# Patient Record
Sex: FEMALE | Race: WHITE | Employment: FULL TIME | URBAN - METROPOLITAN AREA
[De-identification: names, ages, dates, MRNs, and addresses within clinical notes are randomized per-mention and may not be internally consistent; named-entity substitution may affect disease eponyms.]

---

## 2017-01-06 ENCOUNTER — TELEPHONE (OUTPATIENT)
Dept: FAMILY MEDICINE | Facility: CLINIC | Age: 62
End: 2017-01-06

## 2017-01-06 DIAGNOSIS — E78.5 HYPERLIPIDEMIA LDL GOAL <100: Primary | ICD-10-CM

## 2017-01-06 NOTE — TELEPHONE ENCOUNTER
Patient called and would like to know if  She could stop taking her fluoetine, lipitor and BP meds, due to her recent blood work that was done.  Wants to know what Dr. Arora thinks.    Cindy Horvath, [facility]

## 2017-01-09 RX ORDER — ATORVASTATIN CALCIUM 40 MG/1
40 TABLET, FILM COATED ORAL DAILY
Qty: 90 TABLET | Refills: 3 | Status: SHIPPED | OUTPATIENT
Start: 2017-01-09 | End: 2019-01-21

## 2017-01-09 NOTE — TELEPHONE ENCOUNTER
Patient returning call, please try patient again .     Latricia Collins [place] [facility]

## 2017-01-09 NOTE — TELEPHONE ENCOUNTER
Spoke with patient  She wants to stop the Prozac completely has only been taking one tablet and discussed it with Dr Arora at [facility] and now wants to stop , discussed will take one tab  Every other day for week and then stop  We review her cholesterol and she will continue on the statin refill sent   Will f/u in June P. [facility]  Clinic  RN/[place] Lynn

## 2017-01-16 RX ORDER — GLIPIZIDE 10 MG/1
10 TABLET, FILM COATED, EXTENDED RELEASE ORAL DAILY
Qty: 90 TABLET | Refills: 1 | Status: SHIPPED | OUTPATIENT
Start: 2017-01-16 | End: 2017-07-14

## 2017-01-16 NOTE — TELEPHONE ENCOUNTER
Glipizide XL  10mg         Last Written Prescription Date: 10/18/2016 #90 x 0  Last filled - not provided, mail order pharmacy, [facility]  Last Office Visit with [facility], [facility] or [facility] prescribing provider:  12/14/2016 WALESKA Arora        BP Readings from Last 3 Encounters:   12/14/16 120/76   03/09/16 112/80   12/23/15 120/74     MICROL       <5   12/14/2016  No results found for this basename: microalbumin  CREATININE   Date Value Ref Range Status   03/09/2016 0.69 0.52 - 1.04 mg/dL Final   ]  GFR ESTIMATE   Date Value Ref Range Status   03/09/2016 86 >60 mL/min/1.7m2 Final     Comment:     Non  GFR Calc   10/22/2015 81 >60 mL/min/1.7m2 Final     Comment:     Non  GFR Calc   09/29/2014 >90  Non  GFR Calc   >60 mL/min/1.7m2 Final     GFR ESTIMATE IF BLACK   Date Value Ref Range Status   03/09/2016 >90   GFR Calc   >60 mL/min/1.7m2 Final   10/22/2015 >90   GFR Calc   >60 mL/min/1.7m2 Final   09/29/2014 >90   GFR Calc   >60 mL/min/1.7m2 Final     CHOL      255   4/26/2012  HDL       43   4/26/2012  LDL       95   12/14/2016  LDL      172   4/26/2012  TRIG      197   4/26/2012  CHOLHDLRATIO      6.0   4/26/2012  AST       30   4/20/2011  ALT       26   4/20/2011  A1C      7.2   12/14/2016  A1C      7.9   3/9/2016  A1C      8.8   11/19/2015  A1C      8.3   7/13/2015  A1C      9.2   9/29/2014  POTASSIUM   Date Value Ref Range Status   03/09/2016 4.2 3.4 - 5.3 mmol/L Final

## 2017-01-16 NOTE — TELEPHONE ENCOUNTER
Prescription approved per [facility] Refill Protocol or patient Primary care provider (PCP)  JORDAN Sullivan RN/[place] Lynn

## 2017-01-24 DIAGNOSIS — F40.9 PHOBIA: Primary | ICD-10-CM

## 2017-01-24 RX ORDER — LORAZEPAM 1 MG/1
1 TABLET ORAL EVERY 8 HOURS PRN
Qty: 30 TABLET | Refills: 0 | Status: SHIPPED | OUTPATIENT
Start: 2017-01-24 | End: 2019-01-21

## 2017-01-24 NOTE — TELEPHONE ENCOUNTER
Routing refill request to provider for review/approval because:  Drug not on the [facility] refill protocol or controlled substance  [facility]  RN/[place] Lynn

## 2017-01-24 NOTE — TELEPHONE ENCOUNTER
Reason for Call:  Medication or medication refill:    Do you use a [facility]?  Name of the pharmacy and phone number for the current request:  See above    Name of the medication requested: lorazepam    Other request: patient is request this medication as she is leaving for [place] sea[place] trip on 2[place]10.  Please notify patient when ready.    Can we leave a detailed message on this number? YES    Phone number patient can be reached at: Home number on file [phone] (home)    Best Time:     Call taken on 1/24/2017 at 9:35 AM by Angeline Horvath

## 2017-02-03 ENCOUNTER — TELEPHONE (OUTPATIENT)
Dept: FAMILY MEDICINE | Facility: CLINIC | Age: 62
End: 2017-02-03

## 2017-02-03 NOTE — Clinical Note
[facility]  [address] [facility] [id number] [phone]      February 3, 2017      Marianne Monroy  [address] [place] [facility] [address]        Dear Marianne,     As part of [facility]'s commitment to health and wellness we have recently reviewed your chart and your medical record indicates that you are due for one or more of the following:    -- Physical/pap smear. The last pap that we have on file for you was from 2/15/2012. These are recommended every 3 years. Please call our clinic to schedule your pap smear / physical appointment.     -- Colonoscopy or FIT test. Please call one of the following numbers to schedule a colonoscopy:  [facility] [phone]  [facility] [phone]  [facility] [phone]  [facility] [phone] (multiple sites, call for locations)    A colonoscopy is the gold standard but if you are reluctant to do this there is a less invasive and less expensive alternative. FIT (fecal immunochemical testing) is a screening option that is performed on a yearly basis. This is a test to check for blood in the stool, which can be performed in the comfort of your own home. If you are willing to perform this test, we can order the kit for you to  at our lab. When you have completed the test, you simply mail it in the pre-addressed envelope.    Please call us at [phone] if you need an order for a colonoscopy or FIT testing.    Please try to schedule and/or complete the tests above within the next 2-4 weeks.   The number to call to schedule an appointment at [facility] is [phone].    While we work hard to maintain accurate records on all our patients, it is always possible that this notice does not accurately reflect tests that you may have had. To ensure that we do not continue to send you notices please verify, at your next visit or by a Hello[employer] message, that we have accurate dates of your tests, even if  these were done many years ago.     Working together for your health is our goal.    Thank you for choosing [facility] for your healthcare needs.      Sincerely,     Abigail Arora MD

## 2017-02-03 NOTE — TELEPHONE ENCOUNTER
Panel Management Review      Patient has the following on her problem list:     Depression / Dysthymia review  PHQ-9 SCORE 12/23/2015 3/9/2016 12/14/2016   Total Score - - -   Total Score 2 1 2      Patient is due for:  None    Diabetes    ASA: Passed    Last A1C  A1C      7.2   12/14/2016  A1C      7.9   3/9/2016  A1C      8.8   11/19/2015  A1C      8.3   7/13/2015  A1C      9.2   9/29/2014  A1C tested: Passed    Last LDL:    CHOL      255   4/26/2012  HDL       43   4/26/2012  LDL       95   12/14/2016  LDL      172   4/26/2012  TRIG      197   4/26/2012  CHOLHDLRATIO      6.0   4/26/2012  No results found for this basename: nhdl    Is the patient on a Statin? YES             Is the patient on Aspirin? YES    Medications     HMG CoA Reductase Inhibitors    atorvastatin (LIPITOR) 40 MG tablet    Salicylates    ASPIRIN 81 MG OR TABS          Last three blood pressure readings:  BP Readings from Last 3 Encounters:   12/14/16 120/76   03/09/16 112/80   12/23/15 120/74       Date of last diabetes office visit: 12/14/16     Tobacco History:     History   Smoking status     Never Smoker    Smokeless tobacco     Never Used             Composite cancer screening  Chart review shows that this patient is due/due soon for the following Pap Smear and Colonoscopy  Summary:    Patient is due/failing the following:   PHYSICAL/PAP, also due for colonoscopy    Action needed:   Patient needs office visit for physical/pap.    Type of outreach:    Sent letter.    Questions for provider review:    None                                                                                                                                    Alejandra Stallings CMA

## 2017-02-20 DIAGNOSIS — I10 HYPERTENSION GOAL BP (BLOOD PRESSURE) < 140/90: ICD-10-CM

## 2017-02-20 NOTE — TELEPHONE ENCOUNTER
lisinopril      Last Written Prescription Date: 2-12-16  Last Fill Quantity: 90, # refills: 2  Last Office Visit with G, P or [facility] prescribing provider: 12-14-16 with Dr. Arora       Potassium   Date Value Ref Range Status   03/09/2016 4.2 3.4 - 5.3 mmol/L Final     Creatinine   Date Value Ref Range Status   03/09/2016 0.69 0.52 - 1.04 mg/dL Final     BP Readings from Last 3 Encounters:   12/14/16 120/76   03/09/16 112/80   12/23/15 120/74     Latricia Collins [facility]

## 2017-02-21 RX ORDER — LISINOPRIL 20 MG/1
20 TABLET ORAL DAILY
Qty: 90 TABLET | Refills: 2 | Status: SHIPPED | OUTPATIENT
Start: 2017-02-21 | End: 2017-12-20

## 2017-02-21 NOTE — TELEPHONE ENCOUNTER
Prescription approved per [facility] Refill Protocol or patient Primary care provider (PCP)  JORDAN Sullivan RN/[place]

## 2017-04-01 ENCOUNTER — TRANSFERRED RECORDS (OUTPATIENT)
Dept: HEALTH INFORMATION MANAGEMENT | Facility: CLINIC | Age: 62
End: 2017-04-01

## 2017-04-19 RX ORDER — METFORMIN HCL 500 MG
TABLET, EXTENDED RELEASE 24 HR ORAL
Qty: 360 TABLET | Refills: 2 | Status: SHIPPED | OUTPATIENT
Start: 2017-04-19 | End: 2018-01-14

## 2017-04-19 NOTE — TELEPHONE ENCOUNTER
metFORMIN (GLUCOPHAGE-XR) 500 MG 24 hr tablet         Last Written Prescription Date: 4/13/16  Last Fill Quantity: 360, # refills: 3  Last Office Visit with G, P or [facility] prescribing provider:  12/14/16        BP Readings from Last 3 Encounters:   12/14/16 120/76   03/09/16 112/80   12/23/15 120/74     Lab Results   Component Value Date    MICROL <5 12/14/2016     Lab Results   Component Value Date    UMALCR Unable to calculate due to low value 12/14/2016     Creatinine   Date Value Ref Range Status   03/09/2016 0.69 0.52 - 1.04 mg/dL Final   ]  GFR Estimate   Date Value Ref Range Status   03/09/2016 86 >60 mL/min/1.7m2 Final     Comment:     Non  GFR Calc   10/22/2015 81 >60 mL/min/1.7m2 Final     Comment:     Non  GFR Calc   09/29/2014 >90  Non  GFR Calc   >60 mL/min/1.7m2 Final     GFR Estimate If Black   Date Value Ref Range Status   03/09/2016 >90   GFR Calc   >60 mL/min/1.7m2 Final   10/22/2015 >90   GFR Calc   >60 mL/min/1.7m2 Final   09/29/2014 >90   GFR Calc   >60 mL/min/1.7m2 Final     Lab Results   Component Value Date    CHOL 255 04/26/2012     Lab Results   Component Value Date    HDL 43 04/26/2012     Lab Results   Component Value Date    LDL 95 12/14/2016     04/26/2012     Lab Results   Component Value Date    TRIG 197 04/26/2012     Lab Results   Component Value Date    CHOLHDLRATIO 6.0 04/26/2012     Lab Results   Component Value Date    AST 30 04/20/2011     Lab Results   Component Value Date    ALT 26 04/20/2011     Lab Results   Component Value Date    A1C 7.2 12/14/2016    A1C 7.9 03/09/2016    A1C 8.8 11/19/2015    A1C 8.3 07/13/2015    A1C 9.2 09/29/2014     Potassium   Date Value Ref Range Status   03/09/2016 4.2 3.4 - 5.3 mmol/L Final

## 2017-04-19 NOTE — TELEPHONE ENCOUNTER
Prescription approved per [facility] Refill Protocol or patient Primary care provider (PCP)  JORDAN Sullivan RN/[place]

## 2017-05-01 NOTE — PROGRESS NOTES
SUBJECTIVE:                                                    Marianne Monroy is a 62 year old female who presents to clinic today for the following health issues:        Diabetes Follow-up  Metformin 2,000mg qd, glipizide XL 10mg qd  Patient is checking blood sugars: 1-2 times daily.    Results are as follows:         Rangin - 140    Diabetic concerns: None     Symptoms of hypoglycemia (low blood sugar): none     Paresthesias (numbness or burning in feet) or sores: No     Date of last diabetic eye exam: 1 month ago     - patient says that her diet has changed.     Hyperlipidemia Follow-Up  Atorvastatin 40mg qd    Rate your low fat/cholesterol diet?: fair    Taking statin?  Yes, no muscle aches from statin    Other lipid medications/supplements?:  none     Hypertension Follow-up  Lisinopril 20mg qd    Outpatient blood pressures are being checked at home.    Low Salt Diet: low salt     Depression Followup  Ativan 1mg q8h PRN - Patient states that she has discontinued Fluoxetine 20mg qd    Status since last visit: Worsened since discontinuing medication 1 month ago. She states that she wanted to see how she did without medication. She would like to discuss starting Buspirone     See PHQ-9 for current symptoms.  Other associated symptoms: Trouble sleeping    Complicating factors:   Significant life event:  No   Current substance abuse:  None  Anxiety or Panic symptoms:  Yes    PHQ-9  English PHQ-9   Any Language            Amount of exercise or physical activity: None    Problems taking medications regularly: No    Medication side effects: none    Diet: Watching what she eats        ROS:  Constitutional, HEENT, cardiovascular, pulmonary, gi and gu systems are negative, except as otherwise noted.    This document serves as a record of the services and decisions personally performed and made by Abigail Arora MD. It was created on his behalf by Tristian Bah, a trained medical scribe. The creation of this  "document is based the provider's statements to the medical scribe.  Tristian Bah 4:39 PM May 8, 2017      OBJECTIVE:                                                    /80  Pulse 80  Ht 5' 4\" (1.626 m)  Wt 158 lb 6 oz (71.8 kg)  BMI 27.19 kg/m2  Body mass index is 27.19 kg/(m^2).     GENERAL: healthy, alert and no distress  EYES: Eyes grossly normal to inspection, conjunctivae and sclerae normal  RESP: lungs clear to auscultation - no rales, rhonchi or wheezes  CV: regular rate and rhythm, normal S1 S2, no murmur  MS: no gross musculoskeletal defects noted, no edema  NEURO: Normal strength and tone, mentation intact and speech normal  PSYCH: mentation appears normal, affect normal/bright         ASSESSMENT/PLAN:                                                      (E11.65) Uncontrolled diabetes mellitus type 2 without complications (H)  (primary encounter diagnosis)  Comment: A1c is 8.4 today, increased from previous visit. Patient says her diet has changed due to increased anxiety since discontinuing SSRI therapy. Patient would like to change lifestyle before changing medications.   Plan: Basic metabolic panel  (Ca, Cl, CO2, Creat,         Gluc, K, Na, BUN), Hemoglobin A1c, FOOT EXAM    (F41.1) Generalized anxiety disorder  Comment: Increased anxiety since discontinuing SSRI therapy. Will start buspar. Patient should continue using Ativan PRN.    Plan: busPIRone (BUSPAR) 5 MG tablet          (K21.9) Gastroesophageal reflux disease, esophagitis presence not specified  Comment: Patient is doing well with PPI therapy.   Plan: Continue with monitor.         Patient Instructions   *    Great that you went to [place]!    *    For the anxiety, try buspar. Start slowly.     *    No change in medications.     *    Follow up in 3 months.    *    Call with any questions.         Patient will follow up in 3 months or sooner, PRN. Patient instructed to call with any questions or concerns.      The information in this " document, created by a scribe for me, accurately reflects the services I personally performed and the decisions made by me. I have reviewed and approved this document for accuracy. 4:40 PM 5/8/2017    Abigail Arora MD  [facility]

## 2017-05-08 ENCOUNTER — OFFICE VISIT (OUTPATIENT)
Dept: FAMILY MEDICINE | Facility: CLINIC | Age: 62
End: 2017-05-08
Payer: COMMERCIAL

## 2017-05-08 VITALS
HEIGHT: 64 IN | HEART RATE: 80 BPM | BODY MASS INDEX: 27.04 KG/M2 | DIASTOLIC BLOOD PRESSURE: 80 MMHG | SYSTOLIC BLOOD PRESSURE: 126 MMHG | WEIGHT: 158.38 LBS

## 2017-05-08 DIAGNOSIS — I10 HYPERTENSION, GOAL BELOW 140/90: ICD-10-CM

## 2017-05-08 DIAGNOSIS — K21.9 GASTROESOPHAGEAL REFLUX DISEASE, ESOPHAGITIS PRESENCE NOT SPECIFIED: ICD-10-CM

## 2017-05-08 DIAGNOSIS — F41.1 GENERALIZED ANXIETY DISORDER: ICD-10-CM

## 2017-05-08 DIAGNOSIS — E78.5 HYPERLIPIDEMIA LDL GOAL <100: ICD-10-CM

## 2017-05-08 LAB
ANION GAP SERPL CALCULATED.3IONS-SCNC: 8 MMOL/L (ref 3–14)
BUN SERPL-MCNC: 17 MG/DL (ref 7–30)
CALCIUM SERPL-MCNC: 9.6 MG/DL (ref 8.5–10.1)
CHLORIDE SERPL-SCNC: 102 MMOL/L (ref 94–109)
CO2 SERPL-SCNC: 32 MMOL/L (ref 20–32)
CREAT SERPL-MCNC: 0.8 MG/DL (ref 0.52–1.04)
GFR SERPL CREATININE-BSD FRML MDRD: 72 ML/MIN/1.7M2
GLUCOSE SERPL-MCNC: 125 MG/DL (ref 70–99)
HBA1C MFR BLD: 8.4 % (ref 4.3–6)
POTASSIUM SERPL-SCNC: 4.1 MMOL/L (ref 3.4–5.3)
SODIUM SERPL-SCNC: 142 MMOL/L (ref 133–144)

## 2017-05-08 PROCEDURE — 36415 COLL VENOUS BLD VENIPUNCTURE: CPT | Performed by: FAMILY MEDICINE

## 2017-05-08 PROCEDURE — 99207 C FOOT EXAM  NO CHARGE: CPT | Performed by: FAMILY MEDICINE

## 2017-05-08 PROCEDURE — 99214 OFFICE O/P EST MOD 30 MIN: CPT | Performed by: FAMILY MEDICINE

## 2017-05-08 PROCEDURE — 80048 BASIC METABOLIC PNL TOTAL CA: CPT | Performed by: FAMILY MEDICINE

## 2017-05-08 PROCEDURE — 83036 HEMOGLOBIN GLYCOSYLATED A1C: CPT | Performed by: FAMILY MEDICINE

## 2017-05-08 RX ORDER — BUSPIRONE HYDROCHLORIDE 5 MG/1
TABLET ORAL
Qty: 150 TABLET | Refills: 0 | Status: SHIPPED | OUTPATIENT
Start: 2017-05-08 | End: 2017-06-02

## 2017-05-08 NOTE — PATIENT INSTRUCTIONS
*    Great that you went to [place]!    *    For the anxiety, try buspar. Start slowly.     *    No change in medications.     *    Follow up in 3 months.    *    Call with any questions.

## 2017-05-08 NOTE — Clinical Note
Please abstract the following data from this visit with this patient into the appropriate field in Epic:  Eye exam with ophthalmology on this date: 4/1/2017

## 2017-05-08 NOTE — NURSING NOTE
"Chief Complaint   Patient presents with     Diabetes     Depression       Initial /80  Pulse 80  Ht 5' 4\" (1.626 m)  Wt 158 lb 6 oz (71.8 kg)  BMI 27.19 kg/m2 Estimated body mass index is 27.19 kg/(m^2) as calculated from the following:    Height as of this encounter: 5' 4\" (1.626 m).    Weight as of this encounter: 158 lb 6 oz (71.8 kg).  Medication Reconciliation: complete  "

## 2017-05-09 ASSESSMENT — ANXIETY QUESTIONNAIRES
7. FEELING AFRAID AS IF SOMETHING AWFUL MIGHT HAPPEN: NOT AT ALL
1. FEELING NERVOUS, ANXIOUS, OR ON EDGE: MORE THAN HALF THE DAYS
GAD7 TOTAL SCORE: 11
3. WORRYING TOO MUCH ABOUT DIFFERENT THINGS: SEVERAL DAYS
5. BEING SO RESTLESS THAT IT IS HARD TO SIT STILL: MORE THAN HALF THE DAYS
2. NOT BEING ABLE TO STOP OR CONTROL WORRYING: SEVERAL DAYS
6. BECOMING EASILY ANNOYED OR IRRITABLE: NEARLY EVERY DAY

## 2017-05-09 ASSESSMENT — PATIENT HEALTH QUESTIONNAIRE - PHQ9: 5. POOR APPETITE OR OVEREATING: MORE THAN HALF THE DAYS

## 2017-05-10 ASSESSMENT — ANXIETY QUESTIONNAIRES: GAD7 TOTAL SCORE: 11

## 2017-05-10 ASSESSMENT — PATIENT HEALTH QUESTIONNAIRE - PHQ9: SUM OF ALL RESPONSES TO PHQ QUESTIONS 1-9: 8

## 2017-06-02 ENCOUNTER — TELEPHONE (OUTPATIENT)
Dept: FAMILY MEDICINE | Facility: CLINIC | Age: 62
End: 2017-06-02

## 2017-06-02 DIAGNOSIS — F33.0 MAJOR DEPRESSIVE DISORDER, RECURRENT EPISODE, MILD (H): ICD-10-CM

## 2017-06-02 NOTE — TELEPHONE ENCOUNTER
Patient called  that she has questions about her buspar medication.    [facility]

## 2017-06-02 NOTE — TELEPHONE ENCOUNTER
"Patient presents at clinic requesting a Fluoxetine 20 mg 2 tab daily script sent to our  [place] pharmacy. The Buspar is not working for her. \"It works for about 2 hours and then I feel my anxiety ramping up again.\"    Routing to provider for script. Patient is waiting.    Aydee Saavedra RN    "

## 2017-07-19 RX ORDER — GLIPIZIDE 10 MG/1
TABLET, FILM COATED, EXTENDED RELEASE ORAL
Qty: 90 TABLET | Refills: 3 | Status: SHIPPED | OUTPATIENT
Start: 2017-07-19 | End: 2018-07-13

## 2017-07-19 NOTE — TELEPHONE ENCOUNTER
Prescription approved per [facility] Refill Protocol or patient Primary care provider (PCP)  JORDAN Sullivan RN/[place]

## 2017-08-19 ENCOUNTER — HEALTH MAINTENANCE LETTER (OUTPATIENT)
Age: 62
End: 2017-08-19

## 2017-11-17 ENCOUNTER — OFFICE VISIT (OUTPATIENT)
Dept: FAMILY MEDICINE | Facility: CLINIC | Age: 62
End: 2017-11-17
Payer: COMMERCIAL

## 2017-11-17 VITALS
SYSTOLIC BLOOD PRESSURE: 125 MMHG | HEART RATE: 80 BPM | DIASTOLIC BLOOD PRESSURE: 80 MMHG | BODY MASS INDEX: 25.88 KG/M2 | TEMPERATURE: 98.3 F | HEIGHT: 64 IN | WEIGHT: 151.6 LBS

## 2017-11-17 DIAGNOSIS — Z23 NEED FOR PROPHYLACTIC VACCINATION AND INOCULATION AGAINST INFLUENZA: ICD-10-CM

## 2017-11-17 DIAGNOSIS — F33.0 MAJOR DEPRESSIVE DISORDER, RECURRENT EPISODE, MILD (H): ICD-10-CM

## 2017-11-17 DIAGNOSIS — Z11.59 NEED FOR HEPATITIS C SCREENING TEST: ICD-10-CM

## 2017-11-17 DIAGNOSIS — N30.00 ACUTE CYSTITIS WITHOUT HEMATURIA: Primary | ICD-10-CM

## 2017-11-17 DIAGNOSIS — E11.9 TYPE 2 DIABETES MELLITUS WITHOUT COMPLICATION, WITHOUT LONG-TERM CURRENT USE OF INSULIN (H): ICD-10-CM

## 2017-11-17 DIAGNOSIS — I10 HYPERTENSION, GOAL BELOW 140/90: ICD-10-CM

## 2017-11-17 DIAGNOSIS — R30.0 DYSURIA: ICD-10-CM

## 2017-11-17 LAB
ALBUMIN UR-MCNC: NEGATIVE MG/DL
APPEARANCE UR: CLEAR
BACTERIA #/AREA URNS HPF: ABNORMAL /HPF
BILIRUB UR QL STRIP: NEGATIVE
COLOR UR AUTO: YELLOW
GLUCOSE UR STRIP-MCNC: NEGATIVE MG/DL
HBA1C MFR BLD: 7 % (ref 4.3–6)
HGB UR QL STRIP: NEGATIVE
KETONES UR STRIP-MCNC: NEGATIVE MG/DL
LEUKOCYTE ESTERASE UR QL STRIP: ABNORMAL
NITRATE UR QL: NEGATIVE
NON-SQ EPI CELLS #/AREA URNS LPF: ABNORMAL /LPF
PH UR STRIP: 7.5 PH (ref 5–7)
RBC #/AREA URNS AUTO: ABNORMAL /HPF
SOURCE: ABNORMAL
SP GR UR STRIP: 1.02 (ref 1–1.03)
UROBILINOGEN UR STRIP-ACNC: 1 EU/DL (ref 0.2–1)
WBC #/AREA URNS AUTO: ABNORMAL /HPF

## 2017-11-17 PROCEDURE — 83721 ASSAY OF BLOOD LIPOPROTEIN: CPT | Performed by: PHYSICIAN ASSISTANT

## 2017-11-17 PROCEDURE — 99214 OFFICE O/P EST MOD 30 MIN: CPT | Mod: 25 | Performed by: PHYSICIAN ASSISTANT

## 2017-11-17 PROCEDURE — 84443 ASSAY THYROID STIM HORMONE: CPT | Performed by: PHYSICIAN ASSISTANT

## 2017-11-17 PROCEDURE — 86803 HEPATITIS C AB TEST: CPT | Performed by: PHYSICIAN ASSISTANT

## 2017-11-17 PROCEDURE — 90686 IIV4 VACC NO PRSV 0.5 ML IM: CPT | Performed by: PHYSICIAN ASSISTANT

## 2017-11-17 PROCEDURE — 83036 HEMOGLOBIN GLYCOSYLATED A1C: CPT | Performed by: PHYSICIAN ASSISTANT

## 2017-11-17 PROCEDURE — 82043 UR ALBUMIN QUANTITATIVE: CPT | Performed by: PHYSICIAN ASSISTANT

## 2017-11-17 PROCEDURE — 80053 COMPREHEN METABOLIC PANEL: CPT | Performed by: PHYSICIAN ASSISTANT

## 2017-11-17 PROCEDURE — 81001 URINALYSIS AUTO W/SCOPE: CPT | Performed by: PHYSICIAN ASSISTANT

## 2017-11-17 PROCEDURE — 36415 COLL VENOUS BLD VENIPUNCTURE: CPT | Performed by: PHYSICIAN ASSISTANT

## 2017-11-17 PROCEDURE — 90471 IMMUNIZATION ADMIN: CPT | Performed by: PHYSICIAN ASSISTANT

## 2017-11-17 RX ORDER — SULFAMETHOXAZOLE/TRIMETHOPRIM 800-160 MG
1 TABLET ORAL 2 TIMES DAILY
Qty: 6 TABLET | Refills: 0 | Status: SHIPPED | OUTPATIENT
Start: 2017-11-17 | End: 2017-11-20

## 2017-11-17 NOTE — LETTER
November 21, 2017      Marianne M Porfirio  [address] [facility] [address]            Dear Marianne,     The rest of your labs look good.     Please follow-up if you have any questions or concerns.     Resulted Orders   Comprehensive metabolic panel   Result Value Ref Range    Sodium 138 133 - 144 mmol/L    Potassium 4.0 3.4 - 5.3 mmol/L    Chloride 101 94 - 109 mmol/L    Carbon Dioxide 31 20 - 32 mmol/L    Anion Gap 6 3 - 14 mmol/L    Glucose 53 (L) 70 - 99 mg/dL      Comment:      Non Fasting    Urea Nitrogen 18 7 - 30 mg/dL    Creatinine 0.74 0.52 - 1.04 mg/dL    GFR Estimate 79 >60 mL/min/1.7m2      Comment:      Non  GFR Calc    GFR Estimate If Black >90 >60 mL/min/1.7m2      Comment:       GFR Calc    Calcium 9.6 8.5 - 10.1 mg/dL    Bilirubin Total 0.3 0.2 - 1.3 mg/dL    Albumin 3.9 3.4 - 5.0 g/dL    Protein Total 7.9 6.8 - 8.8 g/dL    Alkaline Phosphatase 102 40 - 150 U/L    ALT 36 0 - 50 U/L    AST 16 0 - 45 U/L   Hemoglobin A1c   Result Value Ref Range    Hemoglobin A1C 7.0 (H) 4.3 - 6.0 %   Albumin Random Urine Quantitative with Creat Ratio   Result Value Ref Range    Creatinine Urine 150 mg/dL    Albumin Urine mg/L 27 mg/L    Albumin Urine mg/g Cr 18.20 0 - 25 mg/g Cr   *UA reflex to Microscopic   Result Value Ref Range    Color Urine Yellow     Appearance Urine Clear     Glucose Urine Negative NEG^Negative mg/dL    Bilirubin Urine Negative NEG^Negative    Ketones Urine Negative NEG^Negative mg/dL    Specific Gravity Urine 1.020 1.003 - 1.035    Blood Urine Negative NEG^Negative    pH Urine 7.5 (H) 5.0 - 7.0 pH    Protein Albumin Urine Negative NEG^Negative mg/dL    Urobilinogen Urine 1.0 0.2 - 1.0 EU/dL    Nitrite Urine Negative NEG^Negative    Leukocyte Esterase Urine Trace (A) NEG^Negative    Source Midstream Urine    LDL cholesterol direct   Result Value Ref Range    LDL Cholesterol Direct 136 (H) <100 mg/dL      Comment:      Above desirable:  100-129  mg/dl  Borderline High:  130-159 mg/dL  High:             160-189 mg/dL  Very high:       >189 mg/dl     Urine Microscopic   Result Value Ref Range    WBC Urine 2-5 (A) OTO2^O - 2 /HPF    RBC Urine O - 2 OTO2^O - 2 /HPF    Squamous Epithelial /LPF Urine Few FEW^Few /LPF    Bacteria Urine Few (A) NEG^Negative /HPF   TSH with free T4 reflex   Result Value Ref Range    TSH 3.11 0.40 - 4.00 mU/L   **Hepatitis C Screen Reflex to RNA FUTURE anytime   Result Value Ref Range    Hepatitis C Antibody Nonreactive NR^Nonreactive      Comment:      Assay performance characteristics have not been established for newborns,   infants, and children         If you have any questions or concerns, please call the clinic at the number listed above.       Sincerely,     Jannet Rick PA-C/ag

## 2017-11-17 NOTE — NURSING NOTE
"Chief Complaint   Patient presents with     UTI       Initial /80  Pulse 80  Temp 98.3  F (36.8  C) (Tympanic)  Ht 5' 4\" (1.626 m)  Wt 151 lb 9.6 oz (68.8 kg)  Breastfeeding? No  BMI 26.02 kg/m2 Estimated body mass index is 26.02 kg/(m^2) as calculated from the following:    Height as of this encounter: 5' 4\" (1.626 m).    Weight as of this encounter: 151 lb 9.6 oz (68.8 kg).  Medication Reconciliation: complete     Chey Trinh MA      "

## 2017-11-17 NOTE — MR AVS SNAPSHOT
After Visit Summary   11/17/2017    Marianne Monroy    MRN: [id number]           Patient Information     Date Of Birth          1955        Visit Information        Provider Department      11/17/2017 4:00 PM Jannet Rick PA-C [facility]        Today's Diagnoses     Uncontrolled type 2 diabetes mellitus without complication, without long-term current use of insulin (H)    -  1    Hypertension, goal below 140/90        Dysuria        Need for prophylactic vaccination and inoculation against influenza        Need for hepatitis C screening test        Acute cystitis without hematuria        Major depressive disorder, recurrent episode, mild (H)          Care Instructions    Great job getting your sugars down!  Follow-up in 4-6 months for a physical and pap.             Follow-ups after your visit        Future tests that were ordered for you today     Open Future Orders        Priority Expected Expires Ordered    **Hepatitis C Screen Reflex to RNA FUTURE anytime Routine 11/17/2017 11/17/2018 11/17/2017            Who to contact     Normal or non-critical lab and imaging results will be communicated to you by [employer], letter or phone within 4 business days after the clinic has received the results. If you do not hear from us within 7 days, please contact the clinic through [employer] or phone. If you have a critical or abnormal lab result, we will notify you by phone as soon as possible.  Submit refill requests through [employer] or call your pharmacy and they will forward the refill request to us. Please allow 3 business days for your refill to be completed.          If you need to speak with a  for additional information , please call: [phone]           Additional Information About Your Visit        [employer] Information     [employer] gives you secure access to your electronic health record. If you see a primary care provider, you can also send messages to your  "care team and make appointments. If you have questions, please call your primary care clinic.  If you do not have a primary care provider, please call [phone] and they will assist you.        Care EveryWhere ID     This is your Care EveryWhere ID. This could be used by other organizations to access your [facility] medical records  [id number]        Your Vitals Were     Pulse Temperature Height Breastfeeding? BMI (Body Mass Index)       80 98.3  F (36.8  C) (Tympanic) 5' 4\" (1.626 m) No 26.02 kg/m2        Blood Pressure from Last 3 Encounters:   11/17/17 125/80   05/08/17 126/80   12/14/16 120/76    Weight from Last 3 Encounters:   11/17/17 151 lb 9.6 oz (68.8 kg)   05/08/17 158 lb 6 oz (71.8 kg)   12/14/16 152 lb 2 oz (69 kg)              We Performed the Following     *UA reflex to Microscopic     Albumin Random Urine Quantitative with Creat Ratio     Comprehensive metabolic panel     FLU VAC, SPLIT VIRUS IM > 3 YO (QUADRIVALENT) [58202]     Hemoglobin A1c     LDL cholesterol direct     TSH with free T4 reflex     Urine Microscopic     Vaccine Administration, Initial [59771]          Today's Medication Changes          These changes are accurate as of: 11/17/17  4:20 PM.  If you have any questions, ask your nurse or doctor.               Start taking these medicines.        Dose/Directions    sulfamethoxazole-trimethoprim 800-160 MG per tablet   Commonly known as:  BACTRIM DS/SEPTRA DS   Used for:  Acute cystitis without hematuria   Started by:  Jannet Rick PA-C        Dose:  1 tablet   Take 1 tablet by mouth 2 times daily for 3 days   Quantity:  6 tablet   Refills:  0            Where to get your medicines      These medications were sent to [facility] [address]  [address], [facility] [address]     Phone:  [phone]     sulfamethoxazole-trimethoprim 800-160 MG per tablet                Primary Care Provider Office Phone # Fax #    Abigail Dior " MD [place] [phone]       [address] [place] [address]        Equal Access to Services     MAIDA PARRA : Hadii aad ku hadrenéego Adelinasammy, wakerenda lucreciaqsobeidaha, qanhita kaalmada franchesca, andrzej spivey. So [facility] [phone].    ATENCIÓN: Si habla español, tiene a lewis disposición servicios gratuitos de asistencia lingüística. Llame al [phone].    We comply with applicable federal civil rights laws and [place] laws. We do not discriminate on the basis of race, color, national origin, age, disability, sex, sexual orientation, or gender identity.            Thank you!     [facility]  for your care. Our goal is always to provide you with excellent care. Hearing back from our patients is one way we can continue to improve our services. Please take a few minutes to complete the written survey that you may receive in the mail after your visit with us. Thank you!             Your Updated Medication List - Protect others around you: Learn how to safely use, store and throw away your medicines at www.disposemymeds.org.          This list is accurate as of: 11/17/17  4:20 PM.  Always use your most recent med list.                   Brand Name Dispense Instructions for use Diagnosis    aspirin 81 MG tablet      ONE DAILY        atorvastatin 40 MG tablet    LIPITOR    90 tablet    Take 1 tablet (40 mg) by mouth daily    Hyperlipidemia LDL goal <100       blood glucose monitoring lancets     100 Box    1 each 2 times daily Use to test blood sugar 2 times daily or as directed.    Diabetes mellitus, type 2 (H)       blood glucose monitoring meter device kit    no brand specified    1 Kit    PER PATIENT HEALTH PLAN    Diabetes mellitus, type 2 (H)       blood glucose monitoring test strip    JESSE CONTOUR    100 each    1 strip by In Vitro route 2 times daily    Type 2 diabetes, HbA1c goal < 7% (H)       fish oil-omega-3 fatty acids 1000 MG  capsule      1 tablet daily        * FLUoxetine 20 MG capsule    PROZAC    180 capsule    2 tablets daily for mood.    Major depressive disorder, recurrent episode, mild (H)       * FLUoxetine 20 MG capsule    PROzac    60 capsule    Take 2 capsules (40 mg) by mouth daily    Major depressive disorder, recurrent episode, mild (H)       glipiZIDE 10 MG 24 hr tablet    GLUCOTROL XL    90 tablet    TAKE 1 TABLET DAILY    Uncontrolled diabetes mellitus type 2 without complications (H)       HERBALS      sleep med- prn        lisinopril 20 MG tablet    PRINIVIL/ZESTRIL    90 tablet    Take 1 tablet (20 mg) by mouth daily    Hypertension goal BP (blood pressure) < 140/90       LORazepam 1 MG tablet    ATIVAN    30 tablet    Take 1 tablet (1 mg) by mouth every 8 hours as needed for anxiety    Phobia       metFORMIN 500 MG 24 hr tablet    GLUCOPHAGE-XR    360 tablet    TAKE 4 TABLETS DAILY    Uncontrolled diabetes mellitus type 2 without complications (H)       Multiple vitamin Tabs      1 TABLET DAILY        omeprazole 20 MG CR capsule    priLOSEC    90 capsule    Take 1 capsule (20 mg) by mouth daily    GERD (gastroesophageal reflux disease)       sulfamethoxazole-trimethoprim 800-160 MG per tablet    BACTRIM DS/SEPTRA DS    6 tablet    Take 1 tablet by mouth 2 times daily for 3 days    Acute cystitis without hematuria       * Notice:  This list has 2 medication(s) that are the same as other medications prescribed for you. Read the directions carefully, and ask your doctor or other care provider to review them with you.

## 2017-11-18 LAB
ALBUMIN SERPL-MCNC: 3.9 G/DL (ref 3.4–5)
ALP SERPL-CCNC: 102 U/L (ref 40–150)
ALT SERPL W P-5'-P-CCNC: 36 U/L (ref 0–50)
ANION GAP SERPL CALCULATED.3IONS-SCNC: 6 MMOL/L (ref 3–14)
AST SERPL W P-5'-P-CCNC: 16 U/L (ref 0–45)
BILIRUB SERPL-MCNC: 0.3 MG/DL (ref 0.2–1.3)
BUN SERPL-MCNC: 18 MG/DL (ref 7–30)
CALCIUM SERPL-MCNC: 9.6 MG/DL (ref 8.5–10.1)
CHLORIDE SERPL-SCNC: 101 MMOL/L (ref 94–109)
CO2 SERPL-SCNC: 31 MMOL/L (ref 20–32)
CREAT SERPL-MCNC: 0.74 MG/DL (ref 0.52–1.04)
CREAT UR-MCNC: 150 MG/DL
GFR SERPL CREATININE-BSD FRML MDRD: 79 ML/MIN/1.7M2
GLUCOSE SERPL-MCNC: 53 MG/DL (ref 70–99)
LDLC SERPL DIRECT ASSAY-MCNC: 136 MG/DL
MICROALBUMIN UR-MCNC: 27 MG/L
MICROALBUMIN/CREAT UR: 18.2 MG/G CR (ref 0–25)
POTASSIUM SERPL-SCNC: 4 MMOL/L (ref 3.4–5.3)
PROT SERPL-MCNC: 7.9 G/DL (ref 6.8–8.8)
SODIUM SERPL-SCNC: 138 MMOL/L (ref 133–144)
TSH SERPL DL<=0.005 MIU/L-ACNC: 3.11 MU/L (ref 0.4–4)

## 2017-11-20 LAB — HCV AB SERPL QL IA: NONREACTIVE

## 2017-11-21 DIAGNOSIS — F33.0 MAJOR DEPRESSIVE DISORDER, RECURRENT EPISODE, MILD (H): Primary | ICD-10-CM

## 2017-12-20 DIAGNOSIS — I10 HYPERTENSION GOAL BP (BLOOD PRESSURE) < 140/90: ICD-10-CM

## 2017-12-21 RX ORDER — LISINOPRIL 20 MG/1
TABLET ORAL
Qty: 90 TABLET | Refills: 2 | Status: SHIPPED | OUTPATIENT
Start: 2017-12-21 | End: 2018-09-16

## 2017-12-21 NOTE — TELEPHONE ENCOUNTER
Prescription approved per [facility] Refill Protocol or patient Primary care provider (PCP)  JORDAN Sullivan RN/[place]

## 2017-12-21 NOTE — TELEPHONE ENCOUNTER
Requested Prescriptions   Pending Prescriptions Disp Refills     lisinopril (PRINIVIL/ZESTRIL) 20 MG tablet [Pharmacy Med Name: LISINOPRIL TABS 20MG] 90 tablet 2    Last Written Prescription Date:  02/21/2017 #90 x 0  Last filled 09/22/2017  Last Office Visit with [facility], [facility] or [facility] prescribing provider:  11/17/2017 WALESKA Rick   Future Office Visit:   none   Sig: TAKE 1 TABLET DAILY    ACE Inhibitors (Including Combos) Protocol Passed    12/20/2017 11:36 PM       Passed - Blood pressure under 140/90    BP Readings from Last 3 Encounters:   11/17/17 125/80   05/08/17 126/80   12/14/16 120/76                Passed - Recent or future visit with authorizing provider's specialty    Patient had office visit in the last year or has a visit in the next 30 days with authorizing provider.  See chart review.              Passed - Patient is age 18 or older       Passed - No active pregnancy on record       Passed - Normal serum creatinine on file in past 12 months    Recent Labs   Lab Test  11/17/17   1543   CR  0.74            Passed - Normal serum potassium on file in past 12 months    Recent Labs   Lab Test  11/17/17   1543   POTASSIUM  4.0            Passed - No positive pregnancy test in past 12 months

## 2018-01-08 DIAGNOSIS — F33.0 MAJOR DEPRESSIVE DISORDER, RECURRENT EPISODE, MILD (H): ICD-10-CM

## 2018-01-08 NOTE — TELEPHONE ENCOUNTER
"Requested Prescriptions   Pending Prescriptions Disp Refills     FLUoxetine (PROZAC) 20 MG capsule [Pharmacy Med Name: FLUOXETINE HCL CAPS 20MG] 180 capsule 1    Last Written Prescription Date:  06/02/2017 #180 x 1  Last filled 10/02/2017  Last Office Visit with [facility], [facility] or [facility] prescribing provider:  11/17/2017 WALESKA Rick   Future Office Visit: none      Sig: TAKE 2 CAPSULES DAILY FOR MOOD    SSRIs Protocol Failed  PHQ-9 SCORE 3/9/2016 12/14/2016 5/9/2017   Total Score - - -   Total Score 1 2 8         JUSTUS-7 SCORE 3/9/2016 12/14/2016 5/9/2017   Total Score 1 2 11           1/8/2018  5:24 PM       Failed - PHQ-9 score less than 5 in past 6 months    The PHQ-9 criteria is meant to fail. It requires a PHQ-9 score review         Passed - Patient is age 18 or older       Passed - No active pregnancy on record       Passed - No positive pregnancy test in last 12 months       Passed - Recent (6 mo) or future visit with authorizing provider's specialty    Patient had office visit in the last 6 months or has a visit in the next 30 days with authorizing provider.  See \"Patient Info\" tab in inbasket, or \"Choose Columns\" in Meds & Orders section of the refill encounter.             "

## 2018-01-10 ENCOUNTER — MYC MEDICAL ADVICE (OUTPATIENT)
Dept: OTHER | Age: 63
End: 2018-01-10

## 2018-01-10 DIAGNOSIS — F33.0 MAJOR DEPRESSIVE DISORDER, RECURRENT EPISODE, MILD (H): ICD-10-CM

## 2018-01-11 NOTE — TELEPHONE ENCOUNTER
Patient called and wants to know why her Fluoxetine has not been filled.  [facility]

## 2018-01-12 ASSESSMENT — PATIENT HEALTH QUESTIONNAIRE - PHQ9: SUM OF ALL RESPONSES TO PHQ QUESTIONS 1-9: 6

## 2018-01-12 NOTE — TELEPHONE ENCOUNTER
Please fill medication. Last PHQ-9 was 8. Then forward to Alejandra or nurse pool to do PHQ-9. Left message for patient to call back if she wants this at a local pharmacy as well. Thanks, Mena Duncan RN

## 2018-01-12 NOTE — TELEPHONE ENCOUNTER
Spoke with patient.  PHQ-9 updated and updated patient that script was sent.  Routing to Dr. Arora to review.    Aydee Saavedra RN

## 2018-01-12 NOTE — TELEPHONE ENCOUNTER
Pt states she is out of her fluoxetine and needs it asap to avoid withdrawal. Pt reports she does NOT use MyChart so she didn't receive the message from RN regarding PHQ9. Pt requesting call back after 2pm 1/12 so she can do it over the phone. Her mother is in the hospital so she will be gone until 2pm.      Janie Pelaez, [place]

## 2018-01-15 NOTE — TELEPHONE ENCOUNTER
"Requested Prescriptions   Pending Prescriptions Disp Refills     metFORMIN (GLUCOPHAGE-XR) 500 MG 24 hr tablet [Pharmacy Med Name: METFORMIN HCL ER TABS 500MG] 360 tablet 2    Last Written Prescription Date:  04/19/2017 #360 x 2  Last filled 10/17/2017  Last Office Visit with [facility], [facility] or [facility] prescribing provider:  11/17/2017 WALESKA Rick   Future Office Visit:  none    Sig: TAKE 4 TABLETS DAILY    Biguanide Agents Passed    1/14/2018 11:34 PM       Passed - Patient's BP is less than 140/90    BP Readings from Last 3 Encounters:   11/17/17 125/80   05/08/17 126/80   12/14/16 120/76                Passed - Patient has documented LDL within the past 12 mos.    Recent Labs   Lab Test  11/17/17   1543   LDL  136*            Passed - Patient has had a Microalbumin in the past 12 mos.    Recent Labs   Lab Test  11/17/17   1547   MICROL  27   UMALCR  18.20            Passed - Patient is age 10 or older       Passed - Patient has documented A1c within the specified period of time.    Recent Labs   Lab Test  11/17/17   1543   A1C  7.0*            Passed - Patient's CR is NOT>1.4 OR Patient's EGFR is NOT<45 within past 12 mos.    Recent Labs   Lab Test  11/17/17   1543   GFRESTIMATED  79   GFRESTBLACK  >90       Recent Labs   Lab Test  11/17/17   1543   CR  0.74            Passed - Patient does NOT have a diagnosis of CHF.       Passed - Patient is not pregnant       Passed - Patient has not had a positive pregnancy test within the past 12 mos.        Passed - Recent (6 mos) or future visit with authorizing provider's specialty    Patient had office visit in the last 6 months or has a visit in the next 30 days with authorizing provider.  See \"Patient Info\" tab in inbasket, or \"Choose Columns\" in Meds & Orders section of the refill encounter.             "

## 2018-01-19 RX ORDER — METFORMIN HCL 500 MG
TABLET, EXTENDED RELEASE 24 HR ORAL
Qty: 360 TABLET | Refills: 0 | Status: SHIPPED | OUTPATIENT
Start: 2018-01-19 | End: 2018-04-15

## 2018-01-19 NOTE — TELEPHONE ENCOUNTER
Patient called and LM that she is still waiting for her metformin to be filled.  Needs it done today.    Cindy Horvath, [facility]

## 2018-04-03 PROBLEM — F41.1 GENERALIZED ANXIETY DISORDER: Status: ACTIVE | Noted: 2018-04-03

## 2018-04-16 RX ORDER — METFORMIN HCL 500 MG
TABLET, EXTENDED RELEASE 24 HR ORAL
Qty: 360 TABLET | Refills: 0 | Status: SHIPPED | OUTPATIENT
Start: 2018-04-16 | End: 2018-07-15

## 2018-04-16 NOTE — TELEPHONE ENCOUNTER
"Requested Prescriptions   Pending Prescriptions Disp Refills     metFORMIN (GLUCOPHAGE-XR) 500 MG 24 hr tablet [Pharmacy Med Name: METFORMIN HCL ER TABS 500MG] 360 tablet 0    Last Written Prescription Date:  01/19/2018 #360 x 0  Last filled 01/19/2018  Last office visit: 11/17/2017 WALESKA Rick   Future Office Visit:  None   Sig: TAKE 4 TABLETS DAILY    Biguanide Agents Passed    4/15/2018  4:31 AM       Passed - Blood pressure less than 140/90 in past 6 months    BP Readings from Last 3 Encounters:   11/17/17 125/80   05/08/17 126/80   12/14/16 120/76                Passed - Patient has documented LDL within the past 12 mos.    Recent Labs   Lab Test  11/17/17   1543   LDL  136*            Passed - Patient has had a Microalbumin in the past 12 mos.    Recent Labs   Lab Test  11/17/17   1547   MICROL  27   UMALCR  18.20            Passed - Patient is age 10 or older       Passed - Patient has documented A1c within the specified period of time.    Recent Labs   Lab Test  11/17/17   1543   A1C  7.0*            Passed - Patient's CR is NOT>1.4 OR Patient's EGFR is NOT<45 within past 12 mos.    Recent Labs   Lab Test  11/17/17   1543   GFRESTIMATED  79   GFRESTBLACK  >90       Recent Labs   Lab Test  11/17/17   1543   CR  0.74            Passed - Patient does NOT have a diagnosis of CHF.       Passed - Patient is not pregnant       Passed - Patient has not had a positive pregnancy test within the past 12 mos.        Passed - Recent (6 mo) or future (30 days) visit within the authorizing provider's specialty    Patient had office visit in the last 6 months or has a visit in the next 30 days with authorizing provider or within the authorizing provider's specialty.  See \"Patient Info\" tab in inbasket, or \"Choose Columns\" in Meds & Orders section of the refill encounter.              "

## 2018-06-10 ENCOUNTER — HOSPITAL ENCOUNTER (EMERGENCY)
Facility: CLINIC | Age: 63
Discharge: HOME | End: 2018-06-10
Attending: FAMILY MEDICINE | Admitting: FAMILY MEDICINE
Payer: COMMERCIAL

## 2018-06-10 ENCOUNTER — APPOINTMENT (OUTPATIENT)
Dept: CT IMAGING | Facility: CLINIC | Age: 63
End: 2018-06-10
Attending: FAMILY MEDICINE
Payer: COMMERCIAL

## 2018-06-10 VITALS
HEART RATE: 95 BPM | RESPIRATION RATE: 16 BRPM | OXYGEN SATURATION: 99 % | TEMPERATURE: 98.4 F | BODY MASS INDEX: 25.75 KG/M2 | WEIGHT: 150 LBS | SYSTOLIC BLOOD PRESSURE: 164 MMHG | DIASTOLIC BLOOD PRESSURE: 84 MMHG

## 2018-06-10 DIAGNOSIS — S22.080A T12 COMPRESSION FRACTURE (H): ICD-10-CM

## 2018-06-10 PROCEDURE — 99284 EMERGENCY DEPT VISIT MOD MDM: CPT | Mod: 25

## 2018-06-10 PROCEDURE — 99284 EMERGENCY DEPT VISIT MOD MDM: CPT | Mod: Z6 | Performed by: FAMILY MEDICINE

## 2018-06-10 PROCEDURE — 72131 CT LUMBAR SPINE W/O DYE: CPT

## 2018-06-10 RX ORDER — OXYCODONE HYDROCHLORIDE 5 MG/1
5 TABLET ORAL EVERY 6 HOURS PRN
Qty: 12 TABLET | Refills: 0 | Status: SHIPPED | OUTPATIENT
Start: 2018-06-10 | End: 2018-06-25

## 2018-06-10 NOTE — ED AVS SNAPSHOT
[facility] Emergency Department    [address] [address]    Phone:  [phone]    Fax:  [phone]                                       Marianne Monroy   MRN: [id number]    Department:  [facility] Emergency Department   Date of Visit:  6/10/2018           After Visit Summary Signature Page     I have received my discharge instructions, and my questions have been answered. I have discussed any challenges I see with this plan with the nurse or doctor.    ..........................................................................................................................................  Patient/Patient Representative Signature      ..........................................................................................................................................  Patient Representative Print Name and Relationship to Patient    ..................................................               ................................................  Date                                            Time    ..........................................................................................................................................  Reviewed by Signature/Title    ...................................................              ..............................................  Date                                                            Time

## 2018-06-10 NOTE — ED PROVIDER NOTES
"  History     Chief Complaint   Patient presents with     Fall     6 foot fall off ladder, landed on bottom, heard a crunch, coming in with back pain      The history is provided by the patient.     Marianne Monroy is a 63 year old female with a past medical history of JUSTUS, hyperlipidemia, hypertension and Type II Diabetes who presents to the ED for evaluation of a fall. Patient states that was she on a 6 foot ladder, cleaning gutters, when she \"hit a soft spot\" and the ladder fell from underneath her. She tried jumping off the ladder, but that didn't work and she fell directly on her buttocks. She heard a \"crunch\" when she landed on the ground. This incident happened at 1300 today. She tried resting the area through the day but continues to have significant pain. She took a dose of Tylenol at 1300 which she has not had much relief from. She denies neck, chest, abdominal, foot or hip pain or injury. She denies weakness or numbness in lower extremities. She has no troubles ambulating. She is not on a blood thinner. Denies loss of consciousness.      Problem List:    Patient Active Problem List    Diagnosis Date Noted     Generalized anxiety disorder 04/03/2018     Priority: Medium     Major depressive disorder, recurrent episode, mild (H) 11/21/2017     Priority: Medium     Added per visit on 11/17/17.       Hypertension, goal below 140/90 09/02/2014     Priority: Medium     Diagnosis abstracted from previous encounter       Advanced directives, counseling/discussion 02/18/2014     Priority: Medium     Hyperlipidemia LDL goal <100 01/27/2013     Priority: Medium     January 27, 2013 above goal. , will change to atorvastatin 40 mg, recheck lipids in 1-2 months.        C. difficile diarrhea 05/25/2012     Priority: Medium     May 25, 2012 culture positive, will treat with flagyl.   January 27, 2013 clinically resolved.        GERD (gastroesophageal reflux disease) 04/07/2010     Priority: Medium     December " 2015: Omeprazole 20 mg prn, helps symptoms    2011 stable on chronic PPI therapy. One year refill.        Type 2 diabetes mellitus without complication, without long-term current use of insulin (H) 2010     Priority: Medium     2012  markedly elevated A1c, but improved from last year. Now 11.8. Poor compliance, an element of denial. Referral to diabetic education, discuss starting insulin, patient strongly felt she could not given herself injections.   .2013 now controlled, on glipizide, metformin. A1c 7.3.        Phobia 2008     Priority: Medium     2008  Intolerant of Effexor. Will give small rx of ativan. Max: 10 to 20 tablets per year.   2009 improved. Still has 12 of 20 tablets of ativan.   2011 limited usage, refill given.   Problem list name updated by automated process. Provider to review          Past Medical History:    Past Medical History:   Diagnosis Date     Abnormal Pap smear of cervix ~     Allergic rhinitis, cause unspecified      Anxiety state, unspecified      Unspecified essential hypertension        Past Surgical History:    Past Surgical History:   Procedure Laterality Date     NO HISTORY OF SURGERY         Family History:    Family History   Problem Relation Age of Onset     DIABETES Mother      hypertension, alive at 83     C.A.D. Mother      CANCER Father      lung cancer, smoker.   at age 53     Family History Negative Sister      OSTEOPOROSIS Sister        Social History:  Marital Status:   [2]  Social History   Substance Use Topics     Smoking status: Never Smoker     Smokeless tobacco: Never Used     Alcohol use Yes      Comment: social        Medications:      oxyCODONE IR (ROXICODONE) 5 MG tablet   ASPIRIN 81 MG OR TABS   atorvastatin (LIPITOR) 40 MG tablet   BLOOD GLUCOSE METER KIT   blood glucose monitoring (ACCU-CHEK MULTICLIX) lancets   blood glucose monitoring (JESSE CONTOUR) test  strip   FISH OIL 1000 MG OR CAPS   FLUoxetine (PROZAC) 20 MG capsule   glipiZIDE (GLUCOTROL XL) 10 MG 24 hr tablet   HERBALS   lisinopril (PRINIVIL/ZESTRIL) 20 MG tablet   LORazepam (ATIVAN) 1 MG tablet   metFORMIN (GLUCOPHAGE-XR) 500 MG 24 hr tablet   MULTIPLE VITAMIN OR TABS   omeprazole (PRILOSEC) 20 MG capsule         Review of Systems  Further problem focused system review negative.    Physical Exam   BP: 164/84  Pulse: 95  Temp: 98.4  F (36.9  C)  Resp: 16  Weight: 68 kg (150 lb)  SpO2: 99 %      Physical Exam  Nursing note and vitals were reviewed.  Constitutional: Awake and alert, adequately nourished and developed appearing 63-year-old in moderate discomfort, who does not appear acutely ill, and who answers questions appropriately and cooperates with examination.  HEENT: Atraumatic and normocephalic.  PERRL.  EOMI.   Neck: Full pain-free range of motion of the neck in 6 directions with no discomfort.  Pulmonary/Chest: Breathing is unlabored.      Musculoskeletal: Extremities are warm and well-perfused and without edema.  She moves them all freely.  Range of motion of the back reveals normal spinal curvatures.  Range of motion of the lumbar spine is markedly limited due to pain with 0  of extension, 20  of flexion and limited lateral bending and rotation.  There is no tenderness to palpation over the length of the spine.  There is some tenderness with percussion over the upper lumbar spine without underlying erythema, swelling, deformity.  Neurological: Alert, oriented, thought content logical, coherent.  Motor strength is intact in the lower extremities on manual muscle testing.  Motor tone is normal.  Reflexes are symmetric.  Skin: Warm, dry, no rashes.  Psychiatric: Affect broad and appropriate.      ED Course     ED Course     Procedures               Critical Care time:  none               Results for orders placed or performed during the hospital encounter of 06/10/18 (from the past 24 hour(s))    Lumbar spine CT w/o contrast    Narrative    CT LUMBAR SPINE WITHOUT CONTRAST 6/10/2018 6:19 PM     HISTORY: Fall off ladder 6 feet onto buttocks, pain at mid back  extends down to sacrum, denies leg pain.    TECHNIQUE: Axial images were obtained through the lumbar spine without  contrast. Coronal and sagittal images were also acquired. Radiation  dose for this scan was reduced using automated exposure control,  adjustment of the mA and/or kV according to patient size, or iterative  reconstruction technique.    FINDINGS: Six nonrib-bearing vertebral bodies are present. For the  purposes of this report, we will assume that T12 has absent ribs.  There is an acute fracture involving the superior portion of the T12  vertebral body with minimal wedging. There is no evidence for any  posterior protrusion. Posterior elements are intact. No other  fractures are present. Disc spaces are all preserved. There is no  evidence for any stenosis. Minimal degenerative facet disease is seen  in the lower lumbar spine. Vascular calcifications are noted.      Impression    IMPRESSION: T12 superior endplate compression fracture with minimal  anterior wedging. No evidence for any posterior protrusion or  stenosis. Please note that there are six nonrib-bearing vertebral  bodies and T12 is considered to have absent ribs for the purposes of  this report.         Medications - No data to display    1737 PM. Patient assessed. Course of care outlined.    Assessments & Plan (with Medical Decision Making)     63-year-old female presents with low back pain after a fall from a ladder as described above.  She has no neurologic symptoms and no neurologic signs.  Pain was moderate.  She has no evidence of other injuries.  CT scan of the lumbosacral spine shows an acute T12 compression fracture.  This is a small fracture.  There are no retropulsed fragments.  Posterior elements are intact.  I discussed the findings with the patient and her friend.   We discussed pain medication and conservative management.  She will use acetaminophen and ibuprofen and I gave her a prescription for oxycodone for back breakthrough pain.  I asked her to follow-up in primary care next week for recheck.    I have reviewed the nursing notes.    I have reviewed the findings, diagnosis, plan and need for follow up with the patient.       New Prescriptions    OXYCODONE IR (ROXICODONE) 5 MG TABLET    Take 1 tablet (5 mg) by mouth every 6 hours as needed for pain       Final diagnoses:   T12 compression fracture (H)     This document serves as a record of services personally performed by Latrell Goldne MD. It was created on their behalf by Farideh Soria, a trained medical scribe. The creation of this record is based on the provider's personal observations and the statements of the patient. This document has been checked and approved by the attending provider.    Note: Chart documentation done in part with Dragon Voice Recognition software. Although reviewed after completion, some word and grammatical errors may remain.      6/10/2018   [facility] EMERGENCY DEPARTMENT     Latrell Golden MD  06/10/18 0354

## 2018-06-10 NOTE — ED AVS SNAPSHOT
[facility] Emergency Department    [address] [address]    Phone:  [phone]    Fax:  [phone]                                       Marianne Monroy   MRN: [id number]    Department:  [facility] Emergency Department   Date of Visit:  6/10/2018           Patient Information     Date Of Birth          1955        Your diagnoses for this visit were:     T12 compression fracture (H)        You were seen by Latrell Golden MD.        Discharge Instructions       Use ibuprofen 400-600 mg up to 4 times per day if needed for pain. Stop if it is causing nausea or abdominal pain.   Add acetaminophen 500 mg 1-2 pills up to every 4 hours if needed for pain.   You may add oxycodone 5 mg, 1-2 tablets up to every 6 hours if needed for pain.  Try to use this primarily only at night to help with sleep.    Do not use alcohol, operate machinery, drive, or climb on ladders for 8 hours after taking oxycodone. Use docusate (100mg) 2 times a day to prevent constipation while on narcotics.  Schedule follow up with Dr. Arora for next week.  Take Vitamin D3, 5688-2549 units per day.        Discharge References/Attachments     FRACTURE, VERTEBRAL COMPRESSION (ENGLISH)      24 Hour Appointment Hotline       To make an appointment at any [place] clinic, call [phone] ([phone]). If you don't have a family doctor or clinic, we will help you find one. [place] clinics are conveniently located to serve the needs of you and your family.             Review of your medicines      START taking        Dose / Directions Last dose taken    oxyCODONE IR 5 MG tablet   Commonly known as:  ROXICODONE   Dose:  5 mg   Quantity:  12 tablet        Take 1 tablet (5 mg) by mouth every 6 hours as needed for pain   Refills:  0          Our records show that you are taking the medicines listed below. If these are incorrect, please call your family doctor or clinic.        Dose / Directions Last dose taken     aspirin 81 MG tablet        ONE DAILY   Refills:  3        atorvastatin 40 MG tablet   Commonly known as:  LIPITOR   Dose:  40 mg   Quantity:  90 tablet        Take 1 tablet (40 mg) by mouth daily   Refills:  3        blood glucose monitoring lancets   Dose:  1 each   Quantity:  100 Box        1 each 2 times daily Use to test blood sugar 2 times daily or as directed.   Refills:  prn        blood glucose monitoring meter device kit   Commonly known as:  no brand specified   Quantity:  1 Kit        PER PATIENT HEALTH PLAN   Refills:  0        blood glucose monitoring test strip   Commonly known as:  JESSE CONTOUR   Dose:  1 strip   Quantity:  100 each        1 strip by In Vitro route 2 times daily   Refills:  5        fish oil-omega-3 fatty acids 1000 MG capsule        1 tablet daily   Refills:  0        FLUoxetine 20 MG capsule   Commonly known as:  PROZAC   Quantity:  180 capsule        2 tablets daily for mood.   Refills:  1        glipiZIDE 10 MG 24 hr tablet   Commonly known as:  GLUCOTROL XL   Quantity:  90 tablet        TAKE 1 TABLET DAILY   Refills:  3        HERBALS        sleep med- prn   Refills:  0        lisinopril 20 MG tablet   Commonly known as:  PRINIVIL/ZESTRIL   Quantity:  90 tablet        TAKE 1 TABLET DAILY   Refills:  2        LORazepam 1 MG tablet   Commonly known as:  ATIVAN   Dose:  1 mg   Quantity:  30 tablet        Take 1 tablet (1 mg) by mouth every 8 hours as needed for anxiety   Refills:  0        metFORMIN 500 MG 24 hr tablet   Commonly known as:  GLUCOPHAGE-XR   Quantity:  360 tablet        TAKE 4 TABLETS DAILY   Refills:  0        Multiple vitamin Tabs        1 TABLET DAILY   Refills:  0        omeprazole 20 MG CR capsule   Commonly known as:  priLOSEC   Dose:  20 mg   Quantity:  90 capsule        Take 1 capsule (20 mg) by mouth daily   Refills:  2                Information about OPIOIDS     PRESCRIPTION OPIOIDS: WHAT YOU NEED TO KNOW   You have a prescription for an opioid (narcotic)  pain medicine. Opioids can cause addiction. If you have a history of chemical dependency of any type, you are at a higher risk of becoming addicted to opioids. Only take this medicine after all other options have been tried. Take it for as short a time and as few doses as possible.     Do not:    Drive. If you drive while taking these medicines, you could be arrested for driving under the influence (DUI).    Operate heavy machinery    Do any other dangerous activities while taking these medicines.     Drink any alcohol while taking these medicines.      Take with any other medicines that contain acetaminophen. Read all labels carefully. Look for the word  acetaminophen  or  Tylenol.  Ask your pharmacist if you have questions or are unsure.    Store your pills in a secure place, locked if possible. We will not replace any lost or stolen medicine. If you don t finish your medicine, please throw away (dispose) as directed by your pharmacist. The [place] [employer] has more information about safe disposal: https://www.pca.[facility]./living-green/managing-unwanted-medications    All opioids tend to cause constipation. Drink plenty of water and eat foods that have a lot of fiber, such as fruits, vegetables, prune juice, apple juice and high-fiber cereal. Take a laxative (Miralax, milk of magnesia, Colace, Senna) if you don t move your bowels at least every other day.         Prescriptions were sent or printed at these locations (1 Prescription)                   [facility] [address]   [address] [address]    Telephone:  [phone]   Fax:  [phone]   Hours:                  Printed at Department/Unit printer (1 of 1)         oxyCODONE IR (ROXICODONE) 5 MG tablet                Procedures and tests performed during your visit     Lumbar spine CT w/o contrast      Orders Needing Specimen Collection     None      Pending Results     Date and Time  Order Name Status Description    6/10/2018 1743 Lumbar spine CT w/o contrast Preliminary             Pending Culture Results     No orders found from 6/8/2018 to 6/11/2018.            Pending Results Instructions     If you had any lab results that were not finalized at the time of your Discharge, you can call the ED Lab Result RN at [phone]. You will be contacted by this team for any positive Lab results or changes in treatment. The nurses are available 7 days a week from 10A to 6:30P.  You can leave a message 24 hours per day and they will return your call.        Test Results From Your Hospital Stay        6/10/2018  6:35 PM      Narrative     CT LUMBAR SPINE WITHOUT CONTRAST 6/10/2018 6:19 PM     HISTORY: Fall off ladder 6 feet onto buttocks, pain at mid back  extends down to sacrum, denies leg pain.    TECHNIQUE: Axial images were obtained through the lumbar spine without  contrast. Coronal and sagittal images were also acquired. Radiation  dose for this scan was reduced using automated exposure control,  adjustment of the mA and/or kV according to patient size, or iterative  reconstruction technique.    FINDINGS: Six nonrib-bearing vertebral bodies are present. For the  purposes of this report, we will assume that T12 has absent ribs.  There is an acute fracture involving the superior portion of the T12  vertebral body with minimal wedging. There is no evidence for any  posterior protrusion. Posterior elements are intact. No other  fractures are present. Disc spaces are all preserved. There is no  evidence for any stenosis. Minimal degenerative facet disease is seen  in the lower lumbar spine. Vascular calcifications are noted.        Impression     IMPRESSION: T12 superior endplate compression fracture with minimal  anterior wedging. No evidence for any posterior protrusion or  stenosis. Please note that there are six nonrib-bearing vertebral  bodies and T12 is considered to have absent ribs for the  purposes of  this report.                  Thank you for choosing [facility]       Thank you for choosing [facility] for your care. Our goal is always to provide you with excellent care. Hearing back from our patients is one way we can continue to improve our services. Please take a few minutes to complete the written survey that you may receive in the mail after you visit with us. Thank you!        [employer] Information     [facility] gives you secure access to your electronic health record. If you see a primary care provider, you can also send messages to your care team and make appointments. If you have questions, please call your primary care clinic.  If you do not have a primary care provider, please call [phone] and they will assist you.        Care EveryWhere ID     This is your Care EveryWhere ID. This could be used by other organizations to access your [facility] medical records  [id number]        Equal Access to Services     MAIDA PARRA : Mj Adames, flaco garcia, andrzej ragland. [facility] [phone].    ATENCIÓN: Si habla español, tiene a lewis disposición servicios gratuitos de asistencia lingüística. Sylvester al [phone].    We comply with applicable federal civil rights laws and [place] laws. We do not discriminate on the basis of race, color, national origin, age, disability, sex, sexual orientation, or gender identity.            After Visit Summary       This is your record. Keep this with you and show to your community pharmacist(s) and doctor(s) at your next visit.

## 2018-06-10 NOTE — ED NOTES
Pt here after falling from a 6 foot ladder around 1300. Pt was unable to get up for about 15 minutes afterwards due to low back pain. Took tylenol at 1330 and ice packs with no relief. She states pain has not gotten worse but not getting any better. Denies LOC or hitting her head.

## 2018-06-12 ENCOUNTER — OFFICE VISIT (OUTPATIENT)
Dept: FAMILY MEDICINE | Facility: CLINIC | Age: 63
End: 2018-06-12
Payer: COMMERCIAL

## 2018-06-12 VITALS
DIASTOLIC BLOOD PRESSURE: 64 MMHG | WEIGHT: 150 LBS | SYSTOLIC BLOOD PRESSURE: 122 MMHG | HEIGHT: 64 IN | HEART RATE: 70 BPM | BODY MASS INDEX: 25.61 KG/M2

## 2018-06-12 DIAGNOSIS — E11.9 TYPE 2 DIABETES MELLITUS WITHOUT COMPLICATION, WITHOUT LONG-TERM CURRENT USE OF INSULIN (H): ICD-10-CM

## 2018-06-12 DIAGNOSIS — R11.0 NAUSEA: ICD-10-CM

## 2018-06-12 DIAGNOSIS — S22.080A COMPRESSION FRACTURE OF T12 VERTEBRA (H): Primary | ICD-10-CM

## 2018-06-12 LAB
GLUCOSE BLD-MCNC: 116 MG/DL (ref 70–99)
HBA1C MFR BLD: 7.2 % (ref 0–5.6)

## 2018-06-12 PROCEDURE — 82947 ASSAY GLUCOSE BLOOD QUANT: CPT | Performed by: FAMILY MEDICINE

## 2018-06-12 PROCEDURE — 99214 OFFICE O/P EST MOD 30 MIN: CPT | Performed by: FAMILY MEDICINE

## 2018-06-12 PROCEDURE — 83036 HEMOGLOBIN GLYCOSYLATED A1C: CPT | Performed by: FAMILY MEDICINE

## 2018-06-12 PROCEDURE — 36416 COLLJ CAPILLARY BLOOD SPEC: CPT | Performed by: FAMILY MEDICINE

## 2018-06-12 RX ORDER — OXYCODONE HYDROCHLORIDE 5 MG/1
5 TABLET ORAL EVERY 6 HOURS PRN
Qty: 12 TABLET | Refills: 0 | Status: CANCELLED | OUTPATIENT
Start: 2018-06-12

## 2018-06-12 RX ORDER — ONDANSETRON 8 MG/1
8 TABLET, FILM COATED ORAL EVERY 8 HOURS PRN
Qty: 20 TABLET | Refills: 1 | Status: SHIPPED | OUTPATIENT
Start: 2018-06-12 | End: 2019-01-21

## 2018-06-12 RX ORDER — POLYETHYLENE GLYCOL 3350 17 G/17G
1 POWDER, FOR SOLUTION ORAL 2 TIMES DAILY
Qty: 510 G | Refills: 1 | Status: SHIPPED | OUTPATIENT
Start: 2018-06-12 | End: 2019-09-24

## 2018-06-12 RX ORDER — HYDROMORPHONE HYDROCHLORIDE 4 MG/1
4 TABLET ORAL EVERY 6 HOURS PRN
Qty: 40 TABLET | Refills: 0 | Status: SHIPPED | OUTPATIENT
Start: 2018-06-12 | End: 2018-06-25

## 2018-06-12 ASSESSMENT — PAIN SCALES - GENERAL: PAINLEVEL: SEVERE PAIN (6)

## 2018-06-12 NOTE — PATIENT INSTRUCTIONS
*   This will be a frustrating experience.     *    Needs to heal.     *    Listen to the pain.     *    In 2 weeks, set up physical therapy. [phone].    *     Try a different pain medication: dilaudid.     *     Will check an A1c    *      Won't be able to work for 2 weeks, then see as needed.

## 2018-06-12 NOTE — MR AVS SNAPSHOT
After Visit Summary   6/12/2018    Marianne Monroy    MRN: [id number]           Patient Information     Date Of Birth          1955        Visit Information        Provider Department      6/12/2018 3:00 PM Abigail Arora MD [facility]        Today's Diagnoses     Compression fracture of T12 vertebra (H)    -  1    Nausea        Type 2 diabetes mellitus without complication, without long-term current use of insulin (H)          Care Instructions    *   This will be a frustrating experience.     *    Needs to heal.     *    Listen to the pain.     *    In 2 weeks, set up physical therapy. [phone].    *     Try a different pain medication: dilaudid.     *     Will check an A1c    *      Won't be able to work for 2 weeks, then see as needed.               Follow-ups after your visit        Additional Services     [facility], AND CH[facility]RACT[facility] REFERRAL       **This order will print in the [facility] [place]**    Physical Therapy, Hand Therapy and Chiropractic Care are available through:    *[facility]  *[facility]  *[facility]    Call one number to schedule at any of the above locations: [phone].    Your provider has referred you to: Physical Therapy at [facility] or [facility]    Indication/Reason for Referral: Low Back Pain  Onset of Illness: days  Therapy Orders: Evaluate and Treat  Special Programs: None  Special Request: None    Danielle Santos      Additional Comments for the Therapist or Chiropractor:     Please be aware that coverage of these services is subject to the terms and limitations of your health insurance plan.  Call member services at your health plan with any benefit or coverage questions.      Please bring the following to your appointment:    *Your personal calendar for scheduling future appointments  *Comfortable clothing                  Who to contact     Normal or non-critical lab and imaging  "results will be communicated to you by MyChart, letter or phone within 4 business days after the clinic has received the results. If you do not hear from us within 7 days, please contact the clinic through [employer] or phone. If you have a critical or abnormal lab result, we will notify you by phone as soon as possible.  Submit refill requests through [email or web address] or call your pharmacy and they will forward the refill request to us. Please allow 3 business days for your refill to be completed.          If you need to speak with a  for additional information , please call: [phone]           Additional Information About Your Visit        [email or web address] Information     [email or web address] gives you secure access to your electronic health record. If you see a primary care provider, you can also send messages to your care team and make appointments. If you have questions, please call your primary care clinic.  If you do not have a primary care provider, please call [phone] and they will assist you.        Care EveryWhere ID     This is your Care EveryWhere ID. This could be used by other organizations to access your [place] medical records  [id number]        Your Vitals Were     Pulse Height BMI (Body Mass Index)             70 5' 4\" (1.626 m) 25.75 kg/m2          Blood Pressure from Last 3 Encounters:   06/12/18 122/64   06/10/18 164/84   11/17/17 125/80    Weight from Last 3 Encounters:   06/12/18 150 lb (68 kg)   06/10/18 150 lb (68 kg)   11/17/17 151 lb 9.6 oz (68.8 kg)              We Performed the Following     Glucose, whole blood     Hemoglobin A1c     [facility], AND CHIR[facility]RACTIC REFERRAL          Today's Medication Changes          These changes are accurate as of 6/12/18  3:42 PM.  If you have any questions, ask your nurse or doctor.               Start taking these medicines.        Dose/Directions    HYDROmorphone 4 MG tablet   Commonly known as:  DILAUDID   Used for:  Compression fracture of T12 " vertebra (H)   Started by:  Abigail Arora MD        Dose:  4 mg   Take 1 tablet (4 mg) by mouth every 6 hours as needed for severe pain   Quantity:  40 tablet   Refills:  0       ondansetron 8 MG tablet   Commonly known as:  ZOFRAN   Used for:  Nausea   Started by:  Abigail Arora MD        Dose:  8 mg   Take 1 tablet (8 mg) by mouth every 8 hours as needed for nausea   Quantity:  20 tablet   Refills:  1       polyethylene glycol powder   Commonly known as:  MIRALAX   Used for:  Type 2 diabetes mellitus without complication, without long-term current use of insulin (H)   Started by:  Abigail Arora MD        Dose:  1 capful   Take 17 g (1 capful) by mouth 2 times daily prn   Quantity:  510 g   Refills:  1            Where to get your medicines      These medications were sent to [facility] [address]  [address] [address]     Phone:  [phone]     ondansetron 8 MG tablet    polyethylene glycol powder         Some of these will need a paper prescription and others can be bought over the counter.  Ask your nurse if you have questions.     Bring a paper prescription for each of these medications     HYDROmorphone 4 MG tablet               Information about OPIOIDS     PRESCRIPTION OPIOIDS: WHAT YOU NEED TO KNOW   We gave you an opioid (narcotic) pain medicine. It is important to manage your pain, but opioids are not always the best choice. You should first try all the other options your care team gave you. Take this medicine for as short a time (and as few doses) as possible.     These medicines have risks:    DO NOT drive when on new or higher doses of pain medicine. These medicines can affect your alertness and reaction times, and you could be arrested for driving under the influence (DUI). If you need to use opioids long-term, talk to your care team about driving.    DO NOT operate heave machinery    DO NOT do any other dangerous  activities while taking these medicines.     DO NOT drink any alcohol while taking these medicines.      If the opioid prescribed includes acetaminophen, DO NOT take with any other medicines that contain acetaminophen. Read all labels carefully. Look for the word  acetaminophen  or  Tylenol.  Ask your pharmacist if you have questions or are unsure.    You can get addicted to pain medicines, especially if you have a history of addiction (chemical, alcohol or substance dependence). Talk to your care team about ways to reduce this risk.    Store your pills in a secure place, locked if possible. We will not replace any lost or stolen medicine. If you don t finish your medicine, please throw away (dispose) as directed by your pharmacist. The [place] Pollution Control Agency has more information about safe disposal: https://www.pca.[facility].[place].us/living-green/managing-unwanted-medications.     All opioids tend to cause constipation. Drink plenty of water and eat foods that have a lot of fiber, such as fruits, vegetables, prune juice, apple juice and high-fiber cereal. Take a laxative (Miralax, milk of magnesia, Colace, Senna) if you don t move your bowels at least every other day.          Primary Care Provider Office Phone # Fax #    Abigail Arora [phone] [address] [place] [address]        Equal Access to Services     MAIDA PARRA AH: Hadii kevan ku hadasho Soomaali, waaxda luqadaha, qaybta kaalmada adeegyada, andrzej bae haymoon spivey. So [facility] [phone].    ATENCIÓN: Si habla español, tiene a lewis disposición servicios gratuitos de asistencia lingüística. Llame al [phone].    We comply with applicable federal civil rights laws and [place] laws. We do not discriminate on the basis of race, color, national origin, age, disability, sex, sexual orientation, or gender identity.            Thank you!     Thank you for choosing [facility] [place]  for your  care. Our goal is always to provide you with excellent care. Hearing back from our patients is one way we can continue to improve our services. Please take a few minutes to complete the written survey that you may receive in the mail after your visit with us. Thank you!             Your Updated Medication List - Protect others around you: Learn how to safely use, store and throw away your medicines at www.disposemymeds.org.          This list is accurate as of 6/12/18  3:42 PM.  Always use your most recent med list.                   Brand Name Dispense Instructions for use Diagnosis    aspirin 81 MG tablet      ONE DAILY        atorvastatin 40 MG tablet    LIPITOR    90 tablet    Take 1 tablet (40 mg) by mouth daily    Hyperlipidemia LDL goal <100       blood glucose monitoring lancets     100 Box    1 each 2 times daily Use to test blood sugar 2 times daily or as directed.    Diabetes mellitus, type 2 (H)       blood glucose monitoring meter device kit    no brand specified    1 Kit    PER PATIENT HEALTH PLAN    Diabetes mellitus, type 2 (H)       blood glucose monitoring test strip    JESSE CONTOUR    100 each    1 strip by In Vitro route 2 times daily    Type 2 diabetes, HbA1c goal < 7% (H)       fish oil-omega-3 fatty acids 1000 MG capsule      1 tablet daily        FLUoxetine 20 MG capsule    PROZAC    180 capsule    2 tablets daily for mood.    Major depressive disorder, recurrent episode, mild (H)       glipiZIDE 10 MG 24 hr tablet    GLUCOTROL XL    90 tablet    TAKE 1 TABLET DAILY    Uncontrolled diabetes mellitus type 2 without complications (H)       HERBALS      sleep med- prn        HYDROmorphone 4 MG tablet    DILAUDID    40 tablet    Take 1 tablet (4 mg) by mouth every 6 hours as needed for severe pain    Compression fracture of T12 vertebra (H)       lisinopril 20 MG tablet    PRINIVIL/ZESTRIL    90 tablet    TAKE 1 TABLET DAILY    Hypertension goal BP (blood pressure) < 140/90       LORazepam 1 MG  tablet    ATIVAN    30 tablet    Take 1 tablet (1 mg) by mouth every 8 hours as needed for anxiety    Phobia       metFORMIN 500 MG 24 hr tablet    GLUCOPHAGE-XR    360 tablet    TAKE 4 TABLETS DAILY    Uncontrolled diabetes mellitus type 2 without complications (H)       Multiple vitamin Tabs      1 TABLET DAILY        omeprazole 20 MG CR capsule    priLOSEC    90 capsule    Take 1 capsule (20 mg) by mouth daily    GERD (gastroesophageal reflux disease)       ondansetron 8 MG tablet    ZOFRAN    20 tablet    Take 1 tablet (8 mg) by mouth every 8 hours as needed for nausea    Nausea       oxyCODONE IR 5 MG tablet    ROXICODONE    12 tablet    Take 1 tablet (5 mg) by mouth every 6 hours as needed for pain        polyethylene glycol powder    MIRALAX    510 g    Take 17 g (1 capful) by mouth 2 times daily prn    Type 2 diabetes mellitus without complication, without long-term current use of insulin (H)

## 2018-06-12 NOTE — PROGRESS NOTES
"  SUBJECTIVE:   Marianne Monroy is a 63 year old female who presents to clinic today for the following health issues:      ED/UC Followup:    Facility:  [place]  Date of visit: 6/10/2018  Reason for visit: Fall from ladder  Current Status: Dx T12 compression fracture. She is taking oxycodone 5mg q6h prn with improvement, she states if medication is taken on an empty stomach she does have nausea and threw up medication this morning. She states that she is fatigued and is sleeping a lot. She is using a walker to ambulate.       ROS:  Constitutional, HEENT, cardiovascular, pulmonary, gi and gu systems are negative, except as otherwise noted.    This document serves as a record of the services and decisions personally performed and made by Abigail Arora MD. It was created on his behalf by Tristian Bah, a trained medical scribe. The creation of this document is based the provider's statements to the medical scribe.  Tristian Bah 3:02 PM June 12, 2018  OBJECTIVE:   /64  Pulse 70  Ht 5' 4\" (1.626 m)  Wt 150 lb (68 kg)  BMI 25.75 kg/m2  Body mass index is 25.75 kg/(m^2).       GENERAL: healthy, alert and no distress  EYES: Eyes grossly normal to inspection, conjunctivae and sclerae normal  MS: no gross musculoskeletal defects noted, no edema  SKIN: no suspicious lesions or rashes  NEURO: Normal strength and tone, mentation intact and speech normal  PSYCH: mentation appears normal, affect normal/bright    Lab Results   Component Value Date    A1C 7.0 11/17/2017    A1C 8.4 05/08/2017    A1C 7.2 12/14/2016    A1C 7.9 03/09/2016    A1C 8.8 11/19/2015       Results for orders placed or performed in visit on 06/12/18   Glucose, whole blood   Result Value Ref Range    Glucose Whole Blood 116 (H) 70 - 99 mg/dL     ASSESSMENT/PLAN:     (S22.080A) Compression fracture of T12 vertebra (H)  (primary encounter diagnosis)  Comment: Fracture will heal with time. Narcotic pain medication does control the pain. She will " change medication due to side effects from oxycodone. I advised the patient to be as active as she can tolerate, allowing pain to be her guide. She understands that the fracture can take 6-8 weeks to heal. She should continue using walker to assist with ambulation. She was given note for work today. Patient will be prescribed PT but she understands she should not start PT for at least 2 weeks.   Plan: HYDROmorphone (DILAUDID) 4 MG tablet, [facility],         HAND, AND CHIROPRACTIC REFERRAL    (R11.0) Nausea  Comment: Glucose 116 today. Will prescribe Zofran to manage symptoms.   Plan: Glucose, whole blood, ondansetron (ZOFRAN) 8 MG        tablet          (E11.9) Type 2 diabetes mellitus without complication, without long-term current use of insulin (H)  Comment: A1c is 7.2. Controlled with 2 oral medications. Continue with medications.    Plan: Hemoglobin A1c, polyethylene glycol (MIRALAX)         Powder    Results for orders placed or performed in visit on 06/12/18   Glucose, whole blood   Result Value Ref Range    Glucose Whole Blood 116 (H) 70 - 99 mg/dL   Hemoglobin A1c   Result Value Ref Range    Hemoglobin A1C 7.2 (H) 0 - 5.6 %        Patient Instructions   *   This will be a frustrating experience.     *    Needs to heal.     *    Listen to the pain.     *    In 2 weeks, set up physical therapy. [phone].    *     Try a different pain medication: dilaudid.     *     Will check an A1c    *      Won't be able to work for 2 weeks, then see as needed.             Patient will follow up in 3 months or sooner, PRN. Patient instructed to call with any questions or concerns.    The information in this document, created by a scribe for me, accurately reflects the services I personally performed and the decisions made by me. I have reviewed and approved this document for accuracy. 3:02 PM 6/12/2018    Abigail Arora MD  [facility]

## 2018-06-14 ENCOUNTER — TELEPHONE (OUTPATIENT)
Dept: FAMILY MEDICINE | Facility: CLINIC | Age: 63
End: 2018-06-14

## 2018-06-14 NOTE — TELEPHONE ENCOUNTER
Patient was in on 6/13 to see Ulysses for severe back pain from fall off ladder.  She was prescribed 4 mg of morphine every 6 hours.  It isn't lasting long enough - only about 4 hours.  She wants to know if she can either increase dosage or take the 4 mg every 4 hours instead.   She is in constant pain.  Her phone number is [phone].    Please triage.    Jody Nguyen/[facility] [place]

## 2018-06-14 NOTE — TELEPHONE ENCOUNTER
Called and spoke with patient  Advised not to take hydromorphone any closer the 5 hours and to try to get to the 6 hours, advised to add IBUP 600mg QID opposite it for the inflammation , she may feels it not helping as much as she is being more active, to try and rest and limit activity for these first 2 weeks, she agreed  PLAN  Ice heat alt for comfort  Take pain med as ordered  Add IBUP in between  Limit activity  as need not to over due , due to taking pain med  Call Monday with up date  JORDAN Sullivan  RN/[place]

## 2018-06-15 ENCOUNTER — MYC MEDICAL ADVICE (OUTPATIENT)
Dept: FAMILY MEDICINE | Facility: CLINIC | Age: 63
End: 2018-06-15

## 2018-06-18 ENCOUNTER — MYC MEDICAL ADVICE (OUTPATIENT)
Dept: FAMILY MEDICINE | Facility: CLINIC | Age: 63
End: 2018-06-18

## 2018-06-19 ENCOUNTER — MYC MEDICAL ADVICE (OUTPATIENT)
Dept: FAMILY MEDICINE | Facility: CLINIC | Age: 63
End: 2018-06-19

## 2018-06-24 ENCOUNTER — MYC MEDICAL ADVICE (OUTPATIENT)
Dept: FAMILY MEDICINE | Facility: CLINIC | Age: 63
End: 2018-06-24

## 2018-06-25 ENCOUNTER — MYC MEDICAL ADVICE (OUTPATIENT)
Dept: FAMILY MEDICINE | Facility: CLINIC | Age: 63
End: 2018-06-25

## 2018-06-25 ENCOUNTER — OFFICE VISIT (OUTPATIENT)
Dept: FAMILY MEDICINE | Facility: CLINIC | Age: 63
End: 2018-06-25
Payer: COMMERCIAL

## 2018-06-25 VITALS
DIASTOLIC BLOOD PRESSURE: 70 MMHG | HEART RATE: 68 BPM | WEIGHT: 157.38 LBS | HEIGHT: 64 IN | BODY MASS INDEX: 26.87 KG/M2 | SYSTOLIC BLOOD PRESSURE: 124 MMHG

## 2018-06-25 DIAGNOSIS — S22.080A COMPRESSION FRACTURE OF T12 VERTEBRA (H): ICD-10-CM

## 2018-06-25 DIAGNOSIS — S22.000D CLOSED COMPRESSION FRACTURE OF THORACIC VERTEBRA WITH ROUTINE HEALING, SUBSEQUENT ENCOUNTER: Primary | ICD-10-CM

## 2018-06-25 PROCEDURE — 99214 OFFICE O/P EST MOD 30 MIN: CPT | Performed by: FAMILY MEDICINE

## 2018-06-25 RX ORDER — HYDROMORPHONE HYDROCHLORIDE 4 MG/1
4 TABLET ORAL EVERY 6 HOURS PRN
Qty: 40 TABLET | Refills: 0 | Status: SHIPPED | OUTPATIENT
Start: 2018-06-25 | End: 2018-07-18

## 2018-06-25 ASSESSMENT — PAIN SCALES - GENERAL: PAINLEVEL: SEVERE PAIN (6)

## 2018-06-25 NOTE — PROGRESS NOTES
SUBJECTIVE:   Marianne Monroy is a 63 year old female who presents to clinic today for the following health issues:    - Follow up after 6/12/2018 visit.    - Patient presents to clinic today with episodes of shortness of breath, wheezing and a productive cough. Symptoms have been present for the last 2 nights. She denies other cold symptoms. Unsure if this is related to recent fall.   - She does has had frequent back pain that will wake her up at night. For pain, she takes hydromorphone and acetaminophen-codeine. Will also take Miralax for constipation side effects.    Back Pain Follow Up  Hydromorphone 4mg q6h prn, acetaminophen-codeine 300-30mg q6h prn    Description:   Location of pain:  Right lumbar spine  Character of pain: Constant dull ache that becomes shooting  Pain radiation: Radiates to right side of buttock  Since last visit, pain is:  unchanged  New numbness or weakness in legs, not attributed to pain:  no     Intensity: Currently 6+/10    History:   Pain interferes with job: YES, she states that she did go to work this AM but had to leave due to pain  Therapies tried without relief: none  Therapies tried with relief: Tylenol #3 and Dulaudid, she states that she would like to discuss upping dose of tylenol       Accompanying Signs & Symptoms:  Risk of Fracture:  Recent history of compression fracture of T12 vertebra  Risk of Cauda Equina:  None  Risk of Infection:  None  Risk of Cancer:  None        Amount of exercise or physical activity: None due to pain    Problems taking medications regularly: No    Medication side effects: GI upset when taking oxycodone    Diet: regular (no restrictions)      Problem list and histories reviewed & adjusted, as indicated.  Additional history: as documented    Reviewed and updated as needed this visit by clinical staff  Tobacco  Meds  Med Hx  Surg Hx  Fam Hx  Soc Hx      Reviewed and updated as needed this visit by Provider         ROS:  Constitutional,  "HEENT, cardiovascular, pulmonary, gi and gu systems are negative, except as otherwise noted.    This document serves as a record of the services and decisions personally performed and made by Abigail Arora MD. It was created on his behalf by Janet Rea, a trained medical scribe. The creation of this document is based the provider's statements to the medical scribe.    Scribe Janet Rea 2:35 PM 6/25/2018      OBJECTIVE:     /70  Pulse 68  Ht 1.626 m (5' 4\")  Wt 71.4 kg (157 lb 6 oz)  BMI 27.01 kg/m2  Body mass index is 27.01 kg/(m^2).  GENERAL: Healthy, alert and no distress  EYES: Eyes grossly normal to inspection, conjunctivae and sclerae normal  MS: He continues to be lower thoracic point midline vertebral tenderness to palpation.  NEURO: Normal strength and tone, mentation intact and speech normal  PSYCH: Mentation appears normal, affect normal/bright     Diagnostic Test Results:  No results found for this or any previous visit (from the past 24 hour(s)).    ASSESSMENT/PLAN:   (S22.000D) Closed compression fracture of thoracic vertebra with routine healing, subsequent encounter  (primary encounter diagnosis)  Comment: Explained again that fracture will heal over time. Refilled hydromorphone for pain. It has only been 2 weeks since the injury and patient understands it can take 6-8 weeks for fracture to heal. Can start physical therapy at this time.  We will also discuss osteoporosis, patient will need a DEXA scan but for insurance purposes, may defer to age 64..  She did have a significant fall, this fracture may not be secondary to osteoporosis.  Plan: HYDROmorphone (DILAUDID) 4 MG tablet      (S22.080A) Compression fracture of T12 vertebra (H)  Comment: See above.  Plan: HYDROmorphone (DILAUDID) 4 MG tablet        Patient instructions:  - Return to clinic in 1 month for recheck.  - Take medications as prescribed.   - Continue to take Miralax for constipation symptoms.    Patient will follow up in 1 " month or sooner, PRN. Patient instructed to call with any questions or concerns.    The information in this document, created by a scribe for me, accurately reflects the services I personally performed and the decisions made by me. I have reviewed and approved this document for accuracy.

## 2018-06-25 NOTE — MR AVS SNAPSHOT
"              After Visit Summary   6/25/2018    Marianne Monroy    MRN: [id number]           Patient Information     Date Of Birth          1955        Visit Information        Provider Department      6/25/2018 2:20 PM Abigail Arora MD [facility]        Today's Diagnoses     Closed compression fracture of thoracic vertebra with routine healing, subsequent encounter    -  1    Compression fracture of T12 vertebra (H)           Follow-ups after your visit        Who to contact     Normal or non-critical lab and imaging results will be communicated to you by Easyworks Universehart, letter or phone within 4 business days after the clinic has received the results. If you do not hear from us within 7 days, please contact the clinic through Kaybust or phone. If you have a critical or abnormal lab result, we will notify you by phone as soon as possible.  Submit refill requests through iubenda or call your pharmacy and they will forward the refill request to us. Please allow 3 business days for your refill to be completed.          If you need to speak with a  for additional information , please call: [phone]           Additional Information About Your Visit        MyCharFundability Information     iubenda gives you secure access to your electronic health record. If you see a primary care provider, you can also send messages to your care team and make appointments. If you have questions, please call your primary care clinic.  If you do not have a primary care provider, please call [phone] and they will assist you.        Care EveryWhere ID     This is your Care EveryWhere ID. This could be used by other organizations to access your [facility] medical records  [id number]        Your Vitals Were     Pulse Height BMI (Body Mass Index)             68 5' 4\" (1.626 m) 27.01 kg/m2          Blood Pressure from Last 3 Encounters:   06/25/18 124/70   06/12/18 122/64   06/10/18 164/84    Weight from Last " 3 Encounters:   06/25/18 157 lb 6 oz (71.4 kg)   06/12/18 150 lb (68 kg)   06/10/18 150 lb (68 kg)              Today, you had the following     No orders found for display         Today's Medication Changes          These changes are accurate as of 6/25/18 11:59 PM.  If you have any questions, ask your nurse or doctor.               Stop taking these medicines if you haven't already. Please contact your care team if you have questions.     oxyCODONE IR 5 MG tablet   Commonly known as:  ROXICODONE   Stopped by:  Abigail Arora MD                Where to get your medicines      Some of these will need a paper prescription and others can be bought over the counter.  Ask your nurse if you have questions.     Bring a paper prescription for each of these medications     HYDROmorphone 4 MG tablet               Information about OPIOIDS     PRESCRIPTION OPIOIDS: WHAT YOU NEED TO KNOW   We gave you an opioid (narcotic) pain medicine. It is important to manage your pain, but opioids are not always the best choice. You should first try all the other options your care team gave you. Take this medicine for as short a time (and as few doses) as possible.     These medicines have risks:    DO NOT drive when on new or higher doses of pain medicine. These medicines can affect your alertness and reaction times, and you could be arrested for driving under the influence (DUI). If you need to use opioids long-term, talk to your care team about driving.    DO NOT operate heave machinery    DO NOT do any other dangerous activities while taking these medicines.     DO NOT drink any alcohol while taking these medicines.      If the opioid prescribed includes acetaminophen, DO NOT take with any other medicines that contain acetaminophen. Read all labels carefully. Look for the word  acetaminophen  or  Tylenol.  Ask your pharmacist if you have questions or are unsure.    You can get addicted to pain medicines, especially if you have a  history of addiction (chemical, alcohol or substance dependence). Talk to your care team about ways to reduce this risk.    Store your pills in a secure place, locked if possible. We will not replace any lost or stolen medicine. If you don t finish your medicine, please throw away (dispose) as directed by your pharmacist. The [place] Pollution Control Agency has more information about safe disposal: https://www.pca.[facility].[place]/living-green/managing-unwanted-medications.     All opioids tend to cause constipation. Drink plenty of water and eat foods that have a lot of fiber, such as fruits, vegetables, prune juice, apple juice and high-fiber cereal. Take a laxative (Miralax, milk of magnesia, Colace, Senna) if you don t move your bowels at least every other day.          Primary Care Provider Office Phone # Fax #    Abigail Arora [phone] [address] [place] [address]        Equal Access to Services     MELISSA [facility]ULIETH : Hadii aad ku hadasho Soomaali, waaxda luqadaha, qaybta kaalmada adeegyada, waxay olga lidiain haymartyn tej del rosario . So [facility] [phone].    ATENCIÓN: Si habla español, tiene a lewis disposición servicios gratuitos de asistencia lingüística. Llame al [phone].    We comply with applicable federal civil rights laws and [place] laws. We do not discriminate on the basis of race, color, national origin, age, disability, sex, sexual orientation, or gender identity.            Thank you!     Thank you for choosing [facility]3] [place]  for your care. Our goal is always to provide you with excellent care. Hearing back from our patients is one way we can continue to improve our services. Please take a few minutes to complete the written survey that you may receive in the mail after your visit with us. Thank you!             Your Updated Medication List - Protect others around you: Learn how to safely use, store and throw away your medicines at  www.disposemymeds.org.          This list is accurate as of 6/25/18 11:59 PM.  Always use your most recent med list.                   Brand Name Dispense Instructions for use Diagnosis    acetaminophen-codeine 300-30 MG per tablet    TYLENOL #3    20 tablet    Take 1 tablet by mouth every 6 hours as needed for severe pain    Closed compression fracture of thoracic vertebra with routine healing, subsequent encounter       aspirin 81 MG tablet      ONE DAILY        atorvastatin 40 MG tablet    LIPITOR    90 tablet    Take 1 tablet (40 mg) by mouth daily    Hyperlipidemia LDL goal <100       blood glucose monitoring lancets     100 Box    1 each 2 times daily Use to test blood sugar 2 times daily or as directed.    Diabetes mellitus, type 2 (H)       blood glucose monitoring meter device kit    no brand specified    1 Kit    PER PATIENT HEALTH PLAN    Diabetes mellitus, type 2 (H)       blood glucose monitoring test strip    JESSE CONTOUR    100 each    1 strip by In Vitro route 2 times daily    Type 2 diabetes, HbA1c goal < 7% (H)       fish oil-omega-3 fatty acids 1000 MG capsule      1 tablet daily        FLUoxetine 20 MG capsule    PROZAC    180 capsule    2 tablets daily for mood.    Major depressive disorder, recurrent episode, mild (H)       glipiZIDE 10 MG 24 hr tablet    GLUCOTROL XL    90 tablet    TAKE 1 TABLET DAILY    Uncontrolled diabetes mellitus type 2 without complications (H)       HERBALS      sleep med- prn        HYDROmorphone 4 MG tablet    DILAUDID    40 tablet    Take 1 tablet (4 mg) by mouth every 6 hours as needed for severe pain    Compression fracture of T12 vertebra (H), Closed compression fracture of thoracic vertebra with routine healing, subsequent encounter       lisinopril 20 MG tablet    PRINIVIL/ZESTRIL    90 tablet    TAKE 1 TABLET DAILY    Hypertension goal BP (blood pressure) < 140/90       LORazepam 1 MG tablet    ATIVAN    30 tablet    Take 1 tablet (1 mg) by mouth every 8  hours as needed for anxiety    Phobia       metFORMIN 500 MG 24 hr tablet    GLUCOPHAGE-XR    360 tablet    TAKE 4 TABLETS DAILY    Uncontrolled diabetes mellitus type 2 without complications (H)       Multiple vitamin Tabs      1 TABLET DAILY        omeprazole 20 MG CR capsule    priLOSEC    90 capsule    Take 1 capsule (20 mg) by mouth daily    GERD (gastroesophageal reflux disease)       ondansetron 8 MG tablet    ZOFRAN    20 tablet    Take 1 tablet (8 mg) by mouth every 8 hours as needed for nausea    Nausea       polyethylene glycol powder    MIRALAX    510 g    Take 17 g (1 capful) by mouth 2 times daily prn    Type 2 diabetes mellitus without complication, without long-term current use of insulin (H)

## 2018-07-16 RX ORDER — METFORMIN HCL 500 MG
TABLET, EXTENDED RELEASE 24 HR ORAL
Qty: 360 TABLET | Refills: 1 | Status: SHIPPED | OUTPATIENT
Start: 2018-07-16 | End: 2019-01-11

## 2018-07-16 RX ORDER — GLIPIZIDE 10 MG/1
TABLET, FILM COATED, EXTENDED RELEASE ORAL
Qty: 90 TABLET | Refills: 1 | Status: SHIPPED | OUTPATIENT
Start: 2018-07-16 | End: 2019-01-11

## 2018-07-16 NOTE — TELEPHONE ENCOUNTER
"Requested Prescriptions   Pending Prescriptions Disp Refills     glipiZIDE (GLUCOTROL XL) 10 MG 24 hr tablet [Pharmacy Med Name: GLIPIZIDE XL TABS 10MG] 90 tablet 3    Last Written Prescription Date:  07/19/2017 #90 x 3  Last filled 04/17/2018  Last office visit: 6/25/2018 WALESKA Arora   Future Office Visit:  None   Sig: TAKE 1 TABLET DAILY    Sulfonylurea Agents Passed    7/13/2018 11:27 PM       Passed - Blood pressure less than 140/90 in past 6 months    BP Readings from Last 3 Encounters:   06/25/18 124/70   06/12/18 122/64   06/10/18 164/84                Passed - Patient has documented LDL within the past 12 mos.    Recent Labs   Lab Test  11/17/17   1543   LDL  136*            Passed - Patient has had a Microalbumin in the past 12 mos.    Recent Labs   Lab Test  11/17/17   1547   MICROL  27   UMALCR  18.20            Passed - Patient has documented A1c within the specified period of time.    If HgbA1C is 8 or greater, it needs to be on file within the past 3 months.  If less than 8, must be on file within the past 6 months.     Recent Labs   Lab Test  06/12/18   1518   A1C  7.2*            Passed - Patient is age 18 or older       Passed - No active pregnancy on record       Passed - Patient has a recent creatinine (normal) within the past 12 mos.    Recent Labs   Lab Test  11/17/17   1543   CR  0.74            Passed - Patient has not had a positive pregnancy test within the past 12 mos.       Passed - Recent (6 mo) or future (30 days) visit within the authorizing provider's specialty    Patient had office visit in the last 6 months or has a visit in the next 30 days with authorizing provider or within the authorizing provider's specialty.  See \"Patient Info\" tab in inbasket, or \"Choose Columns\" in Meds & Orders section of the refill encounter.              "

## 2018-09-04 ENCOUNTER — TELEPHONE (OUTPATIENT)
Dept: FAMILY MEDICINE | Facility: CLINIC | Age: 63
End: 2018-09-04

## 2018-09-04 NOTE — LETTER
September 4, 2018      Marianne Monroy  [address] [place] [id number]        Dear Marianne,     As part of [facility]'s commitment to health and wellness we have reviewed your chart and it indicates that you are due for one or more of the following:    -- Pap smear. The last pap that we have on file for you was from 2/15/2012. These are recommended every 3 years. Please call our clinic to schedule your pap smear / physical appointment.     -- Lab tests: You are due for the following: Hgb A1C and Lipid profile. Plan to come fasting 8-10 hours prior to your appointment - nothing to eat or drink EXCEPT water and your medications.    Please try to schedule and/or complete the tests above within the next 2-4 weeks.   The number to call to schedule an appointment at [facility] is [phone].    While we work hard to maintain accurate records, it is always possible that this notice does not accurately reflect tests that you may have had. To ensure that we do not send you unnecessary notices please verify that we have accurate dates of your tests, even if these were done many years ago.     Thank you for choosing [facility] for your healthcare needs.      Sincerely,     Abigail Arora MD

## 2018-09-04 NOTE — TELEPHONE ENCOUNTER
Panel Management Review      Patient has the following on her problem list:     Depression / Dysthymia review    Measure:  Needs PHQ-9 score of 4 or less during index window.  Administer PHQ-9 and if score is 5 or more, send encounter to provider for next steps.    5 - 7 month window range: FAIL    PHQ-9 SCORE 12/14/2016 5/9/2017 1/12/2018   Total Score - - -   Total Score 2 8 6       If PHQ-9 recheck is 5 or more, route to provider for next steps.    Patient is due for:  PHQ9    Diabetes    ASA: Passed    Last A1C  Lab Results   Component Value Date    A1C 7.2 06/12/2018    A1C 7.0 11/17/2017    A1C 8.4 05/08/2017    A1C 7.2 12/14/2016    A1C 7.9 03/09/2016     A1C tested: Passed    Last LDL:    Lab Results   Component Value Date    CHOL 255 04/26/2012     Lab Results   Component Value Date    HDL 43 04/26/2012     Lab Results   Component Value Date     11/17/2017     04/26/2012     Lab Results   Component Value Date    TRIG 197 04/26/2012     Lab Results   Component Value Date    CHOLHDLRATIO 6.0 04/26/2012     No results found for: NHDL    Is the patient on a Statin? YES             Is the patient on Aspirin? YES    Medications     HMG CoA Reductase Inhibitors    atorvastatin (LIPITOR) 40 MG tablet    Salicylates    ASPIRIN 81 MG OR TABS          Last three blood pressure readings:  BP Readings from Last 3 Encounters:   06/25/18 124/70   06/12/18 122/64   06/10/18 164/84       Date of last diabetes office visit: 11/17/2017     Tobacco History:     History   Smoking Status     Never Smoker   Smokeless Tobacco     Never Used           Composite cancer screening  Chart review shows that this patient is due/due soon for the following Pap Smear, Colonoscopy and Fecal Colorectal (FIT)  Summary:    Patient is due/failing the following:   COLONOSCOPY and PAP    Action needed:   Patient needs office visit for physical/pap/med check with fasting labs. Will address need for colonoscopy/FIT test at  visit.    Type of outreach:    Sent letter.    Questions for provider review:    None                                                                                                                                    Alejandra Stallings CMA

## 2018-09-16 DIAGNOSIS — I10 HYPERTENSION GOAL BP (BLOOD PRESSURE) < 140/90: ICD-10-CM

## 2018-09-17 NOTE — TELEPHONE ENCOUNTER
"Requested Prescriptions   Pending Prescriptions Disp Refills     lisinopril (PRINIVIL/ZESTRIL) 20 MG tablet [Pharmacy Med Name: LISINOPRIL TABS 20MG] 90 tablet 2    Last Written Prescription Date:  12/21/2017 #90 x 2  Last filled 06/21/2018  Last office visit: 6/25/2018 WALESKA Arora   Future Office Visit:  None   Sig: TAKE 1 TABLET DAILY    ACE Inhibitors (Including Combos) Protocol Passed    9/16/2018 11:11 PM       Passed - Blood pressure under 140/90 in past 12 months    BP Readings from Last 3 Encounters:   06/25/18 124/70   06/12/18 122/64   06/10/18 164/84                Passed - Recent (12 mo) or future (30 days) visit within the authorizing provider's specialty    Patient had office visit in the last 12 months or has a visit in the next 30 days with authorizing provider or within the authorizing provider's specialty.  See \"Patient Info\" tab in inbasket, or \"Choose Columns\" in Meds & Orders section of the refill encounter.           Passed - Patient is age 18 or older       Passed - No active pregnancy on record       Passed - Normal serum creatinine on file in past 12 months    Recent Labs   Lab Test  11/17/17   1543   CR  0.74            Passed - Normal serum potassium on file in past 12 months    Recent Labs   Lab Test  11/17/17   1543   POTASSIUM  4.0            Passed - No positive pregnancy test in past 12 months          "

## 2018-09-18 RX ORDER — LISINOPRIL 20 MG/1
TABLET ORAL
Qty: 90 TABLET | Refills: 1 | Status: SHIPPED | OUTPATIENT
Start: 2018-09-18 | End: 2019-03-17

## 2018-09-18 NOTE — TELEPHONE ENCOUNTER
Prescription approved per [facility] [place].  Aydee Saavedra RN

## 2018-10-12 ENCOUNTER — MYC MEDICAL ADVICE (OUTPATIENT)
Dept: FAMILY MEDICINE | Facility: CLINIC | Age: 63
End: 2018-10-12

## 2018-10-12 DIAGNOSIS — F33.0 MAJOR DEPRESSIVE DISORDER, RECURRENT EPISODE, MILD (H): ICD-10-CM

## 2018-10-12 NOTE — TELEPHONE ENCOUNTER
"Called patient to clarify.  She is currently taking 2 tabs a day of the fluoxetine \"for anxiety\", she had sent a my chart message to Dr Arora in July asking about increasing the dose to 3 tabs, but did not increase.  She states she does occasionally take 3 tabs per day.  She was sent a PHQ9 and GAD7 to update.  Last office visit with note regarding mood 11/17/2017.  She states she has enough medication to last until refill can be approved and mailed by [facility].    Routing refill request to provider for review/approval because:  Last PHQ9 score of 6 outside protocol parameters.        Shy Huynh RN    "

## 2018-10-13 ASSESSMENT — PATIENT HEALTH QUESTIONNAIRE - PHQ9
10. IF YOU CHECKED OFF ANY PROBLEMS, HOW DIFFICULT HAVE THESE PROBLEMS MADE IT FOR YOU TO DO YOUR WORK, TAKE CARE OF THINGS AT HOME, OR GET ALONG WITH OTHER PEOPLE: NOT DIFFICULT AT ALL
SUM OF ALL RESPONSES TO PHQ QUESTIONS 1-9: 7
SUM OF ALL RESPONSES TO PHQ QUESTIONS 1-9: 7

## 2018-10-14 ASSESSMENT — PATIENT HEALTH QUESTIONNAIRE - PHQ9: SUM OF ALL RESPONSES TO PHQ QUESTIONS 1-9: 7

## 2019-01-14 RX ORDER — METFORMIN HCL 500 MG
TABLET, EXTENDED RELEASE 24 HR ORAL
Qty: 360 TABLET | Refills: 1 | Status: SHIPPED | OUTPATIENT
Start: 2019-01-14 | End: 2019-08-02

## 2019-01-14 RX ORDER — GLIPIZIDE 10 MG/1
TABLET, FILM COATED, EXTENDED RELEASE ORAL
Qty: 90 TABLET | Refills: 1 | Status: SHIPPED | OUTPATIENT
Start: 2019-01-14 | End: 2019-08-02

## 2019-01-14 NOTE — TELEPHONE ENCOUNTER
"Requested Prescriptions   Pending Prescriptions Disp Refills     glipiZIDE (GLUCOTROL XL) 10 MG 24 hr tablet [Pharmacy Med Name: GLIPIZIDE ER TABS 10MG] 90 tablet 1    Last Written Prescription Date:  07/16/2018 #90 x 1  Last filled 10/17/2018  Last office visit: 6/25/2018 WALESKA Arora   Future Office Visit: None    Sig: TAKE 1 TABLET DAILY    Sulfonylurea Agents Failed - 1/11/2019 11:47 PM       Failed - Blood pressure less than 140/90 in past 6 months    BP Readings from Last 3 Encounters:   06/25/18 124/70   06/12/18 122/64   06/10/18 164/84                Failed - Patient has documented LDL within the past 12 mos.    Recent Labs   Lab Test 11/17/17  1543   *            Failed - Patient has documented A1c within the specified period of time.    If HgbA1C is 8 or greater, it needs to be on file within the past 3 months.  If less than 8, must be on file within the past 6 months.     Recent Labs   Lab Test 06/12/18  1518   A1C 7.2*            Failed - Patient has a recent creatinine (normal) within the past 12 mos.    Recent Labs   Lab Test 11/17/17  1543   CR 0.74            Failed - Recent (6 mo) or future (30 days) visit within the authorizing provider's specialty    Patient had office visit in the last 6 months or has a visit in the next 30 days with authorizing provider or within the authorizing provider's specialty.  See \"Patient Info\" tab in inbasket, or \"Choose Columns\" in Meds & Orders section of the refill encounter.           Passed - Patient has had a Microalbumin in the past 12 mos.    Recent Labs   Lab Test 11/17/17  1547   MICROL 27   UMALCR 18.20            Passed - Medication is active on med list       Passed - Patient is age 18 or older       Passed - No active pregnancy on record       Passed - Patient has not had a positive pregnancy test within the past 12 mos.        metFORMIN (GLUCOPHAGE-XR) 500 MG 24 hr tablet [Pharmacy Med Name: METFORMIN HCL ER TABS 500MG] 360 tablet 1    Last " "Written Prescription Date:  07/16/2018 #360 x 1  Last filled 10/17/2018  Last office visit: 6/25/2018 with prescribing provider:  06/25/2018 WALESKA Arora   Future Office Visit:  None   Sig: TAKE 4 TABLETS DAILY    Biguanide Agents Failed - 1/11/2019 11:47 PM       Failed - Blood pressure less than 140/90 in past 6 months    BP Readings from Last 3 Encounters:   06/25/18 124/70   06/12/18 122/64   06/10/18 164/84                Failed - Patient has documented LDL within the past 12 mos.    Recent Labs   Lab Test 11/17/17  1543   *            Failed - Patient has documented A1c within the specified period of time.    If HgbA1C is 8 or greater, it needs to be on file within the past 3 months.  If less than 8, must be on file within the past 6 months.     Recent Labs   Lab Test 06/12/18  1518   A1C 7.2*            Failed - Recent (6 mo) or future (30 days) visit within the authorizing provider's specialty    Patient had office visit in the last 6 months or has a visit in the next 30 days with authorizing provider or within the authorizing provider's specialty.  See \"Patient Info\" tab in inbasket, or \"Choose Columns\" in Meds & Orders section of the refill encounter.           Passed - Patient has had a Microalbumin in the past 15 mos.    Recent Labs   Lab Test 11/17/17  1547   MICROL 27   UMALCR 18.20            Passed - Patient is age 10 or older       Passed - Patient's CR is NOT>1.4 OR Patient's EGFR is NOT<45 within past 12 mos.    Recent Labs   Lab Test 11/17/17  1543   GFRESTIMATED 79   GFRESTBLACK >90       Recent Labs   Lab Test 11/17/17  1543   CR 0.74            Passed - Patient does NOT have a diagnosis of CHF.       Passed - Medication is active on med list       Passed - Patient is not pregnant       Passed - Patient has not had a positive pregnancy test within the past 12 mos.           "

## 2019-01-14 NOTE — TELEPHONE ENCOUNTER
ML for pt  to callback will fill but pt is OVER DUE for a karinaalry general physical and fasting labs with Ulysses tucker advised appt   .;hr

## 2019-01-15 NOTE — PATIENT INSTRUCTIONS
Preventive Health Recommendations  Female Ages 50 - 64    *   I'll have our care coordination regarding care giver burnout. They will contact you.     *   The A1c, a 3 month average of your blood sugars, 7.1. This is very good.     *   No change in medications.     *   Your last pap smear.     *   Follow up in 6 months.     *   Tetanus booster today.         Yearly exam: See your health care provider every year in order to  o Review health changes.   o Discuss preventive care.    o Review your medicines if your doctor has prescribed any.      Get a Pap test every three years (unless you have an abnormal result and your provider advises testing more often).    If you get Pap tests with HPV test, you only need to test every 5 years, unless you have an abnormal result.     You do not need a Pap test if your uterus was removed (hysterectomy) and you have not had cancer.    You should be tested each year for STDs (sexually transmitted diseases) if you're at risk.     Have a mammogram every 1 to 2 years.    Have a colonoscopy at age 50, or have a yearly FIT test (stool test). These exams screen for colon cancer.      Have a cholesterol test every 5 years, or more often if advised.    Have a diabetes test (fasting glucose) every three years. If you are at risk for diabetes, you should have this test more often.     If you are at risk for osteoporosis (brittle bone disease), think about having a bone density scan (DEXA).    Shots: Get a flu shot each year. Get a tetanus shot every 10 years.    Nutrition:     Eat at least 5 servings of fruits and vegetables each day.    Eat whole-grain bread, whole-wheat pasta and brown rice instead of white grains and rice.    Get adequate Calcium and Vitamin D.     Lifestyle    Exercise at least 150 minutes a week (30 minutes a day, 5 days a week). This will help you control your weight and prevent disease.    Limit alcohol to one drink per day.    No smoking.     Wear sunscreen to  prevent skin cancer.     See your dentist every six months for an exam and cleaning.    See your eye doctor every 1 to 2 years.

## 2019-01-15 NOTE — PROGRESS NOTES
"   SUBJECTIVE:   CC: Marianne Monroy is an 63 year old woman who presents for preventive health visit.     Healthy Habits:    Do you get at least three servings of calcium containing foods daily (dairy, green leafy vegetables, etc.)? yes    Amount of exercise or daily activities, outside of work: Doing yoga at home    Problems taking medications regularly No    Medication side effects: No    Have you had an eye exam in the past two years? yes    Do you see a dentist twice per year? yes    Do you have sleep apnea, excessive snoring or daytime drowsiness?no      Diabetes Follow-up  Glipizide 10mg every day, metformin 2,000mg qd    Patient is checking blood sugars: rarely.  She states that readings have been ok    Diabetic concerns: None     Symptoms of hypoglycemia (low blood sugar): nausea, sweating and lightheaded     Paresthesias (numbness or burning in feet) or sores: No     Date of last diabetic eye exam: 2017    Diabetes Management Resources    Hyperlipidemia Follow-Up  Atorvastatin 40mg qd    Rate your low fat/cholesterol diet?: not monitoring fat    Taking statin?  Yes, no muscle aches from statin    Other lipid medications/supplements?:  Fish oil/Omega 3, without side effects    Hypertension Follow-up  Lisinopril 20mg qd    Outpatient blood pressures are being checked at home.  She states that results are \"really good\"    Low Salt Diet: not monitoring salt    BP Readings from Last 2 Encounters:   01/21/19 128/80   06/25/18 124/70     Hemoglobin A1C (%)   Date Value   01/21/2019 7.1 (H)   06/12/2018 7.2 (H)     LDL Cholesterol Calculated (mg/dL)   Date Value   04/26/2012 172 (H)   04/01/2010 215 (H)     LDL Cholesterol Direct (mg/dL)   Date Value   11/17/2017 136 (H)   12/14/2016 95     Depression and Anxiety Follow-Up  Prozac 60mg every day, ativan 1mg q8h prn    Status since last visit: No change    Other associated symptoms:None    Complicating factors:     Significant life event: Yes-  Stress with her " mother     Current substance abuse: None    PHQ 5/9/2017 1/12/2018 10/13/2018   PHQ-9 Total Score 8 6 7   Q9: Suicide Ideation Not at all Not at all Not at all     JUSTUS-7 SCORE 3/9/2016 12/14/2016 5/9/2017   Total Score 1 2 11         PHQ-9  English  PHQ-9   Any Language  JUSTUS-7  Suicide Assessment Five-step Evaluation and Treatment (SAFE-T)    - She would like information on a Caregiver Support group.    - FIT test last completed 12/22/2016 with normal results. She is on a routine annual schedule for this and is due to repeat.     - Mammogram last completed with normal findings 11/17/2016. She is on a routine annual schedule for this and is due to repeat.       Today's PHQ-2 Score:   PHQ-2 ( 1999 [employer]) 5/9/2017 12/14/2016   Q1: Little interest or pleasure in doing things 1 1   Q2: Feeling down, depressed or hopeless 1 0   PHQ-2 Score 2 1       Abuse: Current or Past(Physical, Sexual or Emotional)- No  Do you feel safe in your environment? Yes    Social History     Tobacco Use     Smoking status: Never Smoker     Smokeless tobacco: Never Used   Substance Use Topics     Alcohol use: Yes     Comment: social     If you drink alcohol do you typically have >3 drinks per day or >7 drinks per week? No                     Reviewed orders with patient.  Reviewed health maintenance and updated orders accordingly - Yes      Mammogram Screening: Patient over age 50, mutual decision to screen reflected in health maintenance.    Pertinent mammograms are reviewed under the imaging tab.  History of abnormal Pap smear: NO - age 30-65 PAP every 5 years with negative HPV co-testing recommended  PAP / HPV 2/15/2012 9/17/2009 3/26/2008   PAP NIL NIL NIL     Reviewed and updated as needed this visit by clinical staff  Tobacco  Allergies  Meds  Med Hx  Surg Hx  Fam Hx  Soc Hx        Reviewed and updated as needed this visit by Provider          ROS:  CONSTITUTIONAL: NEGATIVE for fever, chills, change in weight  INTEGUMENTARY/SKIN:  "NEGATIVE for worrisome rashes, moles or lesions  EYES: NEGATIVE for vision changes or irritation  ENT: NEGATIVE for ear, mouth and throat problems  RESP: NEGATIVE for significant cough or SOB  BREAST: NEGATIVE for masses, tenderness or discharge  CV: NEGATIVE for chest pain, palpitations or peripheral edema  GI: NEGATIVE for nausea, abdominal pain, heartburn, or change in bowel habits  : NEGATIVE for unusual urinary or vaginal symptoms. No vaginal bleeding.  MUSCULOSKELETAL: NEGATIVE for significant arthralgias or myalgia  NEURO: NEGATIVE for weakness, dizziness or paresthesias  PSYCHIATRIC: NEGATIVE for changes in mood or affect     OBJECTIVE:   /80   Pulse 64   Ht 1.626 m (5' 4\")   Wt 68.7 kg (151 lb 6 oz)   BMI 25.98 kg/m    EXAM:  GENERAL: Healthy, alert and no distress  EYES: Eyes grossly normal to inspection, conjunctivae and sclerae normal  RESP: Lungs clear to auscultation - no rales, rhonchi or wheezes  CV: Regular rate and rhythm, normal S1 S2, no murmur  GI:  soft, nontender, no HSM   Pelvic: no vaginal mucosa lesion, minimal discharge, atrophy noted.   MS: No gross musculoskeletal defects noted, no edema  NEURO: Normal strength and tone, mentation intact and speech normal  PSYCH: Mentation appears normal, affect normal/bright     Results for orders placed or performed in visit on 01/21/19   Hemoglobin A1c   Result Value Ref Range    Hemoglobin A1C 7.1 (H) 0 - 5.6 %        ASSESSMENT/PLAN:   (Z00.00) Routine general medical examination at a health care facility  (primary encounter diagnosis)  Comment: no significant change.      (Z12.4) Screening for cervical cancer  Plan: Pap imaged thin layer screen with HPV -         recommended age 30 - 65      (Z11.51) Screening for human papillomavirus  Plan: HPV High Risk Types DNA Cervical      (E11.9) Type 2 diabetes mellitus without complication, without long-term current use of insulin (H)  Comment: Within guidelines using 2 drug therapy, metformin " and glipizide.  Lab Results   Component Value Date    A1C 7.1 01/21/2019    A1C 7.2 06/12/2018    A1C 7.0 11/17/2017    A1C 8.4 05/08/2017    A1C 7.2 12/14/2016     Plan: Hemoglobin A1c, FOOT EXAM        Continue.  Follow-up in 6 months.    (I10) Hypertension, goal below 140/90  Comment: within guide lines.  Acceptable renal function.  GFR Estimate   Date Value Ref Range Status   01/21/2019 74 >60 mL/min/[1.73_m2] Final     Comment:     Non  GFR Calc  Starting 12/18/2018, serum creatinine based estimated GFR (eGFR) will be   calculated using the Chronic Kidney Disease Epidemiology Collaboration   (CKD-EPI) equation.     11/17/2017 79 >60 mL/min/1.7m2 Final     Comment:     Non  GFR Calc   05/08/2017 72 >60 mL/min/1.7m2 Final     Comment:     Non  GFR Calc     Patient is on an ACE inhibitor.  Plan: Basic metabolic panel  (Ca, Cl, CO2, Creat,         Gluc, K, Na, BUN), Albumin Random Urine         Quantitative with Creat Ratio        Follow-up in 6 months.    (E78.5) Hyperlipidemia LDL goal <100  Comment: Advised to restart statin therapy.  Plan: Lipid panel reflex to direct LDL Fasting,         atorvastatin (LIPITOR) 40 MG tablet,         DISCONTINUED: atorvastatin (LIPITOR) 40 MG         tablet      (F33.0) Major depressive disorder, recurrent episode, mild (H)  Comment: doing well on serotonin specific reuptake inhibitor therapy.   Plan: continue. 6 month refill.     (E11.65) Uncontrolled type 2 diabetes mellitus without complication, without long-term current use of insulin (H)  Comment: now controlled  Plan: continue with current medications.     (Z12.11) Colon cancer screening  Plan: Fecal colorectal cancer screen (FIT)    (Z23) Need for vaccination  Plan: VACCINE ADMINISTRATION, INITIAL, TDAP VACCINE         (ADACEL)      (Z63.6) Caregiver burden  Comment: Patient is the sole caregiver for her 93-year-old mother who is now living in assisted care.  She notes  significant care giver burden requesting additional resources and support group for herself.  Will refer for care coordination consultation.  Plan: CARE COORDINATION REFERRAL           Patient Instructions     Preventive Health Recommendations  Female Ages 50 - 64    *   I'll have our care coordination regarding care giver burnout. They will contact you.     *   The A1c, a 3 month average of your blood sugars, 7.1. This is very good.     *   No change in medications.     *   Your last pap smear.     *   Follow up in 6 months.     *   Tetanus booster today.         Yearly exam: See your health care provider every year in order to  o Review health changes.   o Discuss preventive care.    o Review your medicines if your doctor has prescribed any.      Get a Pap test every three years (unless you have an abnormal result and your provider advises testing more often).    If you get Pap tests with HPV test, you only need to test every 5 years, unless you have an abnormal result.     You do not need a Pap test if your uterus was removed (hysterectomy) and you have not had cancer.    You should be tested each year for STDs (sexually transmitted diseases) if you're at risk.     Have a mammogram every 1 to 2 years.    Have a colonoscopy at age 50, or have a yearly FIT test (stool test). These exams screen for colon cancer.      Have a cholesterol test every 5 years, or more often if advised.    Have a diabetes test (fasting glucose) every three years. If you are at risk for diabetes, you should have this test more often.     If you are at risk for osteoporosis (brittle bone disease), think about having a bone density scan (DEXA).    Shots: Get a flu shot each year. Get a tetanus shot every 10 years.    Nutrition:     Eat at least 5 servings of fruits and vegetables each day.    Eat whole-grain bread, whole-wheat pasta and brown rice instead of white grains and rice.    Get adequate Calcium and Vitamin D.  "    Lifestyle    Exercise at least 150 minutes a week (30 minutes a day, 5 days a week). This will help you control your weight and prevent disease.    Limit alcohol to one drink per day.    No smoking.     Wear sunscreen to prevent skin cancer.     See your dentist every six months for an exam and cleaning.    See your eye doctor every 1 to 2 years.       COUNSELING:   Reviewed preventive health counseling, as reflected in patient instructions       Regular exercise       Healthy diet/nutrition       Vision screening       Immunizations    Vaccinated for: Influenza             (Kayla)menopause management    BP Readings from Last 1 Encounters:   01/21/19 128/80     Estimated body mass index is 25.98 kg/m  as calculated from the following:    Height as of this encounter: 1.626 m (5' 4\").    Weight as of this encounter: 68.7 kg (151 lb 6 oz).           reports that  has never smoked. she has never used smokeless tobacco.      Counseling Resources:  ATP IV Guidelines  Pooled Cohorts Equation Calculator  Breast Cancer Risk Calculator  FRAX Risk Assessment  ICSI Preventive Guidelines  Dietary Guidelines for Americans, 2010  USDA's MyPlate  ASA Prophylaxis  Lung CA Screening    Abigail Arora MD  [facility]  "

## 2019-01-21 ENCOUNTER — OFFICE VISIT (OUTPATIENT)
Dept: FAMILY MEDICINE | Facility: CLINIC | Age: 64
End: 2019-01-21
Payer: COMMERCIAL

## 2019-01-21 VITALS
DIASTOLIC BLOOD PRESSURE: 80 MMHG | HEIGHT: 64 IN | BODY MASS INDEX: 25.84 KG/M2 | HEART RATE: 64 BPM | WEIGHT: 151.38 LBS | SYSTOLIC BLOOD PRESSURE: 128 MMHG

## 2019-01-21 DIAGNOSIS — F33.0 MAJOR DEPRESSIVE DISORDER, RECURRENT EPISODE, MILD (H): ICD-10-CM

## 2019-01-21 DIAGNOSIS — Z12.11 COLON CANCER SCREENING: ICD-10-CM

## 2019-01-21 DIAGNOSIS — E78.5 HYPERLIPIDEMIA LDL GOAL <100: ICD-10-CM

## 2019-01-21 DIAGNOSIS — E11.9 TYPE 2 DIABETES MELLITUS WITHOUT COMPLICATION, WITHOUT LONG-TERM CURRENT USE OF INSULIN (H): ICD-10-CM

## 2019-01-21 DIAGNOSIS — Z11.51 SCREENING FOR HUMAN PAPILLOMAVIRUS: ICD-10-CM

## 2019-01-21 DIAGNOSIS — Z23 NEED FOR VACCINATION: ICD-10-CM

## 2019-01-21 DIAGNOSIS — Z00.00 ROUTINE GENERAL MEDICAL EXAMINATION AT A HEALTH CARE FACILITY: Primary | ICD-10-CM

## 2019-01-21 DIAGNOSIS — I10 HYPERTENSION, GOAL BELOW 140/90: ICD-10-CM

## 2019-01-21 DIAGNOSIS — Z63.6 CAREGIVER BURDEN: ICD-10-CM

## 2019-01-21 DIAGNOSIS — Z12.4 SCREENING FOR CERVICAL CANCER: ICD-10-CM

## 2019-01-21 LAB
ANION GAP SERPL CALCULATED.3IONS-SCNC: 5 MMOL/L (ref 3–14)
BUN SERPL-MCNC: 19 MG/DL (ref 7–30)
CALCIUM SERPL-MCNC: 9.5 MG/DL (ref 8.5–10.1)
CHLORIDE SERPL-SCNC: 101 MMOL/L (ref 94–109)
CHOLEST SERPL-MCNC: 287 MG/DL
CO2 SERPL-SCNC: 30 MMOL/L (ref 20–32)
CREAT SERPL-MCNC: 0.83 MG/DL (ref 0.52–1.04)
CREAT UR-MCNC: 164 MG/DL
GFR SERPL CREATININE-BSD FRML MDRD: 74 ML/MIN/{1.73_M2}
GLUCOSE SERPL-MCNC: 133 MG/DL (ref 70–99)
HBA1C MFR BLD: 7.1 % (ref 0–5.6)
HDLC SERPL-MCNC: 53 MG/DL
LDLC SERPL CALC-MCNC: 171 MG/DL
MICROALBUMIN UR-MCNC: 18 MG/L
MICROALBUMIN/CREAT UR: 10.98 MG/G CR (ref 0–25)
NONHDLC SERPL-MCNC: 234 MG/DL
POTASSIUM SERPL-SCNC: 4.7 MMOL/L (ref 3.4–5.3)
SODIUM SERPL-SCNC: 136 MMOL/L (ref 133–144)
TRIGL SERPL-MCNC: 313 MG/DL

## 2019-01-21 PROCEDURE — G0123 SCREEN CERV/VAG THIN LAYER: HCPCS | Performed by: FAMILY MEDICINE

## 2019-01-21 PROCEDURE — 80061 LIPID PANEL: CPT | Performed by: FAMILY MEDICINE

## 2019-01-21 PROCEDURE — 36415 COLL VENOUS BLD VENIPUNCTURE: CPT | Performed by: FAMILY MEDICINE

## 2019-01-21 PROCEDURE — 90471 IMMUNIZATION ADMIN: CPT | Performed by: FAMILY MEDICINE

## 2019-01-21 PROCEDURE — 80048 BASIC METABOLIC PNL TOTAL CA: CPT | Performed by: FAMILY MEDICINE

## 2019-01-21 PROCEDURE — 99396 PREV VISIT EST AGE 40-64: CPT | Mod: 25 | Performed by: FAMILY MEDICINE

## 2019-01-21 PROCEDURE — 99207 C FOOT EXAM  NO CHARGE: CPT | Mod: 25 | Performed by: FAMILY MEDICINE

## 2019-01-21 PROCEDURE — 82043 UR ALBUMIN QUANTITATIVE: CPT | Performed by: FAMILY MEDICINE

## 2019-01-21 PROCEDURE — 87624 HPV HI-RISK TYP POOLED RSLT: CPT | Performed by: FAMILY MEDICINE

## 2019-01-21 PROCEDURE — 90715 TDAP VACCINE 7 YRS/> IM: CPT | Performed by: FAMILY MEDICINE

## 2019-01-21 PROCEDURE — 99213 OFFICE O/P EST LOW 20 MIN: CPT | Mod: 25 | Performed by: FAMILY MEDICINE

## 2019-01-21 PROCEDURE — 83036 HEMOGLOBIN GLYCOSYLATED A1C: CPT | Performed by: FAMILY MEDICINE

## 2019-01-21 RX ORDER — ATORVASTATIN CALCIUM 40 MG/1
40 TABLET, FILM COATED ORAL DAILY
Qty: 90 TABLET | Refills: 3 | Status: SHIPPED | OUTPATIENT
Start: 2019-01-21 | End: 2019-12-31

## 2019-01-21 RX ORDER — ATORVASTATIN CALCIUM 40 MG/1
40 TABLET, FILM COATED ORAL DAILY
Qty: 90 TABLET | Refills: 3 | Status: SHIPPED | OUTPATIENT
Start: 2019-01-21 | End: 2019-01-21

## 2019-01-21 SDOH — SOCIAL STABILITY - SOCIAL INSECURITY: DEPENDENT RELATIVE NEEDING CARE AT HOME: Z63.6

## 2019-01-21 ASSESSMENT — MIFFLIN-ST. JEOR: SCORE: 1221.63

## 2019-01-21 NOTE — NURSING NOTE
Screening Questionnaire for Adult Immunization    Are you sick today?   No   Do you have allergies to medications, food, a vaccine component or latex?   No   Have you ever had a serious reaction after receiving a vaccination?   No   Do you have a long-term health problem with heart disease, lung disease, asthma, kidney disease, metabolic disease (e.g. diabetes), anemia, or other blood disorder?   No   Do you have cancer, leukemia, HIV/AIDS, or any other immune system problem?   No   In the past 3 months, have you taken medications that affect  your immune system, such as prednisone, other steroids, or anticancer drugs; drugs for the treatment of rheumatoid arthritis, Crohn s disease, or psoriasis; or have you had radiation treatments?   No   Have you had a seizure, or a brain or other nervous system problem?   No   During the past year, have you received a transfusion of blood or blood     products, or been given immune (gamma) globulin or antiviral drug?   No   For women: Are you pregnant or is there a chance you could become        pregnant during the next month?   No   Have you received any vaccinations in the past 4 weeks?   No     Immunization questionnaire answers were all negative.     Screening performed by Alejandra Stallings on 1/21/2019 at 9:25 AM.

## 2019-01-23 ENCOUNTER — PATIENT OUTREACH (OUTPATIENT)
Dept: CARE COORDINATION | Facility: CLINIC | Age: 64
End: 2019-01-23

## 2019-01-23 LAB
COPATH REPORT: NORMAL
PAP: NORMAL

## 2019-01-23 ASSESSMENT — ACTIVITIES OF DAILY LIVING (ADL): DEPENDENT_IADLS:: INDEPENDENT

## 2019-01-23 NOTE — PROGRESS NOTES
"Clinic Care Coordination Contact    Clinic Care Coordination Contact  OUTREACH    Referral Information:  Referral Source: PCP, pt requesting assistance with support group resources.  Primary Diagnosis: Behavioral Health    KEIRY placed a call to the pt and introduced self, title, and role.  SW explained that a referral was placed by pt's care team and this writer is calling to offer assistance of how I may be able to help.  Pt agreed to speak with this writer.    SW and pt discussed that pt is the primary care giver to her 93 year old mother and that the pt also lives with depression.  Pt shared that she has been struggling with how to manage her depression and caring for her mom at the same time.  In the past she found that attending support groups was helpful, stating she found the experiences of others as uplifting and sources of encouragement.    KEIRY had done research prior to calling the pt and shared that [facility] has the Family Caregiver Connection, which offers several programs, but these may work for the pt & her mom:  The Gathering -- out of home group respite.  Forums -- educational forums on care giving in your area.  Support Groups -- Caregiver Support Groups meet regularly to build support, comfort, and share information regarding caregiving issues.  Volunteering -- Contribute to your community in a meaningful way by providing \"respite\" to a senior caregiver and an opportunity for socialization to those they care for.  Powerful Tools for Caregivers Class --Powerful Tools for Caregivers is a class with six sessions designed to provide you with the skills you need to take care of yourself.    KEIRY printed off materials and mailed them to the pt for review as she does not have access to the Internet at home right now.  Pt said she will review and follow up as needed.    Chief Complaint   Patient presents with     Clinic Care Coordination - Initial     social work      Universal Utilization: Clinic " Utilization  Difficulty keeping appointments:: No  Compliance Concerns: No  No-Show Concerns: No  No PCP office visit in Past Year: No  Utilization    Last refreshed: 1/22/2019  6:34 PM:  Hospital Admissions 0           Last refreshed: 1/22/2019  6:34 PM:  ED Visits 1           Last refreshed: 1/22/2019  6:34 PM:  No Show Count (past year) 0              Current as of: 1/22/2019  6:34 PM            Clinical Concerns:  Current Medical Concerns:  Pt lives with DM    Current Behavioral Concerns: Pt lives with depression     Education Provided to patient: See above   Pain  Pain (GOAL):: No  Health Maintenance Reviewed: Due/Overdue:   02/02/1972  HIV SCREEN (SYSTEM ASSIGNED)     02/02/2004  ZOSTER IMMUNIZATION (1 of 2)     12/22/2017  FIT Q1 YR     04/01/2018  EYE EXAM Q1 YEAR     11/17/2018  MAMMO SCREEN Q2 YR (SYSTEM ASSIGNED)      Clinical Pathway: None    Medication Management:  Pt takes as prescribed; pt is able to afford and obtain.     Functional Status:  Dependent ADLs:: Independent  Dependent IADLs:: Independent  Bed or wheelchair confined:: No  Mobility Status: Independent    Living Situation:  Current living arrangement:: I live in a private home with family; pt is caregiver to 93 year old mom in her home.  Type of residence:: Private home - stairs    Diet/Exercise/Sleep:  Diet:: Diabetic diet  Inadequate nutrition (GOAL):: No  Food Insecurity: No  Tube Feeding: No  Exercise:: Currently not exercising  Inadequate activity/exercise (GOAL):: No  Significant changes in sleep pattern (GOAL): No    Transportation:  Transportation concerns (GOAL):: No  Transportation means:: Regular car     Psychosocial:  Islam or spiritual beliefs that impact treatment:: Yes  Mental health DX:: Yes  Mental health DX how managed:: Medication  Mental health management concern (GOAL):: No  Informal Support system:: Children, Friends     Financial/Insurance:  Social Security and unemployment, 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   Medicare on 2/1/19 with Rl   Financial/Insurance concerns (GOAL):: No     Resources and Interventions:  Community Resources: None  Supplies used at home:: Diabetic Supplies  Equipment Currently Used at Home: glucometer    Advance Care Plan/Directive  Advanced Care Plans/Directives on file:: No  Advanced Care Plan/Directive Status: Considering Options    Referrals Placed: [facility], [facility], [facility] Family Caregiver Connection and [place] Choice Assessment     Goals:   Goal Statement: I will begin to attend a caregiver support group to help my mental health.  Measure of Success: Pt will attend support group for improved mental health  Supportive Steps to Achieve: SW to mail pt list of support groups in her area  Barriers: None  Strengths: Pt reached out for assistance for her mental health  Date to Achieve By: 3 months  Patient expressed understanding of goal: Yes    Patient/Caregiver understanding: Pt to review information that is being mailed to her and join a support group.    Outreach Frequency: monthly      Plan: SW to continue to follow pt for cares.  Pt also has several questions about how to gain services for her elderly mom.    Janie Carrero  Social Work Care Coordinator  [facility]  [phone]

## 2019-01-23 NOTE — LETTER
[facility]  [address] [place] [id number]  [phone]      January 23, 2019    Marianne Monroy  [address] [place] [facility] [id number]      Dear Marianne,    I am a clinic care coordinator who works with Abigail Arora MD at the [facility] and I wanted to thank you for spending the time to talk with me.     As we discussed today on the phone, I am including information on the several programs we discussed:  1.  [place] Choice Assessment  2.  [facility] Caregiver Connection Programs    Please feel free to contact me at [phone], with any questions or concerns. We at [facility] are focused on providing you with the highest-quality healthcare experience possible and that all starts with you.     Sincerely,     Janie Marquez    Enclosed: I have enclosed a copy of the Complex Care Plan. This has helpful information and goals that we have talked about. Please keep this in an easy to access place to use as needed.

## 2019-01-23 NOTE — LETTER
[facility]  Complex Care Plan  About Me:  Patient Name:  Marianne Monroy    YOB: 1955  Age:   63 year old   An MRN: [id number] Telephone Information:  Home Phone [phone]   Mobile [phone]       Address:  [address] [place] [address]  [place] [address] Email address:  [email or web address]      Emergency Contact(s)  Name Relationship Lgl Grd Work Phone Home Phone Mobile Phone   1. CAROL MONROY Daughter    [phone]   2. DIEGO MONROY Son   [phone]           Primary language:  English     needed? No   [facility]:  [phone] op. 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  Mobility Status/ Medical Equipment: Independent    Health Maintenance: Health Maintenance Reviewed: Due/Overdue    My Access Plan  Medical Emergency 911   Primary Clinic Line [facility] - [phone]   24 Hour Appointment Line [phone] or  [phone]) (toll-free)   24 Hour Nurse Line [phone] (toll-free)   Preferred Urgent Care [facility], [phone]   Preferred Hospital [place], [place]  [phone]   Preferred Pharmacy [facility] [address]     Behavioral Health Crisis Line The National Suicide Prevention Lifeline at 1-446.850.7408 or 911       My Care Team Members    Patient Care Team       Relationship Specialty Notifications Start End    Abigail Arora MD PCP - General   10/11/05     Phone: [phone] Fax: [phone] [address] [place] [address]    Janie Marquez LSW Clinic Care Coordinator Primary Care - [facility] Admissions 1/23/19     Phone: [phone] Fax: [phone]                My Care Plans  Self Management and Treatment Plan, Goals and  (Comments)  Goals        General    Healthy Coping     Notes - Note edited  1/23/2019 11:55 AM by Janie Marquez LSW    Goal Statement: I will begin to attend a caregiver support group to help my mental health.  Measure of Success: Pt will attend support group for improved mental health  Supportive Steps to Achieve: SW to mail pt list of support groups in her area  Barriers: None  Strengths: Pt reached out for assistance for her mental health  Date to Achieve By: 3 months  Patient expressed understanding of goal: Yes               Action Plans on File: None  Advance Care Plans/Directives Type: None    My Medical and Care Information  Problem List   Patient Active Problem List   Diagnosis     Phobia     GERD (gastroesophageal reflux disease)     Uncontrolled type 2 diabetes mellitus without complication, without long-term current use of insulin (H)     C. difficile diarrhea     Hyperlipidemia LDL goal <100     Advanced directives, counseling/discussion     Hypertension, goal below 140/90     Major depressive disorder, recurrent episode, mild (H)     Generalized anxiety disorder      Current Medications and Allergies:  See printed Medication Report.    Care Coordination Start Date: 1/23/2019   Frequency of Care Coordination: monthly   Form Last Updated: 01/23/2019

## 2019-01-24 LAB
FINAL DIAGNOSIS: NORMAL
HPV HR 12 DNA CVX QL NAA+PROBE: NEGATIVE
HPV16 DNA SPEC QL NAA+PROBE: NEGATIVE
HPV18 DNA SPEC QL NAA+PROBE: NEGATIVE
SPECIMEN DESCRIPTION: NORMAL
SPECIMEN SOURCE CVX/VAG CYTO: NORMAL

## 2019-01-25 ENCOUNTER — MYC MEDICAL ADVICE (OUTPATIENT)
Dept: FAMILY MEDICINE | Facility: CLINIC | Age: 64
End: 2019-01-25

## 2019-01-25 NOTE — TELEPHONE ENCOUNTER
Screen shot of updated information given to the AFR at [facility] to update the system.    Shy Huynh RN

## 2019-02-05 DIAGNOSIS — Z12.11 COLON CANCER SCREENING: ICD-10-CM

## 2019-02-28 ENCOUNTER — PATIENT OUTREACH (OUTPATIENT)
Dept: CARE COORDINATION | Facility: CLINIC | Age: 64
End: 2019-02-28

## 2019-02-28 ASSESSMENT — ACTIVITIES OF DAILY LIVING (ADL): DEPENDENT_IADLS:: INDEPENDENT

## 2019-02-28 NOTE — PROGRESS NOTES
Clinic Care Coordination Contact  [facility]/Voicemail    Referral Source: PCP  Clinical Data: Care Coordinator Outreach  Outreach attempted x 1.  Left message on voicemail with call back information and requested return call.  Plan: Care Coordinator mailed out care coordination introduction letter on 1-23-19. Care Coordinator will try to reach patient again in 5-15 business days.    Per Epic review, pt just recently increased her daily dosage of prozac due to increased mental health symptoms.  SW to continue to follow and offer assistance of [facility] as needed.    Janie Carrero  Social Work Care Coordinator  [facility]  [phone]

## 2019-03-06 ENCOUNTER — MYC REFILL (OUTPATIENT)
Dept: FAMILY MEDICINE | Facility: CLINIC | Age: 64
End: 2019-03-06

## 2019-03-06 DIAGNOSIS — F33.0 MAJOR DEPRESSIVE DISORDER, RECURRENT EPISODE, MILD (H): ICD-10-CM

## 2019-03-17 DIAGNOSIS — I10 HYPERTENSION GOAL BP (BLOOD PRESSURE) < 140/90: ICD-10-CM

## 2019-03-18 NOTE — TELEPHONE ENCOUNTER
"Requested Prescriptions   Pending Prescriptions Disp Refills     lisinopril (PRINIVIL/ZESTRIL) 20 MG tablet [Pharmacy Med Name: LISINOPRIL TABS 20MG] 90 tablet 1     Sig: TAKE 1 TABLET DAILY    ACE Inhibitors (Including Combos) Protocol Passed - 3/17/2019  4:48 AM       Passed - Blood pressure under 140/90 in past 12 months    BP Readings from Last 3 Encounters:   01/21/19 128/80   06/25/18 124/70   06/12/18 122/64                Passed - Recent (12 mo) or future (30 days) visit within the authorizing provider's specialty    Patient had office visit in the last 12 months or has a visit in the next 30 days with authorizing provider or within the authorizing provider's specialty.  See \"Patient Info\" tab in inbasket, or \"Choose Columns\" in Meds & Orders section of the refill encounter.             Passed - Medication is active on med list       Passed - Patient is age 18 or older       Passed - No active pregnancy on record       Passed - Normal serum creatinine on file in past 12 months    Recent Labs   Lab Test 01/21/19  0836   CR 0.83            Passed - Normal serum potassium on file in past 12 months    Recent Labs   Lab Test 01/21/19  0836   POTASSIUM 4.7            Passed - No positive pregnancy test within past 12 months        Last Written Prescription Date:  9/18/18  Last Fill Quantity: 90,  # refills: 1   Last office visit: 1/21/2019 with prescribing provider:  Abigail Arora     Future Office Visit:      "

## 2019-03-19 RX ORDER — LISINOPRIL 20 MG/1
TABLET ORAL
Qty: 90 TABLET | Refills: 1 | Status: SHIPPED | OUTPATIENT
Start: 2019-03-19 | End: 2019-09-24

## 2019-03-19 NOTE — TELEPHONE ENCOUNTER
Prescription approved per [facility] Refill Protocol.  Cat Card RN

## 2019-04-02 ENCOUNTER — PATIENT OUTREACH (OUTPATIENT)
Dept: CARE COORDINATION | Facility: CLINIC | Age: 64
End: 2019-04-02

## 2019-04-02 ASSESSMENT — ACTIVITIES OF DAILY LIVING (ADL): DEPENDENT_IADLS:: INDEPENDENT

## 2019-04-02 NOTE — PROGRESS NOTES
Clinic Care Coordination Contact  [facility]/Voicemail    Referral Source: PCP  Clinical Data: Care Coordinator Outreach  Outreach attempted x 2.  Left message on voicemail with call back information and requested return call.  Plan: Care Coordinator mailed out care coordination introduction letter on 1-23-19. Care Coordinator will try to reach patient again in 5-15 business days.  If no response to that encounter, CC to close pt to care coordination due to lack of engagement.      Janie Carrero  Social Work Care Coordinator  [facility]  [phone]

## 2019-04-16 ASSESSMENT — ACTIVITIES OF DAILY LIVING (ADL): DEPENDENT_IADLS:: INDEPENDENT

## 2019-04-16 NOTE — PROGRESS NOTES
Clinic Care Coordination Contact  [facility]/Voicemail    Referral Source: PCP  Clinical Data: Care Coordinator Outreach  Outreach attempted x 3.  Left message on voicemail with call back information and requested return call.  Plan: Care Coordinator mailed out care coordination introduction letter on 1-23-19. Care Coordinator will do no further outreaches at this time.    Janie Rosa [facility]  Social Work Care Coordinator  [facility]  [phone]

## 2019-08-05 NOTE — TELEPHONE ENCOUNTER
"Requested Prescriptions   Pending Prescriptions Disp Refills     glipiZIDE (GLUCOTROL XL) 10 MG 24 hr tablet [Pharmacy Med Name:  Last Written Prescription Date:  01/14/19  Last Fill Quantity: 90,  # refills: 1   Last office visit: 1/21/2019 with prescribing provider:  Abigail Arora   Future Office Visit:     GLIPIZIDE ER TABS 10MG] 90 tablet 1     Sig: TAKE 1 TABLET DAILY       Sulfonylurea Agents Failed - 8/2/2019  7:09 PM        Failed - Blood pressure less than 140/90 in past 6 months     BP Readings from Last 3 Encounters:   01/21/19 128/80   06/25/18 124/70   06/12/18 122/64           Failed - Patient has documented A1c within the specified period of time.     If HgbA1C is 8 or greater, it needs to be on file within the past 3 months.  If less than 8, must be on file within the past 6 months.     Recent Labs   Lab Test 01/21/19  0836   A1C 7.1*             Failed - Recent (6 mo) or future (30 days) visit within the authorizing provider's specialty     Patient had office visit in the last 6 months or has a visit in the next 30 days with authorizing provider or within the authorizing provider's specialty.  See \"Patient Info\" tab in inbasket, or \"Choose Columns\" in Meds & Orders section of the refill encounter.            Passed - Patient has documented LDL within the past 12 mos.     Recent Labs   Lab Test 01/21/19  0836   *             Passed - Patient has had a Microalbumin in the past 15 mos.     Recent Labs   Lab Test 01/21/19  0843   MICROL 18   UMALCR 10.98             Passed - Medication is active on med list        Passed - Patient is age 18 or older        Passed - No active pregnancy on record        Passed - Patient has a recent creatinine (normal) within the past 12 mos.     Recent Labs   Lab Test 01/21/19  0836   CR 0.83             Passed - Patient has not had a positive pregnancy test within the past 12 mos.        metFORMIN (GLUCOPHAGE-XR) 500 MG 24 hr tablet [Pharmacy Med Name:  Last " "Written Prescription Date:  01/14/19  Last Fill Quantity: 360,  # refills: 1   Last office visit: 1/21/2019 with prescribing provider:  Abigail Arora   Future Office Visit:     METFORMIN HCL ER TABS 500MG] 360 tablet 1     Sig: TAKE 4 TABLETS DAILY       Biguanide Agents Failed - 8/2/2019  7:09 PM        Failed - Blood pressure less than 140/90 in past 6 months     BP Readings from Last 3 Encounters:   01/21/19 128/80   06/25/18 124/70   06/12/18 122/64           Failed - Patient has documented A1c within the specified period of time.     If HgbA1C is 8 or greater, it needs to be on file within the past 3 months.  If less than 8, must be on file within the past 6 months.     Recent Labs   Lab Test 01/21/19  0836   A1C 7.1*             Failed - Recent (6 mo) or future (30 days) visit within the authorizing provider's specialty     Patient had office visit in the last 6 months or has a visit in the next 30 days with authorizing provider or within the authorizing provider's specialty.  See \"Patient Info\" tab in inbasket, or \"Choose Columns\" in Meds & Orders section of the refill encounter.            Passed - Patient has documented LDL within the past 12 mos.     Recent Labs   Lab Test 01/21/19  0836   *             Passed - Patient has had a Microalbumin in the past 15 mos.     Recent Labs   Lab Test 01/21/19  0843   MICROL 18   UMALCR 10.98           Passed - Patient is age 10 or older        Passed - Patient's CR is NOT>1.4 OR Patient's EGFR is NOT<45 within past 12 mos.     Recent Labs   Lab Test 01/21/19  0836   GFRESTIMATED 74   GFRESTBLACK 86       Recent Labs   Lab Test 01/21/19  0836   CR 0.83             Passed - Patient does NOT have a diagnosis of CHF.        Passed - Medication is active on med list        Passed - Patient is not pregnant        Passed - Patient has not had a positive pregnancy test within the past 12 mos.           "

## 2019-08-06 RX ORDER — GLIPIZIDE 10 MG/1
10 TABLET, FILM COATED, EXTENDED RELEASE ORAL DAILY
Qty: 30 TABLET | Refills: 0 | Status: SHIPPED | OUTPATIENT
Start: 2019-08-06 | End: 2019-09-02

## 2019-08-06 RX ORDER — METFORMIN HCL 500 MG
2000 TABLET, EXTENDED RELEASE 24 HR ORAL DAILY
Qty: 120 TABLET | Refills: 0 | Status: SHIPPED | OUTPATIENT
Start: 2019-08-06 | End: 2019-09-02

## 2019-09-17 NOTE — PROGRESS NOTES
Subjective     Marianne Monroy is a 64 year old female who presents to clinic today for the following health issues:    HPI     Diabetes Follow-up  Glipizide 10mg every day, metformin 2,000mg every day     How often are you checking your blood sugar? Not at all over the past 2 weeks due to moving and unable to find meter    What time of day are you checking your blood sugars (select all that apply)?  N/A    Have you had any blood sugars above 200?  N/A    Have you had any blood sugars below 70?  N/A    What symptoms do you notice when your blood sugar is low?  None    What concerns do you have today about your diabetes? None     Do you have any of these symptoms? (Select all that apply)  No numbness or tingling in feet.  No redness, sores or blisters on feet.  No complaints of excessive thirst.  No reports of blurry vision.  No significant changes to weight.     Have you had a diabetic eye exam in the last 12 months? No    Diabetes Management Resources    Hyperlipidemia Follow-Up  Atorvastatin 40mg every day     Are you having any of the following symptoms? (Select all that apply)  No complaints of shortness of breath, chest pain or pressure.  No increased sweating or nausea with activity.  No left-sided neck or arm pain.  No complaints of pain in calves when walking 1-2 blocks.    Are you regularly taking any medication or supplement to lower your cholesterol?   Yes- Statin and fish oil    Are you having muscle aches or other side effects that you think could be caused by your cholesterol lowering medication?  No    Hypertension Follow-up  Lisinopril 20mg every day     Do you check your blood pressure regularly outside of the clinic? No     Are you following a low salt diet? No    Are your blood pressures ever more than 140 on the top number (systolic) OR more   than 90 on the bottom number (diastolic), for example 140/90? N/A    BP Readings from Last 2 Encounters:   09/24/19 120/80   01/21/19 128/80      Hemoglobin A1C (%)   Date Value   09/24/2019 7.6 (H)   01/21/2019 7.1 (H)     LDL Cholesterol Calculated (mg/dL)   Date Value   01/21/2019 171 (H)   04/26/2012 172 (H)     LDL Cholesterol Direct (mg/dL)   Date Value   11/17/2017 136 (H)   12/14/2016 95     Depression and Anxiety Follow-Up  Prozac 60mg every day     How are you doing with your depression since your last visit? No change    How are you doing with your anxiety since your last visit?  No change    Are you having other symptoms that might be associated with depression or anxiety? No    Have you had a significant life event? Recent move and stress with aging mother     Do you have any concerns with your use of alcohol or other drugs? No    Social History     Tobacco Use     Smoking status: Never Smoker     Smokeless tobacco: Never Used   Substance Use Topics     Alcohol use: Yes     Comment: social     Drug use: No     PHQ 1/12/2018 10/13/2018 9/24/2019   PHQ-9 Total Score 6 7 5   Q9: Thoughts of better off dead/self-harm past 2 weeks Not at all Not at all Not at all     JUSTUS-7 SCORE 12/14/2016 5/9/2017 9/24/2019   Total Score 2 11 4     Suicide Assessment Five-step Evaluation and Treatment (SAFE-T)      - Mammogram last completed 11/17/2016 with normal findings She is on a routine annual schedule and is due to repeat.    - FIT test last completed 12/22/2016 with normal findings. She is on a routine annual schedule and is due to repeat.      How many servings of fruits and vegetables do you eat daily?  2-3    On average, how many sweetened beverages do you drink each day (soda, juice, sweet tea, etc)?   0-1    How many days per week do you miss taking your medication? 0      Reviewed and updated as needed this visit by Provider         Review of Systems   ROS COMP: CONSTITUTIONAL:NEGATIVE for fever, chills, change in weight  INTEGUMENTARY/SKIN: NEGATIVE for worrisome rashes, moles or lesions  ENT/MOUTH: NEGATIVE for ear, mouth and throat  problems  RESP:NEGATIVE for significant cough or SOB  CV: NEGATIVE for chest pain, palpitations or peripheral edema  PSYCHIATRIC: NEGATIVE for changes in mood or affect    This document serves as a record of the services and decisions personally performed and made by Abigail Arora MD. It was created on his behalf by Jelani Corea, a trained medical scribe. The creation of this document is based the provider's statements to the medical scribe.  Jelani Corea 4:06 PM September 24, 2019        Objective    /80   Pulse 72   Temp 97.4  F (36.3  C) (Tympanic)   Resp 14   Wt 69.9 kg (154 lb)   BMI 26.43 kg/m    Body mass index is 26.43 kg/m .  Physical Exam   GENERAL: alert, pleasant and no distress  HENT: ear canals and TM's normal, nose and mouth without ulcers or lesions  RESP: lungs clear to auscultation - no rales, rhonchi or wheezes  CV: regular rate and rhythm, normal S1 S2  SKIN: no suspicious lesions or rashes  PSYCH: mentation appears normal, affect normal/bright    Diagnostic Test Results:    Results for orders placed or performed in visit on 09/24/19   Hemoglobin A1c   Result Value Ref Range    Hemoglobin A1C 7.6 (H) 0 - 5.6 %   TSH   Result Value Ref Range    TSH 0.89 0.40 - 4.00 mU/L           Assessment & Plan     (E11.9) Controlled type 2 diabetes mellitus without complication, without long-term current use of insulin (H)  (primary encounter diagnosis)  Comment: Patient now has improved insurance.  We will add a third agent, if covered, to manage her diabetes.  If she tolerates this medication, will discontinue glipizide.  Plan: TSH, empagliflozin (JARDIANCE) 10 MG TABS         tablet        Reviewed side effects including increased urination.  Follow-up in 6 months.    (E11.65) Uncontrolled diabetes mellitus type 2 without complications (H)  Comment: Now resolved, patient is considered controlled.  Plan: glipiZIDE (GLUCOTROL XL) 10 MG 24 hr tablet,         metFORMIN (GLUCOPHAGE-XR) 500 MG 24 hr  tablet        Continue Metformin and glipizide with a long-term goal weaning off or decrease in the dose of glipizide to preserve beta cell function.    (E78.5) Hyperlipidemia LDL goal <100  Comment: Tolerating high intensity statin well. Denies any myalgias. Acceptable lipid panel from previous lab tests.  Plan: Continue medication. Refills x 4 months.    (I10) Hypertension, goal below 140/90  Comment: Comment: BP within guidelines using single drug therapy, ACE Inhibitor. Normal renal function. No evidence of microalbuminuria from previous lab tests.  Plan: lisinopril (PRINIVIL/ZESTRIL) 20 MG tablet        Continue medication. Refill x 1 year.    (F33.0) Major depressive disorder, recurrent episode, mild (H)  Comment: Doing well on SSRI therapy.  Plan: FLUoxetine (PROZAC) 20 MG capsule        Continue.  Follow-up in 6 months.    (Z12.11) Colon cancer screening  Comment: Preventative screening.  Plan: Fecal colorectal cancer screen (FIT)          (Z23) Need for prophylactic vaccination and inoculation against influenza  Comment: I provided face to face counseling, answered questions, and explained the benefits and risks of the vaccine component order today.  Plan: INFLUENZA (HIGH DOSE) 3 VALENT VACCINE [96741],        Vaccine Administration, Initial [08159]         Patient Instructions   *   The A1c, a 3 month average of your blood sugars, is okay: 7.6.     *    No change in medications.     *   Flu shot today. Thank you.    *   Turn in the FIT card for colon cancer screening.     *    Follow up in 6 months.     *    Call about a mammogram: [phone].     *     Stop by our pharmacy about the new diabetes medication: Jardiance. Check on the price.        Return in about 6 months (around 3/24/2020).    The information in this document, created by a scribe for me, accurately reflects the services I personally performed and the decisions made by me. I have reviewed and approved this document for accuracy.      Abigail Arora MD  [facility]

## 2019-09-24 ENCOUNTER — OFFICE VISIT (OUTPATIENT)
Dept: FAMILY MEDICINE | Facility: CLINIC | Age: 64
End: 2019-09-24
Payer: COMMERCIAL

## 2019-09-24 VITALS
SYSTOLIC BLOOD PRESSURE: 120 MMHG | HEART RATE: 72 BPM | DIASTOLIC BLOOD PRESSURE: 80 MMHG | BODY MASS INDEX: 26.43 KG/M2 | RESPIRATION RATE: 14 BRPM | WEIGHT: 154 LBS | TEMPERATURE: 97.4 F

## 2019-09-24 DIAGNOSIS — Z23 NEED FOR PROPHYLACTIC VACCINATION AND INOCULATION AGAINST INFLUENZA: ICD-10-CM

## 2019-09-24 DIAGNOSIS — I10 HYPERTENSION, GOAL BELOW 140/90: ICD-10-CM

## 2019-09-24 DIAGNOSIS — E11.9 CONTROLLED TYPE 2 DIABETES MELLITUS WITHOUT COMPLICATION, WITHOUT LONG-TERM CURRENT USE OF INSULIN (H): Primary | ICD-10-CM

## 2019-09-24 DIAGNOSIS — Z12.11 COLON CANCER SCREENING: ICD-10-CM

## 2019-09-24 DIAGNOSIS — F33.0 MAJOR DEPRESSIVE DISORDER, RECURRENT EPISODE, MILD (H): ICD-10-CM

## 2019-09-24 DIAGNOSIS — E78.5 HYPERLIPIDEMIA LDL GOAL <100: ICD-10-CM

## 2019-09-24 LAB
HBA1C MFR BLD: 7.6 % (ref 0–5.6)
TSH SERPL DL<=0.005 MIU/L-ACNC: 0.89 MU/L (ref 0.4–4)

## 2019-09-24 PROCEDURE — G0008 ADMIN INFLUENZA VIRUS VAC: HCPCS | Performed by: FAMILY MEDICINE

## 2019-09-24 PROCEDURE — 90662 IIV NO PRSV INCREASED AG IM: CPT | Performed by: FAMILY MEDICINE

## 2019-09-24 PROCEDURE — 83036 HEMOGLOBIN GLYCOSYLATED A1C: CPT | Performed by: FAMILY MEDICINE

## 2019-09-24 PROCEDURE — 99214 OFFICE O/P EST MOD 30 MIN: CPT | Mod: 25 | Performed by: FAMILY MEDICINE

## 2019-09-24 PROCEDURE — 84443 ASSAY THYROID STIM HORMONE: CPT | Performed by: FAMILY MEDICINE

## 2019-09-24 PROCEDURE — 36415 COLL VENOUS BLD VENIPUNCTURE: CPT | Performed by: FAMILY MEDICINE

## 2019-09-24 RX ORDER — GLIPIZIDE 10 MG/1
10 TABLET, FILM COATED, EXTENDED RELEASE ORAL DAILY
Qty: 90 TABLET | Refills: 1 | Status: SHIPPED | OUTPATIENT
Start: 2019-09-24 | End: 2020-02-12

## 2019-09-24 RX ORDER — LISINOPRIL 20 MG/1
20 TABLET ORAL DAILY
Qty: 90 TABLET | Refills: 1 | Status: SHIPPED | OUTPATIENT
Start: 2019-09-24 | End: 2020-03-25

## 2019-09-24 RX ORDER — METFORMIN HCL 500 MG
1000 TABLET, EXTENDED RELEASE 24 HR ORAL 2 TIMES DAILY WITH MEALS
Qty: 360 TABLET | Refills: 1 | Status: SHIPPED | OUTPATIENT
Start: 2019-09-24 | End: 2020-04-10

## 2019-09-24 ASSESSMENT — PATIENT HEALTH QUESTIONNAIRE - PHQ9
5. POOR APPETITE OR OVEREATING: NOT AT ALL
SUM OF ALL RESPONSES TO PHQ QUESTIONS 1-9: 5

## 2019-09-24 ASSESSMENT — ANXIETY QUESTIONNAIRES
5. BEING SO RESTLESS THAT IT IS HARD TO SIT STILL: SEVERAL DAYS
3. WORRYING TOO MUCH ABOUT DIFFERENT THINGS: SEVERAL DAYS
6. BECOMING EASILY ANNOYED OR IRRITABLE: SEVERAL DAYS
7. FEELING AFRAID AS IF SOMETHING AWFUL MIGHT HAPPEN: NOT AT ALL
GAD7 TOTAL SCORE: 4
1. FEELING NERVOUS, ANXIOUS, OR ON EDGE: SEVERAL DAYS
2. NOT BEING ABLE TO STOP OR CONTROL WORRYING: NOT AT ALL

## 2019-09-24 ASSESSMENT — PAIN SCALES - GENERAL: PAINLEVEL: NO PAIN (0)

## 2019-09-24 NOTE — PATIENT INSTRUCTIONS
*   The A1c, a 3 month average of your blood sugars, is okay: 7.6.     *    No change in medications.     *   Flu shot today. Thank you.    *   Turn in the FIT card for colon cancer screening.     *    Follow up in 6 months.     *    Call about a mammogram: [phone].     *     Stop by our pharmacy about the new diabetes medication: Jardiance. Check on the price.

## 2019-09-25 ASSESSMENT — ANXIETY QUESTIONNAIRES: GAD7 TOTAL SCORE: 4

## 2019-10-09 DIAGNOSIS — Z12.11 COLON CANCER SCREENING: ICD-10-CM

## 2019-10-09 PROCEDURE — 82274 ASSAY TEST FOR BLOOD FECAL: CPT | Performed by: FAMILY MEDICINE

## 2019-10-13 LAB — HEMOCCULT STL QL IA: NEGATIVE

## 2019-10-29 ENCOUNTER — MYC MEDICAL ADVICE (OUTPATIENT)
Dept: FAMILY MEDICINE | Facility: CLINIC | Age: 64
End: 2019-10-29

## 2019-10-30 NOTE — TELEPHONE ENCOUNTER
Called and spoke with pt after receiving mychart     Rash      Duration: for some time, off on some time worse  Worse right now     Description  Location: around waist band  Bright Red , very itchy and inflamed  No bleeding at this time   Itching: moderate    Intensity:  moderate    Accompanying signs and symptoms: None    History (similar episodes/previous evaluation): has had pres  2009    Precipitating or alleviating factors:  New exposures:  None  Recent travel: no      Therapies tried and outcome: old  cream form 2009  , not getting better     Discussed most likely a skin rash yeast-Candida  ,  Advised OTC antifungal cream, 2-3 x  7-10 days  should see improvement in in 3-5 days    Home care  Your healthcare provider will recommend an antifungal cream or ointment for the rash. He or she may also prescribe a medicine for the itch. Follow all instructions for using these medicines. Don t use cornstarch powder. Cornstarch can cause the Candida infection to get worse.  General care:    Keep your skin clean by washing the area twice a day.    Use the cream as directed until your rash is gone. Once the skin has healed, keep it dry to prevent another infection.     If you are overweight, talk with your healthcare provider about a plan to lose excess weight.    Avoid clothes that fit tightly.  Follow-up care  Follow up with your healthcare provider, or as advised. Your rash will clear in 7 to 14 days. Call your healthcare provider if the rash is not gone after 14 days.  When to seek medical advice  Call your healthcare provider right away if any of these occur:    Pain or redness that gets worse or spreads    Fluid coming from the skin    Yellow crusts on the skin    Fever of 100.4 F (38 C) or higher, or as directed by your healthcare provider  Pt ok with home care will call and make appt as need  JORDAN Sullivan RN/[place]

## 2019-11-06 ENCOUNTER — HEALTH MAINTENANCE LETTER (OUTPATIENT)
Age: 64
End: 2019-11-06

## 2019-11-20 ENCOUNTER — TELEPHONE (OUTPATIENT)
Dept: FAMILY MEDICINE | Facility: CLINIC | Age: 64
End: 2019-11-20

## 2019-11-20 NOTE — TELEPHONE ENCOUNTER
Panel Management Review          Composite cancer screening  Chart review shows that this patient is due/due soon for the following Mammogram  Summary:    Patient is due/failing the following:   MAMMOGRAM    Action needed:   Patient needs referral/order: mammogram    Type of outreach:    Sent letter.    Questions for provider review:    None                                                                                                                                    Alejandra Stallings CMA

## 2019-11-20 NOTE — LETTER
[facility]  [address] [address]  [phone]  November 20, 2019    Marianne Monroy  [address]   [place] [address]    Dear Jody,    We care about your health and have reviewed your health plan including your medical conditions, medication list, and lab results.  Based on this review, it is recommended that you follow up regarding the following health topic(s):     -Breast Cancer Screening    We recommend you take the following action(s):  -schedule a MAMMOGRAM which is due. Please disregard this reminder if you have had this exam elsewhere within the last 1-2 years please let us know so we can update your records.    Please call us at [phone] (or use [facility]) to address the above recommendations.     Thank you for trusting [facility] and we appreciate the opportunity to serve you.  We look forward to supporting your healthcare needs in the future.    Healthy Regards,      Your Health Care Team  [facility]

## 2019-12-29 DIAGNOSIS — E78.5 HYPERLIPIDEMIA LDL GOAL <100: ICD-10-CM

## 2019-12-30 NOTE — TELEPHONE ENCOUNTER
"Requested Prescriptions   Pending Prescriptions Disp Refills     atorvastatin (LIPITOR) 40 MG tablet [Pharmacy Med Name: ATORVASTATIN TABS 40MG] 90 tablet 4     Sig: TAKE 1 TABLET DAILY  Last Written Prescription Date:  01/21/2019 #90 x 3  Last filled 09/20/2019  Last office visit: 9/24/2019 WALESKA Arora   Future Office Visit:  None         Statins Protocol Passed - 12/29/2019 12:53 PM        Passed - LDL on file in past 12 months     Recent Labs   Lab Test 01/21/19  0836   *             Passed - No abnormal creatine kinase in past 12 months     No lab results found.             Passed - Recent (12 mo) or future (30 days) visit within the authorizing provider's specialty     Patient has had an office visit with the authorizing provider or a provider within the authorizing providers department within the previous 12 mos or has a future within next 30 days. See \"Patient Info\" tab in inbasket, or \"Choose Columns\" in Meds & Orders section of the refill encounter.              Passed - Medication is active on med list        Passed - Patient is age 18 or older        Passed - No active pregnancy on record        Passed - No positive pregnancy test in past 12 months          "

## 2019-12-31 ENCOUNTER — TELEPHONE (OUTPATIENT)
Dept: FAMILY MEDICINE | Facility: CLINIC | Age: 64
End: 2019-12-31

## 2019-12-31 RX ORDER — ATORVASTATIN CALCIUM 40 MG/1
TABLET, FILM COATED ORAL
Qty: 90 TABLET | Refills: 0 | Status: SHIPPED | OUTPATIENT
Start: 2019-12-31 | End: 2020-02-12

## 2019-12-31 NOTE — TELEPHONE ENCOUNTER
I have attempted to contact this patient by phone with the following results: left message to return my call on answering machine.    Muna Velásquez RN

## 2019-12-31 NOTE — TELEPHONE ENCOUNTER
Pt  Sent a mychart message to the  that they brought back to me,  She is having a really bad cough and her ribs hurt from it and a temp of 100.5 she states it hurts to breath in deep and then makes her cough more. Please call to triage.        Denise Patel, [place]

## 2020-01-03 ENCOUNTER — OFFICE VISIT (OUTPATIENT)
Dept: FAMILY MEDICINE | Facility: CLINIC | Age: 65
End: 2020-01-03
Payer: COMMERCIAL

## 2020-01-03 VITALS
HEART RATE: 89 BPM | RESPIRATION RATE: 14 BRPM | SYSTOLIC BLOOD PRESSURE: 130 MMHG | HEIGHT: 64 IN | WEIGHT: 152 LBS | OXYGEN SATURATION: 98 % | BODY MASS INDEX: 25.95 KG/M2 | DIASTOLIC BLOOD PRESSURE: 82 MMHG

## 2020-01-03 DIAGNOSIS — F33.0 MAJOR DEPRESSIVE DISORDER, RECURRENT EPISODE, MILD (H): ICD-10-CM

## 2020-01-03 DIAGNOSIS — L30.9 ECZEMA, UNSPECIFIED TYPE: ICD-10-CM

## 2020-01-03 DIAGNOSIS — J20.9 ACUTE BRONCHITIS, UNSPECIFIED ORGANISM: Primary | ICD-10-CM

## 2020-01-03 DIAGNOSIS — I10 HYPERTENSION, GOAL BELOW 140/90: ICD-10-CM

## 2020-01-03 DIAGNOSIS — E78.5 HYPERLIPIDEMIA LDL GOAL <100: ICD-10-CM

## 2020-01-03 DIAGNOSIS — E11.9 CONTROLLED TYPE 2 DIABETES MELLITUS WITHOUT COMPLICATION, WITHOUT LONG-TERM CURRENT USE OF INSULIN (H): ICD-10-CM

## 2020-01-03 LAB
ANION GAP SERPL CALCULATED.3IONS-SCNC: 5 MMOL/L (ref 3–14)
BUN SERPL-MCNC: 22 MG/DL (ref 7–30)
CALCIUM SERPL-MCNC: 9.7 MG/DL (ref 8.5–10.1)
CHLORIDE SERPL-SCNC: 99 MMOL/L (ref 94–109)
CHOLEST SERPL-MCNC: 180 MG/DL
CO2 SERPL-SCNC: 32 MMOL/L (ref 20–32)
CREAT SERPL-MCNC: 0.68 MG/DL (ref 0.52–1.04)
CREAT UR-MCNC: 81 MG/DL
GFR SERPL CREATININE-BSD FRML MDRD: >90 ML/MIN/{1.73_M2}
GLUCOSE SERPL-MCNC: 240 MG/DL (ref 70–99)
HBA1C MFR BLD: 8.8 % (ref 0–5.6)
HDLC SERPL-MCNC: 48 MG/DL
LDLC SERPL CALC-MCNC: 67 MG/DL
MICROALBUMIN UR-MCNC: 11 MG/L
MICROALBUMIN/CREAT UR: 13.21 MG/G CR (ref 0–25)
NONHDLC SERPL-MCNC: 132 MG/DL
POTASSIUM SERPL-SCNC: 4.3 MMOL/L (ref 3.4–5.3)
SODIUM SERPL-SCNC: 136 MMOL/L (ref 133–144)
TRIGL SERPL-MCNC: 326 MG/DL

## 2020-01-03 PROCEDURE — 82043 UR ALBUMIN QUANTITATIVE: CPT | Performed by: FAMILY MEDICINE

## 2020-01-03 PROCEDURE — 83036 HEMOGLOBIN GLYCOSYLATED A1C: CPT | Performed by: FAMILY MEDICINE

## 2020-01-03 PROCEDURE — 80061 LIPID PANEL: CPT | Performed by: FAMILY MEDICINE

## 2020-01-03 PROCEDURE — 80048 BASIC METABOLIC PNL TOTAL CA: CPT | Performed by: FAMILY MEDICINE

## 2020-01-03 PROCEDURE — 36415 COLL VENOUS BLD VENIPUNCTURE: CPT | Performed by: FAMILY MEDICINE

## 2020-01-03 PROCEDURE — 99214 OFFICE O/P EST MOD 30 MIN: CPT | Performed by: FAMILY MEDICINE

## 2020-01-03 RX ORDER — TRIAMCINOLONE ACETONIDE 1 MG/G
CREAM TOPICAL 2 TIMES DAILY PRN
Qty: 45 G | Refills: 1 | Status: ON HOLD | OUTPATIENT
Start: 2020-01-03 | End: 2022-05-20

## 2020-01-03 RX ORDER — DOXYCYCLINE 100 MG/1
100 CAPSULE ORAL 2 TIMES DAILY
Qty: 20 CAPSULE | Refills: 0 | Status: SHIPPED | OUTPATIENT
Start: 2020-01-03 | End: 2020-02-12

## 2020-01-03 RX ORDER — PREDNISONE 20 MG/1
20 TABLET ORAL DAILY
Qty: 7 TABLET | Refills: 0 | Status: SHIPPED | OUTPATIENT
Start: 2020-01-03 | End: 2020-02-12

## 2020-01-03 RX ORDER — DOXYCYCLINE 100 MG/1
100 CAPSULE ORAL 2 TIMES DAILY
Qty: 14 CAPSULE | Refills: 0 | Status: SHIPPED | OUTPATIENT
Start: 2020-01-03 | End: 2020-02-12

## 2020-01-03 ASSESSMENT — MIFFLIN-ST. JEOR: SCORE: 1219.47

## 2020-01-03 ASSESSMENT — PAIN SCALES - GENERAL: PAINLEVEL: NO PAIN (0)

## 2020-01-03 NOTE — PROGRESS NOTES
"Subjective     Marianne Monroy is a 64 year old female who presents to clinic today for the following health issues:    HPI     Acute Illness   Acute illness concerns: Cough  Onset: 6 weeks    Fever: YES- Low grade    Chills/Sweats: no    Headache (location?): YES    Sinus Pressure:no    Conjunctivitis:  no    Ear Pain: YES- Right ear popping    Rhinorrhea: YES    Congestion: YES    Sore Throat: no     Cough: YES-productive of yellow sputum    Wheeze: YES    Decreased Appetite: YES- Less PO    Nausea: no    Vomiting: no    Diarrhea:  no    Dysuria/Freq.: no    Fatigue/Achiness: YES- fatigue    Sick/Strep Exposure: no     Therapies Tried and outcome: None            Reviewed and updated as needed this visit by Provider         Review of Systems   ROS COMP: Constitutional, HEENT, cardiovascular, pulmonary, gi and gu systems are negative, except as otherwise noted.      Objective    /82   Pulse 89   Resp 14   Ht 1.626 m (5' 4\")   Wt 68.9 kg (152 lb)   SpO2 98%   BMI 26.09 kg/m    Body mass index is 26.09 kg/m .  Physical Exam   GENERAL: Healthy, alert and no distress  EYES: Eyes grossly normal to inspection, conjunctivae and sclerae normal  RESP: Lungs clear to auscultation - no rales, rhonchi or wheezes  CV: Regular rate and rhythm, normal S1 S2, no murmur  MS: No gross musculoskeletal defects noted, no edema  NEURO: Normal strength and tone, mentation intact and speech normal  PSYCH: Mentation appears normal, affect normal/bright     Diagnostic Test Results:  Labs reviewed in Epic  Results for orders placed or performed in visit on 01/03/20   Basic metabolic panel     Status: Abnormal   Result Value Ref Range    Sodium 136 133 - 144 mmol/L    Potassium 4.3 3.4 - 5.3 mmol/L    Chloride 99 94 - 109 mmol/L    Carbon Dioxide 32 20 - 32 mmol/L    Anion Gap 5 3 - 14 mmol/L    Glucose 240 (H) 70 - 99 mg/dL    Urea Nitrogen 22 7 - 30 mg/dL    Creatinine 0.68 0.52 - 1.04 mg/dL    GFR Estimate >90 >60 " mL/min/[1.73_m2]    GFR Estimate If Black >90 >60 mL/min/[1.73_m2]    Calcium 9.7 8.5 - 10.1 mg/dL   Hemoglobin A1c     Status: Abnormal   Result Value Ref Range    Hemoglobin A1C 8.8 (H) 0 - 5.6 %   Lipid panel reflex to direct LDL Non-fasting     Status: Abnormal   Result Value Ref Range    Cholesterol 180 <200 mg/dL    Triglycerides 326 (H) <150 mg/dL    HDL Cholesterol 48 (L) >49 mg/dL    LDL Cholesterol Calculated 67 <100 mg/dL    Non HDL Cholesterol 132 (H) <130 mg/dL   Albumin Random Urine Quantitative with Creat Ratio     Status: None   Result Value Ref Range    Creatinine Urine 81 mg/dL    Albumin Urine mg/L 11 mg/L    Albumin Urine mg/g Cr 13.21 0 - 25 mg/g Cr           Assessment & Plan     (J20.9) Acute bronchitis, unspecified organism  (primary encounter diagnosis)  Comment: Given the duration of symptoms, will treat for infection and bronchospasm.  Plan: doxycycline hyclate (VIBRAMYCIN) 100 MG         capsule, predniSONE (DELTASONE) 20 MG tablet        Reviewed side effects including transient elevation of blood sugar readings.    (E78.5) Hyperlipidemia LDL goal <100  Comment: Continue on statin therapy.  LDL less than 70.  Plan: Basic metabolic panel, Lipid panel reflex to         direct LDL Non-fasting      (E11.9) Controlled type 2 diabetes mellitus without complication, without long-term current use of insulin (H)  Comment: Not elevated.  Stressed lifestyle.  Plan: Hemoglobin A1c        No change in medications at this time.  Follow-up in 3 months.    (I10) Hypertension, goal below 140/90  Comment: Within guidelines using single drug therapy, ACE inhibitor.  Excellent renal function, no signs of microalbuminuria.  Plan: Albumin Random Urine Quantitative with Creat         Ratio        Continue.  1 year refill.    (L30.9) Eczema, unspecified type  Comment: To be used as needed but not on the face or continuously for more than 2 weeks.  Plan: triamcinolone (KENALOG) 0.1 % external cream      (F33.0)  "Major depressive disorder, recurrent episode, mild (H)  Comment: Doing well.  Continue high-dose SSRI therapy.  Plan: We will review at next visit.     BMI:   Estimated body mass index is 26.09 kg/m  as calculated from the following:    Height as of this encounter: 1.626 m (5' 4\").    Weight as of this encounter: 68.9 kg (152 lb).           Patient Instructions   *  Call about setting up a mammogram. [phone].     *   Sounds like a sinus infection, will try some antibiotics.     *   Also prednisone pills for the wheezing. Watch for insomnia and agitation     *   Blood tests today for diabetes.     *   Follow up in 6 months.      Return in about 6 months (around 7/3/2020) for diabetes check.    Abigail Arora MD  [facility]        "

## 2020-01-03 NOTE — PATIENT INSTRUCTIONS
*  Call about setting up a mammogram. [phone].     *   Sounds like a sinus infection, will try some antibiotics.     *   Also prednisone pills for the wheezing. Watch for insomnia and agitation     *   Blood tests today for diabetes.     *   Follow up in 6 months.

## 2020-02-06 ENCOUNTER — TRANSFERRED RECORDS (OUTPATIENT)
Dept: HEALTH INFORMATION MANAGEMENT | Facility: CLINIC | Age: 65
End: 2020-02-06

## 2020-02-06 LAB
RETINOPATHY: NEGATIVE
RETINOPATHY: NORMAL

## 2020-02-12 ENCOUNTER — OFFICE VISIT (OUTPATIENT)
Dept: FAMILY MEDICINE | Facility: CLINIC | Age: 65
End: 2020-02-12
Payer: COMMERCIAL

## 2020-02-12 VITALS
HEART RATE: 94 BPM | WEIGHT: 154.2 LBS | TEMPERATURE: 98 F | OXYGEN SATURATION: 97 % | HEIGHT: 64 IN | RESPIRATION RATE: 16 BRPM | DIASTOLIC BLOOD PRESSURE: 72 MMHG | BODY MASS INDEX: 26.32 KG/M2 | SYSTOLIC BLOOD PRESSURE: 128 MMHG

## 2020-02-12 DIAGNOSIS — R21 RASH: Primary | ICD-10-CM

## 2020-02-12 PROBLEM — E11.9 DIABETES MELLITUS, TYPE 2 (H): Status: ACTIVE | Noted: 2020-02-12

## 2020-02-12 PROCEDURE — 99213 OFFICE O/P EST LOW 20 MIN: CPT | Performed by: FAMILY MEDICINE

## 2020-02-12 RX ORDER — METHYLPREDNISOLONE 4 MG
TABLET, DOSE PACK ORAL
Qty: 21 TABLET | Refills: 0 | Status: SHIPPED | OUTPATIENT
Start: 2020-02-12 | End: 2020-09-10

## 2020-02-12 RX ORDER — GLIPIZIDE 10 MG/1
10 TABLET, FILM COATED, EXTENDED RELEASE ORAL DAILY
Qty: 90 TABLET | Refills: 1 | Status: SHIPPED | OUTPATIENT
Start: 2020-02-12 | End: 2020-03-25

## 2020-02-12 RX ORDER — ATORVASTATIN CALCIUM 40 MG/1
40 TABLET, FILM COATED ORAL DAILY
Qty: 90 TABLET | Refills: 3 | Status: SHIPPED | OUTPATIENT
Start: 2020-02-12 | End: 2020-03-25

## 2020-02-12 ASSESSMENT — ANXIETY QUESTIONNAIRES
IF YOU CHECKED OFF ANY PROBLEMS ON THIS QUESTIONNAIRE, HOW DIFFICULT HAVE THESE PROBLEMS MADE IT FOR YOU TO DO YOUR WORK, TAKE CARE OF THINGS AT HOME, OR GET ALONG WITH OTHER PEOPLE: SOMEWHAT DIFFICULT
2. NOT BEING ABLE TO STOP OR CONTROL WORRYING: SEVERAL DAYS
5. BEING SO RESTLESS THAT IT IS HARD TO SIT STILL: NOT AT ALL
1. FEELING NERVOUS, ANXIOUS, OR ON EDGE: SEVERAL DAYS
GAD7 TOTAL SCORE: 6
7. FEELING AFRAID AS IF SOMETHING AWFUL MIGHT HAPPEN: SEVERAL DAYS
6. BECOMING EASILY ANNOYED OR IRRITABLE: SEVERAL DAYS
3. WORRYING TOO MUCH ABOUT DIFFERENT THINGS: SEVERAL DAYS

## 2020-02-12 ASSESSMENT — PATIENT HEALTH QUESTIONNAIRE - PHQ9
SUM OF ALL RESPONSES TO PHQ QUESTIONS 1-9: 6
5. POOR APPETITE OR OVEREATING: SEVERAL DAYS

## 2020-02-12 ASSESSMENT — MIFFLIN-ST. JEOR: SCORE: 1224.45

## 2020-02-12 NOTE — PROGRESS NOTES
Subjective     Marianne Monroy is a 65 year old female who presents to clinic today for the following health issues:    HPI   Rash  Onset: About 2 months    Description:   Location: Spreading on upper body  Character: round, raised, red, itchy  Itching (Pruritis): YES    Progression of Symptoms:  worsening    Accompanying Signs & Symptoms:  Fever: no   Body aches or joint pain: no   Sore throat symptoms: no   Recent cold symptoms: no     History:   Previous similar rash: no     Precipitating factors:   Exposure to similar rash: no   New exposures: None   Recent travel: no     Alleviating factors:  None    Therapies Tried and outcome: kenalog cream helps relieve itch but rash still there. From previous doctor  Triamcinolone helps but rash comes back    Patient Active Problem List   Diagnosis     Phobia     GERD (gastroesophageal reflux disease)     C. difficile diarrhea     Hyperlipidemia LDL goal <100     Advanced directives, counseling/discussion     Hypertension, goal below 140/90     Major depressive disorder, recurrent episode, mild (H)     Generalized anxiety disorder     Diabetes mellitus, type 2 (H)     Past Surgical History:   Procedure Laterality Date     NO HISTORY OF SURGERY         Social History     Tobacco Use     Smoking status: Never Smoker     Smokeless tobacco: Never Used   Substance Use Topics     Alcohol use: Yes     Comment: social     Family History   Problem Relation Age of Onset     Diabetes Mother         hypertension, alive at 83     C.A.D. Mother      Cancer Father         lung cancer, smoker.   at age 53     Family History Negative Sister      Osteoporosis Sister          Current Outpatient Medications   Medication Sig Dispense Refill     ASPIRIN 81 MG OR TABS ONE DAILY  3     atorvastatin (LIPITOR) 40 MG tablet Take 1 tablet (40 mg) by mouth daily 90 tablet 3     BLOOD GLUCOSE METER KIT PER PATIENT HEALTH PLAN 1 Kit 0     blood glucose monitoring (ACCU-CHEK MULTICLIX) lancets 1 each  2 times daily Use to test blood sugar 2 times daily or as directed. 100 Box prn     blood glucose monitoring (JESSE CONTOUR) test strip 1 strip by In Vitro route 2 times daily 100 each 5     FLUoxetine (PROZAC) 20 MG capsule Take 3 capsules (60 mg) by mouth daily 270 capsule 1     glipiZIDE (GLUCOTROL XL) 10 MG 24 hr tablet Take 1 tablet (10 mg) by mouth daily 90 tablet 1     lisinopril (PRINIVIL/ZESTRIL) 20 MG tablet Take 1 tablet (20 mg) by mouth daily For blood pressure. 90 tablet 1     metFORMIN (GLUCOPHAGE-XR) 500 MG 24 hr tablet Take 2 tablets (1,000 mg) by mouth 2 times daily (with meals) 360 tablet 1     methylPREDNISolone (MEDROL DOSEPAK) 4 MG tablet therapy pack Follow Package Directions 21 tablet 0     MULTIPLE VITAMIN OR TABS 1 TABLET DAILY       triamcinolone (KENALOG) 0.1 % external cream Apply topically 2 times daily as needed for irritation 45 g 1     FISH OIL 1000 MG OR CAPS 1 tablet daily       HERBALS sleep med- prn       LORazepam (ATIVAN) 0.5 MG tablet Take 1 tablet (0.5 mg) by mouth every 6 hours as needed for anxiety (Patient not taking: Reported on 2/12/2020) 6 tablet 0     omeprazole (PRILOSEC) 20 MG capsule Take 1 capsule (20 mg) by mouth daily (Patient not taking: Reported on 2/12/2020) 90 capsule 2     Allergies   Allergen Reactions     Effexor [Venlafaxine Hydrochloride]      Tachycardia, makes her extremely jittery--cant sit down, has to keep moving constantly     Recent Labs   Lab Test 01/03/20  1402 09/24/19  1554 01/21/19  0836  11/17/17  1543  04/26/12  0937   A1C 8.8* 7.6* 7.1*   < > 7.0*   < > 8.1*   LDL 67  --  171*  --  136*   < > 172*   HDL 48*  --  53  --   --   --  43*   TRIG 326*  --  313*  --   --   --  197*   ALT  --   --   --   --  36  --   --    CR 0.68  --  0.83  --  0.74   < > 0.63   GFRESTIMATED >90  --  74  --  79   < > >90   GFRESTBLACK >90  --  86  --  >90   < > >90   POTASSIUM 4.3  --  4.7  --  4.0   < > 4.3   TSH  --  0.89  --   --  3.11   < >  --     < > =  "values in this interval not displayed.      BP Readings from Last 3 Encounters:   02/12/20 128/72   01/03/20 130/82   09/24/19 120/80    Wt Readings from Last 3 Encounters:   02/12/20 69.9 kg (154 lb 3.2 oz)   01/03/20 68.9 kg (152 lb)   09/24/19 69.9 kg (154 lb)                      Reviewed and updated as needed this visit by Provider         Review of Systems   ROS COMP:   Rest of the ROS is Negative except see above and Problem list [stable]        Objective    Pulse 94   Temp 98  F (36.7  C) (Oral)   Resp 16   Ht 1.626 m (5' 4\")   Wt 69.9 kg (154 lb 3.2 oz)   SpO2 97%   Breastfeeding No   BMI 26.47 kg/m    Body mass index is 26.47 kg/m .  Physical Exam   GENERAL: healthy, alert and no distress  NECK: no adenopathy, no asymmetry, masses, or scars and thyroid normal to palpation  RESP: lungs clear to auscultation - no rales, rhonchi or wheezes  CV: regular rate and rhythm, normal S1 S2, no S3 or S4, no murmur, click or rub, no peripheral edema and peripheral pulses strong  ABDOMEN: soft, nontender, no hepatosplenomegaly, no masses and bowel sounds normal  MS: no gross musculoskeletal defects noted, no edema  Mild erythematous rash seen abdomen, arms    Diagnostic Test Results:  Labs reviewed in Epic  none         Assessment & Plan     1. Rash  Allergic rash  - methylPREDNISolone (MEDROL DOSEPAK) 4 MG tablet therapy pack; Follow Package Directions  Dispense: 21 tablet; Refill: 0     Zyrtec otc  Follow up 2 week if not better/sooner if worse        Return in about 3 months (around 5/12/2020) for Diabetes.    Roxanna Otoole MD  [facility]        "

## 2020-02-13 ASSESSMENT — ANXIETY QUESTIONNAIRES: GAD7 TOTAL SCORE: 6

## 2020-02-18 ENCOUNTER — ANCILLARY PROCEDURE (OUTPATIENT)
Dept: MAMMOGRAPHY | Facility: CLINIC | Age: 65
End: 2020-02-18
Payer: COMMERCIAL

## 2020-02-18 DIAGNOSIS — Z12.31 VISIT FOR SCREENING MAMMOGRAM: ICD-10-CM

## 2020-02-18 PROCEDURE — 77067 SCR MAMMO BI INCL CAD: CPT | Mod: TC

## 2020-02-18 PROCEDURE — 77063 BREAST TOMOSYNTHESIS BI: CPT | Mod: TC

## 2020-03-02 ENCOUNTER — MYC MEDICAL ADVICE (OUTPATIENT)
Dept: FAMILY MEDICINE | Facility: CLINIC | Age: 65
End: 2020-03-02

## 2020-03-02 NOTE — TELEPHONE ENCOUNTER
Mychart information     Influenza (High Dose) 3 valent vaccine9/24/2019    Pneumo Conj 13-V (2010&after)3/9/2016    Pneumococcal 23 valent4/20/2011    JORDAN Sullivan RN/[place]

## 2020-03-24 ENCOUNTER — MYC MEDICAL ADVICE (OUTPATIENT)
Dept: FAMILY MEDICINE | Facility: CLINIC | Age: 65
End: 2020-03-24

## 2020-03-24 DIAGNOSIS — E78.5 HYPERLIPIDEMIA LDL GOAL <100: ICD-10-CM

## 2020-03-24 DIAGNOSIS — I10 HYPERTENSION, GOAL BELOW 140/90: ICD-10-CM

## 2020-03-24 DIAGNOSIS — E11.9 CONTROLLED TYPE 2 DIABETES MELLITUS WITHOUT COMPLICATION, WITHOUT LONG-TERM CURRENT USE OF INSULIN (H): Primary | ICD-10-CM

## 2020-03-25 RX ORDER — GLIPIZIDE 10 MG/1
TABLET, FILM COATED, EXTENDED RELEASE ORAL
Qty: 90 TABLET | Refills: 3 | Status: SHIPPED | OUTPATIENT
Start: 2020-03-25 | End: 2020-09-10

## 2020-03-25 RX ORDER — ATORVASTATIN CALCIUM 40 MG/1
TABLET, FILM COATED ORAL
Qty: 90 TABLET | Refills: 3 | Status: SHIPPED | OUTPATIENT
Start: 2020-03-25 | End: 2020-11-10 | Stop reason: DRUGHIGH

## 2020-03-25 RX ORDER — LISINOPRIL 20 MG/1
TABLET ORAL
Qty: 90 TABLET | Refills: 3 | Status: SHIPPED | OUTPATIENT
Start: 2020-03-25 | End: 2021-03-10

## 2020-03-25 NOTE — TELEPHONE ENCOUNTER
Routing refill request to provider for review/approval because:  Last signed by another provider. Mail order now requesting as other was sent to a local pharmacy.    Muna Velásquez RN

## 2020-03-25 NOTE — TELEPHONE ENCOUNTER
"Requested Prescriptions   Pending Prescriptions Disp Refills     lisinopril (ZESTRIL) 20 MG tablet [Pharmacy Med Name: LISINOPRIL TABS 20MG]  Last Written Prescription Date:  9/24/19  Last Fill Quantity: 90,  # refills: 1   Last office visit: 2/12/2020 with prescribing provider:   david  Future Office Visit:     90 tablet 3     Sig: TAKE 1 TABLET DAILY FOR BLOOD PRESSURE       ACE Inhibitors (Including Combos) Protocol Passed - 3/24/2020  5:35 PM        Passed - Blood pressure under 140/90 in past 12 months     BP Readings from Last 3 Encounters:   02/12/20 128/72   01/03/20 130/82   09/24/19 120/80                 Passed - Recent (12 mo) or future (30 days) visit within the authorizing provider's specialty     Patient has had an office visit with the authorizing provider or a provider within the authorizing providers department within the previous 12 mos or has a future within next 30 days. See \"Patient Info\" tab in inbasket, or \"Choose Columns\" in Meds & Orders section of the refill encounter.              Passed - Medication is active on med list        Passed - Patient is age 18 or older        Passed - No active pregnancy on record        Passed - Normal serum creatinine on file in past 12 months     Recent Labs   Lab Test 01/03/20  1402   CR 0.68       Ok to refill medication if creatinine is low          Passed - Normal serum potassium on file in past 12 months     Recent Labs   Lab Test 01/03/20  1402   POTASSIUM 4.3             Passed - No positive pregnancy test within past 12 months           glipiZIDE (GLUCOTROL XL) 10 MG 24 hr tablet [Pharmacy Med Name: GLIPIZIDE ER TABS 10MG]  Last Written Prescription Date:  2/12/20  Last Fill Quantity: 90,  # refills: 1   Last office visit: 2/12/2020 with prescribing provider:  david   Future Office Visit:     90 tablet 3     Sig: TAKE 1 TABLET DAILY       Sulfonylurea Agents Passed - 3/24/2020  5:35 PM        Passed - Blood pressure less than 140/90 in past 6 " "months     BP Readings from Last 3 Encounters:   02/12/20 128/72   01/03/20 130/82   09/24/19 120/80                 Passed - Patient has documented LDL within the past 12 mos.     Recent Labs   Lab Test 01/03/20  1402   LDL 67             Passed - Patient has had a Microalbumin in the past 15 mos.     Recent Labs   Lab Test 01/03/20  1408   MICROL 11   UMALCR 13.21             Passed - Patient has documented A1c within the specified period of time.     If HgbA1C is 8 or greater, it needs to be on file within the past 3 months.  If less than 8, must be on file within the past 6 months.     Recent Labs   Lab Test 01/03/20  1402   A1C 8.8*             Passed - Medication is active on med list        Passed - Patient is age 18 or older        Passed - No active pregnancy on record        Passed - Patient has a recent creatinine (normal) within the past 12 mos.     Recent Labs   Lab Test 01/03/20  1402   CR 0.68       Ok to refill medication if creatinine is low          Passed - Patient has not had a positive pregnancy test within the past 12 mos.        Passed - Recent (6 mo) or future (30 days) visit within the authorizing provider's specialty     Patient had office visit in the last 6 months or has a visit in the next 30 days with authorizing provider or within the authorizing provider's specialty.  See \"Patient Info\" tab in inbasket, or \"Choose Columns\" in Meds & Orders section of the refill encounter.               atorvastatin (LIPITOR) 40 MG tablet [Pharmacy Med Name: ATORVASTATIN TABS 40MG]  Last Written Prescription Date:  2/12/20  Last Fill Quantity: 90,  # refills: 3   Last office visit: 2/12/2020 with prescribing provider:  david   Future Office Visit:     90 tablet 3     Sig: TAKE 1 TABLET DAILY       Statins Protocol Passed - 3/24/2020  5:35 PM        Passed - LDL on file in past 12 months     Recent Labs   Lab Test 01/03/20  1402   LDL 67             Passed - No abnormal creatine kinase in past 12 " "months     No lab results found.             Passed - Recent (12 mo) or future (30 days) visit within the authorizing provider's specialty     Patient has had an office visit with the authorizing provider or a provider within the authorizing providers department within the previous 12 mos or has a future within next 30 days. See \"Patient Info\" tab in inbasket, or \"Choose Columns\" in Meds & Orders section of the refill encounter.              Passed - Medication is active on med list        Passed - Patient is age 18 or older        Passed - No active pregnancy on record        Passed - No positive pregnancy test in past 12 months             "

## 2020-09-09 ENCOUNTER — OFFICE VISIT (OUTPATIENT)
Dept: FAMILY MEDICINE | Facility: CLINIC | Age: 65
End: 2020-09-09
Payer: COMMERCIAL

## 2020-09-09 VITALS
OXYGEN SATURATION: 98 % | HEART RATE: 96 BPM | SYSTOLIC BLOOD PRESSURE: 120 MMHG | HEIGHT: 64 IN | RESPIRATION RATE: 18 BRPM | WEIGHT: 155 LBS | BODY MASS INDEX: 26.46 KG/M2 | DIASTOLIC BLOOD PRESSURE: 82 MMHG | TEMPERATURE: 98 F

## 2020-09-09 DIAGNOSIS — F33.0 MAJOR DEPRESSIVE DISORDER, RECURRENT EPISODE, MILD (H): ICD-10-CM

## 2020-09-09 DIAGNOSIS — E78.5 HYPERLIPIDEMIA WITH TARGET LDL LESS THAN 70: ICD-10-CM

## 2020-09-09 DIAGNOSIS — Z23 NEED FOR PROPHYLACTIC VACCINATION AND INOCULATION AGAINST INFLUENZA: ICD-10-CM

## 2020-09-09 DIAGNOSIS — E11.65 TYPE 2 DIABETES MELLITUS WITH HYPERGLYCEMIA, WITHOUT LONG-TERM CURRENT USE OF INSULIN (H): ICD-10-CM

## 2020-09-09 DIAGNOSIS — K21.9 GASTROESOPHAGEAL REFLUX DISEASE WITHOUT ESOPHAGITIS: ICD-10-CM

## 2020-09-09 DIAGNOSIS — I10 HYPERTENSION, GOAL BELOW 140/90: ICD-10-CM

## 2020-09-09 DIAGNOSIS — Z12.11 SCREEN FOR COLON CANCER: ICD-10-CM

## 2020-09-09 LAB
ALBUMIN SERPL-MCNC: 3.7 G/DL (ref 3.4–5)
ALP SERPL-CCNC: 90 U/L (ref 40–150)
ALT SERPL W P-5'-P-CCNC: 33 U/L (ref 0–50)
AMYLASE SERPL-CCNC: 26 U/L (ref 30–110)
ANION GAP SERPL CALCULATED.3IONS-SCNC: 4 MMOL/L (ref 3–14)
AST SERPL W P-5'-P-CCNC: 14 U/L (ref 0–45)
BASOPHILS # BLD AUTO: 0 10E9/L (ref 0–0.2)
BASOPHILS NFR BLD AUTO: 0.4 %
BILIRUB DIRECT SERPL-MCNC: <0.1 MG/DL (ref 0–0.2)
BILIRUB SERPL-MCNC: 0.4 MG/DL (ref 0.2–1.3)
BUN SERPL-MCNC: 18 MG/DL (ref 7–30)
CALCIUM SERPL-MCNC: 9.1 MG/DL (ref 8.5–10.1)
CHLORIDE SERPL-SCNC: 107 MMOL/L (ref 94–109)
CO2 SERPL-SCNC: 28 MMOL/L (ref 20–32)
CREAT SERPL-MCNC: 0.91 MG/DL (ref 0.52–1.04)
DIFFERENTIAL METHOD BLD: NORMAL
EOSINOPHIL # BLD AUTO: 0.5 10E9/L (ref 0–0.7)
EOSINOPHIL NFR BLD AUTO: 7.3 %
ERYTHROCYTE [DISTWIDTH] IN BLOOD BY AUTOMATED COUNT: 12.5 % (ref 10–15)
GFR SERPL CREATININE-BSD FRML MDRD: 66 ML/MIN/{1.73_M2}
GLUCOSE SERPL-MCNC: 121 MG/DL (ref 70–99)
HBA1C MFR BLD: 8.5 % (ref 0–5.6)
HCT VFR BLD AUTO: 41.1 % (ref 35–47)
HGB BLD-MCNC: 13.3 G/DL (ref 11.7–15.7)
LIPASE SERPL-CCNC: 105 U/L (ref 73–393)
LYMPHOCYTES # BLD AUTO: 2.4 10E9/L (ref 0.8–5.3)
LYMPHOCYTES NFR BLD AUTO: 33.5 %
MCH RBC QN AUTO: 29.1 PG (ref 26.5–33)
MCHC RBC AUTO-ENTMCNC: 32.4 G/DL (ref 31.5–36.5)
MCV RBC AUTO: 90 FL (ref 78–100)
MONOCYTES # BLD AUTO: 0.9 10E9/L (ref 0–1.3)
MONOCYTES NFR BLD AUTO: 12.7 %
NEUTROPHILS # BLD AUTO: 3.3 10E9/L (ref 1.6–8.3)
NEUTROPHILS NFR BLD AUTO: 46.1 %
PLATELET # BLD AUTO: 222 10E9/L (ref 150–450)
POTASSIUM SERPL-SCNC: 4.9 MMOL/L (ref 3.4–5.3)
PROT SERPL-MCNC: 7.4 G/DL (ref 6.8–8.8)
RBC # BLD AUTO: 4.57 10E12/L (ref 3.8–5.2)
SODIUM SERPL-SCNC: 139 MMOL/L (ref 133–144)
WBC # BLD AUTO: 7.1 10E9/L (ref 4–11)

## 2020-09-09 PROCEDURE — 83036 HEMOGLOBIN GLYCOSYLATED A1C: CPT | Performed by: FAMILY MEDICINE

## 2020-09-09 PROCEDURE — 82150 ASSAY OF AMYLASE: CPT | Performed by: FAMILY MEDICINE

## 2020-09-09 PROCEDURE — 80076 HEPATIC FUNCTION PANEL: CPT | Performed by: FAMILY MEDICINE

## 2020-09-09 PROCEDURE — 90732 PPSV23 VACC 2 YRS+ SUBQ/IM: CPT | Performed by: FAMILY MEDICINE

## 2020-09-09 PROCEDURE — 80048 BASIC METABOLIC PNL TOTAL CA: CPT | Performed by: FAMILY MEDICINE

## 2020-09-09 PROCEDURE — 99214 OFFICE O/P EST MOD 30 MIN: CPT | Mod: 25 | Performed by: FAMILY MEDICINE

## 2020-09-09 PROCEDURE — 85025 COMPLETE CBC W/AUTO DIFF WBC: CPT | Performed by: FAMILY MEDICINE

## 2020-09-09 PROCEDURE — G0008 ADMIN INFLUENZA VIRUS VAC: HCPCS | Performed by: FAMILY MEDICINE

## 2020-09-09 PROCEDURE — 99207 C FOOT EXAM  NO CHARGE: CPT | Performed by: FAMILY MEDICINE

## 2020-09-09 PROCEDURE — G0009 ADMIN PNEUMOCOCCAL VACCINE: HCPCS | Performed by: FAMILY MEDICINE

## 2020-09-09 PROCEDURE — 36415 COLL VENOUS BLD VENIPUNCTURE: CPT | Performed by: FAMILY MEDICINE

## 2020-09-09 PROCEDURE — 90662 IIV NO PRSV INCREASED AG IM: CPT | Performed by: FAMILY MEDICINE

## 2020-09-09 PROCEDURE — 83690 ASSAY OF LIPASE: CPT | Performed by: FAMILY MEDICINE

## 2020-09-09 RX ORDER — METFORMIN HCL 500 MG
1000 TABLET, EXTENDED RELEASE 24 HR ORAL 2 TIMES DAILY WITH MEALS
Qty: 360 TABLET | Refills: 0 | Status: SHIPPED | OUTPATIENT
Start: 2020-09-09 | End: 2020-10-13

## 2020-09-09 ASSESSMENT — MIFFLIN-ST. JEOR: SCORE: 1228.08

## 2020-09-09 ASSESSMENT — PAIN SCALES - GENERAL: PAINLEVEL: NO PAIN (0)

## 2020-09-09 NOTE — PROGRESS NOTES
Subjective     Marianne Monroy is a 65 year old female who presents to clinic today for the following health issues:    History of Present Illness        Mental Health Follow-up:  Patient presents to follow-up on Depression.Patient's depression since last visit has been:  No change  The patient is not having other symptoms associated with depression.      Any significant life events: No  Patient is feeling anxious or having panic attacks.  Patient has no concerns about alcohol or drug use.     Social History  Tobacco Use    Smoking status: Never Smoker    Smokeless tobacco: Never Used  Alcohol use: Yes    Comment: social  Drug use: No      Today's PHQ-9         PHQ-9 Total Score:         PHQ-9 Q9 Thoughts of better off dead/self-harm past 2 weeks :       Thoughts of suicide or self harm:      Self-harm Plan:        Self-harm Action:          Safety concerns for self or others:           Diabetes:   She presents for follow up of diabetes.  She is checking home blood glucose one time daily. She checks blood glucose before meals.  Blood glucose is never over 200 and never under 70. She is aware of hypoglycemia symptoms including shakiness, dizziness and weakness. She has no concerns regarding her diabetes at this time.  She is not experiencing numbness or burning in feet, excessive thirst, blurry vision, weight changes or redness, sores or blisters on feet. The patient has had a diabetic eye exam in the last 12 months. Eye exam performed on 2/6/2020. Location of last eye exam doesn't know the name.        Hyperlipidemia:  She presents for follow up of hyperlipidemia.  She is taking medication to lower cholesterol. She is not having myalgia or other side effects to statin medications.    Hypertension: She presents for follow up of hypertension.  She does check blood pressure  regularly outside of the clinic. Outpatient blood pressures have not been over 140/90. She follows a low salt diet.       Pt has been seeing  "Physician at another clinic  She will follow up for Depression    Abdominal/Flank Pain  Onset/Duration: week and half ago  Description:   Character: Cramping and burning  Location: epigastric region with some GERD symptoms  Radiation: None  Intensity: moderate  Progression of Symptoms:  same  Accompanying Signs & Symptoms:  Fever/Chills: no  Gas/Bloating: YES  Nausea: no  Vomitting: no  Diarrhea: YES occasional  Constipation: no  Dysuria or Hematuria: no  History:   Trauma: no  Previous similar pain: YES  Previous tests done: fit test done  Precipitating factors:   Does the pain change with:     Food: YES    Bowel Movement: no    Urination: no   Other factors:  no  Therapies tried and outcome: OCT  Pepcid and imodium  No LMP recorded. Patient is postmenopausal.  Drinks 2 c of coffee  No other gastric Irritants  Review of Systems   CONSTITUTIONAL: NEGATIVE for fever, chills, change in weight  ENT/MOUTH: NEGATIVE for ear, mouth and throat problems  RESP: NEGATIVE for significant cough or SOB  CV: NEGATIVE for chest pain, palpitations or peripheral edema  GI: as above  The patient denies a, anorexia, nausea or vomiting, dysphagia,  or black or bloody stools.    : none  PSYCHIATRIC: NEGATIVE for changes in mood or affect  ROS otherwise negative      Objective    /82   Pulse 96   Temp 98  F (36.7  C) (Oral)   Resp 18   Ht 1.626 m (5' 4\")   Wt 70.3 kg (155 lb)   SpO2 98%   BMI 26.61 kg/m    Body mass index is 26.61 kg/m .  Physical Exam   GENERAL: healthy, alert and no distress  NECK: no adenopathy, no asymmetry, masses, or scars and thyroid normal to palpation  RESP: lungs clear to auscultation - no rales, rhonchi or wheezes  CV: regular rate and rhythm, normal S1 S2, no S3 or S4, no murmur, click or rub, no peripheral edema and peripheral pulses strong  ABDOMEN: soft, nontender, no hepatosplenomegaly, no masses and bowel sounds normal  MS: no gross musculoskeletal defects noted, no edema  SKIN: no " "suspicious lesions or rashes  PSYCH: mentation appears normal, affect normal/bright    Pending         Assessment & Plan     Type 2 diabetes mellitus with hyperglycemia, without long-term current use of insulin (H)  Pending   - HEMOGLOBIN A1C  - metFORMIN (GLUCOPHAGE-XR) 500 MG 24 hr tablet; Take 2 tablets (1,000 mg) by mouth 2 times daily (with meals) Needs appointment for further refills.  - Basic metabolic panel  - FOOT EXAM    Hyperlipidemia LDL goal <70  Labs pending     Hypertension, goal below 140/90  Stable         Gastroesophageal reflux disease without esophagitis  Advised PPI  - CBC with platelets differential  - Hepatic panel  - Lipase  - Amylase  - omeprazole (PRILOSEC) 20 MG DR capsule; Take 1 capsule (20 mg) by mouth daily  Follow up 1 week if not better/sooner if worse    Screen for colon cancer  Advised   - Fecal colorectal cancer screen (FIT); Future    Need for prophylactic vaccination and inoculation against influenza  Advised   - FLUZONE HIGH DOSE 65+  [57767]  - Vaccine Administration, Initial [80725]     BMI:   Estimated body mass index is 26.61 kg/m  as calculated from the following:    Height as of this encounter: 1.626 m (5' 4\").    Weight as of this encounter: 70.3 kg (155 lb).   Weight management plan: Discussed healthy diet and exercise guidelines            Return in about 1 month (around 10/9/2020) for Physical Exam, Med check.    Roxanna Otoole MD  [facility]    "

## 2020-09-10 DIAGNOSIS — E11.65 TYPE 2 DIABETES MELLITUS WITH HYPERGLYCEMIA, WITHOUT LONG-TERM CURRENT USE OF INSULIN (H): Primary | ICD-10-CM

## 2020-09-10 DIAGNOSIS — E11.9 CONTROLLED TYPE 2 DIABETES MELLITUS WITHOUT COMPLICATION, WITHOUT LONG-TERM CURRENT USE OF INSULIN (H): ICD-10-CM

## 2020-09-10 DIAGNOSIS — Z12.11 SCREEN FOR COLON CANCER: ICD-10-CM

## 2020-09-10 PROCEDURE — 82274 ASSAY TEST FOR BLOOD FECAL: CPT | Performed by: FAMILY MEDICINE

## 2020-09-10 RX ORDER — GLIPIZIDE 10 MG/1
10 TABLET, FILM COATED, EXTENDED RELEASE ORAL DAILY
Refills: 3 | Status: SHIPPED
Start: 2020-09-10 | End: 2020-09-15

## 2020-09-10 RX ORDER — GLIPIZIDE 2.5 MG/1
12.5 TABLET, EXTENDED RELEASE ORAL DAILY
Qty: 450 TABLET | Refills: 0 | Status: SHIPPED | OUTPATIENT
Start: 2020-09-10 | End: 2020-10-13

## 2020-09-13 ENCOUNTER — MYC MEDICAL ADVICE (OUTPATIENT)
Dept: FAMILY MEDICINE | Facility: CLINIC | Age: 65
End: 2020-09-13

## 2020-09-13 LAB — HEMOCCULT STL QL IA: NEGATIVE

## 2020-09-14 ENCOUNTER — MYC MEDICAL ADVICE (OUTPATIENT)
Dept: FAMILY MEDICINE | Facility: CLINIC | Age: 65
End: 2020-09-14

## 2020-09-14 NOTE — TELEPHONE ENCOUNTER
Please see mychart message. Pt was given referral to diabetic ed, has not scheduled with them yet. Should she start with an appt with them to discuss her questions?

## 2020-09-17 ASSESSMENT — PATIENT HEALTH QUESTIONNAIRE - PHQ9: SUM OF ALL RESPONSES TO PHQ QUESTIONS 1-9: 4

## 2020-09-30 ENCOUNTER — MYC MEDICAL ADVICE (OUTPATIENT)
Dept: FAMILY MEDICINE | Facility: CLINIC | Age: 65
End: 2020-09-30

## 2020-09-30 DIAGNOSIS — K21.9 GASTROESOPHAGEAL REFLUX DISEASE WITHOUT ESOPHAGITIS: ICD-10-CM

## 2020-10-05 NOTE — TELEPHONE ENCOUNTER
Me         12:58 PM  Note     Patient is scheduled for an appointment.         Next 5 appointments (look out 90 days)    Oct 13, 2020  4:00 PM  PHYSICAL with Roxanna Otoole MD  [facility] ([facility] [address] [address]  [phone]        Gail Ramirez,

## 2020-10-11 ENCOUNTER — MYC MEDICAL ADVICE (OUTPATIENT)
Dept: FAMILY MEDICINE | Facility: CLINIC | Age: 65
End: 2020-10-11

## 2020-10-11 DIAGNOSIS — F33.0 MAJOR DEPRESSIVE DISORDER, RECURRENT EPISODE, MILD (H): ICD-10-CM

## 2020-10-11 DIAGNOSIS — E11.65 TYPE 2 DIABETES MELLITUS WITH HYPERGLYCEMIA, WITHOUT LONG-TERM CURRENT USE OF INSULIN (H): ICD-10-CM

## 2020-10-12 ENCOUNTER — MYC MEDICAL ADVICE (OUTPATIENT)
Dept: FAMILY MEDICINE | Facility: CLINIC | Age: 65
End: 2020-10-12

## 2020-10-12 DIAGNOSIS — E11.65 TYPE 2 DIABETES MELLITUS WITH HYPERGLYCEMIA, WITHOUT LONG-TERM CURRENT USE OF INSULIN (H): ICD-10-CM

## 2020-10-12 NOTE — TELEPHONE ENCOUNTER
Please send to Mail order metFORMIN (GLUCOPHAGE-XR) 500 MG 24 hr tablet, glipiZIDE (GLUCOTROL XL) 2.5 MG 24 hr tablet, FLUoxetine (PROZAC) 20 MG capsule

## 2020-10-12 NOTE — TELEPHONE ENCOUNTER
Refill request received within 30 days of last office visit with pcp.  Prescription is routed to the provider to please address refill.   Hanane Camejo RN

## 2020-10-13 RX ORDER — METFORMIN HCL 500 MG
1000 TABLET, EXTENDED RELEASE 24 HR ORAL 2 TIMES DAILY WITH MEALS
Qty: 360 TABLET | Refills: 0 | Status: SHIPPED | OUTPATIENT
Start: 2020-10-13 | End: 2020-11-10

## 2020-10-13 RX ORDER — GLIPIZIDE 2.5 MG/1
12.5 TABLET, EXTENDED RELEASE ORAL DAILY
Qty: 450 TABLET | Refills: 0 | Status: SHIPPED | OUTPATIENT
Start: 2020-10-13 | End: 2020-10-15

## 2020-10-15 RX ORDER — GLIPIZIDE 5 MG/1
10 TABLET, FILM COATED, EXTENDED RELEASE ORAL DAILY
Qty: 60 TABLET | Refills: 3 | Status: SHIPPED | OUTPATIENT
Start: 2020-10-15 | End: 2021-03-10

## 2020-11-06 ENCOUNTER — PATIENT OUTREACH (OUTPATIENT)
Dept: EDUCATION SERVICES | Facility: OTHER | Age: 65
End: 2020-11-06
Payer: COMMERCIAL

## 2020-11-06 DIAGNOSIS — E11.9 DIABETES MELLITUS WITHOUT COMPLICATION (H): Primary | ICD-10-CM

## 2020-11-06 PROCEDURE — G0108 DIAB MANAGE TRN  PER INDIV: HCPCS | Mod: 95

## 2020-11-06 NOTE — PROGRESS NOTES
"Diabetes Self-Management Education & Support    Presents for: Individual review  Patient verbally consented to the telephone visit service today: yes        SUBJECTIVE/OBJECTIVE:  Presents for: Individual review  Accompanied by: Self  Focus of Visit: Taking Medication, Monitoring, Healthy Eating  Diabetes type: Type 2  Diabetes management related comments/concerns: Healthy eating  Other concerns:: None  Cultural Influences/Ethnic Background:  American    Diabetes Symptoms & Complications:  Weight trend: Stable  Complications assessed today?: No    Patient Problem List and Family Medical History reviewed for relevant medical history, current medical status, and diabetes risk factors.    Vitals:  There were no vitals taken for this visit.  Estimated body mass index is 26.61 kg/m  as calculated from the following:    Height as of 9/9/20: 1.626 m (5' 4\").    Weight as of 9/9/20: 70.3 kg (155 lb).   Last 3 BP:   BP Readings from Last 3 Encounters:   09/09/20 120/82   02/12/20 128/72   01/03/20 130/82       History   Smoking Status     Never Smoker   Smokeless Tobacco     Never Used       Labs:  Lab Results   Component Value Date    A1C 8.5 09/09/2020     Lab Results   Component Value Date     09/09/2020     Lab Results   Component Value Date    LDL 67 01/03/2020     HDL Cholesterol   Date Value Ref Range Status   01/03/2020 48 (L) >49 mg/dL Final   ]  GFR Estimate   Date Value Ref Range Status   09/09/2020 66 >60 mL/min/[1.73_m2] Final     Comment:     Non  GFR Calc  Starting 12/18/2018, serum creatinine based estimated GFR (eGFR) will be   calculated using the Chronic Kidney Disease Epidemiology Collaboration   (CKD-EPI) equation.       GFR Estimate If Black   Date Value Ref Range Status   09/09/2020 76 >60 mL/min/[1.73_m2] Final     Comment:      GFR Calc  Starting 12/18/2018, serum creatinine based estimated GFR (eGFR) will be   calculated using the Chronic Kidney Disease " Epidemiology Collaboration   (CKD-EPI) equation.       Lab Results   Component Value Date    CR 0.91 09/09/2020     No results found for: MICROALBUMIN    Healthy Eating:  Healthy Eating Assessed Today: Yes  Cultural/Faith diet restrictions?: No  Meal planning/habits: Avoiding sweets, Carb counting  Meals include: Breakfast, Lunch, Dinner  Breakfast: 2 cups of coffe with cream and a bowl of cheerios.  Lunch: couple slices of cake at work, beef, chips, water all day OR Apple and rice cake and more water  Dinner: 2 pieces of pumpernickle bread and 2 glasses of milk  Snacks: green snap peas and handful of pretzel OR 2 pieces of pumpernickle bread and 2 glasses of milk  Beverages: Water, Coffee  Has patient met with a dietitian in the past?: No    Being Active:  Being Active Assessed Today: Yes  Exercise:: Currently not exercising  Barrier to exercise: None    Monitoring:  Monitoring Assessed Today: Yes  Did patient bring glucose meter to appointment? : Yes  Times checking blood sugar at home (number): 4  Times checking blood sugar at home (per): Day  Blood glucose trend: Fluctuating      Date Breakfast  Lunch  Dinner  Bedtime    Before After Before After Before After    11/6 209   - 318 145 - - -   11/5  554 322  266 402       Taking Medications:  Diabetes Medication(s)     Biguanides       metFORMIN (GLUCOPHAGE-XR) 500 MG 24 hr tablet    Take 2 tablets (1,000 mg) by mouth 2 times daily (with meals) Needs appointment for further refills.    Sodium-Glucose Co-Transporter 2 (SGLT2) Inhibitors       empagliflozin (JARDIANCE) 10 MG TABS tablet    Take 1 tablet (10 mg) by mouth daily    Sulfonylureas       glipiZIDE (GLUCOTROL XL) 5 MG 24 hr tablet    Take 2 tablets (10 mg) by mouth daily Discontinue Previous dose          Taking Medication Assessed Today: Yes  Current Treatments: Oral Medication (taken by mouth)  Problems taking diabetes medications regularly?: Yes  Diabetes medication side effects?: Yes(Diarrhea  with 4 tablet of metformin, tolerates 3 tablets.)    Problem Solving:  Problem Solving Assessed Today: Yes  Is the patient at risk for DKA?: No  Does patient have severe weather/disaster plan for diabetes management?: No    Reducing Risks:  Diabetes Risks: Age over 45 years    Healthy Coping:  Healthy Coping Assessed Today: Yes  Stage of change: ACTION (Actively working towards change)  Patient Activation Measure Survey Score:  BRY Score (Last Two) 4/20/2011   BRY Raw Score 42   Activation Score 66   BRY Level 3       Diabetes knowledge and skills assessment:   Patient is knowledgeable in diabetes management concepts related to: PT has not mastered any topics for diabetes self management     Patient needs further education on the following diabetes management concepts: Healthy Eating, Being Active, Monitoring, Taking Medication, Problem Solving, Reducing Risks and Healthy Coping    Based on learning assessment above, most appropriate setting for further diabetes education would be: Individual setting.      INTERVENTIONS:    Education provided today on:  AADE Self-Care Behaviors:  Diabetes Pathophysiology  Healthy Eating: plate planning method  Being Active: relationship to blood glucose  Monitoring: purpose, log and interpret results, individual blood glucose targets and frequency of monitoring  Taking Medication: action of prescribed medication, drawing up, administering and storing injectable diabetes medications, proper site selection and rotation for injections, side effects of prescribed medications and when to take medications  Reducing Risks: major complications of diabetes and A1C - goals, relating to blood glucose levels, how often to check    Opportunities for ongoing education and support in diabetes-self management were discussed.    Pt verbalized understanding of concepts discussed and recommendations provided today.       Education Materials Provided:  [place] Understanding Diabetes Booklet and My  Plate Planner      ASSESSMENT:  Marianne Monroy presents over the  Phone for diabetes education. Reports used to take metformin x 4 tablets but had diarrhea / incontinence so she reduced metformin to 3 tablets. She also admits she stopped Jardiance on her own. Advised to restart Jardiance to aid with elevated BG. Pt wasn't to avoid injectables and insulin. She has many questions about diet/lifestyle and is hoping to maximize on lifestyle changes. Voices recent life stressor with pandemic, and the passing of her mother.        Discussed carbohydrate sources and impact on blood glucose. Reviewed basics of healthy eating and incorporating a variety of foods into meal plan. Instructed  portion control and suggested plate method to balance meals.  Lean protein, non starchy vegetables and plant based fats were encouraged with meals. Reviewed benefits of exercising to help lower blood glucose and encouraged 30 minutes a day x 5 days/week, and to target exercise after meals if feel consumed too many CHO or having problem with BG being elevated after a meal.  Pt verbalized understanding of concepts discussed and recommendations provided.    Patient's most recent   Lab Results   Component Value Date    A1C 8.5 09/09/2020    is not meeting goal of <7.0    PLAN  Start Jardiance as prescribed once a day in the morning   See Patient Instructions for co-developed, patient-stated behavior change goals.  AVS via [employer]. See Follow-Up section for recommended follow-up.    Regina Sutherland, MS, RD, LD, CDE       Time Spent: 30 minutes  Encounter Type: Individual    Any diabetes medication dose changes were made via the CDE Protocol and Collaborative Practice Agreement with the patient's referring provider. A copy of this encounter was shared with the provider.

## 2020-11-07 NOTE — PATIENT INSTRUCTIONS
Patient Instructions:     Start Jardiance as prescribed once a day in the morning     Check blood sugars four times a day               - Before breakfast in the morning (target 130mg/dL or less)             - 2 hours after the start of a meal ( target 180 mg/dL or less)      Consume up to 45-60 gm carbohydrates for breakfast, lunch and dinner, and up to 30 gm for snacks.   Here is a file that you can open in your browser about Understanding Diabetes and Counting Carbs. [email or web address]    Use My Plate method for dinner:   Plate planning provides an easy way to create balanced meals based on foods you like to eat. Fill one quarter of your plate with non-starchy vegetables, one quarter with fruit, one quarter with lean protein, and the last quarter with fiber-rich starch or grain. Add a serving of calcium-rich dairy or non-dairy substitute to complete the balanced meal. You may add a small amount of fat to your meal, if desired (such as 1 tablespoon salad dressing or 1 teaspoon butter or margarine). If you choose to snack, keep snacks small. https://www.choosemyplate.gov/      Bring your meter to your next appointment.      Email if blood sugars are above target range.      Regina Sutherland, MS, RD, LD, CDE

## 2020-11-09 ENCOUNTER — VIRTUAL VISIT (OUTPATIENT)
Dept: PHARMACY | Facility: CLINIC | Age: 65
End: 2020-11-09
Attending: FAMILY MEDICINE
Payer: COMMERCIAL

## 2020-11-09 DIAGNOSIS — K21.9 GASTROESOPHAGEAL REFLUX DISEASE, UNSPECIFIED WHETHER ESOPHAGITIS PRESENT: ICD-10-CM

## 2020-11-09 DIAGNOSIS — Z78.9 TAKES DIETARY SUPPLEMENTS: ICD-10-CM

## 2020-11-09 DIAGNOSIS — E78.5 HYPERLIPIDEMIA LDL GOAL <100: ICD-10-CM

## 2020-11-09 DIAGNOSIS — E11.65 TYPE 2 DIABETES MELLITUS WITH HYPERGLYCEMIA, WITHOUT LONG-TERM CURRENT USE OF INSULIN (H): Primary | ICD-10-CM

## 2020-11-09 DIAGNOSIS — I10 HYPERTENSION, GOAL BELOW 140/90: ICD-10-CM

## 2020-11-09 PROCEDURE — 99607 MTMS BY PHARM ADDL 15 MIN: CPT | Mod: TEL | Performed by: PHARMACIST

## 2020-11-09 PROCEDURE — 99605 MTMS BY PHARM NP 15 MIN: CPT | Mod: TEL | Performed by: PHARMACIST

## 2020-11-09 NOTE — PROGRESS NOTES
MTM ENCOUNTER  SUBJECTIVE/OBJECTIVE:                           Marianne Monroy is a 65 year old female coming in for an initial visit. She was referred to me from Dr. Otoole for diabetes Drugs cost.      Reason for visit: medication questions  Need lisinopril?  Need atorvastatin?  Herbal medications.    Allergies/ADRs: Reviewed in chart  Tobacco: She reports that she has never smoked. She has never used smokeless tobacco.  Alcohol: Less than 1 beverages / week  Caffeine: 2 cups/day of coffee  Activity: 10 min walking video/day  Past Medical History: Reviewed in chart    Medication Adherence/Access:   Patient uses pill box(es).  Patient takes medications 2 time(s) per day.   Per patient, misses medication 2 times per week.   Pharmacy: [facility].  Misses doses 0 times/week.    Type 2 Diabetes:    Metformin 3000 mg/day   Glipizide XL 10 mg daily  Jardiance 10 mg once daily - restarted 3 days ago    Diarrhea has improved with metformin dose reduction from 4000 mg/day.  She initially thought she had diarrhea due to Jardiance, but now tolerating well. Has noticed increased urination, but was prepared for that.  Blood sugar monitorin-4 time(s) daily, [employer] Lite meter from [employer]. Ranges (patient reported):  Fasting- 157 (lower than normal 170s, 180s)  ~Post-Prandial- 250, 155, 165, 289 doesn't know best times to check blood sugar.  Symptoms of low blood sugar? none, Frequency of lows- none  Symptoms of high blood sugar? polyuria  Eye exam: up to date  Foot exam: up to date  Diet/Exercise: met with dietician on Friday, discussed diet in detail then. Already starting to make adjustments.  Aspirin: Taking 81mg daily and denies side effects  Statin: Yes: atorvastatin   ACEi/ARB: Yes: lisinopril.   Urine Albumin:   Lab Results   Component Value Date    UMALCR 13.21 2020      Lab Results   Component Value Date    A1C 8.5 2020    A1C 8.8 2020    A1C 7.6 2019    A1C 7.1 2019    A1C 7.2  06/12/2018     Hypertension:  Lisinopril 20 mg once daily     She wonders if she needs lisinopril.  Patient does self-monitor blood pressure. Home BP monitoring in range of 130's systolic over 70's diastolic.  Patient reports no current medication side effects.  139/76 today , electronic cuff.  BP Readings from Last 3 Encounters:   09/09/20 120/82   02/12/20 128/72   01/03/20 130/82     Hyperlipidemia:   atorvastatin 40 mg daily    Wonders if she needs atorvastatin and if should restart fish oil, used to take 1000 mg daily.  Patient reports no significant myalgias or other side effects.  The 10-year ASCVD risk score (Evelyn BECKER JrHilda, et al., 2013) is: 13.7%    Values used to calculate the score:      Age: 65 years      Sex: Female      Is Non- : No      Diabetic: Yes      Tobacco smoker: No      Systolic Blood Pressure: 120 mmHg      Is BP treated: Yes      HDL Cholesterol: 48 mg/dL      Total Cholesterol: 180 mg/dL  Recent Labs   Lab Test 01/03/20  1402 01/21/19  0836   CHOL 180 287*   HDL 48* 53   LDL 67 171*   TRIG 326* 313*     GERD/Hx c diff:   Prilosec (omeprazole) 20 mg daily as needed (~1x/week)    Has history of ulcer, no bleed. Used a bit more a couple of weeks ago, but now down back to ~1x/week.  Pt reports no current symptoms.  Patient feels that current regimen is effective.    Supplements:   Vitamin D 125 mcg (5000 international unit(s)) once daily    Has questions about cinnamon or garlic and memory supplement. Hasn't started them yet.  Vitamin D Deficiency Screening Results:  No results found for: VITDT No reported issues at this time.     Today's Vitals: There were no vitals taken for this visit.      ASSESSMENT:                              Medication Adherence: No issues identified    Type 2 Diabetes: Patient is not meeting A1c goal of < 7%. Self monitoring of blood glucose is not at goal of fasting  mg/dL and post prandial < 180 mg/dL. Patient would benefit from minimum  SMBG: Check blood sugars fasting, and occasionally 2 hours after starting a meal. Aspirin therapy is indicated in this patient due to age and at dose of 81mg daily. Pt is taking with no problems..    Hypertension: Stable. Patient is meeting blood pressure goal of < 140/90mmHg. Blood pressure would likely increase if she were to stop lisinopril.    Hyperlipidemia: Patient is on high intensity statin which is indicated based on 2019 ACC/AHA guidelines for lipid management, however may benefit from dose increase on atorvastatin vs restarting fish oil at 4g/day for added triglyceride lowering benefit.    GERD/Hx c diff: Stable.     Supplements: Vitamin D dose not necessary. No known benefit for the other supplements.     PLAN:                              1. Goal blood sugars we are looking for to get your A1C less than 7% are between  mg/dL right away in the morning or before meals, and 2-hours after a meal less than 180 mg/dL.     2. We will plan to recheck A1C in December.     3. Increase atorvastatin to 80 mg once daily (don't need to take fish oil).    4. Don't need to take cinnamon or garlic or the memory supplement.    5. Next time you  Vitamin D, you only need to take 25 mcg once daily.    6. Will need to check electrolytes after Jardiance start - lab appointment scheduled.    -follow-up with Dr. Otoole in December/January.    I spent 48 minutes with this patient today. All changes were made via collaborative practice agreement with Dr. Otoole. A copy of the visit note was provided to the patient's referring provider.    Will follow up in 3 weeks.    The patient was sent via [employer] a summary of these recommendations.     Zeynep Yap, PharmD  Medication Therapy Management Pharmacist  [phone]    Patient consented to a telehealth visit: yes  Telemedicine Visit Details  Type of service:  Telephone visit  Start Time: 3:04 PM  End Time: 3:52 PM  Originating Location (patient location): Home  [place]  Location (provider location):  [facility]  Mode of Communication:  Telephone

## 2020-11-10 RX ORDER — METFORMIN HCL 500 MG
TABLET, EXTENDED RELEASE 24 HR ORAL
Qty: 270 TABLET | Refills: 1 | Status: SHIPPED | OUTPATIENT
Start: 2020-11-10 | End: 2021-03-10

## 2020-11-10 RX ORDER — ATORVASTATIN CALCIUM 80 MG/1
80 TABLET, FILM COATED ORAL DAILY
Qty: 90 TABLET | Refills: 0 | Status: SHIPPED | OUTPATIENT
Start: 2020-11-10 | End: 2020-11-11

## 2020-11-10 NOTE — PATIENT INSTRUCTIONS
Recommendations from today's MTM visit:                                                      1. Goal blood sugars we are looking for to get your A1C less than 7% are between  mg/dL right away in the morning or before meals, and 2-hours after a meal less than 180 mg/dL.     2. We will plan to recheck A1C in December.     3. Increase atorvastatin to 80 mg once daily (don't need to take fish oil).    4. Don't need to take cinnamon or garlic or the memory supplement.    5. Next time you  Vitamin D, you only need to take 25 mcg once daily.    6. Will need to check electrolytes after Jardiance start - lab appointment scheduled.    It was great to speak with you today.  I value your experience and would be very thankful for your time with providing feedback on our clinic survey. You may receive a survey via email or text message in the next few days.     Next MTM visit: 3 weeks    To schedule another MTM appointment, please call the clinic directly or you may call the MTM scheduling line at [phone] or toll-free at [phone].     My Clinical Pharmacist's contact information:                                                      It was a pleasure talking with you today!  Please feel free to contact me with any questions or concerns you have.      Zeynep Yap, PharmD  Medication Therapy Management Pharmacist  [phone]

## 2020-11-11 ENCOUNTER — MYC REFILL (OUTPATIENT)
Dept: PHARMACY | Facility: CLINIC | Age: 65
End: 2020-11-11

## 2020-11-11 DIAGNOSIS — E11.65 TYPE 2 DIABETES MELLITUS WITH HYPERGLYCEMIA, WITHOUT LONG-TERM CURRENT USE OF INSULIN (H): ICD-10-CM

## 2020-11-12 RX ORDER — ATORVASTATIN CALCIUM 80 MG/1
80 TABLET, FILM COATED ORAL DAILY
Qty: 90 TABLET | Refills: 0 | Status: SHIPPED | OUTPATIENT
Start: 2020-11-12 | End: 2021-03-10

## 2020-11-29 ENCOUNTER — HEALTH MAINTENANCE LETTER (OUTPATIENT)
Age: 65
End: 2020-11-29

## 2020-11-30 ENCOUNTER — VIRTUAL VISIT (OUTPATIENT)
Dept: PHARMACY | Facility: CLINIC | Age: 65
End: 2020-11-30
Payer: COMMERCIAL

## 2020-11-30 DIAGNOSIS — Z78.9 TAKES DIETARY SUPPLEMENTS: ICD-10-CM

## 2020-11-30 DIAGNOSIS — E78.5 HYPERLIPIDEMIA LDL GOAL <100: ICD-10-CM

## 2020-11-30 DIAGNOSIS — I10 HYPERTENSION, GOAL BELOW 140/90: ICD-10-CM

## 2020-11-30 DIAGNOSIS — K21.9 GASTROESOPHAGEAL REFLUX DISEASE, UNSPECIFIED WHETHER ESOPHAGITIS PRESENT: ICD-10-CM

## 2020-11-30 DIAGNOSIS — E11.65 TYPE 2 DIABETES MELLITUS WITH HYPERGLYCEMIA, WITHOUT LONG-TERM CURRENT USE OF INSULIN (H): Primary | ICD-10-CM

## 2020-11-30 PROCEDURE — 99607 MTMS BY PHARM ADDL 15 MIN: CPT | Mod: TEL | Performed by: PHARMACIST

## 2020-11-30 PROCEDURE — 99606 MTMS BY PHARM EST 15 MIN: CPT | Mod: TEL | Performed by: PHARMACIST

## 2020-11-30 RX ORDER — MULTIVIT-MIN/IRON/FOLIC ACID/K 18-600-40
1 CAPSULE ORAL DAILY
Status: ON HOLD | COMMUNITY
End: 2022-05-20

## 2020-11-30 NOTE — Clinical Note
KRISTINE SANDOVAL note, thanks!    Zeynep Yap, PharmD  Medication Therapy Management Pharmacist  [phone]

## 2020-11-30 NOTE — PROGRESS NOTES
MTM ENCOUNTER  SUBJECTIVE/OBJECTIVE:                           Marianne Monroy is a 65 year old female called for a follow-up visit. She was referred to me from Dr. Otoole for diabetes Drugs cost.  Today's visit is a follow-up MTM visit from 20.     Reason for visit: doing well following med changes from last time and eating habits.    Allergies/ADRs: Reviewed in chart  Tobacco: She reports that she has never smoked. She has never used smokeless tobacco.  Alcohol: Less than 1 beverages / week  Caffeine: 2 cups/day of coffee  Activity:two 10 min walking video/day  Past Medical History: Reviewed in chart    Medication Adherence/Access:   Patient uses pill box(es).  Patient takes medications 2 time(s) per day.   Per patient, misses medication 2 times per week.   Pharmacy: [facility].  Misses doses 0 times/week.    Type 2 Diabetes:    Metformin 1500 mg/day   Glipizide XL 10 mg daily  Jardiance 10 mg once daily    She plans to increase her exercise, trying to to do more after meals. Has noticed increased urination since starting Jardiance, but it is improving, not as often. She's happy with numbers coming down.  Diarrhea remains improved with metformin dose reduction from 2000 mg/day.   Blood sugar monitorin-4 time(s) daily, [employer] Lite meter from internet. Ranges (patient reported):  Fastin, 130, 134, 137   1.5-2 hours after meal: 184, 165, 208 (during eating)  [place], cranberry, pie, 1.5 hours after 279  Symptoms of low blood sugar? none Frequency of lows- none  Symptoms of high blood sugar? none  Eye exam: up to date  Foot exam: up to date  Diet/Exercise: met with dietician, making adjustments with eating (see exercise above)  Aspirin: Taking 81mg daily and denies side effects  Statin: Yes: atorvastatin   ACEi/ARB: Yes: lisinopril.   Urine Albumin:   Lab Results   Component Value Date    UMALCR 13.21 2020   ]  Lab Results   Component Value Date    A1C 8.5 2020    A1C 8.8 2020     A1C 7.6 09/24/2019    A1C 7.1 01/21/2019    A1C 7.2 06/12/2018     Hypertension:  Lisinopril 20 mg once daily     Patient does self-monitor blood pressure. Home BP monitoring in range of 130's systolic over 70's diastolic.  Patient reports no current medication side effects.    BP Readings from Last 3 Encounters:   09/09/20 120/82   02/12/20 128/72   01/03/20 130/82     Hyperlipidemia:   atorvastatin 80 mg daily (increased from 40 mg daily)    Patient reports no significant myalgias or other side effects.  Recent Labs   Lab Test 01/03/20  1402 01/21/19  0836   CHOL 180 287*   HDL 48* 53   LDL 67 171*   TRIG 326* 313*     GERD/Hx c diff:   Prilosec (omeprazole) 20 mg daily as needed (~1x/week)    Has history of ulcer, no bleed.  Pt reports no current symptoms.  Patient feels that current regimen is effective.    Supplements:   None    She didn't start cinnamon or garlic or memory supplement. Stopped Vitamin D 5000 international unit(s).  Vitamin D Deficiency Screening Results:  No results found for: VITDT No reported issues at this time.     Today's Vitals: There were no vitals taken for this visit.      ASSESSMENT:                              Medication Adherence: No issues identified    Type 2 Diabetes: Patient is not meeting A1c goal of < 7%. Self monitoring of blood glucose is not at goal of fasting  mg/dL and post prandial < 180 mg/dL. Patient would benefit from minimum SMBG: Check blood sugars fasting, and occasionally 2 hours after starting a meal.  Aspirin therapy is indicated in this patient due to age and at dose of 81mg daily. Pt is taking with no problems. Due for A1C.    Hypertension: Stable. Patient is meeting blood pressure goal of < 140/90mmHg.     Hyperlipidemia: Patient is on high intensity statin which is indicated based on 2019 ACC/AHA guidelines for lipid management. May benefit from lipid recheck in 1-2 months.    GERD/Hx c diff: Stable.     Supplements: Vitamin D at lower dose than  previous would be good to continue.    PLAN:                              1. Ok to take Vitamin D 1000 international unit(s) daily, start taking this.    2. Set a timer to check blood sugar two hours after a meal.    3. Continue to increase exercise, aiming for after meals is a good time.    4. Scheduled lab appointment for A1C and electrolytes.    I spent 23 minutes with this patient today. All changes were made via collaborative practice agreement with Dr. Otoole A copy of the visit note was provided to the patient's primary care provider.    Will follow up in 1 month.    The patient was sent via [employer] a summary of these recommendations.     Zeynep Yap, PharmD  Medication Therapy Management Pharmacist  [phone]    Patient consented to a telehealth visit: yes  Telemedicine Visit Details  Type of service:  Telephone visit  Start Time: 11:02 AM  End Time: 11:25 AM  Originating Location (patient location): [place]  [place] Location (provider location):  [facility]  Mode of Communication:  Telephone

## 2020-11-30 NOTE — PATIENT INSTRUCTIONS
Recommendations from today's MTM visit:                                                      1. Ok to take Vitamin D 1000 international unit(s) daily, start taking this.    2. Set a timer to check blood sugar two hours after a meal.    3. Continue to increase exercise, aiming for after meals is a good time.    4. Scheduled lab appointment for A1C and electrolytes.    It was great to speak with you today.  I value your experience and would be very thankful for your time with providing feedback on our clinic survey. You may receive a survey via email or text message in the next few days.     Next MTM visit: 1 month    To schedule another MTM appointment, please call the clinic directly or you may call the MTM scheduling line at [phone] or toll-free at [phone].     My Clinical Pharmacist's contact information:                                                      It was a pleasure talking with you today!  Please feel free to contact me with any questions or concerns you have.      Zeynep Yap, PharmD  Medication Therapy Management Pharmacist  [phone]

## 2020-12-03 ENCOUNTER — PATIENT OUTREACH (OUTPATIENT)
Dept: EDUCATION SERVICES | Facility: OTHER | Age: 65
End: 2020-12-03
Payer: COMMERCIAL

## 2020-12-03 DIAGNOSIS — E11.9 DIABETES MELLITUS WITHOUT COMPLICATION (H): Primary | ICD-10-CM

## 2020-12-03 PROCEDURE — 98966 PH1 ASSMT&MGMT NQHP 5-10: CPT | Mod: 95

## 2020-12-03 NOTE — PROGRESS NOTES
Diabetes Self Management Training: Follow-up Visit    Marianne Monroy presents today for education, evaluation of glucose control and initiation of oral medication Type 2 diabetes.    She is accompanied by self on the phone  Patient verbally consented to the telephone visit service today: yes    Patient's diabetes management related comments/concerns: none     Patient would like this visit to be focused around the following diabetes-related behaviors and goals: lifestyle/diet    ASSESSMENT:  Patient Problem List reviewed for relevant medical history and current medical status. Pt was called today to follow up on initiation of jardiance 11/6. She reports BG have improved significantly. She is tolerating medication. Pt is also following recommended exercise and diet changes.     Reports cut out potatoes and French fries. Eats high fiber choices I.e  pumpernickel bread. Has cut out sweets. Uses stevia. Reading labels more often.  Brought herself a workout DVD 10 mins workout twice.       Current Diabetes Management per Patient:  Taking diabetes medications?   yes:     Diabetes Medication(s)     Biguanides       metFORMIN (GLUCOPHAGE-XR) 500 MG 24 hr tablet    Take 1000 mg by mouth in the morning and 500 mg in the evening.    Sodium-Glucose Co-Transporter 2 (SGLT2) Inhibitors       empagliflozin (JARDIANCE) 10 MG TABS tablet    Take 1 tablet (10 mg) by mouth daily    Sulfonylureas       glipiZIDE (GLUCOTROL XL) 5 MG 24 hr tablet    Take 2 tablets (10 mg) by mouth daily Discontinue Previous dose          Do you have any difficulty affording your medications or glucose monitoring supplies?  No     Patient glucose self monitoring as follows: two times daily.   BG results:   Date Breakfast  Lunch  Dinner  Bedtime    Before After Before After Before After    12/3 120 187         12/2 129          12/1 122   125            BG values are: In goal  Patient's most recent   Lab Results   Component Value Date    A1C 8.5  "09/09/2020    is not meeting goal of <7.0    Nutrition:  Patient currently eats 3 meals per day and has a low intake of carbohydrates    Meals include: Breakfast, Lunch, Dinner  Breakfast: 2 cups of coffe with cream and a bowl of cheerios.  Lunch: couple slices of cake at work, beef, chips, water all day OR Apple and rice cake and more water  Dinner: 2 pieces of pumpernickel bread and 2 glasses of milk  Snacks: green snap peas and handful of pretzel OR 2 pieces of pumpernickel bread and 2 glasses of milk  Beverages: Water, Coffee    Cultural/Roman Catholic diet restrictions: No     Biggest Challenge to Healthy Eating: knowing what to eat    Physical Activity:    20 mins a day DVD workout     Vitals:  There were no vitals taken for this visit.  Estimated body mass index is 26.61 kg/m  as calculated from the following:    Height as of 9/9/20: 1.626 m (5' 4\").    Weight as of 9/9/20: 70.3 kg (155 lb).   Last 3 BP:   BP Readings from Last 3 Encounters:   09/09/20 120/82   02/12/20 128/72   01/03/20 130/82       History   Smoking Status     Never Smoker   Smokeless Tobacco     Never Used       Labs:  Lab Results   Component Value Date    A1C 8.5 09/09/2020     Lab Results   Component Value Date     09/09/2020     Lab Results   Component Value Date    LDL 67 01/03/2020     HDL Cholesterol   Date Value Ref Range Status   01/03/2020 48 (L) >49 mg/dL Final   ]  GFR Estimate   Date Value Ref Range Status   09/09/2020 66 >60 mL/min/[1.73_m2] Final     Comment:     Non  GFR Calc  Starting 12/18/2018, serum creatinine based estimated GFR (eGFR) will be   calculated using the Chronic Kidney Disease Epidemiology Collaboration   (CKD-EPI) equation.       GFR Estimate If Black   Date Value Ref Range Status   09/09/2020 76 >60 mL/min/[1.73_m2] Final     Comment:      GFR Calc  Starting 12/18/2018, serum creatinine based estimated GFR (eGFR) will be   calculated using the Chronic Kidney Disease " Epidemiology Collaboration   (CKD-EPI) equation.       Lab Results   Component Value Date    CR 0.91 09/09/2020     No results found for: MICROALBUMIN    Health Beliefs and Attitudes:   Patient Activation Measure Survey Score:  BRY Score (Last Two) 4/20/2011   BRY Raw Score 42   Activation Score 66   BRY Level 3       Stage of Change: MAINTENANCE (Working to maintain change, with risk of relapse)    Progress toward meeting diabetes-related behavioral goals:    GOALS % Met Goal   Healthy Eating     Physical Activity     Monitoring     Medication Taking     Problem Solving     Healthy Coping     Risk Reduction           Diabetes knowledge and skills assessment:     Patient is knowledgeable in diabetes management concepts related to: Healthy Eating and Being Active    Patient needs further education on the following diabetes management concepts: Taking Medication, Problem Solving and Reducing Risks    Barriers to Learning Assessment: No Barriers identified    Based on learning assessment above, most appropriate setting for further diabetes education would be: Individual setting.    INTERVENTION:    Education provided today on:  AADE Self-Care Behaviors:  Healthy Eating: consistency in amount, composition, and timing of food intake  Monitoring: log and interpret results, individual blood glucose targets and frequency of monitoring  Taking Medication: action of prescribed medication and when to take medications    Opportunities for ongoing education and support in diabetes-self management were discussed.    Pt verbalized understanding of concepts discussed and recommendations provided today.       Education Materials Provided:  No new materials provided today    PLAN:  Keep a blood glucose record for next visit.  Repeat A1C this month   AVS via [email or web address]      FOLLOW-UP:  Follow-up as needed.   Chart routed to referring provider.    Ongoing plan for education and support: Online diabetes websites/forums, Smartphone Charanjit(s),  Exercise program and Diabetes Education group class(es)    Regina Sutherland, MS, RD, LD, CDE       Time Spent: 10 minutes  Encounter Type: Individual    Any diabetes medication dose changes were made via the CDE Protocol and Collaborative Practice Agreement with the patient's referring provider. A copy of this encounter was shared with the provider.

## 2020-12-27 ENCOUNTER — MYC REFILL (OUTPATIENT)
Dept: FAMILY MEDICINE | Facility: CLINIC | Age: 65
End: 2020-12-27

## 2020-12-27 DIAGNOSIS — E11.65 TYPE 2 DIABETES MELLITUS WITH HYPERGLYCEMIA, WITHOUT LONG-TERM CURRENT USE OF INSULIN (H): ICD-10-CM

## 2020-12-29 NOTE — TELEPHONE ENCOUNTER
"Requested Prescriptions   Pending Prescriptions Disp Refills     empagliflozin (JARDIANCE) 10 MG TABS tablet 90 tablet 0     Sig: Take 1 tablet (10 mg) by mouth daily       Sodium Glucose Co-Transport Inhibitor Agents Failed - 12/29/2020 10:59 AM        Failed - Patient has documented A1c within the specified period of time.     If HgbA1C is 8 or greater, it needs to be on file within the past 3 months.  If less than 8, must be on file within the past 6 months.     Recent Labs   Lab Test 09/09/20  1625   A1C 8.5*             Passed - No creatinine >1.4 or GFR <45 within the past 12 mos     Recent Labs   Lab Test 09/09/20  1625   GFRESTIMATED 66   GFRESTBLACK 76       Recent Labs   Lab Test 09/09/20  1625   CR 0.91             Passed - Medication is active on med list        Passed - Patient is age 18 or older        Passed - Patient is not pregnant        Passed - Patient has documented normal Potassium within the last 12 mos.     Recent Labs   Lab Test 09/09/20  1625   POTASSIUM 4.9             Passed - Patient has no positive pregnancy test within the past 12 mos.        Passed - Recent (6 mo) or future (30 days) visit within the authorizing provider's specialty     Patient had office visit in the last 6 months or has a visit in the next 30 days with authorizing provider or within the authorizing provider's specialty.  See \"Patient Info\" tab in inbasket, or \"Choose Columns\" in Meds & Orders section of the refill encounter.               Routing refill request to provider for review/approval because:  Failed protocol.  Sarah PAYANN-RN  [facility]    "

## 2021-01-07 DIAGNOSIS — E11.65 TYPE 2 DIABETES MELLITUS WITH HYPERGLYCEMIA, WITHOUT LONG-TERM CURRENT USE OF INSULIN (H): ICD-10-CM

## 2021-01-07 DIAGNOSIS — I10 HYPERTENSION, GOAL BELOW 140/90: ICD-10-CM

## 2021-01-07 LAB
ANION GAP SERPL CALCULATED.3IONS-SCNC: 7 MMOL/L (ref 3–14)
BUN SERPL-MCNC: 19 MG/DL (ref 7–30)
CALCIUM SERPL-MCNC: 9.5 MG/DL (ref 8.5–10.1)
CHLORIDE SERPL-SCNC: 106 MMOL/L (ref 94–109)
CO2 SERPL-SCNC: 28 MMOL/L (ref 20–32)
CREAT SERPL-MCNC: 0.9 MG/DL (ref 0.52–1.04)
GFR SERPL CREATININE-BSD FRML MDRD: 66 ML/MIN/{1.73_M2}
GLUCOSE SERPL-MCNC: 113 MG/DL (ref 70–99)
HBA1C MFR BLD: 7.9 % (ref 0–5.6)
POTASSIUM SERPL-SCNC: 4.8 MMOL/L (ref 3.4–5.3)
SODIUM SERPL-SCNC: 141 MMOL/L (ref 133–144)

## 2021-01-07 PROCEDURE — 83036 HEMOGLOBIN GLYCOSYLATED A1C: CPT | Performed by: FAMILY MEDICINE

## 2021-01-07 PROCEDURE — 36415 COLL VENOUS BLD VENIPUNCTURE: CPT | Performed by: FAMILY MEDICINE

## 2021-01-07 PROCEDURE — 80048 BASIC METABOLIC PNL TOTAL CA: CPT | Performed by: FAMILY MEDICINE

## 2021-02-15 ENCOUNTER — TELEPHONE (OUTPATIENT)
Dept: PHARMACY | Facility: CLINIC | Age: 66
End: 2021-02-15

## 2021-02-15 NOTE — TELEPHONE ENCOUNTER
Called to schedule MTM appt, she states her blood sugar have been good and prefers waiting 1 month to schedule.    Zeynep Yap, PharmD  Medication Therapy Management Pharmacist  [phone]  [facility]: [phone]

## 2021-03-06 ENCOUNTER — IMMUNIZATION (OUTPATIENT)
Dept: NURSING | Facility: CLINIC | Age: 66
End: 2021-03-06
Payer: COMMERCIAL

## 2021-03-06 PROCEDURE — 0031A PR COVID VAC JANSSEN AD26 0.5ML: CPT

## 2021-03-06 PROCEDURE — 91303 PR COVID VAC JANSSEN AD26 0.5ML: CPT

## 2021-03-08 DIAGNOSIS — I10 HYPERTENSION, GOAL BELOW 140/90: ICD-10-CM

## 2021-03-08 DIAGNOSIS — E11.65 TYPE 2 DIABETES MELLITUS WITH HYPERGLYCEMIA, WITHOUT LONG-TERM CURRENT USE OF INSULIN (H): ICD-10-CM

## 2021-03-10 RX ORDER — METFORMIN HCL 500 MG
TABLET, EXTENDED RELEASE 24 HR ORAL
Qty: 360 TABLET | Refills: 0 | Status: SHIPPED | OUTPATIENT
Start: 2021-03-10 | End: 2021-05-18

## 2021-03-10 RX ORDER — LISINOPRIL 20 MG/1
20 TABLET ORAL DAILY
Qty: 90 TABLET | Refills: 0 | Status: SHIPPED | OUTPATIENT
Start: 2021-03-10 | End: 2021-05-18

## 2021-03-10 RX ORDER — ATORVASTATIN CALCIUM 80 MG/1
80 TABLET, FILM COATED ORAL DAILY
Qty: 90 TABLET | Refills: 0 | Status: SHIPPED | OUTPATIENT
Start: 2021-03-10 | End: 2021-05-18

## 2021-03-10 RX ORDER — GLIPIZIDE 5 MG/1
10 TABLET, FILM COATED, EXTENDED RELEASE ORAL DAILY
Qty: 180 TABLET | Refills: 0 | Status: SHIPPED | OUTPATIENT
Start: 2021-03-10 | End: 2021-05-18

## 2021-03-10 RX ORDER — GLIPIZIDE 2.5 MG/1
TABLET, EXTENDED RELEASE ORAL
Qty: 450 TABLET | Refills: 3 | OUTPATIENT
Start: 2021-03-10

## 2021-03-10 NOTE — TELEPHONE ENCOUNTER
Medication is being filled for 1 time refill only due to:  Patient needs to be seen because +++NEED ANNUAL EXAM+++.

## 2021-03-10 NOTE — TELEPHONE ENCOUNTER
Per chart patient followed by Dr. Otoole.  Lalitha Rodriguez RN  [facility]

## 2021-03-10 NOTE — TELEPHONE ENCOUNTER
Per chart this is a Otoole patient.  Lalitha Rodriguez RN  [facility]

## 2021-03-15 ENCOUNTER — TRANSFERRED RECORDS (OUTPATIENT)
Dept: HEALTH INFORMATION MANAGEMENT | Facility: CLINIC | Age: 66
End: 2021-03-15

## 2021-03-15 LAB — RETINOPATHY: NORMAL

## 2021-04-05 DIAGNOSIS — E11.65 TYPE 2 DIABETES MELLITUS WITH HYPERGLYCEMIA, WITHOUT LONG-TERM CURRENT USE OF INSULIN (H): ICD-10-CM

## 2021-04-05 NOTE — LETTER
April 7, 2021      Marianne Monroy  [address]  [address]  [facility] [place] [facility] [address]            Your provider has sent a 90 day joey refill of JARDIANCE 10 MG TABS tablet. You are due for an appointment for further refills. Please contact the clinic to schedule an appointment for further refills.      Sincerely,       [facility] / MICHELLE

## 2021-04-07 RX ORDER — EMPAGLIFLOZIN 10 MG/1
TABLET, FILM COATED ORAL
Qty: 90 TABLET | Refills: 0 | Status: SHIPPED | OUTPATIENT
Start: 2021-04-07 | End: 2021-05-18

## 2021-04-07 NOTE — TELEPHONE ENCOUNTER
Medication is being filled for 1 time refill only due to:      Greater than 6 months since last office visit, due for follow up      Zoie Higginbotham RN, BSN, PHN  [facility]

## 2021-04-23 ENCOUNTER — TELEPHONE (OUTPATIENT)
Dept: PHARMACY | Facility: CLINIC | Age: 66
End: 2021-04-23

## 2021-04-23 NOTE — TELEPHONE ENCOUNTER
Called to schedule MTM appt, no answer, LVM.     Zeynep Yap, PharmD  Medication Therapy Management Pharmacist  [phone]  [facility]: [phone]

## 2021-05-18 ENCOUNTER — OFFICE VISIT (OUTPATIENT)
Dept: FAMILY MEDICINE | Facility: CLINIC | Age: 66
End: 2021-05-18
Payer: COMMERCIAL

## 2021-05-18 VITALS
HEART RATE: 90 BPM | TEMPERATURE: 98.5 F | RESPIRATION RATE: 12 BRPM | DIASTOLIC BLOOD PRESSURE: 86 MMHG | SYSTOLIC BLOOD PRESSURE: 118 MMHG | OXYGEN SATURATION: 99 % | WEIGHT: 147 LBS | BODY MASS INDEX: 26.05 KG/M2 | HEIGHT: 63 IN

## 2021-05-18 DIAGNOSIS — F41.1 GENERALIZED ANXIETY DISORDER: ICD-10-CM

## 2021-05-18 DIAGNOSIS — F33.0 MAJOR DEPRESSIVE DISORDER, RECURRENT EPISODE, MILD (H): ICD-10-CM

## 2021-05-18 DIAGNOSIS — E11.65 TYPE 2 DIABETES MELLITUS WITH HYPERGLYCEMIA, WITHOUT LONG-TERM CURRENT USE OF INSULIN (H): Primary | ICD-10-CM

## 2021-05-18 DIAGNOSIS — I10 HYPERTENSION, GOAL BELOW 140/90: ICD-10-CM

## 2021-05-18 DIAGNOSIS — E78.5 HYPERLIPIDEMIA LDL GOAL <100: ICD-10-CM

## 2021-05-18 LAB — HBA1C MFR BLD: 7.6 % (ref 0–5.6)

## 2021-05-18 PROCEDURE — 36415 COLL VENOUS BLD VENIPUNCTURE: CPT | Performed by: FAMILY MEDICINE

## 2021-05-18 PROCEDURE — 80061 LIPID PANEL: CPT | Performed by: FAMILY MEDICINE

## 2021-05-18 PROCEDURE — 99214 OFFICE O/P EST MOD 30 MIN: CPT | Performed by: FAMILY MEDICINE

## 2021-05-18 PROCEDURE — 83036 HEMOGLOBIN GLYCOSYLATED A1C: CPT | Performed by: FAMILY MEDICINE

## 2021-05-18 PROCEDURE — 82043 UR ALBUMIN QUANTITATIVE: CPT | Performed by: FAMILY MEDICINE

## 2021-05-18 RX ORDER — LISINOPRIL 20 MG/1
20 TABLET ORAL DAILY
Qty: 90 TABLET | Refills: 3 | Status: ON HOLD | OUTPATIENT
Start: 2021-05-18 | End: 2022-06-03

## 2021-05-18 RX ORDER — METFORMIN HCL 500 MG
1500 TABLET, EXTENDED RELEASE 24 HR ORAL
Qty: 270 TABLET | Refills: 1 | Status: SHIPPED | OUTPATIENT
Start: 2021-05-18 | End: 2021-12-08

## 2021-05-18 RX ORDER — ATORVASTATIN CALCIUM 80 MG/1
80 TABLET, FILM COATED ORAL DAILY
Qty: 90 TABLET | Refills: 3 | Status: SHIPPED | OUTPATIENT
Start: 2021-05-18 | End: 2022-06-27

## 2021-05-18 RX ORDER — GLIPIZIDE 5 MG/1
10 TABLET, FILM COATED, EXTENDED RELEASE ORAL DAILY
Qty: 180 TABLET | Refills: 1 | Status: SHIPPED | OUTPATIENT
Start: 2021-05-18 | End: 2021-12-06

## 2021-05-18 ASSESSMENT — ANXIETY QUESTIONNAIRES
2. NOT BEING ABLE TO STOP OR CONTROL WORRYING: NOT AT ALL
6. BECOMING EASILY ANNOYED OR IRRITABLE: SEVERAL DAYS
7. FEELING AFRAID AS IF SOMETHING AWFUL MIGHT HAPPEN: NOT AT ALL
3. WORRYING TOO MUCH ABOUT DIFFERENT THINGS: NOT AT ALL
IF YOU CHECKED OFF ANY PROBLEMS ON THIS QUESTIONNAIRE, HOW DIFFICULT HAVE THESE PROBLEMS MADE IT FOR YOU TO DO YOUR WORK, TAKE CARE OF THINGS AT HOME, OR GET ALONG WITH OTHER PEOPLE: SOMEWHAT DIFFICULT
5. BEING SO RESTLESS THAT IT IS HARD TO SIT STILL: SEVERAL DAYS
GAD7 TOTAL SCORE: 2
1. FEELING NERVOUS, ANXIOUS, OR ON EDGE: NOT AT ALL

## 2021-05-18 ASSESSMENT — PATIENT HEALTH QUESTIONNAIRE - PHQ9
SUM OF ALL RESPONSES TO PHQ QUESTIONS 1-9: 3
5. POOR APPETITE OR OVEREATING: NOT AT ALL

## 2021-05-18 ASSESSMENT — MIFFLIN-ST. JEOR: SCORE: 1170.92

## 2021-05-18 NOTE — PROGRESS NOTES
"    Assessment & Plan     Type 2 diabetes mellitus with hyperglycemia, without long-term current use of insulin (H)  Doing well    - glipiZIDE (GLUCOTROL XL) 5 MG 24 hr tablet; Take 2 tablets (10 mg) by mouth daily +++NEED ANNUAL EXAM+++  - empagliflozin (JARDIANCE) 10 MG TABS tablet; Take 1 tablet (10 mg) by mouth daily  - metFORMIN (GLUCOPHAGE-XR) 500 MG 24 hr tablet; Take 3 tablets (1,500 mg) by mouth daily (with dinner)  - Albumin Random Urine Quantitative with Creat Ratio  - Lipid panel reflex to direct LDL Non-fasting    Major depressive disorder, recurrent episode, mild (H)  Stable   - FLUoxetine (PROZAC) 20 MG capsule; Take 2 capsules (40 mg) by mouth daily  Pt has been taking 40 mg only and doing well  Hypertension, goal below 140/90  controlled  - lisinopril (ZESTRIL) 20 MG tablet; Take 1 tablet (20 mg) by mouth daily    Generalized anxiety disorder  Stable     Hyperlipidemia LDL goal <100  Labs pending   - Lipid panel reflex to direct LDL Non-fasting  BMI:   Estimated body mass index is 26.04 kg/m  as calculated from the following:    Height as of this encounter: 1.6 m (5' 3\").    Weight as of this encounter: 66.7 kg (147 lb).           Return in about 3 months (around 8/18/2021) for Physical Exam, Diabetes.    Roxanna Otoole MD  [facility] NARDA Vera is a 66 year old who presents for the following health issues     HPI     Diabetes Follow-up    How often are you checking your blood sugar? One time daily  What time of day are you checking your blood sugars (select all that apply)?  morning  Have you had any blood sugars above 200?  Yes   Have you had any blood sugars below 70?  No    What symptoms do you notice when your blood sugar is low?  lighthead and temp    What concerns do you have today about your diabetes? None     Do you have any of these symptoms? (Select all that apply)  No numbness or tingling in feet.  No redness, sores or blisters on feet.  No complaints of " excessive thirst.  No reports of blurry vision.  No significant changes to weight.    Have you had a diabetic eye exam in the last 12 months? Yes- Date of last eye exam: 3-15-21,  Location: [facility]        BP Readings from Last 2 Encounters:   05/18/21 118/86   09/09/20 120/82     Hemoglobin A1C (%)   Date Value   05/18/2021 7.6 (H)   01/07/2021 7.9 (H)     LDL Cholesterol Calculated (mg/dL)   Date Value   01/03/2020 67   01/21/2019 171 (H)                 How many servings of fruits and vegetables do you eat daily?  4 or more    On average, how many sweetened beverages do you drink each day (Examples: soda, juice, sweet tea, etc.  Do NOT count diet or artificially sweetened beverages)?   0    How many days per week do you exercise enough to make your heart beat faster? 3 or less    How many minutes a day do you exercise enough to make your heart beat faster? 9 or less    How many days per week do you miss taking your medication? 0    Hyperlipidemia Follow-Up      Are you regularly taking any medication or supplement to lower your cholesterol?   Yes- lipitor    Are you having muscle aches or other side effects that you think could be caused by your cholesterol lowering medication?  No    Hypertension Follow-up      Do you check your blood pressure regularly outside of the clinic? No     Are you following a low salt diet? Yes    Are your blood pressures ever more than 140 on the top number (systolic) OR more   than 90 on the bottom number (diastolic), for example 140/90? No    Depression and Anxiety Follow-Up    How are you doing with your depression since your last visit? Improved     How are you doing with your anxiety since your last visit?  Improved     Are you having other symptoms that might be associated with depression or anxiety? No    Have you had a significant life event? No     Do you have any concerns with your use of alcohol or other drugs? No    Social History     Tobacco Use     Smoking status:  Never Smoker     Smokeless tobacco: Never Used   Substance Use Topics     Alcohol use: Yes     Comment: social     Drug use: No     PHQ 2/12/2020 9/17/2020 5/18/2021   PHQ-9 Total Score 6 4 3   Q9: Thoughts of better off dead/self-harm past 2 weeks Not at all Not at all Not at all     JUSTUS-7 SCORE 9/24/2019 2/12/2020 5/18/2021   Total Score 4 6 2     Last PHQ-9 5/18/2021   1.  Little interest or pleasure in doing things 0   2.  Feeling down, depressed, or hopeless 0   3.  Trouble falling or staying asleep, or sleeping too much 1   4.  Feeling tired or having little energy 1   5.  Poor appetite or overeating 0   6.  Feeling bad about yourself 0   7.  Trouble concentrating 0   8.  Moving slowly or restless 1   Q9: Thoughts of better off dead/self-harm past 2 weeks 0   PHQ-9 Total Score 3   Difficulty at work, home, or with people Not difficult at all     JUSTUS-7  5/18/2021   1. Feeling nervous, anxious, or on edge 0   2. Not being able to stop or control worrying 0   3. Worrying too much about different things 0   4. Trouble relaxing 0   5. Being so restless that it is hard to sit still 1   6. Becoming easily annoyed or irritable 1   7. Feeling afraid, as if something awful might happen 0   JUSTUS-7 Total Score 2   If you checked any problems, how difficult have they made it for you to do your work, take care of things at home, or get along with other people? Somewhat difficult       Suicide Assessment Five-step Evaluation and Treatment (SAFE-T)        Review of Systems   CONSTITUTIONAL: NEGATIVE for fever, chills, change in weight  ENT/MOUTH: NEGATIVE for ear, mouth and throat problems  RESP: NEGATIVE for significant cough or SOB  CV: NEGATIVE for chest pain, palpitations or peripheral edema  GI: NEGATIVE for nausea, abdominal pain, heartburn, or change in bowel habits  PSYCHIATRIC: NEGATIVE for changes in mood or affect  ROS otherwise negative      Objective    /86   Pulse 90   Temp 98.5  F (36.9  C)   Resp 12    "Ht 1.6 m (5' 3\")   Wt 66.7 kg (147 lb)   SpO2 99%   BMI 26.04 kg/m    Body mass index is 26.04 kg/m .  Physical Exam   GENERAL: healthy, alert and no distress  NECK: no adenopathy, no asymmetry, masses, or scars and thyroid normal to palpation  RESP: lungs clear to auscultation - no rales, rhonchi or wheezes  CV: regular rate and rhythm, normal S1 S2, no S3 or S4, no murmur, click or rub, no peripheral edema and peripheral pulses strong  ABDOMEN: soft, nontender, no hepatosplenomegaly, no masses and bowel sounds normal  MS: no gross musculoskeletal defects noted, no edema  PSYCH: mentation appears normal, affect normal/bright  Diabetic foot exam: normal DP and PT pulses, no trophic changes or ulcerative lesions and normal sensory exam    Orders Only on 01/07/2021   Component Date Value Ref Range Status     Sodium 01/07/2021 141  133 - 144 mmol/L Final     Potassium 01/07/2021 4.8  3.4 - 5.3 mmol/L Final     Chloride 01/07/2021 106  94 - 109 mmol/L Final     Carbon Dioxide 01/07/2021 28  20 - 32 mmol/L Final     Anion Gap 01/07/2021 7  3 - 14 mmol/L Final     Glucose 01/07/2021 113* 70 - 99 mg/dL Final     Urea Nitrogen 01/07/2021 19  7 - 30 mg/dL Final     Creatinine 01/07/2021 0.90  0.52 - 1.04 mg/dL Final     GFR Estimate 01/07/2021 66  >60 mL/min/[1.73_m2] Final    Comment: Non  GFR Calc  Starting 12/18/2018, serum creatinine based estimated GFR (eGFR) will be   calculated using the Chronic Kidney Disease Epidemiology Collaboration   (CKD-EPI) equation.       GFR Estimate If Black 01/07/2021 77  >60 mL/min/[1.73_m2] Final    Comment:  GFR Calc  Starting 12/18/2018, serum creatinine based estimated GFR (eGFR) will be   calculated using the Chronic Kidney Disease Epidemiology Collaboration   (CKD-EPI) equation.       Calcium 01/07/2021 9.5  8.5 - 10.1 mg/dL Final     Hemoglobin A1C 01/07/2021 7.9* 0 - 5.6 % Final    Comment: Normal <5.7% Prediabetes 5.7-6.4%  Diabetes 6.5% or " higher - adopted from ADA   consensus guidelines.         Results for orders placed or performed in visit on 05/18/21   Hemoglobin A1c     Status: Abnormal   Result Value Ref Range    Hemoglobin A1C 7.6 (H) 0 - 5.6 %

## 2021-05-19 LAB
CHOLEST SERPL-MCNC: 144 MG/DL
CREAT UR-MCNC: 141 MG/DL
HDLC SERPL-MCNC: 50 MG/DL
LDLC SERPL CALC-MCNC: 59 MG/DL
MICROALBUMIN UR-MCNC: 9 MG/L
MICROALBUMIN/CREAT UR: 6.7 MG/G CR (ref 0–25)
NONHDLC SERPL-MCNC: 94 MG/DL
TRIGL SERPL-MCNC: 173 MG/DL

## 2021-05-19 ASSESSMENT — ANXIETY QUESTIONNAIRES: GAD7 TOTAL SCORE: 2

## 2021-05-21 ENCOUNTER — TELEPHONE (OUTPATIENT)
Dept: PHARMACY | Facility: CLINIC | Age: 66
End: 2021-05-21

## 2021-05-21 NOTE — TELEPHONE ENCOUNTER
We have been unable to reach this patient for MTM follow-up after several attempts. We will stop reaching out to the patient at this time. Please let us know if we can assist in this patient's care in the future.     Routing to PCP as MARGI Yap, PharmD  Medication Therapy Management Pharmacist  [phone]

## 2021-06-09 ENCOUNTER — MYC MEDICAL ADVICE (OUTPATIENT)
Dept: FAMILY MEDICINE | Facility: CLINIC | Age: 66
End: 2021-06-09

## 2021-06-09 DIAGNOSIS — E11.65 TYPE 2 DIABETES MELLITUS WITH HYPERGLYCEMIA, WITHOUT LONG-TERM CURRENT USE OF INSULIN (H): Primary | ICD-10-CM

## 2021-06-09 RX ORDER — FLASH GLUCOSE SCANNING READER
1 EACH MISCELLANEOUS ONCE
Qty: 1 EACH | Refills: 0 | Status: SHIPPED | OUTPATIENT
Start: 2021-06-09 | End: 2021-06-09

## 2021-06-09 RX ORDER — FLASH GLUCOSE SENSOR
1 KIT MISCELLANEOUS
Qty: 2 EACH | Refills: 5 | Status: SHIPPED | OUTPATIENT
Start: 2021-06-09 | End: 2021-06-14

## 2021-06-09 NOTE — TELEPHONE ENCOUNTER
Routing to provider.    Pt asking about whether she can receive an order for a Freestyle Herbert blood glucose monitoring device. Pt concerned about BG readings. Unsure if pt qualifies.    Suzan ORTEGA RN, BSN  [facility]

## 2021-06-14 ENCOUNTER — TELEPHONE (OUTPATIENT)
Dept: FAMILY MEDICINE | Facility: CLINIC | Age: 66
End: 2021-06-14

## 2021-06-14 NOTE — TELEPHONE ENCOUNTER
Prior Authorization Retail Medication Request    Medication/Dose: DispRefillsStartEndDAW  ICD code (if different than what is on RX):  E11.65  Previously Tried and Failed:    Rationale:      Insurance Name:  [employer] NON [place] [employer]  Insurance ID:  [id number]       Pharmacy Information (if different than what is on RX)  Name:  [facility]  Phone:  [phone]

## 2021-06-15 ENCOUNTER — MYC MEDICAL ADVICE (OUTPATIENT)
Dept: FAMILY MEDICINE | Facility: CLINIC | Age: 66
End: 2021-06-15

## 2021-06-15 NOTE — TELEPHONE ENCOUNTER
Central Prior Authorization Team   Phone: [phone]      PA Initiation    Medication: Continuous Blood Gluc Sensor (FREESTYLE VANESSA 14 DAY SENSOR & READER) MISC  Insurance Company: [employer] - Phone [phone] Fax [phone]  Pharmacy Filling the Rx: [id number] - [place], [place] - [address] AT [facility] & [address]  Filling Pharmacy Phone: [phone]  Filling Pharmacy Fax:    Start Date: 6/15/2021

## 2021-06-15 NOTE — TELEPHONE ENCOUNTER
PRIOR AUTHORIZATION DENIED    Medication: Continuous Blood Gluc Sensor (FREESTYLE VANESSA 14 DAY SENSOR & READER) MISC    Denial Date: 6/15/2021    Denial Rational:              Appeal Information:    If you would like to appeal, please supply P/A team with a letter of medical necessity with clinical reason.

## 2021-09-13 ENCOUNTER — MYC MEDICAL ADVICE (OUTPATIENT)
Dept: FAMILY MEDICINE | Facility: CLINIC | Age: 66
End: 2021-09-13

## 2021-09-13 DIAGNOSIS — E11.9 DIABETES MELLITUS, TYPE 2 (H): Primary | ICD-10-CM

## 2021-09-16 NOTE — TELEPHONE ENCOUNTER
Prescription approved per [facility] Refill Protocol.    Perta Hanna RN

## 2021-09-19 ENCOUNTER — HEALTH MAINTENANCE LETTER (OUTPATIENT)
Age: 66
End: 2021-09-19

## 2021-09-30 ENCOUNTER — IMMUNIZATION (OUTPATIENT)
Dept: FAMILY MEDICINE | Facility: CLINIC | Age: 66
End: 2021-09-30
Payer: COMMERCIAL

## 2021-09-30 DIAGNOSIS — Z23 NEED FOR PROPHYLACTIC VACCINATION AND INOCULATION AGAINST INFLUENZA: Primary | ICD-10-CM

## 2021-09-30 PROCEDURE — 99207 PR NO CHARGE NURSE ONLY: CPT

## 2021-09-30 PROCEDURE — 90662 IIV NO PRSV INCREASED AG IM: CPT

## 2021-09-30 PROCEDURE — G0008 ADMIN INFLUENZA VIRUS VAC: HCPCS

## 2021-10-18 ENCOUNTER — OFFICE VISIT (OUTPATIENT)
Dept: URGENT CARE | Facility: URGENT CARE | Age: 66
End: 2021-10-18
Payer: COMMERCIAL

## 2021-10-18 ENCOUNTER — ANCILLARY PROCEDURE (OUTPATIENT)
Dept: GENERAL RADIOLOGY | Facility: CLINIC | Age: 66
End: 2021-10-18
Attending: PHYSICIAN ASSISTANT
Payer: COMMERCIAL

## 2021-10-18 VITALS
RESPIRATION RATE: 12 BRPM | HEIGHT: 64 IN | HEART RATE: 69 BPM | SYSTOLIC BLOOD PRESSURE: 110 MMHG | OXYGEN SATURATION: 99 % | BODY MASS INDEX: 25.61 KG/M2 | WEIGHT: 150 LBS | DIASTOLIC BLOOD PRESSURE: 58 MMHG | TEMPERATURE: 98.5 F

## 2021-10-18 DIAGNOSIS — S29.011A INTERCOSTAL MUSCLE STRAIN, INITIAL ENCOUNTER: Primary | ICD-10-CM

## 2021-10-18 DIAGNOSIS — R07.81 RIB PAIN ON LEFT SIDE: ICD-10-CM

## 2021-10-18 PROCEDURE — 71101 X-RAY EXAM UNILAT RIBS/CHEST: CPT | Mod: LT | Performed by: RADIOLOGY

## 2021-10-18 PROCEDURE — 99214 OFFICE O/P EST MOD 30 MIN: CPT | Performed by: PHYSICIAN ASSISTANT

## 2021-10-18 ASSESSMENT — MIFFLIN-ST. JEOR: SCORE: 1200.4

## 2021-10-18 NOTE — PROGRESS NOTES
Rib pain on left side  - XR Ribs & Chest Left G/E 3 Views    Intercostal muscle strain, initial encounter  Rest the affected area as much as possible.  Apply ice for 15-20 minutes intermittently as needed and especially after any offending activity. Hot packs are better for muscle spasms and cramping. Daily stretching as tolerated.  As pain recedes, begin normal activities slowly as tolerated.  Consider Physical Therapy after 6 weeks if symptoms not better with conservative care.      Okay to take acetaminophen 500 mg- 2 tabs (Total of 1000 mg) every 8 hrs   Okay to take ibuprofen 200 mg- 3 tabs (Total of 600 mg) every 6 hours      30 minutes spent on the date of the encounter doing chart review, history and exam, documentation and further activities per the note     See Patient Instructions  Patient Instructions       Patient Education     RICE     Rest an injury, elevate it, and use ice and compression as directed.   RICE stands for rest, ice, compression, and elevation. These can limit pain and swelling after an injury. RICE may be recommended to help treat breaks (fractures), sprains, strains, and bruises or bumps.   Home care  Here are the details of RICE:    Rest. Limit the use of the injured body part. This helps prevent further damage to the body part and gives it time to heal. In some cases, you may need a sling, brace, splint, or cast to help keep the body part still until it has healed.    Ice. Applying ice right after an injury helps relieve pain and swelling. To make an ice pack, put ice cubes in a plastic bag that seals at the top. Wrap the bag in a clean, thin towel or cloth. Then place it over the injured area. Do this for 10 to 15 minutes every 3 to 4 hours. Continue for the next 1 to 3 days or until your symptoms improve. Never put ice directly on your skin. Don't ice an area longer than 15 minutes at a time.    Compression. Putting pressure on an injury helps reduce swelling and provides  support. Wrap the injured area firmly with an elastic bandage or wrap. Make sure not to wrap the bandage too tightly or you will cut off blood flow to the injured area. If your bandage loosens, rewrap it.    Elevation. Keeping an injury raised or elevated above the level of your heart reduces swelling, pain, and throbbing. For instance, if you have a broken leg, it may help to rest your leg on several pillows when sitting or lying down. Try to keep the injured area elevated as often as possible.  Follow-up care  Follow up with your healthcare provider, or as advised.  When to seek medical advice  Call your healthcare provider right away if any of these occur:    Fever of 100.4 F (38 C) or higher, or as directed by your healthcare provider    Chills    Increased pain or swelling in the injured body part    Injured body part becomes cold, blue, numb, or tingly    Signs of infection. These include warmth in the skin, redness, drainage, or bad smell coming from the injured body part.  [employer] last reviewed this educational content on 6/1/2018 2000-2021 [employer]. All rights reserved. This information is not intended as a substitute for professional medical care. Always follow your healthcare professional's instructions.               Jose Will PA-C  [facility]    Subjective   66 year old who presents to clinic today for the following health issues:    Urgent Care and Musculoskeletal Problem       HPI     Chest Pain  Onset/Duration: Patient has been having left lower rib pain after a fall on Saturday   Description:   Location: left side  Character: achey  Radiation: None  Duration: constant   Intensity: moderate  Progression of Symptoms: same  Accompanying Signs & Symptoms:  Shortness of breath: no  Sweating: no  Nausea/vomiting: no  Lightheadedness: no  Palpitations: no  Fever/Chills: no  Cough: no           Heartburn: no  History:   Family history of heart disease:  no  Tobacco use: no  Previous similar symptoms: no   Precipitating factors:   Worse with exertion: no  Worse with deep breaths: YES           Related to eating: no           Better with burping: no  Alleviating factors: moderate  Therapies tried and outcome: moderate      Review of Systems   Review of Systems   See HPI     Objective    Temp: 98.5  F (36.9  C) Temp src: Oral BP: 110/58 Pulse: 69   Resp: 12 SpO2: 99 %       Physical Exam   Physical Exam  Constitutional:       General: She is not in acute distress.     Appearance: Normal appearance. She is normal weight. She is not ill-appearing, toxic-appearing or diaphoretic.   HENT:      Head: Normocephalic and atraumatic.   Cardiovascular:      Rate and Rhythm: Normal rate and regular rhythm.      Pulses: Normal pulses.      Heart sounds: Normal heart sounds. No murmur heard.   No friction rub. No gallop.    Pulmonary:      Effort: Pulmonary effort is normal. No respiratory distress.      Breath sounds: No stridor. No wheezing, rhonchi or rales.   Chest:      Chest wall: Tenderness present. No mass, lacerations, deformity, swelling, crepitus or edema. There is no dullness to percussion.       Neurological:      General: No focal deficit present.      Mental Status: She is alert and oriented to person, place, and time. Mental status is at baseline.      Gait: Gait normal.   Psychiatric:         Mood and Affect: Mood normal.         Behavior: Behavior normal.         Thought Content: Thought content normal.         Judgment: Judgment normal.        An X-ray was ordered today and reviewed by me. There does not appear to be any evidence of fracture. Awaiting radiology report.     Results for orders placed or performed in visit on 10/18/21 (from the past 24 hour(s))   XR Ribs & Chest Left G/E 3 Views    Narrative    EXAM: XR RIBS and CHEST LT 3VW  LOCATION: [facility]  DATE/TIME: 10/18/2021 5:59 PM    INDICATION:  Rib pain on left  side  COMPARISON: None.      Impression    IMPRESSION: The visualized heart and lungs are negative. No rib fractures.

## 2021-11-24 ENCOUNTER — MYC MEDICAL ADVICE (OUTPATIENT)
Dept: FAMILY MEDICINE | Facility: CLINIC | Age: 66
End: 2021-11-24
Payer: COMMERCIAL

## 2021-11-24 DIAGNOSIS — F40.243 FLYING PHOBIA: ICD-10-CM

## 2021-11-24 RX ORDER — LORAZEPAM 0.5 MG/1
0.5 TABLET ORAL EVERY 6 HOURS PRN
Qty: 6 TABLET | Refills: 0 | Status: SHIPPED | OUTPATIENT
Start: 2021-11-24 | End: 2022-08-22

## 2021-12-06 ENCOUNTER — MYC MEDICAL ADVICE (OUTPATIENT)
Dept: FAMILY MEDICINE | Facility: CLINIC | Age: 66
End: 2021-12-06
Payer: COMMERCIAL

## 2021-12-06 DIAGNOSIS — F33.0 MAJOR DEPRESSIVE DISORDER, RECURRENT EPISODE, MILD (H): ICD-10-CM

## 2021-12-06 DIAGNOSIS — E11.65 TYPE 2 DIABETES MELLITUS WITH HYPERGLYCEMIA, WITHOUT LONG-TERM CURRENT USE OF INSULIN (H): ICD-10-CM

## 2021-12-06 NOTE — TELEPHONE ENCOUNTER
Please call Pt  She is overdue for a visit  Make Diabetic appointment  Our chart says 1500 mg metformin?

## 2021-12-06 NOTE — TELEPHONE ENCOUNTER
Patient sent a [employer] message 12/6/2021:    Jody Monroy Sonia, MD 1 hour ago (7:17 AM)       Good morning - -  I have just requested a renewal for my Metformin - -  the old one was 3 pills a day - - it should be 4 pills a day - - - I just wanted to remind you that you increased my dosage --  Thank you - - Jody       FLUoxetine HCl 20 MG, glipiZIDE 5 MG & metFORMIN HCl 500 MG

## 2021-12-07 NOTE — TELEPHONE ENCOUNTER
[employer] message sent (see 12/6/2021 mycharYabbly encounter)    Lisa Nath RN  [facility]

## 2021-12-08 RX ORDER — METFORMIN HCL 500 MG
1500 TABLET, EXTENDED RELEASE 24 HR ORAL
Qty: 90 TABLET | Refills: 0
Start: 2021-12-08

## 2021-12-08 RX ORDER — GLIPIZIDE 5 MG/1
TABLET, FILM COATED, EXTENDED RELEASE ORAL
Qty: 60 TABLET | Refills: 0 | Status: SHIPPED | OUTPATIENT
Start: 2021-12-08 | End: 2021-12-21

## 2021-12-08 RX ORDER — METFORMIN HCL 500 MG
2000 TABLET, EXTENDED RELEASE 24 HR ORAL
Qty: 120 TABLET | Refills: 0 | Status: SHIPPED | OUTPATIENT
Start: 2021-12-08 | End: 2021-12-21

## 2021-12-13 ENCOUNTER — MYC MEDICAL ADVICE (OUTPATIENT)
Dept: FAMILY MEDICINE | Facility: CLINIC | Age: 66
End: 2021-12-13
Payer: COMMERCIAL

## 2021-12-16 ENCOUNTER — HOSPITAL ENCOUNTER (OUTPATIENT)
Dept: MAMMOGRAPHY | Facility: CLINIC | Age: 66
Discharge: HOME | End: 2021-12-16
Attending: FAMILY MEDICINE | Admitting: FAMILY MEDICINE
Payer: COMMERCIAL

## 2021-12-16 DIAGNOSIS — Z12.31 VISIT FOR SCREENING MAMMOGRAM: ICD-10-CM

## 2021-12-16 PROCEDURE — 77063 BREAST TOMOSYNTHESIS BI: CPT

## 2021-12-21 ENCOUNTER — OFFICE VISIT (OUTPATIENT)
Dept: FAMILY MEDICINE | Facility: CLINIC | Age: 66
End: 2021-12-21
Payer: COMMERCIAL

## 2021-12-21 VITALS
BODY MASS INDEX: 26.26 KG/M2 | WEIGHT: 153.01 LBS | OXYGEN SATURATION: 99 % | TEMPERATURE: 99 F | DIASTOLIC BLOOD PRESSURE: 70 MMHG | HEART RATE: 93 BPM | SYSTOLIC BLOOD PRESSURE: 118 MMHG

## 2021-12-21 DIAGNOSIS — E78.5 HYPERLIPIDEMIA LDL GOAL <100: ICD-10-CM

## 2021-12-21 DIAGNOSIS — I10 HYPERTENSION, GOAL BELOW 140/90: ICD-10-CM

## 2021-12-21 DIAGNOSIS — Z12.11 SCREEN FOR COLON CANCER: ICD-10-CM

## 2021-12-21 DIAGNOSIS — F33.0 MAJOR DEPRESSIVE DISORDER, RECURRENT EPISODE, MILD (H): ICD-10-CM

## 2021-12-21 DIAGNOSIS — E11.65 TYPE 2 DIABETES MELLITUS WITH HYPERGLYCEMIA, WITHOUT LONG-TERM CURRENT USE OF INSULIN (H): ICD-10-CM

## 2021-12-21 DIAGNOSIS — Z78.0 ASYMPTOMATIC POSTMENOPAUSAL STATUS: ICD-10-CM

## 2021-12-21 DIAGNOSIS — E11.65 TYPE 2 DIABETES MELLITUS WITH HYPERGLYCEMIA, WITHOUT LONG-TERM CURRENT USE OF INSULIN (H): Primary | ICD-10-CM

## 2021-12-21 LAB
ANION GAP SERPL CALCULATED.3IONS-SCNC: 6 MMOL/L (ref 3–14)
BUN SERPL-MCNC: 16 MG/DL (ref 7–30)
CALCIUM SERPL-MCNC: 9.5 MG/DL (ref 8.5–10.1)
CHLORIDE BLD-SCNC: 103 MMOL/L (ref 94–109)
CO2 SERPL-SCNC: 28 MMOL/L (ref 20–32)
CREAT SERPL-MCNC: 0.71 MG/DL (ref 0.52–1.04)
GFR SERPL CREATININE-BSD FRML MDRD: >90 ML/MIN/1.73M2
GLUCOSE BLD-MCNC: 218 MG/DL (ref 70–99)
HBA1C MFR BLD: 8.1 % (ref 0–5.6)
HOLD SPECIMEN: NORMAL
HOLD SPECIMEN: NORMAL
POTASSIUM BLD-SCNC: 4.5 MMOL/L (ref 3.4–5.3)
SODIUM SERPL-SCNC: 137 MMOL/L (ref 133–144)

## 2021-12-21 PROCEDURE — 83036 HEMOGLOBIN GLYCOSYLATED A1C: CPT | Performed by: FAMILY MEDICINE

## 2021-12-21 PROCEDURE — 80048 BASIC METABOLIC PNL TOTAL CA: CPT | Performed by: FAMILY MEDICINE

## 2021-12-21 PROCEDURE — 99214 OFFICE O/P EST MOD 30 MIN: CPT | Performed by: FAMILY MEDICINE

## 2021-12-21 PROCEDURE — 36415 COLL VENOUS BLD VENIPUNCTURE: CPT | Performed by: FAMILY MEDICINE

## 2021-12-21 RX ORDER — METFORMIN HCL 500 MG
2000 TABLET, EXTENDED RELEASE 24 HR ORAL
Qty: 120 TABLET | Refills: 0 | Status: CANCELLED | OUTPATIENT
Start: 2021-12-21

## 2021-12-21 RX ORDER — GLIPIZIDE 5 MG/1
TABLET, FILM COATED, EXTENDED RELEASE ORAL
Qty: 180 TABLET | Refills: 1 | Status: SHIPPED | OUTPATIENT
Start: 2021-12-21 | End: 2022-04-14

## 2021-12-21 RX ORDER — METFORMIN HCL 500 MG
2000 TABLET, EXTENDED RELEASE 24 HR ORAL
Qty: 360 TABLET | Refills: 1 | Status: SHIPPED | OUTPATIENT
Start: 2021-12-21 | End: 2022-04-14

## 2021-12-21 RX ORDER — METFORMIN HCL 500 MG
2000 TABLET, EXTENDED RELEASE 24 HR ORAL
Qty: 120 TABLET | Refills: 0 | Status: SHIPPED | OUTPATIENT
Start: 2021-12-21 | End: 2021-12-21

## 2021-12-21 ASSESSMENT — PAIN SCALES - GENERAL: PAINLEVEL: NO PAIN (0)

## 2021-12-21 NOTE — PATIENT INSTRUCTIONS
Please send me your sugar readings in 1 months  Sincerely,  Roxanna Otoole MD    Patient Education     Jardiance Oral Tablet 10 mg   Uses  For diabetes.  Instructions  This medicine may be taken with or without food.  It is very important that you take the medicine at about the same time every day. It will work best if you do this.  Store at room temperature in a dry place. Do not keep in the bathroom.  Keep the medicine away from heat and light.  Drink plenty of water while on this medicine.  Tell your doctor if you have severe or persistent sweating, diarrhea or vomiting. These can increase your risk of a serious side effect.  It is important that you keep taking each dose of this medicine on time even if you are feeling well.  If you forget to take a dose on time, take it as soon as you remember. If it is almost time for the next dose, do not take the missed dose. Return to your normal dosing schedule. Do not take 2 doses of this medicine at one time.  Please tell your doctor and pharmacist about all the medicines you take. Include both prescription and over-the-counter medicines. Also tell them about any vitamins, herbal medicines, or anything else you take for your health.  If your symptoms do not improve or they worsen while on this medicine, contact your doctor.  This medicine may cause low blood sugar. It is very important to have regular meals and exercise regularly as instructed by your doctor. Notify your doctor if you experience symptoms of low blood sugar.  Symptoms of low blood sugar may include nausea, shaking, sweating, cold skin, fast heartbeat, hunger, and irritability.  It is very important that you follow your doctor's instructions for all blood tests.  It is very important that you keep all appointments for medical exams and tests while on this medicine.  Cautions  Tell your doctor and pharmacist if you ever had an allergic reaction to a medicine. Symptoms of an allergic reaction can include  trouble breathing, skin rash, itching, swelling, or severe dizziness.  This medicine is associated with a rare but very serious medical condition. Please speak with your doctor about symptoms you should look out for while on this medicine. Notify your doctor immediately if you develop those symptoms.  Some patients taking this medicine have experienced serious side effects. Please speak with your doctor to understand the risks and benefits associated with this medicine.  Do not use the medication any more than instructed.  This medicine may cause dizziness or fainting, especially after exercising or in hot weather. Be very careful when standing or sitting up quickly.  Your ability to stay alert or to react quickly may be impaired by this medicine. Do not drive or operate machinery until you know how this medicine will affect you.  Please check with your doctor before drinking alcohol while on this medicine.  If you drink more than a few alcoholic beverages each day, ask your doctor whether you should be on this medicine.  Avoid becoming overheated during exercise or other activities. Try to stay cool in hot weather.  Tell the doctor or pharmacist if you are pregnant, planning to be pregnant, or breastfeeding.  Do not breastfeed while on this medicine.  Ask your pharmacist if this medicine can interact with any of your other medicines. Be sure to tell them about all the medicines you take.  Please tell all your doctors and dentists that you are on this medicine before they provide care.  Do not start or stop any other medicines without first speaking to your doctor or pharmacist.  Do not share this medicine with anyone who has not been prescribed this medicine.  This medicine can cause serious side effects in some patients. Important information from the U[place] Food and Drug Administration (FDA) is available from your pharmacist. Please review it carefully with your pharmacist to understand the risks associated with  this medicine.  Always refill this medicine before it runs out.  Side Effects  The following is a list of some common side effects from this medicine. Please speak with your doctor about what you should do if you experience these or other side effects.    dizziness    increased urinary frequency    waking up at night to urinate  Call your doctor or get medical help right away if you notice any of these more serious side effects:    blurry vision    dry mouth    fainting    fast or irregular heart beats    nausea    feeling of numbness or tingling in your hands and feet    shakiness    shortness of breath    stomach pain    thirst    unusual or unexplained tiredness or weakness    difficulty or discomfort urinating    blood in urine    vaginal itching or discharge    vaginal itching or yeast infection    vomiting    yeast infection of the penis (redness, itching, swelling or discharge)  A few people may have an allergic reactions to this medicine. Symptoms can include difficulty breathing, skin rash, itching, swelling, or severe dizziness. If you notice any of these symptoms, seek medical help quickly.  Extra  Please speak with your doctor, nurse, or pharmacist if you have any questions about this medicine.  [email or web address]  IMPORTANT NOTE: This document tells you briefly how to take your medicine, but it does not tell you all there is to know about it.Your doctor or pharmacist may give you other documents about your medicine. Please talk to them if you have any questions.Always follow their advice. There is a more complete description of this medicine available in English.Scan this code on your smartphone or tablet or use the web address below. You can also ask your pharmacist for a printout. If you have any questions, please ask your pharmacist.     2021 [employer].

## 2021-12-21 NOTE — PROGRESS NOTES
"  Assessment & Plan     Type 2 diabetes mellitus with hyperglycemia, without long-term current use of insulin (H)  High  Add Jardiance  SEE [facility] care orders  Risks of jardiance discussed -kidney, Genital infections etc  Let me know if she ha genital itching , pain etc  Drink lots of water  The potential side effects of this medication have been discussed with the patient.  Call if any significant problems with these are experienced.  Will consider weaning off Glipizide in future  - HEMOGLOBIN A1C  - BASIC METABOLIC PANEL; Future  - glipiZIDE (GLUCOTROL XL) 5 MG 24 hr tablet; TAKE 2 TABLETS DAILY (NEED ANNUAL EXAM)  - empagliflozin (JARDIANCE) 10 MG TABS tablet; Take 1 tablet (10 mg) by mouth daily  - metFORMIN (GLUCOPHAGE-XR) 500 MG 24 hr tablet; Take 4 tablets (2,000 mg) by mouth daily (with dinner) Please see changed dose    Hyperlipidemia LDL goal <100  Stable     Hypertension, goal below 140/90  controlled    Major depressive disorder, recurrent episode, mild (H)  Says she generally feels a little down during the holidays  PHQ9 reviewed  No suicidal ideation or thoughts      - FLUoxetine (PROZAC) 20 MG capsule; TAKE 2 CAPSULES DAILY  Follow up PHQ9 in 1 month  Asymptomatic postmenopausal status  Advised   - DEXA HIP/PELVIS/SPINE - Future; Future    Screen for colon cancer  Advised   - COLOGUAJESSICA([employer] SCI[employer])  BMI:   Estimated body mass index is 26.26 kg/m  as calculated from the following:    Height as of 10/18/21: 1.626 m (5' 4\").    Weight as of this encounter: 69.4 kg (153 lb 0.2 oz).       Regular exercise    Return in about 3 months (around 3/21/2022) for recheck/Please schedule appointment, Diabetes, Physical Exam.    Roxanna Otoole MD  [facility] NARDA Vera is a 66 year old who presents for the following health issues     HPI     Diabetes Follow-up    How often are you checking your blood sugar? Two times daily  Blood sugar testing frequency justification:  Patient " modifying lifestyle changes (diet, exercise) with blood sugars  What time of day are you checking your blood sugars (select all that apply)?  Before and after meals  Have you had any blood sugars above 200?  Yes   Have you had any blood sugars below 70?  Yes     What symptoms do you notice when your blood sugar is low?  Doing well    What concerns do you have today about your diabetes? None and Other: Would like Herbert reader     Do you have any of these symptoms? (Select all that apply)  No numbness or tingling in feet.  No redness, sores or blisters on feet.  No complaints of excessive thirst.  No reports of blurry vision.  No significant changes to weight.      BP Readings from Last 2 Encounters:   12/21/21 118/70   10/18/21 110/58     Hemoglobin A1C POCT (%)   Date Value   05/18/2021 7.6 (H)   01/07/2021 7.9 (H)     Hemoglobin A1C (%)   Date Value   12/21/2021 8.1 (H)     LDL Cholesterol Calculated (mg/dL)   Date Value   05/18/2021 59   01/03/2020 67           How many servings of fruits and vegetables do you eat daily?  2-3    On average, how many sweetened beverages do you drink each day (Examples: soda, juice, sweet tea, etc.  Do NOT count diet or artificially sweetened beverages)?   0    How many days per week do you exercise enough to make your heart beat faster? 3 or less    How many minutes a day do you exercise enough to make your heart beat faster? 10 - 19  How many days per week do you miss taking your medication? Rare    What makes it hard for you to take your medications?  remembering to take    Hyperlipidemia Follow-Up      Are you regularly taking any medication or supplement to lower your cholesterol?   Yes- statins    Are you having muscle aches or other side effects that you think could be caused by your cholesterol lowering medication?  No    Hypertension Follow-up      Do you check your blood pressure regularly outside of the clinic? No     Are you following a low salt diet? Yes    Are your  blood pressures ever more than 140 on the top number (systolic) OR more   than 90 on the bottom number (diastolic), for example 140/90? No    Review of Systems   CONSTITUTIONAL: NEGATIVE for fever, chills, change in weight  ENT/MOUTH: NEGATIVE for ear, mouth and throat problems  RESP: NEGATIVE for significant cough or SOB  CV: NEGATIVE for chest pain, palpitations or peripheral edema  GI: NEGATIVE for nausea, abdominal pain, heartburn, or change in bowel habits  PSYCHIATRIC: NEGATIVE for changes in mood or affect      Objective    /70 (BP Location: Right arm, Patient Position: Chair, Cuff Size: Adult Regular)   Pulse 93   Temp 99  F (37.2  C) (Oral)   Wt 69.4 kg (153 lb 0.2 oz)   SpO2 99%   BMI 26.26 kg/m    Body mass index is 26.26 kg/m .  Physical Exam   GENERAL: healthy, alert and no distress  NECK: no adenopathy, no asymmetry, masses, or scars and thyroid normal to palpation  RESP: lungs clear to auscultation - no rales, rhonchi or wheezes  CV: regular rate and rhythm, normal S1 S2, no S3 or S4, no murmur, click or rub, no peripheral edema and peripheral pulses strong  ABDOMEN: soft, nontender, no hepatosplenomegaly, no masses and bowel sounds normal  MS: no gross musculoskeletal defects noted, no edema  PSYCH: mentation appears normal, affect normal/bright  Diabetic foot exam: normal DP and PT pulses    Office Visit on 05/18/2021   Component Date Value Ref Range Status     Hemoglobin A1C POCT 05/18/2021 7.6* 0 - 5.6 % Final    Comment: Normal <5.7% Prediabetes 5.7-6.4%  Diabetes 6.5% or higher - adopted from ADA   consensus guidelines.       Creatinine Urine 05/18/2021 141  mg/dL Final     Albumin Urine mg/L 05/18/2021 9  mg/L Final     Albumin Urine mg/g Cr 05/18/2021 6.70  0 - 25 mg/g Cr Final     Cholesterol 05/18/2021 144  <200 mg/dL Final     Triglycerides 05/18/2021 173* <150 mg/dL Final    Comment: Borderline high:  150-199 mg/dl  High:             200-499 mg/dl  Very high:       >499 mg/dl        HDL Cholesterol 05/18/2021 50  >49 mg/dL Final     LDL Cholesterol Calculated 05/18/2021 59  <100 mg/dL Final    Desirable:       <100 mg/dl     Non HDL Cholesterol 05/18/2021 94  <130 mg/dL Final

## 2022-01-09 ENCOUNTER — HEALTH MAINTENANCE LETTER (OUTPATIENT)
Age: 67
End: 2022-01-09

## 2022-01-10 ENCOUNTER — TELEPHONE (OUTPATIENT)
Dept: FAMILY MEDICINE | Facility: CLINIC | Age: 67
End: 2022-01-10
Payer: COMMERCIAL

## 2022-01-10 DIAGNOSIS — E11.65 TYPE 2 DIABETES MELLITUS WITH HYPERGLYCEMIA, WITHOUT LONG-TERM CURRENT USE OF INSULIN (H): ICD-10-CM

## 2022-01-10 NOTE — TELEPHONE ENCOUNTER
Prior Authorization Retail Medication Request    Medication/Dose: empagliflozin (JARDIANCE) 10 MG TABS tablet  ICD code (if different than what is on RX):    Previously Tried and Failed:    Rationale:      Insurance Name:  [facility]  Insurance ID:  [id number]       Pharmacy Information (if different than what is on RX)  Name: [facility] - [address]

## 2022-01-11 NOTE — TELEPHONE ENCOUNTER
Prior Authorization Not Needed per Insurance    Medication: empagliflozin (JARDIANCE) 10 MG TABS tablet  Insurance Company: [employer] - Phone [phone] Fax [phone]  Expected CoPay:      Pharmacy Filling the Rx: [facility] - [place], [place] - [address] [place] [address]  Pharmacy Notified: Yes  Patient Notified: Yes **Instructed pharmacy to notify patient when script is ready to /ship.**

## 2022-01-12 ENCOUNTER — MYC MEDICAL ADVICE (OUTPATIENT)
Dept: FAMILY MEDICINE | Facility: CLINIC | Age: 67
End: 2022-01-12
Payer: COMMERCIAL

## 2022-01-12 NOTE — TELEPHONE ENCOUNTER
[facility], this request is from . RN sent mychart to clarify preferred pharmacy.     Zoie Higginbotham RN, BSN, PHN  [facility]: [place]

## 2022-01-14 RX ORDER — EMPAGLIFLOZIN 10 MG/1
TABLET, FILM COATED ORAL
Qty: 30 TABLET | Refills: 3 | OUTPATIENT
Start: 2022-01-14

## 2022-01-18 DIAGNOSIS — E11.65 TYPE 2 DIABETES MELLITUS WITH HYPERGLYCEMIA, WITHOUT LONG-TERM CURRENT USE OF INSULIN (H): ICD-10-CM

## 2022-01-20 RX ORDER — BLOOD SUGAR DIAGNOSTIC
STRIP MISCELLANEOUS
Qty: 200 STRIP | Refills: 1 | Status: SHIPPED | OUTPATIENT
Start: 2022-01-20 | End: 2023-01-05

## 2022-01-20 NOTE — TELEPHONE ENCOUNTER
"Requested Prescriptions   Pending Prescriptions Disp Refills     blood glucose (ONETOUCH VERIO IQ) test strip 200 strip 1     Sig: Use to test blood sugar 2 times daily or as directed.       Diabetic Supplies Protocol Passed - 1/18/2022  4:22 PM        Passed - Medication is active on med list        Passed - Patient is 18 years of age or older        Passed - Recent (6 mo) or future (30 days) visit within the authorizing provider's specialty     Patient had office visit in the last 6 months or has a visit in the next 30 days with authorizing provider.  See \"Patient Info\" tab in inbasket, or \"Choose Columns\" in Meds & Orders section of the refill encounter.               Prescription approved per [facility] Refill Protocol.    Zaida SNELL RN  [facility]     "

## 2022-04-14 ENCOUNTER — TELEPHONE (OUTPATIENT)
Dept: FAMILY MEDICINE | Facility: CLINIC | Age: 67
End: 2022-04-14

## 2022-04-14 ENCOUNTER — OFFICE VISIT (OUTPATIENT)
Dept: FAMILY MEDICINE | Facility: CLINIC | Age: 67
End: 2022-04-14
Payer: COMMERCIAL

## 2022-04-14 VITALS
TEMPERATURE: 97.5 F | DIASTOLIC BLOOD PRESSURE: 82 MMHG | BODY MASS INDEX: 25.74 KG/M2 | SYSTOLIC BLOOD PRESSURE: 124 MMHG | HEIGHT: 64 IN | WEIGHT: 150.8 LBS | HEART RATE: 89 BPM | RESPIRATION RATE: 20 BRPM | OXYGEN SATURATION: 98 %

## 2022-04-14 DIAGNOSIS — E11.40 TYPE 2 DIABETES MELLITUS WITH DIABETIC NEUROPATHY, WITHOUT LONG-TERM CURRENT USE OF INSULIN (H): Primary | ICD-10-CM

## 2022-04-14 DIAGNOSIS — Z23 HIGH PRIORITY FOR 2019-NCOV VACCINE: ICD-10-CM

## 2022-04-14 DIAGNOSIS — R42 DIZZINESS: ICD-10-CM

## 2022-04-14 DIAGNOSIS — H57.9 VISUAL SYMPTOMS: ICD-10-CM

## 2022-04-14 DIAGNOSIS — F41.1 GENERALIZED ANXIETY DISORDER: ICD-10-CM

## 2022-04-14 DIAGNOSIS — I10 HYPERTENSION, GOAL BELOW 140/90: ICD-10-CM

## 2022-04-14 DIAGNOSIS — F33.0 MAJOR DEPRESSIVE DISORDER, RECURRENT EPISODE, MILD (H): ICD-10-CM

## 2022-04-14 LAB
CHOLEST SERPL-MCNC: 152 MG/DL
CREAT UR-MCNC: 65 MG/DL
FASTING STATUS PATIENT QL REPORTED: NO
HBA1C MFR BLD: 7.8 % (ref 0–5.6)
HDLC SERPL-MCNC: 50 MG/DL
LDLC SERPL CALC-MCNC: 64 MG/DL
MICROALBUMIN UR-MCNC: <5 MG/L
MICROALBUMIN/CREAT UR: NORMAL MG/G{CREAT}
NONHDLC SERPL-MCNC: 102 MG/DL
TRIGL SERPL-MCNC: 192 MG/DL

## 2022-04-14 PROCEDURE — 0064A COVID-19,PF,MODERNA (18+ YRS BOOSTER .25ML): CPT | Performed by: FAMILY MEDICINE

## 2022-04-14 PROCEDURE — 91306 COVID-19,PF,MODERNA (18+ YRS BOOSTER .25ML): CPT | Performed by: FAMILY MEDICINE

## 2022-04-14 PROCEDURE — 96127 BRIEF EMOTIONAL/BEHAV ASSMT: CPT | Performed by: FAMILY MEDICINE

## 2022-04-14 PROCEDURE — 82043 UR ALBUMIN QUANTITATIVE: CPT | Performed by: FAMILY MEDICINE

## 2022-04-14 PROCEDURE — 99214 OFFICE O/P EST MOD 30 MIN: CPT | Mod: 25 | Performed by: FAMILY MEDICINE

## 2022-04-14 PROCEDURE — 36415 COLL VENOUS BLD VENIPUNCTURE: CPT | Performed by: FAMILY MEDICINE

## 2022-04-14 PROCEDURE — 99207 PR FOOT EXAM NO CHARGE: CPT | Performed by: FAMILY MEDICINE

## 2022-04-14 PROCEDURE — 80061 LIPID PANEL: CPT | Performed by: FAMILY MEDICINE

## 2022-04-14 PROCEDURE — 83036 HEMOGLOBIN GLYCOSYLATED A1C: CPT | Performed by: FAMILY MEDICINE

## 2022-04-14 RX ORDER — METFORMIN HCL 500 MG
2000 TABLET, EXTENDED RELEASE 24 HR ORAL
Qty: 360 TABLET | Refills: 1 | Status: SHIPPED | OUTPATIENT
Start: 2022-04-14 | End: 2022-08-22

## 2022-04-14 RX ORDER — GLIPIZIDE 5 MG/1
TABLET, FILM COATED, EXTENDED RELEASE ORAL
Qty: 180 TABLET | Refills: 1 | Status: SHIPPED | OUTPATIENT
Start: 2022-04-14 | End: 2022-08-22

## 2022-04-14 ASSESSMENT — PATIENT HEALTH QUESTIONNAIRE - PHQ9
10. IF YOU CHECKED OFF ANY PROBLEMS, HOW DIFFICULT HAVE THESE PROBLEMS MADE IT FOR YOU TO DO YOUR WORK, TAKE CARE OF THINGS AT HOME, OR GET ALONG WITH OTHER PEOPLE: NOT DIFFICULT AT ALL
SUM OF ALL RESPONSES TO PHQ QUESTIONS 1-9: 5

## 2022-04-14 ASSESSMENT — ENCOUNTER SYMPTOMS: DIZZINESS: 1

## 2022-04-14 NOTE — NURSING NOTE
Prior to immunization administration, verified patients identity using patient s name and date of birth. Please see Immunization Activity for additional information.     Screening Questionnaire for Adult Immunization    Are you sick today?   No   Do you have allergies to medications, food, a vaccine component or latex?   No   Have you ever had a serious reaction after receiving a vaccination?   No   Do you have a long-term health problem with heart, lung, kidney, or metabolic disease (e.g., diabetes), asthma, a blood disorder, no spleen, complement component deficiency, a cochlear implant, or a spinal fluid leak?  Are you on long-term aspirin therapy?   No   Do you have cancer, leukemia, HIV/AIDS, or any other immune system problem?   No   Do you have a parent, brother, or sister with an immune system problem?   No   In the past 3 months, have you taken medications that affect  your immune system, such as prednisone, other steroids, or anticancer drugs; drugs for the treatment of rheumatoid arthritis, Crohn s disease, or psoriasis; or have you had radiation treatments?   No   Have you had a seizure, or a brain or other nervous system problem?   No   During the past year, have you received a transfusion of blood or blood    products, or been given immune (gamma) globulin or antiviral drug?   No   For women: Are you pregnant or is there a chance you could become       pregnant during the next month?   No   Have you received any vaccinations in the past 4 weeks?   No     Immunization questionnaire answers were all negative.        Per orders of Dr. Otoole, injection of Moderna COVID-19 booster given by Gena Duron MA. Patient instructed to remain in clinic for 15 minutes afterwards, and to report any adverse reaction to me immediately.       Screening performed by Gena Duron MA on 4/14/2022 at 3:23 PM.  Gena Duron MA on 4/14/2022 at 3:23 PM

## 2022-04-14 NOTE — TELEPHONE ENCOUNTER
Left message on answering machine for patient to call back to the nurse at [phone].    Janie Carroll RN  [facility]

## 2022-04-14 NOTE — TELEPHONE ENCOUNTER
Roxanna Otoole MD   4/14/2022  4:02 PM CDT Back to Top        Please call Pt  to increase Jardiance to 25 mg daily with food  Drink Lots of Fluids  If any vaginal Itching/pain let me know  Follow up 3 months

## 2022-04-14 NOTE — PROGRESS NOTES
Assessment & Plan     Type 2 diabetes mellitus with diabetic neuropathy, without long-term current use of insulin (H)  If a1c is high increase Jardiance to 25 mg daily with Food  The potential side effects of this medication have been discussed with the patient.  Call if any significant problems with these are experienced.  Follow up 3 months  Pt karen not bring her CGM today  - OPTOMETRY REFERRAL; Future  - Hemoglobin A1c; Future  - Lipid panel reflex to direct LDL Non-fasting; Future  - Albumin Random Urine Quantitative with Creat Ratio; Future  - FOOT EXAM  - Hemoglobin A1c  - Lipid panel reflex to direct LDL Non-fasting  - Albumin Random Urine Quantitative with Creat Ratio    Generalized anxiety disorder  Stable     Major depressive disorder, recurrent episode, mild (H)  Stable   - FLUoxetine (PROZAC) 20 MG capsule; TAKE 2 CAPSULES DAILY    Hypertension, goal below 140/90  controlled  - Lipid panel reflex to direct LDL Non-fasting; Future  - Lipid panel reflex to direct LDL Non-fasting    Dizziness  With Visual symptoms  Advised MRI  If normal advised PT  - MR Brain w/o & w Contrast; Future    Visual symptoms  As above  - MR Brain w/o & w Contrast; Future    High priority for 2019-nCoV vaccine  Discussed   - COVID-19,PF,MODERNA (18+ Yrs BOOSTER .25mL)  Return in about 3 months (around 7/14/2022) for Medicare wellness Exam.    Roxanna Otoole MD  [facility] NARDA Vera is a 67 year old who presents for the following health issues     Dizziness    History of Present Illness       Mental Health Follow-up:                    Today's PHQ-9         PHQ-9 Total Score: 5  PHQ-9 Q9 Thoughts of better off dead/self-harm past 2 weeks :   Not at all    How difficult have these problems made it for you to do your work, take care of things at home, or get along with other people: Not difficult at all        Diabetes:   She presents for follow up of diabetes.  She is checking home blood glucose two  times daily. She checks blood glucose before meals and at bedtime.  Blood glucose is sometimes over 200 and never under 70. She is aware of hypoglycemia symptoms including shakiness and dizziness. She is concerned about blood sugar frequently over 200. She is having burning in feet. The patient has not had a diabetic eye exam in the last 12 months.         Migraines:    The patient is getting headaches:                Has tried/received treatment for these symptoms:  No    She eats 2-3 servings of fruits and vegetables daily.She consumes 0 sweetened beverage(s) daily.She exercises with enough effort to increase her heart rate 10 to 19 minutes per day.  She exercises with enough effort to increase her heart rate 3 or less days per week.   She is taking medications regularly.   accucheck 120-250  Dizziness  Onset/Duration: x6-7 months  Description: veers to either side when walking   Do you feel faint: no  Does it feel like the surroundings (bed, room) are moving: no  Unsteady/off balance: YES  Have you passed out or fallen: YES  Intensity: mild  Progression of Symptoms: worsening  Accompanying Signs & Symptoms:  Heart palpitations or chest pain: no   Nausea, vomiting: YES - nausea  Weakness or lack of coordination in arms or legs: no  Vision or speech changes: YES- one episode of vision loss   Numbness or tingling: YES- numbness in toes   Ringing in ears (Tinnitus): no  Hearing Loss: no  History:   Head trauma/concussion history: no  Previous similar symptoms: no  Recent bleeding history: no  Any new medications (BP?): no  Precipitating factors:   Worse with activity: YES- running, bending over   Worse with head movement: YES  Alleviating factors:   Does staying in a fixed position give relief: no   Therapies tried and outcome: none  No chest pain  No palpitations  Hyperlipidemia Follow-Up      Are you regularly taking any medication or supplement to lower your cholesterol?   Yes- statins    Are you having muscle  aches or other side effects that you think could be caused by your cholesterol lowering medication?  No    Depression Followup    How are you doing with your depression since your last visit? Stable     Are you having other symptoms that might be associated with depression? Yes:  some    Have you had a significant life event?  No     Are you feeling anxious or having panic attacks?   No    Do you have any concerns with your use of alcohol or other drugs? No    Social History     Tobacco Use     Smoking status: Never Smoker     Smokeless tobacco: Never Used   Vaping Use     Vaping Use: Never used   Substance Use Topics     Alcohol use: Yes     Comment: social     Drug use: No     PHQ 9/17/2020 5/18/2021 4/14/2022   PHQ-9 Total Score 4 3 4   Q9: Thoughts of better off dead/self-harm past 2 weeks Not at all Not at all Not at all     JUSTUS-7 SCORE 9/24/2019 2/12/2020 5/18/2021   Total Score 4 6 2     Last PHQ-9 4/14/2022   1.  Little interest or pleasure in doing things 1   2.  Feeling down, depressed, or hopeless 0   3.  Trouble falling or staying asleep, or sleeping too much 1   4.  Feeling tired or having little energy 1   5.  Poor appetite or overeating 0   6.  Feeling bad about yourself 0   7.  Trouble concentrating 1   8.  Moving slowly or restless 0   Q9: Thoughts of better off dead/self-harm past 2 weeks 0   PHQ-9 Total Score 4   Difficulty at work, home, or with people -     JUSTUS-7  5/18/2021   1. Feeling nervous, anxious, or on edge 0   2. Not being able to stop or control worrying 0   3. Worrying too much about different things 0   4. Trouble relaxing 0   5. Being so restless that it is hard to sit still 1   6. Becoming easily annoyed or irritable 1   7. Feeling afraid, as if something awful might happen 0   JUSTUS-7 Total Score 2   If you checked any problems, how difficult have they made it for you to do your work, take care of things at home, or get along with other people? Somewhat difficult       Suicide  "Assessment Five-step Evaluation and Treatment (SAFE-T)      Review of Systems   Neurological: Positive for dizziness.      CONSTITUTIONAL: NEGATIVE for fever, chills, change in weight  ENT/MOUTH: NEGATIVE for ear, mouth and throat problems  RESP: NEGATIVE for significant cough or SOB  CV: NEGATIVE for chest pain, palpitations or peripheral edema  GI: NEGATIVE for nausea, abdominal pain, heartburn, or change in bowel habits  MUSCULOSKELETAL: NEGATIVE for significant arthralgias or myalgia  NEURO: dizziness  Mild headaches  HEME/ALLERGY/IMMUNE: NEGATIVE for bleeding problems  PSYCHIATRIC: NEGATIVE for changes in mood or affect  Rest of the ROS is Negative except see above and Problem list [stable]        Objective    /82 (Patient Position: Sitting, Cuff Size: Adult Regular)   Pulse 89   Temp 97.5  F (36.4  C) (Tympanic)   Resp 20   Ht 1.626 m (5' 4\")   Wt 68.4 kg (150 lb 12.8 oz)   SpO2 98%   BMI 25.88 kg/m    Body mass index is 25.88 kg/m .  Physical Exam   GENERAL: healthy, alert and no distress  EYES: Eyes grossly normal to inspection, PERRL and conjunctivae and sclerae normal  HENT: normal cephalic/atraumatic, nose and mouth without ulcers or lesions, oropharynx clear and oral mucous membranes moist  NECK: no adenopathy, no asymmetry, masses, or scars and thyroid normal to palpation  RESP: lungs clear to auscultation - no rales, rhonchi or wheezes  CV: regular rate and rhythm, normal S1 S2, no S3 or S4, no murmur, click or rub, no peripheral edema and peripheral pulses strong  ABDOMEN: soft, nontender, no hepatosplenomegaly, no masses and bowel sounds normal  MS: no gross musculoskeletal defects noted, no edema  SKIN: no suspicious lesions or rashes  NEURO: Normal strength and tone, sensory exam grossly normal, mentation intact, speech normal, cranial nerves 2-12 intact, DTR's normal and symmetric normal  and Romberg normal  PSYCH: mentation appears normal, affect normal/bright  Diabetic foot exam: " normal DP and PT pulses, no trophic changes or ulcerative lesions and normal sensory exam    Pending

## 2022-04-15 NOTE — TELEPHONE ENCOUNTER
Patient notified of provider's message as written. Patient verbalized understanding.     Katie Robbins RN   [facility]

## 2022-04-15 NOTE — RESULT ENCOUNTER NOTE
Jody,    Your blood glucose is improving some. Your cholesterol is controlled and your urine protein test is normal. Follow-up as planned.     Mena Dawkins MD

## 2022-04-26 ENCOUNTER — ANCILLARY PROCEDURE (OUTPATIENT)
Dept: MRI IMAGING | Facility: CLINIC | Age: 67
End: 2022-04-26
Attending: FAMILY MEDICINE
Payer: COMMERCIAL

## 2022-04-26 DIAGNOSIS — R42 DIZZINESS: ICD-10-CM

## 2022-04-26 DIAGNOSIS — H57.9 VISUAL SYMPTOMS: ICD-10-CM

## 2022-04-26 PROCEDURE — 70553 MRI BRAIN STEM W/O & W/DYE: CPT | Mod: TC | Performed by: RADIOLOGY

## 2022-04-26 PROCEDURE — A9585 GADOBUTROL INJECTION: HCPCS | Performed by: RADIOLOGY

## 2022-04-26 RX ORDER — GADOBUTROL 604.72 MG/ML
7.5 INJECTION INTRAVENOUS ONCE
Status: COMPLETED | OUTPATIENT
Start: 2022-04-26 | End: 2022-04-26

## 2022-04-26 RX ADMIN — GADOBUTROL 6.5 ML: 604.72 INJECTION INTRAVENOUS at 09:03

## 2022-04-30 ENCOUNTER — TRANSFERRED RECORDS (OUTPATIENT)
Dept: HEALTH INFORMATION MANAGEMENT | Facility: CLINIC | Age: 67
End: 2022-04-30

## 2022-04-30 ENCOUNTER — APPOINTMENT (OUTPATIENT)
Dept: GENERAL RADIOLOGY | Facility: CLINIC | Age: 67
End: 2022-04-30
Attending: EMERGENCY MEDICINE
Payer: COMMERCIAL

## 2022-04-30 ENCOUNTER — HOSPITAL ENCOUNTER (EMERGENCY)
Facility: CLINIC | Age: 67
Discharge: HOME | End: 2022-04-30
Attending: EMERGENCY MEDICINE | Admitting: EMERGENCY MEDICINE
Payer: COMMERCIAL

## 2022-04-30 VITALS
RESPIRATION RATE: 29 BRPM | HEART RATE: 89 BPM | SYSTOLIC BLOOD PRESSURE: 165 MMHG | DIASTOLIC BLOOD PRESSURE: 86 MMHG | OXYGEN SATURATION: 93 % | TEMPERATURE: 97.6 F

## 2022-04-30 DIAGNOSIS — R55 NEAR SYNCOPE: ICD-10-CM

## 2022-04-30 DIAGNOSIS — R00.0 TACHYCARDIA: ICD-10-CM

## 2022-04-30 LAB
ANION GAP SERPL CALCULATED.3IONS-SCNC: 7 MMOL/L (ref 3–14)
BASOPHILS # BLD AUTO: 0.1 10E3/UL (ref 0–0.2)
BASOPHILS NFR BLD AUTO: 1 %
BUN SERPL-MCNC: 28 MG/DL (ref 7–30)
CALCIUM SERPL-MCNC: 9.1 MG/DL (ref 8.5–10.1)
CHLORIDE BLD-SCNC: 99 MMOL/L (ref 94–109)
CO2 SERPL-SCNC: 29 MMOL/L (ref 20–32)
CREAT SERPL-MCNC: 1.12 MG/DL (ref 0.52–1.04)
D DIMER PPP FEU-MCNC: 0.32 UG/ML FEU (ref 0–0.5)
EOSINOPHIL # BLD AUTO: 0.2 10E3/UL (ref 0–0.7)
EOSINOPHIL NFR BLD AUTO: 2 %
ERYTHROCYTE [DISTWIDTH] IN BLOOD BY AUTOMATED COUNT: 12.8 % (ref 10–15)
GFR SERPL CREATININE-BSD FRML MDRD: 53 ML/MIN/1.73M2
GLUCOSE BLD-MCNC: 297 MG/DL (ref 70–99)
HCT VFR BLD AUTO: 43.1 % (ref 35–47)
HGB BLD-MCNC: 13.9 G/DL (ref 11.7–15.7)
HOLD SPECIMEN: NORMAL
IMM GRANULOCYTES # BLD: 0 10E3/UL
IMM GRANULOCYTES NFR BLD: 0 %
LYMPHOCYTES # BLD AUTO: 1.6 10E3/UL (ref 0.8–5.3)
LYMPHOCYTES NFR BLD AUTO: 18 %
MCH RBC QN AUTO: 28.8 PG (ref 26.5–33)
MCHC RBC AUTO-ENTMCNC: 32.3 G/DL (ref 31.5–36.5)
MCV RBC AUTO: 89 FL (ref 78–100)
MONOCYTES # BLD AUTO: 0.6 10E3/UL (ref 0–1.3)
MONOCYTES NFR BLD AUTO: 7 %
NEUTROPHILS # BLD AUTO: 6.1 10E3/UL (ref 1.6–8.3)
NEUTROPHILS NFR BLD AUTO: 72 %
NRBC # BLD AUTO: 0 10E3/UL
NRBC BLD AUTO-RTO: 0 /100
NT-PROBNP SERPL-MCNC: 540 PG/ML (ref 0–900)
PLATELET # BLD AUTO: 248 10E3/UL (ref 150–450)
POTASSIUM BLD-SCNC: 4.1 MMOL/L (ref 3.4–5.3)
RBC # BLD AUTO: 4.83 10E6/UL (ref 3.8–5.2)
SODIUM SERPL-SCNC: 135 MMOL/L (ref 133–144)
TROPONIN I SERPL HS-MCNC: 31 NG/L
TROPONIN I SERPL HS-MCNC: 32 NG/L
WBC # BLD AUTO: 8.6 10E3/UL (ref 4–11)

## 2022-04-30 PROCEDURE — 85025 COMPLETE CBC W/AUTO DIFF WBC: CPT | Performed by: EMERGENCY MEDICINE

## 2022-04-30 PROCEDURE — 36415 COLL VENOUS BLD VENIPUNCTURE: CPT | Performed by: EMERGENCY MEDICINE

## 2022-04-30 PROCEDURE — 99285 EMERGENCY DEPT VISIT HI MDM: CPT | Mod: 25

## 2022-04-30 PROCEDURE — 80048 BASIC METABOLIC PNL TOTAL CA: CPT | Performed by: EMERGENCY MEDICINE

## 2022-04-30 PROCEDURE — 85379 FIBRIN DEGRADATION QUANT: CPT | Performed by: EMERGENCY MEDICINE

## 2022-04-30 PROCEDURE — 93005 ELECTROCARDIOGRAM TRACING: CPT

## 2022-04-30 PROCEDURE — 258N000003 HC RX IP 258 OP 636: Performed by: EMERGENCY MEDICINE

## 2022-04-30 PROCEDURE — 96360 HYDRATION IV INFUSION INIT: CPT

## 2022-04-30 PROCEDURE — 71046 X-RAY EXAM CHEST 2 VIEWS: CPT

## 2022-04-30 PROCEDURE — 84484 ASSAY OF TROPONIN QUANT: CPT | Performed by: EMERGENCY MEDICINE

## 2022-04-30 PROCEDURE — 83880 ASSAY OF NATRIURETIC PEPTIDE: CPT | Performed by: EMERGENCY MEDICINE

## 2022-04-30 RX ADMIN — SODIUM CHLORIDE 1000 ML: 9 INJECTION, SOLUTION INTRAVENOUS at 20:16

## 2022-05-01 NOTE — ED PROVIDER NOTES
History     Chief Complaint:    Dizziness      HPI   Marianne Monroy is a 67 year old female with history of diabetes who presents for evaluation of dizziness, shortness of breath, and elevated heart rate.  Patient states that she went to the museum today and had episodes of lightheaded nests, near syncope, and malaise.  While sitting at lunch, she states that she had to put her head between her knees twice but she felt that she was going to pass out.  Upon returning home, she again became dizzy, but was not well short of breath with exertion and had to take several breaks before she can get up of stairs.  At that time, her daughter placed her apple watch on her wrist and noted a heart rate of 160 approximately which was sustained for some time.  They then went to urgent care where she was evaluated and had a heart rate of 100, and thereupon was referred here for further evaluation.  At this time she notes her heart rate is slightly higher than usual and endorses malaise, but denies any chest pain, shortness of breath, dizziness, or other acute concerns.  She has felt well in the last few days until this afternoon.        Review of Systems  CV: + as per above  Resp: + as per above  Neuro: + as per above  All other systems reviewed and negative      Allergies:  Effexor [Venlafaxine Hydrochloride]      Medications:    ASPIRIN 81 MG OR TABS  atorvastatin (LIPITOR) 80 MG tablet  blood glucose (NO BRAND SPECIFIED) test strip  blood glucose (ONETOUCH VERIO IQ) test strip  blood glucose monitoring (ONETOUCH VERIO) meter device kit  Continuous Blood Gluc Sensor (FREESTYLE VANESSA 14 DAY SENSOR) MISC  empagliflozin (JARDIANCE) 25 MG TABS tablet  FLUoxetine (PROZAC) 20 MG capsule  glipiZIDE (GLUCOTROL XL) 5 MG 24 hr tablet  lisinopril (ZESTRIL) 20 MG tablet  LORazepam (ATIVAN) 0.5 MG tablet  metFORMIN (GLUCOPHAGE-XR) 500 MG 24 hr tablet  MULTIPLE VITAMIN OR TABS  Multiple Vitamins-Minerals (PRESERVISION AREDS PO)  omeprazole  (PRILOSEC) 20 MG DR capsule  triamcinolone (KENALOG) 0.1 % external cream  Vitamin D, Cholecalciferol, 25 MCG (1000 UT) TABS        Past Medical History:    Past Medical History:   Diagnosis Date     Abnormal Pap smear of cervix ~2000     Allergic rhinitis, cause unspecified      Anxiety state, unspecified      Unspecified essential hypertension      Patient Active Problem List    Diagnosis Date Noted     Diabetes mellitus, type 2 (H) 02/12/2020     Priority: Medium     Generalized anxiety disorder 04/03/2018     Priority: Medium     Major depressive disorder, recurrent episode, mild (H) 11/21/2017     Priority: Medium     Added per visit on 11/17/17.       Hypertension, goal below 140/90 09/02/2014     Priority: Medium     Diagnosis abstracted from previous encounter       Advanced directives, counseling/discussion 02/18/2014     Priority: Medium     Hyperlipidemia LDL goal <70 01/27/2013     Priority: Medium     January 27, 2013 above goal. , will change to atorvastatin 40 mg, recheck lipids in 1-2 months.        C. difficile diarrhea 05/25/2012     Priority: Medium     May 25, 2012 culture positive, will treat with flagyl.   January 27, 2013 clinically resolved.        GERD (gastroesophageal reflux disease) 04/07/2010     Priority: Medium     December 23, 2015: Omeprazole 20 mg prn, helps symptoms    April 21, 2011 stable on chronic PPI therapy. One year refill.        Phobia 02/11/2008     Priority: Medium     February 9, 2008  Intolerant of Effexor. Will give small rx of ativan. Max: 10 to 20 tablets per year.   September 17, 2009 improved. Still has 12 of 20 tablets of ativan.   April 21, 2011 limited usage, refill given.   Problem list name updated by automated process. Provider to review          Past Surgical History:    None    Family History:    Family History   Problem Relation Age of Onset     Diabetes Mother         hypertension, alive at 83     C.A.D. Mother      Cancer Father         lung  cancer, smoker.   at age 53     Family History Negative Sister      Osteoporosis Sister        Social History:  Presents with daughter    Physical Exam     Patient Vitals for the past 24 hrs:   BP Temp Temp src Pulse Resp SpO2   22 1915 (!) 165/86 97.6  F (36.4  C) Temporal 105 18 99 %       Physical Exam  Constitutional: Alert, attentive  HENT:    Nose: Nose normal.    Mouth/Throat: Oropharynx is clear, mucous membranes are moist   Eyes: EOM are normal.   CV: tachycardic, regular rhythm; no murmurs, rubs or gallups  Chest: Effort normal and breath sounds normal.   GI:  There is no tenderness. No distension. Normal bowel sounds  MSK: Normal range of motion.   Neurological: Alert, attentive  Skin: Skin is warm and dry.      Emergency Department Course   ECG:  Normal sinus rhythm, rate 95, Left ventricular hypertrophy with repolarization abnormality   Inferior infarct , age undetermined   Abnormal ECG   No previous ECGs available     Imaging:  XR Chest 2 Views   Final Result   IMPRESSION: Negative chest.      Leadless EKG Monitor 3 to 7 Days    (Results Pending)     Report per radiology    Laboratory:  Labs Ordered and Resulted from Time of ED Arrival to Time of ED Departure   BASIC METABOLIC PANEL - Abnormal       Result Value    Sodium 135      Potassium 4.1      Chloride 99      Carbon Dioxide (CO2) 29      Anion Gap 7      Urea Nitrogen 28      Creatinine 1.12 (*)     Calcium 9.1      Glucose 297 (*)     GFR Estimate 53 (*)    D DIMER QUANTITATIVE - Normal    D-Dimer Quantitative 0.32     TROPONIN I - Normal    Troponin I High Sensitivity 31     NT PROBNP INPATIENT - Normal    N terminal Pro BNP Inpatient 540     TROPONIN I - Normal    Troponin I High Sensitivity 32     CBC WITH PLATELETS AND DIFFERENTIAL    WBC Count 8.6      RBC Count 4.83      Hemoglobin 13.9      Hematocrit 43.1      MCV 89      MCH 28.8      MCHC 32.3      RDW 12.8      Platelet Count 248      % Neutrophils 72      % Lymphocytes 18       % Monocytes 7      % Eosinophils 2      % Basophils 1      % Immature Granulocytes 0      NRBCs per 100 WBC 0      Absolute Neutrophils 6.1      Absolute Lymphocytes 1.6      Absolute Monocytes 0.6      Absolute Eosinophils 0.2      Absolute Basophils 0.1      Absolute Immature Granulocytes 0.0      Absolute NRBCs 0.0       Emergency Department Course:      Reviewed:    I reviewed nursing notes, vitals and past medical history    Assessments:   I obtained history and examined the patient as noted above.    I rechecked the patient and explained findings.       Disposition:  The patient was discharged to home.     Impression & Plan        Medical Decision Making:  This is a 67-year-old female with history of diabetes who presents for evaluation of tachycardia and near syncopal episode.  Symptoms are now resolved for several hours.  Differential includes possible paroxysmal atrial fibrillation, SVT, among others.  Her negative D-dimer essentially rules out PE and the patient is low risk by Wells criteria.  Her negative EKG in conjunction with delta 2-hour troponin essentially rule out AMI.  Chest x-ray shows no widened mediastinum, pneumothorax, normality.  There is no hematologic, metabolic, or thyroid related abnormality to explain her symptoms.  Cardiac monitoring is normal in the department.  He remains unclear regarding etiology of symptoms, therefore should resolve the work-up is reassuring.  Explained the unclear nature of symptoms to the patient and daughter at bedside.  Plan Zio patch Holter monitor and return precautions for recurrent symptoms, new or worse symptoms, and primary care follow-up this week.      Diagnosis:    ICD-10-CM    1. Tachycardia  R00.0 Leadless EKG Monitor 3 to 7 Days   2. Near syncope  R55 Leadless EKG Monitor 3 to 7 Days          Lloyd Coronado MD  04/30/22 [id number]

## 2022-05-01 NOTE — ED TRIAGE NOTES
Here for sensation of passing out, sob, shaking, stiff sensation to neck, light sensitivity, gets winded easily, n/v. Stated check her heart rate with apple watch that shows 160 bpm. Went to [facility], had ekg and blood sugar test, advised to come to ED for further eval. ABCs intact.

## 2022-05-02 ENCOUNTER — MYC MEDICAL ADVICE (OUTPATIENT)
Dept: FAMILY MEDICINE | Facility: CLINIC | Age: 67
End: 2022-05-02
Payer: COMMERCIAL

## 2022-05-02 ENCOUNTER — PATIENT OUTREACH (OUTPATIENT)
Dept: FAMILY MEDICINE | Facility: CLINIC | Age: 67
End: 2022-05-02
Payer: COMMERCIAL

## 2022-05-02 LAB
ATRIAL RATE - MUSE: 95 BPM
DIASTOLIC BLOOD PRESSURE - MUSE: NORMAL MMHG
INTERPRETATION ECG - MUSE: NORMAL
P AXIS - MUSE: 38 DEGREES
PR INTERVAL - MUSE: 124 MS
QRS DURATION - MUSE: 86 MS
QT - MUSE: 382 MS
QTC - MUSE: 480 MS
R AXIS - MUSE: 2 DEGREES
SYSTOLIC BLOOD PRESSURE - MUSE: NORMAL MMHG
T AXIS - MUSE: 80 DEGREES
VENTRICULAR RATE- MUSE: 95 BPM

## 2022-05-02 NOTE — TELEPHONE ENCOUNTER
This patient was discharged from [facility] on 4/30/2022    Discharge Diagnosis: Dizziness, tachycardia, near syncope    Has a follow-up visit has been scheduled? No    Please follow-up with patient    Janie Carroll RN  [facility]

## 2022-05-02 NOTE — TELEPHONE ENCOUNTER
Spoke with patient over the telephone for hospital follow up outreach call.    She directed me to look at this [employer] message and ensure that Dr. Otoole reviews it.     It has already been routed to Dr. Otoole for review. Patient was advised that a hospital f/u appointment is needed to address further. She stated that she would like to wait until Dr. Otoole reviews and advises regarding this message before she makes an appointment. Writer provided her with scheduling phone number [phone].    GONSALO Ha RN  [facility]

## 2022-05-02 NOTE — TELEPHONE ENCOUNTER
"ED/Discharge Protocol    \"Hi, my name is Desire Guevara RN, a registered nurse, and I am calling on behalf of Dr. Otoole's office at [facility].  I am calling to follow up and see how things are going for you after your recent visit.\"    \"I see that you were in the (ER/UC/IP) on 04/30/2022.    How are you doing now that you are home?\" \"Feeling like I may need a stress test. It was not good, doing much better now though.\"    Is patient experiencing symptoms that may require a hospital visit?  No symptoms currently.     Discharge Instructions    \"Let's review your discharge instructions.  What is/are the follow-up recommendations?  Pt. Response: Stress test recommended.     \"Were you instructed to make a follow-up appointment?\"  Pt. Response: Yes.  Has appointment been made?   No.  \"Can I help you schedule that appointment?\" \"No, I want to hear back from Dr. Otoole on [facility] first\"      \"When you see the provider, I would recommend that you bring your discharge instructions with you.    Medications    \"How many new medications are you on since your hospitalization/ED visit?\"    0-1  \"How many of your current medicines changed (dose, timing, name, etc.) while you were in the hospital/ED visit?\"   0-1  \"Do you have questions about your medications?\"   No  \"Were you newly diagnosed with heart failure, COPD, diabetes or did you have a heart attack?\"   No  For patients on insulin: \"Did you start on insulin in the hospital or did you have your insulin dose changed?\"   No  Post Discharge Medication Reconciliation Status: Patient did not wish to discuss this with writer.    Was MTM referral placed (*Make sure to put transitions as reason for referral)?   No    Call Summary    \"Do you have any questions or concerns about your condition or care plan at the moment?\"    No  Triage nurse advice given: N/A    Patient was in ER 1 in the past year (assess appropriateness of ER visits.)      \"If you have questions or things don't continue " "to improve, we encourage you contact us through the main clinic number,  [phone].  Even if the clinic is not open, triage nurses are available 24/7 to help you.     We would like you to know that our clinic has extended hours (provide information).  We also have urgent care (provide details on closest location and hours/contact info)\"      \"Thank you for your time and take care!\"    GONSALO Ha RN  [facility] [place]      "

## 2022-05-02 NOTE — TELEPHONE ENCOUNTER
Patient notified of provider message as written. Patient verbalized good understanding.     Desire PAYANN, RN  [facility]

## 2022-05-11 ENCOUNTER — OFFICE VISIT (OUTPATIENT)
Dept: FAMILY MEDICINE | Facility: CLINIC | Age: 67
End: 2022-05-11
Payer: COMMERCIAL

## 2022-05-11 VITALS
BODY MASS INDEX: 25.47 KG/M2 | HEART RATE: 91 BPM | SYSTOLIC BLOOD PRESSURE: 118 MMHG | WEIGHT: 149.2 LBS | DIASTOLIC BLOOD PRESSURE: 80 MMHG | TEMPERATURE: 97.5 F | HEIGHT: 64 IN | OXYGEN SATURATION: 99 %

## 2022-05-11 DIAGNOSIS — R00.0 TACHYCARDIA: Primary | ICD-10-CM

## 2022-05-11 DIAGNOSIS — R55 NEAR SYNCOPE: ICD-10-CM

## 2022-05-11 DIAGNOSIS — Z82.49 FAMILY HISTORY OF ISCHEMIC HEART DISEASE: ICD-10-CM

## 2022-05-11 LAB — TSH SERPL DL<=0.005 MIU/L-ACNC: 1.41 MU/L (ref 0.4–4)

## 2022-05-11 PROCEDURE — 84443 ASSAY THYROID STIM HORMONE: CPT | Performed by: FAMILY MEDICINE

## 2022-05-11 PROCEDURE — 36415 COLL VENOUS BLD VENIPUNCTURE: CPT | Performed by: FAMILY MEDICINE

## 2022-05-11 PROCEDURE — 99214 OFFICE O/P EST MOD 30 MIN: CPT | Performed by: FAMILY MEDICINE

## 2022-05-11 ASSESSMENT — PAIN SCALES - GENERAL: PAINLEVEL: NO PAIN (0)

## 2022-05-11 NOTE — PROGRESS NOTES
"  Assessment & Plan     Tachycardia  Pending   - Adult Leadless EKG Monitor 3 to 7 Days; Future  - Echocardiogram Exercise Stress; Future  - TSH with free T4 reflex; Future  - TSH with free T4 reflex    Near syncope  Doing well now  - Adult Leadless EKG Monitor 3 to 7 Days; Future  - Echocardiogram Exercise Stress; Future    Family history of ischemic heart disease  Pt still has occasional SOB but better  - Echocardiogram Exercise Stress; Future  Follow up after test  Return in about 3 months (around 8/11/2022) for Medicare wellness Exam.    Roxanna Otoole MD  [facility] NARDA Vera is a 67 year old who presents for the following health issues     HPI     ED/UC Followup:    Facility:  [facility]  Date of visit: April 30, 2022  Reason for visit: Tachycardia, dizziness  Current Status: Improved but still having shortness of breath with walking up the stair.    Pt is feeling better and has Not had further episodes  No chest pain  No wheezing  She has Known Diabetes    Review of Systems   Rest of the ROS is Negative except see above and Problem list [stable]        Objective    /80   Pulse 91   Temp 97.5  F (36.4  C) (Temporal)   Ht 1.626 m (5' 4\")   Wt 67.7 kg (149 lb 3.2 oz)   SpO2 99%   BMI 25.61 kg/m    Body mass index is 25.61 kg/m .  Physical Exam   GENERAL: healthy, alert and no distress  NECK: no adenopathy, no asymmetry, masses, or scars and thyroid normal to palpation  RESP: lungs clear to auscultation - no rales, rhonchi or wheezes  CV: regular rate and rhythm, normal S1 S2, no S3 or S4, no murmur, click or rub, no peripheral edema and peripheral pulses strong  ABDOMEN: soft, nontender, no hepatosplenomegaly, no masses and bowel sounds normal  MS: no gross musculoskeletal defects noted, no edema  PSYCH: mentation appears normal, affect normal/bright    Pending   ER notes reviewed           "

## 2022-05-18 ENCOUNTER — HOSPITAL ENCOUNTER (INPATIENT)
Facility: CLINIC | Age: 67
LOS: 17 days | Discharge: HOME HEALTH | DRG: 233 | End: 2022-06-04
Attending: EMERGENCY MEDICINE | Admitting: STUDENT IN AN ORGANIZED HEALTH CARE EDUCATION/TRAINING PROGRAM
Payer: COMMERCIAL

## 2022-05-18 ENCOUNTER — TELEPHONE (OUTPATIENT)
Dept: FAMILY MEDICINE | Facility: CLINIC | Age: 67
End: 2022-05-18

## 2022-05-18 DIAGNOSIS — Z20.822 CONTACT WITH AND (SUSPECTED) EXPOSURE TO COVID-19: ICD-10-CM

## 2022-05-18 DIAGNOSIS — E78.5 HYPERLIPIDEMIA LDL GOAL <100: ICD-10-CM

## 2022-05-18 DIAGNOSIS — I50.21 ACUTE SYSTOLIC HEART FAILURE (H): ICD-10-CM

## 2022-05-18 DIAGNOSIS — R55 NEAR SYNCOPE: ICD-10-CM

## 2022-05-18 DIAGNOSIS — I47.29 PAROXYSMAL VENTRICULAR TACHYCARDIA (H): ICD-10-CM

## 2022-05-18 DIAGNOSIS — Z95.1 S/P CABG (CORONARY ARTERY BYPASS GRAFT): Primary | ICD-10-CM

## 2022-05-18 DIAGNOSIS — I47.29 NON-SUSTAINED VENTRICULAR TACHYCARDIA (H): ICD-10-CM

## 2022-05-18 PROBLEM — I47.20 PAROXYSMAL VENTRICULAR TACHYCARDIA (H): Status: ACTIVE | Noted: 2022-05-18

## 2022-05-18 LAB
ALBUMIN SERPL-MCNC: 4 G/DL (ref 3.4–5)
ALP SERPL-CCNC: 78 U/L (ref 40–150)
ALT SERPL W P-5'-P-CCNC: 27 U/L (ref 0–50)
ANION GAP SERPL CALCULATED.3IONS-SCNC: 8 MMOL/L (ref 3–14)
AST SERPL W P-5'-P-CCNC: 16 U/L (ref 0–45)
ATRIAL RATE - MUSE: 91 BPM
ATRIAL RATE - MUSE: 92 BPM
BASOPHILS # BLD AUTO: 0.1 10E3/UL (ref 0–0.2)
BASOPHILS NFR BLD AUTO: 1 %
BILIRUB DIRECT SERPL-MCNC: <0.1 MG/DL (ref 0–0.2)
BILIRUB SERPL-MCNC: 0.4 MG/DL (ref 0.2–1.3)
BUN SERPL-MCNC: 30 MG/DL (ref 7–30)
CALCIUM SERPL-MCNC: 9.7 MG/DL (ref 8.5–10.1)
CHLORIDE BLD-SCNC: 103 MMOL/L (ref 94–109)
CO2 SERPL-SCNC: 28 MMOL/L (ref 20–32)
CREAT SERPL-MCNC: 0.9 MG/DL (ref 0.52–1.04)
DIASTOLIC BLOOD PRESSURE - MUSE: NORMAL MMHG
DIASTOLIC BLOOD PRESSURE - MUSE: NORMAL MMHG
EOSINOPHIL # BLD AUTO: 0.4 10E3/UL (ref 0–0.7)
EOSINOPHIL NFR BLD AUTO: 6 %
ERYTHROCYTE [DISTWIDTH] IN BLOOD BY AUTOMATED COUNT: 12.7 % (ref 10–15)
GFR SERPL CREATININE-BSD FRML MDRD: 69 ML/MIN/1.73M2
GLUCOSE BLD-MCNC: 167 MG/DL (ref 70–99)
HCT VFR BLD AUTO: 42.3 % (ref 35–47)
HGB BLD-MCNC: 13.8 G/DL (ref 11.7–15.7)
HOLD SPECIMEN: NORMAL
IMM GRANULOCYTES # BLD: 0 10E3/UL
IMM GRANULOCYTES NFR BLD: 0 %
INTERPRETATION ECG - MUSE: NORMAL
INTERPRETATION ECG - MUSE: NORMAL
LYMPHOCYTES # BLD AUTO: 2 10E3/UL (ref 0.8–5.3)
LYMPHOCYTES NFR BLD AUTO: 28 %
MCH RBC QN AUTO: 28.6 PG (ref 26.5–33)
MCHC RBC AUTO-ENTMCNC: 32.6 G/DL (ref 31.5–36.5)
MCV RBC AUTO: 88 FL (ref 78–100)
MONOCYTES # BLD AUTO: 0.6 10E3/UL (ref 0–1.3)
MONOCYTES NFR BLD AUTO: 9 %
NEUTROPHILS # BLD AUTO: 4.1 10E3/UL (ref 1.6–8.3)
NEUTROPHILS NFR BLD AUTO: 56 %
NRBC # BLD AUTO: 0 10E3/UL
NRBC BLD AUTO-RTO: 0 /100
NT-PROBNP SERPL-MCNC: 664 PG/ML (ref 0–900)
P AXIS - MUSE: 35 DEGREES
P AXIS - MUSE: 36 DEGREES
PLATELET # BLD AUTO: 284 10E3/UL (ref 150–450)
POTASSIUM BLD-SCNC: 4.1 MMOL/L (ref 3.4–5.3)
PR INTERVAL - MUSE: 118 MS
PR INTERVAL - MUSE: 122 MS
PROT SERPL-MCNC: 7.9 G/DL (ref 6.8–8.8)
QRS DURATION - MUSE: 84 MS
QRS DURATION - MUSE: 86 MS
QT - MUSE: 374 MS
QT - MUSE: 402 MS
QTC - MUSE: 462 MS
QTC - MUSE: 494 MS
R AXIS - MUSE: -10 DEGREES
R AXIS - MUSE: -12 DEGREES
RBC # BLD AUTO: 4.82 10E6/UL (ref 3.8–5.2)
SARS-COV-2 RNA RESP QL NAA+PROBE: NEGATIVE
SODIUM SERPL-SCNC: 139 MMOL/L (ref 133–144)
SYSTOLIC BLOOD PRESSURE - MUSE: NORMAL MMHG
SYSTOLIC BLOOD PRESSURE - MUSE: NORMAL MMHG
T AXIS - MUSE: 18 DEGREES
T AXIS - MUSE: 30 DEGREES
TROPONIN I SERPL HS-MCNC: 10 NG/L
VENTRICULAR RATE- MUSE: 91 BPM
VENTRICULAR RATE- MUSE: 92 BPM
WBC # BLD AUTO: 7.2 10E3/UL (ref 4–11)

## 2022-05-18 PROCEDURE — 87635 SARS-COV-2 COVID-19 AMP PRB: CPT | Performed by: EMERGENCY MEDICINE

## 2022-05-18 PROCEDURE — 80053 COMPREHEN METABOLIC PANEL: CPT | Performed by: EMERGENCY MEDICINE

## 2022-05-18 PROCEDURE — 250N000013 HC RX MED GY IP 250 OP 250 PS 637: Performed by: EMERGENCY MEDICINE

## 2022-05-18 PROCEDURE — 99285 EMERGENCY DEPT VISIT HI MDM: CPT | Performed by: EMERGENCY MEDICINE

## 2022-05-18 PROCEDURE — 84484 ASSAY OF TROPONIN QUANT: CPT | Performed by: EMERGENCY MEDICINE

## 2022-05-18 PROCEDURE — 99223 1ST HOSP IP/OBS HIGH 75: CPT | Mod: AI | Performed by: STUDENT IN AN ORGANIZED HEALTH CARE EDUCATION/TRAINING PROGRAM

## 2022-05-18 PROCEDURE — 93005 ELECTROCARDIOGRAM TRACING: CPT | Mod: 76 | Performed by: EMERGENCY MEDICINE

## 2022-05-18 PROCEDURE — 36415 COLL VENOUS BLD VENIPUNCTURE: CPT | Performed by: EMERGENCY MEDICINE

## 2022-05-18 PROCEDURE — C9803 HOPD COVID-19 SPEC COLLECT: HCPCS | Performed by: EMERGENCY MEDICINE

## 2022-05-18 PROCEDURE — 83735 ASSAY OF MAGNESIUM: CPT | Performed by: STUDENT IN AN ORGANIZED HEALTH CARE EDUCATION/TRAINING PROGRAM

## 2022-05-18 PROCEDURE — 83880 ASSAY OF NATRIURETIC PEPTIDE: CPT | Performed by: EMERGENCY MEDICINE

## 2022-05-18 PROCEDURE — 82248 BILIRUBIN DIRECT: CPT | Performed by: STUDENT IN AN ORGANIZED HEALTH CARE EDUCATION/TRAINING PROGRAM

## 2022-05-18 PROCEDURE — 80061 LIPID PANEL: CPT

## 2022-05-18 PROCEDURE — 93005 ELECTROCARDIOGRAM TRACING: CPT | Performed by: EMERGENCY MEDICINE

## 2022-05-18 PROCEDURE — 93010 ELECTROCARDIOGRAM REPORT: CPT | Performed by: EMERGENCY MEDICINE

## 2022-05-18 PROCEDURE — 214N000001 HC R&B CCU UMMC

## 2022-05-18 PROCEDURE — 99285 EMERGENCY DEPT VISIT HI MDM: CPT | Mod: 25 | Performed by: EMERGENCY MEDICINE

## 2022-05-18 PROCEDURE — 85049 AUTOMATED PLATELET COUNT: CPT | Performed by: EMERGENCY MEDICINE

## 2022-05-18 RX ORDER — PANTOPRAZOLE SODIUM 40 MG/1
40 TABLET, DELAYED RELEASE ORAL DAILY
Status: DISCONTINUED | OUTPATIENT
Start: 2022-05-19 | End: 2022-05-25

## 2022-05-18 RX ORDER — ASPIRIN 81 MG/1
162 TABLET, CHEWABLE ORAL ONCE
Status: COMPLETED | OUTPATIENT
Start: 2022-05-18 | End: 2022-05-18

## 2022-05-18 RX ORDER — FLUOXETINE 40 MG/1
40 CAPSULE ORAL DAILY
Status: DISCONTINUED | OUTPATIENT
Start: 2022-05-19 | End: 2022-05-24

## 2022-05-18 RX ORDER — ASPIRIN 81 MG/1
81 TABLET ORAL DAILY
Status: DISCONTINUED | OUTPATIENT
Start: 2022-05-19 | End: 2022-05-24

## 2022-05-18 RX ORDER — DEXTROSE MONOHYDRATE 25 G/50ML
25-50 INJECTION, SOLUTION INTRAVENOUS
Status: DISCONTINUED | OUTPATIENT
Start: 2022-05-18 | End: 2022-05-24

## 2022-05-18 RX ORDER — LIDOCAINE 40 MG/G
CREAM TOPICAL
Status: DISCONTINUED | OUTPATIENT
Start: 2022-05-18 | End: 2022-06-04 | Stop reason: HOSPADM

## 2022-05-18 RX ORDER — IBUPROFEN 600 MG/1
600 TABLET, FILM COATED ORAL ONCE
Status: DISCONTINUED | OUTPATIENT
Start: 2022-05-18 | End: 2022-05-18 | Stop reason: CLARIF

## 2022-05-18 RX ORDER — LISINOPRIL 20 MG/1
20 TABLET ORAL DAILY
Status: DISCONTINUED | OUTPATIENT
Start: 2022-05-19 | End: 2022-05-21

## 2022-05-18 RX ORDER — ACETAMINOPHEN 325 MG/1
650 TABLET ORAL ONCE
Status: DISCONTINUED | OUTPATIENT
Start: 2022-05-18 | End: 2022-05-18 | Stop reason: CLARIF

## 2022-05-18 RX ORDER — VITAMIN B COMPLEX
25 TABLET ORAL DAILY
Status: DISCONTINUED | OUTPATIENT
Start: 2022-05-19 | End: 2022-05-20

## 2022-05-18 RX ORDER — ACETAMINOPHEN 650 MG/1
650 SUPPOSITORY RECTAL EVERY 4 HOURS PRN
Status: DISCONTINUED | OUTPATIENT
Start: 2022-05-18 | End: 2022-05-24

## 2022-05-18 RX ORDER — NICOTINE POLACRILEX 4 MG
15-30 LOZENGE BUCCAL
Status: DISCONTINUED | OUTPATIENT
Start: 2022-05-18 | End: 2022-05-24

## 2022-05-18 RX ORDER — ACETAMINOPHEN 325 MG/1
650 TABLET ORAL EVERY 4 HOURS PRN
Status: DISCONTINUED | OUTPATIENT
Start: 2022-05-18 | End: 2022-05-24

## 2022-05-18 RX ORDER — ATORVASTATIN CALCIUM 80 MG/1
80 TABLET, FILM COATED ORAL DAILY
Status: DISCONTINUED | OUTPATIENT
Start: 2022-05-19 | End: 2022-06-04 | Stop reason: HOSPADM

## 2022-05-18 RX ADMIN — ASPIRIN 81 MG 162 MG: 81 TABLET ORAL at 17:40

## 2022-05-18 ASSESSMENT — ACTIVITIES OF DAILY LIVING (ADL)
ADLS_ACUITY_SCORE: 22
ADLS_ACUITY_SCORE: 35
ADLS_ACUITY_SCORE: 35

## 2022-05-18 ASSESSMENT — ENCOUNTER SYMPTOMS
MUSCULOSKELETAL NEGATIVE: 1
PSYCHIATRIC NEGATIVE: 1
GASTROINTESTINAL NEGATIVE: 1
LIGHT-HEADEDNESS: 1
CONSTITUTIONAL NEGATIVE: 1
SHORTNESS OF BREATH: 1
DIZZINESS: 1

## 2022-05-18 NOTE — TELEPHONE ENCOUNTER
Discussed results with pt  Bino shows multiple episodes of V tach  Advised she go to ER  Pt will let us Know which ER she will go to and I can talk to Them also  She feels fine

## 2022-05-18 NOTE — TELEPHONE ENCOUNTER
Patient notified of Provider's message as written.  However, she stated that she was already at the ED on 4/30/2022 for this.  Patient asked if she could just be referred to cardiology asap and go into the hospital if having worsening symptoms.  She is not sure what more they can do in the hospital that an outpatient cardiologist cannot do.  Explained that the ED would now have the Zio Patch results, could monitor/treat her and possibly have their cardiologist weigh in while she is there.  Otherwise, it could take some time to get in with outpatient cardiology and she could have complications or even cardiac arrest during the time she waits to see the specialist.    She would like to discuss further with Roxanna Ignacio personally before deciding whether or not to go to ED. Requesting a phone call from Roxanna Ignacio, IKER  [facility]

## 2022-05-18 NOTE — ED PROVIDER NOTES
[facility] EMERGENCY DEPARTMENT ([facility])    5/18/22        History     Chief Complaint   Patient presents with     Dizziness     Patient presents with episodes of dizziness, light headed and shortness of breath. Patient was wearing a device for the past 3 days that displayed episodes of V-Tach. Currently does not have any symptoms beyond her usual light-headed.      The history is provided by the patient and medical records.     Marianne Monroy is a 67 year old female referred to the emergency department after having several episodes of near syncope. She had an evaluation a week or so ago that revealed nothing substantial. Her primary care provider wisely had a cardiac monitor placed that she wore for about a week. She did have episodes during that period.  She was contacted during this 1 weeks episode and told she had 21 spells of V. tach up to about 160 bpm and the longest one was 30 seconds.  She has diabetes and high blood pressure. She has never had STEMI, she has no stents, no bypass surgeries, never had an echo stress test or angiogram.  She does get short of breath during these spells but they are not accompanied by shortness of breath.  She is currently asymptomatic. Her EKG shows sinus rhythm with LVH and inferior Q waves.  She is currently stable, labs are all ordered and she has been accepted to [place] cardiology service. I discussed the issue of whether to prophylax her with any medications and the decision was no.    This part of the medical record was transcribed by Kristan Pabon Medical Scribe, from a dictation done by Bryan Goyal MD.     Past Medical History  Past Medical History:   Diagnosis Date     Abnormal Pap smear of cervix ~2000    repeat pap was normal     Allergic rhinitis, cause unspecified      Anxiety state, unspecified     panic episodes     Unspecified essential hypertension      Past Surgical History:   Procedure Laterality Date     NO HISTORY OF SURGERY        ASPIRIN 81 MG OR TABS  atorvastatin (LIPITOR) 80 MG tablet  empagliflozin (JARDIANCE) 25 MG TABS tablet  FLUoxetine (PROZAC) 20 MG capsule  glipiZIDE (GLUCOTROL XL) 5 MG 24 hr tablet  lisinopril (ZESTRIL) 20 MG tablet  LORazepam (ATIVAN) 0.5 MG tablet  metFORMIN (GLUCOPHAGE-XR) 500 MG 24 hr tablet  MULTIPLE VITAMIN OR TABS  omeprazole (PRILOSEC) 20 MG DR capsule  triamcinolone (KENALOG) 0.1 % external cream  Vitamin D, Cholecalciferol, 25 MCG (1000 UT) TABS  blood glucose (NO BRAND SPECIFIED) test strip  blood glucose (ONETOUCH VERIO IQ) test strip  blood glucose monitoring (ONETOUCH VERIO) meter device kit  Continuous Blood Gluc Sensor (CoinfloorSTYLE VANESSA 14 DAY SENSOR) MISC  Multiple Vitamins-Minerals (PRESERVISION AREDS PO)      Allergies   Allergen Reactions     Effexor [Venlafaxine Hydrochloride]      Tachycardia, makes her extremely jittery--cant sit down, has to keep moving constantly     Family History  Family History   Problem Relation Age of Onset     Diabetes Mother         hypertension, alive at 83     C.A.D. Mother      Cancer Father         lung cancer, smoker.   at age 53     Family History Negative Sister      Osteoporosis Sister      Social History   Social History     Tobacco Use     Smoking status: Never Smoker     Smokeless tobacco: Never Used   Vaping Use     Vaping Use: Never used   Substance Use Topics     Alcohol use: Yes     Comment: social     Drug use: No      Past medical history, past surgical history, medications, allergies, family history, and social history were reviewed with the patient. No additional pertinent items.       Review of Systems   Constitutional: Negative.    HENT: Negative.    Respiratory: Positive for shortness of breath.    Cardiovascular: Negative for chest pain.   Gastrointestinal: Negative.    Genitourinary: Negative.    Musculoskeletal: Negative.    Neurological: Positive for dizziness and light-headedness. Negative for syncope (near).  "  Psychiatric/Behavioral: Negative.    All other systems reviewed and are negative.    A complete review of systems was performed with pertinent positives and negatives noted in the HPI, and all other systems negative.    Physical Exam   BP: (!) 157/67  Pulse: 100  Temp: 98.5  F (36.9  C)  Resp: 14  Height: 162.6 cm (5' 4\")  Weight: 67.1 kg (148 lb)  SpO2: 95 %  Physical Exam  Vitals and nursing note reviewed.   Constitutional:       General: She is not in acute distress.     Appearance: She is well-developed.   HENT:      Head: Normocephalic and atraumatic.      Mouth/Throat:      Mouth: Mucous membranes are moist.   Eyes:      General: No scleral icterus.     Conjunctiva/sclera: Conjunctivae normal.      Pupils: Pupils are equal, round, and reactive to light.   Cardiovascular:      Rate and Rhythm: Normal rate and regular rhythm.      Heart sounds: Normal heart sounds. No murmur heard.  Pulmonary:      Effort: Pulmonary effort is normal. No respiratory distress.      Breath sounds: Normal breath sounds. No wheezing.   Abdominal:      General: Abdomen is flat.      Palpations: Abdomen is soft.   Musculoskeletal:      Cervical back: Neck supple.   Skin:     General: Skin is warm and dry.   Neurological:      General: No focal deficit present.      Mental Status: She is alert and oriented to person, place, and time.      Cranial Nerves: No cranial nerve deficit.   Psychiatric:         Mood and Affect: Mood normal.         Behavior: Behavior normal.         ED Course      Procedures            EKG Interpretation:      Interpreted by Bryan Goyal MD  Time reviewed: 1730  Symptoms at time of EKG: VT   Rhythm: normal sinus   Rate: normal  Axis: normal  Ectopy: none  Conduction: LVH  ST Segments/ T Waves: No ST-T wave changes  Q Waves: inferior  Comparison to prior: Unchanged from 4/2022    Clinical Impression: LVH, inferior Q waves                    Results for orders placed or performed during the hospital " encounter of 05/18/22   [place] Draw     Status: None    Narrative    The following orders were created for panel order [place] Draw.  Procedure                               Abnormality         Status                     ---------                               -----------         ------                     Extra Red Top Tube[[id number]]                               Final result               Extra Green Top (Lithium...[[id number]]                                                 Extra Purple Top Tube[[id number]]                            Final result                 Please view results for these tests on the individual orders.   Extra Red Top Tube     Status: None   Result Value Ref Range    Hold Specimen JIC    Extra Purple Top Tube     Status: None   Result Value Ref Range    Hold Specimen JIC    Extra Tube     Status: None    Narrative    The following orders were created for panel order Extra Tube.  Procedure                               Abnormality         Status                     ---------                               -----------         ------                     Extra Blue Top Tube[[id number]]                              Final result                 Please view results for these tests on the individual orders.   Extra Blue Top Tube     Status: None   Result Value Ref Range    Hold Specimen JIC    Basic metabolic panel     Status: Abnormal   Result Value Ref Range    Sodium 139 133 - 144 mmol/L    Potassium 4.1 3.4 - 5.3 mmol/L    Chloride 103 94 - 109 mmol/L    Carbon Dioxide (CO2) 28 20 - 32 mmol/L    Anion Gap 8 3 - 14 mmol/L    Urea Nitrogen 30 7 - 30 mg/dL    Creatinine 0.90 0.52 - 1.04 mg/dL    Calcium 9.7 8.5 - 10.1 mg/dL    Glucose 167 (H) 70 - 99 mg/dL    GFR Estimate 69 >60 mL/min/1.73m2   Troponin I     Status: Normal   Result Value Ref Range    Troponin I High Sensitivity 10 <54 ng/L   Nt probnp inpatient (BNP)     Status: Normal   Result Value Ref Range    N terminal Pro BNP Inpatient 664 0 -  900 pg/mL   CBC with platelets and differential     Status: None   Result Value Ref Range    WBC Count 7.2 4.0 - 11.0 10e3/uL    RBC Count 4.82 3.80 - 5.20 10e6/uL    Hemoglobin 13.8 11.7 - 15.7 g/dL    Hematocrit 42.3 35.0 - 47.0 %    MCV 88 78 - 100 fL    MCH 28.6 26.5 - 33.0 pg    MCHC 32.6 31.5 - 36.5 g/dL    RDW 12.7 10.0 - 15.0 %    Platelet Count 284 150 - 450 10e3/uL    % Neutrophils 56 %    % Lymphocytes 28 %    % Monocytes 9 %    % Eosinophils 6 %    % Basophils 1 %    % Immature Granulocytes 0 %    NRBCs per 100 WBC 0 <1 /100    Absolute Neutrophils 4.1 1.6 - 8.3 10e3/uL    Absolute Lymphocytes 2.0 0.8 - 5.3 10e3/uL    Absolute Monocytes 0.6 0.0 - 1.3 10e3/uL    Absolute Eosinophils 0.4 0.0 - 0.7 10e3/uL    Absolute Basophils 0.1 0.0 - 0.2 10e3/uL    Absolute Immature Granulocytes 0.0 <=0.4 10e3/uL    Absolute NRBCs 0.0 10e3/uL   Asymptomatic COVID-19 Virus (Coronavirus) by PCR Nasopharyngeal     Status: Normal    Specimen: Nasopharyngeal; Swab   Result Value Ref Range    SARS CoV2 PCR Negative Negative    Narrative    Testing was performed using the lorenzo  SARS-CoV-2 & Influenza A/B Assay on the lorenzo  Elda  System.  This test should be ordered for the detection of SARS-COV-2 in individuals who meet SARS-CoV-2 clinical and/or epidemiological criteria. Test performance is unknown in asymptomatic patients.  This test is for in vitro diagnostic use under the FDA EUA for laboratories certified under CLIA to perform moderate and/or high complexity testing. This test has not been FDA cleared or approved.  A negative test does not rule out the presence of PCR inhibitors in the specimen or target RNA in concentration below the limit of detection for the assay. The possibility of a false negative should be considered if the patient's recent exposure or clinical presentation suggests COVID-19.  [facility] are certified under the Clinical Laboratory Improvement Amendments of 1988 (CLIA-88) as  qualified to perform moderate and/or high complexity laboratory testing.   EKG 12 lead     Status: None (Preliminary result)   Result Value Ref Range    Systolic Blood Pressure  mmHg    Diastolic Blood Pressure  mmHg    Ventricular Rate 91 BPM    Atrial Rate 91 BPM    RI Interval 118 ms    QRS Duration 86 ms     ms    QTc 494 ms    P Axis 35 degrees    R AXIS -12 degrees    T Axis 18 degrees    Interpretation ECG       Sinus rhythm  Possible Left atrial enlargement  Left ventricular hypertrophy with repolarization abnormality  Inferior infarct , age undetermined  Abnormal ECG     CBC with platelets differential     Status: None    Narrative    The following orders were created for panel order CBC with platelets differential.  Procedure                               Abnormality         Status                     ---------                               -----------         ------                     CBC with platelets and d...[[id number]]                      Final result                 Please view results for these tests on the individual orders.     Medications   acetaminophen (TYLENOL) tablet 650 mg (has no administration in time range)   ibuprofen (ADVIL/MOTRIN) tablet 600 mg (has no administration in time range)   aspirin (ASA) chewable tablet 162 mg (162 mg Oral Given 5/18/22 1740)        Assessments & Plan (with Medical Decision Making)   59-year-old female with recent episodes of near syncope not related to her underlying diabetes.  Her primary care provider had her set up with a Zio patch which showed multiple episodes of V. tach up to 160 bpm and sustained as long as 30 minutes.  She is currently in sinus rhythm she does have signs of LVH and likely inferior Q waves routine labs electrolytes troponin are all negative she was maintained on a monitor here Case was discussed with cardiology attending who agreed with admission to a telemetry bed on the [place] and she did not feel that any medications  such as amiodarone or indicated at this time.  [place] [facility]    I have reviewed the nursing notes. I have reviewed the findings, diagnosis, plan and need for follow up with the patient.    New Prescriptions    No medications on file       Final diagnoses:   Near syncope   Paroxysmal ventricular tachycardia (H)       --  Bryan Goyal MD  [facility] EMERGENCY DEPARTMENT  5/18/2022     Bryan Goyal MD  05/18/22 1922

## 2022-05-18 NOTE — Clinical Note
The site was marked. Prepped: groin and right radial. Prepped with: ChloraPrep. The patient was draped. .

## 2022-05-18 NOTE — TELEPHONE ENCOUNTER
Claudia Day called the clinic to report abnormal Ziopatch results.  1. Episode of Ventricular tachycardia with heart rate of 160 beats per minute and lasted 30 seconds on May 14th 2022 at 11.36am.    2. Patient experienced 21 episodes of  Ventricular tachycardia.    3.Patient experienced 3 episodes of Supraventricular tachycardia.    Please call Claudia Day at [phone] with additional questions or concerns.

## 2022-05-18 NOTE — TELEPHONE ENCOUNTER
Patient has already arrived at [facility] ED and is currently being seen    Lisa Nath RN  [facility]

## 2022-05-18 NOTE — TELEPHONE ENCOUNTER
Left message on patient's identified voice mail.  Advised patient to call [phone] at her earliest convenience and speak to one of the nurses.    Please inform patient to seek medical assistance at the nearest ER as soon as possible.      Sent [employer] message.

## 2022-05-19 ENCOUNTER — APPOINTMENT (OUTPATIENT)
Dept: ULTRASOUND IMAGING | Facility: CLINIC | Age: 67
DRG: 233 | End: 2022-05-19
Attending: NURSE PRACTITIONER
Payer: COMMERCIAL

## 2022-05-19 ENCOUNTER — APPOINTMENT (OUTPATIENT)
Dept: CT IMAGING | Facility: CLINIC | Age: 67
DRG: 233 | End: 2022-05-19
Attending: NURSE PRACTITIONER
Payer: COMMERCIAL

## 2022-05-19 ENCOUNTER — APPOINTMENT (OUTPATIENT)
Dept: CARDIOLOGY | Facility: CLINIC | Age: 67
DRG: 233 | End: 2022-05-19
Attending: EMERGENCY MEDICINE
Payer: COMMERCIAL

## 2022-05-19 PROBLEM — I50.21 ACUTE SYSTOLIC HEART FAILURE (H): Status: ACTIVE | Noted: 2022-05-19

## 2022-05-19 LAB
CHOLEST SERPL-MCNC: 147 MG/DL
GLUCOSE BLDC GLUCOMTR-MCNC: 132 MG/DL (ref 70–99)
GLUCOSE BLDC GLUCOMTR-MCNC: 163 MG/DL (ref 70–99)
GLUCOSE BLDC GLUCOMTR-MCNC: 183 MG/DL (ref 70–99)
GLUCOSE BLDC GLUCOMTR-MCNC: 228 MG/DL (ref 70–99)
HDLC SERPL-MCNC: 47 MG/DL
LDLC SERPL CALC-MCNC: 57 MG/DL
LVEF ECHO: NORMAL
MAGNESIUM SERPL-MCNC: 1.8 MG/DL (ref 1.6–2.3)
NONHDLC SERPL-MCNC: 100 MG/DL
TRIGL SERPL-MCNC: 214 MG/DL

## 2022-05-19 PROCEDURE — 999N000208 ECHOCARDIOGRAM COMPLETE

## 2022-05-19 PROCEDURE — 71250 CT THORAX DX C-: CPT | Mod: 26 | Performed by: RADIOLOGY

## 2022-05-19 PROCEDURE — 214N000001 HC R&B CCU UMMC

## 2022-05-19 PROCEDURE — 250N000013 HC RX MED GY IP 250 OP 250 PS 637: Performed by: NURSE PRACTITIONER

## 2022-05-19 PROCEDURE — 93454 CORONARY ARTERY ANGIO S&I: CPT | Mod: 26 | Performed by: INTERNAL MEDICINE

## 2022-05-19 PROCEDURE — 250N000011 HC RX IP 250 OP 636: Performed by: INTERNAL MEDICINE

## 2022-05-19 PROCEDURE — 99222 1ST HOSP IP/OBS MODERATE 55: CPT | Performed by: SURGERY

## 2022-05-19 PROCEDURE — 93880 EXTRACRANIAL BILAT STUDY: CPT | Mod: 26 | Performed by: RADIOLOGY

## 2022-05-19 PROCEDURE — 71250 CT THORAX DX C-: CPT

## 2022-05-19 PROCEDURE — 272N000001 HC OR GENERAL SUPPLY STERILE: Performed by: INTERNAL MEDICINE

## 2022-05-19 PROCEDURE — 250N000009 HC RX 250: Performed by: INTERNAL MEDICINE

## 2022-05-19 PROCEDURE — 93880 EXTRACRANIAL BILAT STUDY: CPT

## 2022-05-19 PROCEDURE — 99152 MOD SED SAME PHYS/QHP 5/>YRS: CPT | Mod: GC | Performed by: INTERNAL MEDICINE

## 2022-05-19 PROCEDURE — C1887 CATHETER, GUIDING: HCPCS | Performed by: INTERNAL MEDICINE

## 2022-05-19 PROCEDURE — 258N000003 HC RX IP 258 OP 636

## 2022-05-19 PROCEDURE — 93970 EXTREMITY STUDY: CPT

## 2022-05-19 PROCEDURE — C1894 INTRO/SHEATH, NON-LASER: HCPCS | Performed by: INTERNAL MEDICINE

## 2022-05-19 PROCEDURE — 99232 SBSQ HOSP IP/OBS MODERATE 35: CPT | Mod: 25 | Performed by: STUDENT IN AN ORGANIZED HEALTH CARE EDUCATION/TRAINING PROGRAM

## 2022-05-19 PROCEDURE — 255N000002 HC RX 255 OP 636: Performed by: INTERNAL MEDICINE

## 2022-05-19 PROCEDURE — B2111ZZ FLUOROSCOPY OF MULTIPLE CORONARY ARTERIES USING LOW OSMOLAR CONTRAST: ICD-10-PCS | Performed by: INTERNAL MEDICINE

## 2022-05-19 PROCEDURE — 93454 CORONARY ARTERY ANGIO S&I: CPT | Performed by: INTERNAL MEDICINE

## 2022-05-19 PROCEDURE — 93306 TTE W/DOPPLER COMPLETE: CPT | Mod: 26 | Performed by: INTERNAL MEDICINE

## 2022-05-19 PROCEDURE — 99152 MOD SED SAME PHYS/QHP 5/>YRS: CPT | Performed by: INTERNAL MEDICINE

## 2022-05-19 PROCEDURE — 250N000013 HC RX MED GY IP 250 OP 250 PS 637: Performed by: STUDENT IN AN ORGANIZED HEALTH CARE EDUCATION/TRAINING PROGRAM

## 2022-05-19 PROCEDURE — 99207 PR SC NO CHARGE VISIT: CPT | Performed by: STUDENT IN AN ORGANIZED HEALTH CARE EDUCATION/TRAINING PROGRAM

## 2022-05-19 PROCEDURE — 93970 EXTREMITY STUDY: CPT | Mod: 26 | Performed by: RADIOLOGY

## 2022-05-19 RX ORDER — METOPROLOL TARTRATE 25 MG/1
25 TABLET, FILM COATED ORAL 2 TIMES DAILY
Status: DISCONTINUED | OUTPATIENT
Start: 2022-05-19 | End: 2022-05-20

## 2022-05-19 RX ORDER — NICARDIPINE HYDROCHLORIDE 2.5 MG/ML
INJECTION INTRAVENOUS
Status: DISCONTINUED | OUTPATIENT
Start: 2022-05-19 | End: 2022-05-19 | Stop reason: HOSPADM

## 2022-05-19 RX ORDER — NALOXONE HYDROCHLORIDE 0.4 MG/ML
0.2 INJECTION, SOLUTION INTRAMUSCULAR; INTRAVENOUS; SUBCUTANEOUS
Status: ACTIVE | OUTPATIENT
Start: 2022-05-19 | End: 2022-05-19

## 2022-05-19 RX ORDER — ASPIRIN 81 MG/1
162 TABLET ORAL ONCE
Status: DISCONTINUED | OUTPATIENT
Start: 2022-05-19 | End: 2022-05-19

## 2022-05-19 RX ORDER — FLUMAZENIL 0.1 MG/ML
0.2 INJECTION, SOLUTION INTRAVENOUS
Status: ACTIVE | OUTPATIENT
Start: 2022-05-19 | End: 2022-05-19

## 2022-05-19 RX ORDER — NALOXONE HYDROCHLORIDE 0.4 MG/ML
0.4 INJECTION, SOLUTION INTRAMUSCULAR; INTRAVENOUS; SUBCUTANEOUS
Status: ACTIVE | OUTPATIENT
Start: 2022-05-19 | End: 2022-05-19

## 2022-05-19 RX ORDER — OXYCODONE HYDROCHLORIDE 10 MG/1
10 TABLET ORAL EVERY 4 HOURS PRN
Status: DISCONTINUED | OUTPATIENT
Start: 2022-05-19 | End: 2022-06-04 | Stop reason: HOSPADM

## 2022-05-19 RX ORDER — FENTANYL CITRATE 50 UG/ML
INJECTION, SOLUTION INTRAMUSCULAR; INTRAVENOUS
Status: DISCONTINUED | OUTPATIENT
Start: 2022-05-19 | End: 2022-05-19 | Stop reason: HOSPADM

## 2022-05-19 RX ORDER — FENTANYL CITRATE 50 UG/ML
25 INJECTION, SOLUTION INTRAMUSCULAR; INTRAVENOUS
Status: DISCONTINUED | OUTPATIENT
Start: 2022-05-19 | End: 2022-05-23

## 2022-05-19 RX ORDER — OXYCODONE HYDROCHLORIDE 5 MG/1
5 TABLET ORAL EVERY 4 HOURS PRN
Status: DISCONTINUED | OUTPATIENT
Start: 2022-05-19 | End: 2022-06-04 | Stop reason: HOSPADM

## 2022-05-19 RX ORDER — SODIUM CHLORIDE 9 MG/ML
75 INJECTION, SOLUTION INTRAVENOUS CONTINUOUS
Status: ACTIVE | OUTPATIENT
Start: 2022-05-19 | End: 2022-05-19

## 2022-05-19 RX ORDER — IOPAMIDOL 755 MG/ML
INJECTION, SOLUTION INTRAVASCULAR
Status: DISCONTINUED | OUTPATIENT
Start: 2022-05-19 | End: 2022-05-19 | Stop reason: HOSPADM

## 2022-05-19 RX ORDER — NITROGLYCERIN 5 MG/ML
VIAL (ML) INTRAVENOUS
Status: DISCONTINUED | OUTPATIENT
Start: 2022-05-19 | End: 2022-05-19 | Stop reason: HOSPADM

## 2022-05-19 RX ORDER — HEPARIN SODIUM 1000 [USP'U]/ML
INJECTION, SOLUTION INTRAVENOUS; SUBCUTANEOUS
Status: DISCONTINUED | OUTPATIENT
Start: 2022-05-19 | End: 2022-05-19 | Stop reason: HOSPADM

## 2022-05-19 RX ORDER — ATROPINE SULFATE 0.1 MG/ML
0.5 INJECTION INTRAVENOUS
Status: ACTIVE | OUTPATIENT
Start: 2022-05-19 | End: 2022-05-19

## 2022-05-19 RX ADMIN — LISINOPRIL 20 MG: 20 TABLET ORAL at 08:19

## 2022-05-19 RX ADMIN — ATORVASTATIN CALCIUM 80 MG: 80 TABLET, FILM COATED ORAL at 08:19

## 2022-05-19 RX ADMIN — METOPROLOL TARTRATE 25 MG: 25 TABLET, FILM COATED ORAL at 19:11

## 2022-05-19 RX ADMIN — FLUOXETINE HYDROCHLORIDE 40 MG: 40 CAPSULE ORAL at 17:49

## 2022-05-19 RX ADMIN — METOPROLOL TARTRATE 25 MG: 25 TABLET, FILM COATED ORAL at 11:18

## 2022-05-19 RX ADMIN — EMPAGLIFLOZIN 25 MG: 25 TABLET, FILM COATED ORAL at 08:19

## 2022-05-19 RX ADMIN — HUMAN ALBUMIN MICROSPHERES AND PERFLUTREN 6 ML: 10; .22 INJECTION, SOLUTION INTRAVENOUS at 07:22

## 2022-05-19 RX ADMIN — SODIUM CHLORIDE 75 ML/HR: 9 INJECTION, SOLUTION INTRAVENOUS at 14:46

## 2022-05-19 RX ADMIN — ASPIRIN 81 MG: 81 TABLET, COATED ORAL at 08:19

## 2022-05-19 ASSESSMENT — ACTIVITIES OF DAILY LIVING (ADL)
ADLS_ACUITY_SCORE: 24
ADLS_ACUITY_SCORE: 25
ADLS_ACUITY_SCORE: 24

## 2022-05-19 NOTE — PLAN OF CARE
Pt had angiogram this morning which revealed 3-vessel disease and need for CAB.  CVTS consult this afternoon.   Returned from cathlab with TR band in place-air removal started at 14:40. R CMS intact.   Continue to monitor R radial site and band removal.

## 2022-05-19 NOTE — UTILIZATION REVIEW
Admission Status; Secondary Review Determination       Under the authority of the Utilization Management Committee, the utilization review process indicated a secondary review on the above patient. The review outcome is based on review of the medical records, discussions with staff, and applying clinical experience noted on the date of the review.     (x) Inpatient Status Appropriate - This patient's medical care is consistent with medical management for inpatient care and reasonable inpatient medical practice.     RATIONALE FOR DETERMINATION     Marianne Monroy is a 67 year old female with history of HTN, T2DM, GERD, JUSTUS, and MDD.  She had an episode of palpitations and white headedness on 4/30/2022 for which she had urgent care or emergency department evaluation that was unremarkable.  She followed up with primary care on 5/11/2022 and was placed on an event monitor.  She had recurrent lightheadedness and fatigue on 5/14/2022.  She returned the event monitor on 5/18/2022, I believe.  She was contacted by primary care with concern that she had had some episodes of nonsustained ventricular tachycardia.  She was referred to the emergency department.  At the time of presentation she was actually feeling okay.  She was admitted for further evaluation.  Echocardiogram this morning shows severe cardiomyopathy with ejection fraction of 20 - 30%.  Inpatient admission is appropriate for further evaluation and treatment of new severe cardiomyopathy (thought most likely to be ischemic), and nonsustained ventricular tachycardia.  Coronary angiogram is planned for today.     At the time of admission with the information available to the attending physician more than 2 nights Hospital complex care was anticipated, based on patient risk of adverse outcome if treated as outpatient and complex care required. Inpatient admission is appropriate based on the Medicare guidelines.     This document was produced using voice recognition  software       The information on this document is developed by the utilization review team in order for the business office to ensure compliance. This only denotes the appropriateness of proper admission status and does not reflect the quality of care rendered.   The definitions of Inpatient Status and Observation Status used in making the determination above are those provided in the CMS Coverage Manual, Chapter 1 and Chapter 6, section 70.4.   Sincerely,     Antonio Tinajero MD    Utilization Review  Physician Advisor  [facility].

## 2022-05-19 NOTE — CONSULTS
Discharge Pharmacy Test Claim    Entresto is covered by patient's [employer] Medicare Part D plan with a copay of $47/mo.    Test Claim Copay   entresto 47.00       Maggie Suarez  [facility] Pharmacy Liaison  Ph: [phone] Pager: [phone]

## 2022-05-19 NOTE — PLAN OF CARE
Admission          5/18/2022  2200 PM  -----------------------------------------------------------  Diagnosis: for cardiac monitor after hotler monitor show SVT and VT    Admitted from: [facility] ED  Report given from: JULIA Miranda, which received report from [facility] ED RN   Via: EMS  Accompanied by: 2 EMS staff  Family Aware of Admission: Patient notified dtr and son  Belongings: with patient  Admission Profile: Complete  Teaching: Orientation to unit, call don't fall, use of call light, meal times, visiting hours,  when to call for the RN (angina/sob/dizzyness, etc.), and enforced importance of safety.  Access:   Telemetry: Placed on pt  Ht./Wt.: Complete  2 RN skin assessment: completed by Dejuan RN  Dry mole on L hip and dry spot on L ankle pinpoint size. Dry heels.     Temp:  [97.6  F (36.4  C)-98.5  F (36.9  C)] 97.6  F (36.4  C)  Pulse:  [] 83  Resp:  [14-20] 20  BP: (126-169)/(58-94) 147/58  SpO2:  [92 %-99 %] 96 %

## 2022-05-19 NOTE — PROGRESS NOTES
"    [facility]   Cardiology   Progress Note     ASSESSMENT/PLAN:  Marianne Monroy is a 67 year old year old female with PMH significant for HTN, DM2, GERD, JUSTUS, MDD who was admitted to the hospital on 5/18/22 with concern for ventricular tachycardia noted on her portable heart monitor (Zio Patch).     # Concern for Ventricular Tachycardia vs. SVT  # Non-Sustained Ventricular Tachycardia  # Prolonged QTc  Patient with multiple presyncope episodes per above with associated lightheadedness, shortness of breath, and \"just feeling off\" during the episodes. Preliminary event monitor with probable VT vs. SVT. Patient also reports ongoing progressive JEFFERSON over the past several months. Feels out of breath with mild to moderate activity. Denies chest pain but reports recent history of occurrences of jaw pain, diaphoresis, and vomiting consistent with probable ischemic heart disease. Patient has not historically followed with a cardiologist and tends to attribute most of her symptoms to her diabetes.  - On 5/19/22 a 15 second run of NSVT was detected on telemetry. Patient was in bed but was walking around prior to episodes. Per nurse she felt \"off\" during episode. Plan for angiogram today.  - Start ASA 81mg daily and beta blocker today (see below)  - Bathroom privileges only until after angiogram   - Awaiting final report from Zio     #Acute Systolic Heart Failure   # HTN  5/19/22 Echocardiogram showing severely reduced EF (20-30%) with moderate to severe diffuse hypokinesis in the RCA territory consistent with ICMO. This is a new diagnosis for patient.   - BB: Start Lopressor 25mg BID; If tolerated plan to switch to Toprol or Carvedilol tomorrow  - ACE/ARB/ARNi: Was on PTA Lisinopril 20mg daily (last dose 5/19). Currently holding for 3 day wash out with plans to start Entresto (pending affordability for patient; $47/month).   - SGLT-2i: Continue PTA Jardiance 25mg daily   - AA: Consider tomorrow if " BP tolerates  - Continue to monitor BP and titrate/add medication as able    #Hyperlipidemia  Admission Cholesterol 147, HDL 47, LDL 57  - Continue PTA Lipitor 80mg daily   - Cardiac diet and carb conscious diet (after NPO status lifted)    #Diabetes Mellitus Type 2  Last A1C 7.8% on 4/14/22. Home regimen includes Glipizide XL 10mg daily, Metformin XR 1000mg BID, and Jardiance 25mg daily.   - Continue to hold metformin in anticipation of angiogram  - Continue to hold Glipizide due to NPO status and hypoglycemia risk  - Start medium correction SSI  - Hypoglycemia Protocol ordered    #Major Depressive Disorder  -  QTc stable  - Continua PTA Fluoxetine   .  #GERD  - Continue PTA Omeprazole    FEN:   Code status: Full  Prophylaxis:  Up ad lisy; after angiogram  Isolation: None  Disposition: 2-3 Days pending work up    Patient seen and discussed with Dr. Steve, who agrees with above plan.    Farideh MOORE, CNP  [facility] Cardiology Team    Interval History: Patient asymptomatic in bed upon assessment and had not had a recurrence of symptoms at the time of exam. Resting comfortably on room air. Echocardiogram results and plan discussed with patient. She is agreeable to angiogram this afternoon. Beta blocker and ASA initiated. Already on SGLT2-I; continue. Recommend Entresto ($47/month) and will discuss with patient after angiogram. Continue to hold Lisinopril for 3 day washout pending Entresto start (Lisinopril given today). 15 second run of VT noted on telemetry shortly after patient ambulated. Bathroom privileges only unto angiogram complete.     Physical Exam:  Temp:  [97.6  F (36.4  C)-98.5  F (36.9  C)] 98  F (36.7  C)  Pulse:  [] 76  Resp:  [14-20] 16  BP: (126-169)/(58-94) 145/80  SpO2:  [92 %-100 %] 98 %    I/O: Adequate urine output. NPO until after angiogram.     Wt:   Wt Readings from Last 5 Encounters:   05/19/22 66.2 kg (146 lb)   05/11/22 67.7 kg (149 lb 3.2 oz)   04/14/22 68.4 kg (150 lb 12.8 oz)    12/21/21 69.4 kg (153 lb 0.2 oz)   10/18/21 68 kg (150 lb)     General: NAD  HEENT:  PERRLA, EOMI.   Neck: JVD at clavicular line.   CV: RRR. No murmur appreciated. No rubs or gallops. Peripheral radial pulse intact.  Resp: No increased work of breathing or use of accessory muscles, breathing comfortably on room air.  Lung sounds clear throughout/bilaterally  Abdomen:  Normal active bowel sounds.  Abdomen is soft. No distension, non-tender to palpation.    Extremities: Warm. Capillary refill less than 3 sec. 3/4 radial pulses bilaterally.  3/4 pedal pulses bilaterally. No pre-tibial edema. No cyanosis or clubbing.  Skin:  Warm and dry. No erythema, rashes, ulceration or diaphoresis.  Neuro: Alert and oriented x3.      Medications:    aspirin  81 mg Oral Daily     atorvastatin  80 mg Oral Daily     empagliflozin  25 mg Oral Daily     [Held by provider] FLUoxetine  40 mg Oral Daily     insulin aspart  1-7 Units Subcutaneous TID AC     insulin aspart  1-5 Units Subcutaneous At Bedtime     [Held by provider] lisinopril  20 mg Oral Daily     metoprolol tartrate  25 mg Oral BID     pantoprazole  40 mg Oral Daily     sodium chloride (PF)  3 mL Intracatheter Q8H     Vitamin D3  25 mcg Oral Daily       - MEDICATION INSTRUCTIONS -         Labs:   CMP  Recent Labs   Lab 05/19/22  0824 05/18/22  2354 05/18/22  1715   NA  --   --  139   POTASSIUM  --   --  4.1   CHLORIDE  --   --  103   CO2  --   --  28   ANIONGAP  --   --  8   * 228* 167*   BUN  --   --  30   CR  --   --  0.90   GFRESTIMATED  --   --  69   POONAM  --   --  9.7   MAG  --   --  1.8   PROTTOTAL  --   --  7.9   ALBUMIN  --   --  4.0   BILITOTAL  --   --  0.4   ALKPHOS  --   --  78   AST  --   --  16   ALT  --   --  27     CBC  Recent Labs   Lab 05/18/22  1715   WBC 7.2   RBC 4.82   HGB 13.8   HCT 42.3   MCV 88   MCH 28.6   MCHC 32.6   RDW 12.7        INRNo lab results found in last 7 days.  Arterial Blood GasNo lab results found in last 7  days.    Diagnostics:    22 EC/19/22 VT Strip:      22 Echo:  Interpretation Summary  Left ventricular function is decreased. The ejection fraction is 20-30%  (severely reduced). Moderate to severe diffuse hypokinesis is present.  Inferior/inferoseptal akinesis is present. Findings consistent with Ischemic  CM.  Global right ventricular function is normal.  Mild mitral insufficiency is present.  IVC diameter <2.1 cm collapsing >50% with sniff suggests a normal RA pressure  of 3 mmHg.  No pericardial effusion is present.  There is no prior study for direct comparison.    Coronary angiogram: Pending    Recent Results (from the past 24 hour(s))   Echocardiogram Complete   Result Value    LVEF  20-30% (severely reduced)    [facility]    [id number]  [facility]  [id number]  [id number]^MISHEL^PAUL^IRSI     [employer],[place]  Echocardiography Laboratory  [address] [place] [id number]     Name: CRISTAL YOUSIF  MRN: [id number]  : 1955  Study Date: 2022 06:57 AM  Age: 67 yrs  Gender: Female  [place] [facility] [place] For Study: VT  Ordering Physician: PAUL FLORENTINO  Performed By: Alexandria Palomo     BSA: 1.7 m2  Height: 64 in  Weight: 146 lb  HR: 81  BP: 157/67 mmHg  ______________________________________________________________________________  Procedure  Complete Portable Echo Adult. Contrast Optison. Adequate quality color and  spectral Doppler were performed and interpreted. Patient was given 6 ml  mixture of 3 ml Optison and 6 ml saline. 3 ml wasted.  ______________________________________________________________________________  Interpretation Summary  Left ventricular function is decreased. The ejection fraction is 20-30%  (severely reduced). Moderate to severe diffuse hypokinesis is present.  Inferior/inferoseptal akinesis is present. Findings consistent with Ischemic  CM.  Global right ventricular function is normal.  Mild mitral  insufficiency is present.  IVC diameter <2.1 cm collapsing >50% with sniff suggests a normal RA pressure  of 3 mmHg.  No pericardial effusion is present.  There is no prior study for direct comparison.  ______________________________________________________________________________  Left Ventricle  Left ventricular wall thickness is normal. Moderate to severe left ventricular  dilation is present. Grade II or moderate diastolic dysfunction. Left  ventricular function is decreased. The ejection fraction is 20-30% (severely  reduced). Moderate to severe diffuse hypokinesis is present. Inferior wall  akinesis is present. There is no thrombus.     Right Ventricle  The right ventricle is normal size. Global right ventricular function is  normal.     Atria  The right atria appears normal. Mild left atrial enlargement is present.     Mitral Valve  Mild mitral insufficiency is present.     Aortic Valve  Aortic valve is normal in structure and function.     Tricuspid Valve  The tricuspid valve is normal. The right ventricular systolic pressure is  approximated at 17.7 mmHg plus the right atrial pressure.     Pulmonic Valve  Trace pulmonic insufficiency is present.     Vessels  The aorta root is normal. IVC diameter <2.1 cm collapsing >50% with sniff  suggests a normal RA pressure of 3 mmHg.     Pericardium  No pericardial effusion is present.     Compared to Previous Study  There is no prior study for direct comparison.  ______________________________________________________________________________  MMode/2D Measurements & Calculations     RVDd: 2.7 cm  IVSd: 0.70 cm  LVIDd: 5.1 cm  LVIDs: 4.6 cm  LVPWd: 0.74 cm  FS: 9.9 %  LV mass(C)d: 124.5 grams  LV mass(C)dI: 72.7 grams/m2  asc Aorta Diam: 2.7 cm  LVOT diam: 1.5 cm  LVOT area: 1.9 cm2  LA Volume Index (BP): 36.9 ml/m2  RWT: 0.29  TAPSE: 1.7 cm     Doppler Measurements & Calculations  MV E max leona: 89.8 cm/sec  MV A max leona: 102.7 cm/sec  MV E/A: 0.87  MV dec slope: 485.2  cm/sec2  MV dec time: 0.19 sec  MR ERO: 0.13 cm2  MR volume: 25.0 ml  PA acc time: 0.12 sec  TR max leona: 209.9 cm/sec  TR max P.7 mmHg  E/E' av.4  Lateral E/e': 13.9  Medial E/e': 20.9     ______________________________________________________________________________  Report approved by: Lucia Oconnell 2022 09:31 AM             Time Spent on this Encounter   I spent >30 minutes on the unit/floor managing the care of Marianne Monroy. Over 50% of my time was spent on the following:   - Counseling the patient and/or family regarding: diagnostic results, prognosis and risks and benefits of treatment options  - Coordination of care with the: patient and daughter    OSCAR Boykin CNP

## 2022-05-19 NOTE — PROGRESS NOTES
D: Admitted from [facility] ED for cardiac monitor after holter monitor showed SVT and VT.   PMH of HTN, T2DM, GERD, JUSTUS, MDD.     I: Monitored vitals and assessed pt status.   Changed:   Running:   PRN:   Tele: SR  O2: RA  Mobility: Up ad lisy    A: A0x4. VSS. Afebrile. Slept on and off overnight. Pt denied numbness, tingling, SOB/dyspnea, chest pain, nausea, dizziness, or chills. Urinating adequately. LBM 5/18 prior to admission. ACHS blood sugar checks, 1 unit insulin given overnight. NPO diet started at midnight. R PIV WDL and saline locked.    P: Continue to monitor Pt status and report changes to Cards 1.    Shift [id number]

## 2022-05-19 NOTE — H&P
[facility]    Cardiology History and Physical - Cardiology    Date of Admission:  5/18/2022    Assessment & Plan: S    Marianne Monroy is a 67 year old female with PMH significant for HTN, T2DM, GERD, JUSTUS, MDD who is admitted in the setting of concern for ventricular tachycardia on cardiac monitor.    #Concern for ventricular tachycardia  #Lightheadedness  #Presyncope  #Prolonged QTc  Two episodes of presyncope from 4/30/22 with increased lightheadedness, shortness of breath, palpitations. HD stable on presentation. Physical exam unremarkable. Labs with normal TSH, CBC, BMP, troponin and midly elevated BNP. EKG with significant Q waves in DII-DIII, LVH on aVL and LA enlargement on V1 (biphasic wave) consistent in 3 EKGs from 4/30 to today. Main concern is ischemic cardiomyopathy with EKG findings. Per ED report, there could have been sustained ventricular tachycardia for up to 30 minutes in the zio patch report. Patient also has prolonged QTc to 462-490ms, should discontinue fluoxetine to prevent risk of additional polymorphic VT, does not suspect a long QT syndrome given findings suggestive of ischemia.  -Pharmacy to assist in AM with fluoxetine taper to prevent withdrawal, consider duloxetine, desvenlafaxine, milnacipran (no QTc prolongation in clinical trials).  -Goal K>4, Mg >2.  -Telemetry.  -NPO at [place] for possible coronary angiogram vs CT coronary.  -Follow zio patch result in AM, attempted to call without success.  -TTE in AM.    #T2DM  Last A1c: 7.8 (4/14/22)  Current OP regimen: Empagliflozin 25mg daily, metformin XR 2gr daily, glipizide XL 10mg daily.  -Hold metformin and glipizide due to anticipated likely contrast exposure and NPO status.  -Start medium correction ISS.  -Hypoglycemia protocol.  --------------------------------------Chronic medical problems--------------------------------  #MDD/JUSTUS: Continue PTA fluoxetine.  #HTN: Hold PTA  losartan.  #GERD: Continue PTA omeprazole.  #ASCVD: Continue PTA atorvastatin, last LDL 64 (4/22).    Diet: Cardiac, NPO at MN in case of possible procedures.    DVT Prophylaxis: Ambulate every shift  [place] Catheter: Not present  Code Status: Full code.  Fluids: NA.  Lines: PIV.     Disposition Plan   Expected discharge: 2-3 days, recommended to prior living arrangement once Ventricular tachycardia has been completed and no additional arrhythmias.    Entered: Abimael Shen MD 05/18/2022, 10:57 PM     Case to be formally staffed in AM.    Abimael Shen MD  [facility]    ______________________________________________________________________    Chief Complaint   Abnormal cardiac monitor.    History is obtained from the patient.    History of Present Illness   Marianne Monroy is a 67 year old female with PMH significant for HTN, T2DM, GERD, JUSTUS, MDD who is admitted in the setting of concern for ventricular tachycardia on cardiac monitor.    Patient reports a clinical course that started on 4/30/22 at ~12 (noon) characterized by sudden onset episode of increased lightheadedness from baseline associated with heart palpitations and shortness of breath while she was at the [place] that persisted despite sugar intake (patient initially attributed to hypoglycemia) and sitting/lying down. She denied CP, JEFFERSON at baseline, cough, LOC, falls, changes in vision, tinnitus, vertigo. During this episode, she checked her HR with an apple watch and it read 162. She notes that this episode has been the most intense and that persisted for ~7 hours that led to evaluation in the ED in [facility] ultimately resolving spontaneously. During ED evaluation, D-Dimer was negative, EKG and delta 2 hour troponin were significant for possible prior inferior MI, LVH, LA enlargement, CXR normal and lab work up did not reveal abnormal findings.    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 Then, she met with her PCP on 5/11/22 and repeat EKG was unchanged from 4/30 and was placed on cardiac monitor as well as plan for stress echocardiogram.     She notes that on 5/14/22 she had a repeat episode of lightheadedness, fatigue, shortness of breath with heart palpitations while she was sweeping the floor at her son's apartment. She notes that during this episode she had water and sat down and symptoms resolved after ~45 minutes. She denies any recurrences since then.    She returned the cardiac monitor and was contacted today by her PCP with the possibility of possible ventricular tachycardia and recommended to present to the ED.    Today, she reports feeling at her baseline, no CP, no SOB, no palpitations, no changes in vision, falls. Notes that her lightheadedness/unsteadiness is unchanged from baseline. Denies prior events that could have suggested MI (CP, SOB, diaphoresis).    On ROS, no abdominal pain, no nausea/vomiting, tolerates PO intake, no changes in bowel movements/urination, no fevers, chills, night sweats. No weakness, numbness, tingling.    ED course:  -On presentation: 157/57, , afeb, RA.  -Workup: EKG unchanged with suggested prior inferior MI, LVH, labs with BNP to 664, otherwise CBC, BMP, troponin normal.  -Interventions: Aspirin 162mg, transferred to [place] for further evaluation.    Per ED note:  \"Her primary care provider had her set up with a Zio patch which showed multiple episodes of V. tach up to 160 bpm and sustained as long as 30 minutes.\"    Review of Systems    The 10 point Review of Systems is negative other than noted in the HPI or here.     Past Medical History    I have reviewed this patient's medical history and updated it with pertinent information if needed.   Past Medical History:   Diagnosis Date     Abnormal Pap " smear of cervix ~    repeat pap was normal     Allergic rhinitis, cause unspecified      Anxiety state, unspecified     panic episodes     Unspecified essential hypertension        Past Surgical History   I have reviewed this patient's surgical history and updated it with pertinent information if needed.  Past Surgical History:   Procedure Laterality Date     NO HISTORY OF SURGERY         Social History   I have reviewed this patient's social history and updated it with pertinent information if needed.  Social History     Tobacco Use     Smoking status: Never Smoker     Smokeless tobacco: Never Used   Vaping Use     Vaping Use: Never used   Substance Use Topics     Alcohol use: Yes     Comment: social     Drug use: No     Family History   I have reviewed this patient's family history and updated it with pertinent information if needed.   I have reviewed this patient's family history and updated it with pertinent information if needed.  Family History   Problem Relation Age of Onset     Diabetes Mother         hypertension, alive at 83     C.A.D. Mother      Cancer Father         lung cancer, smoker.   at age 53     Family History Negative Sister      Osteoporosis Sister    Mother  at 94yo from MI, no FH of CHF, arrhythmias or sudden cardiac death.    Prior to Admission Medications   Prior to Admission Medications   Prescriptions Last Dose Informant Patient Reported? Taking?   ASPIRIN 81 MG OR TABS 2022 at Unknown time  Yes Yes   Sig: ONE DAILY   Continuous Blood Gluc Sensor (FREESTYLE VANESSA 14 DAY SENSOR) [facility]   No No   Si each every 14 days Change every 14 days.   Patient not taking: No sig reported   FLUoxetine (PROZAC) 20 MG capsule 2022 at Unknown time  No Yes   Sig: TAKE 2 CAPSULES DAILY   LORazepam (ATIVAN) 0.5 MG tablet 2022 at Unknown time  No Yes   Sig: Take 1 tablet (0.5 mg) by mouth every 6 hours as needed for anxiety   MULTIPLE VITAMIN OR TABS 2022 at Unknown time  Yes  Yes   Si TABLET DAILY   Multiple Vitamins-Minerals (PRESERVISION AREDS PO)   Yes No   Sig: Take 2 tablets by mouth daily   Vitamin D, Cholecalciferol, 25 MCG (1000 UT) TABS Unknown at Unknown time  Yes Yes   Sig: Take 1 tablet by mouth daily   atorvastatin (LIPITOR) 80 MG tablet 2022 at Unknown time  No Yes   Sig: Take 1 tablet (80 mg) by mouth daily +++NEED ANNUAL EXAM+++   blood glucose (NO BRAND SPECIFIED) test strip   No No   Sig: Use to test blood sugar 2 times daily or as directed.   blood glucose (ONETOUCH VERIO IQ) test strip   No No   Sig: Use to test blood sugar 2 times daily or as directed.   blood glucose monitoring (ONETOUCH VERIO) meter device kit   No No   Sig: Use to test blood sugar 2 times daily or as directed.   empagliflozin (JARDIANCE) 25 MG TABS tablet 2022 at Unknown time  No Yes   Sig: Take 1 tablet (25 mg) by mouth daily   glipiZIDE (GLUCOTROL XL) 5 MG 24 hr tablet 2022 at Unknown time  No Yes   Sig: TAKE 2 TABLETS DAILY (NEED ANNUAL EXAM)   lisinopril (ZESTRIL) 20 MG tablet 2022 at Unknown time  No Yes   Sig: Take 1 tablet (20 mg) by mouth daily   metFORMIN (GLUCOPHAGE-XR) 500 MG 24 hr tablet 2022 at Unknown time  No Yes   Sig: Take 4 tablets (2,000 mg) by mouth daily (with dinner) Please see changed dose   omeprazole (PRILOSEC) 20 MG DR capsule Unknown at Unknown time  No Yes   Sig: Take 1 capsule (20 mg) by mouth daily   Patient taking differently: Take 20 mg by mouth as needed   triamcinolone (KENALOG) 0.1 % external cream Unknown at Unknown time  No Yes   Sig: Apply topically 2 times daily as needed for irritation      Facility-Administered Medications: None     Allergies   Allergies   Allergen Reactions     Effexor [Venlafaxine Hydrochloride]      Tachycardia, makes her extremely jittery--cant sit down, has to keep moving constantly       Physical Exam   Vital Signs: Temp: 97.6  F (36.4  C) Temp src: Oral BP: (!) 147/58 Pulse: 83   Resp: 20 SpO2: 96 % O2  Device: None (Room air)    Weight: 146 lbs 14.4 oz    -General: Alert, NAD, answers to questions appropriately.  -HEENT: NC/AT, PERRLA, EOMI, non-icteric sclerae, normochromic conjunctivae. Moist mucous membranes, no oropharyngeal erythema.  -Neck: No masses, no LAD, supple.  -CV: RRR, no murmurs, rubs or gallops. No JVD.  -Resp: CTAB, no crackles, rhonchi or wheezing.  -Abdomen: Soft, nontender, nondistended.  -Extremities: No leg edema. DP 2+ bilaterally.  -Neuro: No gross motor or sensory deficit.  -Psych: Oriented x3.  -Skin: No primary or secondary lesions.    Data   Data reviewed today: I reviewed all medications, new labs and imaging results over the last 24 hours. I personally reviewed Cardiology specific data review: the EKG tracing showing Significant Q waves in DII-DIII, LVH on aVL and LA enlargement on V1 (biphasic wave).     Recent Labs   Lab 05/18/22  1715   WBC 7.2   HGB 13.8   MCV 88         POTASSIUM 4.1   CHLORIDE 103   CO2 28   BUN 30   CR 0.90   ANIONGAP 8   POONAM 9.7   *   ALBUMIN 4.0   PROTTOTAL 7.9   BILITOTAL 0.4   ALKPHOS 78   ALT 27   AST 16

## 2022-05-19 NOTE — PRE-PROCEDURE
GENERAL PRE-PROCEDURE:   Procedure:  Coronary angiogram with possible PCI  Date/Time:  5/19/2022 1:09 PM    Verbal consent obtained?: Yes    Written consent obtained?: Yes    Risks and benefits: Risks, benefits and alternatives were discussed    Consent given by:  Patient  Patient states understanding of procedure being performed: Yes    Patient's understanding of procedure matches consent: Yes    Procedure consent matches procedure scheduled: Yes    Expected level of sedation:  Moderate  Appropriately NPO:  Yes  Mallampati  :  Grade 2- soft palate, base of uvula, tonsillar pillars, and portion of posterior pharyngeal wall visible  Lungs:  Lungs clear with good breath sounds bilaterally  Heart:  Normal heart sounds and rate  History & Physical reviewed:  History and physical reviewed and no updates needed  Statement of review:  I have reviewed the lab findings, diagnostic data, medications, and the plan for sedation    OSCAR Herman, CNP  [facility] Cardiology

## 2022-05-19 NOTE — ED NOTES
Pt arrived here via friend. She had an episode last t were she was feeling light headed and felt like she was going to pass out. She talked to her Dr and they did a 3 day Holter monitor. She sent it in and today she received a call telling her to go to the ER. Her dr sent her a letter telling her that she had 21 episodes on in the 3 days of wearing the monitor. 3 of them were SVT and the rest was V tach. And the longest episode of v tach lasted 30 sec. Pt reports that she can tell when she is having the episodes she becomes light headed, dizzy sob and feeling like she is going to faint.

## 2022-05-19 NOTE — ED TRIAGE NOTES
Palpations    Triage Assessment     Row Name 05/18/22 1537       Triage Assessment (Adult)    Airway WDL WDL       Respiratory WDL    Respiratory WDL WDL       Skin Circulation/Temperature WDL    Skin Circulation/Temperature WDL WDL       Cardiac WDL    Cardiac WDL WDL       Peripheral/Neurovascular WDL    Peripheral Neurovascular WDL WDL       Cognitive/Neuro/Behavioral WDL    Cognitive/Neuro/Behavioral WDL WDL

## 2022-05-19 NOTE — CONSULTS
"Cardiothoracic Surgery Consult Note    Consult Reason: Evaluation for CABG    HPI: Marianne Monroy is a 67 year old female with a PMH significant for T2DM, HTN, GERD, JUSTUS, MDD and recent episode of lightheadedness and dizziness on 4/30 with associated palpitations, neck stiffness, bilateral jaw pain and SOB that did not resolve with rest. She had then attempted to walked up one flight of stairs and upon attempting a second flight, developed profound shortness of breath and n/v.  At this time, she had discovered her heart rate was 162 BPM on her apple watch and overall the episode lasted around 7 hours. Sought care in the Emergency Department and workup ultimately revealed possible prior inferior MI, LVH, LA enlargement. She was sent home and followed up with her PCP on 5/11, repeat EKG at that time unchanged and she was placed on a Zio patch and plans for a stress echocardiogram. Then on 5/14, she developed a recurrence of lightheadedness with associated fatigue, palpitations, and shortness of breath. Symptoms resolved with 45 minutes of rest. Her Zio patch was returned on 5/18 and her PCP was contacted due to concerns of ventricular tachycardia. She was advised to present to the emergency department for further evaluation. Upon admission, she denied chest pain, shortness of breath, palpations, dizziness, lightheadedness. She was admitted to the  Cardiology service and coronary angiogram was performed today 5/19 which revealed 70% stenosis of both proximal LAD and mid circumflex as well as  of RCA. Since inpatient, she has had intermittent chest heaviness and one episode of NSVT, ~15 seconds, reportedly felt \"off\" during this.     Pertinent Positives: fatigue x ~ 1 year, shortness of breath, b/l jaw pain, intermittent headaches, one episode of blurry/double vision, chest heaviness, intermittent n/v, decreased activity, dizziness, near syncope, falls approximately one every other week for the past two " months  Pertinent Negatives: syncope, PND, orthopnea, lower extremity edema, hematemesis, hemoptysis, recent illness, COVID, recent travel, chest or abdominal surgeries, radiation/chemotherapy  Activity: Lives alone, fully functional, does not use an assistive device, works full time    Good family support.    PMH:  Past Medical History:   Diagnosis Date     Abnormal Pap smear of cervix ~    repeat pap was normal     Allergic rhinitis, cause unspecified      Anxiety state, unspecified     panic episodes     Unspecified essential hypertension        PSH:  Past Surgical History:   Procedure Laterality Date     NO HISTORY OF SURGERY         Past Surgical History:   Procedure Laterality Date     NO HISTORY OF SURGERY         FH:  Family History   Problem Relation Age of Onset     Diabetes Mother         hypertension, alive at 83     C.A.D. Mother      Cancer Father         lung cancer, smoker.   at age 53     Family History Negative Sister      Osteoporosis Sister        SH:  Social History     Socioeconomic History     Marital status:    Tobacco Use     Smoking status: Never Smoker     Smokeless tobacco: Never Used   Vaping Use     Vaping Use: Never used   Substance and Sexual Activity     Alcohol use: Yes     Comment: social     Drug use: No     Sexual activity: Not Currently     Partners: Male   Other Topics Concern     Parent/sibling w/ CABG, MI or angioplasty before 65F 55M? No       Home Meds:  Medications Prior to Admission   Medication Sig Dispense Refill Last Dose     ASPIRIN 81 MG OR TABS ONE DAILY  3 2022 at Unknown time     atorvastatin (LIPITOR) 80 MG tablet Take 1 tablet (80 mg) by mouth daily +++NEED ANNUAL EXAM+++ 90 tablet 3 2022 at Unknown time     empagliflozin (JARDIANCE) 25 MG TABS tablet Take 1 tablet (25 mg) by mouth daily 30 tablet 4 2022 at Unknown time     FLUoxetine (PROZAC) 20 MG capsule TAKE 2 CAPSULES DAILY 180 capsule 1 2022 at Unknown time     glipiZIDE  (GLUCOTROL XL) 5 MG 24 hr tablet TAKE 2 TABLETS DAILY (NEED ANNUAL EXAM) 180 tablet 1 5/18/2022 at Unknown time     lisinopril (ZESTRIL) 20 MG tablet Take 1 tablet (20 mg) by mouth daily 90 tablet 3 5/18/2022 at Unknown time     LORazepam (ATIVAN) 0.5 MG tablet Take 1 tablet (0.5 mg) by mouth every 6 hours as needed for anxiety 6 tablet 0 5/18/2022 at Unknown time     metFORMIN (GLUCOPHAGE-XR) 500 MG 24 hr tablet Take 4 tablets (2,000 mg) by mouth daily (with dinner) Please see changed dose 360 tablet 1 5/18/2022 at Unknown time     MULTIPLE VITAMIN OR TABS 1 TABLET DAILY   5/18/2022 at Unknown time     omeprazole (PRILOSEC) 20 MG DR capsule Take 1 capsule (20 mg) by mouth daily (Patient taking differently: Take 20 mg by mouth as needed) 30 capsule 0 Unknown at Unknown time     triamcinolone (KENALOG) 0.1 % external cream Apply topically 2 times daily as needed for irritation 45 g 1 Unknown at Unknown time     Vitamin D, Cholecalciferol, 25 MCG (1000 UT) TABS Take 1 tablet by mouth daily   Unknown at Unknown time     blood glucose (NO BRAND SPECIFIED) test strip Use to test blood sugar 2 times daily or as directed. 200 strip 1      blood glucose (ONETOUCH VERIO IQ) test strip Use to test blood sugar 2 times daily or as directed. 200 strip 1      blood glucose monitoring (ONETOUCH VERIO) meter device kit Use to test blood sugar 2 times daily or as directed. 1 kit 0      Continuous Blood Gluc Sensor (FREESTYLE VANESSA 14 DAY SENSOR) MISC 1 each every 14 days Change every 14 days. (Patient not taking: No sig reported) 2 each 5      Multiple Vitamins-Minerals (PRESERVISION AREDS PO) Take 2 tablets by mouth daily          Allergies:  Allergies   Allergen Reactions     Effexor [Venlafaxine Hydrochloride]      Tachycardia, makes her extremely jittery--cant sit down, has to keep moving constantly       ROS:  ROS: 10 point ROS neg other than the symptoms noted above in the HPI.    Physical Exam:  Temp:  [97.6  F (36.4  C)-98.5   F (36.9  C)] 98  F (36.7  C)  Pulse:  [] 67  Resp:  [14-20] 16  BP: (126-169)/(58-94) 138/68  SpO2:  [92 %-100 %] 94 %  Gen: NAD, resting comfortably in bed, conversational  HEENT: normocephalic, atraumatic cranium, EOMI, sclerae anicteric. Oral mucosa pink and moist, midline trachea  Lungs: CTA in all fields, no wheezing or rhonchi   CV: RRR, S1S2 normal, no murmur. Radial pulses and DP pulses symmetric. No dependent edema.   Abd: no scars, positive normal pitched bowel sounds, overall soft and non distended, nontender, no hepatosplenomegaly, no masses/guarding/rebound tenderness.   Musculoskeletal: grossly intact, strength 5/5 upper and lower extremities  Neuro: AOx3, CN II-VII grossly intact, sensation/motor intact in upper and lower extremities  Mental: normal mood and affect, regular rate of speech    Labs:  ABG No lab results found in last 7 days.  CBC  Recent Labs   Lab 22  1715   WBC 7.2   HGB 13.8        BMP  Recent Labs   Lab 22  1208 22  0824 22  2354 22  1715   NA  --   --   --  139   POTASSIUM  --   --   --  4.1   CHLORIDE  --   --   --  103   CO2  --   --   --  28   BUN  --   --   --  30   CR  --   --   --  0.90   * 163* 228* 167*     LFT  Recent Labs   Lab 22  1715   AST 16   ALT 27   ALKPHOS 78   BILITOTAL 0.4   ALBUMIN 4.0     PancreasNo lab results found in last 7 days.    Imaging:  Recent Results (from the past 24 hour(s))   Echocardiogram Complete   Result Value    LVEF  20-30% (severely reduced)    Narrative    [id number]  [id number]  [id number]  [id number]^MISHEL^PAUL^IRIS     [facility],[place]  Echocardiography Laboratory  [address] [place] [id number]     Name: CRISTAL YOUSIF  MRN: [id number]  : 1955  Study Date: 2022 06:57 AM  Age: 67 yrs  Gender: Female  Patient Location: [id number]  Reason For Study: VT  Ordering Physician: PAUL FLORENTINO  Performed By: Alexandria Palomo      BSA: 1.7 m2  Height: 64 in  Weight: 146 lb  HR: 81  BP: 157/67 mmHg  ______________________________________________________________________________  Procedure  Complete Portable Echo Adult. Contrast Optison. Adequate quality color and  spectral Doppler were performed and interpreted. Patient was given 6 ml  mixture of 3 ml Optison and 6 ml saline. 3 ml wasted.  ______________________________________________________________________________  Interpretation Summary  Left ventricular function is decreased. The ejection fraction is 20-30%  (severely reduced). Moderate to severe diffuse hypokinesis is present.  Inferior/inferoseptal akinesis is present. Findings consistent with Ischemic  CM.  Global right ventricular function is normal.  Mild mitral insufficiency is present.  IVC diameter <2.1 cm collapsing >50% with sniff suggests a normal RA pressure  of 3 mmHg.  No pericardial effusion is present.  There is no prior study for direct comparison.  ______________________________________________________________________________  Left Ventricle  Left ventricular wall thickness is normal. Moderate to severe left ventricular  dilation is present. Grade II or moderate diastolic dysfunction. Left  ventricular function is decreased. The ejection fraction is 20-30% (severely  reduced). Moderate to severe diffuse hypokinesis is present. Inferior wall  akinesis is present. There is no thrombus.     Right Ventricle  The right ventricle is normal size. Global right ventricular function is  normal.     Atria  The right atria appears normal. Mild left atrial enlargement is present.     Mitral Valve  Mild mitral insufficiency is present.     Aortic Valve  Aortic valve is normal in structure and function.     Tricuspid Valve  The tricuspid valve is normal. The right ventricular systolic pressure is  approximated at 17.7 mmHg plus the right atrial pressure.     Pulmonic Valve  Trace pulmonic insufficiency is present.     Vessels  The  aorta root is normal. IVC diameter <2.1 cm collapsing >50% with sniff  suggests a normal RA pressure of 3 mmHg.     Pericardium  No pericardial effusion is present.     Compared to Previous Study  There is no prior study for direct comparison.  ______________________________________________________________________________  MMode/2D Measurements & Calculations     RVDd: 2.7 cm  IVSd: 0.70 cm  LVIDd: 5.1 cm  LVIDs: 4.6 cm  LVPWd: 0.74 cm  FS: 9.9 %  LV mass(C)d: 124.5 grams  LV mass(C)dI: 72.7 grams/m2  asc Aorta Diam: 2.7 cm  LVOT diam: 1.5 cm  LVOT area: 1.9 cm2  LA Volume Index (BP): 36.9 ml/m2  RWT: 0.29  TAPSE: 1.7 cm     Doppler Measurements & Calculations  MV E max leona: 89.8 cm/sec  MV A max leona: 102.7 cm/sec  MV E/A: 0.87  MV dec slope: 485.2 cm/sec2  MV dec time: 0.19 sec  MR ERO: 0.13 cm2  MR volume: 25.0 ml  PA acc time: 0.12 sec  TR max leona: 209.9 cm/sec  TR max P.7 mmHg  E/E' av.4  Lateral E/e': 13.9  Medial E/e': 20.9     ______________________________________________________________________________  Report approved by: Lucia Oconnell 2022 09:31 AM         Cardiac Catheterization    Narrative      Prox LAD lesion is 70% stenosed.    Prox LAD to Mid LAD lesion is 40% stenosed.    2nd Mrg-1 lesion is 80% stenosed.    2nd Mrg-2 lesion is 20% stenosed.    Prox RCA to Mid RCA lesion is 100% stenosed.     Three vessel coronary artery disease (70% pLAD lesion, severe stenosis 80%   of large OM2 that bifurcates in 2 large vessels,  of pRCA; distal RCA   get R to R collaterals and rPL gets L to R collaterals from a septal   branch).  Hemostasis with TR band.          A/P:   Severe multivessel CAD  Acute systolic heart failure, LVEF 20-30% likely secondary to ischemic cardiomyopathy  Concern for VT vs SVT  HTN  HLD  DM2 (A1c 7.8%)    Patient is a 67 year old female with a history of DM2, HTN, HLD, acute systolic heart failure, concern for VT vs SVT who has multivessel CAD. CVTS was  consulted for consideration of surgical revascularization.    Discussed with patient and son, agreeable to surgery.    - CT Chest, w/o contrast  - Carotid US  - BLE vein mapping  - Tentative OR date for Tuesday May 24 with Dr. Desai  -Thank you for the opportunity to participate in the care of this patient.    Patient and plan discussed with Dr. Lloyd Mittal, [facility], [facility] CCRN  Nurse Practitioner  Cardiothoracic Surgery  Pager: [phone]    2:18 PM  May 19, 2022    Patient seen and examined. Investigations reviewed. I agree with the findings outlined in the EMMIE note. Plan discussed with Dr Steve. I agree with the plan to proceed with coronary artery bypass surgery. I discussed the risks and benefits of surgery with patient including the risks of death, bleeding, stroke, infection,renal failure and arrhythmias. She understands and is willing to proceed with surgery.  I spent a total of 45 minutes examining the patient, reviewing investigations and therapeutic counseling.

## 2022-05-20 ENCOUNTER — PREP FOR PROCEDURE (OUTPATIENT)
Dept: CARDIOLOGY | Facility: CLINIC | Age: 67
End: 2022-05-20
Payer: COMMERCIAL

## 2022-05-20 DIAGNOSIS — I25.10 CAD (CORONARY ARTERY DISEASE): Primary | ICD-10-CM

## 2022-05-20 LAB
ANION GAP SERPL CALCULATED.3IONS-SCNC: 7 MMOL/L (ref 3–14)
BUN SERPL-MCNC: 29 MG/DL (ref 7–30)
CALCIUM SERPL-MCNC: 9.3 MG/DL (ref 8.5–10.1)
CHLORIDE BLD-SCNC: 106 MMOL/L (ref 94–109)
CO2 SERPL-SCNC: 28 MMOL/L (ref 20–32)
CREAT SERPL-MCNC: 0.82 MG/DL (ref 0.52–1.04)
GFR SERPL CREATININE-BSD FRML MDRD: 77 ML/MIN/1.73M2
GLUCOSE BLD-MCNC: 168 MG/DL (ref 70–99)
GLUCOSE BLDC GLUCOMTR-MCNC: 151 MG/DL (ref 70–99)
GLUCOSE BLDC GLUCOMTR-MCNC: 176 MG/DL (ref 70–99)
GLUCOSE BLDC GLUCOMTR-MCNC: 205 MG/DL (ref 70–99)
GLUCOSE BLDC GLUCOMTR-MCNC: 219 MG/DL (ref 70–99)
HOLD SPECIMEN: NORMAL
MAGNESIUM SERPL-MCNC: 1.9 MG/DL (ref 1.6–2.3)
POTASSIUM BLD-SCNC: 4.3 MMOL/L (ref 3.4–5.3)
SODIUM SERPL-SCNC: 141 MMOL/L (ref 133–144)

## 2022-05-20 PROCEDURE — 82310 ASSAY OF CALCIUM: CPT | Performed by: NURSE PRACTITIONER

## 2022-05-20 PROCEDURE — 86901 BLOOD TYPING SEROLOGIC RH(D): CPT | Performed by: STUDENT IN AN ORGANIZED HEALTH CARE EDUCATION/TRAINING PROGRAM

## 2022-05-20 PROCEDURE — 250N000013 HC RX MED GY IP 250 OP 250 PS 637: Performed by: NURSE PRACTITIONER

## 2022-05-20 PROCEDURE — 83735 ASSAY OF MAGNESIUM: CPT | Performed by: STUDENT IN AN ORGANIZED HEALTH CARE EDUCATION/TRAINING PROGRAM

## 2022-05-20 PROCEDURE — 250N000013 HC RX MED GY IP 250 OP 250 PS 637: Performed by: STUDENT IN AN ORGANIZED HEALTH CARE EDUCATION/TRAINING PROGRAM

## 2022-05-20 PROCEDURE — 86850 RBC ANTIBODY SCREEN: CPT | Performed by: STUDENT IN AN ORGANIZED HEALTH CARE EDUCATION/TRAINING PROGRAM

## 2022-05-20 PROCEDURE — 99207 PR SC NO CHARGE VISIT: CPT | Performed by: STUDENT IN AN ORGANIZED HEALTH CARE EDUCATION/TRAINING PROGRAM

## 2022-05-20 PROCEDURE — 36415 COLL VENOUS BLD VENIPUNCTURE: CPT | Performed by: NURSE PRACTITIONER

## 2022-05-20 PROCEDURE — 99232 SBSQ HOSP IP/OBS MODERATE 35: CPT | Performed by: STUDENT IN AN ORGANIZED HEALTH CARE EDUCATION/TRAINING PROGRAM

## 2022-05-20 PROCEDURE — 214N000001 HC R&B CCU UMMC

## 2022-05-20 PROCEDURE — 250N000012 HC RX MED GY IP 250 OP 636 PS 637: Performed by: STUDENT IN AN ORGANIZED HEALTH CARE EDUCATION/TRAINING PROGRAM

## 2022-05-20 PROCEDURE — 250N000011 HC RX IP 250 OP 636: Performed by: STUDENT IN AN ORGANIZED HEALTH CARE EDUCATION/TRAINING PROGRAM

## 2022-05-20 RX ORDER — HYDROXYZINE HYDROCHLORIDE 25 MG/1
25 TABLET, FILM COATED ORAL EVERY 6 HOURS PRN
Status: DISCONTINUED | OUTPATIENT
Start: 2022-05-20 | End: 2022-05-24

## 2022-05-20 RX ORDER — HYDROXYZINE HYDROCHLORIDE 25 MG/1
50 TABLET, FILM COATED ORAL EVERY 6 HOURS PRN
Status: DISCONTINUED | OUTPATIENT
Start: 2022-05-20 | End: 2022-05-24

## 2022-05-20 RX ORDER — MAGNESIUM SULFATE HEPTAHYDRATE 40 MG/ML
2 INJECTION, SOLUTION INTRAVENOUS ONCE
Status: COMPLETED | OUTPATIENT
Start: 2022-05-20 | End: 2022-05-20

## 2022-05-20 RX ORDER — METOPROLOL SUCCINATE 25 MG/1
25 TABLET, EXTENDED RELEASE ORAL DAILY
Status: DISCONTINUED | OUTPATIENT
Start: 2022-05-20 | End: 2022-05-24

## 2022-05-20 RX ORDER — VALSARTAN 40 MG/1
40 TABLET ORAL DAILY
Status: DISCONTINUED | OUTPATIENT
Start: 2022-05-20 | End: 2022-05-24

## 2022-05-20 RX ADMIN — INSULIN ASPART 1 UNITS: 100 INJECTION, SOLUTION INTRAVENOUS; SUBCUTANEOUS at 08:05

## 2022-05-20 RX ADMIN — MAGNESIUM SULFATE IN WATER 2 G: 40 INJECTION, SOLUTION INTRAVENOUS at 10:00

## 2022-05-20 RX ADMIN — INSULIN ASPART 1 UNITS: 100 INJECTION, SOLUTION INTRAVENOUS; SUBCUTANEOUS at 12:47

## 2022-05-20 RX ADMIN — ASPIRIN 81 MG: 81 TABLET, COATED ORAL at 08:05

## 2022-05-20 RX ADMIN — FLUOXETINE HYDROCHLORIDE 40 MG: 40 CAPSULE ORAL at 08:05

## 2022-05-20 RX ADMIN — ATORVASTATIN CALCIUM 80 MG: 80 TABLET, FILM COATED ORAL at 08:05

## 2022-05-20 RX ADMIN — METOPROLOL TARTRATE 25 MG: 25 TABLET, FILM COATED ORAL at 08:05

## 2022-05-20 RX ADMIN — METOPROLOL SUCCINATE 25 MG: 25 TABLET, EXTENDED RELEASE ORAL at 18:00

## 2022-05-20 RX ADMIN — VALSARTAN 40 MG: 40 TABLET, FILM COATED ORAL at 10:51

## 2022-05-20 RX ADMIN — EMPAGLIFLOZIN 25 MG: 25 TABLET, FILM COATED ORAL at 08:05

## 2022-05-20 RX ADMIN — INSULIN ASPART 2 UNITS: 100 INJECTION, SOLUTION INTRAVENOUS; SUBCUTANEOUS at 18:07

## 2022-05-20 ASSESSMENT — ACTIVITIES OF DAILY LIVING (ADL)
ADLS_ACUITY_SCORE: 25

## 2022-05-20 NOTE — DISCHARGE SUMMARY
.       Monitor Your Weight and Symptoms    Contact us if you:      Gain 2 pounds in one day or 5 pounds in one week    Feel more short of breath    Notice more leg swelling    Feel lightheadeded   Change your lifestyle    Limit Salt or Sodium:    2000 mg  Limit Fluids:    2000 mL or approximately 64 ounces  Eat a Heart Healthy Diet    Low in saturated fats  Stay Active:    Aim to move at least 150 minutes every  week         To Contact us    During Business Hours:  [phone], option # 1      After hours, weekends or holidays:   [phone], Option #4  Ask to speak to the On-Call Cardiologist. Inform them you are a CORE/heart failure patient at the [facility].     Use [employer] allows you to communicate directly with your heart team through secure messaging.    [facility] can be accessed any time on your phone, computer, or tablet.    If you need assistance, we'd be happy to help!         Keep your Heart Appointments:    6/9/22--Belinda MOORE CNP  C.O.R.E. clinic at 1:30.   This is located at the [facility].

## 2022-05-20 NOTE — PLAN OF CARE
Pt awaiting 3v CAB likely on Tuesday w/. No complaints of chest pain; no episodes of VT. Maintaining SR 60s. Per team, activity limited to ambulating in room and short walks in [place].  R radial site from cath 5/19 C/D/I.   K 4.3; Mg 1.9 replaced per protocol.    CORE clinic consult completed at bedside.   BGs 151 & 176. Usually on oral meds at home-using sliding scale and jardiance currently.  Adjusting meds for new heart failure: stopping lisinopril/starting valsartan today (holding off on entresto for now), beta blocker changed to toprol xl 25mg.   New prn atarax for anxiety.  Pre-op imaging completed last night.

## 2022-05-20 NOTE — PROGRESS NOTES
D/she is awake and alert.  I/we talked about CABG surgery, time in ICU, how to do I/S, how to transfer post op, activity restrictions post op, incisions and wound vac, CT's, pain meds, probable rehab before going home. She said surgery has not talked to her yet.  A/laid some groundwork to understand more when surgery talks to her, and to give her enough information to stimulate more questions  P/monitor for changes,  Keep team updated, reminded to write down more questions.

## 2022-05-20 NOTE — PROGRESS NOTES
D: Admitted from [facility] ED for cardiac monitor after holter monitor showed SVT and VT.   PMH of HTN, T2DM, GERD, JUSTUS, MDD.    Had angiogram on 5/19, chest CT and carotid and LE US done as well.     I: Monitored vitals and assessed pt status.   Changed: 13 beats accelerated idioventricular rhythm at 0305 (see below for further details)  Running:   PRN:   Tele: SR  O2: RA  Mobility: Up ad lisy     A: A0x4. VSS. Afebrile. HR 50's-60's most of night. Notified by tele that Pt had 13 beats of accelerated idioventricular rhythm at 0305, provider notified and instructed to monitor and wait for AM lab results (Mg and K). Appeared to sleep well overnight. Pt denied pain, numbness, tingling, SOB/dyspnea, dizziness, or palpitations this shift. Urinating adequately. LBM 5/19. Diet to be advanced as tolerated. POCT ACHS. R radial site WDL, dressing CDI, CMS intact. Pt prefers to leave board on wrist overnight. R PIV WDL and saline locked.     P: Continue to monitor Pt status and report changes to Cards 1. To have CABG early next week on Tuesday. Still needs vein mapping.     Shift [id number]

## 2022-05-20 NOTE — PROGRESS NOTES
"  Cardiology Progress Note      Assessment & Plan:  Marianne Monroy is a 67 year old female with a history of HTN, DM2, GERD, JUSTUS, MDD who was admitted 5/18/22 with concern for ventricular tachycardia noted on her Zio Patch.    Today's Update:  - Atarax ordered for anxiety as needed  - Switch from Lopressor to Toprol XL  - Start Valsartan: plan to start Entresto post-op    # Concern for Ventricular Tachycardia vs. SVT  # Non-Sustained Ventricular Tachycardia  # Prolonged QTc  Patient with multiple presyncope episodes with associated lightheadedness, shortness of breath, and \"just feeling off\" during the episodes. Preliminary event monitor with probable VT vs. SVT. Patient also reports ongoing progressive JEFFERSON over the past several months. Feels out of breath with mild to moderate activity. Denies chest pain, but reports recent history of occurrences of jaw pain, diaphoresis, and vomiting consistent with probable ischemic heart disease. Patient has not historically followed with a cardiologist and tends to attribute most of her symptoms to her diabetes.  - On 5/19/22 a 15 second run of NSVT was detected on telemetry. Patient was in bed but was walking around prior to episodes. Per nurse she felt \"off\" during episode.   - Angiogram 5/19 with 3V disease involving LAD, second OM and mid RCA. CVTS consulted with tentative surgery date 5/24/22  - No heparin gtt indicated due to lack of symptoms.   - ASA 81mg daily and beta blocker (see below)  - Bathroom privileges and short walks with RN if asymptomatic  - Awaiting final report from [facility]      # Acute Heart Failure with reduced EF (20-30%)   # HTN  5/19/22 Echocardiogram showing severely reduced EF (20-30%) with moderate to severe diffuse hypokinesis in the RCA territory consistent with ICMO. This is a new diagnosis for patient.   - GDMT:   - BB: Stop Lopressor 25mg BID, transition to Toprol XL 25 daily. Hopefully titrate up tomorrow due to ectopy   - ACE/ARB/ARNi: Was on " PTA Lisinopril 20mg daily (last dose 5/19). Currently holding for 3 day wash out with plans to start Entresto (pending affordability for patient; $47/month). Patient agrees to starting Entresto post op, will start Valsartan today   - SGLT-2i: Continue PTA Jardiance 25mg daily    - AA: holding for now to allow room to up titrate Toprol XL   - Volume status: appears euvolemic  - Daily BMP  - Daily weights  - Strict I&Os     # Hyperlipidemia  Admission Cholesterol 147, HDL 47, LDL 57  - Continue PTA Lipitor 80mg daily   - Cardiac diet and carb conscious diet     # Diabetes Mellitus Type 2  Last A1C 7.8% on 4/14/22. Home regimen includes Glipizide XL 10mg daily, Metformin XR 1000mg BID, and Jardiance 25mg daily.   - Continue to hold metformin and glipizide while inpatient  - Start medium correction SSI  - Hypoglycemia Protocol ordered     # Major Depressive Disorder  -  QTc stable  - Continua PTA Fluoxetine   - Added PRN Atarax  .  # GERD  - Continue PTA Omeprazole    FEN: Cardiac diet  Code Status: FULL  Prophylaxis: Heparin gtt  Isolation:  none  Disposition: pending surgery    Patient seen and discussed w/ Dr. Steve, who agrees with above plan.    OSCAR Pham, CNP  [facility]  Cards 1  Ascom [phone]    Time Spent on this Encounter   I spent 30 minutes on the unit/floor managing the care of Marianne Monroy. Over 50% of my time was spent on the following:   - Counseling the patient and/or family regarding: diagnostic results, prognosis, risks and benefits of treatment options, recommended follow-up, medical compliance and prevention of disease  - Coordination of care with the: consultant(s) and nurse    OSCAR Betts CNP    Interval History:  No acute events overnight, telemetry reviewed, 13 beats accelerated idoventricular rhythm noted, patient asymptomatic.   Blood sugars well controlled 132-183, will continue to monitor as NPO status off for > 24 hours.   Blood  "pressures 130's: Transition Lopressor to Toprol XL for HFrEF. Discussed entresto with patient who agrees to start and is able to cover the $47 monthly copay    Most recent vital signs:  /64 (BP Location: Left arm)   Pulse 58   Temp 98  F (36.7  C) (Oral)   Resp 16   Ht 1.626 m (5' 4\")   Wt 65.3 kg (144 lb)   SpO2 96%   BMI 24.72 kg/m    Temp:  [98  F (36.7  C)-98.4  F (36.9  C)] 98  F (36.7  C)  Pulse:  [58-85] 58  Resp:  [16-18] 16  BP: (112-160)/(52-92) 133/64  Cuff Mean (mmHg):  [] 88  SpO2:  [94 %-100 %] 96 %  Wt Readings from Last 2 Encounters:   05/20/22 65.3 kg (144 lb)   05/11/22 67.7 kg (149 lb 3.2 oz)       Intake/Output Summary (Last 24 hours) at 5/20/2022 0658  Last data filed at 5/19/2022 1200  Gross per 24 hour   Intake 100 ml   Output 800 ml   Net -700 ml       Physical exam:  General: Pleasant elderly female. Appears comfortable and in no acute distress. Alert and interactive  HEENT: Normocephalic, atraumatic. No scleral icterus or injection  Neck: JVP not elevated  CARDIAC: Regular rate and rhythm, no m/r/g appreciated. Peripheral pulses 2+  RESP: Normal work of breathing on room air without use of accessory breathing muscles. Clear to auscultation in all fields. No wheezes, rhonchi or crackles appreciated.  GI: No abdominal distention. Soft and nontender.   EXTREMITIES: Without lower extremity edema. No cyanosis or clubbing. Warm and well perfused. No venous stasis changes.   SKIN: No acute lesions appreciated. Warm and dry to touch  NEURO: Alert and oriented X3, CN II-XII grossly intact, no focal neurological deficits noted, normal speech  PSYCH: Mood and affect are appropriate    Labs (Past three days):  CBC  Recent Labs   Lab 05/18/22  1715   WBC 7.2   RBC 4.82   HGB 13.8   HCT 42.3   MCV 88   MCH 28.6   MCHC 32.6   RDW 12.7        BMP  Recent Labs   Lab 05/19/22  2123 05/19/22  1208 05/19/22  0824 05/18/22  4464 05/18/22  1715   NA  --   --   --   --  139   POTASSIUM  " --   --   --   --  4.1   CHLORIDE  --   --   --   --  103   CO2  --   --   --   --  28   ANIONGAP  --   --   --   --  8   * 132* 163* 228* 167*   BUN  --   --   --   --  30   CR  --   --   --   --  0.90   GFRESTIMATED  --   --   --   --  69   POONAM  --   --   --   --  9.7   MAG  --   --   --   --  1.8     Troponins: No results found for: TROPI, TROPONIN, TROPR, TROPN     INRNo lab results found in last 7 days.  Liver panel  Recent Labs   Lab 05/18/22  1715   PROTTOTAL 7.9   ALBUMIN 4.0   BILITOTAL 0.4   ALKPHOS 78   AST 16   ALT 27       Imaging/procedure results:  EKG 12 Lead 5/18/22:     Echocardiogram 5/19/22:  Interpretation Summary  Left ventricular function is decreased. The ejection fraction is 20-30%  (severely reduced). Moderate to severe diffuse hypokinesis is present.  Inferior/inferoseptal akinesis is present. Findings consistent with Ischemic  CM.  Global right ventricular function is normal.  Mild mitral insufficiency is present.  IVC diameter <2.1 cm collapsing >50% with sniff suggests a normal RA pressure  of 3 mmHg.  No pericardial effusion is present.  There is no prior study for direct comparison    Coronary angiogram 5/19/22:  Left Main   The vessel is large and is angiographically normal.   Left Anterior Descending   The vessel is large.   Prox LAD lesion is 80% stenosed.   Prox LAD to Mid LAD lesion is 80% stenosed.   First Diagonal Branch   The vessel is small. The vessel exhibits minimal luminal irregularities.   Second Diagonal Branch   The vessel is moderate in size. The vessel exhibits minimal luminal irregularities.   Left Circumflex   The vessel is moderate in size.   First Obtuse Marginal Branch   The vessel is small.   1st Mrg lesion is 40% stenosed.   Second Obtuse Marginal Branch   The vessel is large. The vessel exhibits minimal luminal irregularities.   2nd Mrg-1 lesion is 80% stenosed.   2nd Mrg-2 lesion is 20% stenosed.   Lateral Second Obtuse Marginal Branch   The vessel is  large. The vessel exhibits minimal luminal irregularities.   Right Coronary Artery   The vessel is large.   Collaterals   Mid RCA filled by collaterals from Prox RCA.      Prox RCA to Mid RCA lesion is 100% stenosed.   Right Posterior Descending Artery   The vessel is moderate in size. The vessel exhibits minimal luminal irregularities.   Right Posterior Atrioventricular Artery   The vessel is moderate in size. The vessel exhibits minimal luminal irregularities.       Intervention      No interventions have been documented.

## 2022-05-20 NOTE — CONSULTS
67 year old female presenting with VT. CORE consult requested. Patient is currently admitted with VT, CAD, and HF.    Patient was provided with a CHF book.  I reviewed the importance of daily weights, 2 gm sodium diet, 2L fluid restriction and compliance with medications upon hospital discharge.  CORE contact phone numbers provided and patient is encouraged to call with any questions or concerns, including any weight gain or loss of 2 or more pounds in 24 hours or 5 or more pounds in 1 week.      Appointment made in CORE clinic on 22 at 1:30 at [facility].  I will follow-up with the patient at that time, sooner if needed.  Thank you for the consult.    Yonathan Zhao RN  Cardiology Care Coordinator   [facility]  Questions and [phone]

## 2022-05-20 NOTE — PLAN OF CARE
Pt with HF(EF 20-30%), HTN, DM2 and VT had angiogram today, R radial TR band off with site intact. To have CABG early next week. Chest CT and carotid and LE US done. Needs vein mapping.  Shift: 15:00-23:30  Neuro: A&O x 4, pleasant.  Resp: Lungs CTA, sats 90s.   CV: SR 60-90s. VSS. Denies CP/SOB.  GI/: Eating well. Voiding in the BR. Blood sugar checks.  Mobility: Up in the room independently. Arm board on R lower arm to keep site stable.  Plan: Pt still needs vein mapping. To stay in the hospital till she has bypass surgery.

## 2022-05-20 NOTE — PHARMACY-ADMISSION MEDICATION HISTORY
Admission Medication History Completed by Pharmacy    See [facility] Admission Navigator for allergy information, preferred outpatient pharmacy, prior to admission medications and immunization status.     Medication History Sources:     Patient via telephone, Surescripts    Changes made to PTA medication list (reason):    Added: None    Deleted: Omeprazole,triamcinolone, cholecalciferol    Changed: None    Additional Information:    Patient is a good historian    Medications were last taken at home on 5/18/22 AM.    Prior to Admission medications    Medication Sig Last Dose Taking? Auth Provider   ASPIRIN 81 MG OR TABS ONE DAILY Past Week at Unknown time Yes Abigail Arora MD   atorvastatin (LIPITOR) 80 MG tablet Take 1 tablet (80 mg) by mouth daily +++NEED ANNUAL EXAM+++ Past Week at Unknown time Yes Roxanna Otoole MD   empagliflozin (JARDIANCE) 25 MG TABS tablet Take 1 tablet (25 mg) by mouth daily Past Week at Unknown time Yes Roxanna Otoole MD   FLUoxetine (PROZAC) 20 MG capsule TAKE 2 CAPSULES DAILY Past Week at Unknown time Yes Roxanna Otoole MD   glipiZIDE (GLUCOTROL XL) 5 MG 24 hr tablet TAKE 2 TABLETS DAILY (NEED ANNUAL EXAM) Past Week at Unknown time Yes Roxanna Otoole MD   lisinopril (ZESTRIL) 20 MG tablet Take 1 tablet (20 mg) by mouth daily Past Week at Unknown time Yes Roxanna Otoole MD   LORazepam (ATIVAN) 0.5 MG tablet Take 1 tablet (0.5 mg) by mouth every 6 hours as needed for anxiety Past Week at Unknown time Yes Andrea Julian MD   metFORMIN (GLUCOPHAGE-XR) 500 MG 24 hr tablet Take 4 tablets (2,000 mg) by mouth daily (with dinner) Please see changed dose Past Week at Unknown time Yes Roxanna Otoole MD   MULTIPLE VITAMIN OR TABS 1 TABLET DAILY Past Week at Unknown time Yes Reported, Patient   Multiple Vitamins-Minerals (PRESERVISION AREDS PO) Take 2 tablets by mouth daily Past Week at Unknown time Yes Reported, Patient   blood glucose (NO BRAND SPECIFIED) test strip Use to test blood sugar 2 times  daily or as directed.   Roxanna Otoole MD   blood glucose (ONETOUCH VERIO IQ) test strip Use to test blood sugar 2 times daily or as directed.   Roxanna Otoole MD   blood glucose monitoring (ONETOUCH VERIO) meter device kit Use to test blood sugar 2 times daily or as directed.   Roxanna Otoole MD   Continuous Blood Gluc Sensor (FREESTYLE VANESSA 14 DAY SENSOR) MISC 1 each every 14 days Change every 14 days.  Patient not taking: No sig reported   Roxanan Otoole MD       Date completed: 05/20/22    Medication history completed by: Opal Dupree [facility]

## 2022-05-21 LAB
ANION GAP SERPL CALCULATED.3IONS-SCNC: 4 MMOL/L (ref 3–14)
BUN SERPL-MCNC: 28 MG/DL (ref 7–30)
CALCIUM SERPL-MCNC: 9.3 MG/DL (ref 8.5–10.1)
CHLORIDE BLD-SCNC: 105 MMOL/L (ref 94–109)
CO2 SERPL-SCNC: 30 MMOL/L (ref 20–32)
CREAT SERPL-MCNC: 0.88 MG/DL (ref 0.52–1.04)
GFR SERPL CREATININE-BSD FRML MDRD: 71 ML/MIN/1.73M2
GLUCOSE BLD-MCNC: 197 MG/DL (ref 70–99)
GLUCOSE BLDC GLUCOMTR-MCNC: 144 MG/DL (ref 70–99)
GLUCOSE BLDC GLUCOMTR-MCNC: 189 MG/DL (ref 70–99)
GLUCOSE BLDC GLUCOMTR-MCNC: 266 MG/DL (ref 70–99)
GLUCOSE BLDC GLUCOMTR-MCNC: 278 MG/DL (ref 70–99)
MAGNESIUM SERPL-MCNC: 2.5 MG/DL (ref 1.6–2.3)
POTASSIUM BLD-SCNC: 5.1 MMOL/L (ref 3.4–5.3)
SODIUM SERPL-SCNC: 139 MMOL/L (ref 133–144)

## 2022-05-21 PROCEDURE — 83735 ASSAY OF MAGNESIUM: CPT | Performed by: STUDENT IN AN ORGANIZED HEALTH CARE EDUCATION/TRAINING PROGRAM

## 2022-05-21 PROCEDURE — 214N000001 HC R&B CCU UMMC

## 2022-05-21 PROCEDURE — 250N000013 HC RX MED GY IP 250 OP 250 PS 637: Performed by: STUDENT IN AN ORGANIZED HEALTH CARE EDUCATION/TRAINING PROGRAM

## 2022-05-21 PROCEDURE — 36415 COLL VENOUS BLD VENIPUNCTURE: CPT | Performed by: NURSE PRACTITIONER

## 2022-05-21 PROCEDURE — 999N000128 HC STATISTIC PERIPHERAL IV START W/O US GUIDANCE

## 2022-05-21 PROCEDURE — 80048 BASIC METABOLIC PNL TOTAL CA: CPT | Performed by: NURSE PRACTITIONER

## 2022-05-21 PROCEDURE — 250N000013 HC RX MED GY IP 250 OP 250 PS 637: Performed by: NURSE PRACTITIONER

## 2022-05-21 PROCEDURE — 99232 SBSQ HOSP IP/OBS MODERATE 35: CPT | Performed by: STUDENT IN AN ORGANIZED HEALTH CARE EDUCATION/TRAINING PROGRAM

## 2022-05-21 RX ORDER — NALOXONE HYDROCHLORIDE 0.4 MG/ML
0.4 INJECTION, SOLUTION INTRAMUSCULAR; INTRAVENOUS; SUBCUTANEOUS
Status: DISCONTINUED | OUTPATIENT
Start: 2022-05-21 | End: 2022-05-24

## 2022-05-21 RX ORDER — NALOXONE HYDROCHLORIDE 0.4 MG/ML
0.2 INJECTION, SOLUTION INTRAMUSCULAR; INTRAVENOUS; SUBCUTANEOUS
Status: DISCONTINUED | OUTPATIENT
Start: 2022-05-21 | End: 2022-05-24

## 2022-05-21 RX ADMIN — EMPAGLIFLOZIN 25 MG: 25 TABLET, FILM COATED ORAL at 08:14

## 2022-05-21 RX ADMIN — INSULIN ASPART 1 UNITS: 100 INJECTION, SOLUTION INTRAVENOUS; SUBCUTANEOUS at 08:21

## 2022-05-21 RX ADMIN — VALSARTAN 40 MG: 40 TABLET, FILM COATED ORAL at 08:14

## 2022-05-21 RX ADMIN — PANTOPRAZOLE SODIUM 40 MG: 40 TABLET, DELAYED RELEASE ORAL at 08:14

## 2022-05-21 RX ADMIN — ATORVASTATIN CALCIUM 80 MG: 80 TABLET, FILM COATED ORAL at 08:14

## 2022-05-21 RX ADMIN — ASPIRIN 81 MG: 81 TABLET, COATED ORAL at 08:14

## 2022-05-21 RX ADMIN — FLUOXETINE HYDROCHLORIDE 40 MG: 40 CAPSULE ORAL at 08:13

## 2022-05-21 RX ADMIN — METOPROLOL SUCCINATE 25 MG: 25 TABLET, EXTENDED RELEASE ORAL at 18:18

## 2022-05-21 RX ADMIN — INSULIN ASPART 3 UNITS: 100 INJECTION, SOLUTION INTRAVENOUS; SUBCUTANEOUS at 12:08

## 2022-05-21 ASSESSMENT — ACTIVITIES OF DAILY LIVING (ADL)
ADLS_ACUITY_SCORE: 25
ADLS_ACUITY_SCORE: 22
ADLS_ACUITY_SCORE: 25
ADLS_ACUITY_SCORE: 22
ADLS_ACUITY_SCORE: 25
ADLS_ACUITY_SCORE: 22
ADLS_ACUITY_SCORE: 25
ADLS_ACUITY_SCORE: 25

## 2022-05-21 NOTE — PLAN OF CARE
Temp: 97.9  F (36.6  C) Temp src: Oral BP: 118/63 Pulse: 51   Resp: 16 SpO2: 96 % O2 Device: None (Room air)       D: Admitted for symptomatic VT noted on Zio patch. Hx HTN, DM, GERD, JUSTUS, MDD    I/A: Jody is A&O x4. Tele in place, SR-B (no calls from tele this shift). VSS on RA. PIV in place, SL. R radial site WDL, CMS intact. Up independently. Appeared to sleep well overnight    P: Continue to monitor and follow POC. Plan for CABG 5/24. Notify Cards 1 with changes      Goal Outcome Evaluation:    Plan of Care Reviewed With: patient   Overall Patient Progress: no change  Outcome Evaluation: plan for CABG 5/24

## 2022-05-21 NOTE — PLAN OF CARE
Goal Outcome Evaluation:      HX:67 year old female with a history of HTN, DM2, GERD, JSUTUS, MDD who was admitted 5/18/22 with concern for ventricular tachycardia noted on her Zio Patch.    Cardiac:SB/SR 50-70s. No VT noted.   VS:VSS  IV:PIV-SL  Tubes:NA  Neuro:A/Ox4  Resp:Denies shortness of breath. On RA.   GI/:voiding well, no BM this shift.   Nutrition:Good PO intake.  Endo:Glucoses covered per sliding scale.  Skin:Right wrist angio site clean and dry.  Activity:Up independently.  Pain:Denies pain.  Social:No visitors present. Has cell phone at bedside.  Plan:Monitor heart rate and rhythm. Plan for bypass on Tuesday. Continue current cares and notify providers with questions and concerns.

## 2022-05-21 NOTE — PROGRESS NOTES
D/she is up in the chair and says surgery talked to her today. She likes Dr Desai and appreciated that he explained things well to her, and she feels ready to have surgery. She also talks to her children and they are all keeping each other informed and updated. She is often on her phone. She has no new questions for the moment.   D/daughter here and had questions about VT and metropolol   I/discussed these topics and reviewed with her how he will transfer post op   A/informed about upcoming surgery, and more ready  P/monitor for changes, keep team updated

## 2022-05-21 NOTE — PROGRESS NOTES
"  Cardiology Progress Note      Assessment & Plan:  Marianne Monroy is a 67 year old female with a history ofHTN, DM2, GERD, JUSTUS, MDD who was admitted 5/18/22 with concern for ventricular tachycardia noted on her Zio Patch.     Today's Update:  - Continue current medication regimen  - Plan for CABG Tuesday: will touch base with CVTS to attempt to solidify time     # Concern for Ventricular Tachycardia vs. SVT  # Non-Sustained Ventricular Tachycardia  # Prolonged QTc  Patient with multiple presyncope episodes with associated lightheadedness, shortness of breath, and \"just feeling off\" during the episodes. Preliminary event monitor with probable VT vs. SVT. Patient also reports ongoing progressive JEFFERSON over the past several months. Feels out of breath with mild to moderate activity. Denies chest pain, but reports recent history of occurrences of jaw pain, diaphoresis, and vomiting consistent with probable ischemic heart disease. Patient has not historically followed with a cardiologist and tends to attribute most of her symptoms to her diabetes.  - On 5/19/22 a 15 second run of NSVT was detected on telemetry. Patient was in bed but was walking around prior to episodes. Per nurse she felt \"off\" during episode.   - Angiogram 5/19 with 3V disease involving LAD, second OM and mid RCA. CVTS consulted with tentative surgery date 5/24/22  - No heparin gtt indicated.   - ASA 81mg daily and beta blocker (see below)  - Bathroom privileges and short walks with RN if asymptomatic  - Awaiting final report from [facility]      # Acute Heart Failure with reduced EF (20-30%)   # HTN  5/19/22 Echocardiogram showing severely reduced EF (20-30%) with moderate to severe diffuse hypokinesis in the RCA territory consistent with ICMO. This is a new diagnosis for patient.   - GDMT:              - BB: Toprol XL 25 daily              - ACE/ARB/ARNi: Was on PTA Lisinopril 20mg daily, stopped due to starting Entresto post-op. Valsartan 40 mg daily.     " "          - SGLT-2i: Continue PTA Jardiance 25mg daily               - AA: holding for now to allow room to up titrate Toprol XL              - Volume status: appears euvolemic  - Daily BMP  - Daily weights  - Strict I&Os     # Hyperlipidemia  Admission Cholesterol 147, HDL 47, LDL 57  - Continue PTA Lipitor 80mg daily   - Cardiac diet and carb conscious diet     # Diabetes Mellitus Type 2  Last A1C 7.8% on 4/14/22. Home regimen includes Glipizide XL 10mg daily, Metformin XR 1000mg BID, and Jardiance 25mg daily.   - Continue to hold metformin and glipizide while inpatient  - Start medium correction SSI  - Hypoglycemia Protocol ordered     # Major Depressive Disorder  -  QTc stable  - Continua PTA Fluoxetine   - Added PRN Atarax  .  # GERD  - Continue PTA Omeprazole     FEN: Cardiac diet  Code Status: FULL  Prophylaxis: ambulation  Isolation:  none  Disposition: pending surgery     Patient seen and discussed w/ Dr. Steve, who agrees with above plan.    OSCAR Pham, CNP  [facility]  Cards 1  Ascom [phone]    Time Spent on this Encounter   I spent 30 minutes on the unit/floor managing the care of Marianne Monroy. Over 50% of my time was spent on the following:   - Counseling the patient and/or family regarding: diagnostic results, prognosis, risks and benefits of treatment options, recommended follow-up, medical compliance, and prevention of disease  - Coordination of care with the: consultant(s) and nurse    OSCAR Betts CNP    Interval History:  No acute events overnight  Telemetry, vital signs and labs reviewed. Ectopy much improved overnight with Toprol XL but has had several 'episodes' this AM. Telemetry reviewed, no sustained VT noted.     Most recent vital signs:  /63 (BP Location: Left arm)   Pulse 51   Temp 97.9  F (36.6  C) (Oral)   Resp 16   Ht 1.626 m (5' 4\")   Wt 65.2 kg (143 lb 11.2 oz)   SpO2 98%   BMI 24.67 kg/m    Temp:  [97.9  F (36.6 "  C)-98.3  F (36.8  C)] 97.9  F (36.6  C)  Pulse:  [51-66] 51  Resp:  [16-18] 16  BP: (108-136)/(52-68) 118/63  Cuff Mean (mmHg):  [72] 72  SpO2:  [96 %-98 %] 98 %  Wt Readings from Last 2 Encounters:   05/21/22 65.2 kg (143 lb 11.2 oz)   05/11/22 67.7 kg (149 lb 3.2 oz)       Intake/Output Summary (Last 24 hours) at 5/21/2022 0716  Last data filed at 5/20/2022 1800  Gross per 24 hour   Intake 830 ml   Output 350 ml   Net 480 ml       Physical exam:  General: Pleasant elderly female. Appears comfortable and in no acute distress. Alert and interactive  HEENT: Normocephalic, atraumatic. No scleral icterus or injection  Neck: JVP not elevated  CARDIAC: Regular rate and rhythm, no m/r/g appreciated. Peripheral pulses 2+  RESP: Normal work of breathing on room air without use of accessory breathing muscles. Clear to auscultation in all fields. No wheezes, rhonchi or crackles appreciated.  GI: No abdominal distention. Soft and nontender.   EXTREMITIES: Without lower extremity edema. No cyanosis or clubbing. Warm and well perfused. No venous stasis changes.   SKIN: No acute lesions appreciated. Warm and dry to touch  NEURO: Alert and oriented X3, CN II-XII grossly intact, no focal neurological deficits noted, normal speech  PSYCH: Mood and affect are appropriate    Labs (Past three days):  CBC  Recent Labs   Lab 05/18/22  1715   WBC 7.2   RBC 4.82   HGB 13.8   HCT 42.3   MCV 88   MCH 28.6   MCHC 32.6   RDW 12.7        BMP  Recent Labs   Lab 05/21/22  0706 05/20/22  2229 05/20/22  1734 05/20/22  1218 05/20/22  0758 05/18/22  2354 05/18/22  1715   NA  --   --   --   --  141  --  139   POTASSIUM  --   --   --   --  4.3  --  4.1   CHLORIDE  --   --   --   --  106  --  103   CO2  --   --   --   --  28  --  28   ANIONGAP  --   --   --   --  7  --  8   * 205* 219* 176* 168*   < > 167*   BUN  --   --   --   --  29  --  30   CR  --   --   --   --  0.82  --  0.90   GFRESTIMATED  --   --   --   --  77  --  69   POONAM  --    --   --   --  9.3  --  9.7   MAG  --   --   --   --  1.9  --  1.8    < > = values in this interval not displayed.     Troponins: No results found for: TROPI, TROPONIN, TROPR, TROPN     INRNo lab results found in last 7 days.  Liver panel  Recent Labs   Lab 05/18/22  1715   PROTTOTAL 7.9   ALBUMIN 4.0   BILITOTAL 0.4   ALKPHOS 78   AST 16   ALT 27       Imaging/procedure results:  EKG 12 Lead 5/18/22:     Echocardiogram 5/19/22:  Interpretation Summary  Left ventricular function is decreased. The ejection fraction is 20-30%  (severely reduced). Moderate to severe diffuse hypokinesis is present.  Inferior/inferoseptal akinesis is present. Findings consistent with Ischemic  CM.  Global right ventricular function is normal.  Mild mitral insufficiency is present.  IVC diameter <2.1 cm collapsing >50% with sniff suggests a normal RA pressure  of 3 mmHg.  No pericardial effusion is present.  There is no prior study for direct comparison     Coronary angiogram 5/19/22:  Left Main   The vessel is large and is angiographically normal.   Left Anterior Descending   The vessel is large.   Prox LAD lesion is 80% stenosed.   Prox LAD to Mid LAD lesion is 80% stenosed.   First Diagonal Branch   The vessel is small. The vessel exhibits minimal luminal irregularities.   Second Diagonal Branch   The vessel is moderate in size. The vessel exhibits minimal luminal irregularities.   Left Circumflex   The vessel is moderate in size.   First Obtuse Marginal Branch   The vessel is small.   1st Mrg lesion is 40% stenosed.   Second Obtuse Marginal Branch   The vessel is large. The vessel exhibits minimal luminal irregularities.   2nd Mrg-1 lesion is 80% stenosed.   2nd Mrg-2 lesion is 20% stenosed.   Lateral Second Obtuse Marginal Branch   The vessel is large. The vessel exhibits minimal luminal irregularities.   Right Coronary Artery   The vessel is large.   Collaterals   Mid RCA filled by collaterals from Prox RCA.       Prox RCA to Mid RCA  lesion is 100% stenosed.   Right Posterior Descending Artery   The vessel is moderate in size. The vessel exhibits minimal luminal irregularities.   Right Posterior Atrioventricular Artery   The vessel is moderate in size. The vessel exhibits minimal luminal irregularities.        Intervention        No interventions have been documented.

## 2022-05-22 LAB
ANION GAP SERPL CALCULATED.3IONS-SCNC: 6 MMOL/L (ref 3–14)
BUN SERPL-MCNC: 30 MG/DL (ref 7–30)
CALCIUM SERPL-MCNC: 9.1 MG/DL (ref 8.5–10.1)
CHLORIDE BLD-SCNC: 108 MMOL/L (ref 94–109)
CO2 SERPL-SCNC: 28 MMOL/L (ref 20–32)
CREAT SERPL-MCNC: 0.99 MG/DL (ref 0.52–1.04)
GFR SERPL CREATININE-BSD FRML MDRD: 62 ML/MIN/1.73M2
GLUCOSE BLD-MCNC: 200 MG/DL (ref 70–99)
GLUCOSE BLDC GLUCOMTR-MCNC: 143 MG/DL (ref 70–99)
GLUCOSE BLDC GLUCOMTR-MCNC: 189 MG/DL (ref 70–99)
GLUCOSE BLDC GLUCOMTR-MCNC: 189 MG/DL (ref 70–99)
GLUCOSE BLDC GLUCOMTR-MCNC: 267 MG/DL (ref 70–99)
MAGNESIUM SERPL-MCNC: 2.6 MG/DL (ref 1.6–2.3)
POTASSIUM BLD-SCNC: 5.3 MMOL/L (ref 3.4–5.3)
SODIUM SERPL-SCNC: 142 MMOL/L (ref 133–144)

## 2022-05-22 PROCEDURE — 250N000013 HC RX MED GY IP 250 OP 250 PS 637: Performed by: STUDENT IN AN ORGANIZED HEALTH CARE EDUCATION/TRAINING PROGRAM

## 2022-05-22 PROCEDURE — 99231 SBSQ HOSP IP/OBS SF/LOW 25: CPT | Performed by: STUDENT IN AN ORGANIZED HEALTH CARE EDUCATION/TRAINING PROGRAM

## 2022-05-22 PROCEDURE — 214N000001 HC R&B CCU UMMC

## 2022-05-22 PROCEDURE — 83735 ASSAY OF MAGNESIUM: CPT | Performed by: STUDENT IN AN ORGANIZED HEALTH CARE EDUCATION/TRAINING PROGRAM

## 2022-05-22 PROCEDURE — 36415 COLL VENOUS BLD VENIPUNCTURE: CPT | Performed by: NURSE PRACTITIONER

## 2022-05-22 PROCEDURE — 250N000013 HC RX MED GY IP 250 OP 250 PS 637: Performed by: NURSE PRACTITIONER

## 2022-05-22 PROCEDURE — 80048 BASIC METABOLIC PNL TOTAL CA: CPT | Performed by: NURSE PRACTITIONER

## 2022-05-22 RX ADMIN — PANTOPRAZOLE SODIUM 40 MG: 40 TABLET, DELAYED RELEASE ORAL at 09:07

## 2022-05-22 RX ADMIN — VALSARTAN 40 MG: 40 TABLET, FILM COATED ORAL at 09:07

## 2022-05-22 RX ADMIN — EMPAGLIFLOZIN 25 MG: 25 TABLET, FILM COATED ORAL at 09:07

## 2022-05-22 RX ADMIN — METOPROLOL SUCCINATE 25 MG: 25 TABLET, EXTENDED RELEASE ORAL at 18:03

## 2022-05-22 RX ADMIN — ASPIRIN 81 MG: 81 TABLET, COATED ORAL at 09:07

## 2022-05-22 RX ADMIN — FLUOXETINE HYDROCHLORIDE 40 MG: 40 CAPSULE ORAL at 09:07

## 2022-05-22 RX ADMIN — ATORVASTATIN CALCIUM 80 MG: 80 TABLET, FILM COATED ORAL at 09:07

## 2022-05-22 ASSESSMENT — ACTIVITIES OF DAILY LIVING (ADL)
ADLS_ACUITY_SCORE: 22

## 2022-05-22 NOTE — PROGRESS NOTES
"  Cardiology Progress Note      Assessment & Plan:  Marianne Monroy is a 67 year old female with a history ofHTN, DM2, GERD, JUSTUS, MDD who was admitted 5/18/22 with concern for ventricular tachycardia noted on her Zio Patch.     Today's Update:  - Continue current medication regimen  - Increased insulin for improved glycemic control  - Plan for CABG Tuesday     # Concern for Ventricular Tachycardia vs. SVT  # Non-Sustained Ventricular Tachycardia  # Prolonged QTc  Patient with multiple presyncope episodes with associated lightheadedness, shortness of breath, and \"just feeling off\" during the episodes. Preliminary event monitor with probable VT vs. SVT. Patient also reports ongoing progressive JEFFERSON over the past several months. Feels out of breath with mild to moderate activity. Denies chest pain, but reports recent history of occurrences of jaw pain, diaphoresis, and vomiting consistent with probable ischemic heart disease. Patient has not historically followed with a cardiologist and tends to attribute most of her symptoms to her diabetes.  - On 5/19/22 a 15 second run of NSVT was detected on telemetry. Patient was in bed but was walking around prior to episodes. Per nurse she felt \"off\" during episode.   - Angiogram 5/19 with 3V disease involving LAD, second OM and mid RCA. CVTS consulted with tentative surgery date 5/24/22  - No heparin gtt indicated.   - ASA 81mg daily and beta blocker (see below)  - Ambulate on monitor      # Acute Heart Failure with reduced EF (20-30%)   # HTN  5/19/22 Echocardiogram showing severely reduced EF (20-30%) with moderate to severe diffuse hypokinesis in the RCA territory consistent with ICMO. This is a new diagnosis for patient.   - GDMT:              - BB: Toprol XL 25 daily              - ACE/ARB/ARNi: Was on PTA Lisinopril 20mg daily, stopped due to starting Entresto post-op. Valsartan 40 mg daily.               - SGLT-2i: Continue PTA Jardiance 25mg daily               - AA: " "holding for now              - Volume status: appears euvolemic, no need for diuretic at this time  - Daily BMP  - Daily weights  - Strict I&Os     # Hyperlipidemia  Admission Cholesterol 147, HDL 47, LDL 57  - Continue PTA Lipitor 80mg daily   - Cardiac diet and carb conscious diet     # Diabetes Mellitus Type 2  Last A1C 7.8% on 4/14/22. Home regimen includes Glipizide XL 10mg daily, Metformin XR 1000mg BID, and Jardiance 25mg daily.   - Continue to hold metformin and glipizide while inpatient  - Increased to high correction SSI  - Hypoglycemia Protocol ordered     # Major Depressive Disorder  -  QTc stable  - Continua PTA Fluoxetine   - PRN Atarax  .  # GERD  - Continue PTA Omeprazole     FEN: Cardiac diet  Code Status: FULL  Prophylaxis: ambulation  Isolation:  none  Disposition: pending surgery     Patient seen and discussed w/ Dr. Steve, who agrees with above plan.    OSCAR Pham, CNP  [facility]  Cards 1  Ascom [phone]    Time Spent on this Encounter   I spent 20 minutes on the unit/floor managing the care of Marianne Monroy. Over 50% of my time was spent on the following:   - Counseling the patient and/or family regarding: diagnostic results, prognosis and prevention of disease  - Coordination of care with the: nurse    OSCAR Betts CNP    Interval History:  Jody states she had a good nights rest, denies any episodes of feeling 'off'. States she is going slow when changing positions which is helping with postural hypotension. Also continues to work on mindfulness to help with pre op anxiety. Patient met with Dr. Paul yesterday and denies any further questions regarding surgery.  Telemetry, labs and vital signs reviewed by  Writer.     Most recent vital signs:  /57   Pulse 62   Temp 97.9  F (36.6  C) (Oral)   Resp 14   Ht 1.626 m (5' 4\")   Wt 65.2 kg (143 lb 12.8 oz)   SpO2 98%   BMI 24.68 kg/m    Temp:  [97.7  F (36.5  C)-98.5  F (36.9  C)] " 97.9  F (36.6  C)  Pulse:  [60-75] 62  Resp:  [14-16] 14  BP: (105-121)/(37-61) 116/57  Cuff Mean (mmHg):  [72-75] 72  SpO2:  [97 %-99 %] 98 %  Wt Readings from Last 2 Encounters:   05/22/22 65.2 kg (143 lb 12.8 oz)   05/11/22 67.7 kg (149 lb 3.2 oz)       Intake/Output Summary (Last 24 hours) at 5/22/2022 0737  Last data filed at 5/21/2022 1800  Gross per 24 hour   Intake 900 ml   Output --   Net 900 ml       Physical exam:  General: Pleasant elderly female. Appears comfortable and in no acute distress. Alert and interactive  HEENT: Normocephalic, atraumatic. No scleral icterus or injection  Neck: JVP not elevated  CARDIAC: Regular rate and rhythm, no m/r/g appreciated. Peripheral pulses 2+  RESP: Normal work of breathing on room air without use of accessory breathing muscles. Clear to auscultation in all fields. No wheezes, rhonchi or crackles appreciated.  GI: No abdominal distention. Soft and nontender.   EXTREMITIES: Without lower extremity edema. No cyanosis or clubbing. Warm and well perfused. No venous stasis changes.   SKIN: No acute lesions appreciated. Warm and dry to touch  NEURO: Alert and oriented X3, CN II-XII grossly intact, no focal neurological deficits noted, normal speech  PSYCH: Mood and affect are appropriate    Labs (Past three days):  CBC  Recent Labs   Lab 05/18/22  1715   WBC 7.2   RBC 4.82   HGB 13.8   HCT 42.3   MCV 88   MCH 28.6   MCHC 32.6   RDW 12.7        BMP  Recent Labs   Lab 05/22/22  0627 05/21/22  2140 05/21/22  1703 05/21/22  1202 05/21/22  0721 05/20/22  1218 05/20/22  0758 05/18/22  2354 05/18/22  1715     --   --   --  139  --  141  --  139   POTASSIUM 5.3  --   --   --  5.1  --  4.3  --  4.1   CHLORIDE 108  --   --   --  105  --  106  --  103   CO2 28  --   --   --  30  --  28  --  28   ANIONGAP 6  --   --   --  4  --  7  --  8   * 144* 266* 278* 197*   < > 168*   < > 167*   BUN 30  --   --   --  28  --  29  --  30   CR 0.99  --   --   --  0.88  --  0.82   --  0.90   GFRESTIMATED 62  --   --   --  71  --  77  --  69   POONAM 9.1  --   --   --  9.3  --  9.3  --  9.7   MAG 2.6*  --   --   --  2.5*  --  1.9  --  1.8    < > = values in this interval not displayed.     Troponins: No results found for: TROPI, TROPONIN, TROPR, TROPN     INRNo lab results found in last 7 days.  Liver panel  Recent Labs   Lab 05/18/22  1715   PROTTOTAL 7.9   ALBUMIN 4.0   BILITOTAL 0.4   ALKPHOS 78   AST 16   ALT 27       Imaging/procedure results:  EKG 12 Lead 5/18/22:     Echocardiogram 5/19/22:  Interpretation Summary  Left ventricular function is decreased. The ejection fraction is 20-30%  (severely reduced). Moderate to severe diffuse hypokinesis is present.  Inferior/inferoseptal akinesis is present. Findings consistent with Ischemic  CM.  Global right ventricular function is normal.  Mild mitral insufficiency is present.  IVC diameter <2.1 cm collapsing >50% with sniff suggests a normal RA pressure  of 3 mmHg.  No pericardial effusion is present.  There is no prior study for direct comparison     Coronary angiogram 5/19/22:  Left Main   The vessel is large and is angiographically normal.   Left Anterior Descending   The vessel is large.   Prox LAD lesion is 80% stenosed.   Prox LAD to Mid LAD lesion is 80% stenosed.   First Diagonal Branch   The vessel is small. The vessel exhibits minimal luminal irregularities.   Second Diagonal Branch   The vessel is moderate in size. The vessel exhibits minimal luminal irregularities.   Left Circumflex   The vessel is moderate in size.   First Obtuse Marginal Branch   The vessel is small.   1st Mrg lesion is 40% stenosed.   Second Obtuse Marginal Branch   The vessel is large. The vessel exhibits minimal luminal irregularities.   2nd Mrg-1 lesion is 80% stenosed.   2nd Mrg-2 lesion is 20% stenosed.   Lateral Second Obtuse Marginal Branch   The vessel is large. The vessel exhibits minimal luminal irregularities.   Right Coronary Artery   The vessel is  large.   Collaterals   Mid RCA filled by collaterals from Prox RCA.       Prox RCA to Mid RCA lesion is 100% stenosed.   Right Posterior Descending Artery   The vessel is moderate in size. The vessel exhibits minimal luminal irregularities.   Right Posterior Atrioventricular Artery   The vessel is moderate in size. The vessel exhibits minimal luminal irregularities.        Intervention        No interventions have been documented.

## 2022-05-22 NOTE — PROGRESS NOTES
"D/up and about in the halls, son is here tonight. Said she practiced transfers in and out of bed as she will post op. Says she feels good and looks forward to having the surgery done and to work on recovery. She says she is a \"planner\" and is making plans with her family for after surgery. She is pleased that she has some good plans in place.   A/stable  P/monitor for changes, expect surgery Tuesday afternoon  "

## 2022-05-22 NOTE — PLAN OF CARE
Goal Outcome Evaluation:      HX:67 year old female with a history of HTN, DM2, GERD, JUSTUS, MDD who was admitted 5/18/22 with concern for ventricular tachycardia noted on her Zio Patch.     Cardiac:SB/SR 50-70s. No VT noted.   VS:VSS  IV:PIV-SL  Tubes:NA  Neuro:A/Ox4  Resp:Denies shortness of breath. On RA.   GI/:voiding well, no BM this shift.   Nutrition:Good PO intake.  Endo:Glucoses covered per sliding scale.  Skin:Right wrist angio site clean and dry.  Activity:Up independently.  Pain:Denies pain.  Social:Daughter present. Has cell phone at bedside.  Plan:Monitor heart rate and rhythm. Plan for bypass on Tuesday. Continue post op education. Continue current cares and notify providers with questions and concerns.

## 2022-05-22 NOTE — PLAN OF CARE
Temp: 97.9  F (36.6  C) Temp src: Oral BP: 116/57 Pulse: 62   Resp: 14 SpO2: 98 % O2 Device: None (Room air)       D: Admitted for symptomatic VT noted on Zio patch. Hx HTN, DM, GERD, JUSTUS, MDD     I/A: Jody is A&O x4. Tele in place, SR-B (no VT this shift). VSS on RA. PIV in place, SL. Up independently. Appeared to sleep well overnight     P: Continue to monitor and follow POC. Plan for CABG 5/24. Notify Cards 1 with changes      Goal Outcome Evaluation:    Plan of Care Reviewed With: patient   Overall Patient Progress: no change

## 2022-05-23 ENCOUNTER — ANESTHESIA EVENT (OUTPATIENT)
Dept: SURGERY | Facility: CLINIC | Age: 67
DRG: 233 | End: 2022-05-23
Payer: COMMERCIAL

## 2022-05-23 LAB
ABO/RH(D): NORMAL
ABO/RH(D): NORMAL
ANION GAP SERPL CALCULATED.3IONS-SCNC: 5 MMOL/L (ref 3–14)
ANTIBODY SCREEN: NEGATIVE
ANTIBODY SCREEN: NEGATIVE
BUN SERPL-MCNC: 27 MG/DL (ref 7–30)
CALCIUM SERPL-MCNC: 9.7 MG/DL (ref 8.5–10.1)
CHLORIDE BLD-SCNC: 109 MMOL/L (ref 94–109)
CO2 SERPL-SCNC: 28 MMOL/L (ref 20–32)
CREAT SERPL-MCNC: 0.92 MG/DL (ref 0.52–1.04)
GFR SERPL CREATININE-BSD FRML MDRD: 67 ML/MIN/1.73M2
GLUCOSE BLD-MCNC: 201 MG/DL (ref 70–99)
GLUCOSE BLDC GLUCOMTR-MCNC: 167 MG/DL (ref 70–99)
GLUCOSE BLDC GLUCOMTR-MCNC: 239 MG/DL (ref 70–99)
GLUCOSE BLDC GLUCOMTR-MCNC: 287 MG/DL (ref 70–99)
GLUCOSE BLDC GLUCOMTR-MCNC: 324 MG/DL (ref 70–99)
MAGNESIUM SERPL-MCNC: 2.5 MG/DL (ref 1.6–2.3)
POTASSIUM BLD-SCNC: 5.6 MMOL/L (ref 3.4–5.3)
SODIUM SERPL-SCNC: 142 MMOL/L (ref 133–144)
SPECIMEN EXPIRATION DATE: NORMAL
SPECIMEN EXPIRATION DATE: NORMAL

## 2022-05-23 PROCEDURE — 250N000013 HC RX MED GY IP 250 OP 250 PS 637: Performed by: NURSE PRACTITIONER

## 2022-05-23 PROCEDURE — 86901 BLOOD TYPING SEROLOGIC RH(D): CPT | Performed by: SURGERY

## 2022-05-23 PROCEDURE — 214N000001 HC R&B CCU UMMC

## 2022-05-23 PROCEDURE — 93005 ELECTROCARDIOGRAM TRACING: CPT

## 2022-05-23 PROCEDURE — 93010 ELECTROCARDIOGRAM REPORT: CPT | Performed by: INTERNAL MEDICINE

## 2022-05-23 PROCEDURE — 86850 RBC ANTIBODY SCREEN: CPT | Performed by: SURGERY

## 2022-05-23 PROCEDURE — 250N000013 HC RX MED GY IP 250 OP 250 PS 637: Performed by: STUDENT IN AN ORGANIZED HEALTH CARE EDUCATION/TRAINING PROGRAM

## 2022-05-23 PROCEDURE — 36415 COLL VENOUS BLD VENIPUNCTURE: CPT | Performed by: NURSE PRACTITIONER

## 2022-05-23 PROCEDURE — 83735 ASSAY OF MAGNESIUM: CPT | Performed by: STUDENT IN AN ORGANIZED HEALTH CARE EDUCATION/TRAINING PROGRAM

## 2022-05-23 PROCEDURE — 99232 SBSQ HOSP IP/OBS MODERATE 35: CPT | Performed by: INTERNAL MEDICINE

## 2022-05-23 PROCEDURE — 80048 BASIC METABOLIC PNL TOTAL CA: CPT | Performed by: NURSE PRACTITIONER

## 2022-05-23 PROCEDURE — 36415 COLL VENOUS BLD VENIPUNCTURE: CPT | Performed by: SURGERY

## 2022-05-23 RX ADMIN — FLUOXETINE HYDROCHLORIDE 40 MG: 40 CAPSULE ORAL at 08:12

## 2022-05-23 RX ADMIN — METOPROLOL SUCCINATE 25 MG: 25 TABLET, EXTENDED RELEASE ORAL at 18:09

## 2022-05-23 RX ADMIN — EMPAGLIFLOZIN 25 MG: 25 TABLET, FILM COATED ORAL at 08:12

## 2022-05-23 RX ADMIN — ATORVASTATIN CALCIUM 80 MG: 80 TABLET, FILM COATED ORAL at 08:12

## 2022-05-23 RX ADMIN — PANTOPRAZOLE SODIUM 40 MG: 40 TABLET, DELAYED RELEASE ORAL at 08:12

## 2022-05-23 RX ADMIN — ASPIRIN 81 MG: 81 TABLET, COATED ORAL at 08:12

## 2022-05-23 ASSESSMENT — ACTIVITIES OF DAILY LIVING (ADL)
ADLS_ACUITY_SCORE: 22

## 2022-05-23 NOTE — PLAN OF CARE
D: Admitted 5/18 for concern for VT on ZioPatch. Now awaiting CABG 5/24.    I: Monitored vitals and assessed pt status.     A: A0x4. VSS, on RA. SR/SB. Afebrile. Up ad lisy, walking halls w/ daughter. BG AC/HS.    Temp:  [97.8  F (36.6  C)-98.3  F (36.8  C)] 97.8  F (36.6  C)  Pulse:  [54-72] 70  Resp:  [14-18] 16  BP: (128-140)/(55-64) 140/55  SpO2:  [96 %-99 %] 98 %      P: Continue to monitor Pt status and report changes to Cards 1.   NPO at midnight for CABG 5/24.

## 2022-05-23 NOTE — PROGRESS NOTES
"  Cardiology Progress Note      Assessment & Plan:  Marianne Monroy is a 67 year old female with a history of HTN, DM2, GERD, JUSTUS, MDD who was admitted 5/18/22 with concern for ventricular tachycardia noted on her Zio Patch    Today's Update:  - Hyperkalemic: holding Valsartan, repeat ordered for 1800, no concerning EKG changes    # Concern for Ventricular Tachycardia vs. SVT  # Non-Sustained Ventricular Tachycardia  # Prolonged QTc  Patient with multiple presyncope episodes with associated lightheadedness, shortness of breath, and \"just feeling off\" during the episodes. Preliminary event monitor with probable VT vs. SVT. Patient also reports ongoing progressive JEFFERSON over the past several months. Feels out of breath with mild to moderate activity. Denies chest pain, but reports recent history of occurrences of jaw pain, diaphoresis, and vomiting consistent with probable ischemic heart disease. Patient has not historically followed with a cardiologist and tends to attribute most of her symptoms to her diabetes.  - On 5/19/22 a 15 second run of NSVT was detected on telemetry. Patient was in bed but was walking around prior to episodes. Per nurse she felt \"off\" during episode.   - Angiogram 5/19 with 3V disease involving LAD, second OM and mid RCA. CVTS consulted with tentative surgery date 5/24/22  - No heparin gtt indicated  - ASA 81mg daily and beta blocker (see below)  - Ambulate on monitor      # Acute Heart Failure with reduced EF (20-30%)   # HTN  # Hyperkalemia  5/19/22 Echocardiogram showing severely reduced EF (20-30%) with moderate to severe diffuse hypokinesis in the RCA territory consistent with ICMO. This is a new diagnosis for patient.   - GDMT:              - BB: Toprol XL 25 daily              - ACE/ARB/ARNi: Was on PTA Lisinopril 20mg daily, stopped due to starting Entresto post-op. Valsartan 40 mg daily held due to hyperkalemia              - SGLT-2i: Continue PTA Jardiance 25mg daily               - " "AA: holding for now              - Volume status: appears euvolemic, no need for diuretic at this time  - Daily BMP  - Daily weights  - Strict I&Os     # Hyperlipidemia  Admission Cholesterol 147, HDL 47, LDL 57  - Continue PTA Lipitor 80mg daily   - Cardiac diet and carb conscious diet     # Diabetes Mellitus Type 2  Last A1C 7.8% on 4/14/22. Home regimen includes Glipizide XL 10mg daily, Metformin XR 1000mg BID, and Jardiance 25mg daily.   - Hold metformin and glipizide while inpatient  - Continue high correction sliding scale insulin   - Hypoglycemia Protocol ordered     # Major Depressive Disorder  -  QTc stable  - Continua PTA Fluoxetine   - PRN Atarax  .  # GERD  - Continue PTA Omeprazole     FEN: Cardiac diet  Code Status: FULL  Prophylaxis: ambulation  Isolation:  none  Disposition: pending surgery    Patient seen and discussed w/ Dr. Finn, who agrees with above plan.    OSCAR Pham CNP  [facility]  Cards 1  Ascom [phone]    Time Spent on this Encounter   I spent 30 minutes on the unit/floor managing the care of Marianne Monroy. Over 50% of my time was spent on the following:   - Counseling the patient and/or family regarding: diagnostic results, prognosis, risks and benefits of treatment options, recommended follow-up, medical compliance and prevention of disease  - Coordination of care with the: consultant(s), care coordinator/ and nurse    OSCAR Betts CNP    Interval History:  Mildly hyperkalemic this AM (5.6), will hold Valsartan and recheck this evening. No concerning EKG changes or increased ectopy on telemetry.   Improved glycemic control on high correction sliding scale: 143-267    Most recent vital signs:  /64   Pulse 54   Temp 98.2  F (36.8  C) (Oral)   Resp 14   Ht 1.626 m (5' 4\")   Wt 65 kg (143 lb 4.8 oz)   SpO2 99%   BMI 24.60 kg/m    Temp:  [98.2  F (36.8  C)-98.7  F (37.1  C)] 98.2  F (36.8  C)  Pulse:  [54-74] " 54  Resp:  [14-16] 14  BP: (122-144)/(62-71) 133/64  SpO2:  [94 %-99 %] 99 %  Wt Readings from Last 2 Encounters:   05/23/22 65 kg (143 lb 4.8 oz)   05/11/22 67.7 kg (149 lb 3.2 oz)     Intake/Output Summary (Last 24 hours) at 5/23/2022 0707  Last data filed at 5/22/2022 1800  Gross per 24 hour   Intake 1090 ml   Output --   Net 1090 ml     Physical exam:  General: Pleasant elderly female. Appears comfortable and in no acute distress. Alert and interactive  HEENT: Normocephalic, atraumatic. No scleral icterus or injection  Neck: JVP not elevated  CARDIAC: Regular rate and rhythm, no m/r/g appreciated. Peripheral pulses 2+  RESP: Normal work of breathing on room air without use of accessory breathing muscles. Clear to auscultation in all fields. No wheezes, rhonchi or crackles appreciated.  GI: No abdominal distention. Soft and nontender.   EXTREMITIES: Without lower extremity edema. No cyanosis or clubbing. Warm and well perfused. No venous stasis changes.   SKIN: No acute lesions appreciated. Warm and dry to touch  NEURO: Alert and oriented X3, CN II-XII grossly intact, no focal neurological deficits noted, normal speech  PSYCH: Mood and affect are appropriate    Labs (Past three days):  CBC  Recent Labs   Lab 05/18/22  1715   WBC 7.2   RBC 4.82   HGB 13.8   HCT 42.3   MCV 88   MCH 28.6   MCHC 32.6   RDW 12.7        BMP  Recent Labs   Lab 05/22/22  2151 05/22/22  1738 05/22/22  1306 05/22/22  0740 05/22/22  0627 05/21/22  1202 05/21/22  0721 05/20/22  1218 05/20/22  0758 05/18/22  2354 05/18/22  1715   NA  --   --   --   --  142  --  139  --  141  --  139   POTASSIUM  --   --   --   --  5.3  --  5.1  --  4.3  --  4.1   CHLORIDE  --   --   --   --  108  --  105  --  106  --  103   CO2  --   --   --   --  28  --  30  --  28  --  28   ANIONGAP  --   --   --   --  6  --  4  --  7  --  8   * 267* 143* 189* 200*   < > 197*   < > 168*   < > 167*   BUN  --   --   --   --  30  --  28  --  29  --  30   CR  --    --   --   --  0.99  --  0.88  --  0.82  --  0.90   GFRESTIMATED  --   --   --   --  62  --  71  --  77  --  69   POONAM  --   --   --   --  9.1  --  9.3  --  9.3  --  9.7   MAG  --   --   --   --  2.6*  --  2.5*  --  1.9  --  1.8    < > = values in this interval not displayed.     Liver panel  Recent Labs   Lab 05/18/22  1715   PROTTOTAL 7.9   ALBUMIN 4.0   BILITOTAL 0.4   ALKPHOS 78   AST 16   ALT 27       Imaging/procedure results:  EKG 12 Lead 5/23/22:     EKG 12 Lead 5/18/22:     Echocardiogram 5/19/22:  Interpretation Summary  Left ventricular function is decreased. The ejection fraction is 20-30%  (severely reduced). Moderate to severe diffuse hypokinesis is present.  Inferior/inferoseptal akinesis is present. Findings consistent with Ischemic  CM.  Global right ventricular function is normal.  Mild mitral insufficiency is present.  IVC diameter <2.1 cm collapsing >50% with sniff suggests a normal RA pressure  of 3 mmHg.  No pericardial effusion is present.  There is no prior study for direct comparison     Coronary angiogram 5/19/22:  Left Main   The vessel is large and is angiographically normal.   Left Anterior Descending   The vessel is large.   Prox LAD lesion is 80% stenosed.   Prox LAD to Mid LAD lesion is 80% stenosed.   First Diagonal Branch   The vessel is small. The vessel exhibits minimal luminal irregularities.   Second Diagonal Branch   The vessel is moderate in size. The vessel exhibits minimal luminal irregularities.   Left Circumflex   The vessel is moderate in size.   First Obtuse Marginal Branch   The vessel is small.   1st Mrg lesion is 40% stenosed.   Second Obtuse Marginal Branch   The vessel is large. The vessel exhibits minimal luminal irregularities.   2nd Mrg-1 lesion is 80% stenosed.   2nd Mrg-2 lesion is 20% stenosed.   Lateral Second Obtuse Marginal Branch   The vessel is large. The vessel exhibits minimal luminal irregularities.   Right Coronary Artery   The vessel is large.    Collaterals   Mid RCA filled by collaterals from Prox RCA.       Prox RCA to Mid RCA lesion is 100% stenosed.   Right Posterior Descending Artery   The vessel is moderate in size. The vessel exhibits minimal luminal irregularities.   Right Posterior Atrioventricular Artery   The vessel is moderate in size. The vessel exhibits minimal luminal irregularities.        Intervention        No interventions have been documented.

## 2022-05-23 NOTE — PROGRESS NOTES
BRIEF CVTS PROGRESS NOTE    Met with pt and daughter to complete pre-operative CABG teaching.  Reviewed surgical considerations and recovery pathway; all questions answered to the best of my ability.    NPO at midnight, OK for sip with AM meds.    Pre-op orders placed.  Type and screen ordered; blood products to be available for OR.  No plan for pre-op IABP at this time.    Gabi Tello CNP, ACN-AG  Cardiothoracic Surgery  Pager [phone]

## 2022-05-23 NOTE — ANESTHESIA PREPROCEDURE EVALUATION
Anesthesia Pre-Procedure Evaluation    Patient: Marianne Monroy   MRN: [id number] : 1955        Procedure : Procedure(s):  CORONARY ARTERY BYPASS GRAFT (CABG) ANY ASSOCIATED PROCEDURES          Past Medical History:   Diagnosis Date     Abnormal Pap smear of cervix ~    repeat pap was normal     Allergic rhinitis, cause unspecified      Anxiety state, unspecified     panic episodes     Unspecified essential hypertension       Past Surgical History:   Procedure Laterality Date     CV CORONARY ANGIOGRAM  2022    Procedure: CV CORONARY ANGIOGRAM;  Surgeon: Huseyin Vogel MD;  [facility]     CV CORONARY ANGIOGRAM  2022    Procedure: ;  Surgeon: Huseyin Vogel MD;  Location:  [facility]     NO HISTORY OF SURGERY        Allergies   Allergen Reactions     Effexor [Venlafaxine Hydrochloride]      Tachycardia, makes her extremely jittery--cant sit down, has to keep moving constantly      Social History     Tobacco Use     Smoking status: Never Smoker     Smokeless tobacco: Never Used   Substance Use Topics     Alcohol use: Yes     Comment: social      Wt Readings from Last 1 Encounters:   22 65 kg (143 lb 4.8 oz)        Anesthesia Evaluation   Pt has had prior anesthetic. Type: MAC.        ROS/MED HX  ENT/Pulmonary:     (+) SANDIE risk factors, hypertension,     Neurologic:  - neg neurologic ROS     Cardiovascular: Comment: Acute HF with reduced EF (20-30%)    (+) Dyslipidemia hypertension--CAD ---Previous cardiac testing   Echo: Date:  Results:  Left ventricular function is decreased. The ejection fraction is 20-30%  (severely reduced). Moderate to severe diffuse hypokinesis is present.  Inferior/inferoseptal akinesis is present. Findings consistent with Ischemic  CM.  Global right ventricular function is normal.  Mild mitral insufficiency is present.  IVC diameter <2.1 cm collapsing >50% with sniff suggests a normal RA  pressure  of 3 mmHg.  No pericardial effusion is present.  Stress Test: Date: Results:    ECG Reviewed: Date: Results:    Cath: Date: 5/19 Results:  3V disease (LAD, second OM, mid RCA)     METS/Exercise Tolerance:     Hematologic:       Musculoskeletal:       GI/Hepatic:     (+) GERD,     Renal/Genitourinary:  - neg Renal ROS     Endo:     (+) type II DM,     Psychiatric/Substance Use:     (+) psychiatric history anxiety and depression     Infectious Disease:  - neg infectious disease ROS     Malignancy:  - neg malignancy ROS     Other:                 OUTSIDE LABS:  CBC:   Lab Results   Component Value Date    WBC 7.2 05/18/2022    WBC 8.6 04/30/2022    HGB 13.8 05/18/2022    HGB 13.9 04/30/2022    HCT 42.3 05/18/2022    HCT 43.1 04/30/2022     05/18/2022     04/30/2022     BMP:   Lab Results   Component Value Date     05/23/2022     05/22/2022    POTASSIUM 5.6 (H) 05/23/2022    POTASSIUM 5.3 05/22/2022    CHLORIDE 109 05/23/2022    CHLORIDE 108 05/22/2022    CO2 28 05/23/2022    CO2 28 05/22/2022    BUN 27 05/23/2022    BUN 30 05/22/2022    CR 0.92 05/23/2022    CR 0.99 05/22/2022     (H) 05/23/2022     (H) 05/23/2022     COAGS: No results found for: PTT, INR, FIBR  POC: No results found for: BGM, HCG, HCGS  HEPATIC:   Lab Results   Component Value Date    ALBUMIN 4.0 05/18/2022    PROTTOTAL 7.9 05/18/2022    ALT 27 05/18/2022    AST 16 05/18/2022    ALKPHOS 78 05/18/2022    BILITOTAL 0.4 05/18/2022     OTHER:   Lab Results   Component Value Date    A1C 7.8 (H) 04/14/2022    POONAM 9.7 05/23/2022    MAG 2.5 (H) 05/23/2022    LIPASE 105 09/09/2020    AMYLASE 26 (L) 09/09/2020    TSH 1.41 05/11/2022       Anesthesia Plan    ASA Status:  3      Anesthesia Type: General.     - Airway: ETT   Induction: Intravenous.   Maintenance: Balanced.   Techniques and Equipment:     - Lines/Monitors: 2nd IV, Arterial Line, Central Line, CVP, PAC, BIS, NIRS, DAVID            DAVID Absolute  Contra-indication: NONE            DAVID Relative Contra-indication: NONE     - Blood: PRBC, T&C, Cell Saver     - Drips/Meds: Norepi, Epinephrine     Consents            Postoperative Care    Pain management: IV analgesics, Multi-modal analgesia, Peripheral nerve block (Continuous).   PONV prophylaxis: Ondansetron (or other 5HT-3), Dexamethasone or Solumedrol     Comments:                Oliva Aguilar MD

## 2022-05-23 NOTE — PLAN OF CARE
Temp: 98.2  F (36.8  C) Temp src: Oral BP: 128/62 Pulse: 55   Resp: 14 SpO2: 96 % O2 Device: None (Room air)       D: Admitted for symptomatic VT noted on Zio patch. Hx HTN, DM, GERD, JUSTUS, MDD     I/A: Jody is A&O x4. Tele in place, SR-B (no VT noted this shift). VSS on RA. PIV in place, SL. Up independently. Appeared to sleep well overnight     P: Continue to monitor and follow POC. Plan for CABG 5/24. Notify Cards 1 with changes      Goal Outcome Evaluation:    Plan of Care Reviewed With: patient     Overall Patient Progress: no change

## 2022-05-24 ENCOUNTER — ANESTHESIA (OUTPATIENT)
Dept: SURGERY | Facility: CLINIC | Age: 67
DRG: 233 | End: 2022-05-24
Payer: COMMERCIAL

## 2022-05-24 ENCOUNTER — APPOINTMENT (OUTPATIENT)
Dept: GENERAL RADIOLOGY | Facility: CLINIC | Age: 67
DRG: 233 | End: 2022-05-24
Attending: STUDENT IN AN ORGANIZED HEALTH CARE EDUCATION/TRAINING PROGRAM
Payer: COMMERCIAL

## 2022-05-24 ENCOUNTER — APPOINTMENT (OUTPATIENT)
Dept: GENERAL RADIOLOGY | Facility: CLINIC | Age: 67
DRG: 233 | End: 2022-05-24
Attending: SURGERY
Payer: COMMERCIAL

## 2022-05-24 LAB
ALBUMIN SERPL-MCNC: 2.5 G/DL (ref 3.4–5)
ALP SERPL-CCNC: 53 U/L (ref 40–150)
ALT SERPL W P-5'-P-CCNC: 25 U/L (ref 0–50)
ANION GAP SERPL CALCULATED.3IONS-SCNC: 10 MMOL/L (ref 3–14)
ANION GAP SERPL CALCULATED.3IONS-SCNC: 12 MMOL/L (ref 3–14)
ANION GAP SERPL CALCULATED.3IONS-SCNC: 6 MMOL/L (ref 3–14)
APTT PPP: 30 SECONDS (ref 22–38)
APTT PPP: 53 SECONDS (ref 22–38)
AST SERPL W P-5'-P-CCNC: 37 U/L (ref 0–45)
ATRIAL RATE - MUSE: 60 BPM
BASE EXCESS BLDA CALC-SCNC: -1 MMOL/L (ref -9.6–2)
BASE EXCESS BLDA CALC-SCNC: -1.4 MMOL/L (ref -9.6–2)
BASE EXCESS BLDA CALC-SCNC: -1.7 MMOL/L (ref -9.6–2)
BASE EXCESS BLDA CALC-SCNC: -1.9 MMOL/L (ref -9.6–2)
BASE EXCESS BLDA CALC-SCNC: -4.6 MMOL/L (ref -9–1.8)
BASE EXCESS BLDA CALC-SCNC: -5 MMOL/L (ref -9.6–2)
BASE EXCESS BLDA CALC-SCNC: 1.5 MMOL/L (ref -9.6–2)
BASE EXCESS BLDV CALC-SCNC: -3.5 MMOL/L (ref -7.7–1.9)
BASE EXCESS BLDV CALC-SCNC: -4 MMOL/L (ref -7.7–1.9)
BASE EXCESS BLDV CALC-SCNC: 0 MMOL/L (ref -8.1–1.9)
BILIRUB SERPL-MCNC: 0.5 MG/DL (ref 0.2–1.3)
BLD PROD TYP BPU: NORMAL
BLOOD COMPONENT TYPE: NORMAL
BUN SERPL-MCNC: 18 MG/DL (ref 7–30)
BUN SERPL-MCNC: 18 MG/DL (ref 7–30)
BUN SERPL-MCNC: 29 MG/DL (ref 7–30)
CA-I BLD-MCNC: 4.1 MG/DL (ref 4.4–5.2)
CA-I BLD-MCNC: 4.2 MG/DL (ref 4.4–5.2)
CA-I BLD-MCNC: 4.3 MG/DL (ref 4.4–5.2)
CA-I BLD-MCNC: 4.4 MG/DL (ref 4.4–5.2)
CA-I BLD-MCNC: 4.6 MG/DL (ref 4.4–5.2)
CA-I BLD-MCNC: 4.7 MG/DL (ref 4.4–5.2)
CA-I BLD-MCNC: 4.7 MG/DL (ref 4.4–5.2)
CA-I BLD-MCNC: 5.3 MG/DL (ref 4.4–5.2)
CALCIUM SERPL-MCNC: 8.1 MG/DL (ref 8.5–10.1)
CALCIUM SERPL-MCNC: 8.3 MG/DL (ref 8.5–10.1)
CALCIUM SERPL-MCNC: 9.4 MG/DL (ref 8.5–10.1)
CF REDUC 30M P MA P HEP LENFR BLD TEG: 1 % (ref 0–8)
CF REDUC 30M P MA P HEP LENFR BLD TEG: NORMAL %
CF REDUC 60M P MA LENFR BLD TEG: 0.5 % (ref 0–15)
CF REDUC 60M P MA LENFR BLD TEG: 1 % (ref 0–15)
CF REDUC 60M P MA P HEPASE LENFR BLD TEG: 3.3 % (ref 0–15)
CF REDUC 60M P MA P HEPASE LENFR BLD TEG: NORMAL %
CFT BLD TEG: 1 MINUTE (ref 1–3)
CFT BLD TEG: 1.2 MINUTE (ref 1–3)
CFT P HPASE BLD TEG: 1.2 MINUTE (ref 1–3)
CFT P HPASE BLD TEG: 1.3 MINUTE (ref 1–3)
CHLORIDE BLD-SCNC: 107 MMOL/L (ref 94–109)
CHLORIDE BLD-SCNC: 113 MMOL/L (ref 94–109)
CHLORIDE BLD-SCNC: 114 MMOL/L (ref 94–109)
CI (COAGULATION INDEX)(Z) NON NATIVE: 3.4 (ref -3–3)
CI (COAGULATION INDEX)(Z) NON NATIVE: 3.6 (ref -3–3)
CI (COAGULATION INDEX)(Z): 0.2 (ref -3–3)
CI (COAGULATION INDEX)(Z): 2.8 (ref -3–3)
CLOT ANGLE BLD TEG: 74.6 DEGREES (ref 53–72)
CLOT ANGLE BLD TEG: 75.8 DEGREES (ref 53–72)
CLOT ANGLE P HPASE BLD TEG: 71.3 DEGREES (ref 53–72)
CLOT ANGLE P HPASE BLD TEG: 74.9 DEGREES (ref 53–72)
CLOT INIT BLD TEG: 3.8 MINUTE (ref 5–10)
CLOT INIT BLD TEG: 3.9 MINUTE (ref 5–10)
CLOT INIT P HPASE BLD TEG: 3.8 MINUTE (ref 5–10)
CLOT INIT P HPASE BLD TEG: 7.2 MINUTE (ref 5–10)
CLOT LYSIS 30M P MA LENFR BLD TEG: 0 % (ref 0–8)
CLOT LYSIS 30M P MA LENFR BLD TEG: 0 % (ref 0–8)
CLOT STRENGTH BLD TEG: 10.3 KD/SC (ref 4.5–11)
CLOT STRENGTH BLD TEG: 12.5 KD/SC (ref 4.5–11)
CLOT STRENGTH P HPASE BLD TEG: 8.9 KD/SC (ref 4.5–11)
CLOT STRENGTH P HPASE BLD TEG: 9 KD/SC (ref 4.5–11)
CO2 SERPL-SCNC: 21 MMOL/L (ref 20–32)
CO2 SERPL-SCNC: 22 MMOL/L (ref 20–32)
CO2 SERPL-SCNC: 28 MMOL/L (ref 20–32)
CODING SYSTEM: NORMAL
CREAT SERPL-MCNC: 0.74 MG/DL (ref 0.52–1.04)
CREAT SERPL-MCNC: 0.87 MG/DL (ref 0.52–1.04)
CREAT SERPL-MCNC: 0.95 MG/DL (ref 0.52–1.04)
DIASTOLIC BLOOD PRESSURE - MUSE: NORMAL MMHG
ERYTHROCYTE [DISTWIDTH] IN BLOOD BY AUTOMATED COUNT: 13 % (ref 10–15)
ERYTHROCYTE [DISTWIDTH] IN BLOOD BY AUTOMATED COUNT: 13.2 % (ref 10–15)
FIBRINOGEN PPP-MCNC: 185 MG/DL (ref 170–490)
GFR SERPL CREATININE-BSD FRML MDRD: 65 ML/MIN/1.73M2
GFR SERPL CREATININE-BSD FRML MDRD: 72 ML/MIN/1.73M2
GFR SERPL CREATININE-BSD FRML MDRD: 88 ML/MIN/1.73M2
GLUCOSE BLD-MCNC: 116 MG/DL (ref 70–99)
GLUCOSE BLD-MCNC: 139 MG/DL (ref 70–99)
GLUCOSE BLD-MCNC: 145 MG/DL (ref 70–99)
GLUCOSE BLD-MCNC: 160 MG/DL (ref 70–99)
GLUCOSE BLD-MCNC: 168 MG/DL (ref 70–99)
GLUCOSE BLD-MCNC: 187 MG/DL (ref 70–99)
GLUCOSE BLD-MCNC: 200 MG/DL (ref 70–99)
GLUCOSE BLD-MCNC: 201 MG/DL (ref 70–99)
GLUCOSE BLD-MCNC: 207 MG/DL (ref 70–99)
GLUCOSE BLD-MCNC: 211 MG/DL (ref 70–99)
GLUCOSE BLD-MCNC: 211 MG/DL (ref 70–99)
GLUCOSE BLDC GLUCOMTR-MCNC: 149 MG/DL (ref 70–99)
GLUCOSE BLDC GLUCOMTR-MCNC: 163 MG/DL (ref 70–99)
GLUCOSE BLDC GLUCOMTR-MCNC: 188 MG/DL (ref 70–99)
GLUCOSE BLDC GLUCOMTR-MCNC: 202 MG/DL (ref 70–99)
HCO3 BLD-SCNC: 21 MMOL/L (ref 21–28)
HCO3 BLDA-SCNC: 21 MMOL/L (ref 21–28)
HCO3 BLDA-SCNC: 23 MMOL/L (ref 21–28)
HCO3 BLDA-SCNC: 24 MMOL/L (ref 21–28)
HCO3 BLDA-SCNC: 26 MMOL/L (ref 21–28)
HCO3 BLDV-SCNC: 23 MMOL/L (ref 21–28)
HCO3 BLDV-SCNC: 23 MMOL/L (ref 21–28)
HCO3 BLDV-SCNC: 27 MMOL/L (ref 21–28)
HCT VFR BLD AUTO: 31.3 % (ref 35–47)
HCT VFR BLD AUTO: 32.7 % (ref 35–47)
HGB BLD-MCNC: 10.2 G/DL (ref 11.7–15.7)
HGB BLD-MCNC: 10.4 G/DL (ref 11.7–15.7)
HGB BLD-MCNC: 12.2 G/DL (ref 11.7–15.7)
HGB BLD-MCNC: 12.6 G/DL (ref 11.7–15.7)
HGB BLD-MCNC: 8.2 G/DL (ref 11.7–15.7)
HGB BLD-MCNC: 8.7 G/DL (ref 11.7–15.7)
HGB BLD-MCNC: 8.8 G/DL (ref 11.7–15.7)
HGB BLD-MCNC: 8.9 G/DL (ref 11.7–15.7)
HGB BLD-MCNC: 8.9 G/DL (ref 11.7–15.7)
HOLD SPECIMEN: NORMAL
INR PPP: 1.26 (ref 0.85–1.15)
INR PPP: 1.55 (ref 0.85–1.15)
INTERPRETATION ECG - MUSE: NORMAL
ISSUE DATE AND TIME: NORMAL
LACTATE BLD-SCNC: 0.8 MMOL/L
LACTATE BLD-SCNC: 1.2 MMOL/L
LACTATE BLD-SCNC: 1.7 MMOL/L
LACTATE BLD-SCNC: 1.8 MMOL/L
LACTATE BLD-SCNC: 2.2 MMOL/L
LACTATE BLD-SCNC: 2.4 MMOL/L
LACTATE BLD-SCNC: 3.3 MMOL/L
LACTATE SERPL-SCNC: 2.7 MMOL/L (ref 0.7–2)
LACTATE SERPL-SCNC: 3.8 MMOL/L (ref 0.7–2)
MAGNESIUM SERPL-MCNC: 2.1 MG/DL (ref 1.6–2.3)
MAGNESIUM SERPL-MCNC: 2.6 MG/DL (ref 1.6–2.3)
MCF BLD TEG: 67.2 MM (ref 50–70)
MCF BLD TEG: 71.4 MM (ref 50–70)
MCF P HPASE BLD TEG: 63.9 MM (ref 50–70)
MCF P HPASE BLD TEG: 64.2 MM (ref 50–70)
MCH RBC QN AUTO: 28.2 PG (ref 26.5–33)
MCH RBC QN AUTO: 28.6 PG (ref 26.5–33)
MCHC RBC AUTO-ENTMCNC: 31.8 G/DL (ref 31.5–36.5)
MCHC RBC AUTO-ENTMCNC: 32.6 G/DL (ref 31.5–36.5)
MCV RBC AUTO: 88 FL (ref 78–100)
MCV RBC AUTO: 89 FL (ref 78–100)
O2/TOTAL GAS SETTING VFR VENT: 100 %
O2/TOTAL GAS SETTING VFR VENT: 40 %
O2/TOTAL GAS SETTING VFR VENT: 40 %
O2/TOTAL GAS SETTING VFR VENT: 60 %
O2/TOTAL GAS SETTING VFR VENT: 70 %
O2/TOTAL GAS SETTING VFR VENT: 70 %
O2/TOTAL GAS SETTING VFR VENT: 71 %
OXYHGB MFR BLD: 98 % (ref 92–100)
OXYHGB MFR BLDA: 98 % (ref 92–100)
OXYHGB MFR BLDV: 62 % (ref 70–75)
OXYHGB MFR BLDV: 62 % (ref 70–75)
P AXIS - MUSE: 37 DEGREES
PCO2 BLD: 39 MM HG (ref 35–45)
PCO2 BLDA: 37 MM HG (ref 35–45)
PCO2 BLDA: 37 MM HG (ref 35–45)
PCO2 BLDA: 38 MM HG (ref 35–45)
PCO2 BLDA: 39 MM HG (ref 35–45)
PCO2 BLDA: 44 MM HG (ref 35–45)
PCO2 BLDA: 47 MM HG (ref 35–45)
PCO2 BLDV: 46 MM HG (ref 40–50)
PCO2 BLDV: 46 MM HG (ref 40–50)
PCO2 BLDV: 52 MM HG (ref 40–50)
PH BLD: 7.34 [PH] (ref 7.35–7.45)
PH BLDA: 7.3 [PH] (ref 7.35–7.45)
PH BLDA: 7.33 [PH] (ref 7.35–7.45)
PH BLDA: 7.39 [PH] (ref 7.35–7.45)
PH BLDA: 7.41 [PH] (ref 7.35–7.45)
PH BLDA: 7.41 [PH] (ref 7.35–7.45)
PH BLDA: 7.43 [PH] (ref 7.35–7.45)
PH BLDV: 7.3 [PH] (ref 7.32–7.43)
PH BLDV: 7.3 [PH] (ref 7.32–7.43)
PH BLDV: 7.32 [PH] (ref 7.32–7.43)
PHOSPHATE SERPL-MCNC: 3 MG/DL (ref 2.5–4.5)
PLATELET # BLD AUTO: 147 10E3/UL (ref 150–450)
PLATELET # BLD AUTO: 154 10E3/UL (ref 150–450)
PLATELET # BLD AUTO: 214 10E3/UL (ref 150–450)
PO2 BLD: 135 MM HG (ref 80–105)
PO2 BLDA: 101 MM HG (ref 80–105)
PO2 BLDA: 120 MM HG (ref 80–105)
PO2 BLDA: 231 MM HG (ref 80–105)
PO2 BLDA: 350 MM HG (ref 80–105)
PO2 BLDA: 355 MM HG (ref 80–105)
PO2 BLDA: 361 MM HG (ref 80–105)
PO2 BLDV: 37 MM HG (ref 25–47)
PO2 BLDV: 38 MM HG (ref 25–47)
PO2 BLDV: 43 MM HG (ref 25–47)
POTASSIUM BLD-SCNC: 3.4 MMOL/L (ref 3.5–5)
POTASSIUM BLD-SCNC: 3.5 MMOL/L (ref 3.4–5.3)
POTASSIUM BLD-SCNC: 3.6 MMOL/L (ref 3.5–5)
POTASSIUM BLD-SCNC: 3.8 MMOL/L (ref 3.5–5)
POTASSIUM BLD-SCNC: 4 MMOL/L (ref 3.5–5)
POTASSIUM BLD-SCNC: 4.5 MMOL/L (ref 3.4–5.3)
POTASSIUM BLD-SCNC: 4.7 MMOL/L (ref 3.4–5.3)
POTASSIUM BLD-SCNC: 4.8 MMOL/L (ref 3.5–5)
POTASSIUM BLD-SCNC: 4.9 MMOL/L (ref 3.5–5)
POTASSIUM BLD-SCNC: 5.7 MMOL/L (ref 3.5–5)
PR INTERVAL - MUSE: 102 MS
PROT SERPL-MCNC: 4.6 G/DL (ref 6.8–8.8)
QRS DURATION - MUSE: 86 MS
QT - MUSE: 474 MS
QTC - MUSE: 474 MS
R AXIS - MUSE: -11 DEGREES
RBC # BLD AUTO: 3.57 10E6/UL (ref 3.8–5.2)
RBC # BLD AUTO: 3.69 10E6/UL (ref 3.8–5.2)
SARS-COV-2 RNA RESP QL NAA+PROBE: NEGATIVE
SODIUM BLD-SCNC: 140 MMOL/L (ref 133–144)
SODIUM BLD-SCNC: 141 MMOL/L (ref 133–144)
SODIUM BLD-SCNC: 141 MMOL/L (ref 133–144)
SODIUM BLD-SCNC: 142 MMOL/L (ref 133–144)
SODIUM SERPL-SCNC: 141 MMOL/L (ref 133–144)
SODIUM SERPL-SCNC: 145 MMOL/L (ref 133–144)
SODIUM SERPL-SCNC: 147 MMOL/L (ref 133–144)
SYSTOLIC BLOOD PRESSURE - MUSE: NORMAL MMHG
T AXIS - MUSE: 29 DEGREES
UNIT ABO/RH: NORMAL
UNIT NUMBER: NORMAL
UNIT STATUS: NORMAL
UNIT TYPE ISBT: 8400
VENTRICULAR RATE- MUSE: 60 BPM
WBC # BLD AUTO: 14.4 10E3/UL (ref 4–11)
WBC # BLD AUTO: 9.7 10E3/UL (ref 4–11)

## 2022-05-24 PROCEDURE — 250N000024 HC ISOFLURANE, PER MIN: Performed by: SURGERY

## 2022-05-24 PROCEDURE — 250N000013 HC RX MED GY IP 250 OP 250 PS 637: Performed by: SURGERY

## 2022-05-24 PROCEDURE — 85610 PROTHROMBIN TIME: CPT | Performed by: SURGERY

## 2022-05-24 PROCEDURE — 250N000011 HC RX IP 250 OP 636

## 2022-05-24 PROCEDURE — 82330 ASSAY OF CALCIUM: CPT | Performed by: STUDENT IN AN ORGANIZED HEALTH CARE EDUCATION/TRAINING PROGRAM

## 2022-05-24 PROCEDURE — 82805 BLOOD GASES W/O2 SATURATION: CPT | Performed by: SURGERY

## 2022-05-24 PROCEDURE — 84100 ASSAY OF PHOSPHORUS: CPT | Performed by: STUDENT IN AN ORGANIZED HEALTH CARE EDUCATION/TRAINING PROGRAM

## 2022-05-24 PROCEDURE — 99291 CRITICAL CARE FIRST HOUR: CPT | Mod: GC | Performed by: ANESTHESIOLOGY

## 2022-05-24 PROCEDURE — 250N000011 HC RX IP 250 OP 636: Performed by: STUDENT IN AN ORGANIZED HEALTH CARE EDUCATION/TRAINING PROGRAM

## 2022-05-24 PROCEDURE — 258N000003 HC RX IP 258 OP 636: Performed by: SURGERY

## 2022-05-24 PROCEDURE — 250N000011 HC RX IP 250 OP 636: Performed by: SURGERY

## 2022-05-24 PROCEDURE — 410N000003 HC PER-PERFUSION 1ST 30 MIN: Performed by: SURGERY

## 2022-05-24 PROCEDURE — 84132 ASSAY OF SERUM POTASSIUM: CPT

## 2022-05-24 PROCEDURE — 85396 CLOTTING ASSAY WHOLE BLOOD: CPT | Performed by: SURGERY

## 2022-05-24 PROCEDURE — 84132 ASSAY OF SERUM POTASSIUM: CPT | Performed by: SURGERY

## 2022-05-24 PROCEDURE — 258N000003 HC RX IP 258 OP 636: Performed by: ANESTHESIOLOGY

## 2022-05-24 PROCEDURE — 258N000003 HC RX IP 258 OP 636: Performed by: STUDENT IN AN ORGANIZED HEALTH CARE EDUCATION/TRAINING PROGRAM

## 2022-05-24 PROCEDURE — 85049 AUTOMATED PLATELET COUNT: CPT | Performed by: STUDENT IN AN ORGANIZED HEALTH CARE EDUCATION/TRAINING PROGRAM

## 2022-05-24 PROCEDURE — 250N000009 HC RX 250

## 2022-05-24 PROCEDURE — 82330 ASSAY OF CALCIUM: CPT

## 2022-05-24 PROCEDURE — 74018 RADEX ABDOMEN 1 VIEW: CPT | Mod: 26 | Performed by: RADIOLOGY

## 2022-05-24 PROCEDURE — 85396 CLOTTING ASSAY WHOLE BLOOD: CPT | Performed by: STUDENT IN AN ORGANIZED HEALTH CARE EDUCATION/TRAINING PROGRAM

## 2022-05-24 PROCEDURE — 82947 ASSAY GLUCOSE BLOOD QUANT: CPT | Performed by: SURGERY

## 2022-05-24 PROCEDURE — 85730 THROMBOPLASTIN TIME PARTIAL: CPT | Performed by: SURGERY

## 2022-05-24 PROCEDURE — 999N000065 XR ABDOMEN PORT 1 VIEWS

## 2022-05-24 PROCEDURE — U0003 INFECTIOUS AGENT DETECTION BY NUCLEIC ACID (DNA OR RNA); SEVERE ACUTE RESPIRATORY SYNDROME CORONAVIRUS 2 (SARS-COV-2) (CORONAVIRUS DISEASE [COVID-19]), AMPLIFIED PROBE TECHNIQUE, MAKING USE OF HIGH THROUGHPUT TECHNOLOGIES AS DESCRIBED BY CMS-2020-01-R: HCPCS | Performed by: STUDENT IN AN ORGANIZED HEALTH CARE EDUCATION/TRAINING PROGRAM

## 2022-05-24 PROCEDURE — 82330 ASSAY OF CALCIUM: CPT | Performed by: SURGERY

## 2022-05-24 PROCEDURE — 85027 COMPLETE CBC AUTOMATED: CPT | Performed by: SURGERY

## 2022-05-24 PROCEDURE — 85610 PROTHROMBIN TIME: CPT | Performed by: STUDENT IN AN ORGANIZED HEALTH CARE EDUCATION/TRAINING PROGRAM

## 2022-05-24 PROCEDURE — 33533 CABG ARTERIAL SINGLE: CPT | Mod: GC | Performed by: SURGERY

## 2022-05-24 PROCEDURE — 250N000009 HC RX 250: Performed by: SURGERY

## 2022-05-24 PROCEDURE — 83735 ASSAY OF MAGNESIUM: CPT | Performed by: STUDENT IN AN ORGANIZED HEALTH CARE EDUCATION/TRAINING PROGRAM

## 2022-05-24 PROCEDURE — 83735 ASSAY OF MAGNESIUM: CPT | Performed by: SURGERY

## 2022-05-24 PROCEDURE — 85384 FIBRINOGEN ACTIVITY: CPT | Performed by: STUDENT IN AN ORGANIZED HEALTH CARE EDUCATION/TRAINING PROGRAM

## 2022-05-24 PROCEDURE — 250N000025 HC SEVOFLURANE, PER MIN: Performed by: SURGERY

## 2022-05-24 PROCEDURE — 83605 ASSAY OF LACTIC ACID: CPT | Performed by: SURGERY

## 2022-05-24 PROCEDURE — 06BQ4ZZ EXCISION OF LEFT SAPHENOUS VEIN, PERCUTANEOUS ENDOSCOPIC APPROACH: ICD-10-PCS | Performed by: SURGERY

## 2022-05-24 PROCEDURE — 021209W BYPASS CORONARY ARTERY, THREE ARTERIES FROM AORTA WITH AUTOLOGOUS VENOUS TISSUE, OPEN APPROACH: ICD-10-PCS | Performed by: SURGERY

## 2022-05-24 PROCEDURE — 5A1221Z PERFORMANCE OF CARDIAC OUTPUT, CONTINUOUS: ICD-10-PCS | Performed by: SURGERY

## 2022-05-24 PROCEDURE — 82810 BLOOD GASES O2 SAT ONLY: CPT

## 2022-05-24 PROCEDURE — 200N000002 HC R&B ICU UMMC

## 2022-05-24 PROCEDURE — C1898 LEAD, PMKR, OTHER THAN TRANS: HCPCS | Performed by: SURGERY

## 2022-05-24 PROCEDURE — 999N000157 HC STATISTIC RCP TIME EA 10 MIN

## 2022-05-24 PROCEDURE — 93010 ELECTROCARDIOGRAM REPORT: CPT | Mod: 59 | Performed by: INTERNAL MEDICINE

## 2022-05-24 PROCEDURE — 94002 VENT MGMT INPAT INIT DAY: CPT

## 2022-05-24 PROCEDURE — P9041 ALBUMIN (HUMAN),5%, 50ML: HCPCS

## 2022-05-24 PROCEDURE — 33519 CABG ARTERY-VEIN THREE: CPT | Mod: GC | Performed by: SURGERY

## 2022-05-24 PROCEDURE — 999N000065 XR CHEST PORT 1 VIEW

## 2022-05-24 PROCEDURE — 3E043XZ INTRODUCTION OF VASOPRESSOR INTO CENTRAL VEIN, PERCUTANEOUS APPROACH: ICD-10-PCS | Performed by: PHYSICIAN ASSISTANT

## 2022-05-24 PROCEDURE — 85049 AUTOMATED PLATELET COUNT: CPT | Performed by: SURGERY

## 2022-05-24 PROCEDURE — 370N000017 HC ANESTHESIA TECHNICAL FEE, PER MIN: Performed by: SURGERY

## 2022-05-24 PROCEDURE — 250N000009 HC RX 250: Performed by: STUDENT IN AN ORGANIZED HEALTH CARE EDUCATION/TRAINING PROGRAM

## 2022-05-24 PROCEDURE — 271N000002 HC RX 271: Performed by: STUDENT IN AN ORGANIZED HEALTH CARE EDUCATION/TRAINING PROGRAM

## 2022-05-24 PROCEDURE — 272N000085 HC PACK CELL SAVER CSP: Performed by: SURGERY

## 2022-05-24 PROCEDURE — 83605 ASSAY OF LACTIC ACID: CPT | Performed by: STUDENT IN AN ORGANIZED HEALTH CARE EDUCATION/TRAINING PROGRAM

## 2022-05-24 PROCEDURE — 85730 THROMBOPLASTIN TIME PARTIAL: CPT | Performed by: STUDENT IN AN ORGANIZED HEALTH CARE EDUCATION/TRAINING PROGRAM

## 2022-05-24 PROCEDURE — 33508 ENDOSCOPIC VEIN HARVEST: CPT | Mod: XU | Performed by: SURGERY

## 2022-05-24 PROCEDURE — 360N000079 HC SURGERY LEVEL 6, PER MIN: Performed by: SURGERY

## 2022-05-24 PROCEDURE — 410N000004: Performed by: SURGERY

## 2022-05-24 PROCEDURE — 82805 BLOOD GASES W/O2 SATURATION: CPT | Performed by: STUDENT IN AN ORGANIZED HEALTH CARE EDUCATION/TRAINING PROGRAM

## 2022-05-24 PROCEDURE — 71045 X-RAY EXAM CHEST 1 VIEW: CPT | Mod: 26 | Performed by: RADIOLOGY

## 2022-05-24 PROCEDURE — 02100Z9 BYPASS CORONARY ARTERY, ONE ARTERY FROM LEFT INTERNAL MAMMARY, OPEN APPROACH: ICD-10-PCS | Performed by: SURGERY

## 2022-05-24 PROCEDURE — 82310 ASSAY OF CALCIUM: CPT | Performed by: NURSE PRACTITIONER

## 2022-05-24 PROCEDURE — 84155 ASSAY OF PROTEIN SERUM: CPT | Performed by: SURGERY

## 2022-05-24 PROCEDURE — 93005 ELECTROCARDIOGRAM TRACING: CPT

## 2022-05-24 PROCEDURE — 999N000141 HC STATISTIC PRE-PROCEDURE NURSING ASSESSMENT: Performed by: SURGERY

## 2022-05-24 PROCEDURE — 272N000088 HC PUMP APP ADULT PERFUSION: Performed by: SURGERY

## 2022-05-24 PROCEDURE — 84100 ASSAY OF PHOSPHORUS: CPT | Performed by: SURGERY

## 2022-05-24 PROCEDURE — 36415 COLL VENOUS BLD VENIPUNCTURE: CPT | Performed by: NURSE PRACTITIONER

## 2022-05-24 PROCEDURE — 272N000001 HC OR GENERAL SUPPLY STERILE: Performed by: SURGERY

## 2022-05-24 PROCEDURE — P9037 PLATE PHERES LEUKOREDU IRRAD: HCPCS | Performed by: INTERNAL MEDICINE

## 2022-05-24 PROCEDURE — 06BP4ZZ EXCISION OF RIGHT SAPHENOUS VEIN, PERCUTANEOUS ENDOSCOPIC APPROACH: ICD-10-PCS | Performed by: SURGERY

## 2022-05-24 PROCEDURE — 250N000012 HC RX MED GY IP 250 OP 636 PS 637: Performed by: SURGERY

## 2022-05-24 RX ORDER — DEXTROSE MONOHYDRATE 25 G/50ML
25-50 INJECTION, SOLUTION INTRAVENOUS
Status: DISCONTINUED | OUTPATIENT
Start: 2022-05-24 | End: 2022-05-26

## 2022-05-24 RX ORDER — ONDANSETRON 2 MG/ML
4 INJECTION INTRAMUSCULAR; INTRAVENOUS EVERY 6 HOURS PRN
Status: DISCONTINUED | OUTPATIENT
Start: 2022-05-24 | End: 2022-06-04 | Stop reason: HOSPADM

## 2022-05-24 RX ORDER — HYDROMORPHONE HYDROCHLORIDE 1 MG/ML
0.5 INJECTION, SOLUTION INTRAMUSCULAR; INTRAVENOUS; SUBCUTANEOUS ONCE
Status: COMPLETED | OUTPATIENT
Start: 2022-05-24 | End: 2022-05-24

## 2022-05-24 RX ORDER — HEPARIN SODIUM 5000 [USP'U]/.5ML
5000 INJECTION, SOLUTION INTRAVENOUS; SUBCUTANEOUS EVERY 8 HOURS
Status: DISCONTINUED | OUTPATIENT
Start: 2022-05-25 | End: 2022-06-04 | Stop reason: HOSPADM

## 2022-05-24 RX ORDER — FLUMAZENIL 0.1 MG/ML
0.2 INJECTION, SOLUTION INTRAVENOUS
Status: DISCONTINUED | OUTPATIENT
Start: 2022-05-24 | End: 2022-05-24 | Stop reason: HOSPADM

## 2022-05-24 RX ORDER — ACETAMINOPHEN 325 MG/1
975 TABLET ORAL EVERY 8 HOURS
Status: DISPENSED | OUTPATIENT
Start: 2022-05-24 | End: 2022-05-27

## 2022-05-24 RX ORDER — SODIUM CHLORIDE, SODIUM LACTATE, POTASSIUM CHLORIDE, CALCIUM CHLORIDE 600; 310; 30; 20 MG/100ML; MG/100ML; MG/100ML; MG/100ML
INJECTION, SOLUTION INTRAVENOUS CONTINUOUS
Status: DISCONTINUED | OUTPATIENT
Start: 2022-05-24 | End: 2022-05-29

## 2022-05-24 RX ORDER — FENTANYL CITRATE 50 UG/ML
25-50 INJECTION, SOLUTION INTRAMUSCULAR; INTRAVENOUS
Status: DISCONTINUED | OUTPATIENT
Start: 2022-05-24 | End: 2022-05-24 | Stop reason: HOSPADM

## 2022-05-24 RX ORDER — NALOXONE HYDROCHLORIDE 0.4 MG/ML
0.4 INJECTION, SOLUTION INTRAMUSCULAR; INTRAVENOUS; SUBCUTANEOUS
Status: DISCONTINUED | OUTPATIENT
Start: 2022-05-24 | End: 2022-05-24

## 2022-05-24 RX ORDER — NALOXONE HYDROCHLORIDE 0.4 MG/ML
0.2 INJECTION, SOLUTION INTRAMUSCULAR; INTRAVENOUS; SUBCUTANEOUS
Status: DISCONTINUED | OUTPATIENT
Start: 2022-05-24 | End: 2022-05-24

## 2022-05-24 RX ORDER — HYDROMORPHONE HCL IN WATER/PF 6 MG/30 ML
.2-.4 PATIENT CONTROLLED ANALGESIA SYRINGE INTRAVENOUS EVERY 4 HOURS PRN
Status: DISCONTINUED | OUTPATIENT
Start: 2022-05-24 | End: 2022-05-24

## 2022-05-24 RX ORDER — CALCIUM GLUCONATE 20 MG/ML
2 INJECTION, SOLUTION INTRAVENOUS
Status: ACTIVE | OUTPATIENT
Start: 2022-05-24 | End: 2022-05-27

## 2022-05-24 RX ORDER — LIDOCAINE 40 MG/G
CREAM TOPICAL
Status: DISCONTINUED | OUTPATIENT
Start: 2022-05-24 | End: 2022-05-24 | Stop reason: HOSPADM

## 2022-05-24 RX ORDER — FIBRINOGEN (HUMAN) 700-1300MG
1100 KIT INTRAVENOUS ONCE
Status: DISCONTINUED | OUTPATIENT
Start: 2022-05-24 | End: 2022-05-24

## 2022-05-24 RX ORDER — PROPOFOL 10 MG/ML
INJECTION, EMULSION INTRAVENOUS PRN
Status: DISCONTINUED | OUTPATIENT
Start: 2022-05-24 | End: 2022-05-24

## 2022-05-24 RX ORDER — PROCHLORPERAZINE MALEATE 5 MG
5 TABLET ORAL EVERY 6 HOURS PRN
Status: CANCELLED | OUTPATIENT
Start: 2022-05-24

## 2022-05-24 RX ORDER — ONDANSETRON 4 MG/1
4 TABLET, ORALLY DISINTEGRATING ORAL EVERY 6 HOURS PRN
Status: DISCONTINUED | OUTPATIENT
Start: 2022-05-24 | End: 2022-06-04 | Stop reason: HOSPADM

## 2022-05-24 RX ORDER — NITROGLYCERIN 20 MG/100ML
INJECTION INTRAVENOUS CONTINUOUS PRN
Status: DISCONTINUED | OUTPATIENT
Start: 2022-05-24 | End: 2022-05-24

## 2022-05-24 RX ORDER — PROTAMINE SULFATE 10 MG/ML
INJECTION, SOLUTION INTRAVENOUS PRN
Status: DISCONTINUED | OUTPATIENT
Start: 2022-05-24 | End: 2022-05-24

## 2022-05-24 RX ORDER — DEXMEDETOMIDINE HYDROCHLORIDE 4 UG/ML
.1-1.2 INJECTION, SOLUTION INTRAVENOUS CONTINUOUS
Status: DISCONTINUED | OUTPATIENT
Start: 2022-05-24 | End: 2022-05-24

## 2022-05-24 RX ORDER — HEPARIN SODIUM 1000 [USP'U]/ML
INJECTION, SOLUTION INTRAVENOUS; SUBCUTANEOUS PRN
Status: DISCONTINUED | OUTPATIENT
Start: 2022-05-24 | End: 2022-05-24

## 2022-05-24 RX ORDER — AMOXICILLIN 250 MG
1 CAPSULE ORAL 2 TIMES DAILY
Status: DISCONTINUED | OUTPATIENT
Start: 2022-05-24 | End: 2022-05-27

## 2022-05-24 RX ORDER — CEFAZOLIN SODIUM 1 G/3ML
1 INJECTION, POWDER, FOR SOLUTION INTRAMUSCULAR; INTRAVENOUS EVERY 8 HOURS
Status: COMPLETED | OUTPATIENT
Start: 2022-05-24 | End: 2022-05-25

## 2022-05-24 RX ORDER — METHOCARBAMOL 500 MG/1
500 TABLET, FILM COATED ORAL EVERY 6 HOURS PRN
Status: DISCONTINUED | OUTPATIENT
Start: 2022-05-24 | End: 2022-06-04 | Stop reason: HOSPADM

## 2022-05-24 RX ORDER — BUPIVACAINE HYDROCHLORIDE 2.5 MG/ML
INJECTION, SOLUTION EPIDURAL; INFILTRATION; INTRACAUDAL
Status: COMPLETED | OUTPATIENT
Start: 2022-05-24 | End: 2022-05-24

## 2022-05-24 RX ORDER — DEXMEDETOMIDINE HYDROCHLORIDE 4 UG/ML
.2-.7 INJECTION, SOLUTION INTRAVENOUS CONTINUOUS
Status: DISCONTINUED | OUTPATIENT
Start: 2022-05-24 | End: 2022-05-26

## 2022-05-24 RX ORDER — NICOTINE POLACRILEX 4 MG
15-30 LOZENGE BUCCAL
Status: DISCONTINUED | OUTPATIENT
Start: 2022-05-24 | End: 2022-05-26

## 2022-05-24 RX ORDER — ACETAMINOPHEN 325 MG/1
650 TABLET ORAL EVERY 4 HOURS PRN
Status: DISCONTINUED | OUTPATIENT
Start: 2022-05-27 | End: 2022-06-04 | Stop reason: HOSPADM

## 2022-05-24 RX ORDER — CEFAZOLIN SODIUM 2 G/100ML
2 INJECTION, SOLUTION INTRAVENOUS
Status: COMPLETED | OUTPATIENT
Start: 2022-05-24 | End: 2022-05-24

## 2022-05-24 RX ORDER — ASPIRIN 81 MG/1
162 TABLET, CHEWABLE ORAL DAILY
Status: DISCONTINUED | OUTPATIENT
Start: 2022-05-25 | End: 2022-06-04 | Stop reason: HOSPADM

## 2022-05-24 RX ORDER — ASPIRIN 81 MG/1
162 TABLET, CHEWABLE ORAL
Status: COMPLETED | OUTPATIENT
Start: 2022-05-24 | End: 2022-05-24

## 2022-05-24 RX ORDER — NALOXONE HYDROCHLORIDE 0.4 MG/ML
0.4 INJECTION, SOLUTION INTRAMUSCULAR; INTRAVENOUS; SUBCUTANEOUS
Status: DISCONTINUED | OUTPATIENT
Start: 2022-05-24 | End: 2022-06-04 | Stop reason: HOSPADM

## 2022-05-24 RX ORDER — BISACODYL 10 MG
10 SUPPOSITORY, RECTAL RECTAL DAILY PRN
Status: DISCONTINUED | OUTPATIENT
Start: 2022-05-24 | End: 2022-06-04 | Stop reason: HOSPADM

## 2022-05-24 RX ORDER — CHLORHEXIDINE GLUCONATE ORAL RINSE 1.2 MG/ML
10 SOLUTION DENTAL
Status: DISCONTINUED | OUTPATIENT
Start: 2022-05-24 | End: 2022-05-24 | Stop reason: HOSPADM

## 2022-05-24 RX ORDER — NOREPINEPHRINE BITARTRATE 0.06 MG/ML
.01-.6 INJECTION, SOLUTION INTRAVENOUS CONTINUOUS
Status: DISCONTINUED | OUTPATIENT
Start: 2022-05-24 | End: 2022-05-26

## 2022-05-24 RX ORDER — CALCIUM GLUCONATE 20 MG/ML
1 INJECTION, SOLUTION INTRAVENOUS
Status: DISPENSED | OUTPATIENT
Start: 2022-05-24 | End: 2022-05-27

## 2022-05-24 RX ORDER — HYDRALAZINE HYDROCHLORIDE 20 MG/ML
10 INJECTION INTRAMUSCULAR; INTRAVENOUS EVERY 30 MIN PRN
Status: DISCONTINUED | OUTPATIENT
Start: 2022-05-24 | End: 2022-05-29

## 2022-05-24 RX ORDER — PROPOFOL 10 MG/ML
INJECTION, EMULSION INTRAVENOUS CONTINUOUS PRN
Status: DISCONTINUED | OUTPATIENT
Start: 2022-05-24 | End: 2022-05-24

## 2022-05-24 RX ORDER — PHENYLEPHRINE HCL IN 0.9% NACL 50MG/250ML
.1-6 PLASTIC BAG, INJECTION (ML) INTRAVENOUS CONTINUOUS
Status: DISCONTINUED | OUTPATIENT
Start: 2022-05-24 | End: 2022-05-24 | Stop reason: HOSPADM

## 2022-05-24 RX ORDER — NALOXONE HYDROCHLORIDE 0.4 MG/ML
0.2 INJECTION, SOLUTION INTRAMUSCULAR; INTRAVENOUS; SUBCUTANEOUS
Status: DISCONTINUED | OUTPATIENT
Start: 2022-05-24 | End: 2022-06-04 | Stop reason: HOSPADM

## 2022-05-24 RX ORDER — HYDROMORPHONE HYDROCHLORIDE 1 MG/ML
INJECTION, SOLUTION INTRAMUSCULAR; INTRAVENOUS; SUBCUTANEOUS
Status: COMPLETED
Start: 2022-05-24 | End: 2022-05-24

## 2022-05-24 RX ORDER — NOREPINEPHRINE BITARTRATE 0.06 MG/ML
.01-.1 INJECTION, SOLUTION INTRAVENOUS CONTINUOUS
Status: DISCONTINUED | OUTPATIENT
Start: 2022-05-24 | End: 2022-05-24

## 2022-05-24 RX ORDER — FAMOTIDINE 20 MG/1
20 TABLET, FILM COATED ORAL
Status: COMPLETED | OUTPATIENT
Start: 2022-05-24 | End: 2022-05-24

## 2022-05-24 RX ORDER — POLYETHYLENE GLYCOL 3350 17 G/17G
17 POWDER, FOR SOLUTION ORAL DAILY
Status: DISCONTINUED | OUTPATIENT
Start: 2022-05-25 | End: 2022-05-27

## 2022-05-24 RX ORDER — POTASSIUM CHLORIDE 29.8 MG/ML
20 INJECTION INTRAVENOUS ONCE
Status: COMPLETED | OUTPATIENT
Start: 2022-05-25 | End: 2022-05-25

## 2022-05-24 RX ORDER — FENTANYL CITRATE 50 UG/ML
INJECTION, SOLUTION INTRAMUSCULAR; INTRAVENOUS PRN
Status: DISCONTINUED | OUTPATIENT
Start: 2022-05-24 | End: 2022-05-24

## 2022-05-24 RX ORDER — ACETAMINOPHEN 325 MG/1
975 TABLET ORAL ONCE
Status: COMPLETED | OUTPATIENT
Start: 2022-05-24 | End: 2022-05-24

## 2022-05-24 RX ORDER — NITROGLYCERIN 10 MG/100ML
INJECTION INTRAVENOUS PRN
Status: DISCONTINUED | OUTPATIENT
Start: 2022-05-24 | End: 2022-05-24

## 2022-05-24 RX ORDER — SODIUM CHLORIDE, SODIUM LACTATE, POTASSIUM CHLORIDE, CALCIUM CHLORIDE 600; 310; 30; 20 MG/100ML; MG/100ML; MG/100ML; MG/100ML
INJECTION, SOLUTION INTRAVENOUS CONTINUOUS PRN
Status: DISCONTINUED | OUTPATIENT
Start: 2022-05-24 | End: 2022-05-24

## 2022-05-24 RX ORDER — PROPOFOL 10 MG/ML
5-75 INJECTION, EMULSION INTRAVENOUS CONTINUOUS
Status: DISCONTINUED | OUTPATIENT
Start: 2022-05-24 | End: 2022-05-26

## 2022-05-24 RX ORDER — GABAPENTIN 100 MG/1
100 CAPSULE ORAL
Status: COMPLETED | OUTPATIENT
Start: 2022-05-24 | End: 2022-05-24

## 2022-05-24 RX ORDER — MUPIROCIN 20 MG/G
0.5 OINTMENT TOPICAL 2 TIMES DAILY
Status: DISPENSED | OUTPATIENT
Start: 2022-05-24 | End: 2022-05-29

## 2022-05-24 RX ORDER — SODIUM CHLORIDE, SODIUM GLUCONATE, SODIUM ACETATE, POTASSIUM CHLORIDE AND MAGNESIUM CHLORIDE 526; 502; 368; 37; 30 MG/100ML; MG/100ML; MG/100ML; MG/100ML; MG/100ML
INJECTION, SOLUTION INTRAVENOUS CONTINUOUS PRN
Status: DISCONTINUED | OUTPATIENT
Start: 2022-05-24 | End: 2022-05-24

## 2022-05-24 RX ORDER — LIDOCAINE HYDROCHLORIDE 20 MG/ML
INJECTION, SOLUTION INFILTRATION; PERINEURAL PRN
Status: DISCONTINUED | OUTPATIENT
Start: 2022-05-24 | End: 2022-05-24

## 2022-05-24 RX ORDER — HYDROMORPHONE HCL IN WATER/PF 6 MG/30 ML
0.2 PATIENT CONTROLLED ANALGESIA SYRINGE INTRAVENOUS
Status: DISCONTINUED | OUTPATIENT
Start: 2022-05-24 | End: 2022-06-04 | Stop reason: HOSPADM

## 2022-05-24 RX ORDER — CEFAZOLIN SODIUM 2 G/100ML
2 INJECTION, SOLUTION INTRAVENOUS SEE ADMIN INSTRUCTIONS
Status: DISCONTINUED | OUTPATIENT
Start: 2022-05-24 | End: 2022-05-24 | Stop reason: HOSPADM

## 2022-05-24 RX ORDER — HYDROMORPHONE HCL IN WATER/PF 6 MG/30 ML
0.4 PATIENT CONTROLLED ANALGESIA SYRINGE INTRAVENOUS
Status: DISCONTINUED | OUTPATIENT
Start: 2022-05-24 | End: 2022-06-04 | Stop reason: HOSPADM

## 2022-05-24 RX ORDER — ASPIRIN 81 MG/1
81 TABLET, CHEWABLE ORAL
Status: COMPLETED | OUTPATIENT
Start: 2022-05-24 | End: 2022-05-24

## 2022-05-24 RX ADMIN — NITROGLYCERIN 100 MCG: 10 INJECTION INTRAVENOUS at 08:46

## 2022-05-24 RX ADMIN — SODIUM CHLORIDE, POTASSIUM CHLORIDE, SODIUM LACTATE AND CALCIUM CHLORIDE: 600; 310; 30; 20 INJECTION, SOLUTION INTRAVENOUS at 08:05

## 2022-05-24 RX ADMIN — FENTANYL CITRATE 250 MCG: 50 INJECTION, SOLUTION INTRAMUSCULAR; INTRAVENOUS at 09:38

## 2022-05-24 RX ADMIN — PROTAMINE SULFATE 30 MG: 10 INJECTION, SOLUTION INTRAVENOUS at 13:27

## 2022-05-24 RX ADMIN — FENTANYL CITRATE 50 MCG/HR: 50 INJECTION INTRAVENOUS at 19:42

## 2022-05-24 RX ADMIN — FENTANYL CITRATE 150 MCG: 50 INJECTION, SOLUTION INTRAMUSCULAR; INTRAVENOUS at 13:13

## 2022-05-24 RX ADMIN — FENTANYL CITRATE 250 MCG: 50 INJECTION, SOLUTION INTRAMUSCULAR; INTRAVENOUS at 09:50

## 2022-05-24 RX ADMIN — HYDROMORPHONE HYDROCHLORIDE 0.5 MG: 1 INJECTION, SOLUTION INTRAMUSCULAR; INTRAVENOUS; SUBCUTANEOUS at 14:07

## 2022-05-24 RX ADMIN — CEFAZOLIN SODIUM 2 G: 2 INJECTION, SOLUTION INTRAVENOUS at 13:13

## 2022-05-24 RX ADMIN — MUPIROCIN 0.5 G: 20 OINTMENT TOPICAL at 19:57

## 2022-05-24 RX ADMIN — CALCIUM GLUCONATE 1 G: 20 INJECTION, SOLUTION INTRAVENOUS at 20:51

## 2022-05-24 RX ADMIN — PROTAMINE SULFATE 20 MG: 10 INJECTION, SOLUTION INTRAVENOUS at 13:23

## 2022-05-24 RX ADMIN — NOREPINEPHRINE BITARTRATE 6.4 MCG: 1 INJECTION, SOLUTION, CONCENTRATE INTRAVENOUS at 08:30

## 2022-05-24 RX ADMIN — FENTANYL CITRATE 250 MCG: 50 INJECTION, SOLUTION INTRAMUSCULAR; INTRAVENOUS at 08:18

## 2022-05-24 RX ADMIN — PROTAMINE SULFATE 20 MG: 10 INJECTION, SOLUTION INTRAVENOUS at 13:31

## 2022-05-24 RX ADMIN — NOREPINEPHRINE BITARTRATE 6.4 MCG: 1 INJECTION, SOLUTION, CONCENTRATE INTRAVENOUS at 09:28

## 2022-05-24 RX ADMIN — Medication 50 MG: at 09:32

## 2022-05-24 RX ADMIN — Medication 50 MG: at 10:48

## 2022-05-24 RX ADMIN — HYDROMORPHONE HYDROCHLORIDE 0.2 MG: 0.2 INJECTION, SOLUTION INTRAMUSCULAR; INTRAVENOUS; SUBCUTANEOUS at 17:18

## 2022-05-24 RX ADMIN — SODIUM CHLORIDE, POTASSIUM CHLORIDE, SODIUM LACTATE AND CALCIUM CHLORIDE: 600; 310; 30; 20 INJECTION, SOLUTION INTRAVENOUS at 16:24

## 2022-05-24 RX ADMIN — MIDAZOLAM 1 MG: 1 INJECTION INTRAMUSCULAR; INTRAVENOUS at 07:40

## 2022-05-24 RX ADMIN — GABAPENTIN 100 MG: 100 CAPSULE ORAL at 07:21

## 2022-05-24 RX ADMIN — PROTAMINE SULFATE 30 MG: 10 INJECTION, SOLUTION INTRAVENOUS at 13:25

## 2022-05-24 RX ADMIN — NOREPINEPHRINE BITARTRATE 3.2 MCG: 1 INJECTION, SOLUTION, CONCENTRATE INTRAVENOUS at 10:12

## 2022-05-24 RX ADMIN — HUMAN INSULIN 2 UNITS/HR: 100 INJECTION, SOLUTION SUBCUTANEOUS at 09:55

## 2022-05-24 RX ADMIN — PROPOFOL 30 MCG/KG/MIN: 10 INJECTION, EMULSION INTRAVENOUS at 16:02

## 2022-05-24 RX ADMIN — HEPARIN SODIUM 25000 UNITS: 1000 INJECTION INTRAVENOUS; SUBCUTANEOUS at 10:40

## 2022-05-24 RX ADMIN — CEFAZOLIN SODIUM 2 G: 2 INJECTION, SOLUTION INTRAVENOUS at 09:05

## 2022-05-24 RX ADMIN — EPINEPHRINE 0.01 MCG/KG/MIN: 1 INJECTION PARENTERAL at 16:03

## 2022-05-24 RX ADMIN — BUPIVACAINE HYDROCHLORIDE 20 ML: 2.5 INJECTION, SOLUTION EPIDURAL; INFILTRATION; INTRACAUDAL; PERINEURAL at 07:10

## 2022-05-24 RX ADMIN — FENTANYL CITRATE 250 MCG: 50 INJECTION, SOLUTION INTRAMUSCULAR; INTRAVENOUS at 09:32

## 2022-05-24 RX ADMIN — HYDROMORPHONE HYDROCHLORIDE 0.5 MG: 1 INJECTION, SOLUTION INTRAMUSCULAR; INTRAVENOUS; SUBCUTANEOUS at 16:12

## 2022-05-24 RX ADMIN — ACETAMINOPHEN 975 MG: 325 TABLET ORAL at 07:21

## 2022-05-24 RX ADMIN — PROPOFOL 100 MG: 10 INJECTION, EMULSION INTRAVENOUS at 08:18

## 2022-05-24 RX ADMIN — Medication 50 MG: at 13:13

## 2022-05-24 RX ADMIN — AMINOCAPROIC ACID 1.25 G/HR: 250 INJECTION, SOLUTION INTRAVENOUS at 09:45

## 2022-05-24 RX ADMIN — DEXMEDETOMIDINE HYDROCHLORIDE 0.5 MCG/KG/HR: 4 INJECTION, SOLUTION INTRAVENOUS at 09:50

## 2022-05-24 RX ADMIN — SODIUM CHLORIDE, POTASSIUM CHLORIDE, SODIUM LACTATE AND CALCIUM CHLORIDE 500 ML: 600; 310; 30; 20 INJECTION, SOLUTION INTRAVENOUS at 22:20

## 2022-05-24 RX ADMIN — PROTAMINE SULFATE 20 MG: 10 INJECTION, SOLUTION INTRAVENOUS at 13:32

## 2022-05-24 RX ADMIN — PROTAMINE SULFATE 30 MG: 10 INJECTION, SOLUTION INTRAVENOUS at 13:29

## 2022-05-24 RX ADMIN — HUMAN INSULIN 2 UNITS/HR: 100 INJECTION, SOLUTION SUBCUTANEOUS at 16:03

## 2022-05-24 RX ADMIN — FENTANYL CITRATE 100 MCG: 50 INJECTION, SOLUTION INTRAMUSCULAR; INTRAVENOUS at 10:55

## 2022-05-24 RX ADMIN — AMINOCAPROIC ACID 7.5 G: 250 INJECTION, SOLUTION INTRAVENOUS at 08:45

## 2022-05-24 RX ADMIN — NOREPINEPHRINE BITARTRATE 0.02 MCG/KG/MIN: 1 INJECTION, SOLUTION, CONCENTRATE INTRAVENOUS at 10:13

## 2022-05-24 RX ADMIN — Medication 7 ML/HR: at 10:40

## 2022-05-24 RX ADMIN — PROPOFOL 50 MG: 10 INJECTION, EMULSION INTRAVENOUS at 09:38

## 2022-05-24 RX ADMIN — CEFAZOLIN 1 G: 1 INJECTION, POWDER, FOR SOLUTION INTRAMUSCULAR; INTRAVENOUS at 19:58

## 2022-05-24 RX ADMIN — PROPOFOL 30 MCG/KG/MIN: 10 INJECTION, EMULSION INTRAVENOUS at 21:47

## 2022-05-24 RX ADMIN — DOCUSATE SODIUM 50 MG AND SENNOSIDES 8.6 MG 1 TABLET: 8.6; 5 TABLET, FILM COATED ORAL at 22:08

## 2022-05-24 RX ADMIN — Medication 100 MG: at 08:18

## 2022-05-24 RX ADMIN — HUMAN INSULIN 7 UNITS/HR: 100 INJECTION, SOLUTION SUBCUTANEOUS at 23:00

## 2022-05-24 RX ADMIN — EPINEPHRINE 0.03 MCG/KG/MIN: 1 INJECTION PARENTERAL at 08:52

## 2022-05-24 RX ADMIN — SUGAMMADEX 200 MG: 100 INJECTION, SOLUTION INTRAVENOUS at 15:17

## 2022-05-24 RX ADMIN — NOREPINEPHRINE BITARTRATE 3.2 MCG: 1 INJECTION, SOLUTION, CONCENTRATE INTRAVENOUS at 09:55

## 2022-05-24 RX ADMIN — NOREPINEPHRINE BITARTRATE 12.8 MCG: 1 INJECTION, SOLUTION, CONCENTRATE INTRAVENOUS at 08:18

## 2022-05-24 RX ADMIN — SODIUM CHLORIDE, SODIUM GLUCONATE, SODIUM ACETATE, POTASSIUM CHLORIDE AND MAGNESIUM CHLORIDE: 526; 502; 368; 37; 30 INJECTION, SOLUTION INTRAVENOUS at 08:40

## 2022-05-24 RX ADMIN — FENTANYL CITRATE 250 MCG: 50 INJECTION, SOLUTION INTRAMUSCULAR; INTRAVENOUS at 13:33

## 2022-05-24 RX ADMIN — ONDANSETRON 4 MG: 2 INJECTION INTRAMUSCULAR; INTRAVENOUS at 17:24

## 2022-05-24 RX ADMIN — ASPIRIN 81 MG CHEWABLE TABLET 162 MG: 81 TABLET CHEWABLE at 07:21

## 2022-05-24 RX ADMIN — LIDOCAINE HYDROCHLORIDE 100 MG: 20 INJECTION, SOLUTION INFILTRATION; PERINEURAL at 08:18

## 2022-05-24 RX ADMIN — PROPOFOL 50 MCG/KG/MIN: 10 INJECTION, EMULSION INTRAVENOUS at 14:09

## 2022-05-24 RX ADMIN — SODIUM CHLORIDE, POTASSIUM CHLORIDE, SODIUM LACTATE AND CALCIUM CHLORIDE 250 ML: 600; 310; 30; 20 INJECTION, SOLUTION INTRAVENOUS at 16:23

## 2022-05-24 RX ADMIN — FAMOTIDINE 20 MG: 20 TABLET ORAL at 07:21

## 2022-05-24 RX ADMIN — FENTANYL CITRATE 50 MCG: 50 INJECTION, SOLUTION INTRAMUSCULAR; INTRAVENOUS at 07:40

## 2022-05-24 RX ADMIN — Medication 12.5 MG: at 07:21

## 2022-05-24 ASSESSMENT — ACTIVITIES OF DAILY LIVING (ADL)
ADLS_ACUITY_SCORE: 22
ADLS_ACUITY_SCORE: 34
ADLS_ACUITY_SCORE: 22
ADLS_ACUITY_SCORE: 22
ADLS_ACUITY_SCORE: 34
ADLS_ACUITY_SCORE: 22
ADLS_ACUITY_SCORE: 34
ADLS_ACUITY_SCORE: 22

## 2022-05-24 ASSESSMENT — VISUAL ACUITY: OU: NORMAL ACUITY

## 2022-05-24 NOTE — PROGRESS NOTES
Shift Summary:  Pt admitted from OR post op CABGx4 with Dr. Desai. Sedation turned off, and pt aroused to voice, followed all commands and moved all extremities equally. CPAP was attempted on the ventilator, but pt went apneic multiple times and was flipped back to full support. CVTS updated and told to keep pt intubated overnight. Pt on epinephrine gtt to maintain MAP >65, see flowsheets for titrations. Per Dr. Desai leave Epi gtt on overnight unless pt becomes hypertensive  See flowsheets for detailed hemodynamics. Urine output clear and adequate >100/hr. No significant output from chest tubes. Femoral Arterial line removed.       For detailed assessments and vitals, see flowsheets  Plan:  Monitor hemodynamics, chest tube output and pain.

## 2022-05-24 NOTE — BRIEF OP NOTE
[facility]    Brief Operative Note    Pre-operative diagnosis: CAD (coronary artery disease) [I25.10]  Post-operative diagnosis Same as pre-operative diagnosis    Procedure: Procedure(s):  Median sternotomy.  Intraoperative transesophageal echocardiogram per anesthesia.  Left internal mammary artery harvest.  Right and left endoscopically harvested  greater saphenouse vein.  Cardiopulmonary Bypass.  Coronary Artery Bypass Grafts x4.  Surgeon: Surgeon(s) and Role:     * Ulises Desai MD - Primary     * Charli Shoemaker PA-C - Assisting     * Dayanara Walker MD - Fellow - Assisting  Anesthesia: Combined General with Block   Estimated Blood Loss: 1L    Drains: Mediastinal x2, left pleural  Specimens: * No specimens in log *  Findings:   severe coronary atherosclerosis.  Complications: None.  Implants: * No implants in log *

## 2022-05-24 NOTE — ANESTHESIA PROCEDURE NOTES
Arterial Line Procedure Note    Pre-Procedure   Staff -        Anesthesiologist:  Carlo Ramey MD       Resident/Fellow: Oliva Aguilar MD       Performed By: resident       Location: OR       Pre-Anesthestic Checklist: patient identified, IV checked, risks and benefits discussed, informed consent, monitors and equipment checked, pre-op evaluation and at physician/surgeon's request  Timeout:       Correct Patient: Yes        Correct Procedure: Yes        Correct Site: Yes        Correct Position: Yes   Line Placement:   This line was placed Pre Induction starting at 5/24/2022 8:10 AM and ending at 5/24/2022 8:16 AM  Procedure   Procedure: arterial line       Laterality: right       Insertion Site: radial.  Sterile Prep        Standard elements of sterile barrier followed       Skin prep: Chloraprep  Insertion/Injection        Technique: Seldinger Technique        Catheter Type/Size: 20 G, 12 cm  Narrative        Tegaderm dressing used.       Complications: None apparent,        Arterial waveform: Yes        IBP within 10% of NIBP: Yes

## 2022-05-24 NOTE — ANESTHESIA PROCEDURE NOTES
Airway       Patient location during procedure: OR       Procedure Start/Stop Times: 5/24/2022 8:21 AM  Staff -        Anesthesiologist:  Carlo Ramey MD       Resident/Fellow: Kailey Lee MD       Performed By: residentIndications and Patient Condition       Indications for airway management: luis enrique-procedural       Induction type:intravenous       Mask difficulty assessment: 1 - vent by mask    Final Airway Details       Final airway type: endotracheal airway       Successful airway: ETT - single  Endotracheal Airway Details        ETT size (mm): 8.0       Cuffed: yes       Successful intubation technique: direct laryngoscopy       DL Blade Type: MAC 3       Grade View of Cords: 2 (2B)       Adjucts: stylet       Position: Right       Measured from: lips       Secured at (cm): 23       Bite block used: None    Post intubation assessment        Placement verified by: capnometry, equal breath sounds and chest rise        Number of attempts at approach: 1       Secured with: silk tape       Ease of procedure: easy       Dentition: Intact    Medication(s) Administered   Medication Administration Time: 5/24/2022 8:21 AM

## 2022-05-24 NOTE — ANESTHESIA PROCEDURE NOTES
Central Line/PA Catheter Placement    Pre-Procedure   Staff -        Anesthesiologist:  Carlo Ramey MD       Resident/Fellow: Kailey Lee MD       Performed By: fellow       Location: OR       Pre-Anesthestic Checklist: patient identified, IV checked, site marked, risks and benefits discussed, informed consent, monitors and equipment checked, pre-op evaluation and at physician/surgeon's request  Timeout:       Correct Patient: Yes        Correct Procedure: Yes        Correct Site: Yes        Correct Position: Yes        Correct Laterality: Yes   Line Placement:   This line was placed Post Induction    Procedure   Procedure: central line       Laterality: right       Insertion Site: internal jugular.       Patient Position: Trendelenburg  Sterile Prep        All elements of maximal sterile barrier technique followed       Patient Prep/Sterile Barriers: draped, hand hygiene, gloves , hat , mask , draped, gown, sterile gel and probe cover       Skin prep: Chloraprep  Insertion/Injection        Technique: ultrasound guided and Seldinger Technique        1. Ultrasound was used to evaluate the access site.       2. Vein evaluated via ultrasound for patency/adequacy.       3. Using real-time ultrasound the needle/catheter was observed entering the artery/vein.       Introducer Type: 9 Fr, 2-lumen MAC        Type: PA/CVC with Introducer       Number of Lumens: double lumen       PA Catheter Type: CCO         Appropriate RV, RA and PA waveforms noted:  Yes            Balloon down at end of the procedure:   Narrative         Secured by: suture       Tegaderm and Biopatch dressing used.       Complications: None apparent,        blood aspirated from all lumens,        All lumens flushed: Yes       Verification method: Ultrasound, DAVID and Placement to be verified post-op

## 2022-05-24 NOTE — PROGRESS NOTES
Admitted/transferred from: OR  Reason for admission/transfer: Pt received CABG x4 with Dr. Lloyd Salgado RN skin assessment: completed by IKER Cao and Genia RN  Result of skin assessment and interventions/actions: bruising to L hand and dry, cracked lips, otherwise unremarkable.   Height, weight, drug calc weight: Done  Patient belongings: Glasses in safe; Purse and belongings with son    ?

## 2022-05-24 NOTE — PROGRESS NOTES
Anesthesia block -   Timeout completed. Consent verified.50 mcg fentanyl and 1 mg versed given per provider @ bedside. Patient placed in prone position.    See provider note for more detail.    Jannet Beauchamp RN on 5/24/2022 at 8:45 AM

## 2022-05-24 NOTE — PROGRESS NOTES
D: Admitted 5/18 for concern for VT on ZioPatch. Now awaiting CABG 5/24.   PMH of HTN, DM, GERD, JUSTUS, MDD.     I: Monitored vitals and assessed pt status.   Changed:   Running:   PRN:   Tele: SR/SB  O2: RA  Mobility: Up ad lisy    A: A0x4. VSS. HR 50's-70's this shift. Afebrile. Pt denied any numbness, tingling, dizziness, SOB, or pain. Urinating adequately. LBM 5/23/22. NPO diet started at midnight for CABG. L PIV WDL and saline locked. CHG bath done overnight per Pt.     P: Continue to monitor Pt status and report changes to Cards 1. To have CABG on 5/24 at 8AM.     Shift [id number]

## 2022-05-24 NOTE — ANESTHESIA CARE TRANSFER NOTE
Patient: Marianne Monroy    Procedure: Procedure(s):  Median sternotomy.  Intraoperative transesophageal echocardiogram per anesthesia.  Left internal mammary artery harvest.  Right and left endoscopically harvested  greater saphenouse vein.  Cardiopulmonary Bypass.  Coronary Artery Bypass Grafts x4.       Diagnosis: CAD (coronary artery disease) [I25.10]  Diagnosis Additional Information: No value filed.    Anesthesia Type:   General     Note:    Oropharynx: endotracheal tube in place            Vital Signs Stable: post-procedure vital signs reviewed and stable  Report to RN Given: handoff report given  Patient transferred to: ICU  Comments: Patient transferred to ICU with ETT in situ and with appropriate monitors. VSS on Epi. No apparent complications from anesthesia. Sign out given to ICU team. Patient reversed with sugammadex prior to leaving.   ICU Handoff: Call for PAUSE to initiate/utilize ICU HANDOFF, Identified Patient, Identified Responsible Provider, Reviewed the Pertinent Medical History, Discussed Surgical Course, Reviewed Intra-OP Anesthesia Management and Issues during Anesthesia, Set Expectations for Post Procedure Period and Allowed Opportunity for Questions and Acknowledgement of Understanding      Vitals:  Vitals Value Taken Time   BP     Temp     Pulse 77 05/24/22 1526   Resp 1 05/24/22 1526   SpO2 100 % 05/24/22 1526   Vitals shown include unvalidated device data.    Electronically Signed By: Oliva Aguilar MD  May 24, 2022  3:27 PM

## 2022-05-24 NOTE — ANESTHESIA PROCEDURE NOTES
Perioperative DAVID Procedure Note    Staff -        Anesthesiologist:  Carlo Ramey MD       Resident/Fellow: Kailey Lee MD       Performed By: anesthesiologist and fellow  Preanesthesia Checklist:  Patient identified, IV assessed, risks and benefits discussed, monitors and equipment assessed, procedure being performed at surgeon's request and anesthesia consent obtained.    DAVID Probe Insertion    Probe Status PRE Insertion: NO obvious damage  Probe type:  Adult 2D  Bite block used:   None           Reason: Edentulous  Insertion Technique: Easy, no oropharyngeal manipulation  Insertion complications: None obvious  Billing Report:DAVID report by Anesthesiologist (See Separate Report note)  Probe Status POST Removal: NO obvious damage    DAVID Report  General Procedure Information  Images for this study have been archived.  Modalities: 2D, 3D, CW Doppler, PW Doppler and Color flow mapping  Diagnostic indications for DAVID:   Cardiomyopathy, other  Echocardiographic and Doppler Measurements  Right Ventricle:  Cavity size normal.    Hypertrophy not present.   Thrombus not present.    Global function normal.     Left Ventricle:  Cavity size dilated.    Hypertrophy not present.   Thrombus not present.   Global Function severely impaired.   Ejection Fraction 25%.      Ventricular Regional Function:  1- Basal Anteroseptal:  hypokinetic  2- Basal Anterior:  normal  3- Basal Anterolateral:  normal  4- Basal Inferolateral:  normal  5- Basal Inferior:  normal  6- Basal Inferoseptal:  hypokinetic  7- Mid Anteroseptal:  hypokinetic  8- Mid Anterior:  normal  9- Mid Anterolateral:  normal  10- Mid Inferolateral:  normal  11- Mid Inferior:  normal  12- Mid Inferoseptal:  hypokinetic  13- Apical Anterior:  normal  14- Apical Lateral:  normal  15- Apical Inferior:  normal  16- Apical Septal:  normal  17- [place]:  normal    Valves  Aortic Valve: Annulus normal.  Stenosis not present.  Regurgitation absent.  Leaflets normal.   Leaflet motions normal.    Mitral Valve: Annulus normal.  Stenosis not present.  Regurgitation +1.  Leaflets normal.  Leaflet motions normal.    Tricuspid Valve: Annulus normal.  Regurgitation +1.  Leaflets normal.  Leaflet motions normal.    Pulmonic Valve: Annulus normal.  Stenosis not present.  Regurgitation absent.      Aorta: Ascending Aorta: Size normal.  Dissection not present.  Plaque thickness greater than 3 mm.  Mobile plaque not present.    Aortic Arch: Size normal.    Dissection not present.   Plaque thickness less than 3 mm.   Mobile plaque not present.    Descending Aorta: Size normal.    Dissection not present.   Plaque thickness less than 3 mm.   Mobile plaque not present.      Right Atrium:  Size normal.   Spontaneous echo contrast not present.   Thrombus not present.   Tumor not present.   Device not present.     Left Atrium: Size normal.  Spontaneous echo contrast not present.  Thrombus not present.  Tumor not present.  Device not present.    Left atrial appendage normal.     Atrial Septum: Intra-atrial septal morphology normal.       Ventricular Septum: Intra-ventricular septum morphology normal.      Diastolic Function Measurements:  Diastolic Dysfunction Grade= indeterminate.  E=  ms.  A=  ms.  E/A Ratio= .  DT=  ms.  S/D= .  IVRT= ms.  Other Findings:   Pericardium:  normal. Pleural Effusion:  none. Pulmonary Arteries:  normal. Pulmonary Venous Flow:  normal. Cornoary sinus catheter present.    Post Intervention Findings  Procedure(s) performed:  CABG. Global function:  Improved. Regional wall motion: Improved. Surgeon(s) notified of all postintervention findings: Yes.                 Echocardiogram Comments  Echocardiogram comments: Pre bypass- Normal LA/Dilated LV, Mild Central MR, Severely depressed LV with EF-20-25% with hypokinetic basal to mid nathan septal, infero septal,  segments, No AI/PFO,Normal RA/RV size and function and Mild TR, Normal PV, Calcification on right STJ and diffuse  calcification along DTA.  Post bypass-Normal LA/Dilated LV, Mild Central MR, LV  systolic function improved EF-35-40%  No AI/PFO,Normal RA/RV size and function and Mild TR, Normal PV, Calcification on right STJ and diffuse calcification along DTA.  .

## 2022-05-24 NOTE — ANESTHESIA PROCEDURE NOTES
Erector spinae Procedure Note    Pre-Procedure   Staff -        Anesthesiologist:  Chan Mahoney MD       Resident/Fellow: Josiah Desai MD       Performed By: resident       Location: pre-op       Procedure Start/Stop Times: 5/24/2022 7:10 AM and 5/24/2022 7:55 AM       Pre-Anesthestic Checklist: patient identified, IV checked, site marked, risks and benefits discussed, informed consent, monitors and equipment checked, pre-op evaluation, at physician/surgeon's request and post-op pain management  Timeout:       Correct Patient: Yes        Correct Procedure: Yes        Correct Site: Yes        Correct Position: Yes        Correct Laterality: Yes        Site Marked: Yes  Procedure Documentation  Procedure: Erector spinae       Diagnosis: POST OPERATIVE PAIN       Laterality: bilateral       Patient Position: supine       Patient Prep/Sterile Barriers: sterile gloves, mask       Skin prep: Chloraprep       Local skin infiltrated with 5 mL of 2% lidocaine.        Insertion Site: T5-6.       Needle Type: short bevel       Needle Gauge: 21.        Needle Length (millimeters): 110        Ultrasound guided       1. Ultrasound was used to identify targeted nerve, plexus, vascular marker, or fascial plane and place a needle adjacent to it in real-time.       2. Ultrasound was used to visualize the spread of anesthetic in close proximity to the above referenced structure.       3. A permanent image is entered into the patient's record.    Assessment/Narrative         The placement was negative for: blood aspirated, painful injection and site bleeding       Paresthesias: No.       Bolus given via needle..        Secured via.        Insertion/Infusion Method: Single Shot       Complications: none       Injection made incrementally with aspirations every 5 mL.    Medication(s) Administered   Bupivacaine 0.25% PF (Infiltration) - Infiltration   20 mL - 5/24/2022 7:10:00 AM  Medication Administration Time: 5/24/2022 7:10  AM

## 2022-05-24 NOTE — H&P
CV ICU H&P  5/24/2022      CO-MORBIDITIES:   Patient Active Problem List   Diagnosis     Phobia     GERD (gastroesophageal reflux disease)     C. difficile diarrhea     Hyperlipidemia LDL goal <70     Advanced directives, counseling/discussion     Hypertension, goal below 140/90     Major depressive disorder, recurrent episode, mild (H)     Generalized anxiety disorder     Diabetes mellitus, type 2 (H)     Non-sustained ventricular tachycardia (H)     Near syncope     Acute systolic heart failure (H)     ASSESSMENT: Marianne Monroy is a 67 year old female with PMH of HTN, HLD, T2DM, GERD, MDD, JUSTUS, nonsustained VT, multivessel coronary disease with acute heart failure due to ischemic cardiomyopathy who underwent CABG x4 on 5/24/22 with Dr. Desai. CHU to LAD, SVGs (EVH from both thighs) to distal RCA, OM, ramus.  Intraoperative course uncomplicated. 2 hours on bypass and cross clamp 1:45 hours. LVEF preop 25-30%, end of case was about 30-35% by DAVID.  Pt admitted to CVICU sedated and intubated for further postoperative care. OK to extubate tonight if progressing well.     PLAN:  Neuro/ pain/ sedation:  Hx of MDD and JUSTUS  Acute Postoperative pain  PTA: fluoxetine 20mg BID and hydroxyzine 25-50mg q6h PRN  - bilateral RICKEY catheters  - Monitor neurological status. Notify the MD for any acute changes in exam.  - Pain: fentanyl gtt. Scheduled tylenol and gabapentin. PRN tylenol, oxycodone, robaxin  - Sedation: propofol gtt    Pulmonary care:   Postoperative ventilation management  - Intubated, ventilated  - Titrate supplemental oxygen to maintain saturation above 92%.  - Pulmonary hygiene: Incentive spirometer every 15- 30 minutes when awake, flutter valve, C&DB    Cardiovascular:    Hx of HTN  Acute HFrEF, ischemic CMP  Multivessel coronary artery disease  S/p CABG x 4 on 5/24/22 with Dr. eDsai: CHU to LAD, SVG (harvest form both thighs) to distal RCA, OM, ramus.   Recent echo on 5/19, LVEF 20-30%  Post-op LVEF  35%  GDMT: Toprol XL 25 qday, lisinopril 20mg qday (held, start Entresto postop) and valsartan 40mg qday (held, hyperkalemic)  ASA 81mg, Lipitor 80mg   - Monitor hemodynamic status.   - atrial and ventricular wires placed, not dependent on this during immediate postop period   - Goal MAP>65, SBP<140  - Statin hold  - BB hold  - Entresto postop (discuss with cards)   - ASA hold  - Epi, norepi, vaso gtt; wean as tolerated    GI care/ Nutrition:   Hx of GERD   PTA omeprazole 40mg qday  - NPO   - PPI  - Continue bowel regimen: miralax, senna    Renal/ Fluid Balance/ Electrolytes:   BL creat appears to be ~ 0.82-0.99 (5/23)  - Strict I/O, daily weights  - Avoid/limit nephrotoxins as able  - Replete lytes PRN per protocol    Endocrine:    Stress induced hyperglycemia  Preop A1c: 7.8% (4/14/22)   Preop on: Jardiance 625 qday, high sliding scale corrective insulin  Holding while inpt: Glipizide XL 10qday, Metformin XR 1000 BID  - Insulin gtt postop   - Goal BG <180 for optimal healing    ID/ Antibiotics:  Stress induced leukocytosis  - To complete perioperative regimen: ancef  - Continue to monitor fever curve, WBC and inflammatory markers as appropriate    Heme:     Stress induced leukocytosis  Acute blood loss anemia  Acute blood loss thrombocytopenia  Preop labs (5/18): Hgb: 13.8, hematocrit: 42.3%, plts: 284k.   Postop labs: Hgb: 8.9, Plts 147k.   No s/sx active bleeding  - Continue to monitor  - CBC     MSK/ Skin:  Sternotomy  Surgical Incision  - wound vac   - Sternal precautions  - Postoperative incision management per protocol  - PT/OT/CR    Prophylaxis:    - Mechanical prophylaxis for DVT  - Chemical DVT prophylaxis - start subcutaneous heparin tomorrow  - PPI    Lines/ tubes/ drains:  - Arterial Lines (R radial. L femoral- can be removed) ETT,   - CTs: 1 pleural, 2 meds   - PA catheter, MAC RIJ, geiger, OG    Disposition:  - CVICU    Patient seen, findings and plan discussed with CVICU staff    Suzy Quinonez  MS4,  [facility]  CVTS Service     Resident/Fellow Attestation   I, Andrea Reagan MD, was present with the medical/EMMIE student who participated in the service and in the documentation of the note.  I have verified the history and personally performed the physical exam and medical decision making.  I agree with the assessment and plan of care as documented in the note.      Andrea Reagan MD  PGY3  Date of Service (when I saw the patient): 05/24/22      ====================================    HPI:   Ms. Marianne Monroy is a 67 year old female with past medical history of HTN, HLD, T2DM, GERD, MDD, JUSTUS, recent identification of nonsustained VT on outpt Ziopatch, increase in symptoms of neck stiffness, jaw pain, dyspnea at rest and nausea/vomiting w/ activity originally on 4/30 who was subsequently admitted 5/18 and found to have triple vessel coronary disease. Was also in acute HFrEF, LVEF 20-30%, presumed ischemic cardiomyopathy.     Echo 5/19: RCA distribution inferoseptal hypokinesis, mild MR.   Angio 5/19: Prox LAD: 80% stenosis  To mid LAD: 80% stenosis  OM1: 40% stenosis   OM2 (1,2): 80% and 20%   Prox to mid RCA: 100% (~)  Mid RCA: receives collaterals from proximal RCA   CT chest 5/19: coronary calcifications with sub 6mm pulm nodules  Carotid US 5/19: ALVARO <50% and LICA <50% stenosis.   Vein mapping: no abnormalities noted.     Pt underwent CABGx4 with bilateral EVH for her multivessel coronary disease with Dr. Desai on 5/24/22.   Grafts:  LIMA to LAD, SVGs (EVH from both thighs) to distal RCA, OM, ramus.      PAST MEDICAL HISTORY:   Past Medical History:   Diagnosis Date     Abnormal Pap smear of cervix ~2000    repeat pap was normal     Allergic rhinitis, cause unspecified      Anxiety state, unspecified     panic episodes     Unspecified essential hypertension        PAST SURGICAL HISTORY:   Past Surgical History:   Procedure Laterality Date     CV CORONARY ANGIOGRAM  5/19/2022     Procedure: CV CORONARY ANGIOGRAM;  Surgeon: Huseyin Vogel MD;  [facility]     CV CORONARY ANGIOGRAM  2022    Procedure: ;  Surgeon: Huseyin Vogel MD;  Location:  [facility]     NO HISTORY OF SURGERY         FAMILY HISTORY:   Family History   Problem Relation Age of Onset     Diabetes Mother         hypertension, alive at 83     C.A.D. Mother      Cancer Father         lung cancer, smoker.   at age 53     Family History Negative Sister      Osteoporosis Sister        SOCIAL HISTORY:   Social History     Tobacco Use     Smoking status: Never Smoker     Smokeless tobacco: Never Used   Substance Use Topics     Alcohol use: Yes     Comment: social       OBJECTIVE:   1. VITAL SIGNS:    Temp: 98  F (36.7  C) Temp src: Oral BP: (!) 140/82 Pulse: 63   Resp: 16 SpO2: 98 % O2 Device: None (Room air)        2. INTAKE/ OUTPUT:   EBL: 1L  UOP: intraoperatively, 250   Cell saver: 210   Received total of 2L fluids   Blood products: none   Chest Tubes: 1 pleural, 2 meds, 1 pleurovac, total 40cc.     3. PHYSICAL EXAMINATION:     General: sedated  Neuro: sedated  Resp: intubated, ventilated  CV: S1, S2, RRR, no m/r/g   Abdomen: Soft, non-distended, non-tender  Incisions: wound vac over median sternotomy incision, both legs/thighs wrapped in ace bandages.   Extremities: warm and well perfused  CT: To suction, serosang output, no airleak    4. INVESTIGATIONS:   Arterial Blood Gases   No lab results found in last 7 days.  Complete Blood Count   Recent Labs   Lab 22  1715   WBC 7.2   HGB 13.8        Basic Metabolic Panel  Recent Labs   Lab 22  0533 22  2159 22  1801 22  1206 22  0802 22  0552 22  0740 22  0627 22  1202 22  0721     --   --   --   --  142  --  142  --  139   POTASSIUM 4.7  --   --   --   --  5.6*  --  5.3  --  5.1   CHLORIDE 107  --   --   --   --  109  --  108  --   105   CO2 28  --   --   --   --  28  --  28  --  30   BUN 29  --   --   --   --  27  --  30  --  28   CR 0.95  --   --   --   --  0.92  --  0.99  --  0.88   * 324* 287* 239*   < > 201*   < > 200*   < > 197*    < > = values in this interval not displayed.     Liver Function Tests  Recent Labs   Lab 05/18/22  1715   AST 16   ALT 27   ALKPHOS 78   BILITOTAL 0.4   ALBUMIN 4.0     Pancreatic Enzymes  No lab results found in last 7 days.  Coagulation Profile  No lab results found in last 7 days.    5. RADIOLOGY:   No results found for this or any previous visit (from the past 24 hour(s)).    =========================================

## 2022-05-24 NOTE — ANESTHESIA PROCEDURE NOTES
Arterial Line Procedure Note    Pre-Procedure   Staff -        Anesthesiologist:  Carlo Ramey MD       Resident/Fellow: Oliva Aguilar MD       Performed By: resident       Location: OR       Pre-Anesthestic Checklist: patient identified, IV checked, risks and benefits discussed, informed consent, monitors and equipment checked, pre-op evaluation and at physician/surgeon's request  Timeout:       Correct Patient: Yes        Correct Procedure: Yes        Correct Site: Yes        Correct Position: Yes   Line Placement:   This line was placed Post Induction  Procedure   Procedure: arterial line       Laterality: left       Insertion Site: femoral.  Sterile Prep        Standard elements of sterile barrier followed       Skin prep: Chloraprep  Insertion/Injection        Technique: ultrasound guided        1. Ultrasound was used to evaluate the access site.       2. Artery evaluated via ultrasound for patency/adequacy.       3. Using real-time ultrasound the needle/catheter was observed entering the artery/vein.       Catheter Type/Size: 20 G, 12 cm  Narrative         Secured by: suture       Tegaderm and Biopatch dressing used.       Complications: None apparent,        Arterial waveform: Yes        IBP within 10% of NIBP: Yes

## 2022-05-25 ENCOUNTER — APPOINTMENT (OUTPATIENT)
Dept: GENERAL RADIOLOGY | Facility: CLINIC | Age: 67
DRG: 233 | End: 2022-05-25
Attending: SURGERY
Payer: COMMERCIAL

## 2022-05-25 ENCOUNTER — APPOINTMENT (OUTPATIENT)
Dept: OCCUPATIONAL THERAPY | Facility: CLINIC | Age: 67
DRG: 233 | End: 2022-05-25
Attending: SURGERY
Payer: COMMERCIAL

## 2022-05-25 ENCOUNTER — APPOINTMENT (OUTPATIENT)
Dept: GENERAL RADIOLOGY | Facility: CLINIC | Age: 67
DRG: 233 | End: 2022-05-25
Payer: COMMERCIAL

## 2022-05-25 ENCOUNTER — APPOINTMENT (OUTPATIENT)
Dept: SPEECH THERAPY | Facility: CLINIC | Age: 67
DRG: 233 | End: 2022-05-25
Payer: COMMERCIAL

## 2022-05-25 DIAGNOSIS — Z95.1 S/P CABG (CORONARY ARTERY BYPASS GRAFT): Primary | ICD-10-CM

## 2022-05-25 LAB
ALBUMIN SERPL-MCNC: 2.4 G/DL (ref 3.4–5)
ALP SERPL-CCNC: 53 U/L (ref 40–150)
ALT SERPL W P-5'-P-CCNC: 24 U/L (ref 0–50)
ANION GAP SERPL CALCULATED.3IONS-SCNC: 6 MMOL/L (ref 3–14)
ANION GAP SERPL CALCULATED.3IONS-SCNC: 7 MMOL/L (ref 3–14)
APTT PPP: 29 SECONDS (ref 22–38)
AST SERPL W P-5'-P-CCNC: 34 U/L (ref 0–45)
ATRIAL RATE - MUSE: 73 BPM
ATRIAL RATE - MUSE: 82 BPM
ATRIAL RATE - MUSE: 84 BPM
BASE EXCESS BLDA CALC-SCNC: -1.3 MMOL/L (ref -9–1.8)
BASE EXCESS BLDA CALC-SCNC: -1.5 MMOL/L (ref -9–1.8)
BASE EXCESS BLDA CALC-SCNC: -3.7 MMOL/L (ref -9–1.8)
BASE EXCESS BLDA CALC-SCNC: -3.9 MMOL/L (ref -9–1.8)
BASE EXCESS BLDA CALC-SCNC: -4.2 MMOL/L (ref -9–1.8)
BASE EXCESS BLDV CALC-SCNC: -0.3 MMOL/L (ref -7.7–1.9)
BASE EXCESS BLDV CALC-SCNC: -0.7 MMOL/L (ref -7.7–1.9)
BASE EXCESS BLDV CALC-SCNC: -1.1 MMOL/L (ref -7.7–1.9)
BASE EXCESS BLDV CALC-SCNC: -2.7 MMOL/L (ref -7.7–1.9)
BASE EXCESS BLDV CALC-SCNC: 1.1 MMOL/L (ref -7.7–1.9)
BASE EXCESS BLDV CALC-SCNC: 1.2 MMOL/L (ref -7.7–1.9)
BILIRUB SERPL-MCNC: 0.2 MG/DL (ref 0.2–1.3)
BUN SERPL-MCNC: 16 MG/DL (ref 7–30)
BUN SERPL-MCNC: 18 MG/DL (ref 7–30)
CA-I BLD-MCNC: 4.8 MG/DL (ref 4.4–5.2)
CA-I BLD-MCNC: 5.1 MG/DL (ref 4.4–5.2)
CALCIUM SERPL-MCNC: 8.5 MG/DL (ref 8.5–10.1)
CALCIUM SERPL-MCNC: 8.5 MG/DL (ref 8.5–10.1)
CHLORIDE BLD-SCNC: 116 MMOL/L (ref 94–109)
CHLORIDE BLD-SCNC: 117 MMOL/L (ref 94–109)
CO2 SERPL-SCNC: 23 MMOL/L (ref 20–32)
CO2 SERPL-SCNC: 23 MMOL/L (ref 20–32)
CREAT SERPL-MCNC: 0.77 MG/DL (ref 0.52–1.04)
CREAT SERPL-MCNC: 0.8 MG/DL (ref 0.52–1.04)
CREAT SERPL-MCNC: 0.85 MG/DL (ref 0.52–1.04)
CREAT UR-MCNC: 86 MG/DL
DIASTOLIC BLOOD PRESSURE - MUSE: NORMAL MMHG
ERYTHROCYTE [DISTWIDTH] IN BLOOD BY AUTOMATED COUNT: 13.2 % (ref 10–15)
ERYTHROCYTE [DISTWIDTH] IN BLOOD BY AUTOMATED COUNT: 13.4 % (ref 10–15)
FRACT EXCRET NA UR+SERPL-RTO: 0.3 %
GFR SERPL CREATININE-BSD FRML MDRD: 74 ML/MIN/1.73M2
GFR SERPL CREATININE-BSD FRML MDRD: 80 ML/MIN/1.73M2
GLUCOSE BLD-MCNC: 132 MG/DL (ref 70–99)
GLUCOSE BLD-MCNC: 144 MG/DL (ref 70–99)
GLUCOSE BLD-MCNC: 146 MG/DL (ref 70–99)
GLUCOSE BLD-MCNC: 146 MG/DL (ref 70–99)
GLUCOSE BLDC GLUCOMTR-MCNC: 109 MG/DL (ref 70–99)
GLUCOSE BLDC GLUCOMTR-MCNC: 112 MG/DL (ref 70–99)
GLUCOSE BLDC GLUCOMTR-MCNC: 112 MG/DL (ref 70–99)
GLUCOSE BLDC GLUCOMTR-MCNC: 113 MG/DL (ref 70–99)
GLUCOSE BLDC GLUCOMTR-MCNC: 116 MG/DL (ref 70–99)
GLUCOSE BLDC GLUCOMTR-MCNC: 119 MG/DL (ref 70–99)
GLUCOSE BLDC GLUCOMTR-MCNC: 121 MG/DL (ref 70–99)
GLUCOSE BLDC GLUCOMTR-MCNC: 125 MG/DL (ref 70–99)
GLUCOSE BLDC GLUCOMTR-MCNC: 129 MG/DL (ref 70–99)
GLUCOSE BLDC GLUCOMTR-MCNC: 130 MG/DL (ref 70–99)
GLUCOSE BLDC GLUCOMTR-MCNC: 135 MG/DL (ref 70–99)
GLUCOSE BLDC GLUCOMTR-MCNC: 137 MG/DL (ref 70–99)
GLUCOSE BLDC GLUCOMTR-MCNC: 138 MG/DL (ref 70–99)
GLUCOSE BLDC GLUCOMTR-MCNC: 144 MG/DL (ref 70–99)
HCO3 BLD-SCNC: 22 MMOL/L (ref 21–28)
HCO3 BLD-SCNC: 22 MMOL/L (ref 21–28)
HCO3 BLD-SCNC: 23 MMOL/L (ref 21–28)
HCO3 BLD-SCNC: 23 MMOL/L (ref 21–28)
HCO3 BLD-SCNC: 25 MMOL/L (ref 21–28)
HCO3 BLDV-SCNC: 24 MMOL/L (ref 21–28)
HCO3 BLDV-SCNC: 24 MMOL/L (ref 21–28)
HCO3 BLDV-SCNC: 26 MMOL/L (ref 21–28)
HCO3 BLDV-SCNC: 27 MMOL/L (ref 21–28)
HCT VFR BLD AUTO: 26 % (ref 35–47)
HCT VFR BLD AUTO: 31.1 % (ref 35–47)
HGB BLD-MCNC: 8.5 G/DL (ref 11.7–15.7)
HGB BLD-MCNC: 9.8 G/DL (ref 11.7–15.7)
INR PPP: 1.29 (ref 0.85–1.15)
INTERPRETATION ECG - MUSE: NORMAL
LACTATE SERPL-SCNC: 1.6 MMOL/L (ref 0.7–2)
LACTATE SERPL-SCNC: 4.1 MMOL/L (ref 0.7–2)
MAGNESIUM SERPL-MCNC: 2 MG/DL (ref 1.6–2.3)
MAGNESIUM SERPL-MCNC: 2.1 MG/DL (ref 1.6–2.3)
MAGNESIUM SERPL-MCNC: 2.1 MG/DL (ref 1.6–2.3)
MCH RBC QN AUTO: 28 PG (ref 26.5–33)
MCH RBC QN AUTO: 28.7 PG (ref 26.5–33)
MCHC RBC AUTO-ENTMCNC: 31.5 G/DL (ref 31.5–36.5)
MCHC RBC AUTO-ENTMCNC: 32.7 G/DL (ref 31.5–36.5)
MCV RBC AUTO: 88 FL (ref 78–100)
MCV RBC AUTO: 89 FL (ref 78–100)
O2/TOTAL GAS SETTING VFR VENT: 2 %
O2/TOTAL GAS SETTING VFR VENT: 2 %
O2/TOTAL GAS SETTING VFR VENT: 28 %
O2/TOTAL GAS SETTING VFR VENT: 28 %
O2/TOTAL GAS SETTING VFR VENT: 35 %
O2/TOTAL GAS SETTING VFR VENT: 35 %
O2/TOTAL GAS SETTING VFR VENT: 40 %
OXYHGB MFR BLD: 94 % (ref 92–100)
OXYHGB MFR BLD: 95 % (ref 92–100)
OXYHGB MFR BLD: 98 % (ref 92–100)
OXYHGB MFR BLDV: 54 % (ref 70–75)
OXYHGB MFR BLDV: 55 % (ref 70–75)
OXYHGB MFR BLDV: 57 % (ref 70–75)
OXYHGB MFR BLDV: 61 % (ref 70–75)
OXYHGB MFR BLDV: 62 % (ref 70–75)
OXYHGB MFR BLDV: 63 % (ref 70–75)
P AXIS - MUSE: 27 DEGREES
P AXIS - MUSE: 40 DEGREES
P AXIS - MUSE: 41 DEGREES
PCO2 BLD: 36 MM HG (ref 35–45)
PCO2 BLD: 41 MM HG (ref 35–45)
PCO2 BLD: 43 MM HG (ref 35–45)
PCO2 BLD: 46 MM HG (ref 35–45)
PCO2 BLD: 49 MM HG (ref 35–45)
PCO2 BLDV: 38 MM HG (ref 40–50)
PCO2 BLDV: 39 MM HG (ref 40–50)
PCO2 BLDV: 41 MM HG (ref 40–50)
PCO2 BLDV: 48 MM HG (ref 40–50)
PCO2 BLDV: 55 MM HG (ref 40–50)
PCO2 BLDV: 56 MM HG (ref 40–50)
PH BLD: 7.3 [PH] (ref 7.35–7.45)
PH BLD: 7.32 [PH] (ref 7.35–7.45)
PH BLD: 7.32 [PH] (ref 7.35–7.45)
PH BLD: 7.34 [PH] (ref 7.35–7.45)
PH BLD: 7.42 [PH] (ref 7.35–7.45)
PH BLDV: 7.28 [PH] (ref 7.32–7.43)
PH BLDV: 7.29 [PH] (ref 7.32–7.43)
PH BLDV: 7.3 [PH] (ref 7.32–7.43)
PH BLDV: 7.39 [PH] (ref 7.32–7.43)
PH BLDV: 7.42 [PH] (ref 7.32–7.43)
PH BLDV: 7.43 [PH] (ref 7.32–7.43)
PHOSPHATE SERPL-MCNC: 2.7 MG/DL (ref 2.5–4.5)
PHOSPHATE SERPL-MCNC: 2.7 MG/DL (ref 2.5–4.5)
PHOSPHATE SERPL-MCNC: 4.4 MG/DL (ref 2.5–4.5)
PLATELET # BLD AUTO: 127 10E3/UL (ref 150–450)
PLATELET # BLD AUTO: 189 10E3/UL (ref 150–450)
PO2 BLD: 120 MM HG (ref 80–105)
PO2 BLD: 122 MM HG (ref 80–105)
PO2 BLD: 141 MM HG (ref 80–105)
PO2 BLD: 74 MM HG (ref 80–105)
PO2 BLD: 87 MM HG (ref 80–105)
PO2 BLDV: 31 MM HG (ref 25–47)
PO2 BLDV: 32 MM HG (ref 25–47)
PO2 BLDV: 32 MM HG (ref 25–47)
PO2 BLDV: 33 MM HG (ref 25–47)
PO2 BLDV: 34 MM HG (ref 25–47)
PO2 BLDV: 37 MM HG (ref 25–47)
POTASSIUM BLD-SCNC: 3.5 MMOL/L (ref 3.4–5.3)
POTASSIUM BLD-SCNC: 4.1 MMOL/L (ref 3.4–5.3)
POTASSIUM BLD-SCNC: 4.3 MMOL/L (ref 3.4–5.3)
PR INTERVAL - MUSE: 104 MS
PR INTERVAL - MUSE: 114 MS
PR INTERVAL - MUSE: 118 MS
PROT SERPL-MCNC: 5.1 G/DL (ref 6.8–8.8)
QRS DURATION - MUSE: 80 MS
QRS DURATION - MUSE: 84 MS
QRS DURATION - MUSE: 90 MS
QT - MUSE: 396 MS
QT - MUSE: 488 MS
QT - MUSE: 504 MS
QTC - MUSE: 467 MS
QTC - MUSE: 537 MS
QTC - MUSE: 588 MS
R AXIS - MUSE: -3 DEGREES
R AXIS - MUSE: 1 DEGREES
R AXIS - MUSE: 1 DEGREES
RBC # BLD AUTO: 2.96 10E6/UL (ref 3.8–5.2)
RBC # BLD AUTO: 3.5 10E6/UL (ref 3.8–5.2)
SODIUM SERPL-SCNC: 145 MMOL/L (ref 133–144)
SODIUM SERPL-SCNC: 146 MMOL/L (ref 133–144)
SODIUM SERPL-SCNC: 147 MMOL/L (ref 133–144)
SODIUM UR-SCNC: 42 MMOL/L
SYSTOLIC BLOOD PRESSURE - MUSE: NORMAL MMHG
T AXIS - MUSE: 1 DEGREES
T AXIS - MUSE: 39 DEGREES
T AXIS - MUSE: 69 DEGREES
VENTRICULAR RATE- MUSE: 73 BPM
VENTRICULAR RATE- MUSE: 82 BPM
VENTRICULAR RATE- MUSE: 84 BPM
WBC # BLD AUTO: 13.1 10E3/UL (ref 4–11)
WBC # BLD AUTO: 8.6 10E3/UL (ref 4–11)

## 2022-05-25 PROCEDURE — 999N000045 HC STATISTIC DAILY SWAN MONITORING

## 2022-05-25 PROCEDURE — 999N000065 XR ABDOMEN PORT 1 VIEWS

## 2022-05-25 PROCEDURE — 999N000155 HC STATISTIC RAPCV CVP MONITORING

## 2022-05-25 PROCEDURE — 85610 PROTHROMBIN TIME: CPT | Performed by: STUDENT IN AN ORGANIZED HEALTH CARE EDUCATION/TRAINING PROGRAM

## 2022-05-25 PROCEDURE — 97165 OT EVAL LOW COMPLEX 30 MIN: CPT | Mod: GO

## 2022-05-25 PROCEDURE — 82805 BLOOD GASES W/O2 SATURATION: CPT | Performed by: STUDENT IN AN ORGANIZED HEALTH CARE EDUCATION/TRAINING PROGRAM

## 2022-05-25 PROCEDURE — 250N000009 HC RX 250: Performed by: SURGERY

## 2022-05-25 PROCEDURE — 99232 SBSQ HOSP IP/OBS MODERATE 35: CPT | Performed by: ANESTHESIOLOGY

## 2022-05-25 PROCEDURE — 71045 X-RAY EXAM CHEST 1 VIEW: CPT | Mod: 26 | Performed by: STUDENT IN AN ORGANIZED HEALTH CARE EDUCATION/TRAINING PROGRAM

## 2022-05-25 PROCEDURE — 250N000013 HC RX MED GY IP 250 OP 250 PS 637: Performed by: SURGERY

## 2022-05-25 PROCEDURE — 71045 X-RAY EXAM CHEST 1 VIEW: CPT

## 2022-05-25 PROCEDURE — 84100 ASSAY OF PHOSPHORUS: CPT | Performed by: STUDENT IN AN ORGANIZED HEALTH CARE EDUCATION/TRAINING PROGRAM

## 2022-05-25 PROCEDURE — 44500 INTRO GASTROINTESTINAL TUBE: CPT

## 2022-05-25 PROCEDURE — 250N000011 HC RX IP 250 OP 636: Performed by: ANESTHESIOLOGY

## 2022-05-25 PROCEDURE — 82330 ASSAY OF CALCIUM: CPT | Performed by: STUDENT IN AN ORGANIZED HEALTH CARE EDUCATION/TRAINING PROGRAM

## 2022-05-25 PROCEDURE — 84132 ASSAY OF SERUM POTASSIUM: CPT | Performed by: STUDENT IN AN ORGANIZED HEALTH CARE EDUCATION/TRAINING PROGRAM

## 2022-05-25 PROCEDURE — 258N000003 HC RX IP 258 OP 636: Performed by: SURGERY

## 2022-05-25 PROCEDURE — 200N000002 HC R&B ICU UMMC

## 2022-05-25 PROCEDURE — 74018 RADEX ABDOMEN 1 VIEW: CPT | Mod: 26 | Performed by: RADIOLOGY

## 2022-05-25 PROCEDURE — P9041 ALBUMIN (HUMAN),5%, 50ML: HCPCS

## 2022-05-25 PROCEDURE — 250N000011 HC RX IP 250 OP 636: Performed by: STUDENT IN AN ORGANIZED HEALTH CARE EDUCATION/TRAINING PROGRAM

## 2022-05-25 PROCEDURE — 84300 ASSAY OF URINE SODIUM: CPT | Performed by: ANESTHESIOLOGY

## 2022-05-25 PROCEDURE — 83735 ASSAY OF MAGNESIUM: CPT | Performed by: STUDENT IN AN ORGANIZED HEALTH CARE EDUCATION/TRAINING PROGRAM

## 2022-05-25 PROCEDURE — 82565 ASSAY OF CREATININE: CPT | Performed by: ANESTHESIOLOGY

## 2022-05-25 PROCEDURE — 258N000003 HC RX IP 258 OP 636: Performed by: ANESTHESIOLOGY

## 2022-05-25 PROCEDURE — 82805 BLOOD GASES W/O2 SATURATION: CPT | Performed by: SURGERY

## 2022-05-25 PROCEDURE — 94003 VENT MGMT INPAT SUBQ DAY: CPT

## 2022-05-25 PROCEDURE — 999N000157 HC STATISTIC RCP TIME EA 10 MIN

## 2022-05-25 PROCEDURE — 97530 THERAPEUTIC ACTIVITIES: CPT | Mod: GO

## 2022-05-25 PROCEDURE — 250N000011 HC RX IP 250 OP 636: Performed by: SURGERY

## 2022-05-25 PROCEDURE — 92610 EVALUATE SWALLOWING FUNCTION: CPT | Mod: GN

## 2022-05-25 PROCEDURE — 80048 BASIC METABOLIC PNL TOTAL CA: CPT | Performed by: STUDENT IN AN ORGANIZED HEALTH CARE EDUCATION/TRAINING PROGRAM

## 2022-05-25 PROCEDURE — 250N000009 HC RX 250: Performed by: ANESTHESIOLOGY

## 2022-05-25 PROCEDURE — 93005 ELECTROCARDIOGRAM TRACING: CPT

## 2022-05-25 PROCEDURE — 999N000015 HC STATISTIC ARTERIAL MONITORING DAILY

## 2022-05-25 PROCEDURE — 250N000013 HC RX MED GY IP 250 OP 250 PS 637: Performed by: ANESTHESIOLOGY

## 2022-05-25 PROCEDURE — 85730 THROMBOPLASTIN TIME PARTIAL: CPT | Performed by: STUDENT IN AN ORGANIZED HEALTH CARE EDUCATION/TRAINING PROGRAM

## 2022-05-25 PROCEDURE — 250N000011 HC RX IP 250 OP 636

## 2022-05-25 PROCEDURE — 82947 ASSAY GLUCOSE BLOOD QUANT: CPT | Performed by: SURGERY

## 2022-05-25 PROCEDURE — 93010 ELECTROCARDIOGRAM REPORT: CPT | Mod: 76 | Performed by: INTERNAL MEDICINE

## 2022-05-25 PROCEDURE — 83605 ASSAY OF LACTIC ACID: CPT | Performed by: STUDENT IN AN ORGANIZED HEALTH CARE EDUCATION/TRAINING PROGRAM

## 2022-05-25 PROCEDURE — 85014 HEMATOCRIT: CPT | Performed by: STUDENT IN AN ORGANIZED HEALTH CARE EDUCATION/TRAINING PROGRAM

## 2022-05-25 PROCEDURE — 271N000002 HC RX 271: Performed by: SURGERY

## 2022-05-25 PROCEDURE — 84295 ASSAY OF SERUM SODIUM: CPT | Performed by: ANESTHESIOLOGY

## 2022-05-25 PROCEDURE — 250N000013 HC RX MED GY IP 250 OP 250 PS 637: Performed by: STUDENT IN AN ORGANIZED HEALTH CARE EDUCATION/TRAINING PROGRAM

## 2022-05-25 PROCEDURE — 92526 ORAL FUNCTION THERAPY: CPT | Mod: GN

## 2022-05-25 RX ORDER — MAGNESIUM SULFATE HEPTAHYDRATE 40 MG/ML
2 INJECTION, SOLUTION INTRAVENOUS ONCE
Status: COMPLETED | OUTPATIENT
Start: 2022-05-25 | End: 2022-05-25

## 2022-05-25 RX ORDER — ALBUMIN, HUMAN INJ 5% 5 %
25 SOLUTION INTRAVENOUS ONCE
Status: COMPLETED | OUTPATIENT
Start: 2022-05-25 | End: 2022-05-25

## 2022-05-25 RX ORDER — DEXTROSE MONOHYDRATE 100 MG/ML
INJECTION, SOLUTION INTRAVENOUS CONTINUOUS PRN
Status: DISCONTINUED | OUTPATIENT
Start: 2022-05-25 | End: 2022-06-04 | Stop reason: HOSPADM

## 2022-05-25 RX ORDER — LIDOCAINE HYDROCHLORIDE 20 MG/ML
5 SOLUTION OROPHARYNGEAL ONCE
Status: COMPLETED | OUTPATIENT
Start: 2022-05-25 | End: 2022-05-25

## 2022-05-25 RX ORDER — GUAIFENESIN 600 MG/1
15 TABLET, EXTENDED RELEASE ORAL DAILY
Status: DISCONTINUED | OUTPATIENT
Start: 2022-05-25 | End: 2022-05-26

## 2022-05-25 RX ORDER — AMINO AC/PROTEIN HYDR/WHEY PRO 10G-100/30
1 LIQUID (ML) ORAL 2 TIMES DAILY
Status: DISCONTINUED | OUTPATIENT
Start: 2022-05-25 | End: 2022-05-27

## 2022-05-25 RX ORDER — ALBUMIN, HUMAN INJ 5% 5 %
SOLUTION INTRAVENOUS
Status: COMPLETED
Start: 2022-05-25 | End: 2022-05-25

## 2022-05-25 RX ADMIN — ALBUMIN HUMAN 25 G: 0.05 INJECTION, SOLUTION INTRAVENOUS at 00:38

## 2022-05-25 RX ADMIN — ACETAMINOPHEN 325MG 975 MG: 325 TABLET ORAL at 07:59

## 2022-05-25 RX ADMIN — CEFAZOLIN 1 G: 1 INJECTION, POWDER, FOR SOLUTION INTRAMUSCULAR; INTRAVENOUS at 13:57

## 2022-05-25 RX ADMIN — CEFAZOLIN 1 G: 1 INJECTION, POWDER, FOR SOLUTION INTRAMUSCULAR; INTRAVENOUS at 03:28

## 2022-05-25 RX ADMIN — ONDANSETRON 4 MG: 2 INJECTION INTRAMUSCULAR; INTRAVENOUS at 14:45

## 2022-05-25 RX ADMIN — ATORVASTATIN CALCIUM 80 MG: 80 TABLET, FILM COATED ORAL at 07:59

## 2022-05-25 RX ADMIN — LIDOCAINE HYDROCHLORIDE 5 ML: 20 SOLUTION ORAL; TOPICAL at 09:49

## 2022-05-25 RX ADMIN — ASPIRIN 81 MG 162 MG: 81 TABLET ORAL at 07:59

## 2022-05-25 RX ADMIN — MULTIVIT AND MINERALS-FERROUS GLUCONATE 9 MG IRON/15 ML ORAL LIQUID 15 ML: at 16:31

## 2022-05-25 RX ADMIN — Medication: at 23:12

## 2022-05-25 RX ADMIN — SODIUM CHLORIDE, POTASSIUM CHLORIDE, SODIUM LACTATE AND CALCIUM CHLORIDE 500 ML: 600; 310; 30; 20 INJECTION, SOLUTION INTRAVENOUS at 09:50

## 2022-05-25 RX ADMIN — MUPIROCIN 0.5 G: 20 OINTMENT TOPICAL at 19:51

## 2022-05-25 RX ADMIN — POTASSIUM CHLORIDE 20 MEQ: 29.8 INJECTION, SOLUTION INTRAVENOUS at 00:16

## 2022-05-25 RX ADMIN — PROPOFOL 30 MCG/KG/MIN: 10 INJECTION, EMULSION INTRAVENOUS at 07:10

## 2022-05-25 RX ADMIN — HUMAN INSULIN 2 UNITS/HR: 100 INJECTION, SOLUTION SUBCUTANEOUS at 20:13

## 2022-05-25 RX ADMIN — Medication 40 MG: at 07:59

## 2022-05-25 RX ADMIN — ACETAMINOPHEN 325MG 975 MG: 325 TABLET ORAL at 16:31

## 2022-05-25 RX ADMIN — ALBUMIN HUMAN 25 G: 50 SOLUTION INTRAVENOUS at 00:38

## 2022-05-25 RX ADMIN — HEPARIN SODIUM 5000 UNITS: 5000 INJECTION, SOLUTION INTRAVENOUS; SUBCUTANEOUS at 11:54

## 2022-05-25 RX ADMIN — Medication: at 21:39

## 2022-05-25 RX ADMIN — POLYETHYLENE GLYCOL 3350 17 G: 17 POWDER, FOR SOLUTION ORAL at 07:59

## 2022-05-25 RX ADMIN — MUPIROCIN 0.5 G: 20 OINTMENT TOPICAL at 07:59

## 2022-05-25 RX ADMIN — EPINEPHRINE 0.03 MCG/KG/MIN: 1 INJECTION PARENTERAL at 07:20

## 2022-05-25 RX ADMIN — HYDROMORPHONE HYDROCHLORIDE 0.2 MG: 0.2 INJECTION, SOLUTION INTRAMUSCULAR; INTRAVENOUS; SUBCUTANEOUS at 22:08

## 2022-05-25 RX ADMIN — ACETAMINOPHEN 325MG 975 MG: 325 TABLET ORAL at 00:51

## 2022-05-25 RX ADMIN — DOCUSATE SODIUM 50 MG AND SENNOSIDES 8.6 MG 1 TABLET: 8.6; 5 TABLET, FILM COATED ORAL at 19:50

## 2022-05-25 RX ADMIN — METHOCARBAMOL 500 MG: 500 TABLET ORAL at 07:58

## 2022-05-25 RX ADMIN — OXYCODONE HYDROCHLORIDE 5 MG: 5 TABLET ORAL at 07:58

## 2022-05-25 RX ADMIN — HEPARIN SODIUM 5000 UNITS: 5000 INJECTION, SOLUTION INTRAVENOUS; SUBCUTANEOUS at 19:49

## 2022-05-25 RX ADMIN — MAGNESIUM SULFATE IN WATER 2 G: 40 INJECTION, SOLUTION INTRAVENOUS at 11:54

## 2022-05-25 RX ADMIN — DOCUSATE SODIUM 50 MG AND SENNOSIDES 8.6 MG 1 TABLET: 8.6; 5 TABLET, FILM COATED ORAL at 07:59

## 2022-05-25 ASSESSMENT — ACTIVITIES OF DAILY LIVING (ADL)
ADLS_ACUITY_SCORE: 30
ADLS_ACUITY_SCORE: 30
ADLS_ACUITY_SCORE: 38
ADLS_ACUITY_SCORE: 30
ADLS_ACUITY_SCORE: 31
ADLS_ACUITY_SCORE: 30
ADLS_ACUITY_SCORE: 30
ADLS_ACUITY_SCORE: 38
ADLS_ACUITY_SCORE: 31
ADLS_ACUITY_SCORE: 30
PREVIOUS_RESPONSIBILITIES: MEAL PREP;HOUSEKEEPING;LAUNDRY;SHOPPING;MEDICATION MANAGEMENT;FINANCES
ADLS_ACUITY_SCORE: 31
ADLS_ACUITY_SCORE: 31

## 2022-05-25 NOTE — PROGRESS NOTES
"Pain Service Progress Note  [facility]  Date: 05/25/2022       Patient Name: Marianne Monroy  MRN: [id number]  Age: 67 year old  Sex: female      Assessment:  Marianne Monroy is a 67 year old female with PMH of HTN, HLD, T2DM, GERD, MDD, JUSTUS, nonsustained VT, multivessel coronary disease with acute heart failure due to ischemic cardiomyopathy who underwent CABG x4 on 5/24/22 with Dr. Desai.     Procedure: CABG x4    Date of Surgery: 5/24/22    Date of Catheter Placement: 5/24/22    Plan/Recommendations:  1. Regional Anesthesia/Analgesia  -Continuous Catheter Type/Site: bilateral erector spinae (ES) T5-6  Continue 0.2% ropivacaine  Programmed Intermittent Bolus (PIB) at 7 mL Q60 min via each catheter, total infusion rate of 14 mL/hr    Plan to maintain catheter, max of 7 days    2. Anticoagulation  Heparin 5000U subcutaneous q8hr  -Please contact Inpatient Pain Service before ordering or making any anticoagulation changes       3. Multimodal Analgesia  - Per primary team. Patient currently intubated, on low dose fentanyl infusion    Pain Service will continue to follow.    Overnight Events: No acute events    Tubes/Drains: Yes  Chest tubes, geiger    Subjective: Intubated. Shaking head \"no\" to pain. Giving 2 thumbs up.   Nausea: No  Vomiting: No  Pruritus: No  Symptoms of LAST: No    Pain Location:  Anterior chest - pain controlled    Pain Intensity:    Satisfied with your level of pain control: Yes    Diet: NPO for Medical/Clinical Reasons Except for: Other; Specify: NPO until Extubated  Advance Diet as Tolerated: Clear Liquid Diet    Relevant Labs:  Recent Labs   Lab Test 05/25/22  0341   INR 1.29*   *   PTT 29   BUN 16       Physical Exam:  Vitals: /58   Pulse 77   Temp 37.7  C (99.9  F)   Resp 17   Ht 1.626 m (5' 4\")   Wt 71.2 kg (156 lb 15.5 oz)   SpO2 100%   BMI 26.94 kg/m      Physical Exam:   Orientation:  Alert, oriented, and in no acute distress: " "Yes  Sedation: No    Motor Examination:  5/5 Strength in lower extremities: Yes    Sensory Level:   Decrease in sensation: No    Catheter Site:   Catheter entry site is clean/dry/intact: Yes    Tender: No      Relevant Medications:  Current Pain Medications:  Medications related to Pain Management (From now, onward)    Start     Dose/Rate Route Frequency Ordered Stop    05/27/22 0000  acetaminophen (TYLENOL) tablet 650 mg         650 mg Oral EVERY 4 HOURS PRN 05/24/22 1538      05/25/22 0800  aspirin (ASA) chewable tablet 162 mg         162 mg Oral or NG Tube DAILY 05/24/22 1537      05/25/22 0800  polyethylene glycol (MIRALAX) Packet 17 g         17 g Oral DAILY 05/24/22 1538      05/24/22 2000  senna-docusate (SENOKOT-S/PERICOLACE) 8.6-50 MG per tablet 1 tablet         1 tablet Oral 2 TIMES DAILY 05/24/22 1538      05/24/22 1600  dexmedetomidine (PRECEDEX) 400 mcg in 0.9% sodium chloride 100 mL         0.2-0.7 mcg/kg/hr × 65 kg  3.3-11.4 mL/hr  Intravenous CONTINUOUS 05/24/22 1537      05/24/22 1600  acetaminophen (TYLENOL) tablet 975 mg         975 mg Oral or Feeding Tube EVERY 8 HOURS 05/24/22 1538 05/27/22 1559    05/24/22 1538  HYDROmorphone (DILAUDID) injection 0.2 mg        \"Or\" Linked Group Details    0.2 mg Intravenous EVERY 2 HOURS PRN 05/24/22 1538      05/24/22 1538  HYDROmorphone (DILAUDID) injection 0.4 mg        \"Or\" Linked Group Details    0.4 mg Intravenous EVERY 2 HOURS PRN 05/24/22 1538      05/24/22 1538  methocarbamol (ROBAXIN) tablet 500 mg         500 mg Oral EVERY 6 HOURS PRN 05/24/22 1538      05/24/22 1538  magnesium hydroxide (MILK OF MAGNESIA) suspension 30 mL         30 mL Oral DAILY PRN 05/24/22 1538      05/24/22 1538  bisacodyl (DULCOLAX) Suppository 10 mg         10 mg Rectal DAILY PRN 05/24/22 1538      05/24/22 1530  propofol (DIPRIVAN) infusion         5-75 mcg/kg/min × 65 kg  2-29.3 mL/hr  Intravenous CONTINUOUS 05/24/22 1516      05/24/22 1530  fentaNYL (SUBLIMAZE) infusion     " "     mcg/hr  0.5-4 mL/hr  Intravenous CONTINUOUS 05/24/22 1516      05/24/22 0830  ropivacaine 0.2% in NS perineural infusion simple          Perineural Continuous Nerve Block 05/24/22 0803      05/24/22 0830  ropivacaine 0.2% in NS perineural infusion simple          Perineural Continuous Nerve Block 05/24/22 0803      05/19/22 1404  oxyCODONE (ROXICODONE) tablet 5 mg        \"Or\" Linked Group Details    5 mg Oral EVERY 4 HOURS PRN 05/19/22 1404      05/19/22 1404  oxyCODONE IR (ROXICODONE) tablet 10 mg        \"Or\" Linked Group Details    10 mg Oral EVERY 4 HOURS PRN 05/19/22 1404      05/18/22 2206  lidocaine 1 % 0.1-1 mL         0.1-1 mL Other EVERY 1 HOUR PRN 05/18/22 2206      05/18/22 2206  lidocaine (LMX4) cream          Topical EVERY 1 HOUR PRN 05/18/22 2206            Primary Service Contacted with Recommendations? Yes    Cherie Lundberg MD  05/25/2022     Time/Communication:  I personally spent 20 minutes with greater than 50% in consultation, education, counseling and coordination of care.  See note above for details on conversation.      Contact Info Please Page the Pain Team Via [employer]    "

## 2022-05-25 NOTE — PROCEDURES
Small Bowel Feeding Tube Placement Assessment  Reason for Feeding Tube Placement: administration of nutrition and medication  Cortrak Start Time: 945   Cortrak End Time: 1025  Medicine Delivered During Procedure: lidocaine gel  Placement Successful: presumed NGT, difficulty passing pylorus    Procedure Complications: none  Final Placement Yonathan at exit of nare: 113 cm  Face to Face time with patient: 40 minutes    Bridle Placement:   Reason for bridle placement: securement of FT   Medicine delivered during procedure: lidocaine gel   Procedure: Successful  Location of top of clip on FT: @ 115 cm marker   Condition of nose/skin at time of bridle placement: Unremarkable   Face to Face time with patient: <5 minutes.    Suzan Corea, MS, RD, LD  4E (CVICU) RD pager: [phone]  Ascom: [phone]  Weekend/Holiday RD pager: [phone]

## 2022-05-25 NOTE — PROGRESS NOTES
05/25/22 1600   Quick Adds   Type of Visit Initial Occupational Therapy Evaluation   Living Environment   People in Home alone   Current Living Arrangements apartment   Home Accessibility no concerns   Transportation Anticipated car, drives self;family or friend will provide   Living Environment Comments pt lives alone in an apartment, no stairs. Pt reports plans for staying with son at d/c. Pt reports all needs met on main floor of sons home, tub/shower combo.   Self-Care   Usual Activity Tolerance good   Current Activity Tolerance moderate   Regular Exercise Yes   Activity/Exercise Type walking   Exercise Amount/Frequency daily   Equipment Currently Used at Home glucometer   Fall history within last six months yes   Number of times patient has fallen within last six months 3   Activity/Exercise/Self-Care Comment Pt reports IND at baseline with ADLs/IADLs. Pt thinks falls related to balance over last 6 mo. Pt enjoys walking and painting   Instrumental Activities of Daily Living (IADL)   Previous Responsibilities meal prep;housekeeping;laundry;shopping;medication management;finances   General Information   Onset of Illness/Injury or Date of Surgery 05/24/22   Referring Physician Gabi Tello, NP   Patient/Family Therapy Goal Statement (OT) To get back to walking   Additional Occupational Profile Info/Pertinent History of Current Problem Per chart Marianne Monroy is a 67 year old female who was admitted on 5/18/2022 with acute heart failure, to CVICU on 5/24/2022 s/p CABG x 4 and remains critically ill with cardiogenic  shock, post op pain, mechanical ventilation   Existing Precautions/Restrictions cardiac;sternal   Left Upper Extremity (Weight-bearing Status) partial weight-bearing (PWB)   Right Upper Extremity (Weight-bearing Status) partial weight-bearing (PWB)   Left Lower Extremity (Weight-bearing Status) full weight-bearing (FWB)   Right Lower Extremity (Weight-bearing Status) full weight-bearing (FWB)    Cognitive Status Examination   Orientation Status orientation to person, place and time   Cognitive Status Comments Appears intact, pt lethargic throughout session, though increased alertness once seated.   Visual Perception   Visual Impairment/Limitations corrective lenses full-time   Posture   Posture forward head position;protracted shoulders   Range of Motion Comprehensive   General Range of Motion bilateral upper extremity ROM WFL   Strength Comprehensive (MMT)   Comment, General Manual Muscle Testing (MMT) Assessment BUE at least 3+/5   Coordination   Upper Extremity Coordination No deficits were identified   Bed Mobility   Bed Mobility rolling left;rolling right   Rolling Left [place] (Bed Mobility) contact guard   Rolling Right [place] (Bed Mobility) contact guard   Transfers   Transfer Comments OH lift   Activities of Daily Living   BADL Assessment/Intervention bathing;upper body dressing;lower body dressing;grooming;toileting   Bathing Assessment/Intervention   [place] Level (Bathing) maximum assist (25% patient effort)   Upper Body Dressing Assessment/Training   [place] Level (Upper Body Dressing) minimum assist (75% patient effort)   Lower Body Dressing Assessment/Training   [place] Level (Lower Body Dressing) moderate assist (50% patient effort)   Grooming Assessment/Training   [place] Level (Grooming) set up   Toileting   [place] Level (Toileting) dependent (less than 25% patient effort)   Clinical Impression   Criteria for Skilled Therapeutic Interventions Met (OT) Yes, treatment indicated   OT Diagnosis Pt is below baseline for ADLs   OT Problem List-Impairments impacting ADL activity tolerance impaired;post-surgical precautions;strength;pain;mobility   Assessment of Occupational Performance 3-5 Performance Deficits   Identified Performance Deficits dressing, bathing, toileting, home management   Planned Therapy Interventions (OT) ADL retraining;IADL  retraining;transfer training;home program guidelines;progressive activity/exercise;strengthening   Clinical Decision Making Complexity (OT) low complexity   Anticipated Equipment Needs Upon Discharge (OT) shower chair   Risk & Benefits of therapy have been explained evaluation/treatment results reviewed;care plan/treatment goals reviewed;risks/benefits reviewed;current/potential barriers reviewed;participants voiced agreement with care plan;participants included;patient   Clinical Impression Comments Pt presents below baseline, limited by activity tolerance and IND with ADLs. Pt would benefit from skilled therapies to progress toward PLOF   OT Discharge Planning   OT Discharge Recommendation (DC Rec) Transitional Care Facility   OT Rationale for DC Rec Pt below functional baseline, would benefit from further skilled therapies to progress toward PLOF. Pending progress IP, pt may be able to d/c home with A.   OT Brief overview of current status OH lift   Total Evaluation Time (Minutes)   Total Evaluation Time (Minutes) 5   OT Goals   Therapy Frequency (OT) 5 times/wk   OT Predicted Duration/Target Date for Goal Attainment 06/10/22   OT Goals Upper Body Dressing;Lower Body Dressing;Lower Body Bathing;Toilet Transfer/Toileting;Cardiac Phase 1   OT: Upper Body Dressing Modified independent   OT: Lower Body Dressing Modified independent   OT: Lower Body Bathing Supervision/stand-by assist   OT: Toilet Transfer/Toileting Independent   OT: Understanding of cardiac education to maximize quality of life, condition management, and health outcomes Patient   OT: Perform aerobic activity with stable cardiovascular response intermittent;10 minutes;ambulation   OT: Functional/aerobic ambulation tolerance with stable cardiovascular response in order to return to home and community environment Independent;Within precautions;100 feet   OT: Navigation of stairs simulating home set up with stable cardiovascular response in order to  return to home and community environment Completed

## 2022-05-25 NOTE — PROGRESS NOTES
CLINICAL NUTRITION SERVICES - BRIEF NOTE    Received provider consult for nutrition education with comments post op cardiovascular surgery (automatic consult on post-op order set). S/p CABG x4 on 5/24. Nutrition education to be completed as able/appropriate (as pt s/p CABG and/or initial VAD).    RD will follow per LOS protocol or if re-consulted.     Suzan Corea MS, RD, LD  4E (CVICU) RD pager: [phone]  Ascom: [phone]  Weekend/Holiday RD pager: [phone]

## 2022-05-25 NOTE — PROGRESS NOTES
"   05/25/22 1418   General Information   Onset of Illness/Injury or Date of Surgery 05/18/22   Referring Physician Ying Uribe MD   Patient/Family Therapy Goal Statement (SLP) None stated   Pertinent History of Current Problem Marianne Monroy is a 67 year old female with PMH of HTN, HLD, T2DM, GERD, MDD, JUSTUS, nonsustained VT, multivessel coronary disease with acute heart failure due to ischemic cardiomyopathy who underwent CABG x4 on 5/24/22 with Dr. Desai. VLAD to LAD, SVGs (EVH from both thighs) to distal RCA, OM, ramus.  Intraoperative course uncomplicated. 2 hours on bypass and cross clamp 1:45 hours. LVEF preop 25-30%, end of case was about 30-35% by DAVID.  Pt admitted to CVICU sedated and intubated for further postoperative care. Pt extubated this AM. Clinical swallow eval completed per MD orders to further assess oropharyngeal swallow function.   Past History of Dysphagia No hx of dysphagia per chart review. Per RN, pt's family reported that pt \"aspirated a carrot\" prior to surgery. Upon further discussion with pt, she reported she was likely trying to talk while attempting to eat the carrot.   Type of Evaluation   Type of Evaluation Swallow Evaluation   Oral Motor   Oral Musculature generally intact   Structural Abnormalities none present   Mucosal Quality adequate   Dentition (Oral Motor)   Dentition (Oral Motor) adequate dentition   Facial Symmetry (Oral Motor)   Facial Symmetry (Oral Motor) WNL   Lip Function (Oral Motor)   Lip Range of Motion (Oral Motor) WNL   Tongue Function (Oral Motor)   Tongue ROM (Oral Motor) WNL   Jaw Function (Oral Motor)   Jaw Function (Oral Motor) WNL   Cough/Swallow/Gag Reflex (Oral Motor)   Soft Palate/Velum (Oral Motor) WNL   Volitional Throat Clear/Cough (Oral Motor) WNL   Vocal Quality/Secretion Management (Oral Motor)   Vocal Quality (Oral Motor) WFL   Secretion Management (Oral Motor) WNL   General Swallowing Observations   Respiratory Support (General Swallowing " Observations) supplemental oxygen;nasal cannula   Current Diet/Method of Nutritional Intake (General Swallowing Observations, NIS) NPO;nasogastric tube (NG)   Swallowing Evaluation Clinical swallow evaluation   Clinical Swallow Evaluation   Feeding Assistance frequent cues/help required   Clinical Swallow Evaluation Textures Trialed thin liquids;pureed;solid foods   Clinical Swallow Eval: Thin Liquid Texture Trial   Mode of Presentation, Thin Liquids cup;spoon;self-fed;fed by clinician   Volume of Liquid or Food Presented 7 oz   Oral Phase of Swallow WFL   Pharyngeal Phase of Swallow intact   Diagnostic Statement No overt s/sx of aspiration   Clinical Swallow Evaluation: Puree Solid Texture Trial   Mode of Presentation, Puree spoon;self-fed   Volume of Puree Presented 3 tbsp   Oral Phase, Puree WFL   Pharyngeal Phase, Puree intact   Diagnostic Statement No overt s/sx of aspiration   Clinical Swallow Evaluation: Solid Food Texture Trial   Mode of Presentation self-fed   Volume Presented 3 tbsp   Oral Phase   (prolonged but functional time for mastication)   Pharyngeal Phase intact   Diagnostic Statement No overt s/sx of aspiration. Pt required prolonged but functional time for mastication   Esophageal Phase of Swallow   Patient reports or presents with symptoms of esophageal dysphagia Yes   Esophageal comments Hx of GERD per chart review   Swallowing Recommendations   Diet Consistency Recommendations thin liquids (level 0);regular diet   Supervision Level for Intake close supervision needed   Mode of Delivery Recommendations bolus size, small;bolus size, large;food moistened;slow rate of intake   Swallowing Maneuver Recommendations alternate food and liquid intake;extra swallow   Monitoring/Assistance Required (Eating/Swallowing) stop eating activities when fatigue is present;monitor for cough or change in vocal quality with intake;optimize oral intake to minimize need for tube feeding   Recommended Feeding/Eating  Techniques (Swallow Eval) maintain upright sitting position for eating;maintain upright posture during/after eating for 30 minutes;minimize distractions during oral intake;set-up and prepare tray   Medication Administration Recommendations, Swallowing (SLP) as tolerated   General Therapy Interventions   Planned Therapy Interventions Dysphagia Treatment   Dysphagia treatment Compensatory strategies for swallowing;Instruction of safe swallow strategies   Clinical Impression   Criteria for Skilled Therapeutic Interventions Met (SLP Eval) Yes, treatment indicated   SLP Diagnosis minimal oropharyngeal dysphagia   Risks & Benefits of therapy have been explained evaluation/treatment results reviewed;care plan/treatment goals reviewed;risks/benefits reviewed;current/potential barriers reviewed;participants voiced agreement with care plan;participants included;patient   Clinical Impression Comments Clinical swallow eval completed per MD orders. Pt presents with minimal oropharyngeal dysphagia s/p CABG. Pt's oral mech exam unremarkable. Pt tolerated thin liquids, pureed textures, and regular solid textures with no overt s/sx of aspiration. Pt required prolonged but functional time for mastication with regular solid textures. Recommend pt advance to regular textures and thin liquids with tray set-up. Pt should be fully upright and alert for all PO, take small sips/bites, slow pacing, and alternate between consistencies. ST to continue to follow to assess diet tolerance and train safe swallow strategies. Anticipate pt will meet swallowing goals prior to discharge.   SLP Discharge Planning   SLP Discharge Recommendation home with assist   SLP Rationale for DC Rec anticipate pt will meet swallowing goals prior to discharge   SLP Brief overview of current status  Recommend pt advance to regular textures and thin liquids with tray set-up. Pt should be fully upright and alert for all PO, take small sips/bites, slow pacing, and  alternate between consistencies.    Total Evaluation Time   Total Evaluation Time (Minutes) 15   SLP Goals   Therapy Frequency (SLP Eval) 4 times/wk   SLP Predicted Duration/Target Date for Goal Attainment 07/01/22   SLP Goals Swallow   SLP: Safely tolerate diet without signs/symptoms of aspiration Regular diet;Thin liquids;With use of swallow precautions;Independently

## 2022-05-25 NOTE — PROGRESS NOTES
"CLINICAL NUTRITION SERVICES - ASSESSMENT NOTE     Nutrition Prescription    RECOMMENDATIONS FOR MDs/PROVIDERS TO ORDER:  Daily fluid adjustments per MD    Malnutrition Status:    Patient does not meet two of the established criteria necessary for diagnosing malnutrition    Recommendations already ordered by Registered Dietitian (RD):  Once FT placement verified by AXR, recommend:  Osmolite 1.5 Alexis @ goal of 50ml/hr (1200ml/day) + 1 pkt Prosource BID will provide: 1880 kcals, 98 g PRO, 914 ml free H20, 244 g CHO, and 0 g fiber daily.  - Initiate @ 20 ml/hr and advance by 10 ml q6hr as tolerated  - Do not start or advance until lytes (Mg++,K+) WNL and phos>2.0  - Recommend 30 ml q4hr fluid flushes for tube patency. Additional fluids and/or adjustments per MD.    - Order multivitamin/mineral (15 ml/day via FT) to help ensure micronutrient needs being met with suspected hypermetabolic demands and potential interruptions to TF infusions.  - Elevated HOB with gastric feeds     Future/Additional Recommendations:  Monitor for SLP recommendations and advancement of diet      REASON FOR ASSESSMENT  Marianne Monroy is a/an 67 year old female assessed by the dietitian for Provider Order - Registered Dietitian to Assess and Order TF per Medical Nutrition Therapy Protocol    NUTRITION HISTORY  Unable to obtain nutrition history as pt currently intubated at this time.     CURRENT NUTRITION ORDERS  Diet: NPO    LABS  Labs reviewed    MEDICATIONS  Medications reviewed    ANTHROPOMETRICS  Height: 162.6 cm (5' 4\")  Most Recent Weight: 71.2 kg (156 lb 15.5 oz) (Weight is up 6.2 kg from yesterday)    IBW: 55 kg  BMI: Overweight BMI 25-29.9  Weight History:   Wt Readings from Last 10 Encounters:   05/25/22 71.2 kg (156 lb 15.5 oz)   05/11/22 67.7 kg (149 lb 3.2 oz)   04/14/22 68.4 kg (150 lb 12.8 oz)   12/21/21 69.4 kg (153 lb 0.2 oz)   10/18/21 68 kg (150 lb)   05/18/21 66.7 kg (147 lb)     Dosing Weight: 65 kg (actual lowest " BW)    ASSESSED NUTRITION NEEDS  Estimated Energy Needs: 0429-8328 kcals/day (25 - 30 kcals/kg)  Justification: Maintenance  Estimated Protein Needs:  grams protein/day (1.2 - 1.5 grams of pro/kg)  Justification: Increased needs and Post-op  Estimated Fluid Needs: 1950 mL/day (1 mL/kcal)   Justification: Maintenance    PHYSICAL FINDINGS  See malnutrition section below.  No abnormal nutrition-related physical findings observed.     MALNUTRITION  % Intake: Unable to assess  % Weight Loss: None noted  Subcutaneous Fat Loss: None observed  Muscle Loss: None observed  Fluid Accumulation/Edema: None noted  Malnutrition Diagnosis: Patient does not meet two of the established criteria necessary for diagnosing malnutrition    NUTRITION DIAGNOSIS  Inadequate oral intake related to intubation as evidenced by NPO status and need for EN.       INTERVENTIONS  Implementation  Enteral Nutrition - Initiate     Goals  Total avg nutritional intake to meet a minimum of 25 kcal/kg and 1.2 g PRO/kg daily (per dosing wt 65 kg).     Monitoring/Evaluation  Progress toward goals will be monitored and evaluated per protocol.    Suzan Corea, MS, RD, LD  4E (CVICU) RD pager: [phone]  Ascom: [phone]  Weekend/Holiday RD pager: [phone]

## 2022-05-25 NOTE — PROGRESS NOTES
CV ICU PROGRESS NOTE  May 25, 2022      CO-MORBIDITIES:   S/P CABG (coronary artery bypass graft)  (primary encounter diagnosis)  Near syncope  Paroxysmal ventricular tachycardia (H)  Contact with and (suspected) exposure to covid-19    ASSESSMENT: Marianne Monroy is a 67 year old female with PMH of HTN, HLD, T2DM, GERD, MDD, JUSTUS, nonsustained VT, multivessel coronary disease with acute heart failure due to ischemic cardiomyopathy who underwent CABG x4 on 5/24/22 with Dr. Desai. CHU to LAD, SVGs (EVH from both thighs) to distal RCA, OM, ramus.  Intraoperative course uncomplicated. 2 hours on bypass and cross clamp 1:45 hours. LVEF preop 25-30%, end of case was about 30-35% by DAVID.  Pt admitted to CVICU sedated and intubated for further postoperative care.     MAJOR CHANGES TODAY:  - Sedation vacation  - Pressure support this AM  - Extubate if able  - Place NJT given history of aspiration  - 500 LR bolus for decreased urine output  - Check FeNa  - Start SQH 5000 q8h      PLAN:  Neuro/ pain/ sedation:  Hx of MDD and JUSTUS  Acute Postoperative pain  PTA: fluoxetine 20mg BID and hydroxyzine 25-50mg q6h PRN  - bilateral RICKEY catheters  - Monitor neurological status. Notify the MD for any acute changes in exam.  - Pain: fentanyl gtt. Scheduled tylenol and gabapentin. PRN tylenol, oxycodone, robaxin, dilaudid  - Sedation: propofol gtt  - Daily sedation vacation     Pulmonary care:   Postoperative ventilation management  - Intubated, ventilated  - Titrate supplemental oxygen to maintain saturation above 92%.  - Pulmonary hygiene: Incentive spirometer every 15- 30 minutes when awake, flutter valve, C&DB  - Pressure support  - Extubate today     Cardiovascular:    Hx of HTN  Acute HFrEF, ischemic CMP  Multivessel coronary artery disease  S/p CABG x 4 on 5/24/22 with Dr. Desai: CHU to LAD, SVG (harvest form both thighs) to distal RCA, OM, ramus.   Recent echo on 5/19, LVEF 20-30%  Post-op LVEF 35%  GDMT: Toprol XL 25 qday,  lisinopril 20mg qday (held, start Entresto postop) and valsartan 40mg qday (held, hyperkalemic)  ASA 81mg, Lipitor 80mg   - Monitor hemodynamic status.   - atrial and ventricular wires placed, not dependent on this during immediate postop period   - Goal MAP>65, SBP<140  - Atorvastatin 80 daily  - BB hold  - Entresto postop (discuss with cards) - hold  -  daily  - Epi 0.01 -> wean as able     GI care/ Nutrition:   Hx of GERD   PTA omeprazole 40mg qday - held  - NPO   - Protonix 40 daily  - Continue bowel regimen: miralax, senna    Renal/ Fluid Balance/ Electrolytes:   BL creat appears to be ~ 0.82-0.99 (5/23)  UOP decreased this AM  - 500 LR this AM  - Check FeNa to see if pre-renal  - Strict I/O, daily weights  - Avoid/limit nephrotoxins as able  - Replete lytes PRN per protocol     Endocrine:    Stress induced hyperglycemia  Preop A1c: 7.8% (4/14/22)   Preop on: Jardiance 625 qday, high sliding scale corrective insulin  Holding while inpt: Glipizide XL 10qday, Metformin XR 1000 BID  - Insulin gtt postop   - Goal BG <180 for optimal healing     ID/ Antibiotics:  Stress induced leukocytosis  - To complete perioperative regimen: ancef  - Continue to monitor fever curve, WBC and inflammatory markers as appropriate     Heme:     Stress induced leukocytosis  Acute blood loss anemia  Acute blood loss thrombocytopenia  Preop labs (5/18): Hgb: 13.8, hematocrit: 42.3%, plts: 284k.   No s/sx active bleeding  - Continue to monitor  - CBC      MSK/ Skin:  Sternotomy  Surgical Incision  - wound vac   - Sternal precautions  - Postoperative incision management per protocol  - PT/OT/CR     Prophylaxis:    - Mechanical prophylaxis for DVT  - Chemical DVT prophylaxis - SQH 5000 q8h  - Protonix 40 daily     Lines/ tubes/ drains:  - Arterial LinesETT,   - CTs: 1 pleural, 2 meds   - PA catheter, MAC RIJ, geiger, OG     Disposition:  - CVICU    Seen and discussed with Dr. Rosenthal, CVICU intensivist, and Dr. Crabtree, CVTS  surgeon    ====================================    SUBJECTIVE:   No acute events overnight. She had lower urine output this morning. Otherwise doing well on pressure support and planning to extubate.    OBJECTIVE:   1. VITAL SIGNS:   Temp:  [97.5  F (36.4  C)-99.9  F (37.7  C)] 99.86  F (37.7  C)  Pulse:  [60-90] 79  Resp:  [0-22] 16  BP: (117-149)/(53-70) 117/58  Cuff Mean (mmHg):  [] 78  MAP:  [55 mmHg-92 mmHg] 70 mmHg  Arterial Line BP: ()/(38-68) 120/50  FiO2 (%):  [35 %-60 %] 35 %  SpO2:  [96 %-100 %] 98 %  Vent Mode: CMV/AC  (Continuous Mandatory Ventilation/ Assist Control)  FiO2 (%): (S) 35 % (pO2= 141 on ABG)  Resp Rate (Set): 16 breaths/min  Tidal Volume (Set, mL): 420 mL  PEEP (cm H2O): 6 cmH2O  Resp: 16      2. INTAKE/ OUTPUT:   I/O last 3 completed shifts:  In: 4388.74 [I.V.:3088.74; Other:210; NG/GT:90; IV Piggyback:500]  Out: 2460 [Urine:2115; Emesis/NG output:25; Chest Tube:320]    3. PHYSICAL EXAMINATION:   General: Awake, following commands  Neuro: NAD, pupils reactive, no focal neuro defecits  Resp: Intubated, pressure supporting, chest tubes in place  CV: RRR  Abdomen: Soft, Non-distended, Non-tender  Incisions: clean dry and intact  Extremities: warm and well perfused    4. INVESTIGATIONS:   Arterial Blood Gases   Recent Labs   Lab 05/25/22  0342 05/24/22  2343 05/24/22 2026 05/24/22  1419   PH 7.42 7.34* 7.34* 7.41   PCO2 36 41 39 37   PO2 141* 120* 135* 120*   HCO3 23 22 21 23     Complete Blood Count   Recent Labs   Lab 05/25/22  0341 05/24/22  2338 05/24/22 2027 05/24/22  1549   WBC 8.6 13.1* 14.4* 9.7   HGB 8.5* 9.8* 10.4* 10.2*   * 189 214 154     Basic Metabolic Panel  Recent Labs   Lab 05/25/22  0631 05/25/22  0602 05/25/22  0342 05/25/22  0341 05/25/22  0136 05/24/22  2338 05/24/22  2230 05/24/22 2027 05/24/22  1559 05/24/22  1549   NA  --   --   --  145*  --  147*  --  147*  --  145*   POTASSIUM  --   --   --  4.3  --  3.5  --  3.5  --  4.5   CHLORIDE  --   --    --  116*  --  117*  --  114*  --  113*   CO2  --   --   --  23  --  23  --  21  --  22   BUN  --   --   --  16  --  18  --  18  --  18   CR  --   --   --  0.80  --  0.85  --  0.87  --  0.74   * 137* 146* 146*   < > 144*   < > 211*  201*   < > 200*    < > = values in this interval not displayed.     Liver Function Tests  Recent Labs   Lab 05/25/22  0341 05/24/22  2338 05/24/22  1549 05/24/22  1335 05/18/22  1715   AST  --  34 37  --  16   ALT  --  24 25  --  27   ALKPHOS  --  53 53  --  78   BILITOTAL  --  0.2 0.5  --  0.4   ALBUMIN  --  2.4* 2.5*  --  4.0   INR 1.29*  --  1.26* 1.55*  --      Pancreatic Enzymes  No lab results found in last 7 days.  Coagulation Profile  Recent Labs   Lab 05/25/22  0341 05/24/22  1549 05/24/22  1335   INR 1.29* 1.26* 1.55*   PTT 29 53* 30         5. RADIOLOGY:   Recent Results (from the past 24 hour(s))   XR Chest Port 1 View    Narrative    EXAM:  XR CHEST PORT 1 VIEW    INDICATION: Post Op CVTS Surgery    COMPARISON:  Chest x-ray 4/30/2022    HISTORY:  PMH heart failure, coronary artery disease status post  CABGx4 today.    FINDINGS:  Single AP view of the chest. Endotracheal tube tip just above the  fred. Postsurgical chest with intact sternotomy wires and  mediastinal surgical clips. Mediastinal drains. Left basilar chest  tube. Cobb-Pedro catheter terminating over the right main pulmonary  artery. Temporary pacer leads.    Cardiomediastinal silhouette within normal limits. Streaky perihilar  opacities. No pneumothorax. No pleural effusion. Unremarkable upper  abdomen. No acute bony lesions.      Impression    IMPRESSION:  1.  Endotracheal tube tip just above the fred. Recommend slight  retraction.  2.  Postsurgical changes of CABG. Perihilar atelectasis.    I have personally reviewed the examination and initial interpretation  and I agree with the findings.    FLORENCIO PACK DO         SYSTEM ID:  [id number]   XR Abdomen Port 1 View    Narrative    Exam: XR ABDOMEN  PORT 1 VIEWS, 5/24/2022 9:27 PM    Indication: eval OGT    Comparison: Same-day chest radiograph    Findings:   35 degrees upright frontal view of the lower chest and upper abdomen.  Enteric tube tip and sidehole project over the stomach. Mediastinal  drains and left chest tube. Partially visualized [place]-Pedro catheter.  Intact median sternotomy wires. No dilated small or large bowel. No  pneumatosis or portal venous gas. Streaky perihilar opacities.      Impression    Impression: Enteric tube tip and sidehole in the stomach.    FLORENCIO PACK DO         SYSTEM ID:  [id number]       =========================================

## 2022-05-25 NOTE — PLAN OF CARE
Goal Outcome Evaluation:    Plan of Care Reviewed With: patient     Overall Patient Progress: improving    Outcome Evaluation: Pressor requirements and Oxygen requirements on vent have decreased overnight.  Writer is hopeful this improvement will allow for extubation during the day shift today.    D/I:    Neuro:  HO to request.  Nods head to answer questions.  Propofol @ 30 mcg/kg/min.   Fentanyl Gtt started and titrated up to 100 mcg/hr for pain. Bilateral paravertebral Ropivacaine each catheter @ 7 ml/hr .  Tmax = 99.9 bladder.    Cardiac:  SR upper 70's to 80's with no ectopy.  Epi Gtt @ 0.01 to 0.05 ( currently @ 0.03 ) mcg/kg/min.  CVP = 6, 8 & 12.  PA= 23/11, 20/8 & 27/13. SVO2= 62%, 62% & 63%.  DENISE CI= 2.3, 2.5 & 2.9.  SVR= 1287, 11 & 1040.  Lactic acid up to 3.8 @ 2000.  ml bolus given. Lactic acid up to 4.1 @ midnight. 5% Albumin 500 ml given and lactic acid down to 1.6 on 0400 AM labs.  1 Gm IV Calcium Gluconate and 20 mEq IV KCL giver per ordered PRN lytes replacement protocol.    Pulm :  Vent CMV mode. FIO2 weaned down from 60 to 35%/16/420/5.  Lungs clear with diminished bases. Three chest tubes to 1 canister put out a total of 200 ml for the 12 hour shift which is an average of 17 ml/hr.    GI: Bowel sounds are not audible.  OG had only 25 ml output for the shift.     :  [place] urine output of 765 ml this 12 hour shift for an average of 64 ml/hr    Endocrine:  BG = 112 to 201 ( currently @ 132 ) with Insulin Gtt = 1 to 7 ( currently @ 2 ) units/hr.    Skin: Wound vac on sternal incision.  Ace Wraps on bilateral leg graft incisions.  Bruise on left hand.    A/P:    POD#1 CABG  x 4  Pt progressing well.  Wean Epi as tolerated keeping MAP > 65.  Goal BG is 100 to 150 using insulin Gtt.  Nursing anticipates Vent PS trial this AM with probable extubation today if PS trial goes well.

## 2022-05-25 NOTE — INTERIM SUMMARY
Marianne Monroy is a 67 year old female with b/l ES for pain control.    S: patient feels pain is not controlled. Does not feel pain coming from the catheter sites.    Temp: 36.8  C (98.3  F) Temp src: Axillary BP: 117/58 Pulse: 84   Resp: 16 SpO2: 100 % O2 Device: Mechanical Ventilator Oxygen Delivery: 2 LPM   O: catheter site without erythema or edema. Appear intact and at appropriate depth    A/P: Patient bolused with 10ml 0.25% bupivacaine in each catheter, total of 20ml. No symptoms of LAST. Nursing notified to check vitals for next 30 minutes. Please page with any further concerns.    Luca Tyson DO CA-2  Department of Anesthesiology  Pain Pager [phone]

## 2022-05-26 ENCOUNTER — APPOINTMENT (OUTPATIENT)
Dept: CARDIOLOGY | Facility: CLINIC | Age: 67
DRG: 233 | End: 2022-05-26
Payer: COMMERCIAL

## 2022-05-26 ENCOUNTER — APPOINTMENT (OUTPATIENT)
Dept: SPEECH THERAPY | Facility: CLINIC | Age: 67
DRG: 233 | End: 2022-05-26
Payer: COMMERCIAL

## 2022-05-26 ENCOUNTER — APPOINTMENT (OUTPATIENT)
Dept: ULTRASOUND IMAGING | Facility: CLINIC | Age: 67
DRG: 233 | End: 2022-05-26
Payer: COMMERCIAL

## 2022-05-26 ENCOUNTER — APPOINTMENT (OUTPATIENT)
Dept: OCCUPATIONAL THERAPY | Facility: CLINIC | Age: 67
DRG: 233 | End: 2022-05-26
Payer: COMMERCIAL

## 2022-05-26 LAB
ANION GAP SERPL CALCULATED.3IONS-SCNC: 6 MMOL/L (ref 3–14)
BASE EXCESS BLDA CALC-SCNC: 2.1 MMOL/L (ref -9–1.8)
BASE EXCESS BLDA CALC-SCNC: 3.2 MMOL/L (ref -9–1.8)
BASE EXCESS BLDV CALC-SCNC: -0.3 MMOL/L (ref -7.7–1.9)
BASE EXCESS BLDV CALC-SCNC: -1.5 MMOL/L (ref -7.7–1.9)
BASE EXCESS BLDV CALC-SCNC: -1.6 MMOL/L (ref -7.7–1.9)
BASE EXCESS BLDV CALC-SCNC: 1.1 MMOL/L (ref -7.7–1.9)
BASE EXCESS BLDV CALC-SCNC: 2.9 MMOL/L (ref -7.7–1.9)
BUN SERPL-MCNC: 20 MG/DL (ref 7–30)
CA-I BLD-MCNC: 5 MG/DL (ref 4.4–5.2)
CALCIUM SERPL-MCNC: 8.8 MG/DL (ref 8.5–10.1)
CHLORIDE BLD-SCNC: 114 MMOL/L (ref 94–109)
CO2 SERPL-SCNC: 24 MMOL/L (ref 20–32)
CREAT SERPL-MCNC: 0.91 MG/DL (ref 0.52–1.04)
ERYTHROCYTE [DISTWIDTH] IN BLOOD BY AUTOMATED COUNT: 14.4 % (ref 10–15)
GFR SERPL CREATININE-BSD FRML MDRD: 68 ML/MIN/1.73M2
GLUCOSE BLD-MCNC: 121 MG/DL (ref 70–99)
GLUCOSE BLDC GLUCOMTR-MCNC: 110 MG/DL (ref 70–99)
GLUCOSE BLDC GLUCOMTR-MCNC: 120 MG/DL (ref 70–99)
GLUCOSE BLDC GLUCOMTR-MCNC: 143 MG/DL (ref 70–99)
GLUCOSE BLDC GLUCOMTR-MCNC: 146 MG/DL (ref 70–99)
GLUCOSE BLDC GLUCOMTR-MCNC: 186 MG/DL (ref 70–99)
GLUCOSE BLDC GLUCOMTR-MCNC: 237 MG/DL (ref 70–99)
GLUCOSE BLDC GLUCOMTR-MCNC: 251 MG/DL (ref 70–99)
GLUCOSE BLDC GLUCOMTR-MCNC: 293 MG/DL (ref 70–99)
GLUCOSE BLDC GLUCOMTR-MCNC: 306 MG/DL (ref 70–99)
GLUCOSE BLDC GLUCOMTR-MCNC: 311 MG/DL (ref 70–99)
GLUCOSE BLDC GLUCOMTR-MCNC: 81 MG/DL (ref 70–99)
HCO3 BLD-SCNC: 28 MMOL/L (ref 21–28)
HCO3 BLD-SCNC: 28 MMOL/L (ref 21–28)
HCO3 BLDV-SCNC: 26 MMOL/L (ref 21–28)
HCO3 BLDV-SCNC: 27 MMOL/L (ref 21–28)
HCO3 BLDV-SCNC: 29 MMOL/L (ref 21–28)
HCT VFR BLD AUTO: 26.2 % (ref 35–47)
HGB BLD-MCNC: 8 G/DL (ref 11.7–15.7)
LVEF ECHO: NORMAL
MAGNESIUM SERPL-MCNC: 1.9 MG/DL (ref 1.6–2.3)
MAGNESIUM SERPL-MCNC: 2.5 MG/DL (ref 1.6–2.3)
MCH RBC QN AUTO: 28.6 PG (ref 26.5–33)
MCHC RBC AUTO-ENTMCNC: 30.5 G/DL (ref 31.5–36.5)
MCV RBC AUTO: 94 FL (ref 78–100)
O2/TOTAL GAS SETTING VFR VENT: 2 %
O2/TOTAL GAS SETTING VFR VENT: 28 %
O2/TOTAL GAS SETTING VFR VENT: 4 %
OXYHGB MFR BLD: 93 % (ref 92–100)
OXYHGB MFR BLD: 99 % (ref 92–100)
OXYHGB MFR BLDV: 46 % (ref 70–75)
OXYHGB MFR BLDV: 49 % (ref 70–75)
OXYHGB MFR BLDV: 51 % (ref 70–75)
OXYHGB MFR BLDV: 54 % (ref 70–75)
OXYHGB MFR BLDV: 56 % (ref 70–75)
PCO2 BLD: 46 MM HG (ref 35–45)
PCO2 BLD: 47 MM HG (ref 35–45)
PCO2 BLDV: 48 MM HG (ref 40–50)
PCO2 BLDV: 50 MM HG (ref 40–50)
PCO2 BLDV: 51 MM HG (ref 40–50)
PCO2 BLDV: 54 MM HG (ref 40–50)
PCO2 BLDV: 56 MM HG (ref 40–50)
PF4 HEPARIN CMPLX AB SER QL: NEGATIVE
PH BLD: 7.38 [PH] (ref 7.35–7.45)
PH BLD: 7.4 [PH] (ref 7.35–7.45)
PH BLDV: 7.27 [PH] (ref 7.32–7.43)
PH BLDV: 7.28 [PH] (ref 7.32–7.43)
PH BLDV: 7.33 [PH] (ref 7.32–7.43)
PH BLDV: 7.36 [PH] (ref 7.32–7.43)
PH BLDV: 7.36 [PH] (ref 7.32–7.43)
PHOSPHATE SERPL-MCNC: 2.4 MG/DL (ref 2.5–4.5)
PLATELET # BLD AUTO: 96 10E3/UL (ref 150–450)
PO2 BLD: 118 MM HG (ref 80–105)
PO2 BLD: 71 MM HG (ref 80–105)
PO2 BLDV: 28 MM HG (ref 25–47)
PO2 BLDV: 30 MM HG (ref 25–47)
PO2 BLDV: 31 MM HG (ref 25–47)
PO2 BLDV: 31 MM HG (ref 25–47)
PO2 BLDV: 32 MM HG (ref 25–47)
POTASSIUM BLD-SCNC: 4 MMOL/L (ref 3.4–5.3)
POTASSIUM BLD-SCNC: 4.6 MMOL/L (ref 3.4–5.3)
RBC # BLD AUTO: 2.8 10E6/UL (ref 3.8–5.2)
SODIUM SERPL-SCNC: 144 MMOL/L (ref 133–144)
WBC # BLD AUTO: 9.9 10E3/UL (ref 4–11)

## 2022-05-26 PROCEDURE — 93321 DOPPLER ECHO F-UP/LMTD STD: CPT | Mod: 26 | Performed by: INTERNAL MEDICINE

## 2022-05-26 PROCEDURE — 97530 THERAPEUTIC ACTIVITIES: CPT | Mod: GO

## 2022-05-26 PROCEDURE — 97535 SELF CARE MNGMENT TRAINING: CPT | Mod: GO

## 2022-05-26 PROCEDURE — 92526 ORAL FUNCTION THERAPY: CPT | Mod: GN

## 2022-05-26 PROCEDURE — 999N000155 HC STATISTIC RAPCV CVP MONITORING

## 2022-05-26 PROCEDURE — 250N000013 HC RX MED GY IP 250 OP 250 PS 637: Performed by: ANESTHESIOLOGY

## 2022-05-26 PROCEDURE — 84100 ASSAY OF PHOSPHORUS: CPT | Performed by: STUDENT IN AN ORGANIZED HEALTH CARE EDUCATION/TRAINING PROGRAM

## 2022-05-26 PROCEDURE — 99231 SBSQ HOSP IP/OBS SF/LOW 25: CPT | Performed by: NURSE PRACTITIONER

## 2022-05-26 PROCEDURE — 80048 BASIC METABOLIC PNL TOTAL CA: CPT | Performed by: SURGERY

## 2022-05-26 PROCEDURE — 83735 ASSAY OF MAGNESIUM: CPT | Performed by: STUDENT IN AN ORGANIZED HEALTH CARE EDUCATION/TRAINING PROGRAM

## 2022-05-26 PROCEDURE — 258N000003 HC RX IP 258 OP 636: Performed by: SURGERY

## 2022-05-26 PROCEDURE — 250N000012 HC RX MED GY IP 250 OP 636 PS 637: Performed by: ANESTHESIOLOGY

## 2022-05-26 PROCEDURE — 93970 EXTREMITY STUDY: CPT | Mod: 26 | Performed by: STUDENT IN AN ORGANIZED HEALTH CARE EDUCATION/TRAINING PROGRAM

## 2022-05-26 PROCEDURE — 93308 TTE F-UP OR LMTD: CPT | Mod: 26 | Performed by: INTERNAL MEDICINE

## 2022-05-26 PROCEDURE — 86022 PLATELET ANTIBODIES: CPT

## 2022-05-26 PROCEDURE — 93970 EXTREMITY STUDY: CPT

## 2022-05-26 PROCEDURE — 93325 DOPPLER ECHO COLOR FLOW MAPG: CPT | Mod: 26 | Performed by: INTERNAL MEDICINE

## 2022-05-26 PROCEDURE — 82805 BLOOD GASES W/O2 SATURATION: CPT | Performed by: SURGERY

## 2022-05-26 PROCEDURE — 93325 DOPPLER ECHO COLOR FLOW MAPG: CPT

## 2022-05-26 PROCEDURE — 250N000013 HC RX MED GY IP 250 OP 250 PS 637: Performed by: SURGERY

## 2022-05-26 PROCEDURE — 85027 COMPLETE CBC AUTOMATED: CPT

## 2022-05-26 PROCEDURE — 99291 CRITICAL CARE FIRST HOUR: CPT | Mod: 24 | Performed by: ANESTHESIOLOGY

## 2022-05-26 PROCEDURE — 250N000011 HC RX IP 250 OP 636: Performed by: ANESTHESIOLOGY

## 2022-05-26 PROCEDURE — 999N000015 HC STATISTIC ARTERIAL MONITORING DAILY

## 2022-05-26 PROCEDURE — 999N000045 HC STATISTIC DAILY SWAN MONITORING

## 2022-05-26 PROCEDURE — 82805 BLOOD GASES W/O2 SATURATION: CPT | Performed by: STUDENT IN AN ORGANIZED HEALTH CARE EDUCATION/TRAINING PROGRAM

## 2022-05-26 PROCEDURE — 255N000002 HC RX 255 OP 636: Performed by: INTERNAL MEDICINE

## 2022-05-26 PROCEDURE — 84132 ASSAY OF SERUM POTASSIUM: CPT | Performed by: STUDENT IN AN ORGANIZED HEALTH CARE EDUCATION/TRAINING PROGRAM

## 2022-05-26 PROCEDURE — 250N000009 HC RX 250: Performed by: STUDENT IN AN ORGANIZED HEALTH CARE EDUCATION/TRAINING PROGRAM

## 2022-05-26 PROCEDURE — 250N000011 HC RX IP 250 OP 636: Performed by: SURGERY

## 2022-05-26 PROCEDURE — 999N000157 HC STATISTIC RCP TIME EA 10 MIN

## 2022-05-26 PROCEDURE — 82330 ASSAY OF CALCIUM: CPT | Performed by: SURGERY

## 2022-05-26 PROCEDURE — 250N000013 HC RX MED GY IP 250 OP 250 PS 637: Performed by: STUDENT IN AN ORGANIZED HEALTH CARE EDUCATION/TRAINING PROGRAM

## 2022-05-26 PROCEDURE — 250N000011 HC RX IP 250 OP 636: Performed by: STUDENT IN AN ORGANIZED HEALTH CARE EDUCATION/TRAINING PROGRAM

## 2022-05-26 PROCEDURE — 258N000003 HC RX IP 258 OP 636: Performed by: ANESTHESIOLOGY

## 2022-05-26 PROCEDURE — 200N000002 HC R&B ICU UMMC

## 2022-05-26 RX ORDER — FUROSEMIDE 10 MG/ML
20 INJECTION INTRAMUSCULAR; INTRAVENOUS ONCE
Status: COMPLETED | OUTPATIENT
Start: 2022-05-26 | End: 2022-05-26

## 2022-05-26 RX ORDER — MULTIPLE VITAMINS W/ MINERALS TAB 9MG-400MCG
1 TAB ORAL DAILY
Status: DISCONTINUED | OUTPATIENT
Start: 2022-05-27 | End: 2022-06-04 | Stop reason: HOSPADM

## 2022-05-26 RX ORDER — MULTIPLE VITAMINS W/ MINERALS TAB 9MG-400MCG
1 TAB ORAL DAILY
Status: DISCONTINUED | OUTPATIENT
Start: 2022-05-26 | End: 2022-05-26

## 2022-05-26 RX ORDER — NICOTINE POLACRILEX 4 MG
15-30 LOZENGE BUCCAL
Status: DISCONTINUED | OUTPATIENT
Start: 2022-05-26 | End: 2022-06-04 | Stop reason: HOSPADM

## 2022-05-26 RX ORDER — MAGNESIUM OXIDE 400 MG/1
400 TABLET ORAL 2 TIMES DAILY
Status: COMPLETED | OUTPATIENT
Start: 2022-05-26 | End: 2022-05-28

## 2022-05-26 RX ORDER — DEXTROSE MONOHYDRATE 100 MG/ML
INJECTION, SOLUTION INTRAVENOUS CONTINUOUS PRN
Status: DISCONTINUED | OUTPATIENT
Start: 2022-05-26 | End: 2022-05-30

## 2022-05-26 RX ORDER — DOBUTAMINE HYDROCHLORIDE 200 MG/100ML
1.25 INJECTION INTRAVENOUS CONTINUOUS
Status: DISCONTINUED | OUTPATIENT
Start: 2022-05-26 | End: 2022-05-27

## 2022-05-26 RX ORDER — DEXTROSE MONOHYDRATE 25 G/50ML
25-50 INJECTION, SOLUTION INTRAVENOUS
Status: DISCONTINUED | OUTPATIENT
Start: 2022-05-26 | End: 2022-06-04 | Stop reason: HOSPADM

## 2022-05-26 RX ORDER — PANTOPRAZOLE SODIUM 40 MG/1
40 TABLET, DELAYED RELEASE ORAL
Status: DISCONTINUED | OUTPATIENT
Start: 2022-05-27 | End: 2022-06-04 | Stop reason: HOSPADM

## 2022-05-26 RX ADMIN — HEPARIN SODIUM 5000 UNITS: 5000 INJECTION, SOLUTION INTRAVENOUS; SUBCUTANEOUS at 04:11

## 2022-05-26 RX ADMIN — POTASSIUM & SODIUM PHOSPHATES POWDER PACK 280-160-250 MG 1 PACKET: 280-160-250 PACK at 17:19

## 2022-05-26 RX ADMIN — MULTIVIT AND MINERALS-FERROUS GLUCONATE 9 MG IRON/15 ML ORAL LIQUID 15 ML: at 08:05

## 2022-05-26 RX ADMIN — Medication 40 MG: at 09:09

## 2022-05-26 RX ADMIN — HUMAN ALBUMIN MICROSPHERES AND PERFLUTREN 5 ML: 10; .22 INJECTION, SOLUTION INTRAVENOUS at 11:28

## 2022-05-26 RX ADMIN — DOCUSATE SODIUM 50 MG AND SENNOSIDES 8.6 MG 1 TABLET: 8.6; 5 TABLET, FILM COATED ORAL at 20:43

## 2022-05-26 RX ADMIN — INSULIN ASPART 4 UNITS: 100 INJECTION, SOLUTION INTRAVENOUS; SUBCUTANEOUS at 17:33

## 2022-05-26 RX ADMIN — ACETAMINOPHEN 325MG 975 MG: 325 TABLET ORAL at 00:06

## 2022-05-26 RX ADMIN — HUMAN INSULIN 4 UNITS/HR: 100 INJECTION, SOLUTION SUBCUTANEOUS at 22:02

## 2022-05-26 RX ADMIN — METHOCARBAMOL 500 MG: 500 TABLET ORAL at 08:05

## 2022-05-26 RX ADMIN — OXYCODONE HYDROCHLORIDE 5 MG: 5 TABLET ORAL at 17:20

## 2022-05-26 RX ADMIN — ATORVASTATIN CALCIUM 80 MG: 80 TABLET, FILM COATED ORAL at 08:05

## 2022-05-26 RX ADMIN — FUROSEMIDE 20 MG: 10 INJECTION, SOLUTION INTRAMUSCULAR; INTRAVENOUS at 10:43

## 2022-05-26 RX ADMIN — HYDROMORPHONE HYDROCHLORIDE 0.4 MG: 0.2 INJECTION, SOLUTION INTRAMUSCULAR; INTRAVENOUS; SUBCUTANEOUS at 06:31

## 2022-05-26 RX ADMIN — ACETAMINOPHEN 325MG 975 MG: 325 TABLET ORAL at 08:05

## 2022-05-26 RX ADMIN — HYDROMORPHONE HYDROCHLORIDE 0.4 MG: 0.2 INJECTION, SOLUTION INTRAMUSCULAR; INTRAVENOUS; SUBCUTANEOUS at 00:51

## 2022-05-26 RX ADMIN — SODIUM CHLORIDE, POTASSIUM CHLORIDE, SODIUM LACTATE AND CALCIUM CHLORIDE 500 ML: 600; 310; 30; 20 INJECTION, SOLUTION INTRAVENOUS at 06:15

## 2022-05-26 RX ADMIN — DOBUTAMINE HYDROCHLORIDE 2.5 MCG/KG/MIN: 200 INJECTION INTRAVENOUS at 10:53

## 2022-05-26 RX ADMIN — OXYCODONE HYDROCHLORIDE 5 MG: 5 TABLET ORAL at 08:05

## 2022-05-26 RX ADMIN — DOCUSATE SODIUM 50 MG AND SENNOSIDES 8.6 MG 1 TABLET: 8.6; 5 TABLET, FILM COATED ORAL at 08:05

## 2022-05-26 RX ADMIN — ACETAMINOPHEN 325MG 975 MG: 325 TABLET ORAL at 17:19

## 2022-05-26 RX ADMIN — INSULIN ASPART 1 UNITS: 100 INJECTION, SOLUTION INTRAVENOUS; SUBCUTANEOUS at 12:14

## 2022-05-26 RX ADMIN — HYDRALAZINE HYDROCHLORIDE 10 MG: 20 INJECTION INTRAMUSCULAR; INTRAVENOUS at 17:24

## 2022-05-26 RX ADMIN — EPINEPHRINE 0.01 MCG/KG/MIN: 1 INJECTION PARENTERAL at 04:13

## 2022-05-26 RX ADMIN — ACETAMINOPHEN 325MG 975 MG: 325 TABLET ORAL at 23:43

## 2022-05-26 RX ADMIN — POLYETHYLENE GLYCOL 3350 17 G: 17 POWDER, FOR SOLUTION ORAL at 08:05

## 2022-05-26 RX ADMIN — MUPIROCIN 0.5 G: 20 OINTMENT TOPICAL at 08:06

## 2022-05-26 RX ADMIN — ASPIRIN 81 MG 162 MG: 81 TABLET ORAL at 08:05

## 2022-05-26 RX ADMIN — FUROSEMIDE 20 MG: 10 INJECTION, SOLUTION INTRAMUSCULAR; INTRAVENOUS at 18:45

## 2022-05-26 RX ADMIN — POTASSIUM & SODIUM PHOSPHATES POWDER PACK 280-160-250 MG 1 PACKET: 280-160-250 PACK at 20:43

## 2022-05-26 RX ADMIN — Medication 400 MG: at 20:43

## 2022-05-26 RX ADMIN — HEPARIN SODIUM 5000 UNITS: 5000 INJECTION, SOLUTION INTRAVENOUS; SUBCUTANEOUS at 20:43

## 2022-05-26 RX ADMIN — MUPIROCIN 0.5 G: 20 OINTMENT TOPICAL at 20:44

## 2022-05-26 ASSESSMENT — ACTIVITIES OF DAILY LIVING (ADL)
ADLS_ACUITY_SCORE: 33
ADLS_ACUITY_SCORE: 31
ADLS_ACUITY_SCORE: 33

## 2022-05-26 NOTE — PLAN OF CARE
Speech Language Therapy Discharge Summary    Reason for therapy discharge:    All goals and outcomes met, no further needs identified.    Progress towards therapy goal(s). See goals on Care Plan in [facility] electronic health record for goal details.  Goals met    Therapy recommendation(s):    No further therapy is recommended.      Recommend pt continue regular textures and thin liquids with tray set-up. Pt should be fully upright and alert for all PO, take small sips/bites, slow pacing, and alternate between consistencies. Swallowing goals met; will complete orders.

## 2022-05-26 NOTE — OP NOTE
Procedure Date: 05/24/2022    PREOPERATIVE DIAGNOSES:  1.  Coronary artery disease.  2.  Recent ventricular arrhythmia.  3.  Severe left ventricular dysfunction.    POSTOPERATIVE DIAGNOSES:    1.  Coronary artery disease.  2.  Recent ventricular arrhythmia.  3.  Severe left ventricular dysfunction.    PROCEDURE:  1.  Coronary artery bypass grafting x 4 (left internal mammary artery to left anterior descending artery, saphenous vein graft to distal right coronary artery, saphenous vein graft to ramus intermedius artery, saphenous vein graft to obtuse marginal artery).  2.  Endoscopic vein harvest.    SURGEON:  Ulises Desai MD    ASSISTANTS:  Dayanara Walker MD, Charli Shoemaker PA-C.    OPERATIVE INDICATIONS:  The patient is a 67-year-old female who was admitted with ventricular tachycardia.  Coronary angiogram showed severe triple vessel coronary artery disease with severe left ventricular dysfunction.  Decision was made to proceed with coronary artery bypass grafting.  I discussed risks, benefits of surgery with the patient and the family including risk of death, stroke, bleeding, wound infection, renal failure, arrhythmias.  She has agreed to proceed with surgery.    OPERATIVE FINDINGS:  The patient's sternum was of mildly osteoporotic.  Pericardial space was free of any adhesions.  Aorta was grossly free of plaques.  Veins obtained were adequate for bypass grafting.  Left internal mammary artery was of adequate quality with good flow.  Vessels bypassed included the left anterior descending artery which was a 2 mm vessel with proximal stenosis, the distal right coronary artery which was 1.75 mm in diameter with proximal occlusion, and both the ramus intermedius and first obtuse marginal artery which were 2.2 mm in diameter with proximal stenosis.    DESCRIPTION OF OPERATION:  The patient was brought to the operating room in stable critical condition.  Following the administration of general anesthesia, the patient's  chest, abdomen, and lower extremities were prepped and draped in usual sterile manner.  A long segment of saphenous vein was harvested from both lower extremities from both greater saphenous veins using endoscopic vein harvest techniques.  Simultaneously, a median sternotomy was performed.  The left internal mammary artery was harvested from its bed and dilated with topical papaverine.  Preparation for cardiopulmonary bypass included ACT-guided heparinization and administration of Amicar.  Aorta was cannulated with a 20-Pashto arterial cannula via 3 atretic pursestring pledgeted suture x 2.  Dual stage venous cannula was inserted in the right atrium.  Antegrade and retrograde cardioplegia cannulae were placed.  Full cardiopulmonary bypass was initiated.  Aortic pressure was temporarily reduced and the aorta was cross-clamped.  One liter of cold blood cardioplegia was administered with antegrade and retrograde routes.  Subsequent doses of cardioplegia were given in no greater than 20-minute intervals, via the retrograde route as well as via the completed vein grafts.  Reverse segment of saphenous vein was anastomosed end-to-side to an arteriotomy in the distal portion of the right coronary artery using 7-0 Prolene.  Second reverse segment of saphenous vein was anastomosed end-to-side to an arteriotomy in the mid portion of the ramus intermedius artery using 7-0 Prolene.  Third reversed segment of saphenous vein was anastomosed end-to-side to an arteriotomy in the mid portion of the obtuse marginal artery using 7-0 Prolene.  Distal end of the left internal mammary artery was anastomosed end-to-side to an arteriotomy in the mid portion of the left anterior descending artery using 7-0 Prolene.  Proximal ends of the 3 vein grafts were anastomosed to three 4 mm aortotomies using 6-0 Prolene.  Warm dose of retrograde hotshot cardioplegia was given with high suction on the aortic root vent.  The cross-clamp was released.   Organized rhythm resumed without the need for defibrillations.  Rewarming and reperfusion allowed.  De-airing was confirmed by ventriculography.  The patient was gradually weaned off cardiopulmonary bypass using low dose epinephrine and Levophed.  Following termination of cardiopulmonary bypass, LV function had improved to 30% ejection fraction.  Protamine was given and all cannulas were removed.  Careful hemostasis was obtained.  Two anterior mediastinal and a left pleural chest tube were placed.  Two right atrial pacing wires were placed.  Sternum was closed with surgical steel wires.  Fascia, subcutaneous tissue, skin of chest were closed in layers.  The patient transferred to ICU in stable critical condition.      Ulises Desai MD        D: 2022   T: 2022   MT: [id number]    Name:     CRISTAL YOUSIF  MRN:      [id number]        Account:        [id number]   :      1955           Procedure Date: 2022     Document: [id number]

## 2022-05-26 NOTE — ANESTHESIA POSTPROCEDURE EVALUATION
Patient: Marianne Monroy    Procedure: Procedure(s):  Median sternotomy.  Intraoperative transesophageal echocardiogram per anesthesia.  Left internal mammary artery harvest.  Right and left endoscopically harvested  greater saphenouse vein.  Cardiopulmonary Bypass.  Coronary Artery Bypass Grafts x4.       Anesthesia Type:  General    Note:  Disposition: ICU            ICU Sign Out: Anesthesiologist/ICU physician sign out WAS performed   Postop Pain Control: Uneventful            Sign Out: Well controlled pain   PONV: No   Neuro/Psych: Uneventful            Sign Out: Acceptable/Baseline neuro status   Airway/Respiratory: Uneventful            Sign Out: Acceptable/Baseline resp. status; AIRWAY IN SITU/Resp. Support               Airway in situ/Resp. Support: ETT                 Reason: Planned Pre-op   CV/Hemodynamics: Uneventful            Sign Out: Acceptable CV status; No obvious hypovolemia; No obvious fluid overload   Other NRE: NONE   DID A NON-ROUTINE EVENT OCCUR? No           Last vitals:  Vitals:    05/26/22 0800 05/26/22 0815 05/26/22 0830   BP: 127/57  (!) 149/68   Pulse: 80  82   Resp:      Temp: 37.6  C (99.7  F)  37.7  C (99.9  F)   SpO2: 95% 96% 98%       Electronically Signed By: Carlo Ramey MD  May 26, 2022  8:53 AM

## 2022-05-26 NOTE — PLAN OF CARE
Major Shift Events:  Extubated at 1030 to 2L NC.  Dietician placed NG at bedside; unable to advance to J - team notified; per MD use NG for medications only (no feeding).  RN was unable to flush NG - dietician pulled back 5cm; another abd xray obtained and MD okayed for use.  Passed SLP swallow study.  Emesis after drinking drinking water; pt currently tolerating ice chips.  Low UOP this am - MD notified; 500 LR given - no issues since.  CVP 13, PAP 26-31/13-16, CI 2.5-2.8.  Pt remains on 0.01-0.03 of epi to maintain MAPs >65.  Insulin gtt at 1-2 units/hr on alg 2.  SR 70s-90s.  Intermittently has bigeminal PVC's - team aware.  12-lead EKG obtained and magnesium replaced.    Plan: Continue to monitor.  Wean epi as able.  For vital signs and complete assessments, please see documentation flowsheets.

## 2022-05-26 NOTE — PROCEDURES
Arterial Line Procedure Note             Resident/Fellow: Jojo He MD       Performed By: resident  Procedure   Procedure: arterial line and new line       Laterality: left       Insertion Site: radial.  Sterile Prep        Standard elements of sterile barrier followed       Skin prep: Chloraprep  Insertion/Injection        Technique: USG guided and Seldinger technique.       Catheter Type/Size: 20 G, 12 cm  Narrative         Secured by: suture       Tegaderm dressing used.       Complications: None apparent,        Arterial waveform:        IBP within 10% of NIBP: Yes        Ying Uribe MD  PGY-3  Anesthesia

## 2022-05-26 NOTE — PROGRESS NOTES
Reason for Assessment:  Post-CABG nutrition education, received consult.     Diet History:  Pt reports no history of receiving heart-healthy nutrition education in the past.       Nutrition Diagnosis:  Food- and nutrition-related knowledge deficit r/t no previous knowledge of heart-healthy diet AEB pt report of no previous formal heart-healthy nutrition education.     Nutrition Prescription/Recs:  Continue heart-healthy diet.       Interventions:    Nutrition Education  1. Provided verbal instruction on a heart-healthy diet. Recommended limiting saturated fats and choosing heart healthy fats. Discussed examples and provided lists and sample menu. Discussed limiting foods high in sodium and sodium goal (<2000 mg/d).   2. Discussed increased protein needs with pt. Recommended  g of protein/d during recovery time (next few weeks). Discussed sources of high protein foods and how to increase protein intake. Recommended 2-3 oral nutrition supplements daily after discharge.   3. Provided handouts: Cardiac-[facility] Nutrition Therapy      Ordered a variety of oral nutrition supplements for pt to try.      Goals:    1. Pt will verbalize at least four foods high in saturated and five foods high in sodium.    2. Pt will list at least two interventions to make current meal plan more heart-healthy.      Follow-up:   Patient to ask any further nutrition-related questions before discharge. In addition, pt may request outpatient RD appointment.    Suzan Corea, MS, RD, LD  4E (CVICU) RD pager: [phone]  Ascom: [phone]  Weekend/Holiday RD pager: [phone]

## 2022-05-26 NOTE — PROGRESS NOTES
CV ICU PROGRESS NOTE  May 25, 2022      CO-MORBIDITIES:   S/P CABG (coronary artery bypass graft)  (primary encounter diagnosis)  Near syncope  Paroxysmal ventricular tachycardia (H)  Contact with and (suspected) exposure to covid-19    ASSESSMENT: Marianne Monroy is a 67 year old female with PMH of HTN, HLD, T2DM, GERD, MDD, JUSTUS, nonsustained VT, multivessel coronary disease with acute heart failure due to ischemic cardiomyopathy who underwent CABG x4 on 5/24/22 with Dr. Desai. VLAD to LAD, SVGs (EVH from both thighs) to distal RCA, OM, ramus.  Intraoperative course uncomplicated. 2 hours on bypass and cross clamp 1:45 hours. LVEF preop 25-30%, end of case was about 30-35% by DAVID. Pt admitted to CVICU sedated and intubated for further postoperative care.     OVERNIGHT:  Given 500 mL bolus of LR for low UOP. Emesis x1 due to drinking water quickly.     MAJOR CHANGES TODAY:  - BIPAP; failed due to anxiety; Start HFNC if hypercarbic  - Bilat LE US --> No DVT  - JOSHUA panel   - Echo --> LVEF 35 - 40 %, improved compared to prior. RV EF reduced   - Soft mechanical diet; remove NGT  - Lasix 20 mg IV x1  - Sliding scale insulin, discontinue insulin gtt    PLAN:  Neuro/ pain/ sedation:  Hx of MDD and JUSTUS  Acute Postoperative pain  PTA: fluoxetine 20mg BID and hydroxyzine 25-50mg q6h PRN  - Bilateral RICKEY catheters  - Monitor neurological status. Notify the MD for any acute changes in exam.  - Pain: Scheduled tylenol. PRN tylenol 650 q4h, oxycodone 5-10 q4h, robaxin 500 q6h, dilaudid 0.2-0.4 q2h     Pulmonary care:   Postoperative ventilation management - extubated 5/25  - Titrate supplemental oxygen to maintain saturation above 92%.  - Pulmonary hygiene: Incentive spirometer every 15 - 30 minutes when awake, flutter valve, C&DB     Cardiovascular:    Hx of HTN  Acute HFrEF, ischemic CMP  Multivessel coronary artery disease  S/p CABG x 4 on 5/24/22 with Dr. Desai: VLAD to LAD, SVG (harvest form both thighs) to distal RCA,  OM, ramus.   Recent echo on 5/19, LVEF 20-30%  Post-op LVEF 35%  ASA 81mg, Lipitor 80mg   - Monitor hemodynamic status  - Goal MAP>65, SBP<140  - Dobutamine gtt @ 2.5  - Temp AV pacer wires on backup  - Atorvastatin 80 daily  - BB hold  -  daily     GI care/ Nutrition:   Hx of GERD   PTA omeprazole 40mg qday  - Regular diet, thin liquids  - Protonix 40 daily   - Continue bowel regimen: miralax, senna    Renal/ Fluid Balance/ Electrolytes:   BL creat appears to be ~ 0.82-0.99 (5/23). Low urine output overnight and given 500 mL LR.   - Lasix 20 mg IV x1; goal net negative 500 to 1 L   - Strict I/O, daily weights  - Avoid/limit nephrotoxins as able  - Replete lytes PRN per protocol     Endocrine:    Stress induced hyperglycemia  Preop A1c: 7.8% (4/14/22)   Preop on: Jardiance 625 qday, high sliding scale corrective insulin  Holding while inpt: Glipizide XL 10qday, Metformin XR 1000 BID  - Discontinue insulin gtt; start sliding scale insulin   - Goal BG <180 for optimal healing     ID/ Antibiotics:  Stress induced leukocytosis  Periop Ancef  - Continue to monitor fever curve and WBC     Heme:     Stress induced leukocytosis  Acute blood loss anemia  Acute blood loss thrombocytopenia  Preop labs (5/18): Hgb: 13.8, hematocrit: 42.3%, plts: 284k.   No s/sx active bleeding  - Continue to monitor  - CBC      MSK/Skin:  Sternotomy  Surgical Incision  - Wound vac   - Sternal precautions  - Postoperative incision management per protocol  - PT/OT/CR     Prophylaxis:    5/26 Bilateral DVT ultrasound w/o DVT.   - Mechanical prophylaxis for DVT  - Chemical DVT prophylaxis - SQH  - Protonix 40 daily     Lines/ tubes/ drains:  - CTs: 1 pleural, 2 meds   - PA catheter, MAC RIJ, geiger  - Remove NGT     Disposition:  - CVICU    Seen and discussed with Dr. Rosenthal, CVICU intensivist, Dr. Yeung, CVTS surgeon, and CVTS fellow, Dr. Walker.     ====================================    OBJECTIVE:   1. VITAL SIGNS:   Temp:  [99.1  F  (37.3  C)-99.9  F (37.7  C)] 99.1  F (37.3  C)  Pulse:  [79-97] 94  Resp:  [15-18] 18  BP: ()/(42-78) 132/63  MAP:  [64 mmHg-99 mmHg] 93 mmHg  Arterial Line BP: (101-160)/(46-76) 140/68  SpO2:  [91 %-98 %] 95 %  Vent Mode: CMV/AC  (Continuous Mandatory Ventilation/ Assist Control)  FiO2 (%): 35 %  Resp Rate (Set): 16 breaths/min  Tidal Volume (Set, mL): 420 mL  PEEP (cm H2O): 6 cmH2O  Resp: 18    2. INTAKE/ OUTPUT:   I/O last 3 completed shifts:  In: 1759.62 [I.V.:979.62; NG/GT:280; IV Piggyback:500]  Out: 1824 [Urine:914; Emesis/NG output:500; Chest Tube:410]    3. PHYSICAL EXAMINATION:   General: Awake, following commands  Neuro: NAD, pupils reactive, no focal neuro defecits  Resp: normal rate and work of breathing on 2 L NC  CV: RRR  Abdomen: Soft, Non-distended, Non-tender  Incisions: clean dry and intact  Extremities: warm and well perfused    4. INVESTIGATIONS:   Arterial Blood Gases   Recent Labs   Lab 05/25/22  2019 05/25/22  1235 05/25/22  1008 05/25/22  0342   PH 7.32* 7.32* 7.30* 7.42   PCO2 49* 43 46* 36   PO2 74* 87 122* 141*   HCO3 25 22 23 23     Complete Blood Count   Recent Labs   Lab 05/26/22  0905 05/25/22  0341 05/24/22  2338 05/24/22 2027   WBC 9.9 8.6 13.1* 14.4*   HGB 8.0* 8.5* 9.8* 10.4*   PLT 96* 127* 189 214     Basic Metabolic Panel  Recent Labs   Lab 05/26/22  1201 05/26/22  0905 05/26/22  0745 05/26/22  0440 05/26/22  0422 05/25/22  1627 05/25/22  1622 05/25/22  1009 05/25/22  1008 05/25/22  0342 05/25/22  0341 05/25/22  0136 05/24/22  2338 05/24/22  2230 05/24/22 2027   NA  --   --   --   --  144  --  146*  --   --   --  145*  --  147*  --  147*   POTASSIUM  --   --   --   --  4.6  --   --   --  4.1  --  4.3  --  3.5  --  3.5   CHLORIDE  --   --   --   --  114*  --   --   --   --   --  116*  --  117*  --  114*   CO2  --   --   --   --  24  --   --   --   --   --  23  --  23  --  21   BUN  --   --   --   --  20  --   --   --   --   --  16  --  18  --  18   CR  --   --   --   --   0.91  --  0.77  --   --   --  0.80  --  0.85  --  0.87   * 143* 81 110* 121*   < >  --    < >  --    < > 146*   < > 144*   < > 211*  201*    < > = values in this interval not displayed.     Liver Function Tests  Recent Labs   Lab 22  0341 22  2338 22  1549 22  1335   AST  --  34 37  --    ALT  --    --    ALKPHOS  --  53 53  --    BILITOTAL  --  0.2 0.5  --    ALBUMIN  --  2.4* 2.5*  --    INR 1.29*  --  1.26* 1.55*     Pancreatic Enzymes  No lab results found in last 7 days.  Coagulation Profile  Recent Labs   Lab 22  0341 22  1549 22  1335   INR 1.29* 1.26* 1.55*   PTT 29 53* 30     5. RADIOLOGY:   Recent Results (from the past 24 hour(s))   XR Abdomen Port 1 View    Narrative    EXAMINATION:  XR ABDOMEN PORT 1 VIEWS 2022 3:53 PM     COMPARISON: none..    HISTORY: Verify small bowel feeding tube bedside placement.    TECHNIQUE: Frontal view of the abdomen.    FINDINGS: Feeding tube coils multiple times within the stomach and  terminates near the gastric antrum. No abnormally dilated loops of  bowel. No pneumatosis or portal venous gas.       Impression    IMPRESSION: Feeding tube coils multiple times within the stomach and  terminates near the gastric antrum..    PORSHA SPARROW MD         SYSTEM ID:  [id number]   Echo Limited   Result Value    LVEF  35-40%    Narrative    [id number]  [id number]  [id number]  [id number]^ELLA^CHEVY     [facility]  Echocardiography Laboratory  [address] [place] [id number]     Name: CRISTAL YOUSIF  MRN: [id number]  : 1955  Study Date: 2022 11:21 AM  Age: 67 yrs  Gender: Female  Patient Location: [facility]  Reason For Study: CABG  Ordering Physician: CHEVY JARAMILLO  Performed By: Marii Bermudez RDCS  Height:  in     ______________________________________________________________________________  Procedure  Limited Portable Echo Adult. Contrast Optison. Technically difficult  study.  Patient was given 5 ml mixture of 3 ml Optison and 6 ml saline. 4 ml wasted.  ______________________________________________________________________________  Interpretation Summary  Technically difficult study.  The visual ejection fraction is 35-40%.  Apical wall hypokinesis is present.  Inferior wall hypokinesis is present.  Global right ventricular function is mildly reduced.     This study was compared with the study from 5/19/2022 .The left ventricular  function has improved.  ______________________________________________________________________________  Left Ventricle  The Ejection Fraction was visually estimated. The visual ejection fraction is  35-40%. Apical wall hypokinesis is present. Inferior wall hypokinesis is  present.     Right Ventricle  Global right ventricular function is mildly reduced.     Mitral Valve  Mild mitral insufficiency is present.     Aortic Valve  Aortic valve is normal in structure and function.     Tricuspid Valve  The tricuspid valve is normal.     Vessels  The aorta root is normal.     Pericardium  No pericardial effusion is present.     Compared to Previous Study  This study was compared with the study from 5/19/2022 . The left ventricular  function has improved.  ______________________________________________________________________________  MMode/2D Measurements & Calculations  IVSd: 0.74 cm  LVIDd: 5.2 cm  LVIDs: 4.5 cm  LVPWd: 0.91 cm  FS: 14.4 %  LV mass(C)d: 152.4 grams  RWT: 0.35     ______________________________________________________________________________  Report approved by: Dereck RAMOS 05/26/2022 12:22 PM         US Lower Extremity Venous Duplex Bilateral    Narrative    EXAMINATION: DOPPLER VENOUS ULTRASOUND OF BILATERAL LOWER EXTREMITIES,  5/26/2022 12:42 PM     COMPARISON: None.    HISTORY: Post CABG    TECHNIQUE:  Gray-scale evaluation with compression, spectral flow and  color Doppler assessment of the deep venous system of both legs from  groin  to knee, and then at the ankles.    FINDINGS:  In both lower extremities, the common femoral, femoral, popliteal and  posterior tibial veins demonstrate normal compressibility and blood  flow.      Impression    IMPRESSION: No evidence of deep venous thrombosis in either lower  extremity.    SCOTT GARNETT MD         SYSTEM ID:  [id number]       =========================================

## 2022-05-26 NOTE — PROGRESS NOTES
Pain Service Progress Note  [facility]  Date: 05/26/2022       Patient Name: Marianne Monroy  MRN: [id number]  Age: 67 year old  Sex: female      Assessment:  Marianne Monroy is a 67 year old female with PMH of HTN, HLD, T2DM, GERD, MDD, JUSTUS, nonsustained VT, multivessel coronary disease with acute heart failure due to ischemic cardiomyopathy     Procedure: CABG x 4    Date of Surgery: 5/24/22     Date of Catheter Placement: 5/24/22     Plan/Recommendations:  1. Regional Anesthesia/Analgesia  -Continuous Catheter Type/Site: bilateral erector spinae (ES) T5-6  Continue 0.2% ropivacaine  Programmed Intermittent Bolus (PIB) at 7 mL Q60 min via each catheter, total infusion rate of 14 mL/hr    Plan to maintain catheter, max of 7 days    2. Anticoagulation  Okay to continue heparin ANTICOAGULANT injection 5,000 Units, SC, Q8H as ordered per primary service  -Please contact Inpatient Pain Service before ordering or making any anticoagulation changes       3. Multimodal Analgesia  - per primary service    Pain Service will continue to follow.    Discussed with attending anesthesiologist      Overnight Events: None    Tubes/Drains: Yes  Chest tubes, Farnsworth catheter    Subjective: Denies chest wall pain, reports back pain 3/10 at rest that she identifes as back pain related to history of spine fracures and she thinks that it flared due to positioning for sternotomy surgery.  Denies tenderness at RICKEY sites. Able to effectively use IS to 500 mL but easily fatigued.  Nausea: No  Vomiting: No  Pruritus: No  Symptoms of LAST: No    Pain Location:  Back pain    Pain Intensity:    Pain at Rest: 3/10   Satisfied with your level of pain control: Yes    Diet: Regular Diet Adult Thin Liquids (level 0)    Relevant Labs:  Recent Labs   Lab Test 05/26/22  0905 05/26/22  0422 05/25/22  0341   INR  --   --  1.29*   PLT 96*  --  127*   PTT  --   --  29   BUN  --  20 16       Physical Exam:  Vitals: BP (!) 86/51   " Pulse 82   Temp 99.9  F (37.7  C)   Resp 18   Ht 1.626 m (5' 4\")   Wt 73.3 kg (161 lb 9.6 oz)   SpO2 96%   BMI 27.74 kg/m      Physical Exam:   Orientation:  Alert, oriented, and in no acute distress: Yes  Sedation: No    Motor Examination:  5/5 Strength in lower extremities: Yes    Catheter Site:   Catheter entry site is clean/dry/intact: Yes    Tender: No      Relevant Medications:  Current Pain Medications:  Medications related to Pain Management (From now, onward)    Start     Dose/Rate Route Frequency Ordered Stop    05/27/22 0000  acetaminophen (TYLENOL) tablet 650 mg         650 mg Oral EVERY 4 HOURS PRN 05/24/22 1538      05/25/22 0800  aspirin (ASA) chewable tablet 162 mg         162 mg Oral or NG Tube DAILY 05/24/22 1537      05/25/22 0800  polyethylene glycol (MIRALAX) Packet 17 g         17 g Oral DAILY 05/24/22 1538      05/24/22 2000  senna-docusate (SENOKOT-S/PERICOLACE) 8.6-50 MG per tablet 1 tablet         1 tablet Oral 2 TIMES DAILY 05/24/22 1538      05/24/22 1600  acetaminophen (TYLENOL) tablet 975 mg         975 mg Oral or Feeding Tube EVERY 8 HOURS 05/24/22 1538 05/27/22 1559    05/24/22 1538  HYDROmorphone (DILAUDID) injection 0.2 mg        \"Or\" Linked Group Details    0.2 mg Intravenous EVERY 2 HOURS PRN 05/24/22 1538      05/24/22 1538  HYDROmorphone (DILAUDID) injection 0.4 mg        \"Or\" Linked Group Details    0.4 mg Intravenous EVERY 2 HOURS PRN 05/24/22 1538      05/24/22 1538  methocarbamol (ROBAXIN) tablet 500 mg         500 mg Oral EVERY 6 HOURS PRN 05/24/22 1538      05/24/22 1538  magnesium hydroxide (MILK OF MAGNESIA) suspension 30 mL         30 mL Oral DAILY PRN 05/24/22 1538      05/24/22 1538  bisacodyl (DULCOLAX) Suppository 10 mg         10 mg Rectal DAILY PRN 05/24/22 1538      05/24/22 0830  ropivacaine 0.2% in NS perineural infusion simple          Perineural Continuous Nerve Block 05/24/22 0803      05/24/22 0830  ropivacaine 0.2% in NS perineural infusion simple  " "        Perineural Continuous Nerve Block 05/24/22 0803      05/19/22 1404  oxyCODONE (ROXICODONE) tablet 5 mg        \"Or\" Linked Group Details    5 mg Oral EVERY 4 HOURS PRN 05/19/22 1404      05/19/22 1404  oxyCODONE IR (ROXICODONE) tablet 10 mg        \"Or\" Linked Group Details    10 mg Oral EVERY 4 HOURS PRN 05/19/22 1404      05/18/22 2206  lidocaine 1 % 0.1-1 mL         0.1-1 mL Other EVERY 1 HOUR PRN 05/18/22 2206      05/18/22 2206  lidocaine (LMX4) cream          Topical EVERY 1 HOUR PRN 05/18/22 2206            Primary Service Contacted with Recommendations? No    Norma Willingham, OSCAR CNP  05/26/2022     Time/Communication:  I personally spent 15 minutes with greater than 50% in consultation, education, counseling and coordination of care.  See note above for details on conversation.      Contact Info Please Page the Pain Team Via [employer]    "

## 2022-05-27 ENCOUNTER — APPOINTMENT (OUTPATIENT)
Dept: OCCUPATIONAL THERAPY | Facility: CLINIC | Age: 67
DRG: 233 | End: 2022-05-27
Payer: COMMERCIAL

## 2022-05-27 ENCOUNTER — APPOINTMENT (OUTPATIENT)
Dept: GENERAL RADIOLOGY | Facility: CLINIC | Age: 67
DRG: 233 | End: 2022-05-27
Payer: COMMERCIAL

## 2022-05-27 LAB
ANION GAP SERPL CALCULATED.3IONS-SCNC: 4 MMOL/L (ref 3–14)
BASE EXCESS BLDA CALC-SCNC: 4.5 MMOL/L (ref -9–1.8)
BASE EXCESS BLDV CALC-SCNC: 3.9 MMOL/L (ref -7.7–1.9)
BASE EXCESS BLDV CALC-SCNC: 4.7 MMOL/L (ref -7.7–1.9)
BUN SERPL-MCNC: 25 MG/DL (ref 7–30)
CALCIUM SERPL-MCNC: 8.4 MG/DL (ref 8.5–10.1)
CHLORIDE BLD-SCNC: 109 MMOL/L (ref 94–109)
CO2 SERPL-SCNC: 29 MMOL/L (ref 20–32)
CREAT SERPL-MCNC: 0.88 MG/DL (ref 0.52–1.04)
ERYTHROCYTE [DISTWIDTH] IN BLOOD BY AUTOMATED COUNT: 14.2 % (ref 10–15)
GFR SERPL CREATININE-BSD FRML MDRD: 71 ML/MIN/1.73M2
GLUCOSE BLD-MCNC: 131 MG/DL (ref 70–99)
GLUCOSE BLDC GLUCOMTR-MCNC: 109 MG/DL (ref 70–99)
GLUCOSE BLDC GLUCOMTR-MCNC: 119 MG/DL (ref 70–99)
GLUCOSE BLDC GLUCOMTR-MCNC: 122 MG/DL (ref 70–99)
GLUCOSE BLDC GLUCOMTR-MCNC: 124 MG/DL (ref 70–99)
GLUCOSE BLDC GLUCOMTR-MCNC: 156 MG/DL (ref 70–99)
GLUCOSE BLDC GLUCOMTR-MCNC: 159 MG/DL (ref 70–99)
GLUCOSE BLDC GLUCOMTR-MCNC: 166 MG/DL (ref 70–99)
GLUCOSE BLDC GLUCOMTR-MCNC: 173 MG/DL (ref 70–99)
GLUCOSE BLDC GLUCOMTR-MCNC: 175 MG/DL (ref 70–99)
GLUCOSE BLDC GLUCOMTR-MCNC: 192 MG/DL (ref 70–99)
GLUCOSE BLDC GLUCOMTR-MCNC: 215 MG/DL (ref 70–99)
GLUCOSE BLDC GLUCOMTR-MCNC: 277 MG/DL (ref 70–99)
HCO3 BLD-SCNC: 29 MMOL/L (ref 21–28)
HCO3 BLDV-SCNC: 29 MMOL/L (ref 21–28)
HCO3 BLDV-SCNC: 31 MMOL/L (ref 21–28)
HCT VFR BLD AUTO: 23.3 % (ref 35–47)
HGB BLD-MCNC: 7.3 G/DL (ref 11.7–15.7)
MAGNESIUM SERPL-MCNC: 2.1 MG/DL (ref 1.6–2.3)
MAGNESIUM SERPL-MCNC: 2.2 MG/DL (ref 1.6–2.3)
MCH RBC QN AUTO: 28.5 PG (ref 26.5–33)
MCHC RBC AUTO-ENTMCNC: 31.3 G/DL (ref 31.5–36.5)
MCV RBC AUTO: 91 FL (ref 78–100)
O2/TOTAL GAS SETTING VFR VENT: 2 %
O2/TOTAL GAS SETTING VFR VENT: 4 %
O2/TOTAL GAS SETTING VFR VENT: 4 %
OXYHGB MFR BLD: 96 % (ref 92–100)
OXYHGB MFR BLDV: 50 % (ref 70–75)
OXYHGB MFR BLDV: 66 % (ref 70–75)
PCO2 BLD: 45 MM HG (ref 35–45)
PCO2 BLDV: 49 MM HG (ref 40–50)
PCO2 BLDV: 52 MM HG (ref 40–50)
PH BLD: 7.42 [PH] (ref 7.35–7.45)
PH BLDV: 7.38 [PH] (ref 7.32–7.43)
PH BLDV: 7.39 [PH] (ref 7.32–7.43)
PHOSPHATE SERPL-MCNC: 3.1 MG/DL (ref 2.5–4.5)
PHOSPHATE SERPL-MCNC: 3.2 MG/DL (ref 2.5–4.5)
PLATELET # BLD AUTO: 94 10E3/UL (ref 150–450)
PO2 BLD: 86 MM HG (ref 80–105)
PO2 BLDV: 28 MM HG (ref 25–47)
PO2 BLDV: 35 MM HG (ref 25–47)
POTASSIUM BLD-SCNC: 3.7 MMOL/L (ref 3.4–5.3)
POTASSIUM BLD-SCNC: 4.5 MMOL/L (ref 3.4–5.3)
RBC # BLD AUTO: 2.56 10E6/UL (ref 3.8–5.2)
SODIUM SERPL-SCNC: 142 MMOL/L (ref 133–144)
WBC # BLD AUTO: 7.8 10E3/UL (ref 4–11)

## 2022-05-27 PROCEDURE — 83735 ASSAY OF MAGNESIUM: CPT | Performed by: STUDENT IN AN ORGANIZED HEALTH CARE EDUCATION/TRAINING PROGRAM

## 2022-05-27 PROCEDURE — 93010 ELECTROCARDIOGRAM REPORT: CPT | Performed by: INTERNAL MEDICINE

## 2022-05-27 PROCEDURE — 97530 THERAPEUTIC ACTIVITIES: CPT | Mod: GO | Performed by: OCCUPATIONAL THERAPIST

## 2022-05-27 PROCEDURE — 82805 BLOOD GASES W/O2 SATURATION: CPT | Performed by: STUDENT IN AN ORGANIZED HEALTH CARE EDUCATION/TRAINING PROGRAM

## 2022-05-27 PROCEDURE — 250N000013 HC RX MED GY IP 250 OP 250 PS 637: Performed by: SURGERY

## 2022-05-27 PROCEDURE — 71045 X-RAY EXAM CHEST 1 VIEW: CPT | Mod: 26 | Performed by: RADIOLOGY

## 2022-05-27 PROCEDURE — 84100 ASSAY OF PHOSPHORUS: CPT | Performed by: STUDENT IN AN ORGANIZED HEALTH CARE EDUCATION/TRAINING PROGRAM

## 2022-05-27 PROCEDURE — 85027 COMPLETE CBC AUTOMATED: CPT | Performed by: STUDENT IN AN ORGANIZED HEALTH CARE EDUCATION/TRAINING PROGRAM

## 2022-05-27 PROCEDURE — 999N000157 HC STATISTIC RCP TIME EA 10 MIN

## 2022-05-27 PROCEDURE — 200N000002 HC R&B ICU UMMC

## 2022-05-27 PROCEDURE — 250N000011 HC RX IP 250 OP 636: Performed by: SURGERY

## 2022-05-27 PROCEDURE — 71045 X-RAY EXAM CHEST 1 VIEW: CPT | Mod: 76

## 2022-05-27 PROCEDURE — 99232 SBSQ HOSP IP/OBS MODERATE 35: CPT | Mod: 24 | Performed by: ANESTHESIOLOGY

## 2022-05-27 PROCEDURE — 258N000003 HC RX IP 258 OP 636: Performed by: SURGERY

## 2022-05-27 PROCEDURE — 250N000011 HC RX IP 250 OP 636: Performed by: STUDENT IN AN ORGANIZED HEALTH CARE EDUCATION/TRAINING PROGRAM

## 2022-05-27 PROCEDURE — 99232 SBSQ HOSP IP/OBS MODERATE 35: CPT | Mod: GC | Performed by: INTERNAL MEDICINE

## 2022-05-27 PROCEDURE — 83735 ASSAY OF MAGNESIUM: CPT | Performed by: SURGERY

## 2022-05-27 PROCEDURE — 999N000015 HC STATISTIC ARTERIAL MONITORING DAILY

## 2022-05-27 PROCEDURE — 93005 ELECTROCARDIOGRAM TRACING: CPT

## 2022-05-27 PROCEDURE — 999N000155 HC STATISTIC RAPCV CVP MONITORING

## 2022-05-27 PROCEDURE — 71045 X-RAY EXAM CHEST 1 VIEW: CPT

## 2022-05-27 PROCEDURE — 250N000013 HC RX MED GY IP 250 OP 250 PS 637: Performed by: STUDENT IN AN ORGANIZED HEALTH CARE EDUCATION/TRAINING PROGRAM

## 2022-05-27 PROCEDURE — 250N000011 HC RX IP 250 OP 636: Performed by: ANESTHESIOLOGY

## 2022-05-27 PROCEDURE — 271N000002 HC RX 271: Performed by: SURGERY

## 2022-05-27 PROCEDURE — 80048 BASIC METABOLIC PNL TOTAL CA: CPT | Performed by: SURGERY

## 2022-05-27 PROCEDURE — 999N000045 HC STATISTIC DAILY SWAN MONITORING

## 2022-05-27 PROCEDURE — 250N000013 HC RX MED GY IP 250 OP 250 PS 637: Performed by: ANESTHESIOLOGY

## 2022-05-27 PROCEDURE — 84132 ASSAY OF SERUM POTASSIUM: CPT | Performed by: STUDENT IN AN ORGANIZED HEALTH CARE EDUCATION/TRAINING PROGRAM

## 2022-05-27 PROCEDURE — 82805 BLOOD GASES W/O2 SATURATION: CPT | Performed by: SURGERY

## 2022-05-27 PROCEDURE — 99231 SBSQ HOSP IP/OBS SF/LOW 25: CPT | Performed by: NURSE PRACTITIONER

## 2022-05-27 PROCEDURE — 97535 SELF CARE MNGMENT TRAINING: CPT | Mod: GO | Performed by: OCCUPATIONAL THERAPIST

## 2022-05-27 RX ORDER — POLYETHYLENE GLYCOL 3350 17 G/17G
17 POWDER, FOR SOLUTION ORAL 2 TIMES DAILY
Status: DISCONTINUED | OUTPATIENT
Start: 2022-05-27 | End: 2022-06-03

## 2022-05-27 RX ORDER — POTASSIUM CHLORIDE 29.8 MG/ML
20 INJECTION INTRAVENOUS ONCE
Status: COMPLETED | OUTPATIENT
Start: 2022-05-27 | End: 2022-05-27

## 2022-05-27 RX ORDER — METOPROLOL TARTRATE 1 MG/ML
2.5 INJECTION, SOLUTION INTRAVENOUS EVERY 5 MIN PRN
Status: DISCONTINUED | OUTPATIENT
Start: 2022-05-27 | End: 2022-05-27

## 2022-05-27 RX ORDER — AMOXICILLIN 250 MG
2 CAPSULE ORAL 2 TIMES DAILY
Status: DISCONTINUED | OUTPATIENT
Start: 2022-05-27 | End: 2022-06-03

## 2022-05-27 RX ORDER — LIDOCAINE 40 MG/G
CREAM TOPICAL
Status: DISCONTINUED | OUTPATIENT
Start: 2022-05-27 | End: 2022-05-30

## 2022-05-27 RX ORDER — FUROSEMIDE 10 MG/ML
40 INJECTION INTRAMUSCULAR; INTRAVENOUS ONCE
Status: COMPLETED | OUTPATIENT
Start: 2022-05-27 | End: 2022-05-27

## 2022-05-27 RX ADMIN — DOCUSATE SODIUM 50 MG AND SENNOSIDES 8.6 MG 1 TABLET: 8.6; 5 TABLET, FILM COATED ORAL at 08:17

## 2022-05-27 RX ADMIN — HEPARIN SODIUM 5000 UNITS: 5000 INJECTION, SOLUTION INTRAVENOUS; SUBCUTANEOUS at 04:24

## 2022-05-27 RX ADMIN — ASPIRIN 81 MG 162 MG: 81 TABLET ORAL at 08:17

## 2022-05-27 RX ADMIN — POTASSIUM CHLORIDE 20 MEQ: 29.8 INJECTION INTRAVENOUS at 05:18

## 2022-05-27 RX ADMIN — Medication 400 MG: at 08:17

## 2022-05-27 RX ADMIN — DOCUSATE SODIUM 50 MG AND SENNOSIDES 8.6 MG 2 TABLET: 8.6; 5 TABLET, FILM COATED ORAL at 20:14

## 2022-05-27 RX ADMIN — POTASSIUM & SODIUM PHOSPHATES POWDER PACK 280-160-250 MG 1 PACKET: 280-160-250 PACK at 08:17

## 2022-05-27 RX ADMIN — Medication 400 MG: at 20:14

## 2022-05-27 RX ADMIN — MUPIROCIN 0.5 G: 20 OINTMENT TOPICAL at 08:19

## 2022-05-27 RX ADMIN — ATORVASTATIN CALCIUM 80 MG: 80 TABLET, FILM COATED ORAL at 08:18

## 2022-05-27 RX ADMIN — Medication 6.25 MG: at 11:10

## 2022-05-27 RX ADMIN — Medication: at 11:02

## 2022-05-27 RX ADMIN — Medication 12.5 MG: at 20:14

## 2022-05-27 RX ADMIN — Medication 1 TABLET: at 08:17

## 2022-05-27 RX ADMIN — Medication: at 09:26

## 2022-05-27 RX ADMIN — HEPARIN SODIUM 5000 UNITS: 5000 INJECTION, SOLUTION INTRAVENOUS; SUBCUTANEOUS at 20:14

## 2022-05-27 RX ADMIN — ACETAMINOPHEN 325MG 975 MG: 325 TABLET ORAL at 08:18

## 2022-05-27 RX ADMIN — METHOCARBAMOL 500 MG: 500 TABLET ORAL at 12:39

## 2022-05-27 RX ADMIN — FUROSEMIDE 40 MG: 10 INJECTION, SOLUTION INTRAVENOUS at 08:36

## 2022-05-27 RX ADMIN — PANTOPRAZOLE SODIUM 40 MG: 40 TABLET, DELAYED RELEASE ORAL at 08:17

## 2022-05-27 RX ADMIN — HEPARIN SODIUM 5000 UNITS: 5000 INJECTION, SOLUTION INTRAVENOUS; SUBCUTANEOUS at 12:39

## 2022-05-27 RX ADMIN — POLYETHYLENE GLYCOL 3350 17 G: 17 POWDER, FOR SOLUTION ORAL at 20:14

## 2022-05-27 RX ADMIN — POLYETHYLENE GLYCOL 3350 17 G: 17 POWDER, FOR SOLUTION ORAL at 08:17

## 2022-05-27 RX ADMIN — MUPIROCIN 0.5 G: 20 OINTMENT TOPICAL at 20:15

## 2022-05-27 RX ADMIN — OXYCODONE HYDROCHLORIDE 5 MG: 5 TABLET ORAL at 08:18

## 2022-05-27 ASSESSMENT — ACTIVITIES OF DAILY LIVING (ADL)
ADLS_ACUITY_SCORE: 29
ADLS_ACUITY_SCORE: 33
ADLS_ACUITY_SCORE: 29
ADLS_ACUITY_SCORE: 33
ADLS_ACUITY_SCORE: 31
ADLS_ACUITY_SCORE: 29
ADLS_ACUITY_SCORE: 29

## 2022-05-27 ASSESSMENT — VISUAL ACUITY
OU: NORMAL ACUITY
OU: NORMAL ACUITY

## 2022-05-27 NOTE — PLAN OF CARE
Major Shift Events:      1) FICKS trended Q4h. PA port & CVP clotteed off @ 0400. CVC used for SVO2. CVTS paged. . Likely due to svo2 from CVC. CI 4.3. CVTS aware.  2) Insulin gtt restarted @ 2100. Fir BG in 300's      Plan: trend FICKS. Work with therapies. Advance care as able.   For vital signs and complete assessments, please see documentation flowsheets.

## 2022-05-27 NOTE — PROGRESS NOTES
CV ICU PROGRESS NOTE        CO-MORBIDITIES:   S/P CABG (coronary artery bypass graft)  (primary encounter diagnosis)  Near syncope  Paroxysmal ventricular tachycardia (H)  Contact with and (suspected) exposure to covid-19    ASSESSMENT: Marianne Monroy is a 67 year old female with PMH of HTN, HLD, T2DM, GERD, MDD, JUSTUS, nonsustained VT, multivessel coronary disease with acute heart failure due to ischemic cardiomyopathy who underwent CABG x4 on 5/24/22 with Dr. Desai. VLAD to LAD, SVGs (EVH from both thighs) to distal RCA, OM, ramus.  Intraoperative course uncomplicated. 2 hours on bypass and cross clamp 1:45 hours. LVEF preop 25-30%, end of case was about 30-35% by DAVID. Pt admitted to CVICU sedated and intubated for further postoperative care.     MAJOR CHANGES TODAY:  - Off dobutamine  - Having PVCs, will add metoprolol 6.25 BID  - Lasix 40, reassess response later  - goal net -1L  - Discontinue insulin gtt  - High sliding scale insulin  - JOSHUA panel still pending  - CT, wires pull  - Possible transfer this afternoon     PLAN:  Neuro/ pain/ sedation:  Hx of MDD and JUSTUS  Acute Postoperative pain  PTA: fluoxetine 20mg BID and hydroxyzine 25-50mg q6h PRN  - Bilateral RICKEY catheters  - Monitor neurological status. Notify the MD for any acute changes in exam.  - Pain: Scheduled tylenol. PRN tylenol 650 q4h, oxycodone 5-10 q4h, robaxin 500 q6h, dilaudid 0.2-0.4 q2h     Pulmonary care:   Postoperative ventilation management - extubated 5/25  Hypercarbia - improved   - Titrate supplemental oxygen to maintain saturation above 92%.  - Pulmonary hygiene: Incentive spirometer every 15 - 30 minutes when awake, flutter valve, C&DB     Cardiovascular:    Hx of HTN  Acute HFrEF, ischemic CMP  Multivessel coronary artery disease  S/p CABG x 4 on 5/24/22 with Dr. Desai: VLAD to LAD, SVG (harvest form both thighs) to distal RCA, OM, ramus.   PVCs  Recent echo on 5/19, LVEF 20-30%  Post-op LVEF 35%  5/26 Echo --> LVEF 35 - 40 %,  improved compared to prior. RV EF reduced   ASA 81mg, Lipitor 80mg   - Monitor hemodynamic status  - Goal MAP>65, SBP<140  - Dobutamine off  - Atorvastatin 80 daily  - BB metoprolol 6.25 BId  -  daily     GI care/ Nutrition:   Hx of GERD   PTA omeprazole 40mg qday  - Regular diet, thin liquids  - Protonix 40 daily   - Continue bowel regimen: miralax BID, senna BID  - Give suppository today    Renal/ Fluid Balance/ Electrolytes:   BL creat appears to be ~ 0.82-0.99 (5/23). Low urine output overnight and given 500 mL LR.   - Lasix 40 mg IV x1; goal net negative 500 to 1 L   - Will reassess this afternoon   - Strict I/O, daily weights  - Avoid/limit nephrotoxins as able  - Replete lytes PRN per protocol     Endocrine:    Stress induced hyperglycemia  Preop A1c: 7.8% (4/14/22)   Preop on: Jardiance 625 qday, high sliding scale corrective insulin  Holding while inpt: Glipizide XL 10qday, Metformin XR 1000 BID  - Discontinue insulin gtt; start sliding scale insulin   - Goal BG <180 for optimal healing     ID/ Antibiotics:  Stress induced leukocytosis  Periop Ancef  - Continue to monitor fever curve and WBC     Heme:     Stress induced leukocytosis  Acute blood loss anemia  Acute blood loss thrombocytopenia  Preop labs (5/18): Hgb: 13.8, hematocrit: 42.3%, plts: 284k.   No s/sx active bleeding  - Continue to monitor  - Plts stable today -> JOSHUA panel still pending     MSK/Skin:  Sternotomy  Surgical Incision  - Wound vac   - Sternal precautions  - Postoperative incision management per protocol  - PT/OT/CR     Prophylaxis:    5/26 Bilateral DVT ultrasound w/o DVT.   - Mechanical prophylaxis for DVT  - Chemical DVT prophylaxis - SQH  - Protonix 40 daily     Lines/ tubes/ drains:  - CTs: 1 pleural, 2 meds - will remove today  - PA catheter, MAC RIJ, geiger - discontinue PA/MAC  - Left arterial line     Disposition:  - CVICU    Seen and discussed with Dr. Rosenthal, CVICU intensivist, Dr. Yeung, CVTS surgeon, and CVTS  fellow, Dr. Walker.     ====================================  Subjective:  No acute events overnight. Started on insulin gtt overnight for . Dobutamine weaned off this AM.    OBJECTIVE:   1. VITAL SIGNS:   Temp:  [98.6  F (37  C)-100  F (37.8  C)] 98.6  F (37  C)  Pulse:  [] 92  Resp:  [18-20] 20  BP: ()/(42-78) 152/65  MAP:  [57 mmHg-90 mmHg] 78 mmHg  Arterial Line BP: ()/(41-66) 126/56  SpO2:  [91 %-100 %] 100 %  Resp: 20    2. INTAKE/ OUTPUT:   I/O last 3 completed shifts:  In: 1962.64 [P.O.:1560; I.V.:402.64]  Out: 2800 [Urine:2650; Chest Tube:150]    3. PHYSICAL EXAMINATION:   General: Awake, alert  Neuro: NAD, A&Ox3  Resp: normal rate and work of breathing on 2 L NC  CV: RRR  Abdomen: Soft, Non-distended, Non-tender  Incisions: clean dry and intact  Extremities: warm and well perfused    4. INVESTIGATIONS:   Arterial Blood Gases   Recent Labs   Lab 05/27/22  0359 05/26/22 2335 05/26/22 2017 05/25/22 2019   PH 7.42 7.40 7.38 7.32*   PCO2 45 46* 47* 49*   PO2 86 118* 71* 74*   HCO3 29* 28 28 25     Complete Blood Count   Recent Labs   Lab 05/27/22  0401 05/26/22  0905 05/25/22  0341 05/24/22  2338   WBC 7.8 9.9 8.6 13.1*   HGB 7.3* 8.0* 8.5* 9.8*   PLT 94* 96* 127* 189     Basic Metabolic Panel  Recent Labs   Lab 05/27/22  0548 05/27/22  0456 05/27/22  0407 05/27/22  0401 05/26/22  1732 05/26/22  1446 05/26/22  0440 05/26/22  0422 05/25/22  1627 05/25/22  1622 05/25/22  1009 05/25/22  1008 05/25/22  0342 05/25/22  0341 05/25/22  0136 05/24/22  2338   NA  --   --   --  142  --   --   --  144  --  146*  --   --   --  145*  --  147*   POTASSIUM  --   --   --  3.7  --  4.0  --  4.6  --   --   --  4.1  --  4.3  --  3.5   CHLORIDE  --   --   --  109  --   --   --  114*  --   --   --   --   --  116*  --  117*   CO2  --   --   --  29  --   --   --  24  --   --   --   --   --  23  --  23   BUN  --   --   --  25  --   --   --  20  --   --   --   --   --  16  --  18   CR  --   --   --  0.88  --    --   --  0.91  --  0.77  --   --   --  0.80  --  0.85   * 277* 124* 131*   < >  --    < > 121*   < >  --    < >  --    < > 146*   < > 144*    < > = values in this interval not displayed.     Liver Function Tests  Recent Labs   Lab 22  2338 22  1549 22  1335   AST  --  34 37  --    ALT  --    --    ALKPHOS  --  53 53  --    BILITOTAL  --  0.2 0.5  --    ALBUMIN  --  2.4* 2.5*  --    INR 1.29*  --  1.26* 1.55*     Pancreatic Enzymes  No lab results found in last 7 days.  Coagulation Profile  Recent Labs   Lab 22  1549 22  1335   INR 1.29* 1.26* 1.55*   PTT 29 53* 30     5. RADIOLOGY:   Recent Results (from the past 24 hour(s))   Echo Limited   Result Value    LVEF  35-40%    [facility]    [id number]  [id number]  [id number]  [id number]^ELLA^CHEVY     [facility],[place]  Echocardiography Laboratory  [address] [place], [place] [id number]     Name: CRISTAL YOUSIF  MRN: [id number]  : 1955  Study Date: 2022 11:21 AM  Age: 67 yrs  Gender: Female  Patient Location: [facility]  Reason For Study: CABG  Ordering Physician: CHEVY JARAMILLO  Performed By: Marii Bermudez RDCS  Height:  in     ______________________________________________________________________________  Procedure  Limited Portable Echo Adult. Contrast Optison. Technically difficult study.  Patient was given 5 ml mixture of 3 ml Optison and 6 ml saline. 4 ml wasted.  ______________________________________________________________________________  Interpretation Summary  Technically difficult study.  The visual ejection fraction is 35-40%.  Apical wall hypokinesis is present.  Inferior wall hypokinesis is present.  Global right ventricular function is mildly reduced.     This study was compared with the study from 2022 .The left ventricular  function has improved.  ______________________________________________________________________________  Left  Ventricle  The Ejection Fraction was visually estimated. The visual ejection fraction is  35-40%. Apical wall hypokinesis is present. Inferior wall hypokinesis is  present.     Right Ventricle  Global right ventricular function is mildly reduced.     Mitral Valve  Mild mitral insufficiency is present.     Aortic Valve  Aortic valve is normal in structure and function.     Tricuspid Valve  The tricuspid valve is normal.     Vessels  The aorta root is normal.     Pericardium  No pericardial effusion is present.     Compared to Previous Study  This study was compared with the study from 5/19/2022 . The left ventricular  function has improved.  ______________________________________________________________________________  MMode/2D Measurements & Calculations  IVSd: 0.74 cm  LVIDd: 5.2 cm  LVIDs: 4.5 cm  LVPWd: 0.91 cm  FS: 14.4 %  LV mass(C)d: 152.4 grams  RWT: 0.35     ______________________________________________________________________________  Report approved by: Dereck RAMOS 05/26/2022 12:22 PM         US Lower Extremity Venous Duplex Bilateral    Narrative    EXAMINATION: DOPPLER VENOUS ULTRASOUND OF BILATERAL LOWER EXTREMITIES,  5/26/2022 12:42 PM     COMPARISON: None.    HISTORY: Post CABG    TECHNIQUE:  Gray-scale evaluation with compression, spectral flow and  color Doppler assessment of the deep venous system of both legs from  groin to knee, and then at the ankles.    FINDINGS:  In both lower extremities, the common femoral, femoral, popliteal and  posterior tibial veins demonstrate normal compressibility and blood  flow.      Impression    IMPRESSION: No evidence of deep venous thrombosis in either lower  extremity.    SCOTT GARNETT MD         SYSTEM ID:  [id number]       =========================================

## 2022-05-27 NOTE — CONSULTS
Cardiac Electrophysiology consultation      Reason For Consultation: PVCs [facility]     HPI:  Marianne Monroy is a 67 year old female with PMH of HTN, HLD, T2DM, GERD, MDD, JUSTUS, nonsustained VT, multivessel coronary disease with acute heart failure due to ischemic cardiomyopathy who underwent CABG x4 on 5/24/22 with Dr. Desai. CHU to LAD, SVGs (EVH from both thighs) to distal RCA, OM, ramus.  Intraoperative course uncomplicated. 2 hours on bypass and cross clamp 1:45 hours. LVEF preop 25-30%, end of case was about 30-35% by DAVID. Pt admitted to CVICU sedated and intubated for further postoperative care.     Cardiovascular:    Hx of HTN  Acute HFrEF, ischemic CMP  Multivessel coronary artery disease  S/p CABG x 4 on 5/24/22 with Dr. Desai: CHU to LAD, SVG (harvest form both thighs) to distal RCA, OM, ramus.   PVCs  Recent echo on 5/19, LVEF 20-30%  Post-op LVEF 35%  5/26 Echo --> LVEF 35 - 40 %, improved compared to prior. RV EF reduced     EP consulted for PVCs in [facility] with low BP.    Past Medical History:      Past Medical History:   Diagnosis Date     Abnormal Pap smear of cervix ~2000    repeat pap was normal     Allergic rhinitis, cause unspecified      Anxiety state, unspecified     panic episodes     Unspecified essential hypertension        Past Surgical  History:      Past Surgical History:   Procedure Laterality Date     BYPASS GRAFT ARTERY CORONARY N/A 5/24/2022    Procedure: Median sternotomy.  Intraoperative transesophageal echocardiogram per anesthesia.  Left internal mammary artery harvest.  Right and left endoscopically harvested  greater saphenouse vein.  Cardiopulmonary Bypass.  Coronary Artery Bypass Grafts x4.;  Surgeon: Ulises Desai MD;  Location: [facility] OR     CV CORONARY ANGIOGRAM  5/19/2022    Procedure: CV CORONARY ANGIOGRAM;  Surgeon: Huseyin Vogel MD;  Location:  HEART CARDIAC CATH LAB     CV CORONARY ANGIOGRAM  5/19/2022    Procedure: ;  Surgeon: Huseyin Vogel  "MD Roseanna;  Location:  HEART CARDIAC CATH LAB     NO HISTORY OF SURGERY         Family History:      Family History   Problem Relation Age of Onset     Diabetes Mother         hypertension, alive at 83     C.A.D. Mother      Cancer Father         lung cancer, smoker.   at age 53     Family History Negative Sister      Osteoporosis Sister        Social History:      Social History     Socioeconomic History     Marital status:      Spouse name: Not on file     Number of children: Not on file     Years of education: Not on file     Highest education level: Not on file   Occupational History     Not on file   Tobacco Use     Smoking status: Never Smoker     Smokeless tobacco: Never Used   Vaping Use     Vaping Use: Never used   Substance and Sexual Activity     Alcohol use: Yes     Comment: social     Drug use: No     Sexual activity: Not Currently     Partners: Male   Other Topics Concern     Parent/sibling w/ CABG, MI or angioplasty before 65F 55M? No   Social History Narrative     Not on file     Social Determinants of Health     Financial Resource Strain: Not on file   Food Insecurity: Not on file   Transportation Needs: Not on file   Physical Activity: Not on file   Stress: Not on file   Social Connections: Not on file   Intimate Partner Violence: Not on file   Housing Stability: Not on file       ROS:    A complete review of systems is negative except as noted above in the HPI.    Physical Exam:   Vitals: BP (!) 152/65   Pulse 79   Temp 98.2  F (36.8  C) (Oral)   Resp 11   Ht 1.626 m (5' 4\")   Wt 73.3 kg (161 lb 9.6 oz)   SpO2 100%   BMI 27.74 kg/m    General: Awake, alert  Neuro: NAD, A&Ox3  Resp: normal rate and work of breathing on 2 L NC  CV: RRR  Abdomen: Soft, Non-distended, Non-tender  Incisions: clean dry and intact  Extremities: warm and well perfused         Relevant labs, imaging, medications and procedures were reviewed.    Assessment and Recommendations:   Marianne Monroy " is a 67 year old female with PMH of HTN, HLD, T2DM, GERD, MDD, JUSTUS, nonsustained VT, multivessel coronary disease with acute heart failure due to ischemic cardiomyopathy who underwent CABG x4 on 5/24/22 with Dr. Desai. VLAD to LAD, SVGs (EVH from both thighs) to distal RCA, OM, ramus.  Intraoperative course uncomplicated. 2 hours on bypass and cross clamp 1:45 hours. LVEF preop 25-30%, end of case was about 30-35% by DAVID. Pt admitted to CVICU sedated and intubated for further postoperative care.    EP consulted for PVCs with bigeminy.    #- Bigeminy PVCs:  - PT presented to the hospital on 5/18 at that time had a zio patch placed that showed 21 episodes non sustained VT longest was 22 seconds at a rate of 197, NSVT was seen being possibly focal in origin. At that time pt was suspected to have an underlying ischemic diseases, coronary angio showed multiple vessel, pt had CABG on 05/24/22, after which pt had some EKG with QTC prolongation up to about 588 (true prolongation), possibly 2/2 to to surgery changes. Today pt is having PVCs (normal QTC), possibly of posterior papillary muscle origin (only seen lead 2 and V2 , where PVCs is negative in lead 2 superior axis and RBB in V2, no full EKG seen with PVC) this PVCs is usually occur after ischemic event due to irritation at the papillary muscle area. .  - Tele was reviewed showed few episodes of non sustained VT (polymorphic for few beats).  - EF is 35%-40%  - Plan: continue metoprolol, titrate up as tolerated, will advice pt to have a life vest due to her NSVT before d/c. To f/u in EP clinic, echo in 3 months, if EF not improved will consider a ICD.      I discussed the patient with Dr. Fischer .    Addendum:  I saw and evaluated the patient and agree with the fellow s finding and plans as written.  MD Ayanna Apodaca MD   Cardiac electrophysiology fellow

## 2022-05-27 NOTE — PLAN OF CARE
Major Shift Events:  UOP low this am, CI 2.3, CVP 16, PAP 36/18, SVO2 46 - MD notified; 20 lasix given and Dobutamine started at 2.5.  SBP went up to 140s-160s - MD directed RN to turn dobutamine down to 1.25; then later directed RN to turn up to 2.5.  CI went up to 2.9 and pt had bounding pulses in the evening - MD notified and directed RN to turn dobutamine down to 1.25 and given another 20 of lasix.  CVP at noon had gone down to 8 but back up to 15 by 1600 as well.  SVO2 51-54 at noon and 1600.  BP this evening 140s-150s - MD aware; PRN hydralazine given.  MAPs after hydralazine were upper 60s low 70s.  Pt refused Bipap this am (for high CO2) 2/2 claustrophobia/anxiety - MD notified.  Pts CO2 had gone down and was WDL by this time so team was OK with keeping patient on 2L NC.  Pt has been performing IS and coughing/deep breathing as well.  Left radial A-line placed this afternoon.   Plan: Continue to monitor.  For vital signs and complete assessments, please see documentation flowsheets.

## 2022-05-27 NOTE — PLAN OF CARE
Major Shift Events:  Dobutamine stopped prior to shift change. [place] ports clotted off, CVC capped this morning. Labile blood pressure this AM, especially with bigeminal episodes. Patient has converted back to NSR with only rare PVCs and BP stabilizes. EP consulted for the bigeminy. Rate controlled. Farnsworth in place with adequate UOP, 40mg IV lasix given this AM w adequate response. Increased bowel regimen, patient is passing gas. Complains of some chest/back pain. PRN Oxy and Robaxin given. RICKEY caths in place. Pacer wires pulled this afternoon. CT x3 with minimal output.  Plan to place PICC and removed R MAC d/t inability to get blood return. Resident planning on pulling CTs this afternoon.  Plan: EP recommendations. Monitor vitals/pain/I&Os. Transfer to floor when appropriate. Continue current POC.  For vital signs and complete assessments, please see documentation flowsheets.     Goal Outcome Evaluation:  Problem: Plan of Care - These are the overarching goals to be used throughout the patient stay.    Goal: Readiness for Transition of Care  Outcome: Ongoing, Progressing

## 2022-05-27 NOTE — PROGRESS NOTES
"Pain Service Progress Note  [facility]  Date: 05/27/2022       Patient Name: Marianne Monroy  MRN: [id number]  Age: 67 year old  Sex: female      Assessment:  Marianne Monroy is a 67 year old female with PMH of HTN, HLD, T2DM, GERD, MDD, JUSTUS, nonsustained VT, multivessel coronary disease with acute heart failure due to ischemic cardiomyopathy     Procedure: CABG x 4    Date of Surgery: 5/24/22     Date of Catheter Placement: 5/24/22   Plan/Recommendations:  1. Regional Anesthesia/Analgesia  -Continuous Catheter Type/Site: bilateral erector spinae (ES) T5-6  Continue 0.2% ropivacaine  Programmed Intermittent Bolus (PIB) at 7 mL Q60 min via each catheter, total infusion rate of 14 mL/hr    Plan to maintain catheter, max of 7 days    2. Anticoagulation  Okay to continue heparin 5,000 units SC Q 8 hrs as ordered by primary service  -Please contact Inpatient Pain Service before ordering or making any anticoagulation changes       3. Multimodal Analgesia  - per primary service    Pain Service will continue to follow.    Discussed with attending anesthesiologist      Overnight Events: No acute events    Tubes/Drains: Yes  Chest tubes x 3, Farnsworth catheter    Subjective:  my back and chest hurt.  CVICU team plans to remove mediastinals today and leave left CT tube in place for at least one more day.      Nausea: No  Vomiting: No  Symptoms of LAST: No    Pain Location:  Anterior chest incision, low back    Pain Intensity:    Pain at Rest: 2/10   Pain with Activity: 3-4/10  Satisfied with your level of pain control: Yes    Diet: Regular Diet Adult Thin Liquids (level 0)    Relevant Labs:  Recent Labs   Lab Test 05/27/22  0401 05/26/22  0422 05/25/22  0341   INR  --   --  1.29*   PLT 94*   < > 127*   PTT  --   --  29   BUN 25   < > 16    < > = values in this interval not displayed.       Physical Exam:  Vitals: BP (!) 152/65   Pulse 92   Temp 98.6  F (37  C)   Resp 20   Ht 1.626 m (5' 4\")   Wt " "73.3 kg (161 lb 9.6 oz)   SpO2 100%   BMI 27.74 kg/m      Physical Exam:   Orientation:  Alert, oriented, and in no acute distress: Yes  Sedation: No    Motor Examination:  5/5 Strength in lower extremities: Yes    Catheter Site:   Catheter entry site is clean/dry/intact: Yes    Tender: No      Relevant Medications:  Current Pain Medications:  Medications related to Pain Management (From now, onward)    Start     Dose/Rate Route Frequency Ordered Stop    05/27/22 0000  acetaminophen (TYLENOL) tablet 650 mg         650 mg Oral EVERY 4 HOURS PRN 05/24/22 1538      05/25/22 0800  aspirin (ASA) chewable tablet 162 mg         162 mg Oral or NG Tube DAILY 05/24/22 1537      05/25/22 0800  polyethylene glycol (MIRALAX) Packet 17 g         17 g Oral DAILY 05/24/22 1538      05/24/22 2000  senna-docusate (SENOKOT-S/PERICOLACE) 8.6-50 MG per tablet 1 tablet         1 tablet Oral 2 TIMES DAILY 05/24/22 1538      05/24/22 1538  HYDROmorphone (DILAUDID) injection 0.2 mg        \"Or\" Linked Group Details    0.2 mg Intravenous EVERY 2 HOURS PRN 05/24/22 1538      05/24/22 1538  HYDROmorphone (DILAUDID) injection 0.4 mg        \"Or\" Linked Group Details    0.4 mg Intravenous EVERY 2 HOURS PRN 05/24/22 1538      05/24/22 1538  methocarbamol (ROBAXIN) tablet 500 mg         500 mg Oral EVERY 6 HOURS PRN 05/24/22 1538      05/24/22 1538  magnesium hydroxide (MILK OF MAGNESIA) suspension 30 mL         30 mL Oral DAILY PRN 05/24/22 1538      05/24/22 1538  bisacodyl (DULCOLAX) Suppository 10 mg         10 mg Rectal DAILY PRN 05/24/22 1538      05/24/22 0830  ropivacaine 0.2% in NS perineural infusion simple          Perineural Continuous Nerve Block 05/24/22 0803      05/24/22 0830  ropivacaine 0.2% in NS perineural infusion simple          Perineural Continuous Nerve Block 05/24/22 0803      05/19/22 1404  oxyCODONE (ROXICODONE) tablet 5 mg        \"Or\" Linked Group Details    5 mg Oral EVERY 4 HOURS PRN 05/19/22 1404      05/19/22 1404  " "oxyCODONE IR (ROXICODONE) tablet 10 mg        \"Or\" Linked Group Details    10 mg Oral EVERY 4 HOURS PRN 05/19/22 1404      05/18/22 2206  lidocaine 1 % 0.1-1 mL         0.1-1 mL Other EVERY 1 HOUR PRN 05/18/22 2206 05/18/22 2206  lidocaine (LMX4) cream          Topical EVERY 1 HOUR PRN 05/18/22 2206            Primary Service Contacted with Recommendations? Yes    Norma Willingham, APRN CNP  05/27/2022     Time/Communication:  I personally spent 15 minutes with greater than 50% in consultation, education, counseling and coordination of care.  See note above for details on conversation.      Contact Info Please Page the Pain Team Via [employer]    "

## 2022-05-28 ENCOUNTER — APPOINTMENT (OUTPATIENT)
Dept: OCCUPATIONAL THERAPY | Facility: CLINIC | Age: 67
DRG: 233 | End: 2022-05-28
Payer: COMMERCIAL

## 2022-05-28 ENCOUNTER — APPOINTMENT (OUTPATIENT)
Dept: GENERAL RADIOLOGY | Facility: CLINIC | Age: 67
DRG: 233 | End: 2022-05-28
Payer: COMMERCIAL

## 2022-05-28 ENCOUNTER — APPOINTMENT (OUTPATIENT)
Dept: PHYSICAL THERAPY | Facility: CLINIC | Age: 67
DRG: 233 | End: 2022-05-28
Attending: SURGERY
Payer: COMMERCIAL

## 2022-05-28 LAB
ANION GAP SERPL CALCULATED.3IONS-SCNC: 5 MMOL/L (ref 3–14)
BUN SERPL-MCNC: 31 MG/DL (ref 7–30)
CALCIUM SERPL-MCNC: 8.4 MG/DL (ref 8.5–10.1)
CHLORIDE BLD-SCNC: 108 MMOL/L (ref 94–109)
CO2 SERPL-SCNC: 27 MMOL/L (ref 20–32)
CREAT SERPL-MCNC: 0.75 MG/DL (ref 0.52–1.04)
ERYTHROCYTE [DISTWIDTH] IN BLOOD BY AUTOMATED COUNT: 13.9 % (ref 10–15)
GFR SERPL CREATININE-BSD FRML MDRD: 86 ML/MIN/1.73M2
GLUCOSE BLD-MCNC: 165 MG/DL (ref 70–99)
GLUCOSE BLDC GLUCOMTR-MCNC: 149 MG/DL (ref 70–99)
GLUCOSE BLDC GLUCOMTR-MCNC: 171 MG/DL (ref 70–99)
GLUCOSE BLDC GLUCOMTR-MCNC: 187 MG/DL (ref 70–99)
GLUCOSE BLDC GLUCOMTR-MCNC: 232 MG/DL (ref 70–99)
GLUCOSE BLDC GLUCOMTR-MCNC: 253 MG/DL (ref 70–99)
HCT VFR BLD AUTO: 25.1 % (ref 35–47)
HGB BLD-MCNC: 7.7 G/DL (ref 11.7–15.7)
MAGNESIUM SERPL-MCNC: 2.2 MG/DL (ref 1.6–2.3)
MCH RBC QN AUTO: 27.7 PG (ref 26.5–33)
MCHC RBC AUTO-ENTMCNC: 30.7 G/DL (ref 31.5–36.5)
MCV RBC AUTO: 90 FL (ref 78–100)
PHOSPHATE SERPL-MCNC: 3 MG/DL (ref 2.5–4.5)
PLATELET # BLD AUTO: 113 10E3/UL (ref 150–450)
POTASSIUM BLD-SCNC: 4.3 MMOL/L (ref 3.4–5.3)
RBC # BLD AUTO: 2.78 10E6/UL (ref 3.8–5.2)
SARS-COV-2 RNA RESP QL NAA+PROBE: NEGATIVE
SODIUM SERPL-SCNC: 140 MMOL/L (ref 133–144)
WBC # BLD AUTO: 7.4 10E3/UL (ref 4–11)

## 2022-05-28 PROCEDURE — 272N000450 HC KIT 4FR POWER PICC SINGLE LUMEN

## 2022-05-28 PROCEDURE — 80048 BASIC METABOLIC PNL TOTAL CA: CPT | Performed by: SURGERY

## 2022-05-28 PROCEDURE — 272N000201 ZZ HC ADHESIVE SKIN CLOSURE, DERMABOND

## 2022-05-28 PROCEDURE — 250N000013 HC RX MED GY IP 250 OP 250 PS 637: Performed by: STUDENT IN AN ORGANIZED HEALTH CARE EDUCATION/TRAINING PROGRAM

## 2022-05-28 PROCEDURE — 36569 INSJ PICC 5 YR+ W/O IMAGING: CPT

## 2022-05-28 PROCEDURE — 250N000011 HC RX IP 250 OP 636: Performed by: SURGERY

## 2022-05-28 PROCEDURE — 85027 COMPLETE CBC AUTOMATED: CPT | Performed by: PHYSICIAN ASSISTANT

## 2022-05-28 PROCEDURE — 250N000013 HC RX MED GY IP 250 OP 250 PS 637: Performed by: ANESTHESIOLOGY

## 2022-05-28 PROCEDURE — 272N000019 HC KIT OPEN ENDED DOUBLE LUMEN

## 2022-05-28 PROCEDURE — U0003 INFECTIOUS AGENT DETECTION BY NUCLEIC ACID (DNA OR RNA); SEVERE ACUTE RESPIRATORY SYNDROME CORONAVIRUS 2 (SARS-COV-2) (CORONAVIRUS DISEASE [COVID-19]), AMPLIFIED PROBE TECHNIQUE, MAKING USE OF HIGH THROUGHPUT TECHNOLOGIES AS DESCRIBED BY CMS-2020-01-R: HCPCS

## 2022-05-28 PROCEDURE — 250N000013 HC RX MED GY IP 250 OP 250 PS 637: Performed by: SURGERY

## 2022-05-28 PROCEDURE — 83735 ASSAY OF MAGNESIUM: CPT | Performed by: SURGERY

## 2022-05-28 PROCEDURE — 999N000065 XR CHEST PORT 1 VIEW

## 2022-05-28 PROCEDURE — 214N000001 HC R&B CCU UMMC

## 2022-05-28 PROCEDURE — 71045 X-RAY EXAM CHEST 1 VIEW: CPT | Mod: 26 | Performed by: RADIOLOGY

## 2022-05-28 PROCEDURE — 84100 ASSAY OF PHOSPHORUS: CPT | Performed by: STUDENT IN AN ORGANIZED HEALTH CARE EDUCATION/TRAINING PROGRAM

## 2022-05-28 PROCEDURE — 272N000452 HC KIT SHRLOCK 5FR POWER PICC TRIPLE LUMEN

## 2022-05-28 PROCEDURE — 97535 SELF CARE MNGMENT TRAINING: CPT | Mod: GO | Performed by: OCCUPATIONAL THERAPIST

## 2022-05-28 PROCEDURE — 99207 PR NO BILLABLE SERVICE THIS VISIT: CPT | Performed by: ANESTHESIOLOGY

## 2022-05-28 PROCEDURE — 272N000277 HC DEVICE 4FR SECURACATH

## 2022-05-28 PROCEDURE — 97530 THERAPEUTIC ACTIVITIES: CPT | Mod: GP | Performed by: PHYSICAL THERAPIST

## 2022-05-28 PROCEDURE — 272N000020 HC KIT OPEN ENDED SINGLE LUMEN

## 2022-05-28 PROCEDURE — 97161 PT EVAL LOW COMPLEX 20 MIN: CPT | Mod: GP | Performed by: PHYSICAL THERAPIST

## 2022-05-28 PROCEDURE — 272N000103 HC INTRODUCER MICRO SET

## 2022-05-28 PROCEDURE — 272N000278 HC DEVICE 5FR SECURACATH

## 2022-05-28 PROCEDURE — 97116 GAIT TRAINING THERAPY: CPT | Mod: GP | Performed by: PHYSICAL THERAPIST

## 2022-05-28 PROCEDURE — 99232 SBSQ HOSP IP/OBS MODERATE 35: CPT | Performed by: ANESTHESIOLOGY

## 2022-05-28 PROCEDURE — 97530 THERAPEUTIC ACTIVITIES: CPT | Mod: GO | Performed by: OCCUPATIONAL THERAPIST

## 2022-05-28 RX ADMIN — HEPARIN SODIUM 5000 UNITS: 5000 INJECTION, SOLUTION INTRAVENOUS; SUBCUTANEOUS at 03:50

## 2022-05-28 RX ADMIN — ATORVASTATIN CALCIUM 80 MG: 80 TABLET, FILM COATED ORAL at 08:20

## 2022-05-28 RX ADMIN — Medication 1 TABLET: at 08:20

## 2022-05-28 RX ADMIN — METHOCARBAMOL 500 MG: 500 TABLET ORAL at 21:33

## 2022-05-28 RX ADMIN — HYDRALAZINE HYDROCHLORIDE 10 MG: 20 INJECTION INTRAMUSCULAR; INTRAVENOUS at 09:10

## 2022-05-28 RX ADMIN — Medication 12.5 MG: at 08:20

## 2022-05-28 RX ADMIN — METHOCARBAMOL 500 MG: 500 TABLET ORAL at 15:20

## 2022-05-28 RX ADMIN — HEPARIN SODIUM 5000 UNITS: 5000 INJECTION, SOLUTION INTRAVENOUS; SUBCUTANEOUS at 12:35

## 2022-05-28 RX ADMIN — ACETAMINOPHEN 650 MG: 325 TABLET, FILM COATED ORAL at 18:41

## 2022-05-28 RX ADMIN — PANTOPRAZOLE SODIUM 40 MG: 40 TABLET, DELAYED RELEASE ORAL at 08:21

## 2022-05-28 RX ADMIN — Medication 12.5 MG: at 20:46

## 2022-05-28 RX ADMIN — DOCUSATE SODIUM 50 MG AND SENNOSIDES 8.6 MG 2 TABLET: 8.6; 5 TABLET, FILM COATED ORAL at 08:21

## 2022-05-28 RX ADMIN — OXYCODONE HYDROCHLORIDE 5 MG: 5 TABLET ORAL at 09:10

## 2022-05-28 RX ADMIN — ACETAMINOPHEN 650 MG: 325 TABLET, FILM COATED ORAL at 10:43

## 2022-05-28 RX ADMIN — ASPIRIN 81 MG 162 MG: 81 TABLET ORAL at 08:20

## 2022-05-28 RX ADMIN — MUPIROCIN 0.5 G: 20 OINTMENT TOPICAL at 08:26

## 2022-05-28 RX ADMIN — HEPARIN SODIUM 5000 UNITS: 5000 INJECTION, SOLUTION INTRAVENOUS; SUBCUTANEOUS at 20:46

## 2022-05-28 RX ADMIN — ACETAMINOPHEN 650 MG: 325 TABLET, FILM COATED ORAL at 06:34

## 2022-05-28 RX ADMIN — ACETAMINOPHEN 650 MG: 325 TABLET, FILM COATED ORAL at 15:19

## 2022-05-28 RX ADMIN — Medication 400 MG: at 08:21

## 2022-05-28 RX ADMIN — METHOCARBAMOL 500 MG: 500 TABLET ORAL at 06:34

## 2022-05-28 ASSESSMENT — ACTIVITIES OF DAILY LIVING (ADL)
ADLS_ACUITY_SCORE: 29
ADLS_ACUITY_SCORE: 29
ADLS_ACUITY_SCORE: 35
ADLS_ACUITY_SCORE: 37
ADLS_ACUITY_SCORE: 35
ADLS_ACUITY_SCORE: 29
ADLS_ACUITY_SCORE: 29
ADLS_ACUITY_SCORE: 39
ADLS_ACUITY_SCORE: 39
ADLS_ACUITY_SCORE: 37
ADLS_ACUITY_SCORE: 29
ADLS_ACUITY_SCORE: 37

## 2022-05-28 NOTE — PROCEDURES
[facility]    Triple Lumen PICC Placement    Date/Time: 5/28/2022 11:29 AM  Performed by: Kalpana Kenny RN  Authorized by: Kris Steve MD   Indications: vascular access      UNIVERSAL PROTOCOL   Site Marked: Yes  Prior Images Obtained and Reviewed:  Yes  Required items: Required blood products, implants, devices and special equipment available    Patient identity confirmed:  Verbally with patient  NA - No sedation, light sedation, or local anesthesia  Confirmation Checklist:  Patient's identity using two indicators, relevant allergies, procedure was appropriate and matched the consent or emergent situation and correct equipment/implants were available  Time out: Immediately prior to the procedure a time out was called    Universal Protocol: the Joint Commission Universal Protocol was followed    Preparation: Patient was prepped and draped in usual sterile fashion       ANESTHESIA    Anesthesia: Local infiltration  Local Anesthetic:  Lidocaine 1% without epinephrine  Anesthetic Total (mL):  5      SEDATION    Patient Sedated: No        Preparation: skin prepped with ChloraPrep  Skin prep agent: skin prep agent completely dried prior to procedure  Sterile barriers: maximum sterile barriers were used: cap, mask, sterile gown, sterile gloves, and large sterile sheet  Hand hygiene: hand hygiene performed prior to central venous catheter insertion  Type of line used: PICC and Power PICC  Catheter type: triple lumen  Lumen type: non-valved  Catheter size: 5 Fr  Brand: Bard  Lot number: [id number]  Placement method: venipuncture, MST, ultrasound and tip navigation system  Number of attempts: 1  Difficulty threading catheter: no  Successful placement: yes  Orientation: left    Location: basilic vein (vd 0.38 cm)  Arm circumference: adults 10 cm  Extremity circumference: 30  Visible catheter length: 3  Total catheter length: 44  Dressing and securement: adhesive  securement device, alcohol impregnated caps, blood cleaned with CHG, chlorhexidine disc applied, dressing applied, glue, line secured, securement device, site cleansed, sterile dressing applied and transparent dressing  Post procedure assessment: blood return through all ports, free fluid flow and placement verfied by 3CG technology  PROCEDURE   Patient Tolerance:  Patient tolerated the procedure well with no immediate complicationsDescribe Procedure: PICC ok to use

## 2022-05-28 NOTE — PLAN OF CARE
Cared for patient from [id number].    Transferred to:  at 1600.  Belongings: Sent with patent  [place] removed? This morning, patient has voided since.  Central line removed? RIJ removed. New L arm TL PICC.  Chart and medications sent with patient: yes  Family notified: DaughterAdelaide, notified and updated.    Neuro: Patient A/O x 4. PEERLA 3+. Moves all extremities, follows commands. Afebrile. Patient c/o pain at sternal incision site, radiating to back. PRN tylenol, robaxin, and oxycodone given with relief.   Cardiac: HR 70-90s, switches between sinus rhythm and bigeminy. Patient asymptomatic and pressures stable with bigeminy. SBP goal <140. 10mg IV hydralazine given once. +2 edema BUE, BLE.   Resp: 1L NC tolerating well, desats with activity. Breaths shallow, lung sounds clear, diminished in bases.   GI/: Voiding spontaneously. Bladder scan at 1130 75ml. 3 loose BMs today. On regular diet, fair appetite. Sliding scale insulin ACHS.   Musc: Sternal incision to wound vac, no drainage. Bilateral graft sites BLE, liquid bandage in place, sites bruised with erythema. Assist x 2 to manage lines to commode and wheelchair.     Of note, patient's daughter tested positive for COVID-19 today. Daughter is asymptomatic and was visiting patient yesterday. Patient tested negative for COVID-19 this afternoon and is asymptomatic. Charge RN notified, provider notified, oncoming RN notified.          Problem: Plan of Care - These are the overarching goals to be used throughout the patient stay.    Goal: Absence of Hospital-Acquired Illness or Injury  Intervention: Identify and Manage Fall Risk  Recent Flowsheet Documentation  Taken 5/28/2022 1200 by Selene Ann, RN  Safety Promotion/Fall Prevention:   activity supervised   assistive device/personal items within reach   clutter free environment maintained   fall prevention program maintained   patient and family education   room organization consistent   safety round/check  completed  Taken 5/28/2022 0800 by Selene Ann RN  Safety Promotion/Fall Prevention:   activity supervised   assistive device/personal items within reach   clutter free environment maintained   fall prevention program maintained   patient and family education   room organization consistent   safety round/check completed     Problem: Plan of Care - These are the overarching goals to be used throughout the patient stay.    Goal: Optimal Comfort and Wellbeing  Intervention: Monitor Pain and Promote Comfort  Recent Flowsheet Documentation  Taken 5/28/2022 1519 by Selene Ann, RN  Pain Management Interventions: medication (see MAR)  Taken 5/28/2022 1200 by Selene Ann RN  Pain Management Interventions:   declines   quiet environment facilitated   repositioned   rest  Taken 5/28/2022 1043 by eSlene Ann RN  Pain Management Interventions:   medication (see MAR)   repositioned   rest  Taken 5/28/2022 0800 by Selene Ann RN  Pain Management Interventions:   declines   quiet environment facilitated   repositioned   rest

## 2022-05-28 NOTE — PROGRESS NOTES
Failed PICC attempt right arm unable to thread the catheter in meeting resistance tried several times.Nurse assigned informed will try putting PICC on left arm tomorrow 5/28/22.

## 2022-05-28 NOTE — PLAN OF CARE
Problem: Plan of Care - These are the overarching goals to be used throughout the patient stay.    Goal: Plan of Care Review/Shift Note  Description: The Plan of Care Review/Shift note should be completed every shift.  The Outcome Evaluation is a brief statement about your assessment that the patient is improving, declining, or no change.  This information will be displayed automatically on your shift note.  Outcome: Ongoing, Progressing  Flowsheets (Taken 5/28/2022 0411)  Outcome Evaluation: Pt remains off pressors tolerating well. 3L NC tolerating well. possible transfer to floor status 5/28. pt needs and pain requirements met per pt report. continue to monitor  Overall Patient Progress: improving  Goal: Patient-Specific Goal (Individualized)  Description: Pt is concerned that she will have adeaquate pain control after surgery      Outcome: Ongoing, Progressing  Goal: Absence of Hospital-Acquired Illness or Injury  Outcome: Ongoing, Progressing  Intervention: Identify and Manage Fall Risk  Recent Flowsheet Documentation  Taken 5/28/2022 0400 by Wero Robin RN  Safety Promotion/Fall Prevention:   lighting adjusted   nonskid shoes/slippers when out of bed   clutter free environment maintained  Taken 5/28/2022 0000 by Wero Robin RN  Safety Promotion/Fall Prevention:   lighting adjusted   nonskid shoes/slippers when out of bed   clutter free environment maintained  Intervention: Prevent Skin Injury  Recent Flowsheet Documentation  Taken 5/28/2022 0400 by Wero Robin RN  Body Position:   turned   right  Taken 5/28/2022 0200 by Wero Robin RN  Body Position:   turned   left  Taken 5/28/2022 0000 by Wero Robin RN  Body Position:   turned   right  Intervention: Prevent and Manage VTE (Venous Thromboembolism) Risk  Recent Flowsheet Documentation  Taken 5/28/2022 0400 by Wero Robin RN  Range of Motion: active ROM (range of motion) encouraged  VTE Prevention/Management: SCDs (sequential  compression devices) on  Taken 5/28/2022 0000 by Wero Robin RN  Range of Motion: active ROM (range of motion) encouraged  VTE Prevention/Management: SCDs (sequential compression devices) on  Intervention: Prevent Infection  Recent Flowsheet Documentation  Taken 5/28/2022 0400 by Wero Roibn RN  Infection Prevention:   cohorting utilized   environmental surveillance performed   equipment surfaces disinfected   hand hygiene promoted   personal protective equipment utilized   rest/sleep promoted   single patient room provided   visitors restricted/screened  Taken 5/28/2022 0000 by Wero Robin RN  Infection Prevention:   cohorting utilized   environmental surveillance performed   equipment surfaces disinfected   hand hygiene promoted   personal protective equipment utilized   rest/sleep promoted   single patient room provided   visitors restricted/screened  Goal: Optimal Comfort and Wellbeing  Outcome: Ongoing, Progressing  Intervention: Monitor Pain and Promote Comfort  Recent Flowsheet Documentation  Taken 5/28/2022 0400 by Wero Robin RN  Pain Management Interventions:   rest   repositioned  Taken 5/28/2022 0000 by Wero Robin RN  Pain Management Interventions:   rest   repositioned  Intervention: Provide Person-Centered Care  Recent Flowsheet Documentation  Taken 5/28/2022 0400 by Wero Robin RN  Trust Relationship/Rapport:   care explained   questions answered   questions encouraged  Taken 5/28/2022 0000 by Wero Robin RN  Trust Relationship/Rapport:   care explained   questions answered   questions encouraged  Goal: Readiness for Transition of Care  Outcome: Ongoing, Progressing     Problem: Adjustment to Device (Cardiac Rhythm Management Device)  Goal: Optimal Adjustment to Device  Outcome: Ongoing, Progressing  Intervention: Optimize Psychosocial Response to Device Implantation  Recent Flowsheet Documentation  Taken 5/28/2022 0400 by Wero Robin RN  Supportive Measures: active  listening utilized  Taken 5/28/2022 0000 by Wero Robin RN  Supportive Measures: active listening utilized     Problem: Bleeding (Cardiac Rhythm Management Device)  Goal: Absence of Bleeding  Outcome: Ongoing, Progressing  Intervention: Monitor and Manage Bleeding  Recent Flowsheet Documentation  Taken 5/28/2022 0400 by Wero Robin RN  Bleeding Precautions: blood pressure closely monitored  Taken 5/28/2022 0000 by Wero Robin RN  Bleeding Precautions: blood pressure closely monitored     Problem: Device-Related Complication Risk (Cardiac Rhythm Management Device)  Goal: Effective Device Function  Outcome: Ongoing, Progressing     Problem: Infection (Cardiac Rhythm Management Device)  Goal: Absence of Infection Signs and Symptoms  Outcome: Ongoing, Progressing     Problem: Pain (Cardiac Rhythm Management Device)  Goal: Acceptable Pain Level  Outcome: Ongoing, Progressing  Intervention: Prevent or Manage Pain  Recent Flowsheet Documentation  Taken 5/28/2022 0400 by Wero Robin RN  Pain Management Interventions:   rest   repositioned  Taken 5/28/2022 0000 by Wero Robin RN  Pain Management Interventions:   rest   repositioned     Problem: Respiratory Compromise (Cardiac Rhythm Management Device)  Goal: Effective Oxygenation and Ventilation  Outcome: Ongoing, Progressing  Intervention: Optimize Oxygenation and Ventilation  Recent Flowsheet Documentation  Taken 5/28/2022 0400 by Wero Robin RN  Cough And Deep Breathing: done independently per patient  Taken 5/28/2022 0000 by Wero Robin RN  Cough And Deep Breathing: done independently per patient     Problem: Risk for Delirium  Goal: Optimal Coping  Outcome: Ongoing, Progressing  Intervention: Optimize Psychosocial Adjustment to Delirium  Recent Flowsheet Documentation  Taken 5/28/2022 0400 by Wero Robin RN  Supportive Measures: active listening utilized  Taken 5/28/2022 0000 by Wero Robin RN  Supportive Measures: active listening  utilized  Goal: Improved Behavioral Control  Outcome: Ongoing, Progressing  Intervention: Prevent and Manage Agitation  Recent Flowsheet Documentation  Taken 5/28/2022 0400 by Wero Robin RN  Environment Familiarity/Consistency: daily routine followed  Taken 5/28/2022 0000 by eWro Robin RN  Environment Familiarity/Consistency: daily routine followed  Goal: Improved Attention and Thought Clarity  Outcome: Ongoing, Progressing  Intervention: Maximize Cognitive Function  Recent Flowsheet Documentation  Taken 5/28/2022 0400 by Wero Robin RN  Sensory Stimulation Regulation: care clustered  Reorientation Measures:   calendar in view   clock in view   familiar social contact encouraged  Taken 5/28/2022 0000 by Wero Robin RN  Sensory Stimulation Regulation: care clustered  Reorientation Measures:   calendar in view   clock in view   familiar social contact encouraged  Goal: Improved Sleep  Outcome: Ongoing, Progressing     Problem: Activity Intolerance (Cardiovascular Surgery)  Goal: Improved Activity Tolerance  Outcome: Ongoing, Progressing  Intervention: Optimize Tolerance for Activity  Recent Flowsheet Documentation  Taken 5/28/2022 0400 by Wero Robin RN  Environmental Support: calm environment promoted  Taken 5/28/2022 0000 by Wero Robin RN  Environmental Support: calm environment promoted     Problem: Adjustment to Surgery (Cardiovascular Surgery)  Goal: Optimal Coping with Heart Surgery  Outcome: Ongoing, Progressing  Intervention: Support Psychosocial Response to Surgery  Recent Flowsheet Documentation  Taken 5/28/2022 0400 by Wero Robin RN  Supportive Measures: active listening utilized  Taken 5/28/2022 0000 by Wero Robin RN  Supportive Measures: active listening utilized     Problem: Bleeding (Cardiovascular Surgery)  Goal: Bleeding (Cardiovascular Surgery)  Outcome: Ongoing, Progressing  Intervention: Monitor and Manage Bleeding  Recent Flowsheet Documentation  Taken  5/28/2022 0400 by Wero Robin RN  Bleeding Management: dressing monitored  Taken 5/28/2022 0000 by Wero Robin RN  Bleeding Management: dressing monitored     Problem: Bowel Motility Impaired (Cardiovascular Surgery)  Goal: Effective Bowel Elimination (Cardiovascular Surgery)  Outcome: Ongoing, Progressing     Problem: Cardiac Function Impaired (Cardiovascular Surgery)  Goal: Effective Cardiac Function  Outcome: Ongoing, Progressing     Problem: Cerebral Tissue Perfusion (Cardiovascular Surgery)  Goal: Optimal Cerebral Tissue Perfusion (Cardiovascular Surgery)  Outcome: Ongoing, Progressing  Intervention: Protect and Optimize Cerebral Perfusion  Recent Flowsheet Documentation  Taken 5/28/2022 0400 by Wero Robin RN  Sensory Stimulation Regulation: care clustered  Cerebral Perfusion Promotion: blood pressure monitored  Head of Bed (HOB) Positioning: HOB at 30-45 degrees  Taken 5/28/2022 0200 by Wero Robin RN  Head of Bed (HOB) Positioning: HOB at 30-45 degrees  Taken 5/28/2022 0000 by Wero Robin RN  Sensory Stimulation Regulation: care clustered  Cerebral Perfusion Promotion: blood pressure monitored  Head of Bed (HOB) Positioning: HOB at 30-45 degrees     Problem: Fluid and Electrolyte Imbalance (Cardiovascular Surgery)  Goal: Fluid and Electrolyte Balance (Cardiovascular Surgery)  Outcome: Ongoing, Progressing     Problem: Glycemic Control Impaired (Cardiovascular Surgery)  Goal: Blood Glucose Level Within Targeted Range (Cardiovascular Surgery)  Outcome: Ongoing, Progressing     Problem: Infection (Cardiovascular Surgery)  Goal: Absence of Infection Signs and Symptoms  Outcome: Ongoing, Progressing  Intervention: Prevent or Manage Infection  Recent Flowsheet Documentation  Taken 5/28/2022 0400 by Wero Robin RN  Infection Prevention:   cohorting utilized   environmental surveillance performed   equipment surfaces disinfected   hand hygiene promoted   personal protective equipment  utilized   rest/sleep promoted   single patient room provided   visitors restricted/screened  Taken 5/28/2022 0000 by Wero Robin RN  Infection Prevention:   cohorting utilized   environmental surveillance performed   equipment surfaces disinfected   hand hygiene promoted   personal protective equipment utilized   rest/sleep promoted   single patient room provided   visitors restricted/screened     Problem: Ongoing Anesthesia Effects (Cardiovascular Surgery)  Goal: Anesthesia/Sedation Recovery  Outcome: Ongoing, Progressing  Intervention: Optimize Anesthesia Recovery  Recent Flowsheet Documentation  Taken 5/28/2022 0400 by Wero Robin RN  Safety Promotion/Fall Prevention:   lighting adjusted   nonskid shoes/slippers when out of bed   clutter free environment maintained  Reorientation Measures:   calendar in view   clock in view   familiar social contact encouraged  Taken 5/28/2022 0000 by Wero Robin RN  Safety Promotion/Fall Prevention:   lighting adjusted   nonskid shoes/slippers when out of bed   clutter free environment maintained  Reorientation Measures:   calendar in view   clock in view   familiar social contact encouraged     Problem: Pain (Cardiovascular Surgery)  Goal: Acceptable Pain Control  Outcome: Ongoing, Progressing  Intervention: Prevent or Manage Pain  Recent Flowsheet Documentation  Taken 5/28/2022 0400 by Wero Robin RN  Pain Management Interventions:   rest   repositioned  Taken 5/28/2022 0000 by Wero Robin RN  Pain Management Interventions:   rest   repositioned     Problem: Postoperative Nausea and Vomiting (Cardiovascular Surgery)  Goal: Nausea and Vomiting Relief (Cardiovascular Surgery)  Outcome: Ongoing, Progressing     Problem: Postoperative Urinary Retention (Cardiovascular Surgery)  Goal: Effective Urinary Elimination (Cardiovascular Surgery)  Outcome: Ongoing, Progressing     Problem: Respiratory Compromise (Cardiovascular Surgery)  Goal: Effective Oxygenation and  Ventilation (Cardiovascular Surgery)  Outcome: Ongoing, Progressing  Intervention: Promote Airway Secretion Clearance  Recent Flowsheet Documentation  Taken 5/28/2022 0400 by Wero Robin RN  Cough And Deep Breathing: done independently per patient  Taken 5/28/2022 0000 by Wero Robin RN  Cough And Deep Breathing: done independently per patient  Intervention: Optimize Oxygenation and Ventilation  Recent Flowsheet Documentation  Taken 5/28/2022 0400 by Wero Robin RN  Chest Tube Safety: all connections secured  Taken 5/28/2022 0000 by Wero Robin RN  Chest Tube Safety: all connections secured   Goal Outcome Evaluation:          Overall Patient Progress: improving    Outcome Evaluation: Pt remains off pressors tolerating well. 3L NC tolerating well. possible transfer to floor status 5/28. pt needs and pain requirements met per pt report. continue to monitor

## 2022-05-28 NOTE — PROGRESS NOTES
CV ICU PROGRESS NOTE        CO-MORBIDITIES:   S/P CABG (coronary artery bypass graft)  (primary encounter diagnosis)  Near syncope  Paroxysmal ventricular tachycardia (H)  Contact with and (suspected) exposure to covid-19    ASSESSMENT: Marianne Monroy is a 67 year old female with PMH of HTN, HLD, T2DM, GERD, MDD, JUSTUS, nonsustained VT, multivessel coronary disease with acute heart failure due to ischemic cardiomyopathy who underwent CABG x4 on 5/24/22 with Dr. Desai. CHU to LAD, SVGs (EVH from both thighs) to distal RCA, OM, ramus.  Intraoperative course uncomplicated. 2 hours on bypass and cross clamp 1:45 hours. LVEF preop 25-30%, end of case was about 30-35% by DAVID. Pt admitted to CVICU sedated and intubated for further postoperative care.     MAJOR CHANGES TODAY:  - transfer to floor  - PICC line placement  - continue metoprolol 12.5 BID  - RICKEY cath discontinue     PLAN:  Neuro/ pain/ sedation:  Hx of MDD and JUSTUS  Acute Postoperative pain  PTA: fluoxetine 20mg BID and hydroxyzine 25-50mg q6h PRN  - Bilateral RICKEY catheters discontinued 5/28/22  - Monitor neurological status. Notify the MD for any acute changes in exam.  - Pain: Scheduled tylenol. PRN tylenol 650 q4h, oxycodone 5-10 q4h, robaxin 500 q6h, dilaudid 0.2-0.4 q2h     Pulmonary care:   Postoperative ventilation management - extubated 5/25  Hypercarbia - improved   - Titrate supplemental oxygen to maintain saturation above 92%.  - Pulmonary hygiene: Incentive spirometer every 15 - 30 minutes when awake, flutter valve, C&DB     Cardiovascular:    Hx of HTN  Acute HFrEF, ischemic CMP  Multivessel coronary artery disease  S/p CABG x 4 on 5/24/22 with Dr. Desai: VLAD to LAD, SVG (harvest form both thighs) to distal RCA, OM, ramus.   Bigeminy PVCs with mild hypotension  Recent echo on 5/19, LVEF 20-30%  Post-op LVEF 35%  5/26 Echo --> LVEF 35 - 40 %, improved compared to prior. RV EF reduced   - EP consult for bigeminy - recommend metoprolol and will  follow-up for consideration of life vest  - Monitor hemodynamic status  - Goal MAP>65, SBP<140  - Atorvastatin 80 daily  - BB metoprolol 12.5 BID, can up-titrate as needed  -  daily     GI care/ Nutrition:   Hx of GERD   PTA omeprazole 40mg qday  - Regular diet, thin liquids  - Protonix 40 daily   - Continue bowel regimen: miralax BID, senna BID    Renal/ Fluid Balance/ Electrolytes:   BL creat appears to be ~ 0.82-0.99 (5/23).  - Strict I/O, daily weights  - Avoid/limit nephrotoxins as able  - Replete lytes PRN per protocol     Endocrine:    Stress induced hyperglycemia  Preop A1c: 7.8% (4/14/22)   Preop on: Jardiance 625 qday, high sliding scale corrective insulin  Holding while inpt: Glipizide XL 10qday, Metformin XR 1000 BID  - sliding scale insulin  - Goal BG <180 for optimal healing     ID/ Antibiotics:  Stress induced leukocytosis  Periop Ancef  - Continue to monitor fever curve and WBC     Heme:     Stress induced leukocytosis  Acute blood loss anemia  Acute blood loss thrombocytopenia - improved  Preop labs (5/18): Hgb: 13.8, hematocrit: 42.3%, plts: 284k.   No s/sx active bleeding  - Continue to monitor  - JOSHUA panel negative  - Plt 113     MSK/Skin:  Sternotomy  Surgical Incision  - Wound vac   - Sternal precautions  - Postoperative incision management per protocol  - PT/OT/CR     Prophylaxis:    5/26 Bilateral DVT ultrasound w/o DVT.   - Mechanical prophylaxis for DVT  - Chemical DVT prophylaxis - SQH  - Protonix 40 daily     Lines/ tubes/ drains:  - Left PICC line     Disposition:  - CVICU    Seen and discussed with Dr. Rosenthal, CVICU intensivist, Dr. Ngo, CVTS surgeon, and CVTS fellow, Dr. Wood.     ====================================  Subjective:  No acute events overnight. Doing well overall. No hypotension.    OBJECTIVE:   1. VITAL SIGNS:   Temp:  [98.2  F (36.8  C)-98.9  F (37.2  C)] 98.4  F (36.9  C)  Pulse:  [74-85] 77  Resp:  [11-24] 20  BP: (103178)/(52-79) 111/52  MAP:  [64  mmHg-103 mmHg] 88 mmHg  Arterial Line BP: ()/(43-80) 96/80  SpO2:  [93 %-100 %] 93 %  Resp: 20    2. INTAKE/ OUTPUT:   I/O last 3 completed shifts:  In: 1081.68 [P.O.:960; I.V.:121.68]  Out: 1515 [Urine:1425; Chest Tube:90]    3. PHYSICAL EXAMINATION:   General: Awake, alert  Neuro: NAD, A&Ox3  Resp: normal rate and work of breathing on 2 L NC  CV: RRR  Abdomen: Soft, Non-distended, Non-tender  Incisions: clean dry and intact  Extremities: warm and well perfused    4. INVESTIGATIONS:   Arterial Blood Gases   Recent Labs   Lab 05/27/22  0359 05/26/22 2335 05/26/22 2017 05/25/22 2019   PH 7.42 7.40 7.38 7.32*   PCO2 45 46* 47* 49*   PO2 86 118* 71* 74*   HCO3 29* 28 28 25     Complete Blood Count   Recent Labs   Lab 05/28/22  0350 05/27/22  0401 05/26/22  0905 05/25/22  0341   WBC 7.4 7.8 9.9 8.6   HGB 7.7* 7.3* 8.0* 8.5*   * 94* 96* 127*     Basic Metabolic Panel  Recent Labs   Lab 05/28/22  0824 05/28/22  0350 05/28/22  0208 05/27/22  2218 05/27/22  1754 05/27/22  1416 05/27/22  0407 05/27/22  0401 05/26/22  1732 05/26/22  1446 05/26/22  0440 05/26/22  0422 05/25/22  1627 05/25/22  1622 05/25/22  0342 05/25/22  0341   NA  --  140  --   --   --   --   --  142  --   --   --  144  --  146*  --  145*   POTASSIUM  --  4.3  --   --   --  4.5  --  3.7  --  4.0  --  4.6  --   --    < > 4.3   CHLORIDE  --  108  --   --   --   --   --  109  --   --   --  114*  --   --   --  116*   CO2  --  27  --   --   --   --   --  29  --   --   --  24  --   --   --  23   BUN  --  31*  --   --   --   --   --  25  --   --   --  20  --   --   --  16   CR  --  0.75  --   --   --   --   --  0.88  --   --   --  0.91  --  0.77  --  0.80   * 165* 171* 156*   < >  --    < > 131*   < >  --    < > 121*   < >  --    < > 146*    < > = values in this interval not displayed.     Liver Function Tests  Recent Labs   Lab 05/25/22  0341 05/24/22  2338 05/24/22  1549 05/24/22  1335   AST  --  34 37  --    ALT  --  24 25  --    SUNNY   --  53 53  --    BILITOTAL  --  0.2 0.5  --    ALBUMIN  --  2.4* 2.5*  --    INR 1.29*  --  1.26* 1.55*     Pancreatic Enzymes  No lab results found in last 7 days.  Coagulation Profile  Recent Labs   Lab 05/25/22  0341 05/24/22  1549 05/24/22  1335   INR 1.29* 1.26* 1.55*   PTT 29 53* 30     5. RADIOLOGY:   Recent Results (from the past 24 hour(s))   XR Chest Port 1 View    Narrative    EXAM:  XR CHEST PORT 1 VIEW    INDICATION: post-tube pull cxr    COMPARISON:  5/27/2022 at 0902 hours    HISTORY:  CAD and heart failure related to ischemic cardiomyopathy  status post CABG X4 on 5/24/2022.    FINDINGS:  Single AP view of the chest. Postsurgical chest with intact sternotomy  wires. Mediastinal drains and left chest tube have been removed.  Temporary pacer wires and Raymond-Pedro catheter have been. Upper  abdominal surgical clips. New right IJ central venous catheter with  tip projecting in the mid to low SVC.    Stable cardiomediastinal silhouettes. Similar bibasilar opacities. No  appreciable pneumothorax. Stable trace left pleural effusion. No acute  bony lesions.      Impression    IMPRESSION:  1.  No appreciable pneumothorax status post left chest removal.  2.  Similar bibasilar opacities, likely atelectasis.    I have personally reviewed the examination and initial interpretation  and I agree with the findings.    TONY TEMPLE MD         SYSTEM ID:  [id number]       =========================================

## 2022-05-28 NOTE — PLAN OF CARE
Shift Summary [id number]    Major Shift Events:   No acute changes during shift. Pt remains A/Ox4, PERRLA, afebrile. 3L NC tolerating well. Pt in Bigeminy, team aware, BP within goal w/o intervention. Geiger, adequate output; geiger out at 0600, no BM's during shift. No new skin issues present during shift.     Arterial line unable to draw back and not reading accurate; out per provider at 0600    Plan: continue current  For vital signs and complete assessments, please see documentation flowsheets.

## 2022-05-28 NOTE — PROCEDURES
[facility]    Single Lumen PICC Placement    Date/Time: 5/28/2022 10:07 AM  Performed by: Kalpana Kenny RN  Authorized by: Kris Steve MD   Indications: vascular access      UNIVERSAL PROTOCOL   Site Marked: Yes  Prior Images Obtained and Reviewed:  Yes  Required items: Required blood products, implants, devices and special equipment available    Patient identity confirmed:  Verbally with patient, arm band and hospital-assigned identification number  NA - No sedation, light sedation, or local anesthesia  Confirmation Checklist:  Patient's identity using two indicators, relevant allergies, procedure was appropriate and matched the consent or emergent situation and correct equipment/implants were available  Time out: Immediately prior to the procedure a time out was called    Universal Protocol: the Joint Commission Universal Protocol was followed    Preparation: Patient was prepped and draped in usual sterile fashion       ANESTHESIA    Anesthesia: Local infiltration  Local Anesthetic:  Lidocaine 1% without epinephrine  Anesthetic Total (mL):  5      SEDATION    Patient Sedated: No        Preparation: skin prepped with ChloraPrep  Skin prep agent: skin prep agent completely dried prior to procedure  Sterile barriers: maximum sterile barriers were used: cap, mask, sterile gown, sterile gloves, and large sterile sheet  Hand hygiene: hand hygiene performed prior to central venous catheter insertion  Type of line used: PICC and Power PICC  Catheter type: single lumen  Lumen type: non-valved  Catheter size: 4 Fr  Brand: Bard  Lot number: [id number]  Placement method: venipuncture, MST, ultrasound and tip navigation system  Number of attempts: 1  Difficulty threading catheter: no  Successful placement: yes  Orientation: left    Location: basilic vein  Tip Location: superior vena cava  Arm circumference: adults 10 cm  Extremity circumference: 34  Visible  catheter length: 2  Total catheter length: 46  Dressing and securement: adhesive securement device, alcohol impregnated caps, blood cleaned with CHG, chlorhexidine disc applied, dressing applied, glue, line secured, securement device, site cleansed, sterile dressing applied and transparent dressing  Post procedure assessment: blood return through all ports, free fluid flow and placement verified by 3CG technology  PROCEDURE   Patient Tolerance:  Patient tolerated the procedure well with no immediate complicationsDescribe Procedure: PICC ok to use

## 2022-05-28 NOTE — PROGRESS NOTES
"Pain Service Progress Note  [facility]  Date: 05/28/2022       Patient Name: Marianne Monroy  MRN: [id number]  Age: 67 year old  Sex: female      Assessment:  Marianne Monroy is a 67 year old female with PMH of HTN, HLD, T2DM, GERD, MDD, JUSTUS, nonsustained VT, multivessel coronary disease with acute heart failure due to ischemic cardiomyopathy     Procedure: CABG x 4    Date of Surgery: 5/24/22     Date of Catheter Placement: 5/24/22   Date of catheter removal: 05/28/22     Plan/Recommendations:  1. Regional Anesthesia/Analgesia  Removed bilateral erector spinae (ES) T5-6  05/28/22 at 8:45 AM. Infusion orders discontinued.     2. Anticoagulation  Okay to continue heparin 5,000 units SC Q 8 hrs as ordered by primary service immediately after catheter removal (no disruption in therapy needed)       3. Multimodal Analgesia  - per primary service    Pain Service will sign off.     Discussed with attending anesthesiologist      Overnight Events: No acute events    Subjective:   - chest tubes removed  - pain well controlled (4-5/10 at rest and with activity)  - last heparin at 4 AM    Nausea: No  Vomiting: No  Symptoms of LAST: No    Pain Location:  Anterior chest incision, low back    Pain Intensity:    Pain at Rest: 4-5/10   Pain with Activity: 4-5/10  Satisfied with your level of pain control: Yes    Diet: Regular Diet Adult Thin Liquids (level 0)    Relevant Labs:  Recent Labs   Lab Test 05/27/22  0401 05/26/22  0422 05/25/22  0341   INR  --   --  1.29*   PLT 94*   < > 127*   PTT  --   --  29   BUN 25   < > 16    < > = values in this interval not displayed.       Physical Exam:  Vitals: BP (!) 148/63   Pulse 77   Temp 37  C (98.6  F) (Axillary)   Resp 14   Ht 1.626 m (5' 4\")   Wt 73.3 kg (161 lb 9.6 oz)   SpO2 99%   BMI 27.74 kg/m      Physical Exam:   Orientation:  Alert, oriented, and in no acute distress: Yes  Sedation: No    Motor Examination:  5/5 Strength in lower extremities: " "Yes    Catheter Site:   Catheter entry site is clean/dry/intact: Yes   Catheters removed with tips intact and bandage placed.     Tender: No      Relevant Medications:  Current Pain Medications:  Medications related to Pain Management (From now, onward)    Start     Dose/Rate Route Frequency Ordered Stop    05/27/22 0000  acetaminophen (TYLENOL) tablet 650 mg         650 mg Oral EVERY 4 HOURS PRN 05/24/22 1538      05/25/22 0800  aspirin (ASA) chewable tablet 162 mg         162 mg Oral or NG Tube DAILY 05/24/22 1537      05/25/22 0800  polyethylene glycol (MIRALAX) Packet 17 g         17 g Oral DAILY 05/24/22 1538      05/24/22 2000  senna-docusate (SENOKOT-S/PERICOLACE) 8.6-50 MG per tablet 1 tablet         1 tablet Oral 2 TIMES DAILY 05/24/22 1538      05/24/22 1538  HYDROmorphone (DILAUDID) injection 0.2 mg        \"Or\" Linked Group Details    0.2 mg Intravenous EVERY 2 HOURS PRN 05/24/22 1538      05/24/22 1538  HYDROmorphone (DILAUDID) injection 0.4 mg        \"Or\" Linked Group Details    0.4 mg Intravenous EVERY 2 HOURS PRN 05/24/22 1538      05/24/22 1538  methocarbamol (ROBAXIN) tablet 500 mg         500 mg Oral EVERY 6 HOURS PRN 05/24/22 1538      05/24/22 1538  magnesium hydroxide (MILK OF MAGNESIA) suspension 30 mL         30 mL Oral DAILY PRN 05/24/22 1538      05/24/22 1538  bisacodyl (DULCOLAX) Suppository 10 mg         10 mg Rectal DAILY PRN 05/24/22 1538      05/24/22 0830  ropivacaine 0.2% in NS perineural infusion simple          Perineural Continuous Nerve Block 05/24/22 0803      05/24/22 0830  ropivacaine 0.2% in NS perineural infusion simple          Perineural Continuous Nerve Block 05/24/22 0803      05/19/22 1404  oxyCODONE (ROXICODONE) tablet 5 mg        \"Or\" Linked Group Details    5 mg Oral EVERY 4 HOURS PRN 05/19/22 1404      05/19/22 1404  oxyCODONE IR (ROXICODONE) tablet 10 mg        \"Or\" Linked Group Details    10 mg Oral EVERY 4 HOURS PRN 05/19/22 1404      05/18/22 2811  lidocaine 1 % " 0.1-1 mL         0.1-1 mL Other EVERY 1 HOUR PRN 05/18/22 2206 05/18/22 2206  lidocaine (LMX4) cream          Topical EVERY 1 HOUR PRN 05/18/22 2206            Primary Service Contacted with Recommendations? Yes    Lloyd Eduardo III, MD   05/28/2022     Contact Info Please Page the Pain Team Via [facility] [place]

## 2022-05-28 NOTE — PROGRESS NOTES
05/28/22 1500   Quick Adds   Type of Visit Initial PT Evaluation   Living Environment   People in Home alone   Current Living Arrangements apartment   Home Accessibility no concerns   Transportation Anticipated car, drives self;family or friend will provide   Self-Care   Usual Activity Tolerance good   Current Activity Tolerance moderate   Regular Exercise Yes   Activity/Exercise Type walking   Exercise Amount/Frequency 30 mins   Equipment Currently Used at Home none   Fall history within last six months yes   Number of times patient has fallen within last six months 4   Activity/Exercise/Self-Care Comment IND with functional mobility and ADLs.  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   General Information   Onset of Illness/Injury or Date of Surgery 05/18/22   Referring Physician Sanjay Tello NP   Patient/Family Therapy Goals Statement (PT) return home   Pertinent History of Current Problem (include personal factors and/or comorbidities that impact the POC) Marianne Monroy is a 67 year old female with PMH of HTN, HLD, T2DM, GERD, MDD, JUSTUS, nonsustained VT, multivessel coronary disease with acute heart failure due to ischemic cardiomyopathy who underwent CABG x4 on 5/24/22 with Dr. Desai. VLAD to LAD, SVGs (EVH from both thighs) to distal RCA, OM, ramus.  Intraoperative course uncomplicated. 2 hours on bypass and cross clamp 1:45 hours. LVEF preop 25-30%, end of case was about 30-35% by DAVID. Pt admitted to CVICU sedated and intubated for further postoperative care   Existing Precautions/Restrictions sternal   Cognition   Affect/Mental Status (Cognition) WFL   Orientation Status (Cognition) oriented x 4   Follows Commands (Cognition) WFL   Pain Assessment   Patient Currently in Pain Yes, see Vital Sign flowsheet   Posture    Posture Forward head position;Protracted shoulders   Range of Motion (ROM)   ROM Comment B LE grossly WFL   Strength (Manual Muscle Testing)   Strength Comments no functional  deficits noted   Bed Mobility   Comment, (Bed Mobility) supine > sit with mod assist   Transfers   Comment, (Transfers) sit > stand with rachna walker and min assist.   Gait/Stairs (Locomotion)   Comment, (Gait/Stairs) a few steps with rachna walker and min assist   Balance   Balance Comments required UE support to maintain standing balance   Sensory Examination   Sensory Perception patient reports no sensory changes   Clinical Impression   Criteria for Skilled Therapeutic Intervention Yes, treatment indicated   PT Diagnosis (PT) impaired functional mobility s/p CABG x 4   Influenced by the following impairments pain, decreased strength, impaired balance, decreased activity tolerance   Functional limitations due to impairments impaired bed mobility, impaired transfers, impaired gait   Clinical Presentation (PT Evaluation Complexity) Stable/Uncomplicated   Clinical Presentation Rationale clinical judgement   Clinical Decision Making (Complexity) low complexity   Planned Therapy Interventions (PT) balance training;bed mobility training;gait training;neuromuscular re-education;strengthening;transfer training;progressive activity/exercise   Risk & Benefits of therapy have been explained evaluation/treatment results reviewed;care plan/treatment goals reviewed   PT Discharge Planning   PT Discharge Recommendation (DC Rec) Acute Rehab Center-Motivated patient will benefit from intensive, interdisciplinary therapy.  Anticipate will be able to tolerate 3 hours of therapy per day   PT Rationale for DC Rec dependence with functional mobility, previously IND with functional mobility, would benefit from intensive rehab setting to allow return to community based living   PT Brief overview of current status supine > sit mod assist, sit > stand min assist with walker, ambulated 10' with walker min assist   Plan of Care Review   Plan of Care Reviewed With patient   Total Evaluation Time   Total Evaluation Time (Minutes) 8    Physical Therapy Goals   PT Frequency 5x/week   PT Predicted Duration/Target Date for Goal Attainment 06/10/22   PT Goals Bed Mobility;Transfers;Gait   PT: Bed Mobility Independent;Supine to/from sit;Within precautions   PT: Transfers Independent;Sit to/from stand;Within precautions   PT: Gait Independent;Greater than 200 feet

## 2022-05-28 NOTE — PROGRESS NOTES
Patient shift status: Neuro status intact. Rational and systematic thought process in place. Hemodynamically stable with bigeminy. Up to chair. Fair intake for meals. Urinating adequate amounts. Small stool this evening. Continue with stool softeners. Complaining of abdominal/chest discomfort - with some relief from ropivacaine perineural infusion. Chest tube discontinued. Edematous in lower extremities. Vascular Access tried to place PICC but was unsuccessful - will try again in early am.   Skin assessment: Chest incision site dry with Vac Ulta. Incisional site on legs dry with liquid dressing. Bruising throughout.   Plan: Continue to closely monitor.  For vital signs and complete assessments, please see documentation flowsheets.

## 2022-05-29 ENCOUNTER — APPOINTMENT (OUTPATIENT)
Dept: OCCUPATIONAL THERAPY | Facility: CLINIC | Age: 67
DRG: 233 | End: 2022-05-29
Payer: COMMERCIAL

## 2022-05-29 LAB
ANION GAP SERPL CALCULATED.3IONS-SCNC: 7 MMOL/L (ref 3–14)
BUN SERPL-MCNC: 22 MG/DL (ref 7–30)
CALCIUM SERPL-MCNC: 8.7 MG/DL (ref 8.5–10.1)
CHLORIDE BLD-SCNC: 103 MMOL/L (ref 94–109)
CO2 SERPL-SCNC: 28 MMOL/L (ref 20–32)
CREAT SERPL-MCNC: 0.46 MG/DL (ref 0.52–1.04)
ERYTHROCYTE [DISTWIDTH] IN BLOOD BY AUTOMATED COUNT: 14.1 % (ref 10–15)
GFR SERPL CREATININE-BSD FRML MDRD: >90 ML/MIN/1.73M2
GLUCOSE BLD-MCNC: 331 MG/DL (ref 70–99)
GLUCOSE BLDC GLUCOMTR-MCNC: 167 MG/DL (ref 70–99)
GLUCOSE BLDC GLUCOMTR-MCNC: 270 MG/DL (ref 70–99)
GLUCOSE BLDC GLUCOMTR-MCNC: 304 MG/DL (ref 70–99)
GLUCOSE BLDC GLUCOMTR-MCNC: 331 MG/DL (ref 70–99)
GLUCOSE BLDC GLUCOMTR-MCNC: 340 MG/DL (ref 70–99)
GLUCOSE BLDC GLUCOMTR-MCNC: 343 MG/DL (ref 70–99)
HCT VFR BLD AUTO: 28.7 % (ref 35–47)
HGB BLD-MCNC: 8.9 G/DL (ref 11.7–15.7)
MAGNESIUM SERPL-MCNC: 1.9 MG/DL (ref 1.6–2.3)
MCH RBC QN AUTO: 28.8 PG (ref 26.5–33)
MCHC RBC AUTO-ENTMCNC: 31 G/DL (ref 31.5–36.5)
MCV RBC AUTO: 93 FL (ref 78–100)
PHOSPHATE SERPL-MCNC: 2.3 MG/DL (ref 2.5–4.5)
PLATELET # BLD AUTO: 207 10E3/UL (ref 150–450)
POTASSIUM BLD-SCNC: 4 MMOL/L (ref 3.4–5.3)
RBC # BLD AUTO: 3.09 10E6/UL (ref 3.8–5.2)
SODIUM SERPL-SCNC: 138 MMOL/L (ref 133–144)
WBC # BLD AUTO: 7.6 10E3/UL (ref 4–11)

## 2022-05-29 PROCEDURE — 97530 THERAPEUTIC ACTIVITIES: CPT | Mod: GO | Performed by: OCCUPATIONAL THERAPIST

## 2022-05-29 PROCEDURE — 80048 BASIC METABOLIC PNL TOTAL CA: CPT

## 2022-05-29 PROCEDURE — 250N000013 HC RX MED GY IP 250 OP 250 PS 637

## 2022-05-29 PROCEDURE — 214N000001 HC R&B CCU UMMC

## 2022-05-29 PROCEDURE — 250N000012 HC RX MED GY IP 250 OP 636 PS 637: Performed by: SURGERY

## 2022-05-29 PROCEDURE — 85027 COMPLETE CBC AUTOMATED: CPT

## 2022-05-29 PROCEDURE — 36592 COLLECT BLOOD FROM PICC: CPT

## 2022-05-29 PROCEDURE — 250N000011 HC RX IP 250 OP 636

## 2022-05-29 PROCEDURE — 83735 ASSAY OF MAGNESIUM: CPT

## 2022-05-29 PROCEDURE — 250N000011 HC RX IP 250 OP 636: Performed by: STUDENT IN AN ORGANIZED HEALTH CARE EDUCATION/TRAINING PROGRAM

## 2022-05-29 PROCEDURE — 97535 SELF CARE MNGMENT TRAINING: CPT | Mod: GO | Performed by: OCCUPATIONAL THERAPIST

## 2022-05-29 PROCEDURE — 250N000011 HC RX IP 250 OP 636: Performed by: SURGERY

## 2022-05-29 PROCEDURE — 84100 ASSAY OF PHOSPHORUS: CPT | Performed by: STUDENT IN AN ORGANIZED HEALTH CARE EDUCATION/TRAINING PROGRAM

## 2022-05-29 PROCEDURE — 250N000013 HC RX MED GY IP 250 OP 250 PS 637: Performed by: STUDENT IN AN ORGANIZED HEALTH CARE EDUCATION/TRAINING PROGRAM

## 2022-05-29 RX ORDER — HYDROXYZINE HYDROCHLORIDE 50 MG/1
50 TABLET, FILM COATED ORAL EVERY 6 HOURS PRN
Status: DISCONTINUED | OUTPATIENT
Start: 2022-05-29 | End: 2022-05-29

## 2022-05-29 RX ORDER — FUROSEMIDE 10 MG/ML
20 INJECTION INTRAMUSCULAR; INTRAVENOUS ONCE
Status: COMPLETED | OUTPATIENT
Start: 2022-05-29 | End: 2022-05-29

## 2022-05-29 RX ORDER — MAGNESIUM SULFATE HEPTAHYDRATE 40 MG/ML
2 INJECTION, SOLUTION INTRAVENOUS ONCE
Status: CANCELLED | OUTPATIENT
Start: 2022-05-29 | End: 2022-05-29

## 2022-05-29 RX ORDER — HYDROXYZINE HYDROCHLORIDE 10 MG/1
20 TABLET, FILM COATED ORAL EVERY 6 HOURS PRN
Status: DISCONTINUED | OUTPATIENT
Start: 2022-05-29 | End: 2022-06-04 | Stop reason: HOSPADM

## 2022-05-29 RX ORDER — FLUOXETINE 40 MG/1
40 CAPSULE ORAL DAILY
Status: DISCONTINUED | OUTPATIENT
Start: 2022-05-29 | End: 2022-06-04 | Stop reason: HOSPADM

## 2022-05-29 RX ORDER — FUROSEMIDE 10 MG/ML
10 INJECTION INTRAMUSCULAR; INTRAVENOUS ONCE
Status: COMPLETED | OUTPATIENT
Start: 2022-05-29 | End: 2022-05-29

## 2022-05-29 RX ORDER — HYDRALAZINE HYDROCHLORIDE 20 MG/ML
10 INJECTION INTRAMUSCULAR; INTRAVENOUS EVERY 6 HOURS PRN
Status: DISCONTINUED | OUTPATIENT
Start: 2022-05-29 | End: 2022-06-04 | Stop reason: HOSPADM

## 2022-05-29 RX ORDER — MAGNESIUM SULFATE HEPTAHYDRATE 40 MG/ML
2 INJECTION, SOLUTION INTRAVENOUS ONCE
Status: COMPLETED | OUTPATIENT
Start: 2022-05-29 | End: 2022-05-29

## 2022-05-29 RX ORDER — HYDROXYZINE HYDROCHLORIDE 25 MG/1
25 TABLET, FILM COATED ORAL EVERY 6 HOURS PRN
Status: DISCONTINUED | OUTPATIENT
Start: 2022-05-29 | End: 2022-05-29

## 2022-05-29 RX ADMIN — Medication 12.5 MG: at 08:08

## 2022-05-29 RX ADMIN — INSULIN GLARGINE 12 UNITS: 100 INJECTION, SOLUTION SUBCUTANEOUS at 12:19

## 2022-05-29 RX ADMIN — POTASSIUM & SODIUM PHOSPHATES POWDER PACK 280-160-250 MG 1 PACKET: 280-160-250 PACK at 20:19

## 2022-05-29 RX ADMIN — HYDRALAZINE HYDROCHLORIDE 10 MG: 20 INJECTION INTRAMUSCULAR; INTRAVENOUS at 00:33

## 2022-05-29 RX ADMIN — ASPIRIN 81 MG 162 MG: 81 TABLET ORAL at 08:22

## 2022-05-29 RX ADMIN — HYDROXYZINE HYDROCHLORIDE 25 MG: 25 TABLET, FILM COATED ORAL at 01:29

## 2022-05-29 RX ADMIN — OXYCODONE HYDROCHLORIDE 5 MG: 5 TABLET ORAL at 20:19

## 2022-05-29 RX ADMIN — PANTOPRAZOLE SODIUM 40 MG: 40 TABLET, DELAYED RELEASE ORAL at 08:08

## 2022-05-29 RX ADMIN — ACETAMINOPHEN 650 MG: 325 TABLET, FILM COATED ORAL at 17:13

## 2022-05-29 RX ADMIN — ACETAMINOPHEN 650 MG: 325 TABLET, FILM COATED ORAL at 08:22

## 2022-05-29 RX ADMIN — Medication 12.5 MG: at 20:19

## 2022-05-29 RX ADMIN — POTASSIUM & SODIUM PHOSPHATES POWDER PACK 280-160-250 MG 1 PACKET: 280-160-250 PACK at 15:35

## 2022-05-29 RX ADMIN — ATORVASTATIN CALCIUM 80 MG: 80 TABLET, FILM COATED ORAL at 08:08

## 2022-05-29 RX ADMIN — HYDRALAZINE HYDROCHLORIDE 10 MG: 20 INJECTION INTRAMUSCULAR; INTRAVENOUS at 20:19

## 2022-05-29 RX ADMIN — Medication 1 TABLET: at 08:07

## 2022-05-29 RX ADMIN — FUROSEMIDE 10 MG: 10 INJECTION, SOLUTION INTRAVENOUS at 17:05

## 2022-05-29 RX ADMIN — OXYCODONE HYDROCHLORIDE 5 MG: 5 TABLET ORAL at 00:33

## 2022-05-29 RX ADMIN — METHOCARBAMOL 500 MG: 500 TABLET ORAL at 17:13

## 2022-05-29 RX ADMIN — FLUOXETINE HYDROCHLORIDE 40 MG: 40 CAPSULE ORAL at 08:08

## 2022-05-29 RX ADMIN — HEPARIN SODIUM 5000 UNITS: 5000 INJECTION, SOLUTION INTRAVENOUS; SUBCUTANEOUS at 20:20

## 2022-05-29 RX ADMIN — MUPIROCIN 0.5 G: 20 OINTMENT TOPICAL at 08:22

## 2022-05-29 RX ADMIN — HYDROXYZINE HYDROCHLORIDE 25 MG: 25 TABLET, FILM COATED ORAL at 08:21

## 2022-05-29 RX ADMIN — FUROSEMIDE 20 MG: 10 INJECTION, SOLUTION INTRAVENOUS at 10:45

## 2022-05-29 RX ADMIN — HYDROXYZINE HYDROCHLORIDE 25 MG: 25 TABLET, FILM COATED ORAL at 17:14

## 2022-05-29 RX ADMIN — ACETAMINOPHEN 650 MG: 325 TABLET, FILM COATED ORAL at 00:33

## 2022-05-29 RX ADMIN — MAGNESIUM SULFATE HEPTAHYDRATE 2 G: 40 INJECTION, SOLUTION INTRAVENOUS at 10:46

## 2022-05-29 RX ADMIN — HEPARIN SODIUM 5000 UNITS: 5000 INJECTION, SOLUTION INTRAVENOUS; SUBCUTANEOUS at 12:19

## 2022-05-29 RX ADMIN — POTASSIUM & SODIUM PHOSPHATES POWDER PACK 280-160-250 MG 1 PACKET: 280-160-250 PACK at 10:44

## 2022-05-29 RX ADMIN — OXYCODONE HYDROCHLORIDE 5 MG: 5 TABLET ORAL at 21:06

## 2022-05-29 RX ADMIN — HEPARIN SODIUM 5000 UNITS: 5000 INJECTION, SOLUTION INTRAVENOUS; SUBCUTANEOUS at 03:53

## 2022-05-29 RX ADMIN — ACETAMINOPHEN 650 MG: 325 TABLET, FILM COATED ORAL at 12:20

## 2022-05-29 ASSESSMENT — ACTIVITIES OF DAILY LIVING (ADL)
ADLS_ACUITY_SCORE: 38
ADLS_ACUITY_SCORE: 37
ADLS_ACUITY_SCORE: 38
ADLS_ACUITY_SCORE: 37
ADLS_ACUITY_SCORE: 38
ADLS_ACUITY_SCORE: 37
ADLS_ACUITY_SCORE: 37
ADLS_ACUITY_SCORE: 38
ADLS_ACUITY_SCORE: 38

## 2022-05-29 NOTE — PLAN OF CARE
"D: Pt admit 5/18/22 s/p CABG x4 5/24/22. PMH HTN, HLD, DM2, GERD, MDD, JUSTUS, nonsustained VT, multivessel CAD w/acute HF due to ICM    [id number]    I/A:   Neuro: A&Ox4. Pt experiencing anxiety at shift start resulting in inability to sleep as well as generalized jerking/tremors of extremities. Pt described feeling \"restless.\" Pt informed writer of prozac prescription PTA. MD pageroz, prozac reordered to start this AM. Additionally, PRN atarax ordered and administered with relief of symptoms. Bed alarm on per unit policy 24 hours following transfer from ICU. Pt calling appropriately overnight.  VS: BP goal SBP <140. PRN IV hydralazine administered x1, subsequent BP's WNL. Other VSS  Respiratory: 2L NC w/humidification  Cardiac: SR w/PVC's  Pain: pt described \"generalized\" pain controlled with PRN oxycodone x1 and PRN tylenol x1  GI/: Urinating adequately via purewick. No BM this shift.  Diet: regular  BG/insulin: ACHS BG checks  IV/Drips: L 3L PICC SL.   Activity: A x1-2 w/GB and walker  Skin/drains: midsternal incision covered by wound vac continuous 125, no output noted. Bilateral graft sites on thighs CDI with generalized bruising.     P: Plan to Continue to monitor pt status and report changes to CVTS.     Teresa Sharma RN    "

## 2022-05-29 NOTE — PROGRESS NOTES
D:pt incontinent of stool and urine  I:promote using  the commode  A:pt had about 750 cc stool urine, plus incontinence this shift  P:hold softeners

## 2022-05-30 LAB
ANION GAP SERPL CALCULATED.3IONS-SCNC: 5 MMOL/L (ref 3–14)
BUN SERPL-MCNC: 18 MG/DL (ref 7–30)
CALCIUM SERPL-MCNC: 8.5 MG/DL (ref 8.5–10.1)
CHLORIDE BLD-SCNC: 106 MMOL/L (ref 94–109)
CO2 SERPL-SCNC: 31 MMOL/L (ref 20–32)
CREAT SERPL-MCNC: 0.64 MG/DL (ref 0.52–1.04)
ERYTHROCYTE [DISTWIDTH] IN BLOOD BY AUTOMATED COUNT: 14 % (ref 10–15)
GFR SERPL CREATININE-BSD FRML MDRD: >90 ML/MIN/1.73M2
GLUCOSE BLD-MCNC: 147 MG/DL (ref 70–99)
GLUCOSE BLDC GLUCOMTR-MCNC: 122 MG/DL (ref 70–99)
GLUCOSE BLDC GLUCOMTR-MCNC: 155 MG/DL (ref 70–99)
GLUCOSE BLDC GLUCOMTR-MCNC: 195 MG/DL (ref 70–99)
GLUCOSE BLDC GLUCOMTR-MCNC: 226 MG/DL (ref 70–99)
HCT VFR BLD AUTO: 26 % (ref 35–47)
HGB BLD-MCNC: 8.1 G/DL (ref 11.7–15.7)
MAGNESIUM SERPL-MCNC: 2.1 MG/DL (ref 1.6–2.3)
MCH RBC QN AUTO: 28.4 PG (ref 26.5–33)
MCHC RBC AUTO-ENTMCNC: 31.2 G/DL (ref 31.5–36.5)
MCV RBC AUTO: 91 FL (ref 78–100)
PHOSPHATE SERPL-MCNC: 3.4 MG/DL (ref 2.5–4.5)
PLATELET # BLD AUTO: 184 10E3/UL (ref 150–450)
POTASSIUM BLD-SCNC: 3.7 MMOL/L (ref 3.4–5.3)
RBC # BLD AUTO: 2.85 10E6/UL (ref 3.8–5.2)
SODIUM SERPL-SCNC: 142 MMOL/L (ref 133–144)
WBC # BLD AUTO: 5.8 10E3/UL (ref 4–11)

## 2022-05-30 PROCEDURE — 250N000013 HC RX MED GY IP 250 OP 250 PS 637: Performed by: SURGERY

## 2022-05-30 PROCEDURE — 84100 ASSAY OF PHOSPHORUS: CPT | Performed by: STUDENT IN AN ORGANIZED HEALTH CARE EDUCATION/TRAINING PROGRAM

## 2022-05-30 PROCEDURE — 250N000013 HC RX MED GY IP 250 OP 250 PS 637

## 2022-05-30 PROCEDURE — 999N000007 HC SITE CHECK

## 2022-05-30 PROCEDURE — 250N000013 HC RX MED GY IP 250 OP 250 PS 637: Performed by: STUDENT IN AN ORGANIZED HEALTH CARE EDUCATION/TRAINING PROGRAM

## 2022-05-30 PROCEDURE — 214N000001 HC R&B CCU UMMC

## 2022-05-30 PROCEDURE — 85027 COMPLETE CBC AUTOMATED: CPT

## 2022-05-30 PROCEDURE — 250N000011 HC RX IP 250 OP 636

## 2022-05-30 PROCEDURE — 80048 BASIC METABOLIC PNL TOTAL CA: CPT

## 2022-05-30 PROCEDURE — 250N000011 HC RX IP 250 OP 636: Performed by: SURGERY

## 2022-05-30 PROCEDURE — 83735 ASSAY OF MAGNESIUM: CPT

## 2022-05-30 RX ORDER — POTASSIUM CHLORIDE 750 MG/1
20 TABLET, EXTENDED RELEASE ORAL ONCE
Status: COMPLETED | OUTPATIENT
Start: 2022-05-30 | End: 2022-05-30

## 2022-05-30 RX ORDER — METFORMIN HCL 500 MG
2000 TABLET, EXTENDED RELEASE 24 HR ORAL
Status: DISCONTINUED | OUTPATIENT
Start: 2022-05-30 | End: 2022-06-04 | Stop reason: HOSPADM

## 2022-05-30 RX ORDER — FUROSEMIDE 10 MG/ML
30 INJECTION INTRAMUSCULAR; INTRAVENOUS ONCE
Status: COMPLETED | OUTPATIENT
Start: 2022-05-30 | End: 2022-05-30

## 2022-05-30 RX ADMIN — HEPARIN SODIUM 5000 UNITS: 5000 INJECTION, SOLUTION INTRAVENOUS; SUBCUTANEOUS at 04:35

## 2022-05-30 RX ADMIN — OXYCODONE HYDROCHLORIDE 10 MG: 10 TABLET ORAL at 23:59

## 2022-05-30 RX ADMIN — OXYCODONE HYDROCHLORIDE 10 MG: 10 TABLET ORAL at 04:34

## 2022-05-30 RX ADMIN — OXYCODONE HYDROCHLORIDE 5 MG: 5 TABLET ORAL at 14:59

## 2022-05-30 RX ADMIN — ACETAMINOPHEN 650 MG: 325 TABLET, FILM COATED ORAL at 14:59

## 2022-05-30 RX ADMIN — METFORMIN ER 500 MG 2000 MG: 500 TABLET ORAL at 16:58

## 2022-05-30 RX ADMIN — HYDROXYZINE HYDROCHLORIDE 20 MG: 10 TABLET ORAL at 18:49

## 2022-05-30 RX ADMIN — ACETAMINOPHEN 650 MG: 325 TABLET, FILM COATED ORAL at 08:54

## 2022-05-30 RX ADMIN — METHOCARBAMOL 500 MG: 500 TABLET ORAL at 00:05

## 2022-05-30 RX ADMIN — ATORVASTATIN CALCIUM 80 MG: 80 TABLET, FILM COATED ORAL at 08:55

## 2022-05-30 RX ADMIN — FUROSEMIDE 30 MG: 10 INJECTION, SOLUTION INTRAVENOUS at 16:58

## 2022-05-30 RX ADMIN — POTASSIUM CHLORIDE 20 MEQ: 750 TABLET, EXTENDED RELEASE ORAL at 06:28

## 2022-05-30 RX ADMIN — METHOCARBAMOL 500 MG: 500 TABLET ORAL at 18:49

## 2022-05-30 RX ADMIN — INSULIN ASPART 4 UNITS: 100 INJECTION, SOLUTION INTRAVENOUS; SUBCUTANEOUS at 12:54

## 2022-05-30 RX ADMIN — METHOCARBAMOL 500 MG: 500 TABLET ORAL at 12:16

## 2022-05-30 RX ADMIN — ASPIRIN 81 MG 162 MG: 81 TABLET ORAL at 08:55

## 2022-05-30 RX ADMIN — Medication 12.5 MG: at 20:05

## 2022-05-30 RX ADMIN — HYDROXYZINE HYDROCHLORIDE 20 MG: 10 TABLET ORAL at 12:16

## 2022-05-30 RX ADMIN — HYDROXYZINE HYDROCHLORIDE 20 MG: 10 TABLET ORAL at 06:28

## 2022-05-30 RX ADMIN — METHOCARBAMOL 500 MG: 500 TABLET ORAL at 06:28

## 2022-05-30 RX ADMIN — Medication 12.5 MG: at 08:55

## 2022-05-30 RX ADMIN — HEPARIN SODIUM 5000 UNITS: 5000 INJECTION, SOLUTION INTRAVENOUS; SUBCUTANEOUS at 12:16

## 2022-05-30 RX ADMIN — HEPARIN SODIUM 5000 UNITS: 5000 INJECTION, SOLUTION INTRAVENOUS; SUBCUTANEOUS at 20:05

## 2022-05-30 RX ADMIN — PANTOPRAZOLE SODIUM 40 MG: 40 TABLET, DELAYED RELEASE ORAL at 08:55

## 2022-05-30 RX ADMIN — OXYCODONE HYDROCHLORIDE 10 MG: 10 TABLET ORAL at 20:06

## 2022-05-30 RX ADMIN — HYDROXYZINE HYDROCHLORIDE 20 MG: 10 TABLET ORAL at 00:05

## 2022-05-30 RX ADMIN — INSULIN ASPART 2 UNITS: 100 INJECTION, SOLUTION INTRAVENOUS; SUBCUTANEOUS at 07:42

## 2022-05-30 RX ADMIN — ACETAMINOPHEN 650 MG: 325 TABLET, FILM COATED ORAL at 00:05

## 2022-05-30 RX ADMIN — OXYCODONE HYDROCHLORIDE 5 MG: 5 TABLET ORAL at 08:54

## 2022-05-30 RX ADMIN — ACETAMINOPHEN 650 MG: 325 TABLET, FILM COATED ORAL at 20:06

## 2022-05-30 RX ADMIN — FLUOXETINE HYDROCHLORIDE 40 MG: 40 CAPSULE ORAL at 08:55

## 2022-05-30 RX ADMIN — Medication 1 TABLET: at 08:55

## 2022-05-30 ASSESSMENT — ACTIVITIES OF DAILY LIVING (ADL)
ADLS_ACUITY_SCORE: 36
ADLS_ACUITY_SCORE: 38
ADLS_ACUITY_SCORE: 36
ADLS_ACUITY_SCORE: 38
ADLS_ACUITY_SCORE: 36
ADLS_ACUITY_SCORE: 36
ADLS_ACUITY_SCORE: 38
ADLS_ACUITY_SCORE: 36

## 2022-05-30 NOTE — PROGRESS NOTES
Shift: 0700 - 1930    67 year old female with PMH of HTN, HLD, T2DM, GERD, MDD, JUSTUS, nonsustained VT, multivessel coronary disease with acute heart failure due to ischemic cardiomyopathy who underwent CABG x4 on 5/24/22 with Dr. Desai.    VS: Temp: 99.4  F (37.4  C) Temp src: Oral BP: 108/67 Pulse: 74   Resp: 18 SpO2: 98 % O2 Device: Nasal cannula Oxygen Delivery: 1 LPM     Pain: Sternal pain. Oxy, tylenol, atarax and robaxin given, decreased pain reported.   Neuro: A&Ox4. Calls appropriately.   Cardiac:   SR. VSS.  Respiratory: Lung sounds clear/diminished, 1L NC.   GI/Diet/Appetite: Consistent Carb diet 60gm, low sat fat w/<2400 Na intake. BG AC/HS, Carb count coverage. Started metformin this evening 2gm dose. LBM 5/30.  :  Voiding adequate amounts of urine w/o difficulty.   LDA's: PICC LUE TL.   Skin: Wound vac -125. Sternal incision, old CT sites, harvest sites, approximated.   Activity: Ax1 w/GB & Walker.   Tests/Procedures:   Pertinent Labs/Lab Collection: K+ 3.7 20meq KCl replaced by night shift, recheck in AM.      Plan: Diureses, pain control,ambulate. Contact CVTS/Cross cover with questions/concerns

## 2022-05-30 NOTE — PLAN OF CARE
Dx: s/p CABGx4 5/24    Neuro: A&O x4. Denied n/t. Atarax given at HS.  Cardiac: SR. Afebrile . SBP within goal. +2 edema noted in BLE.  Respiratory: Tolerating 1; mostly for comfort. NC. SaO2 > 92%. LS dim at base.  GI/: Denied nausea, bowel sounds audible. No BM this shift. Pt voiding via external catheter.   Diet: Reg. Bg Achs and carb count.  Skin: Sternal incision to wound vac. Wisner sites ROSEANNE.  Pain: Tylenol and robaxin given 1x  LDAs: L TL PICC  Electrolytes: K+ replaced per protocol.  Mobility: SBA. Bsc available in room    Plan:  plan per CVTS.

## 2022-05-30 NOTE — PROGRESS NOTES
D:pt incontinent of stool and urine  A:pt seems not interested in using commode  I:promote commode use and activity  P:per Primary

## 2022-05-30 NOTE — PROGRESS NOTES
"CVTS PROGRESS NOTE    Marianne Monroy is a 67 year old female with PMH of HTN, HLD, T2DM, GERD, MDD, JUSTUS, nonsustained VT, multivessel coronary disease with acute heart failure due to ischemic cardiomyopathy who underwent CABG x4 on 5/24/22 with Dr. Desai. VLAD to LAD, SVGs (EVH from both thighs) to distal RCA, OM, ramus.  Intraoperative course uncomplicated. LVEF preop 25-30%, end of case was about 30-35% by DAVID.     S:  Transferred out of CVICU yesterday evening.  Night marred by anxiety for which she was given Atarax and her PTA Prozac restarted with improvement.  Continues to need a small amount of supplemental oxygen via NC.  BG running above goal.  Denies N/V/C, dizziness, lightheadedness, sternal popping/clicking/snapping.    O:    BP (!) 143/68   Pulse 82   Temp 98.2  F (36.8  C)   Resp 20   Ht 1.626 m (5' 4\")   Wt 72.1 kg (159 lb)   SpO2 97%   BMI 27.29 kg/m      CBC RESULTS: Recent Labs   Lab Test 05/29/22  0732   WBC 7.6   RBC 3.09*   HGB 8.9*   HCT 28.7*   MCV 93   MCH 28.8   MCHC 31.0*   RDW 14.1          Last Comprehensive Metabolic Panel:  Sodium   Date Value Ref Range Status   05/29/2022 138 133 - 144 mmol/L Final   01/07/2021 141 133 - 144 mmol/L Final     Potassium   Date Value Ref Range Status   05/29/2022 4.0 3.4 - 5.3 mmol/L Final   01/07/2021 4.8 3.4 - 5.3 mmol/L Final     Chloride   Date Value Ref Range Status   05/29/2022 103 94 - 109 mmol/L Final   01/07/2021 106 94 - 109 mmol/L Final     Carbon Dioxide   Date Value Ref Range Status   01/07/2021 28 20 - 32 mmol/L Final     Carbon Dioxide (CO2)   Date Value Ref Range Status   05/29/2022 28 20 - 32 mmol/L Final     Anion Gap   Date Value Ref Range Status   05/29/2022 7 3 - 14 mmol/L Final   01/07/2021 7 3 - 14 mmol/L Final     Glucose   Date Value Ref Range Status   05/29/2022 331 (H) 70 - 99 mg/dL Final   01/07/2021 113 (H) 70 - 99 mg/dL Final     GLUCOSE BY METER POCT   Date Value Ref Range Status   05/29/2022 270 (H) 70 - 99 " mg/dL Final     Urea Nitrogen   Date Value Ref Range Status   05/29/2022 22 7 - 30 mg/dL Final   01/07/2021 19 7 - 30 mg/dL Final     Creatinine   Date Value Ref Range Status   05/29/2022 0.46 (L) 0.52 - 1.04 mg/dL Final   01/07/2021 0.90 0.52 - 1.04 mg/dL Final     GFR Estimate   Date Value Ref Range Status   05/29/2022 >90 >60 mL/min/1.73m2 Final     Comment:     Effective December 21, 2021 eGFRcr in adults is calculated using the 2021 CKD-EPI creatinine equation which includes age and gender (Laverne et al., NEJ, DOI: 10.1056/HVVGtj6337031)   01/07/2021 66 >60 mL/min/[1.73_m2] Final     Comment:     Non  GFR Calc  Starting 12/18/2018, serum creatinine based estimated GFR (eGFR) will be   calculated using the Chronic Kidney Disease Epidemiology Collaboration   (CKD-EPI) equation.       Calcium   Date Value Ref Range Status   05/29/2022 8.7 8.5 - 10.1 mg/dL Final   01/07/2021 9.5 8.5 - 10.1 mg/dL Final     Bilirubin Total   Date Value Ref Range Status   05/24/2022 0.2 0.2 - 1.3 mg/dL Final   09/09/2020 0.4 0.2 - 1.3 mg/dL Final     Alkaline Phosphatase   Date Value Ref Range Status   05/24/2022 53 40 - 150 U/L Final   09/09/2020 90 40 - 150 U/L Final     ALT   Date Value Ref Range Status   05/24/2022 24 0 - 50 U/L Final   09/09/2020 33 0 - 50 U/L Final     AST   Date Value Ref Range Status   05/24/2022 34 0 - 45 U/L Final   09/09/2020 14 0 - 45 U/L Final       IMAGING:  No results found for this or any previous visit (from the past 24 hour(s)).    EXAM:  Gen:  Drowsy but rouses to voice then becomes progressively more anxious  Neuro:  A&O, HO, no focal deficits  Resp:  Unlabored breathing on supplemental oxygen via NC, shallow breathing without wheezes, rales, or rhonchi  CV:  Warm and well perfused, S1, S2, RRR, no murmurs or rubs, moderate BLE edema  GI:  abd SNTND, active bowel sounds  :  PureWick in place  MSK:  HO, no gross deformities  Int:  L medial thigh hematoma without signs of  infection, LE incisions C/D/I; sternal incision with wound vac in place    A/P:    - IV Lasix diuresis 20mg IV in AM, repeat 10mg IV in PM   - Add BID Lantus and prandial insulin for glucose control   - Encourage OOB/activity    CAD:  ASA, BB, statin  PPX:  PPI, SQH, SCD, OOB/activity    Therapies recommending ARU at discharge once medically stable - likely mid-week.    Discussed with Dr. Graham.    Gabi Tello, CNP, ACNPC-AG  Cardiothoracic Surgery  Pager [phone]

## 2022-05-31 ENCOUNTER — APPOINTMENT (OUTPATIENT)
Dept: OCCUPATIONAL THERAPY | Facility: CLINIC | Age: 67
DRG: 233 | End: 2022-05-31
Payer: COMMERCIAL

## 2022-05-31 ENCOUNTER — APPOINTMENT (OUTPATIENT)
Dept: PHYSICAL THERAPY | Facility: CLINIC | Age: 67
DRG: 233 | End: 2022-05-31
Payer: COMMERCIAL

## 2022-05-31 LAB
ANION GAP SERPL CALCULATED.3IONS-SCNC: 4 MMOL/L (ref 3–14)
ATRIAL RATE - MUSE: 82 BPM
BUN SERPL-MCNC: 22 MG/DL (ref 7–30)
CALCIUM SERPL-MCNC: 8.6 MG/DL (ref 8.5–10.1)
CHLORIDE BLD-SCNC: 104 MMOL/L (ref 94–109)
CO2 SERPL-SCNC: 31 MMOL/L (ref 20–32)
CREAT SERPL-MCNC: 0.82 MG/DL (ref 0.52–1.04)
DIASTOLIC BLOOD PRESSURE - MUSE: NORMAL MMHG
ERYTHROCYTE [DISTWIDTH] IN BLOOD BY AUTOMATED COUNT: 14.2 % (ref 10–15)
GFR SERPL CREATININE-BSD FRML MDRD: 77 ML/MIN/1.73M2
GLUCOSE BLD-MCNC: 141 MG/DL (ref 70–99)
GLUCOSE BLDC GLUCOMTR-MCNC: 126 MG/DL (ref 70–99)
GLUCOSE BLDC GLUCOMTR-MCNC: 155 MG/DL (ref 70–99)
GLUCOSE BLDC GLUCOMTR-MCNC: 178 MG/DL (ref 70–99)
GLUCOSE BLDC GLUCOMTR-MCNC: 257 MG/DL (ref 70–99)
HCT VFR BLD AUTO: 25.9 % (ref 35–47)
HGB BLD-MCNC: 7.9 G/DL (ref 11.7–15.7)
INTERPRETATION ECG - MUSE: NORMAL
MAGNESIUM SERPL-MCNC: 2.1 MG/DL (ref 1.6–2.3)
MCH RBC QN AUTO: 28.2 PG (ref 26.5–33)
MCHC RBC AUTO-ENTMCNC: 30.5 G/DL (ref 31.5–36.5)
MCV RBC AUTO: 93 FL (ref 78–100)
P AXIS - MUSE: 37 DEGREES
PHOSPHATE SERPL-MCNC: 4.1 MG/DL (ref 2.5–4.5)
PLATELET # BLD AUTO: 223 10E3/UL (ref 150–450)
POTASSIUM BLD-SCNC: 4.3 MMOL/L (ref 3.4–5.3)
PR INTERVAL - MUSE: 116 MS
QRS DURATION - MUSE: 82 MS
QT - MUSE: 430 MS
QTC - MUSE: 502 MS
R AXIS - MUSE: 10 DEGREES
RBC # BLD AUTO: 2.8 10E6/UL (ref 3.8–5.2)
SODIUM SERPL-SCNC: 139 MMOL/L (ref 133–144)
SYSTOLIC BLOOD PRESSURE - MUSE: NORMAL MMHG
T AXIS - MUSE: 14 DEGREES
VENTRICULAR RATE- MUSE: 82 BPM
WBC # BLD AUTO: 5.9 10E3/UL (ref 4–11)

## 2022-05-31 PROCEDURE — 250N000013 HC RX MED GY IP 250 OP 250 PS 637: Performed by: NURSE PRACTITIONER

## 2022-05-31 PROCEDURE — 250N000013 HC RX MED GY IP 250 OP 250 PS 637: Performed by: STUDENT IN AN ORGANIZED HEALTH CARE EDUCATION/TRAINING PROGRAM

## 2022-05-31 PROCEDURE — 97530 THERAPEUTIC ACTIVITIES: CPT | Mod: GO | Performed by: OCCUPATIONAL THERAPIST

## 2022-05-31 PROCEDURE — 80048 BASIC METABOLIC PNL TOTAL CA: CPT

## 2022-05-31 PROCEDURE — 97110 THERAPEUTIC EXERCISES: CPT | Mod: GO | Performed by: OCCUPATIONAL THERAPIST

## 2022-05-31 PROCEDURE — 250N000013 HC RX MED GY IP 250 OP 250 PS 637

## 2022-05-31 PROCEDURE — 83735 ASSAY OF MAGNESIUM: CPT

## 2022-05-31 PROCEDURE — 84100 ASSAY OF PHOSPHORUS: CPT | Performed by: STUDENT IN AN ORGANIZED HEALTH CARE EDUCATION/TRAINING PROGRAM

## 2022-05-31 PROCEDURE — 93010 ELECTROCARDIOGRAM REPORT: CPT | Performed by: INTERNAL MEDICINE

## 2022-05-31 PROCEDURE — 250N000011 HC RX IP 250 OP 636

## 2022-05-31 PROCEDURE — 250N000013 HC RX MED GY IP 250 OP 250 PS 637: Performed by: SURGERY

## 2022-05-31 PROCEDURE — 97535 SELF CARE MNGMENT TRAINING: CPT | Mod: GO | Performed by: OCCUPATIONAL THERAPIST

## 2022-05-31 PROCEDURE — 97116 GAIT TRAINING THERAPY: CPT | Mod: GP

## 2022-05-31 PROCEDURE — 214N000001 HC R&B CCU UMMC

## 2022-05-31 PROCEDURE — 93005 ELECTROCARDIOGRAM TRACING: CPT

## 2022-05-31 PROCEDURE — 97530 THERAPEUTIC ACTIVITIES: CPT | Mod: GP

## 2022-05-31 PROCEDURE — 85027 COMPLETE CBC AUTOMATED: CPT

## 2022-05-31 RX ORDER — GLIPIZIDE 10 MG/1
10 TABLET, FILM COATED, EXTENDED RELEASE ORAL
Status: DISCONTINUED | OUTPATIENT
Start: 2022-05-31 | End: 2022-06-04 | Stop reason: HOSPADM

## 2022-05-31 RX ORDER — LISINOPRIL 2.5 MG/1
2.5 TABLET ORAL DAILY
Status: DISCONTINUED | OUTPATIENT
Start: 2022-05-31 | End: 2022-06-04 | Stop reason: HOSPADM

## 2022-05-31 RX ORDER — GLIPIZIDE 10 MG/1
10 TABLET, FILM COATED, EXTENDED RELEASE ORAL
Status: DISCONTINUED | OUTPATIENT
Start: 2022-06-01 | End: 2022-05-31

## 2022-05-31 RX ADMIN — INSULIN ASPART: 100 INJECTION, SOLUTION INTRAVENOUS; SUBCUTANEOUS at 08:26

## 2022-05-31 RX ADMIN — HYDROXYZINE HYDROCHLORIDE 20 MG: 10 TABLET ORAL at 08:19

## 2022-05-31 RX ADMIN — Medication 1 TABLET: at 08:21

## 2022-05-31 RX ADMIN — ACETAMINOPHEN 650 MG: 325 TABLET, FILM COATED ORAL at 08:19

## 2022-05-31 RX ADMIN — ACETAMINOPHEN 650 MG: 325 TABLET, FILM COATED ORAL at 04:15

## 2022-05-31 RX ADMIN — ACETAMINOPHEN 650 MG: 325 TABLET, FILM COATED ORAL at 13:17

## 2022-05-31 RX ADMIN — METHOCARBAMOL 500 MG: 500 TABLET ORAL at 23:54

## 2022-05-31 RX ADMIN — OXYCODONE HYDROCHLORIDE 5 MG: 5 TABLET ORAL at 13:17

## 2022-05-31 RX ADMIN — PANTOPRAZOLE SODIUM 40 MG: 40 TABLET, DELAYED RELEASE ORAL at 08:21

## 2022-05-31 RX ADMIN — ACETAMINOPHEN 650 MG: 325 TABLET, FILM COATED ORAL at 22:16

## 2022-05-31 RX ADMIN — ASPIRIN 81 MG 162 MG: 81 TABLET ORAL at 08:21

## 2022-05-31 RX ADMIN — METFORMIN ER 500 MG 2000 MG: 500 TABLET ORAL at 17:26

## 2022-05-31 RX ADMIN — METHOCARBAMOL 500 MG: 500 TABLET ORAL at 16:44

## 2022-05-31 RX ADMIN — INSULIN ASPART 3 UNITS: 100 INJECTION, SOLUTION INTRAVENOUS; SUBCUTANEOUS at 12:55

## 2022-05-31 RX ADMIN — GLIPIZIDE 10 MG: 10 TABLET, FILM COATED, EXTENDED RELEASE ORAL at 17:26

## 2022-05-31 RX ADMIN — ACETAMINOPHEN 650 MG: 325 TABLET, FILM COATED ORAL at 18:03

## 2022-05-31 RX ADMIN — OXYCODONE HYDROCHLORIDE 5 MG: 5 TABLET ORAL at 04:15

## 2022-05-31 RX ADMIN — Medication 12.5 MG: at 20:19

## 2022-05-31 RX ADMIN — METHOCARBAMOL 500 MG: 500 TABLET ORAL at 00:00

## 2022-05-31 RX ADMIN — EMPAGLIFLOZIN 25 MG: 25 TABLET, FILM COATED ORAL at 16:38

## 2022-05-31 RX ADMIN — OXYCODONE HYDROCHLORIDE 5 MG: 5 TABLET ORAL at 18:03

## 2022-05-31 RX ADMIN — OXYCODONE HYDROCHLORIDE 5 MG: 5 TABLET ORAL at 08:19

## 2022-05-31 RX ADMIN — ATORVASTATIN CALCIUM 80 MG: 80 TABLET, FILM COATED ORAL at 08:21

## 2022-05-31 RX ADMIN — HYDROXYZINE HYDROCHLORIDE 20 MG: 10 TABLET ORAL at 00:00

## 2022-05-31 RX ADMIN — HYDROXYZINE HYDROCHLORIDE 20 MG: 10 TABLET ORAL at 16:44

## 2022-05-31 RX ADMIN — INSULIN ASPART: 100 INJECTION, SOLUTION INTRAVENOUS; SUBCUTANEOUS at 17:52

## 2022-05-31 RX ADMIN — FUROSEMIDE 60 MG: 40 TABLET ORAL at 13:17

## 2022-05-31 RX ADMIN — LISINOPRIL 2.5 MG: 2.5 TABLET ORAL at 13:17

## 2022-05-31 RX ADMIN — HEPARIN SODIUM 5000 UNITS: 5000 INJECTION, SOLUTION INTRAVENOUS; SUBCUTANEOUS at 13:17

## 2022-05-31 RX ADMIN — Medication 12.5 MG: at 08:21

## 2022-05-31 RX ADMIN — ACETAMINOPHEN 650 MG: 325 TABLET, FILM COATED ORAL at 00:00

## 2022-05-31 RX ADMIN — FLUOXETINE HYDROCHLORIDE 40 MG: 40 CAPSULE ORAL at 08:21

## 2022-05-31 RX ADMIN — OXYCODONE HYDROCHLORIDE 5 MG: 5 TABLET ORAL at 22:16

## 2022-05-31 ASSESSMENT — ACTIVITIES OF DAILY LIVING (ADL)
ADLS_ACUITY_SCORE: 36
ADLS_ACUITY_SCORE: 36
DEPENDENT_IADLS:: INDEPENDENT
ADLS_ACUITY_SCORE: 36

## 2022-05-31 NOTE — PLAN OF CARE
Dx: s/p CABGx4 5/24     Neuro: A&O x4. Denied n/t. Atarax given at HS.  Cardiac: SR. Afebrile . SBP within goal. +2 edema noted in BLE.  Respiratory: normal breathing on RA. LS with crackles at times.  GI/: Denied nausea, bowel sounds audible. No BM this shift. Pt voiding via external catheter.   Diet: Reg. Bg Achs and carb count.  Skin: Sternal incision to wound vac. [place] sites ROSEANNE.  Pain: Tylenol, Oxy, Robaxin available  LDAs: L TL PICC  Electrolytes: No replacement needed this AM  Mobility: SBA.     Plan: plan per CVTS

## 2022-05-31 NOTE — CONSULTS
Care Management Initial Consult    General Information  Assessment completed with: Patient, VM-chart review,    Type of CM/SW Visit: Initial Assessment    Primary Care Provider verified and updated as needed: Yes   Readmission within the last 30 days: no previous admission in last 30 days      Reason for Consult: discharge planning, other (see comments) (elevated risk score)  Advance Care Planning: Advance Care Planning Reviewed: no concerns identified          Communication Assessment  Patient's communication style: spoken language (English or Bilingual)    Hearing Difficulty or Deaf: no   Wear Glasses or Blind: yes    Cognitive  Cognitive/Neuro/Behavioral: WDL  Level of Consciousness: alert  Arousal Level: opens eyes spontaneously  Orientation: oriented x 4  Mood/Behavior: calm, cooperative  Best Language: 0 - No aphasia  Speech: clear, spontaneous    Living Environment:   People in home: alone     Current living Arrangements: apartment      Able to return to prior arrangements: no  Living Arrangement Comments: ARU recommended prior to returning home    Family/Social Support:  Care provided by: self  Provides care for: no one  Marital Status: Single  Children          Description of Support System: Involved    Support Assessment: Adequate social supports, Adequate family and caregiver support    Current Resources:   Patient receiving home care services: No     Community Resources: None  Equipment currently used at home: none  Supplies currently used at home: None    Employment/Financial:  Employment Status: employed full-time        Financial Concerns: No concerns identified   Referral to Financial Worker: No       Lifestyle & Psychosocial Needs:  Social Determinants of Health     Tobacco Use: Low Risk      Smoking Tobacco Use: Never Smoker     Smokeless Tobacco Use: Never Used   Alcohol Use: Not on file   Financial Resource Strain: Not on file   Food Insecurity: Not on file   Transportation Needs: Not on file  "  Physical Activity: Not on file   Stress: Not on file   Social Connections: Not on file   Intimate Partner Violence: Not on file   Depression: Not at risk     PHQ-2 Score: 0   Housing Stability: Not on file       Functional Status:  Prior to admission patient needed assistance:   Dependent ADLs:: Independent  Dependent IADLs:: Independent  Assesssment of Functional Status: Not at baseline with ADL Functioning    Mental Health Status:  Mental Health Status: Current Concern  Mental Health Management: Medication    Chemical Dependency Status:  Chemical Dependency Status: No Current Concerns             Values/Beliefs:  Spiritual, Cultural Beliefs, Druze Practices, Values that affect care:  Gayathri              Additional Information:  \"Marianne Monroy is a 67 year old female with PMH significant for HTN, T2DM, GERD, JUSTUS, MDD who is admitted in the setting of concern for ventricular tachycardia on cardiac monitor.\"    SW completed CMA w/ pt. Pt lives alone. Pt was IND w/ mobility, ADLs and IADLs PTA. 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 Pt has 2 children, Adelaide and Matthew, who live locally and are involved and supportive. Pt states they cannot provide 24/7 care at discharge, but they are available to help out as needed and already do help her. Pt reports having anxiety and depression and takes medication. Pt denied any substance use. Pt doesn't own any equipment. Pt has never received home care before.     Pt has ARU recs. KEIRY provided education to pt about ARU and pt in agreement w/ rec. KEIRY sent referral to  ARU via phone (Josselyn) this afternoon.     KEIRY will continue to follow and assist w/ discharge planning.     Carmencita Mckoy MSW, [facility]   Phone: [phone]  Pager: [phone]          "

## 2022-05-31 NOTE — PROGRESS NOTES
Cardiovascular Surgery Progress Note  05/31/2022         Assessment and Plan:     Marianne Monroy is a 67 year old female with PMH of HTN, HLD, T2DM, GERD, MDD, JUSTUS, acute heart failure due to ischemic cardiomyopathy, presented with NSVT on Ziopatch, found to have multivessel CAD now s/p CABG x4 on 5/24/22 with Dr. Desai.    Cardiovascular:   Multivessel coronary artery disease s/p CABG x 4 on 5/24/22 with Dr. Desai: CHU to LAD, SVG (harvest form both thighs) to distal RCA, OM, ramus.   Bigeminy PVCs with mild hypotension  Acute HFrEF, ischemic CMP  Hx NSVT  Recent echo on 5/19, LVEF 20-30%  Post-op LVEF 35%  5/26 Echo --> LVEF 35 - 40 %, improved compared to prior. RV EF reduced   HD stable overnight, in NSR  - Continue Atorvastatin 80 mg daily  - Continue  mg daily  - Continue Metoprolol 12.5 mg BID  - Add lisinopril 2.5 mg daily  - EP consulted for Bigeminy while in ICU, recommended BB and will f/u for consideration of lifevest  - Repeat EKG today to eval QTc  - Obtain baseline postoperative echo today 5/31    Chest tubes: removed 5/27  TPW: removed 5/27    Pulmonary:  Extubated 5/25. Now saturating well on RA.   - Supplemental O2 PRN to keep sats > 92%. Wean off as tolerated.  - Pulm hygiene, IS, activity and deep breathing    Neurology / MSK:  Acute post-operative pain  Hx MDD and JUSTUS  Well controlled with current regimen:  - Acetaminophen, PO oxycodone PRN, IV dilaudid PRN, methocarbamol  - B/L ES catheters removed 5/28  - Resumed PTA fluoxetine and hydroxyzine     / Renal / Fluid / Electrolytes:  BL creat ~ 0.82, most recent creatinine stable; adequate UOP.   FLUID STATUS: Pre-op weight 148 lbs, weight today pending; I/O past 24 hours: -1.2 L  - Lasix 60 mg po daily  - Replete lytes per protocol  - Strict I/O, daily weights  - Replete lytes per protocol    GI / Nutrition:   History of GERD  - Tolerating regular diet  - Continue bowel regimen, last BM 5/30  - PPI    Endocrine:  Stress induced  "hyperglycemia   DMII  Hgb A1c 7.8%  - Initially managed on insulin drip postop, transitioned to lantus and high resistance sliding scale; goal BG <180  - PTA metformin resumed 5/30  - Resume PTA Jardiance and glipizide on 5/31, discontinue lantus    Infectious Disease:  Stress induced leukocytosis  WBC 5.9, remains afebrile, no signs or symptoms of infection  - Completed perioperative antibiotics  - Continue to monitor fever curve, CBC    Hematology:   Acute blood loss anemia and thrombocytopenia  Hgb 7.9; Plt 223, no signs or symptoms of active bleeding  - CBC in AM  - B/L Venous duplex of lower extremity negative for DVT on 5/26    Anticoagulation:   - ASA only    MSK/Skin:  Sternotomy  Surgical Incision  - Wound vac removed 5/31  - Sternal precautions  - Postoperative incision management per protocol  - PT/OT/CR    Prophylaxis:   - Stress ulcer prophylaxis: Pantoprazole 40 mg daily  - DVT prophylaxis: Subcutaneous heparin, SCD    Disposition:   - Transferred to  on 5/29  - Therapies recommending discharge to ARU pending bed availability    Discussed with CVTS Fellow as needed.  Dr. Desai has been informed of changes through both verbal and written communication.      OSCAR Jaquez, ACNPC-AG, CCRN  Nurse Practitioner  Cardiothoracic Surgery  Pager: [phone]        Interval History:     No overnight events.  States pain is well managed on current regimen. Slept well overnight. Anxiety has improved with her medications.  Tolerating diet, is passing flatus, + BM. No nausea or vomiting.  Breathing well without complaints, IS to 750 ml.   Working with therapies and ambulating in halls with assistance.   Denies chest pain, palpitations, syncopal symptoms, fevers, chills, myalgias, or sternal popping/clicking. Endorses positional dizziness.         Physical Exam:   Blood pressure 123/73, pulse 78, temperature 98.5  F (36.9  C), temperature source Oral, resp. rate 18, height 1.626 m (5' 4\"), weight 72.4 kg (159 " lb 9.6 oz), SpO2 92 %, not currently breastfeeding.  Vitals:    05/28/22 1900 05/29/22 0629 05/30/22 0636   Weight: 72.4 kg (159 lb 9.6 oz) 72.1 kg (159 lb) 72.4 kg (159 lb 9.6 oz)        Gen: A&Ox4, NAD  Neuro: Intact, no focal deficits   CV: RRR, normal S1 S2, no murmurs, rubs or gallops. no JVD  Pulm: CTA, no wheezing or rhonchi, normal breathing on RA  Abd: nondistended, normal BS, soft, nontender  Ext: mild peripheral edema, mild pitting  Incision: clean, dry, intact, no erythema, sternum stable  Tubes/drain sites: dressing clean and dry           Data:    Imaging:  reviewed recent imaging, no acute concerns    No results found for this or any previous visit (from the past 24 hour(s)).     Labs:  Recent Labs   Lab 05/31/22  1210 05/31/22  0652 05/31/22  0424 05/30/22  0729 05/30/22  0441 05/29/22  0805 05/29/22  0732 05/25/22  0342 05/25/22  0341 05/25/22  0136 05/24/22  2338 05/24/22  1559 05/24/22  1549 05/24/22  1336 05/24/22  1335   WBC  --   --  5.9  --  5.8  --  7.6   < > 8.6  --  13.1*   < > 9.7  --   --    HGB  --   --  7.9*  --  8.1*  --  8.9*   < > 8.5*  --  9.8*   < > 10.2*   < >  --    MCV  --   --  93  --  91  --  93   < > 88  --  89   < > 88  --   --    PLT  --   --  223  --  184  --  207   < > 127*  --  189   < > 154  --  147*   INR  --   --   --   --   --   --   --   --  1.29*  --   --   --  1.26*  --  1.55*   NA  --   --  139  --  142  --  138   < > 145*  --  147*   < > 145*   < >  --    POTASSIUM  --   --  4.3  --  3.7  --  4.0   < > 4.3  --  3.5   < > 4.5   < >  --    CHLORIDE  --   --  104  --  106  --  103   < > 116*  --  117*   < > 113*  --   --    CO2  --   --  31  --  31  --  28   < > 23  --  23   < > 22  --   --    BUN  --   --  22  --  18  --  22   < > 16  --  18   < > 18  --   --    CR  --   --  0.82  --  0.64  --  0.46*   < > 0.80  --  0.85   < > 0.74  --   --    ANIONGAP  --   --  4  --  5  --  7   < > 6  --  7   < > 10  --   --    POONAM  --   --  8.6  --  8.5  --  8.7   < > 8.5  --   8.5   < > 8.3*  --   --    * 126* 141*   < > 147*   < > 331*   < > 146*   < > 144*   < > 200*   < >  --    ALBUMIN  --   --   --   --   --   --   --   --   --   --  2.4*  --  2.5*  --   --    PROTTOTAL  --   --   --   --   --   --   --   --   --   --  5.1*  --  4.6*  --   --    BILITOTAL  --   --   --   --   --   --   --   --   --   --  0.2  --  0.5  --   --    ALKPHOS  --   --   --   --   --   --   --   --   --   --  53  --  53  --   --    ALT  --   --   --   --   --   --   --   --   --   --  24  --  25  --   --    AST  --   --   --   --   --   --   --   --   --   --  34  --  37  --   --     < > = values in this interval not displayed.     GLUCOSE:   Recent Labs   Lab 05/31/22  1210 05/31/22  0652 05/31/22  0424 05/30/22  2151 05/30/22  1725 05/30/22  1210   * 126* 141* 195* 122* 226*

## 2022-05-31 NOTE — PROGRESS NOTES
CLINICAL NUTRITION SERVICES     Checked in with pt.     INTERVENTIONS:  Implementation:  Medical food supplement therapy: Pt does not remember receiving oral supplements. Provided oral supplement menu. Per pt, only tolerates stevia and not other sweeteners. Monitors amounts of carbohydrates in foods. Rec only choose tolerated oral supplements  Nutrition Education: Reinforced heart-healthy nutrition education. Provided handout, Nutrition Care Manual handout on Heart-Healthy Eating Nutrition Therapy. Rec continue to monitor glycemic control. Per pt, limits her intake of sweets (only eats small portions/bite-sized candy bars).     Follow up/Monitoring:  Will continue to follow pt.    Mandie Todd, MS, RD, LD, [facility]   6C Pgr: [phone]

## 2022-05-31 NOTE — PROGRESS NOTES
"CVTS PROGRESS NOTE    Marianne Monroy is a 67 year old female with PMH of HTN, HLD, T2DM, GERD, MDD, JUSTUS, nonsustained VT, multivessel coronary disease with acute heart failure due to ischemic cardiomyopathy who underwent CABG x4 on 5/24/22 with Dr. Desai. VLAD to LAD, SVGs (EVH from both thighs) to distal RCA, OM, ramus.  Intraoperative course uncomplicated. LVEF preop 25-30%, end of case was about 30-35% by DAVID.     S:  Transferred out of CVICU yesterday evening.  Night marred by anxiety for which she was given Atarax and her PTA Prozac restarted with improvement.  Continues to need a small amount of supplemental oxygen via NC.  BG running above goal.  Denies N/V/C, dizziness, lightheadedness, sternal popping/clicking/snapping.    O:    /60   Pulse 76   Temp 98.7  F (37.1  C) (Oral)   Resp 18   Ht 1.626 m (5' 4\")   Wt 72.4 kg (159 lb 9.6 oz)   SpO2 93%   BMI 27.40 kg/m      CBC RESULTS: Recent Labs   Lab Test 05/29/22  0732   WBC 7.6   RBC 3.09*   HGB 8.9*   HCT 28.7*   MCV 93   MCH 28.8   MCHC 31.0*   RDW 14.1          Last Comprehensive Metabolic Panel:  Sodium   Date Value Ref Range Status   05/31/2022 139 133 - 144 mmol/L Final   01/07/2021 141 133 - 144 mmol/L Final     Potassium   Date Value Ref Range Status   05/31/2022 4.3 3.4 - 5.3 mmol/L Final   01/07/2021 4.8 3.4 - 5.3 mmol/L Final     Chloride   Date Value Ref Range Status   05/31/2022 104 94 - 109 mmol/L Final   01/07/2021 106 94 - 109 mmol/L Final     Carbon Dioxide   Date Value Ref Range Status   01/07/2021 28 20 - 32 mmol/L Final     Carbon Dioxide (CO2)   Date Value Ref Range Status   05/31/2022 31 20 - 32 mmol/L Final     Anion Gap   Date Value Ref Range Status   05/31/2022 4 3 - 14 mmol/L Final   01/07/2021 7 3 - 14 mmol/L Final     Glucose   Date Value Ref Range Status   05/31/2022 141 (H) 70 - 99 mg/dL Final   01/07/2021 113 (H) 70 - 99 mg/dL Final     Urea Nitrogen   Date Value Ref Range Status   05/31/2022 22 7 - 30 mg/dL " Final   01/07/2021 19 7 - 30 mg/dL Final     Creatinine   Date Value Ref Range Status   05/31/2022 0.82 0.52 - 1.04 mg/dL Final   01/07/2021 0.90 0.52 - 1.04 mg/dL Final     GFR Estimate   Date Value Ref Range Status   05/31/2022 77 >60 mL/min/1.73m2 Final     Comment:     Effective December 21, 2021 eGFRcr in adults is calculated using the 2021 CKD-EPI creatinine equation which includes age and gender (Laverne et al., NE, DOI: 10.1056/PEJGbs3163928)   01/07/2021 66 >60 mL/min/[1.73_m2] Final     Comment:     Non  GFR Calc  Starting 12/18/2018, serum creatinine based estimated GFR (eGFR) will be   calculated using the Chronic Kidney Disease Epidemiology Collaboration   (CKD-EPI) equation.       Calcium   Date Value Ref Range Status   05/31/2022 8.6 8.5 - 10.1 mg/dL Final   01/07/2021 9.5 8.5 - 10.1 mg/dL Final     Bilirubin Total   Date Value Ref Range Status   05/24/2022 0.2 0.2 - 1.3 mg/dL Final   09/09/2020 0.4 0.2 - 1.3 mg/dL Final     Alkaline Phosphatase   Date Value Ref Range Status   05/24/2022 53 40 - 150 U/L Final   09/09/2020 90 40 - 150 U/L Final     ALT   Date Value Ref Range Status   05/24/2022 24 0 - 50 U/L Final   09/09/2020 33 0 - 50 U/L Final     AST   Date Value Ref Range Status   05/24/2022 34 0 - 45 U/L Final   09/09/2020 14 0 - 45 U/L Final       IMAGING:  No results found for this or any previous visit (from the past 24 hour(s)).    EXAM:  Gen:  Drowsy but rouses to voice then becomes progressively more anxious  Neuro:  A&O, HO, no focal deficits  Resp:  Unlabored breathing on supplemental oxygen via NC, shallow breathing without wheezes, rales, or rhonchi  CV:  Warm and well perfused, S1, S2, RRR, no murmurs or rubs, moderate BLE edema  GI:  abd SNTND, active bowel sounds  :  PureWick in place  MSK:  HO, no gross deformities  Int:  L medial thigh hematoma without signs of infection, LE incisions C/D/I; sternal incision with wound vac in place    A/P:    - IV Lasix diuresis  20mg IV in AM, repeat 30mg IV in PM   - Continue Lantus BID, high sliding scale insulin, and prandial insulin for glucose control, resume PTA metformin this evening; if BG remain high, can likely resume PTA Jardiance in AM   - Transition to controlled braden/cardiac diet   - Encourage OOB/activity   - IS/pulmonary toilet, wean supplemental oxygen as able   - Continue PTA Prozac and prn Atarax for anxiety    CAD:  ASA, BB, statin  PPX:  PPI, SQH, SCD, OOB/activity    Therapies recommending ARU at discharge once medically stable - likely mid-week.    Discussed with Dr. Graham.    Gabi Tello, CNP, ACN-AG  Cardiothoracic Surgery  Pager [phone]

## 2022-06-01 ENCOUNTER — APPOINTMENT (OUTPATIENT)
Dept: OCCUPATIONAL THERAPY | Facility: CLINIC | Age: 67
DRG: 233 | End: 2022-06-01
Payer: COMMERCIAL

## 2022-06-01 ENCOUNTER — APPOINTMENT (OUTPATIENT)
Dept: CARDIOLOGY | Facility: CLINIC | Age: 67
DRG: 233 | End: 2022-06-01
Attending: NURSE PRACTITIONER
Payer: COMMERCIAL

## 2022-06-01 LAB
ANION GAP SERPL CALCULATED.3IONS-SCNC: 6 MMOL/L (ref 3–14)
ATRIAL RATE - MUSE: 73 BPM
BUN SERPL-MCNC: 22 MG/DL (ref 7–30)
CALCIUM SERPL-MCNC: 9.1 MG/DL (ref 8.5–10.1)
CHLORIDE BLD-SCNC: 104 MMOL/L (ref 94–109)
CO2 SERPL-SCNC: 30 MMOL/L (ref 20–32)
CREAT SERPL-MCNC: 0.83 MG/DL (ref 0.52–1.04)
DIASTOLIC BLOOD PRESSURE - MUSE: NORMAL MMHG
ERYTHROCYTE [DISTWIDTH] IN BLOOD BY AUTOMATED COUNT: 14.4 % (ref 10–15)
GFR SERPL CREATININE-BSD FRML MDRD: 76 ML/MIN/1.73M2
GLUCOSE BLD-MCNC: 100 MG/DL (ref 70–99)
GLUCOSE BLDC GLUCOMTR-MCNC: 103 MG/DL (ref 70–99)
GLUCOSE BLDC GLUCOMTR-MCNC: 123 MG/DL (ref 70–99)
GLUCOSE BLDC GLUCOMTR-MCNC: 155 MG/DL (ref 70–99)
GLUCOSE BLDC GLUCOMTR-MCNC: 187 MG/DL (ref 70–99)
GLUCOSE BLDC GLUCOMTR-MCNC: 96 MG/DL (ref 70–99)
HCT VFR BLD AUTO: 24.5 % (ref 35–47)
HGB BLD-MCNC: 7.5 G/DL (ref 11.7–15.7)
INTERPRETATION ECG - MUSE: NORMAL
LVEF ECHO: NORMAL
MAGNESIUM SERPL-MCNC: 1.9 MG/DL (ref 1.6–2.3)
MCH RBC QN AUTO: 27.7 PG (ref 26.5–33)
MCHC RBC AUTO-ENTMCNC: 30.6 G/DL (ref 31.5–36.5)
MCV RBC AUTO: 90 FL (ref 78–100)
P AXIS - MUSE: 28 DEGREES
PHOSPHATE SERPL-MCNC: 4.8 MG/DL (ref 2.5–4.5)
PLATELET # BLD AUTO: 241 10E3/UL (ref 150–450)
POTASSIUM BLD-SCNC: 4.5 MMOL/L (ref 3.4–5.3)
PR INTERVAL - MUSE: 108 MS
QRS DURATION - MUSE: 84 MS
QT - MUSE: 436 MS
QTC - MUSE: 480 MS
R AXIS - MUSE: 11 DEGREES
RBC # BLD AUTO: 2.71 10E6/UL (ref 3.8–5.2)
SODIUM SERPL-SCNC: 140 MMOL/L (ref 133–144)
SYSTOLIC BLOOD PRESSURE - MUSE: NORMAL MMHG
T AXIS - MUSE: -1 DEGREES
VENTRICULAR RATE- MUSE: 73 BPM
WBC # BLD AUTO: 7.7 10E3/UL (ref 4–11)

## 2022-06-01 PROCEDURE — 93325 DOPPLER ECHO COLOR FLOW MAPG: CPT

## 2022-06-01 PROCEDURE — 97535 SELF CARE MNGMENT TRAINING: CPT | Mod: GO | Performed by: OCCUPATIONAL THERAPIST

## 2022-06-01 PROCEDURE — 36592 COLLECT BLOOD FROM PICC: CPT | Performed by: NURSE PRACTITIONER

## 2022-06-01 PROCEDURE — 214N000001 HC R&B CCU UMMC

## 2022-06-01 PROCEDURE — 83735 ASSAY OF MAGNESIUM: CPT | Performed by: NURSE PRACTITIONER

## 2022-06-01 PROCEDURE — 250N000013 HC RX MED GY IP 250 OP 250 PS 637

## 2022-06-01 PROCEDURE — 97110 THERAPEUTIC EXERCISES: CPT | Mod: GO | Performed by: OCCUPATIONAL THERAPIST

## 2022-06-01 PROCEDURE — 250N000012 HC RX MED GY IP 250 OP 636 PS 637: Performed by: SURGERY

## 2022-06-01 PROCEDURE — 250N000011 HC RX IP 250 OP 636

## 2022-06-01 PROCEDURE — 84100 ASSAY OF PHOSPHORUS: CPT | Performed by: STUDENT IN AN ORGANIZED HEALTH CARE EDUCATION/TRAINING PROGRAM

## 2022-06-01 PROCEDURE — 93308 TTE F-UP OR LMTD: CPT | Mod: 26 | Performed by: INTERNAL MEDICINE

## 2022-06-01 PROCEDURE — 97530 THERAPEUTIC ACTIVITIES: CPT | Mod: GO | Performed by: OCCUPATIONAL THERAPIST

## 2022-06-01 PROCEDURE — 255N000002 HC RX 255 OP 636: Performed by: STUDENT IN AN ORGANIZED HEALTH CARE EDUCATION/TRAINING PROGRAM

## 2022-06-01 PROCEDURE — 93321 DOPPLER ECHO F-UP/LMTD STD: CPT | Mod: 26 | Performed by: INTERNAL MEDICINE

## 2022-06-01 PROCEDURE — 85014 HEMATOCRIT: CPT | Performed by: NURSE PRACTITIONER

## 2022-06-01 PROCEDURE — 250N000011 HC RX IP 250 OP 636: Performed by: STUDENT IN AN ORGANIZED HEALTH CARE EDUCATION/TRAINING PROGRAM

## 2022-06-01 PROCEDURE — 93325 DOPPLER ECHO COLOR FLOW MAPG: CPT | Mod: 26 | Performed by: INTERNAL MEDICINE

## 2022-06-01 PROCEDURE — 250N000013 HC RX MED GY IP 250 OP 250 PS 637: Performed by: SURGERY

## 2022-06-01 PROCEDURE — 250N000013 HC RX MED GY IP 250 OP 250 PS 637: Performed by: STUDENT IN AN ORGANIZED HEALTH CARE EDUCATION/TRAINING PROGRAM

## 2022-06-01 PROCEDURE — 80048 BASIC METABOLIC PNL TOTAL CA: CPT

## 2022-06-01 PROCEDURE — 250N000013 HC RX MED GY IP 250 OP 250 PS 637: Performed by: NURSE PRACTITIONER

## 2022-06-01 RX ORDER — MAGNESIUM SULFATE HEPTAHYDRATE 40 MG/ML
2 INJECTION, SOLUTION INTRAVENOUS ONCE
Status: COMPLETED | OUTPATIENT
Start: 2022-06-01 | End: 2022-06-01

## 2022-06-01 RX ADMIN — Medication 12.5 MG: at 20:19

## 2022-06-01 RX ADMIN — EMPAGLIFLOZIN 25 MG: 25 TABLET, FILM COATED ORAL at 08:00

## 2022-06-01 RX ADMIN — HYDROXYZINE HYDROCHLORIDE 20 MG: 10 TABLET ORAL at 21:06

## 2022-06-01 RX ADMIN — METFORMIN ER 500 MG 2000 MG: 500 TABLET ORAL at 18:37

## 2022-06-01 RX ADMIN — ACETAMINOPHEN 650 MG: 325 TABLET, FILM COATED ORAL at 23:20

## 2022-06-01 RX ADMIN — ACETAMINOPHEN 650 MG: 325 TABLET, FILM COATED ORAL at 15:24

## 2022-06-01 RX ADMIN — HUMAN ALBUMIN MICROSPHERES AND PERFLUTREN 5 ML: 10; .22 INJECTION, SOLUTION INTRAVENOUS at 11:46

## 2022-06-01 RX ADMIN — Medication 1 TABLET: at 07:56

## 2022-06-01 RX ADMIN — FUROSEMIDE 60 MG: 40 TABLET ORAL at 07:56

## 2022-06-01 RX ADMIN — INSULIN ASPART 4 UNITS: 100 INJECTION, SOLUTION INTRAVENOUS; SUBCUTANEOUS at 15:40

## 2022-06-01 RX ADMIN — HEPARIN SODIUM 5000 UNITS: 5000 INJECTION, SOLUTION INTRAVENOUS; SUBCUTANEOUS at 20:31

## 2022-06-01 RX ADMIN — ACETAMINOPHEN 650 MG: 325 TABLET, FILM COATED ORAL at 11:24

## 2022-06-01 RX ADMIN — OXYCODONE HYDROCHLORIDE 5 MG: 5 TABLET ORAL at 03:12

## 2022-06-01 RX ADMIN — PANTOPRAZOLE SODIUM 40 MG: 40 TABLET, DELAYED RELEASE ORAL at 07:58

## 2022-06-01 RX ADMIN — Medication 12.5 MG: at 07:58

## 2022-06-01 RX ADMIN — HEPARIN SODIUM 5000 UNITS: 5000 INJECTION, SOLUTION INTRAVENOUS; SUBCUTANEOUS at 03:13

## 2022-06-01 RX ADMIN — OXYCODONE HYDROCHLORIDE 5 MG: 5 TABLET ORAL at 11:24

## 2022-06-01 RX ADMIN — INSULIN ASPART 3 UNITS: 100 INJECTION, SOLUTION INTRAVENOUS; SUBCUTANEOUS at 18:43

## 2022-06-01 RX ADMIN — OXYCODONE HYDROCHLORIDE 5 MG: 5 TABLET ORAL at 19:17

## 2022-06-01 RX ADMIN — HYDROXYZINE HYDROCHLORIDE 20 MG: 10 TABLET ORAL at 14:52

## 2022-06-01 RX ADMIN — OXYCODONE HYDROCHLORIDE 5 MG: 5 TABLET ORAL at 07:10

## 2022-06-01 RX ADMIN — METHOCARBAMOL 500 MG: 500 TABLET ORAL at 14:52

## 2022-06-01 RX ADMIN — ASPIRIN 81 MG 162 MG: 81 TABLET ORAL at 07:57

## 2022-06-01 RX ADMIN — MAGNESIUM SULFATE IN WATER 2 G: 40 INJECTION, SOLUTION INTRAVENOUS at 11:54

## 2022-06-01 RX ADMIN — METHOCARBAMOL 500 MG: 500 TABLET ORAL at 21:06

## 2022-06-01 RX ADMIN — ACETAMINOPHEN 650 MG: 325 TABLET, FILM COATED ORAL at 03:11

## 2022-06-01 RX ADMIN — OXYCODONE HYDROCHLORIDE 5 MG: 5 TABLET ORAL at 15:24

## 2022-06-01 RX ADMIN — GLIPIZIDE 10 MG: 10 TABLET, FILM COATED, EXTENDED RELEASE ORAL at 07:57

## 2022-06-01 RX ADMIN — ACETAMINOPHEN 650 MG: 325 TABLET, FILM COATED ORAL at 07:10

## 2022-06-01 RX ADMIN — FLUOXETINE HYDROCHLORIDE 40 MG: 40 CAPSULE ORAL at 07:58

## 2022-06-01 RX ADMIN — LISINOPRIL 2.5 MG: 2.5 TABLET ORAL at 07:57

## 2022-06-01 RX ADMIN — ACETAMINOPHEN 650 MG: 325 TABLET, FILM COATED ORAL at 19:16

## 2022-06-01 RX ADMIN — OXYCODONE HYDROCHLORIDE 5 MG: 5 TABLET ORAL at 23:20

## 2022-06-01 RX ADMIN — ATORVASTATIN CALCIUM 80 MG: 80 TABLET, FILM COATED ORAL at 07:56

## 2022-06-01 ASSESSMENT — ACTIVITIES OF DAILY LIVING (ADL)
ADLS_ACUITY_SCORE: 34
ADLS_ACUITY_SCORE: 36
ADLS_ACUITY_SCORE: 34
ADLS_ACUITY_SCORE: 36
ADLS_ACUITY_SCORE: 36
ADLS_ACUITY_SCORE: 34
ADLS_ACUITY_SCORE: 36
ADLS_ACUITY_SCORE: 36
ADLS_ACUITY_SCORE: 34

## 2022-06-01 NOTE — PLAN OF CARE
D: s/p CABG x4 on 5/24/22.   Neuro: A/Ox4. Calls appropriately. Pleasant and cooperative.   Cardiac/Tele:  VVS. NSR, Afebrile. Denies chest pain   Respiratory: Room air. Tolerating well  GI/: Continent. Uses commode at bedside with standby by assist x 1.  Diet/Appetite: Modified carb diet.  Skin: No new deficits noted.   LDAs: Triple lumen PICC left arm SL  Activity: up with walker and gait belt, stand by assist x 1.  Pain: To keep pain under control, patient would like to be offered tylenol and oxycodone when available.    P: Continue to monitor and notify team with changes.

## 2022-06-01 NOTE — PROGRESS NOTES
"CLINICAL NUTRITION SERVICES - REASSESSMENT NOTE     Nutrition Prescription    RECOMMENDATIONS FOR MDs/PROVIDERS TO ORDER:  None at this time.     Malnutrition Status:    Moderate malnutrition in the context of acute illness/injury    Recommendations already ordered by Registered Dietitian (RD):  None additionally at this time.     Future/Additional Recommendations:  1. Continue current diet, as ordered (includes sodium restriction). Encourage intake of meals.   2. Monitor potential need for a fluid restriction, if warranted.   3. Continue multivitamin with minerals, as ordered, to help ensure micronutrient needs are met.   4. Monitor phos trends. Phos was 4.8 on 6/1.   5. Monitor BG control. Hgb A1c of 7.8 on 4/14/2022. BG was 100 on 6/1.      EVALUATION OF THE PROGRESS TOWARD GOALS   Diet: Moderate Consistent Carbohydrate and Low Saturated Fat/2400 mg Sodium since 5/30. Was a on regular diet order prior, starting 5/25. SLP signed off on 5/26. Has a prn snack/supplement order.   Intake: Tolerating diet. Per % intake flowsheets, pt consuming 100% with a good appetite 5/23, % with a good appetite 5/26, 75% with a fair appetite 5/27, 25-50% with fair appetite 5/28, 100% with a good appetite 5/29-5/30, and % on 5/31. Per discussion with pt, appetite improving. States she is \"managing\" and is watching her blood sugars.     Nutrition Support: Not started this admission.      NEW FINDINGS   Resp: Extubated 5/25 (CABG 5/24). No O2.   GI: Having zero to four stools daily. Last stool was on 6/1. Stools are formed and brown.    Wt Hx: 71.4 kg (6/25/18), 66.7 kg (5/18/21), 69.4 kg (12/21/21), 68.4 kg (4/14/2022), 67.7 kg (5/11/2022), 67.1 kg (5/18, admit), 71.3 kg (6/1) - Current wt is above weights over the last year which may be due to fluid status.     MALNUTRITION  % Intake: < 75% for > 7 days (moderate)  % Weight Loss: None at this time, but difficult to assess wt changes with changes in fluid " status  Subcutaneous Fat Loss: None observed  Muscle Loss: None observed  Fluid Accumulation/Edema: Mild  Malnutrition Diagnosis: Moderate malnutrition in the context of acute illness/injury    Previous Goals   Total avg nutritional intake to meet a minimum of 25 kcal/kg and 1.2 g PRO/kg daily (per dosing wt 65 kg).  Evaluation: Not meeting consistently.    Previous Nutrition Diagnosis  Inadequate oral intake related to intubation as evidenced by NPO status and need for EN.     Evaluation: Unresolved, improving.     CURRENT NUTRITION DIAGNOSIS  Inadequate oral intake related to decreased appetite post-op, now improving as evidenced by pt consuming 25-50% of meals, at times.       INTERVENTIONS  Implementation  Medical food supplement therapy, Nutrition education for nutrition relationship to health/disease: See nutrition note 5/31 for additional details.     Goals  Patient to consume % of nutritionally adequate meal trays TID, or the equivalent with supplements/snacks.    Monitoring/Evaluation  Progress toward goals will be monitored and evaluated per protocol.     Nutrition will continue to follow.      Mandie Todd, MS, RD, LD, [facility]   6C Pgr: [phone]

## 2022-06-01 NOTE — PROGRESS NOTES
Cardiovascular Surgery Progress Note  06/01/2022         Assessment and Plan:     Marianne Monroy is a 67 year old female with PMH of HTN, HLD, T2DM, GERD, MDD, JUSTUS, acute heart failure due to ischemic cardiomyopathy, presented with NSVT on Ziopatch, found to have multivessel CAD now s/p CABG x4 on 5/24/22 with Dr. Desai.    Cardiovascular:   Multivessel coronary artery disease s/p CABG x 4 on 5/24/22 with Dr. Desai: CHU to LAD, SVG (harvest form both thighs) to distal RCA, OM, ramus.   Bigeminy PVCs with mild hypotension  Acute HFrEF, ischemic CMP  Hx NSVT  Recent echo on 5/19, LVEF 20-30%  Post-op LVEF 35%  5/26 Echo --> LVEF 35 - 40 %, improved compared to prior. RV EF reduced   5/31 Postoperative echo demonstrated LVEF 35-40%, mildly reduced RV function, no pericardial effusion  HD stable overnight, in NSR  - Continue Atorvastatin 80 mg daily  - Continue  mg daily  - Continue Metoprolol 12.5 mg BID  - Lisinopril 2.5 mg daily  - EP consulted for Bigeminy while in ICU, recommended BB and will f/u for consideration of lifevest  - Repeat EKG 5/31: QTc 480 ms    Chest tubes: removed 5/27  TPW: removed 5/27    Pulmonary:  Extubated 5/25. Now saturating well on RA.   - Supplemental O2 PRN to keep sats > 92%. Wean off as tolerated.  - Pulm hygiene, IS, activity and deep breathing    Neurology / MSK:  Acute post-operative pain  Hx MDD and JUSTUS  Well controlled with current regimen:  - Acetaminophen, PO oxycodone PRN, IV dilaudid PRN, methocarbamol  - B/L ES catheters removed 5/28  - Resumed PTA fluoxetine and hydroxyzine     / Renal / Fluid / Electrolytes:  BL creat ~ 0.82, most recent creatinine stable; adequate UOP.   FLUID STATUS: Pre-op weight 148 lbs, weight today 157 lbs; I/O past 24 hours: -1.7 L  - Continue Lasix 60 mg po daily  - Replete lytes per protocol  - Strict I/O, daily weights  - Replete lytes per protocol    GI / Nutrition:   History of GERD  - Tolerating regular diet  - Continue bowel  "regimen, last BM 5/30  - PPI    Endocrine:  Stress induced hyperglycemia   DMII  Hgb A1c 7.8%  - Initially managed on insulin drip postop, transitioned to lantus and high resistance sliding scale; goal BG <180  - PTA metformin resumed 5/31  - Resumed PTA Jardiance and glipizide on 5/31, discontinued lantus    Infectious Disease:  Stress induced leukocytosis  WBC 7.7, remains afebrile, no signs or symptoms of infection  - Completed perioperative antibiotics  - Continue to monitor fever curve, CBC    Hematology:   Acute blood loss anemia and thrombocytopenia  Hgb 7.5; Plt 241, no signs or symptoms of active bleeding  - CBC in AM  - B/L Venous duplex of lower extremity negative for DVT on 5/26    Anticoagulation:   - ASA only    MSK/Skin:  Sternotomy  Surgical Incision  - Wound vac removed 5/31  - Sternal precautions  - Postoperative incision management per protocol  - PT/OT/CR    Prophylaxis:   - Stress ulcer prophylaxis: Pantoprazole 40 mg daily  - DVT prophylaxis: Subcutaneous heparin, SCD    Disposition:   - Transferred to  on 5/29  - Therapies recommending discharge to ARU pending bed availability, medically ready since 5/31    Discussed with CVTS Fellow as needed.  Dr. Desai has been informed of changes through both verbal and written communication.      OSCAR Jaquez, [facility]-AG, CCRN  Nurse Practitioner  Cardiothoracic Surgery  Pager: [phone]        Interval History:     No acute events overnight. States pain is well managed on current regimen, slept well. Breathing is improved, working with IS. Tolerating diet, is passing flatus, +BM. Denies chest pain, palpitations, dizziness, syncopal symptoms, chills, myalgias, sternal popping/clicking.         Physical Exam:   Blood pressure 113/55, pulse 80, temperature 98.6  F (37  C), temperature source Oral, resp. rate 16, height 1.626 m (5' 4\"), weight 71.3 kg (157 lb 3.2 oz), SpO2 95 %, not currently breastfeeding.  Vitals:    05/29/22 0629 05/30/22 0636 " 06/01/22 0330   Weight: 72.1 kg (159 lb) 72.4 kg (159 lb 9.6 oz) 71.3 kg (157 lb 3.2 oz)        Gen: A&Ox4, NAD  Neuro: Intact, no focal deficits   CV: RRR, normal S1 S2, no murmurs, rubs or gallops. no JVD  Pulm: CTA, no wheezing or rhonchi, normal breathing on RA  Abd: nondistended, normal BS, soft, nontender  Ext: mild peripheral edema, mild pitting  Incision: clean, dry, intact, no erythema, sternum stable  Tubes/drain sites: dressing clean and dry           Data:    Imaging:  reviewed recent imaging, no acute concerns    No results found for this or any previous visit (from the past 24 hour(s)).     Labs:  Recent Labs   Lab 06/01/22  0733 06/01/22  0641 05/31/22  2212 05/31/22  0652 05/31/22  0424 05/30/22  0729 05/30/22  0441   WBC  --  7.7  --   --  5.9  --  5.8   HGB  --  7.5*  --   --  7.9*  --  8.1*   MCV  --  90  --   --  93  --  91   PLT  --  241  --   --  223  --  184   NA  --  140  --   --  139  --  142   POTASSIUM  --  4.5  --   --  4.3  --  3.7   CHLORIDE  --  104  --   --  104  --  106   CO2  --  30  --   --  31  --  31   BUN  --  22  --   --  22  --  18   CR  --  0.83  --   --  0.82  --  0.64   ANIONGAP  --  6  --   --  4  --  5   POONAM  --  9.1  --   --  8.6  --  8.5   * 100* 178*   < > 141*   < > 147*    < > = values in this interval not displayed.     GLUCOSE:   Recent Labs   Lab 06/01/22  0733 06/01/22  0641 05/31/22  2212 05/31/22  1639 05/31/22  1210 05/31/22  0652   * 100* 178* 155* 257* 126*

## 2022-06-01 NOTE — PROGRESS NOTES
Care Management Follow Up    Length of Stay (days): 14    Expected Discharge Date:       Concerns to be Addressed: Discharge planning      Patient plan of care discussed at interdisciplinary rounds: Yes    Anticipated Discharge Disposition: ARU recommended     Anticipated Discharge Services: None   Anticipated Discharge DME: NA     Patient/family educated on Medicare website which has current facility and service quality ratings:  Yes  Education Provided on the Discharge Plan:  Yes  Patient/Family in Agreement with the Plan:  Yes    Referrals Placed by CM/SW:  See below  Private pay costs discussed: Not applicable    Additional Information:  KEIRY followed up w/ Josselyn at [facility] ARU admissions. Per Josselyn, pt may progress enough at hospital to not need ARU. Josselyn noted that if pt goes to ARU, she will need insurance auth and requested PT put in their note today. KEIRY was able to talk to OT, who is going to see if PT can meet w/ pt today, if not then tomorrow. SW updated Josselyn. KEIRY updated team in rounds. Team indicated that pt really wants to go to rehab (pt voiced this to SW during assessment as well), to get stronger. Pt lives alone, but can maybe stay at her son's home for a period of time (he works during the day).    SW will f/u w/ [facility] ARU tomorrow re: discharge planning.     Carmencita DC, [facility]   Phone: [phone]  Pager: [phone]

## 2022-06-01 NOTE — PLAN OF CARE
Neuro: A&O x4, slightly anxious at times, atarax given x 2   Cardiac: SR. Afebrile +2 edema noted in BLE.  Respiratory: O2 95% on RA, LS dim, intermittent base crackles  GI/: No stool this shift, BS+, voiding in commode or on toilet  Diet: Reg, tolerating   Skin: Sternal incision, [place] sites ROSEANNE   Pain: Tylenol, Oxy, Robaxin each with some relief  LDAs: L DL PICC  Mobility: SBA.     Plan: Continue POC

## 2022-06-01 NOTE — PLAN OF CARE
D: s/p CABG x4 on 5/24/22.      I: Monitored vitals and assessed patient status.   PRN:  tylenol x2  oxy x2  robaxin x1     A: A&Ox4. On room air. SR, VSS, afebrile. Sternal incision CDI. Urinating adequately. LBM 5/31. Assist x1.     P: Continue to monitor.

## 2022-06-02 ENCOUNTER — APPOINTMENT (OUTPATIENT)
Dept: PHYSICAL THERAPY | Facility: CLINIC | Age: 67
DRG: 233 | End: 2022-06-02
Payer: COMMERCIAL

## 2022-06-02 ENCOUNTER — APPOINTMENT (OUTPATIENT)
Dept: OCCUPATIONAL THERAPY | Facility: CLINIC | Age: 67
DRG: 233 | End: 2022-06-02
Payer: COMMERCIAL

## 2022-06-02 LAB
ANION GAP SERPL CALCULATED.3IONS-SCNC: 5 MMOL/L (ref 3–14)
BUN SERPL-MCNC: 21 MG/DL (ref 7–30)
CALCIUM SERPL-MCNC: 8.8 MG/DL (ref 8.5–10.1)
CHLORIDE BLD-SCNC: 101 MMOL/L (ref 94–109)
CO2 SERPL-SCNC: 32 MMOL/L (ref 20–32)
CREAT SERPL-MCNC: 0.9 MG/DL (ref 0.52–1.04)
ERYTHROCYTE [DISTWIDTH] IN BLOOD BY AUTOMATED COUNT: 14.6 % (ref 10–15)
GFR SERPL CREATININE-BSD FRML MDRD: 69 ML/MIN/1.73M2
GLUCOSE BLD-MCNC: 167 MG/DL (ref 70–99)
GLUCOSE BLDC GLUCOMTR-MCNC: 108 MG/DL (ref 70–99)
GLUCOSE BLDC GLUCOMTR-MCNC: 112 MG/DL (ref 70–99)
GLUCOSE BLDC GLUCOMTR-MCNC: 132 MG/DL (ref 70–99)
GLUCOSE BLDC GLUCOMTR-MCNC: 154 MG/DL (ref 70–99)
GLUCOSE BLDC GLUCOMTR-MCNC: 250 MG/DL (ref 70–99)
HCT VFR BLD AUTO: 24.7 % (ref 35–47)
HGB BLD-MCNC: 7.5 G/DL (ref 11.7–15.7)
MAGNESIUM SERPL-MCNC: 2.3 MG/DL (ref 1.6–2.3)
MCH RBC QN AUTO: 28 PG (ref 26.5–33)
MCHC RBC AUTO-ENTMCNC: 30.4 G/DL (ref 31.5–36.5)
MCV RBC AUTO: 92 FL (ref 78–100)
PHOSPHATE SERPL-MCNC: 4.6 MG/DL (ref 2.5–4.5)
PLATELET # BLD AUTO: 285 10E3/UL (ref 150–450)
POTASSIUM BLD-SCNC: 4.5 MMOL/L (ref 3.4–5.3)
RBC # BLD AUTO: 2.68 10E6/UL (ref 3.8–5.2)
SODIUM SERPL-SCNC: 138 MMOL/L (ref 133–144)
WBC # BLD AUTO: 8.2 10E3/UL (ref 4–11)

## 2022-06-02 PROCEDURE — 99356 PR PROLONGED SERV,INPATIENT,1ST HR: CPT | Performed by: INTERNAL MEDICINE

## 2022-06-02 PROCEDURE — 36592 COLLECT BLOOD FROM PICC: CPT

## 2022-06-02 PROCEDURE — 250N000013 HC RX MED GY IP 250 OP 250 PS 637

## 2022-06-02 PROCEDURE — 85014 HEMATOCRIT: CPT | Performed by: NURSE PRACTITIONER

## 2022-06-02 PROCEDURE — 250N000013 HC RX MED GY IP 250 OP 250 PS 637: Performed by: STUDENT IN AN ORGANIZED HEALTH CARE EDUCATION/TRAINING PROGRAM

## 2022-06-02 PROCEDURE — 250N000013 HC RX MED GY IP 250 OP 250 PS 637: Performed by: NURSE PRACTITIONER

## 2022-06-02 PROCEDURE — 97116 GAIT TRAINING THERAPY: CPT | Mod: GP

## 2022-06-02 PROCEDURE — 99207 PR SC NO CHARGE VISIT: CPT | Performed by: INTERNAL MEDICINE

## 2022-06-02 PROCEDURE — 80048 BASIC METABOLIC PNL TOTAL CA: CPT

## 2022-06-02 PROCEDURE — 97535 SELF CARE MNGMENT TRAINING: CPT | Mod: GO | Performed by: OCCUPATIONAL THERAPIST

## 2022-06-02 PROCEDURE — 999N000007 HC SITE CHECK

## 2022-06-02 PROCEDURE — 83735 ASSAY OF MAGNESIUM: CPT | Performed by: STUDENT IN AN ORGANIZED HEALTH CARE EDUCATION/TRAINING PROGRAM

## 2022-06-02 PROCEDURE — 214N000001 HC R&B CCU UMMC

## 2022-06-02 PROCEDURE — 250N000013 HC RX MED GY IP 250 OP 250 PS 637: Performed by: SURGERY

## 2022-06-02 PROCEDURE — 97530 THERAPEUTIC ACTIVITIES: CPT | Mod: GP

## 2022-06-02 PROCEDURE — 84100 ASSAY OF PHOSPHORUS: CPT | Performed by: STUDENT IN AN ORGANIZED HEALTH CARE EDUCATION/TRAINING PROGRAM

## 2022-06-02 PROCEDURE — 99233 SBSQ HOSP IP/OBS HIGH 50: CPT | Performed by: INTERNAL MEDICINE

## 2022-06-02 PROCEDURE — 97530 THERAPEUTIC ACTIVITIES: CPT | Mod: GO | Performed by: OCCUPATIONAL THERAPIST

## 2022-06-02 PROCEDURE — 250N000011 HC RX IP 250 OP 636

## 2022-06-02 RX ADMIN — Medication 1 TABLET: at 09:04

## 2022-06-02 RX ADMIN — LISINOPRIL 2.5 MG: 2.5 TABLET ORAL at 09:04

## 2022-06-02 RX ADMIN — HYDROXYZINE HYDROCHLORIDE 20 MG: 10 TABLET ORAL at 09:02

## 2022-06-02 RX ADMIN — METHOCARBAMOL 500 MG: 500 TABLET ORAL at 21:09

## 2022-06-02 RX ADMIN — FUROSEMIDE 60 MG: 40 TABLET ORAL at 09:04

## 2022-06-02 RX ADMIN — ACETAMINOPHEN 650 MG: 325 TABLET, FILM COATED ORAL at 21:11

## 2022-06-02 RX ADMIN — Medication 12.5 MG: at 20:49

## 2022-06-02 RX ADMIN — ACETAMINOPHEN 650 MG: 325 TABLET, FILM COATED ORAL at 09:02

## 2022-06-02 RX ADMIN — INSULIN ASPART 3 UNITS: 100 INJECTION, SOLUTION INTRAVENOUS; SUBCUTANEOUS at 13:13

## 2022-06-02 RX ADMIN — OXYCODONE HYDROCHLORIDE 5 MG: 5 TABLET ORAL at 13:08

## 2022-06-02 RX ADMIN — HEPARIN SODIUM 5000 UNITS: 5000 INJECTION, SOLUTION INTRAVENOUS; SUBCUTANEOUS at 20:52

## 2022-06-02 RX ADMIN — METFORMIN ER 500 MG 2000 MG: 500 TABLET ORAL at 16:04

## 2022-06-02 RX ADMIN — ATORVASTATIN CALCIUM 80 MG: 80 TABLET, FILM COATED ORAL at 09:04

## 2022-06-02 RX ADMIN — HYDROXYZINE HYDROCHLORIDE 20 MG: 10 TABLET ORAL at 16:04

## 2022-06-02 RX ADMIN — OXYCODONE HYDROCHLORIDE 5 MG: 5 TABLET ORAL at 04:13

## 2022-06-02 RX ADMIN — FLUOXETINE HYDROCHLORIDE 40 MG: 40 CAPSULE ORAL at 09:04

## 2022-06-02 RX ADMIN — PANTOPRAZOLE SODIUM 40 MG: 40 TABLET, DELAYED RELEASE ORAL at 09:04

## 2022-06-02 RX ADMIN — Medication 12.5 MG: at 09:04

## 2022-06-02 RX ADMIN — OXYCODONE HYDROCHLORIDE 5 MG: 5 TABLET ORAL at 09:02

## 2022-06-02 RX ADMIN — OXYCODONE HYDROCHLORIDE 5 MG: 5 TABLET ORAL at 17:10

## 2022-06-02 RX ADMIN — OXYCODONE HYDROCHLORIDE 5 MG: 5 TABLET ORAL at 21:11

## 2022-06-02 RX ADMIN — ACETAMINOPHEN 650 MG: 325 TABLET, FILM COATED ORAL at 04:13

## 2022-06-02 RX ADMIN — HYDROXYZINE HYDROCHLORIDE 20 MG: 10 TABLET ORAL at 21:09

## 2022-06-02 RX ADMIN — METHOCARBAMOL 500 MG: 500 TABLET ORAL at 09:03

## 2022-06-02 RX ADMIN — EMPAGLIFLOZIN 25 MG: 25 TABLET, FILM COATED ORAL at 09:04

## 2022-06-02 RX ADMIN — ASPIRIN 81 MG 162 MG: 81 TABLET ORAL at 09:04

## 2022-06-02 RX ADMIN — METHOCARBAMOL 500 MG: 500 TABLET ORAL at 16:04

## 2022-06-02 RX ADMIN — GLIPIZIDE 10 MG: 10 TABLET, FILM COATED, EXTENDED RELEASE ORAL at 09:04

## 2022-06-02 RX ADMIN — ACETAMINOPHEN 650 MG: 325 TABLET, FILM COATED ORAL at 17:10

## 2022-06-02 RX ADMIN — ACETAMINOPHEN 650 MG: 325 TABLET, FILM COATED ORAL at 13:09

## 2022-06-02 ASSESSMENT — ACTIVITIES OF DAILY LIVING (ADL)
ADLS_ACUITY_SCORE: 34
DEPENDENT_IADLS:: INDEPENDENT
ADLS_ACUITY_SCORE: 34
ADLS_ACUITY_SCORE: 34

## 2022-06-02 NOTE — PROGRESS NOTES
Care Management Follow Up    Length of Stay (days): 15    Expected Discharge Date:  6/3/22 early afternoon and son will pick pt up     Concerns to be Addressed:    PT says pt can discharge to son's home with HHPT/OT   Patient plan of care discussed at interdisciplinary rounds: Yes    Anticipated Discharge Disposition:To son 's home  Matthew Monroy  [address] [place] [address]     Anticipated Discharge Services:  [facility]: HHPT/OT--referral sent via email  Anticipated Discharge DME:  PT will issue a walker 6/3/22    [facility]: script and all pertinent info fa'x to [facility] @ 11:45am  I called [facility] rep: Zakia Yelitza cell: [phone] and informed her that TS has just consulted EP MD to determine if she really needs it_____.    Patient/family educated on Medicare website which has current facility and service quality ratings:  Yes  Education Provided on the Discharge Plan: yes   Patient/Family in Agreement with the Plan: yes    Referrals Placed by CM/SW:    [facility] today wait for acceptance: they cannot take her    [facility]: [phone] Mena to check @ 2:57pm and call me back: they cannot accept her    [facility]: [facility] [phone] --they have HHPT/OT beginning the middle of next week--they are asking that the fax not be sent until next week 6/6 to fax; [phone]______    [facility]: [phone] #2 #1 Roxana said to fax info [phone] done @ 3:30pm wait for acceptance: No due to staffing    Private pay costs discussed: may have some with Medicare [employer]    Additional Information:    Wait for [facility] to accept pt or send more HH referrals  Wait for [facility] lifevest to be approved_____  I have informed pt what it is for/how it works/that a [facility] RN will come to fit it and teach her about it. I have informed pt that she must return it to [facility] when therapy is complete and she verbalized understanding.          Azul Alonzo RN

## 2022-06-02 NOTE — DISCHARGE SUMMARY
[facility], Check   Cardiothor[facility] Hospital Discharge Summary     Marianne Monroy MRN# [id number]   Age: 67 year old YOB: 1955     Admitting Physician:  Ema Francis MD  Discharge Physician:  VAL Zarco  Primary Care Physician:        Roxanna Otoole     DATE OF ADMISSION: 5/18/2022      DATE OF DISCHARGE: June 4, 2022     Admit Wt: 146 lbs   Discharge Wt: 149 lbs          Primary Diagnoses:   1. Preop sustained VT and Paroxysmal Ventricular Tachycardia   2. Coronary artery disease per angiogram 5/19/2022  3. S/p CABG x 4 vessels  3. Ischemic Cardiomyopathy   LVEF 20-30% per echo 5/19/22   LVEF now 35-40% per echo 6/1/2022  4. CHF with reduced EF. CORE clinic follow up 6/9/22 at [facility].          Secondary Diagnoses:   1. HTN  2. Anxiety  3. DMT2    PROCEDURES PERFORMED:   Date: 5/24/2022.  Surgeon: Dr. Ulises Desai  1.  Coronary artery bypass grafting x 4 (left internal mammary artery to left anterior descending artery, saphenous vein graft to distal right coronary artery, saphenous vein graft to ramus intermedius artery, saphenous vein graft to obtuse marginal artery).  2.  Endoscopic vein harvest.    OPERATIVE FINDINGS:    The patient's sternum was of mildly osteoporotic.  Pericardial space was free of any adhesions.  Aorta was grossly free of plaques.  Veins obtained were adequate for bypass grafting.  Left internal mammary artery was of adequate quality with good flow.  Vessels bypassed included the left anterior descending artery which was a 2 mm vessel with proximal stenosis, the distal right coronary artery which was 1.75 mm in diameter with proximal occlusion, and both the ramus intermedius and first obtuse marginal artery which were 2.2 mm in diameter with proximal stenosis.    INTRAOPERATIVE COMPLICATIONS:  none     PATHOLOGY RESULTS:  none     CULTURE RESULTS:  none    CONSULTS:    1. PT/OT  2. C.O.R[facility] Clinic   3. Electrophysiology  "    BRIEF HISTORY OF ILLNESS:  Marianne Monroy is a 67 year old female with a PMH significant for T2DM, HTN, GERD, JUSTUS, MDD and recent episode of lightheadedness and dizziness on 4/30 with associated palpitations, neck stiffness, bilateral jaw pain and SOB that did not resolve with rest. She had then attempted to walked up one flight of stairs and upon attempting a second flight, developed profound shortness of breath and n/v.  At this time, she had discovered her heart rate was 162 BPM on her apple watch and overall the episode lasted around 7 hours. Sought care in the Emergency Department and workup ultimately revealed possible prior inferior MI, LVH, LA enlargement. She was sent home and followed up with her PCP on 5/11, repeat EKG at that time unchanged and she was placed on a Zio patch and plans for a stress echocardiogram. Then on 5/14, she developed a recurrence of lightheadedness with associated fatigue, palpitations, and shortness of breath. Symptoms resolved with 45 minutes of rest. Her Zio patch was returned on 5/18 and her PCP was contacted due to concerns of ventricular tachycardia. She was advised to present to the emergency department for further evaluation. Upon admission, she denied chest pain, shortness of breath, palpations, dizziness, lightheadedness. She was admitted to the Cardiology service and coronary angiogram was performed 5/19 which revealed 70% stenosis of both proximal LAD and mid circumflex as well as  of RCA. Since inpatient, she has had intermittent chest heaviness and one episode of NSVT, ~15 seconds, reportedly felt \"off\" during this.   CVTS was consulted and she underwent 4 vessel coronary artery bypass grafting on 5/25/2022.    HOSPITAL COURSE:   Ms Monroy underwent the above-named procedures.  She tolerated the operation well and postoperatively was admitted to the CVICU.  She was extubated on POD # 0 to 2 lpm via NC with neuro status intact.  Her ICU stay was complicated by " "cardiogenic shock and CHF. Treatment included slow wean of inotropes with gentle diuresis. She also had some paroxysmal ventricular tachycardia which was eventually controlled with initiation of beta blockers, EP was consulted for co-management. She was transferred to the post-surgical telemetry unit on POD # 4.      She did well overall during her stay.   Chest tubes and pacing wires removed without complication. Due to recent history of sustained VT, EP was consulted closer to discharge. Her post-op echo showed her EF improved to 35-40%, but EP still recommended a Lifevest with 3 month follow up due to pre-op VT, she was approved and it was fitted prior to discharge.   She has CORE clinic follow up 6/9/22 at [facility].  Pain control was her main complaint. She was weaning off oxycodone at discharged and educated/encouraged on how to wean properly.  She was restarted on her PTA diabetes control medications and taken off corrective insulin.  *Her daughter had visited one night and then tested positive for COVID (5/31?) after visiting. Jody tested negative during her stay.     Prior to discharge, her pain was controlled well, she was able to perform most ADLs and ambulate without difficulty, and had full return of bowel and bladder function.  On June 4, 2022, she was discharged to home with a LifeVest in stable condition.    Patient discharged on aspirin:  Yes 162 mg  Patient discharged on a statin: Yes  Patient discharged on beta blocker: Yes  Patient discharged on ACE Inhibitor/ARB: Yes         Discharge Disposition:     Discharged to home            Condition on Discharge:     Discharge condition: Stable   Discharge vitals: Blood pressure 124/54, pulse 77, temperature 98.8  F (37.1  C), temperature source Oral, resp. rate 18, height 1.626 m (5' 4\"), weight 67.6 kg (149 lb), SpO2 95 %, not currently breastfeeding.     Code status on discharge: Full Code     Vitals:    06/02/22 0001 06/03/22 0523 06/04/22 0308 " "  Weight: 70.2 kg (154 lb 12.8 oz) 69.3 kg (152 lb 11.2 oz) 67.6 kg (149 lb)       DAY OF DISCHARGE PHYSICAL EXAM:    Blood pressure 124/54, pulse 77, temperature 98.8  F (37.1  C), temperature source Oral, resp. rate 18, height 1.626 m (5' 4\"), weight 67.6 kg (149 lb), SpO2 95 %, not currently breastfeeding.  Vitals:    06/02/22 0001 06/03/22 0523 06/04/22 0308   Weight: 70.2 kg (154 lb 12.8 oz) 69.3 kg (152 lb 11.2 oz) 67.6 kg (149 lb)      Weight; + 4 lbs since admit and trending down.   24 hr Fluid status; net loss 380 mL. UOP 1 L  MAPs: 73 - 99    Gen: A&Ox4, NAD  Neuro: no focal deficits   CV: RRR, normal S1 S2, no murmurs, rubs or gallops.   Pulm: CTA, no wheezing or rhonchi, normal breathing on RA  Abd: nondistended, normal BS, soft, nontender  Ext: trace peripheral edema, 1+ pitting  Incision: clean, dry, intact, no erythema, sternum stable  Tubes/drain sites: dressing clean and dry    BMP  Recent Labs   Lab 06/04/22  0727 06/04/22  0658 06/03/22  2246 06/03/22  1659 06/03/22  1212 06/03/22  0535 06/02/22  0818 06/02/22  0603 06/01/22  0733 06/01/22  0641     --   --   --   --  138  --  138  --  140   POTASSIUM 4.9  --   --   --   --  4.8  --  4.5  --  4.5   CHLORIDE 101  --   --   --   --  100  --  101  --  104   POONAM 9.2  --   --   --   --  9.3  --  8.8  --  9.1   CO2 29  --   --   --   --  32  --  32  --  30   BUN 21  --   --   --   --  20  --  21  --  22   CR 1.06*  --   --   --   --  0.63  --  0.90  --  0.83   * 148* 173* 129*   < > 138*   < > 167*   < > 100*    < > = values in this interval not displayed.     CBC  Recent Labs   Lab 06/02/22  0603 06/01/22  0641 05/31/22  0424 05/30/22  0441   WBC 8.2 7.7 5.9 5.8   RBC 2.68* 2.71* 2.80* 2.85*   HGB 7.5* 7.5* 7.9* 8.1*   HCT 24.7* 24.5* 25.9* 26.0*   MCV 92 90 93 91   MCH 28.0 27.7 28.2 28.4   MCHC 30.4* 30.6* 30.5* 31.2*   RDW 14.6 14.4 14.2 14.0    241 223 184     INR  No lab results found in last 7 days.   Hepatic Panel  No lab " results found in last 7 days.  Recent Labs   Lab 06/04/22  0727 06/04/22  0658 06/03/22  2246 06/03/22  1659 06/03/22  1212 06/03/22  0535   * 148* 173* 129* 190* 138*       ECHOCARDIOGRAM, 6/1/2022-   Interpretation Summary  Left ventricular size is normal.  The visual ejection fraction is 35-40%.  Right ventricular function, chamber size, wall motion, and thickness are normal.  The inferior vena cava is normal.  No pericardial effusion is present.    CXR 5/28/2022-   FINDINGS: Single view of the chest. Right IJ central venous catheter tip in the mid SVC. Left  arm PICC tip in the right atrium. Postoperative changes of prior median sternotomy. Sternotomy  wires are intact. Trachea is midline. Cardiomediastinal silhouette is stable. Continued dense  retrocardiac opacities. Hazy right basilar opacities. No substantial effusion. No appreciable pneumothorax.                                                              IMPRESSION:  1. Left arm PICC tip in the right atrium.  2. Stable bibasilar opacities.      DISCHARGE INSTRUCTIONS:  You had a sternotomy, avoid lifting anything greater than ten pounds for 6 weeks after surgery and then less than 20 pounds for an additional 6 weeks. Do not reach backwards or use arms to push out of chair. Do not let people pull on your arms to assist with standing. Avoid twisting or reaching too far across your body.  Avoid strenuous activities such as bowling, vacuuming, raking, shoveling, golf or tennis for 12 weeks after your surgery. It is okay to resume sex if you feel comfortable in doing so. You may have to try different positions with your partner.  Splint your chest incision by hugging a pillow or bringing your arms across your chest when coughing or sneezing. Please try to sleep on your back for the first 4-6 weeks to avoid extra stress on your sternum (breastbone) while it is healing.     No driving for 4 weeks after surgery or while on pain medication.     Shower or  wash your incisions twice daily with soap and water (or as instructed), pat dry. Keep wound clean and dry, showers are okay after discharge, but don't let spray hit directly on incision. No baths or swimming for 1 month. Cover chest tube sites with gauze until they stop draining, then leave open to air. It is not abnormal for chest tube sites to drain yellowish/clear fluid for up to 2-3 weeks after surgery.   Watch for signs of infection: increased redness, tenderness, warmth or any drainage from sternum incision.  Also a temperature > 100.5 F or chills. Call your surgeon or primary care provider's office immediately. Remove any skin glue left on incisions after 10-14 days. This will not affect your incision and can speed up healing.    Exercise is very important in your recovery. Please follow the guidelines set up for you in your cardiac rehab classes at the hospital. If outpatient cardiac rehab was ordered for you, we highly recommend you participate. If you have problems arranging your cardiac rehab, please call [phone] for all locations, with the exception of [place], please call [phone] and [place], please call [phone].    Avoid sitting for prolonged periods of time, try to walk every hour during the day. If you have a leg incision, elevate your leg often when you are not walking.    Check your weight when you get home from the hospital and continue to check it daily through your recovery for at least a month. If you notice a weight gain of 2-3 pounds in a week, notify your primary care physician, cardiologist or surgeon.    Bowel activity may be slow after surgery. If necessary, you may take an over the counter laxative such as Milk of Magnesia or Miralax. You may have stool softeners prescribed (docusate sodium, Senokot). We recommend using stool softeners while using narcotics for pain (oxycodone/percocet, hydrocodone/vicodin, hydromorphone/dilaudid).      Wean OFF of narcotics (oxycodone,  dilaudid, hydrocodone) as soon as possible. You should continue taking acetaminophen as long as you have any surgical pain as the first choice for pain control and add narcotics as necessary for pain to be tolerable.      DO NOT SMOKE.  IF YOU NEED HELP QUITTING, PLEASE TALK WITH YOUR CARDIOLOGIST OR PRIMARY DOCTOR.    REGARDING PRESCRIPTION REFILLS.  If you need a refill on your pain medication contact us to discuss your pain and a possible one time refill.   All other medications will be adjusted, discontinued and re-filled by your primary care physician and/or your cardiologist as they were prior to your surgery. We have given you enough for one to three month with possibly one refill.    POST-OPERATIVE CLINIC VISITS  You have a follow up visit with [facility] Surgery Advance Care Practitioners on 6/14/22 at 2:30 pm at [facility].  You will then return to the care of your primary provider and your cardiologist. Future medication refills should come from your PCP or Cardiologist.   You should see your primary care provider in 2-4 weeks after discharge.   It is important to see your cardiologist about 4-6 weeks after discharge.      PRE-ADMISSION MEDICATIONS:  No current facility-administered medications on file prior to encounter.  atorvastatin (LIPITOR) 80 MG tablet, Take 1 tablet (80 mg) by mouth daily +++NEED ANNUAL EXAM+++  empagliflozin (JARDIANCE) 25 MG TABS tablet, Take 1 tablet (25 mg) by mouth daily  FLUoxetine (PROZAC) 20 MG capsule, TAKE 2 CAPSULES DAILY  glipiZIDE (GLUCOTROL XL) 5 MG 24 hr tablet, TAKE 2 TABLETS DAILY (NEED ANNUAL EXAM)  LORazepam (ATIVAN) 0.5 MG tablet, Take 1 tablet (0.5 mg) by mouth every 6 hours as needed for anxiety  metFORMIN (GLUCOPHAGE-XR) 500 MG 24 hr tablet, Take 4 tablets (2,000 mg) by mouth daily (with dinner) Please see changed dose  MULTIPLE VITAMIN OR TABS, 1 TABLET DAILY  Multiple Vitamins-Minerals (PRESERVISION AREDS PO), Take 2 tablets by mouth  daily  blood glucose (NO BRAND SPECIFIED) test strip, Use to test blood sugar 2 times daily or as directed.  blood glucose (ONETOUCH VERIO IQ) test strip, Use to test blood sugar 2 times daily or as directed.  blood glucose monitoring (ONETOUCH VERIO) meter device kit, Use to test blood sugar 2 times daily or as directed.  Continuous Blood Gluc Sensor (FREESTYLE VANESSA 14 DAY SENSOR) MISC, 1 each every 14 days Change every 14 days. (Patient not taking: No sig reported)       DISCHARGE MEDICATIONS:      Review of your medicines      START taking      Dose / Directions   acetaminophen 325 MG tablet  Commonly known as: TYLENOL  Used for: S/P CABG (coronary artery bypass graft)      Dose: 650 mg  Take 2 tablets (650 mg) by mouth every 4 hours as needed for other (For optimal non-opioid multimodal pain management to improve pain control.)  Quantity: 100 tablet  Refills: 0     aspirin 81 MG chewable tablet  Commonly known as: ASA  Used for: S/P CABG (coronary artery bypass graft)  Replaces: aspirin 81 MG tablet      Dose: 162 mg  2 tablets (162 mg) by Oral or NG Tube route daily  Quantity: 120 tablet  Refills: 0     furosemide 20 MG tablet  Commonly known as: LASIX  Used for: S/P CABG (coronary artery bypass graft)      Dose: 60 mg  Take 3 tablets (60 mg) by mouth daily for 5 days  Quantity: 15 tablet  Refills: 0     metoprolol succinate ER 25 MG 24 hr tablet  Commonly known as: TOPROL XL  Used for: S/P CABG (coronary artery bypass graft)      Dose: 25 mg  Take 1 tablet (25 mg) by mouth daily  Quantity: 90 tablet  Refills: 0     oxyCODONE 5 MG tablet  Commonly known as: ROXICODONE  Used for: S/P CABG (coronary artery bypass graft)      Dose: 5 mg  Take 1 tablet (5 mg) by mouth every 6 hours as needed for pain  Quantity: 30 tablet  Refills: 0     pantoprazole 40 MG EC tablet  Commonly known as: PROTONIX  Used for: S/P CABG (coronary artery bypass graft)      Dose: 40 mg  Take 1 tablet (40 mg) by mouth every morning (before  breakfast) for 14 days  Quantity: 14 tablet  Refills: 0     senna-docusate 8.6-50 MG tablet  Commonly known as: SENOKOT-S/PERICOLACE  Used for: S/P CABG (coronary artery bypass graft)      Dose: 1 tablet  Take 1 tablet by mouth nightly as needed for constipation  Quantity: 45 tablet  Refills: 0        CONTINUE these medicines which may have CHANGED, or have new prescriptions. If we are uncertain of the size of tablets/capsules you have at home, strength may be listed as something that might have changed.      Dose / Directions   * atorvastatin 80 MG tablet  Commonly known as: LIPITOR  This may have changed: Another medication with the same name was added. Make sure you understand how and when to take each.  Used for: Hyperlipidemia LDL goal <100      Dose: 80 mg  Take 1 tablet (80 mg) by mouth daily +++NEED ANNUAL EXAM+++  Quantity: 90 tablet  Refills: 3     * atorvastatin 80 MG tablet  Commonly known as: LIPITOR  This may have changed: You were already taking a medication with the same name, and this prescription was added. Make sure you understand how and when to take each.  Used for: S/P CABG (coronary artery bypass graft)      Dose: 80 mg  Take 1 tablet (80 mg) by mouth daily  Quantity: 30 tablet  Refills: 0     lisinopril 2.5 MG tablet  Commonly known as: ZESTRIL  This may have changed:     medication strength    how much to take  Used for: S/P CABG (coronary artery bypass graft)      Dose: 2.5 mg  Take 1 tablet (2.5 mg) by mouth daily  Quantity: 60 tablet  Refills: 0         * This list has 2 medication(s) that are the same as other medications prescribed for you. Read the directions carefully, and ask your doctor or other care provider to review them with you.            CONTINUE these medicines which have NOT CHANGED      Dose / Directions   blood glucose monitoring meter device kit  Used for: Type 2 diabetes mellitus with hyperglycemia, without long-term current use of insulin (H)      Use to test blood  sugar 2 times daily or as directed.  Quantity: 1 kit  Refills: 0     * blood glucose test strip  Commonly known as: NO BRAND SPECIFIED  Used for: Diabetes mellitus, type 2 (H)      Use to test blood sugar 2 times daily or as directed.  Quantity: 200 strip  Refills: 1     * ONETOUCH VERIO IQ test strip  Used for: Type 2 diabetes mellitus with hyperglycemia, without long-term current use of insulin (H)  Generic drug: blood glucose      Use to test blood sugar 2 times daily or as directed.  Quantity: 200 strip  Refills: 1     empagliflozin 25 MG Tabs tablet  Commonly known as: Jardiance  Used for: Type 2 diabetes mellitus with diabetic neuropathy, without long-term current use of insulin (H)      Dose: 25 mg  Take 1 tablet (25 mg) by mouth daily  Quantity: 30 tablet  Refills: 4     FLUoxetine 20 MG capsule  Commonly known as: PROzac  Used for: Major depressive disorder, recurrent episode, mild (H)      TAKE 2 CAPSULES DAILY  Quantity: 180 capsule  Refills: 1     FreeStyle Hebrert 14 Day Sensor Misc  Used for: Type 2 diabetes mellitus with hyperglycemia, without long-term current use of insulin (H)      Dose: 1 each  1 each every 14 days Change every 14 days.  Quantity: 2 each  Refills: 5     glipiZIDE 5 MG 24 hr tablet  Commonly known as: GLUCOTROL XL      TAKE 2 TABLETS DAILY (NEED ANNUAL EXAM)  Quantity: 180 tablet  Refills: 1     LORazepam 0.5 MG tablet  Commonly known as: ATIVAN  Used for: Flying phobia      Dose: 0.5 mg  Take 1 tablet (0.5 mg) by mouth every 6 hours as needed for anxiety  Quantity: 6 tablet  Refills: 0     metFORMIN 500 MG 24 hr tablet  Commonly known as: GLUCOPHAGE XR      Dose: 2,000 mg  Take 4 tablets (2,000 mg) by mouth daily (with dinner) Please see changed dose  Quantity: 360 tablet  Refills: 1     Multiple vitamin Tabs      1 TABLET DAILY  Refills: 0     PRESERVISION AREDS PO      Dose: 2 tablet  Take 2 tablets by mouth daily  Refills: 0         * This list has 2 medication(s) that are the  same as other medications prescribed for you. Read the directions carefully, and ask your doctor or other care provider to review them with you.            STOP taking    aspirin 81 MG tablet  Commonly known as: ASA  Replaced by: aspirin 81 MG chewable tablet              Where to get your medicines      These medications were sent to [facility] - [place], [place] - [address] [address]    Phone: [phone]     acetaminophen 325 MG tablet    aspirin 81 MG chewable tablet    atorvastatin 80 MG tablet    furosemide 20 MG tablet    lisinopril 2.5 MG tablet    metoprolol succinate ER 25 MG 24 hr tablet    oxyCODONE 5 MG tablet    pantoprazole 40 MG EC tablet    senna-docusate 8.6-50 MG tablet       CC:Roxanna Miller      [facility]   Cardiothoracic Surgery  Office phone: [phone]  Office fax: [phone]

## 2022-06-02 NOTE — PROGRESS NOTES
Cardiovascular Surgery Progress Note  06/02/2022         Assessment and Plan:     Marianne Monroy is a 67 year old female with PMH of HTN, HLD, T2DM, GERD, MDD, JUSTUS, acute heart failure due to ischemic cardiomyopathy, presented with NSVT on Ziopatch, found to have multivessel CAD now s/p CABG x4 on 5/24/22 with Dr. Desai.     Cardiovascular:   Multivessel coronary artery disease s/p CABG x 4 on 5/24/22 with Dr. Desai: CHU to LAD, SVG (harvest form both thighs) to distal RCA, OM, ramus.   Bigeminy PVCs with mild hypotension  Acute HFrEF, ischemic CMP  Hx NSVT  Recent echo on 5/19, LVEF 20-30%  Post-op LVEF 35%  5/26 Echo --> LVEF 35 - 40 %, improved compared to prior. RV EF reduced   5/31 Postoperative echo demonstrated LVEF 35-40%, mildly reduced RV function, no pericardial effusion  HD stable overnight, in NSR  - Continue Atorvastatin 80 mg daily  - Continue  mg daily  - Continue Metoprolol 12.5 mg BID  - Lisinopril 2.5 mg daily  - EP consulted for Bigeminy while in ICU, recommended BB and will f/u for consideration of lifevest  - Repeat EKG 5/31: QTc 480 ms     Chest tubes: removed 5/27  TPW: removed 5/27     Pulmonary:  Extubated 5/25. Now saturating well on RA.   - Supplemental O2 PRN to keep sats > 92%. Wean off as tolerated.  - Pulm hygiene, IS, activity and deep breathing     Neurology / MSK:  Acute post-operative pain  Hx MDD and JUSTUS  Well controlled with current regimen:  - Acetaminophen, PO oxycodone PRN, IV dilaudid PRN, methocarbamol  - B/L ES catheters removed 5/28  - Resumed PTA fluoxetine and hydroxyzine      / Renal / Fluid / Electrolytes:  BL creat ~ 0.82, most recent creatinine stable; adequate UOP.   FLUID STATUS: Pre-op weight 148 lbs, weight today 157 lbs; I/O past 24 hours: -1.7 L  - Continue Lasix 60 mg po daily  - Replete lytes per protocol  - Strict I/O, daily weights  - Replete lytes per protocol     GI / Nutrition:   History of GERD  - Tolerating regular diet  - Continue bowel  regimen, last BM 5/30  - PPI     Endocrine:  Stress induced hyperglycemia   DMII  Hgb A1c 7.8%  - Initially managed on insulin drip postop, transitioned to lantus and high resistance sliding scale; goal BG <180  - PTA metformin resumed 5/31  - Resumed PTA Jardiance and glipizide on 5/31, discontinued lantus     Infectious Disease:  Stress induced leukocytosis  WBC 7.7, remains afebrile, no signs or symptoms of infection  - Completed perioperative antibiotics  - Continue to monitor fever curve, CBC     Hematology:   Acute blood loss anemia and thrombocytopenia  Hgb 7.5; Plt 241, no signs or symptoms of active bleeding  - CBC in AM  - B/L Venous duplex of lower extremity negative for DVT on 5/26     Anticoagulation:   - ASA only     MSK/Skin:  Sternotomy  Surgical Incision  - Wound vac removed 5/31  - Sternal precautions  - Postoperative incision management per protocol  - PT/OT/CR     Prophylaxis:   - Stress ulcer prophylaxis: Pantoprazole 40 mg daily  - DVT prophylaxis: Subcutaneous heparin, SCD     Disposition:   - Transferred to  on 5/29  - Therapies recommending discharge to home with OP cardiac rehabilitation. Will go home with son with 24 hour care.  - EP consulted for consideration of [facility] LifeVest    Discussed with Surgeon, Dr. Desai via written and verbal commnication.     Alo Costello PA-c  Pager: [phone]  11:29 AM June 2, 2022           Interval History:     No overnight events. Visit with patient was performed while walking the halls. Patient was conversing without difficulty and walking with Walker. She demonstrated walking and conversation without difficulty  States pain is well managed on current regimen. Slept well overnight.  Tolerating diet, is passing flatus, BM x 1. No nausea or vomiting.  Breathing well without complaints.   Working with therapies and ambulating in halls with assistance.  Denies chest pain, palpitations, dizziness, syncopal symptoms, fevers, chills, myalgias, or sternal  "popping/clicking.         Physical Exam:   Blood pressure 117/47, pulse 87, temperature 98.6  F (37  C), temperature source Oral, resp. rate 18, height 1.626 m (5' 4\"), weight 70.2 kg (154 lb 12.8 oz), SpO2 94 %, not currently breastfeeding.  Vitals:    05/30/22 0636 06/01/22 0330 06/02/22 0001   Weight: 72.4 kg (159 lb 9.6 oz) 71.3 kg (157 lb 3.2 oz) 70.2 kg (154 lb 12.8 oz)      Current Weight: 70.2 Kg Trend: -1.1 Kg  Admit weight: 67.1 Kg    Daily Fluid status; net loss: -2540  mL    Net loss SA: -2331 mL  MAPs: 60-85  LVEF: 35-40%    Gen: A&Ox4, NAD  Neuro: no focal deficits   CV: RRR, normal S1 S2, no murmurs, rubs or gallops. No appreciable JVD   Vascular: Peripheral pulses difficult to assess due to lower extremity swelling.   Pulm: CTA,no wheezing or rhonchi, normal breathing on RA  Abd: nondistended, normal BS, soft, nontender  Ext: positive peripheral edema, 1+ pitting. Warm and soft.   Incision: clean, dry, intact=, no erythema, sternum stable  Tubes/drain sites: dressing clean and dry.          Data:    Imaging:  reviewed recent imaging    Chest x-ray      Echocardiogram 6/1/22  Interpretation Summary  Left ventricular size is normal.  The visual ejection fraction is 35-40%.  Right ventricular function, chamber size, wall motion, and thickness are  normal.  The inferior vena cava is normal.  No pericardial effusion is present.      CT scan    Labs:  CBC  Recent Labs   Lab 06/02/22  0603 06/01/22  0641 05/31/22  0424 05/30/22  0441   WBC 8.2 7.7 5.9 5.8   RBC 2.68* 2.71* 2.80* 2.85*   HGB 7.5* 7.5* 7.9* 8.1*   HCT 24.7* 24.5* 25.9* 26.0*   MCV 92 90 93 91   MCH 28.0 27.7 28.2 28.4   MCHC 30.4* 30.6* 30.5* 31.2*   RDW 14.6 14.4 14.2 14.0    241 223 184     CMP:  Last Comprehensive Metabolic Panel:  Sodium   Date Value Ref Range Status   06/02/2022 138 133 - 144 mmol/L Final   01/07/2021 141 133 - 144 mmol/L Final     Potassium   Date Value Ref Range Status   06/02/2022 4.5 3.4 - 5.3 mmol/L Final "   01/07/2021 4.8 3.4 - 5.3 mmol/L Final     Chloride   Date Value Ref Range Status   06/02/2022 101 94 - 109 mmol/L Final   01/07/2021 106 94 - 109 mmol/L Final     Carbon Dioxide   Date Value Ref Range Status   01/07/2021 28 20 - 32 mmol/L Final     Carbon Dioxide (CO2)   Date Value Ref Range Status   06/02/2022 32 20 - 32 mmol/L Final     Anion Gap   Date Value Ref Range Status   06/02/2022 5 3 - 14 mmol/L Final   01/07/2021 7 3 - 14 mmol/L Final     Glucose   Date Value Ref Range Status   06/02/2022 167 (H) 70 - 99 mg/dL Final   01/07/2021 113 (H) 70 - 99 mg/dL Final     GLUCOSE BY METER POCT   Date Value Ref Range Status   06/02/2022 112 (H) 70 - 99 mg/dL Final     Urea Nitrogen   Date Value Ref Range Status   06/02/2022 21 7 - 30 mg/dL Final   01/07/2021 19 7 - 30 mg/dL Final     Creatinine   Date Value Ref Range Status   06/02/2022 0.90 0.52 - 1.04 mg/dL Final   01/07/2021 0.90 0.52 - 1.04 mg/dL Final     GFR Estimate   Date Value Ref Range Status   06/02/2022 69 >60 mL/min/1.73m2 Final     Comment:     Effective December 21, 2021 eGFRcr in adults is calculated using the 2021 CKD-EPI creatinine equation which includes age and gender (Laverne gallardo al., NEJ, DOI: 10.1056/QXAMrv1886275)   01/07/2021 66 >60 mL/min/[1.73_m2] Final     Comment:     Non  GFR Calc  Starting 12/18/2018, serum creatinine based estimated GFR (eGFR) will be   calculated using the Chronic Kidney Disease Epidemiology Collaboration   (CKD-EPI) equation.       Calcium   Date Value Ref Range Status   06/02/2022 8.8 8.5 - 10.1 mg/dL Final   01/07/2021 9.5 8.5 - 10.1 mg/dL Final     Bilirubin Total   Date Value Ref Range Status   05/24/2022 0.2 0.2 - 1.3 mg/dL Final   09/09/2020 0.4 0.2 - 1.3 mg/dL Final     Alkaline Phosphatase   Date Value Ref Range Status   05/24/2022 53 40 - 150 U/L Final   09/09/2020 90 40 - 150 U/L Final     ALT   Date Value Ref Range Status   05/24/2022 24 0 - 50 U/L Final   09/09/2020 33 0 - 50 U/L Final      AST   Date Value Ref Range Status   05/24/2022 34 0 - 45 U/L Final   09/09/2020 14 0 - 45 U/L Final     BMP  Recent Labs   Lab 06/02/22 0818 06/02/22  0603 06/01/22 2313 06/01/22 2149 06/01/22  0733 06/01/22  0641 05/31/22  0652 05/31/22  0424 05/30/22  0729 05/30/22  0441   NA  --  138  --   --   --  140  --  139  --  142   POTASSIUM  --  4.5  --   --   --  4.5  --  4.3  --  3.7   CHLORIDE  --  101  --   --   --  104  --  104  --  106   POONAM  --  8.8  --   --   --  9.1  --  8.6  --  8.5   CO2  --  32  --   --   --  30  --  31  --  31   BUN  --  21  --   --   --  22  --  22  --  18   CR  --  0.90  --   --   --  0.83  --  0.82  --  0.64   * 167* 96 123*   < > 100*   < > 141*   < > 147*    < > = values in this interval not displayed.     INR  No lab results found in last 7 days.   Hepatic Panel  No lab results found in last 7 days.  GLUCOSE:   Recent Labs   Lab 06/02/22 0818 06/02/22  0603 06/01/22  2313 06/01/22  2149 06/01/22  1726 06/01/22  1209   * 167* 96 123* 155* 187*

## 2022-06-02 NOTE — PROGRESS NOTES
Care Management Follow Up    Length of Stay (days): 15    Expected Discharge Date:  6/3/2022     Concerns to be Addressed:     Disposition planning  Patient plan of care discussed at interdisciplinary rounds: Yes    Anticipated Discharge Disposition: Home with home care (home OT and Physical Therapy are recommended)     Anticipated Discharge Services:  Home with skilled home care  Anticipated Discharge DME:  Not applicable at this time    Patient/family educated on Medicare website which has current facility and service quality ratings:  Rehab placement is no longer being pursued  Education Provided on the Discharge Plan: yes   Patient/Family in Agreement with the Plan: yes    Referrals Placed by CM/SW:  No additional rehab facility referrals made  Private pay costs discussed:   Not applicable at this time    Additional Information:  Physical Therapy changed their recommendation to home with home physical therapy.  KEIRY spoke with OT (Cody) who states that he is also changing his recommendation to home with home OT.  KEIRY updated CV[facility] DANIELLE Pineda who indicates that pt will be ready for discharge tomorrow (6/3/2022).  KEIRY updated 6C RN CC (Azul Alonzo) who will follow up with pt in regards to arranging for skilled home care visits.  No further  SW intervention planned at this time as discharge to home is anticipated.    SW  Available should add'l needs arise.    LAMIN Johnson  Social Work, 6A  Phone:  [phone]  Pager:  [phone]  6/2/2022          MAYA Perez

## 2022-06-02 NOTE — PLAN OF CARE
"Hours of Care:  1900 - 0700    Temp:  [97.4  F (36.3  C)-98.8  F (37.1  C)] 98  F (36.7  C)  Pulse:  [71-82] 74  Resp:  [16] 16  BP: ()/(49-65) 115/64  SpO2:  [93 %-95 %] 93 %     D: 67 year old female with PMH of HTN, HLD, T2DM, GERD, MDD, JUSTUS, acute heart failure due to ischemic cardiomyopathy, presented with NSVT on Ziopatch, found to have multivessel CAD now s/p CABG x4 on 5/24/22 with Dr. Desai.    I: Monitored vitals and assessed pt status.     PRN: Tylenol & oxycodone x2  Tele: Sinus Rhythm  O2: Room air  Mobility: SBA FWW    A: Neuro: A/O x4.  Call light appropriate.  Able to make needs known.  Respiratory:  On room air.  Lung sounds clear.  No report of shortness of breath at rest.  Cardiac: VSS.  Heparin ordered q 8.  Patient accepted 1/2 administrations this shift.  \"I'm trying to get myself off of this\".  Discussed need for anticoagulation while less ambulatory post surgery to prevent blood clots.  Accepted 2000 administration.  Refused 0400 administration.  No s/s of bleeding.  GI: Last BM 6/1  : Urinating adequate amounts of clear, yellow urine in bedside commode.  Skin:  See PCS for assessment and treatment of wounds and surgical incisions.  LDA:  Triple lumen PICC LUE  Electrolytes: RN managed Potassium, magnesium and phosphorus.  Labs pending.  Pain: Reporting 4-6/10 pain level that is mostly in her legs.  Requesting to be woken for all PRN oxycodone and tylenol.      P: Continue to monitor Pt status and report changes to CVTS.    Intake/Output Summary (Last 24 hours) at 6/2/2022 0617  Last data filed at 6/2/2022 0600  Gross per 24 hour   Intake 140 ml   Output 2600 ml   Net -2460 ml        "

## 2022-06-02 NOTE — DISCHARGE INSTRUCTIONS
________________________________________________________  Discharge RN please fax discharge orders to home care agency:  [facility]  Ph: [phone]  Fax; [phone]  ________________________________________________________           ZOLL Lifevest  Provided by [facility]  24/7 support Ph: [phone]    Ms Monroy, it is your responsibility to send the Lifevest back to [facility] when your therapy is completed.        AFTER YOU GO HOME FROM YOUR HEART SURGERY  (4 vessel coronary artery bypass with Dr Ulises Desai 5/24/22)    You had a sternotomy, avoid lifting anything greater than ten pounds for 6 weeks after surgery and then less than 20 pounds for an additional 6 weeks. Do not reach backwards or use arms to push out of chair. Do not let people pull on your arms to assist with standing. Avoid twisting or reaching too far across your body.  Avoid strenuous activities such as bowling, vacuuming, raking, shoveling, golf or tennis for 12 weeks after your surgery. It is okay to resume sex if you feel comfortable in doing so. You may have to try different positions with your partner.  Splint your chest incision by hugging a pillow or bringing your arms across your chest when coughing or sneezing. Please try to sleep on your back for the first 4-6 weeks to avoid extra stress on your sternum (breastbone) while it is healing.     No driving for 4 weeks after surgery or while on pain medication.     Shower or wash your incisions twice daily with soap and water (or as instructed), pat dry. Keep wound clean and dry, showers are okay after discharge, but don't let spray hit directly on incision. No baths or swimming for 1 month. Cover chest tube sites with gauze until they stop draining, then leave open to air. It is not abnormal for chest tube sites to drain yellowish/clear fluid for up to 2-3 weeks after surgery.   Watch for signs of infection: increased redness, tenderness, warmth or any drainage from sternum incision.   Also a temperature > 100.5 F or chills. Call your surgeon or primary care provider's office immediately. Remove any skin glue left on incisions after 10-14 days. This will not affect your incision and can speed up healing.    Exercise is very important in your recovery. Please follow the guidelines set up for you in your cardiac rehab classes at the hospital. If outpatient cardiac rehab was ordered for you, we highly recommend you participate. If you have problems arranging your cardiac rehab, please call [phone] for all locations, with the exception of [place], please call [phone] and [facility], please call [phone].    Avoid sitting for prolonged periods of time, try to walk every hour during the day. If you have a leg incision, elevate your leg often when you are not walking.    Check your weight when you get home from the hospital and continue to check it daily through your recovery for at least a month. If you notice a weight gain of 2-3 pounds in a week, notify your primary care physician, cardiologist or surgeon.    Bowel activity may be slow after surgery. If necessary, you may take an over the counter laxative such as Milk of Magnesia or Miralax. You may have stool softeners prescribed (docusate sodium, Senokot). We recommend using stool softeners while using narcotics for pain (oxycodone/percocet, hydrocodone/vicodin, hydromorphone/dilaudid).      Wean OFF of narcotics (oxycodone, dilaudid, hydrocodone) as soon as possible. You should continue taking acetaminophen as long as you have any surgical pain as the first choice for pain control and add narcotics as necessary for pain to be tolerable.      DO NOT SMOKE.  IF YOU NEED HELP QUITTING, PLEASE TALK WITH YOUR CARDIOLOGIST OR PRIMARY DOCTOR.    REGARDING PRESCRIPTION REFILLS.  If you need a refill on your pain medication contact us to discuss your pain and a possible one time refill.   All other medications will be adjusted, discontinued  and re-filled by your primary care physician and/or your cardiologist as they were prior to your surgery. We have given you enough for one to three month with possibly one refill.    POST-OPERATIVE CLINIC VISITS  You have a follow up visit with [facility] Surgery Advance Care Practitioners on 6/14/22 at 2:30 pm at the [facility].  You will then return to the care of your primary provider and your cardiologist. Future medication refills should come from your PCP or Cardiologist.   Appointment made in [facility] clinic on 6/9/22 at 1:30 at [facility].  You should see your primary care provider in 2-4 weeks after discharge.   It is important to see your cardiologist about 4-6 weeks after discharge.    If you do not hear from a  in 7 days, please call [phone] (choose option 1) and request to be seen with a general cardiologist or someone that you have seen in the past.   If there is a need to return to see CT Surgery, please call our  Umm Taylor at [phone].     [facility] Lifevest  Provided by [facility]  24/7 support Ph: [phone]  Ms Monroy, it is your responsibility to send the Lifevest back to [facility] when your therapy is completed.    SURGICAL QUESTIONS  Please call Yuan Munoz or Jannet Perdue with surgical recovery and medication questions, their phone numbers are listed below.  They will assist you with your needs and contact other surgery care team members as indicated.    On weekends or after hours, please call [phone] and ask the  to page the Cardiothoracic Surgery fellow on call.      Thank you,    Your Cardiothoracic Surgery Team  Yuan Munoz RN Care Coordinator-  [phone]   Jannet Perdue RN Care Coordinator- [phone]

## 2022-06-03 ENCOUNTER — APPOINTMENT (OUTPATIENT)
Dept: OCCUPATIONAL THERAPY | Facility: CLINIC | Age: 67
DRG: 233 | End: 2022-06-03
Payer: COMMERCIAL

## 2022-06-03 LAB
ANION GAP SERPL CALCULATED.3IONS-SCNC: 6 MMOL/L (ref 3–14)
BUN SERPL-MCNC: 20 MG/DL (ref 7–30)
CALCIUM SERPL-MCNC: 9.3 MG/DL (ref 8.5–10.1)
CHLORIDE BLD-SCNC: 100 MMOL/L (ref 94–109)
CO2 SERPL-SCNC: 32 MMOL/L (ref 20–32)
CREAT SERPL-MCNC: 0.63 MG/DL (ref 0.52–1.04)
GFR SERPL CREATININE-BSD FRML MDRD: >90 ML/MIN/1.73M2
GLUCOSE BLD-MCNC: 138 MG/DL (ref 70–99)
GLUCOSE BLDC GLUCOMTR-MCNC: 129 MG/DL (ref 70–99)
GLUCOSE BLDC GLUCOMTR-MCNC: 173 MG/DL (ref 70–99)
GLUCOSE BLDC GLUCOMTR-MCNC: 190 MG/DL (ref 70–99)
HOLD SPECIMEN: NORMAL
MAGNESIUM SERPL-MCNC: 2.1 MG/DL (ref 1.6–2.3)
PHOSPHATE SERPL-MCNC: 4.1 MG/DL (ref 2.5–4.5)
POTASSIUM BLD-SCNC: 4.8 MMOL/L (ref 3.4–5.3)
SODIUM SERPL-SCNC: 138 MMOL/L (ref 133–144)

## 2022-06-03 PROCEDURE — 250N000011 HC RX IP 250 OP 636

## 2022-06-03 PROCEDURE — 250N000013 HC RX MED GY IP 250 OP 250 PS 637

## 2022-06-03 PROCEDURE — 250N000013 HC RX MED GY IP 250 OP 250 PS 637: Performed by: STUDENT IN AN ORGANIZED HEALTH CARE EDUCATION/TRAINING PROGRAM

## 2022-06-03 PROCEDURE — 250N000013 HC RX MED GY IP 250 OP 250 PS 637: Performed by: SURGERY

## 2022-06-03 PROCEDURE — 250N000013 HC RX MED GY IP 250 OP 250 PS 637: Performed by: PHYSICIAN ASSISTANT

## 2022-06-03 PROCEDURE — 80048 BASIC METABOLIC PNL TOTAL CA: CPT

## 2022-06-03 PROCEDURE — 36592 COLLECT BLOOD FROM PICC: CPT

## 2022-06-03 PROCEDURE — 97535 SELF CARE MNGMENT TRAINING: CPT | Mod: GO | Performed by: OCCUPATIONAL THERAPIST

## 2022-06-03 PROCEDURE — 84100 ASSAY OF PHOSPHORUS: CPT | Performed by: STUDENT IN AN ORGANIZED HEALTH CARE EDUCATION/TRAINING PROGRAM

## 2022-06-03 PROCEDURE — 250N000013 HC RX MED GY IP 250 OP 250 PS 637: Performed by: NURSE PRACTITIONER

## 2022-06-03 PROCEDURE — 214N000001 HC R&B CCU UMMC

## 2022-06-03 PROCEDURE — 83735 ASSAY OF MAGNESIUM: CPT | Performed by: NURSE PRACTITIONER

## 2022-06-03 RX ORDER — OXYCODONE HYDROCHLORIDE 5 MG/1
5 TABLET ORAL EVERY 6 HOURS PRN
Qty: 30 TABLET | Refills: 0 | Status: SHIPPED | OUTPATIENT
Start: 2022-06-03 | End: 2022-07-06

## 2022-06-03 RX ORDER — METOPROLOL SUCCINATE 25 MG/1
25 TABLET, EXTENDED RELEASE ORAL DAILY
Qty: 90 TABLET | Refills: 0 | Status: SHIPPED | OUTPATIENT
Start: 2022-06-04 | End: 2022-06-30

## 2022-06-03 RX ORDER — POLYETHYLENE GLYCOL 3350 17 G/17G
17 POWDER, FOR SOLUTION ORAL DAILY PRN
Status: DISCONTINUED | OUTPATIENT
Start: 2022-06-03 | End: 2022-06-04 | Stop reason: HOSPADM

## 2022-06-03 RX ORDER — METOPROLOL SUCCINATE 25 MG/1
25 TABLET, EXTENDED RELEASE ORAL DAILY
Status: DISCONTINUED | OUTPATIENT
Start: 2022-06-03 | End: 2022-06-04 | Stop reason: HOSPADM

## 2022-06-03 RX ORDER — ASPIRIN 81 MG/1
162 TABLET, CHEWABLE ORAL DAILY
Qty: 120 TABLET | Refills: 0 | Status: SHIPPED | OUTPATIENT
Start: 2022-06-04 | End: 2022-11-14

## 2022-06-03 RX ORDER — AMOXICILLIN 250 MG
1 CAPSULE ORAL
Qty: 45 TABLET | Refills: 0 | Status: SHIPPED | OUTPATIENT
Start: 2022-06-03 | End: 2022-06-14

## 2022-06-03 RX ORDER — ATORVASTATIN CALCIUM 80 MG/1
80 TABLET, FILM COATED ORAL DAILY
Qty: 30 TABLET | Refills: 0 | Status: SHIPPED | OUTPATIENT
Start: 2022-06-04 | End: 2022-08-15

## 2022-06-03 RX ORDER — AMOXICILLIN 250 MG
1 CAPSULE ORAL
Status: DISCONTINUED | OUTPATIENT
Start: 2022-06-03 | End: 2022-06-04 | Stop reason: HOSPADM

## 2022-06-03 RX ORDER — PANTOPRAZOLE SODIUM 40 MG/1
40 TABLET, DELAYED RELEASE ORAL
Qty: 14 TABLET | Refills: 0 | Status: SHIPPED | OUTPATIENT
Start: 2022-06-04 | End: 2022-06-14

## 2022-06-03 RX ORDER — FUROSEMIDE 20 MG
60 TABLET ORAL DAILY
Qty: 15 TABLET | Refills: 0 | Status: SHIPPED | OUTPATIENT
Start: 2022-06-04 | End: 2022-06-14

## 2022-06-03 RX ORDER — ACETAMINOPHEN 325 MG/1
650 TABLET ORAL EVERY 4 HOURS PRN
Qty: 100 TABLET | Refills: 0 | Status: SHIPPED | OUTPATIENT
Start: 2022-06-03 | End: 2022-11-14

## 2022-06-03 RX ORDER — LISINOPRIL 2.5 MG/1
2.5 TABLET ORAL DAILY
Qty: 60 TABLET | Refills: 0 | Status: SHIPPED | OUTPATIENT
Start: 2022-06-04 | End: 2022-06-24

## 2022-06-03 RX ADMIN — METHOCARBAMOL 500 MG: 500 TABLET ORAL at 16:08

## 2022-06-03 RX ADMIN — OXYCODONE HYDROCHLORIDE 10 MG: 10 TABLET ORAL at 18:56

## 2022-06-03 RX ADMIN — ACETAMINOPHEN 650 MG: 325 TABLET, FILM COATED ORAL at 05:23

## 2022-06-03 RX ADMIN — LISINOPRIL 2.5 MG: 2.5 TABLET ORAL at 09:05

## 2022-06-03 RX ADMIN — GLIPIZIDE 10 MG: 10 TABLET, FILM COATED, EXTENDED RELEASE ORAL at 09:05

## 2022-06-03 RX ADMIN — EMPAGLIFLOZIN 25 MG: 25 TABLET, FILM COATED ORAL at 09:08

## 2022-06-03 RX ADMIN — HEPARIN SODIUM 5000 UNITS: 5000 INJECTION, SOLUTION INTRAVENOUS; SUBCUTANEOUS at 20:35

## 2022-06-03 RX ADMIN — ACETAMINOPHEN 650 MG: 325 TABLET, FILM COATED ORAL at 17:42

## 2022-06-03 RX ADMIN — HYDROXYZINE HYDROCHLORIDE 20 MG: 10 TABLET ORAL at 16:09

## 2022-06-03 RX ADMIN — OXYCODONE HYDROCHLORIDE 5 MG: 5 TABLET ORAL at 22:50

## 2022-06-03 RX ADMIN — HYDROXYZINE HYDROCHLORIDE 20 MG: 10 TABLET ORAL at 11:04

## 2022-06-03 RX ADMIN — OXYCODONE HYDROCHLORIDE 5 MG: 5 TABLET ORAL at 05:23

## 2022-06-03 RX ADMIN — METOPROLOL SUCCINATE 25 MG: 25 TABLET, EXTENDED RELEASE ORAL at 09:16

## 2022-06-03 RX ADMIN — OXYCODONE HYDROCHLORIDE 5 MG: 5 TABLET ORAL at 01:15

## 2022-06-03 RX ADMIN — METFORMIN ER 500 MG 2000 MG: 500 TABLET ORAL at 16:08

## 2022-06-03 RX ADMIN — Medication 1 TABLET: at 09:08

## 2022-06-03 RX ADMIN — FLUOXETINE HYDROCHLORIDE 40 MG: 40 CAPSULE ORAL at 09:08

## 2022-06-03 RX ADMIN — METHOCARBAMOL 500 MG: 500 TABLET ORAL at 09:16

## 2022-06-03 RX ADMIN — ACETAMINOPHEN 650 MG: 325 TABLET, FILM COATED ORAL at 13:24

## 2022-06-03 RX ADMIN — ATORVASTATIN CALCIUM 80 MG: 80 TABLET, FILM COATED ORAL at 09:05

## 2022-06-03 RX ADMIN — OXYCODONE HYDROCHLORIDE 5 MG: 5 TABLET ORAL at 10:55

## 2022-06-03 RX ADMIN — ACETAMINOPHEN 650 MG: 325 TABLET, FILM COATED ORAL at 09:16

## 2022-06-03 RX ADMIN — FUROSEMIDE 60 MG: 40 TABLET ORAL at 09:04

## 2022-06-03 RX ADMIN — ASPIRIN 81 MG 162 MG: 81 TABLET ORAL at 09:03

## 2022-06-03 RX ADMIN — ACETAMINOPHEN 650 MG: 325 TABLET, FILM COATED ORAL at 01:15

## 2022-06-03 ASSESSMENT — ACTIVITIES OF DAILY LIVING (ADL)
ADLS_ACUITY_SCORE: 34
ADLS_ACUITY_SCORE: 31
ADLS_ACUITY_SCORE: 34
ADLS_ACUITY_SCORE: 34
ADLS_ACUITY_SCORE: 31
ADLS_ACUITY_SCORE: 34
ADLS_ACUITY_SCORE: 31
ADLS_ACUITY_SCORE: 34
ADLS_ACUITY_SCORE: 31
ADLS_ACUITY_SCORE: 34

## 2022-06-03 NOTE — PLAN OF CARE
Hours of Care:  1900 - 0700    Temp:  [98.3  F (36.8  C)-98.7  F (37.1  C)] 98.4  F (36.9  C)  Pulse:  [68-87] 72  Resp:  [16-18] 16  BP: (104-126)/(47-64) 112/59  SpO2:  [92 %-97 %] 93 %     D: 67 year old female with PMH of HTN, HLD, T2DM, GERD, MDD, JUSTUS, acute heart failure due to ischemic cardiomyopathy, presented with NSVT on Ziopatch, found to have multivessel CAD now s/p CABG x4 on 5/24/22 with Dr. Desai.     I: Monitored vitals and assessed pt status.      PRN: Tylenol & oxycodone x3  Tele: Sinus Rhythm  O2: Room air  Mobility: SBA FWW     A: Neuro: A/O x4.  Call light appropriate.  Able to make needs known.  Respiratory:  On room air.  Lung sounds clear.  No report of shortness of breath at rest.  Cardiac: VSS.  Heparin ordered q 8.  Patient accepted 1/2 administrations this shift.  Accepted 2000 administration.  Refused 0400 administration.  No s/s of bleeding.  GI: Last BM 6/3  : Urinating adequate amounts of clear, yellow urine in bedside commode.  Skin:  See PCS for assessment and treatment of wounds and surgical incisions.  LDA:  Triple lumen PICC LUE  Electrolytes: RN managed Potassium, magnesium and phosphorus.  Labs pending.  Pain: Reporting 4/10 pain level that is mostly in her legs.  Requesting to be woken for all PRN oxycodone and tylenol.       P: Continue to monitor Pt status and report changes to CVTS.  Plan to discharge to son's home with LifeVest and skilled home care.       Intake/Output Summary (Last 24 hours) at 6/3/2022 0451  Last data filed at 6/3/2022 0400  Gross per 24 hour   Intake 790 ml   Output 1600 ml   Net -810 ml

## 2022-06-03 NOTE — PLAN OF CARE
D: S/p CABG x4 on 5/24.    I: Monitored vitals and assessed pt status.   PRN: Tylenol, Oxycodone, Atarax, Robaxin  Tele: Sinus rhythm, HR 70-80's  Mobility: Up ind to commode, SBA    A: AOx4. VSS. Afebrile. Urinating adequately. Pain well controlled with current regimen. Refused afternoon dose of heparin.     P: Continue to monitor Pt status and report changes to CVTS. Plan to discharge home tomorrow with Life vest.

## 2022-06-03 NOTE — PROGRESS NOTES
Care Coordinator  D/I: 4pm QUENTIN rep: Zakia S called me and her home Lifevest HAS BEEN APPROVED. Zakia is searching for an RN to come to 6c to fit/teach pt this evening but it most likely will not happen until tomorrow morning(for sure).  I have informed Blaise NEAL--pt will stay until Lifevest is on her body.  I informed pt that I tried 6 HH agencies and [facility] can't begin until middle of next week for HHOT/OT--Blaise is aware. I have fax'd her info to them this evening.  A: Pt can discharge when Lifevest has been fitted and taught(most likely 6/4/22  P: I have informed bedside RN Jenni. Pt signed the Discharge medicare IMM letter. Se informed me that she is staying until tomorrow morning--MD aware.

## 2022-06-03 NOTE — CONSULTS
Electrophysiology Consultation Note   EP Attending: .   Reason for consultation: consideration for LifeVest.   Provider requesting consultation: Mr. Costello TEVIN CVTS Service.  Date of Service: 6/2/2022      HPI:   Ms. Monroy is a 67 year old female who has a past medical history significant for HTN, HLD, DM2, GERD, MDD, JUSTUS who was found to have on 430/22 around noon she started having sudden onset of increased lightheadedness, racing heart, and shortness of breath that persisted despite taking in sugar (patient initially attributed to hypoglycemia) and sitting/lying down.  She reports that her apple watch read  bpm.  Episode lasted for about 7 hours leading her to go into the ER but her symptoms spontaneously had resolved.  During ER evaluation D-dimer was negative, ECG and delta 2-hour troponin were significant for possible prior inferior MI, LVH, LA enlargement, and CXR normal.  She saw her ECG who placed her on a cardiac monitor and planned for a stress echo.  Zio patch monitor showed that she was having episodes of sustained VT episodes about 21 minutes and she was told to present to the ER.  Coronary angiogram revealed severe multivessel disease and an echo showed severely reduced LVEF 20-30% with moderate to severe diffuse hypokinesis in the RCA territory.  She was started on GDMT and optimized and referred to CVTS for surgical intervention.  She underwent CABG x4 (LIMA-LAD, SVG- distal RCA, SVG-OM, SVG-ramus) on 5/24/2022.  Postoperatively, while in ICU, she had bigeminal PVCs with mild hypotension.  Most recent echo on 5/31/2022 showed LVEF improved to 35-40%, mildly reduced RV dysfunction, and no pericardial effusion.  She has been making a good recovery.  VSS.  Hgb 7.5 with renal function and electrolytes stable.  Current cardiac medications include: ASA, Lipitor, Lasix, lisinopril, and metoprolol.     Past Medical History:   Past Medical History:   Diagnosis Date    Abnormal Pap smear of  cervix ~2000    repeat pap was normal    Allergic rhinitis, cause unspecified     Anxiety state, unspecified     panic episodes    Unspecified essential hypertension      Past Surgical History:   Past Surgical History:   Procedure Laterality Date    BYPASS GRAFT ARTERY CORONARY N/A 05/24/2022    Procedure: Median sternotomy.  Intraoperative transesophageal echocardiogram per anesthesia.  Left internal mammary artery harvest.  Right and left endoscopically harvested  greater saphenouse vein.  Cardiopulmonary Bypass.  Coronary Artery Bypass Grafts x4.;  Surgeon: Ulises Desai MD;  Location: [facility] OR    CV CORONARY ANGIOGRAM  05/19/2022    Procedure: CV CORONARY ANGIOGRAM;  Surgeon: Huseyin Vogel MD;  Location:  [facility]    CV CORONARY ANGIOGRAM  05/19/2022    Procedure: ;  Surgeon: Huseyin Vogel MD;  Location:  [facility]ATH [facility]    NO HISTORY OF SURGERY      PICC DOUBLE LUMEN PLACEMENT Right 05/27/2022    Failed PICC attempt right arm basilic vein    PICC INSERTION - TRIPLE LUMEN Left 05/28/2022    left basilic 5fr tl,picc44 cm     Allergies: Per MAR     Allergies   Allergen Reactions    Effexor [Venlafaxine Hydrochloride]      Tachycardia, makes her extremely jittery--cant sit down, has to keep moving constantly     Medications:   Per MAR current outpatient cardiovascular medications include:   Medications Prior to Admission   Medication Sig Dispense Refill Last Dose    ASPIRIN 81 MG OR TABS ONE DAILY  3 Past Week at Unknown time    atorvastatin (LIPITOR) 80 MG tablet Take 1 tablet (80 mg) by mouth daily +++NEED ANNUAL EXAM+++ 90 tablet 3 Past Week at Unknown time    empagliflozin (JARDIANCE) 25 MG TABS tablet Take 1 tablet (25 mg) by mouth daily 30 tablet 4 Past Week at Unknown time    FLUoxetine (PROZAC) 20 MG capsule TAKE 2 CAPSULES DAILY 180 capsule 1 Past Week at Unknown time    glipiZIDE (GLUCOTROL XL) 5 MG 24 hr tablet TAKE 2 TABLETS DAILY (NEED ANNUAL  EXAM) 180 tablet 1 Past Week at Unknown time    lisinopril (ZESTRIL) 20 MG tablet Take 1 tablet (20 mg) by mouth daily 90 tablet 3 Past Week at Unknown time    LORazepam (ATIVAN) 0.5 MG tablet Take 1 tablet (0.5 mg) by mouth every 6 hours as needed for anxiety 6 tablet 0 Past Week at Unknown time    metFORMIN (GLUCOPHAGE-XR) 500 MG 24 hr tablet Take 4 tablets (2,000 mg) by mouth daily (with dinner) Please see changed dose 360 tablet 1 Past Week at Unknown time    MULTIPLE VITAMIN OR TABS 1 TABLET DAILY   Past Week at Unknown time    Multiple Vitamins-Minerals (PRESERVISION AREDS PO) Take 2 tablets by mouth daily   Past Week at Unknown time    blood glucose (NO BRAND SPECIFIED) test strip Use to test blood sugar 2 times daily or as directed. 200 strip 1     blood glucose (ONETOUCH VERIO IQ) test strip Use to test blood sugar 2 times daily or as directed. 200 strip 1     blood glucose monitoring (ONETOUCH VERIO) meter device kit Use to test blood sugar 2 times daily or as directed. 1 kit 0     Continuous Blood Gluc Sensor (FREESTYLE VANESSA 14 DAY SENSOR) MIS 1 each every 14 days Change every 14 days. (Patient not taking: No sig reported) 2 each 5      No current outpatient medications on file.     Current Facility-Administered Medications   Medication Dose Route Frequency    aspirin  162 mg Oral or NG Tube Daily    atorvastatin  80 mg Oral Daily    empagliflozin  25 mg Oral Daily    FLUoxetine  40 mg Oral Daily    furosemide  60 mg Oral Daily    glipiZIDE  10 mg Oral Daily with breakfast    heparin ANTICOAGULANT  5,000 Units Subcutaneous Q8H    insulin aspart   Subcutaneous TID w/meals    insulin aspart  1-10 Units Subcutaneous TID AC    insulin aspart  1-7 Units Subcutaneous At Bedtime    lisinopril  2.5 mg Oral Daily    metFORMIN  2,000 mg Oral Daily with supper    metoprolol tartrate  12.5 mg Oral BID    multivitamin w/minerals  1 tablet Oral Daily    pantoprazole  40 mg Oral QAM AC    polyethylene glycol  17 g  "Oral BID    senna-docusate  2 tablet Oral BID    sodium chloride (PF)  3 mL Intracatheter Q8H     Family History:   Family History   Problem Relation Age of Onset    Diabetes Mother         hypertension, alive at 83    C.A.D. Mother     Cancer Father         lung cancer, smoker.   at age 53    Family History Negative Sister     Osteoporosis Sister      Social History:   Social History     Tobacco Use    Smoking status: Never Smoker    Smokeless tobacco: Never Used   Substance Use Topics    Alcohol use: Yes     Comment: social       ROS:   A comprehensive 10 point ROS was negative other than as mentioned in HPI.    Physical Examination:   VITALS: /64 (BP Location: Right arm, Cuff Size: Adult Regular)   Pulse 77   Temp 98.3  F (36.8  C)   Resp 16   Ht 1.626 m (5' 4\")   Wt 70.2 kg (154 lb 12.8 oz)   SpO2 93%   BMI 26.57 kg/m      GENERAL APPEARANCE: AxO, NAD   HEENT: NCAT, EOMI, MMM. PERRLA.   NECK: Supple. No JVD or bruit. Good carotid upstroke.   CHEST: CTAB   CARDIOVASCULAR: S1S2, Reg, No m/r/g.   ABDOMEN: BS+, soft, NT, ND.  EXTREMITIES: Trace pedal edema. Distal pulses intact.   NEURO: Grossly nonfocal.   PSYCH: Normal affect.  SKIN: Warm and dry.   Data:   Labs:  BMP  Recent Labs   Lab 22  2116 22  1720 22  1612 22  1157 22  0818 22  0603 22  0733 22  0641 22  0652 22  0424 22  0729 22  0441   NA  --   --   --   --   --  138  --  140  --  139  --  142   POTASSIUM  --   --   --   --   --  4.5  --  4.5  --  4.3  --  3.7   CHLORIDE  --   --   --   --   --  101  --  104  --  104  --  106   POONAM  --   --   --   --   --  8.8  --  9.1  --  8.6  --  8.5   CO2  --   --   --   --   --  32  --  30  --  31  --  31   BUN  --   --   --   --   --  -    --  22  --  18   CR  --   --   --   --   --  0.90  --  0.83  --  0.82  --  0.64   * 154* 132* 250*   < > 167*   < > 100*   < > 141*   < > 147*    < > = values in this interval " not displayed.     CBC  Recent Labs   Lab 22  0603 22  0641 22  0424 22  0441   WBC 8.2 7.7 5.9 5.8   RBC 2.68* 2.71* 2.80* 2.85*   HGB 7.5* 7.5* 7.9* 8.1*   HCT 24.7* 24.5* 25.9* 26.0*   MCV 92 90 93 91   MCH 28.0 27.7 28.2 28.4   MCHC 30.4* 30.6* 30.5* 31.2*   RDW 14.6 14.4 14.2 14.0    241 223 184     Cholesterol (mg/dL)   Date Value   2022 147   2022 152   2021 144   2020 180   2019 287 (H)   2012 255 (H)     Cholesterol/HDL Ratio (no units)   Date Value   2012 6.0 (H)   2010 7.0 (H)     HDL Cholesterol (mg/dL)   Date Value   2021 50   2020 48 (L)   2019 53   2012 43 (L)     Direct Measure HDL (mg/dL)   Date Value   2022 47 (L)   2022 50     LDL Cholesterol Calculated (mg/dL)   Date Value   2022 57   2022 64   2021 59   2020 67   2019 171 (H)   2012 172 (H)     LDL Cholesterol Direct (mg/dL)   Date Value   2017 136 (H)   2016 95   2015 100   2013 108     EK/1/22 ECHO:   Interpretation Summary  Left ventricular size is normal.  The visual ejection fraction is 35-40%.  Right ventricular function, chamber size, wall motion, and thickness are  normal.  The inferior vena cava is normal.  No pericardial effusion is present.  Assessment:   Ms. Monroy is a 67 year old female who has a past medical history significant for HTN, HLD, DM2, GERD, MDD, JUSTUS who was found to have on  around noon she started having sudden onset of increased lightheadedness, racing heart, and shortness of breath that persisted despite taking in sugar (patient initially attributed to hypoglycemia) and sitting/lying down.  She reports that her apple watch read  bpm.  Episode lasted for about 7 hours leading her to go into the ER but her symptoms spontaneously had resolved.  During ER evaluation D-dimer was negative, ECG and delta 2-hour troponin were  significant for possible prior inferior MI, LVH, LA enlargement, and CXR normal.  She saw her ECG who placed her on a cardiac monitor and planned for a stress echo.  Zio patch monitor showed that she was having episodes of sustained VT episodes about 21 minutes and she was told to present to the ER.  Coronary angiogram revealed severe multivessel disease and an echo showed severely reduced LVEF 20-30% with moderate to severe diffuse hypokinesis in the RCA territory.  She was started on GDMT and optimized and referred to CVTS for surgical intervention.  She underwent CABG x4 (LIMA-LAD, SVG- distal RCA, SVG-OM, SVG-ramus) on 5/24/2022.  Postoperatively, while in ICU, she had bigeminal PVCs with mild hypotension.  Most recent echo on 5/31/2022 showed LVEF improved to 35-40%, mildly reduced RV dysfunction, and no pericardial effusion.  She has been making a good recovery.  VSS.  Hgb 7.5 with renal function and electrolytes stable.  Current cardiac medications include: ASA, Lipitor, Lasix, lisinopril, and metoprolol.   EP Recommendations:  ICM LVEF initially 20-30% improved to 35-40% postoperatively, NYHA III:  1. ACEi/ARB: Continue lisinopril and uptitrate as able.  2. BB: Continue Toprol-XL and uptitrate as able  3. Aldosterone antagonist: Deferred during medication optimization.  4. SCD prophylaxis: She has just been revascularized and needs to have GDMT optimized.  Given sustained VT preoperatively and some bigeminy/ectopy postoperatively, we do favor LifeVest as a bridge to a decision.  She should get EP follow-up with an echo in 90 days.  If LVEF remains greater than 35% and arrhythmias stable then she would not require ICD implant.  If LVEF less than 35% then would need to consider ICD implant as primary prevention.  If LVEF remains 35-40% and she continues to have NSVT then would need to consider possible EP study for risk stratification and determination of need of ICD.  5. Fluid status: Continue Lasix    The  patient states understanding and is agreeable with plan.   Thank you for allowing us to participate in the care of this patient.     The patient was discussed w/ Dr. Morrow.  The above note reflects our joint plan.    OSCAR Verde CNP  Electrophysiology Consult Service  Pager: [phone]    EMMIE Total time spent on patient visit, reviewing notes, imaging, labs, orders, and completing necessary documentation: 40 minutes.  >50% of visit spent on counseling patient and/or coordination of care.    EP STAFF NOTE  I have seen and examined the patient as part of a shared visit with LOUIS Verde NP.  I agree with the note above. I reviewed today's vital signs and medications. I have reviewed and discussed with the advanced practice provider their physical examination, assessment, and plan   Briefly, ICM, VT before revascularization, now s/p CABG  My key history/exam findings are: RRR.   The key management decisions made by me: lifevest, f/unit(s) in 3 months with echo.  Total time spent on patient visit, reviewing notes, imaging, labs, orders, and completing necessary documentation: 30 minutes.  >50% of visit spent on counseling patient and/or coordination of care.     Too Morrow MD [facility]  Cardiology - Electrophysiology

## 2022-06-03 NOTE — PLAN OF CARE
Physical Therapy Discharge Summary    Reason for therapy discharge:    Discharged to son's home with assist and HH therapy.    Progress towards therapy goal(s). See goals on Care Plan in [facility] electronic health record for goal details.  Goals met/adequate for discharge.    Therapy recommendation(s):    Continued therapy is recommended.  Rationale/Recommendations:  HH therapies to maximize strength, activity tolerance and functional independence.

## 2022-06-04 VITALS
OXYGEN SATURATION: 95 % | HEIGHT: 64 IN | WEIGHT: 149 LBS | BODY MASS INDEX: 25.44 KG/M2 | HEART RATE: 77 BPM | SYSTOLIC BLOOD PRESSURE: 124 MMHG | DIASTOLIC BLOOD PRESSURE: 54 MMHG | RESPIRATION RATE: 18 BRPM | TEMPERATURE: 98.8 F

## 2022-06-04 LAB
ANION GAP SERPL CALCULATED.3IONS-SCNC: 7 MMOL/L (ref 3–14)
BUN SERPL-MCNC: 21 MG/DL (ref 7–30)
CALCIUM SERPL-MCNC: 9.2 MG/DL (ref 8.5–10.1)
CHLORIDE BLD-SCNC: 101 MMOL/L (ref 94–109)
CO2 SERPL-SCNC: 29 MMOL/L (ref 20–32)
CREAT SERPL-MCNC: 1.06 MG/DL (ref 0.52–1.04)
GFR SERPL CREATININE-BSD FRML MDRD: 57 ML/MIN/1.73M2
GLUCOSE BLD-MCNC: 159 MG/DL (ref 70–99)
GLUCOSE BLDC GLUCOMTR-MCNC: 148 MG/DL (ref 70–99)
MAGNESIUM SERPL-MCNC: 2 MG/DL (ref 1.6–2.3)
PHOSPHATE SERPL-MCNC: 4.1 MG/DL (ref 2.5–4.5)
POTASSIUM BLD-SCNC: 4.9 MMOL/L (ref 3.4–5.3)
SARS-COV-2 RNA RESP QL NAA+PROBE: NEGATIVE
SODIUM SERPL-SCNC: 137 MMOL/L (ref 133–144)

## 2022-06-04 PROCEDURE — 36415 COLL VENOUS BLD VENIPUNCTURE: CPT

## 2022-06-04 PROCEDURE — 250N000013 HC RX MED GY IP 250 OP 250 PS 637

## 2022-06-04 PROCEDURE — 83735 ASSAY OF MAGNESIUM: CPT | Performed by: NURSE PRACTITIONER

## 2022-06-04 PROCEDURE — 250N000013 HC RX MED GY IP 250 OP 250 PS 637: Performed by: NURSE PRACTITIONER

## 2022-06-04 PROCEDURE — 84100 ASSAY OF PHOSPHORUS: CPT | Performed by: STUDENT IN AN ORGANIZED HEALTH CARE EDUCATION/TRAINING PROGRAM

## 2022-06-04 PROCEDURE — 80048 BASIC METABOLIC PNL TOTAL CA: CPT

## 2022-06-04 PROCEDURE — 250N000013 HC RX MED GY IP 250 OP 250 PS 637: Performed by: SURGERY

## 2022-06-04 PROCEDURE — U0005 INFEC AGEN DETEC AMPLI PROBE: HCPCS

## 2022-06-04 PROCEDURE — 250N000013 HC RX MED GY IP 250 OP 250 PS 637: Performed by: STUDENT IN AN ORGANIZED HEALTH CARE EDUCATION/TRAINING PROGRAM

## 2022-06-04 PROCEDURE — 250N000013 HC RX MED GY IP 250 OP 250 PS 637: Performed by: PHYSICIAN ASSISTANT

## 2022-06-04 RX ADMIN — Medication 1 TABLET: at 08:31

## 2022-06-04 RX ADMIN — OXYCODONE HYDROCHLORIDE 5 MG: 5 TABLET ORAL at 03:10

## 2022-06-04 RX ADMIN — PANTOPRAZOLE SODIUM 40 MG: 40 TABLET, DELAYED RELEASE ORAL at 08:31

## 2022-06-04 RX ADMIN — ATORVASTATIN CALCIUM 80 MG: 80 TABLET, FILM COATED ORAL at 08:31

## 2022-06-04 RX ADMIN — FLUOXETINE HYDROCHLORIDE 40 MG: 40 CAPSULE ORAL at 08:31

## 2022-06-04 RX ADMIN — INSULIN ASPART 3 UNITS: 100 INJECTION, SOLUTION INTRAVENOUS; SUBCUTANEOUS at 08:34

## 2022-06-04 RX ADMIN — GLIPIZIDE 10 MG: 10 TABLET, FILM COATED, EXTENDED RELEASE ORAL at 08:31

## 2022-06-04 RX ADMIN — METOPROLOL SUCCINATE 25 MG: 25 TABLET, EXTENDED RELEASE ORAL at 08:31

## 2022-06-04 RX ADMIN — METHOCARBAMOL 500 MG: 500 TABLET ORAL at 06:08

## 2022-06-04 RX ADMIN — ACETAMINOPHEN 650 MG: 325 TABLET, FILM COATED ORAL at 08:50

## 2022-06-04 RX ADMIN — LISINOPRIL 2.5 MG: 2.5 TABLET ORAL at 08:32

## 2022-06-04 RX ADMIN — ASPIRIN 81 MG 162 MG: 81 TABLET ORAL at 08:31

## 2022-06-04 RX ADMIN — OXYCODONE HYDROCHLORIDE 5 MG: 5 TABLET ORAL at 08:51

## 2022-06-04 RX ADMIN — HYDROXYZINE HYDROCHLORIDE 20 MG: 10 TABLET ORAL at 06:08

## 2022-06-04 RX ADMIN — EMPAGLIFLOZIN 25 MG: 25 TABLET, FILM COATED ORAL at 08:51

## 2022-06-04 RX ADMIN — FUROSEMIDE 60 MG: 40 TABLET ORAL at 08:31

## 2022-06-04 ASSESSMENT — ACTIVITIES OF DAILY LIVING (ADL)
ADLS_ACUITY_SCORE: 31

## 2022-06-04 NOTE — PLAN OF CARE
Dx: s/p CABGx4 5/24     Neuro: A&O x4.  Cardiac: SR. AVSS. +2 edema at feet.  Respiratory: normal breathing on RA. LS with dim at bases  GI/: Denied nausea, bowel sounds audible. No BM this shift. Pt GUTP.  Diet: DB. Bg Achs and carb count.  Skin: Sternal incision to wound vac. [place] sites ROSEANNE.  Pain: Pt educated to wean off oxy for time of discharge; pt verbalized she understands but oxy helps with pain at legs. Oxy 5mg given 2x  LDAs: L TL PICC - Red lumen with no blood return  Electrolytes: await AM lab  Mobility: ud lisy in room     Plan: Zoll life vest education with Representative Juni Groves to be facetimed from pt's cellular device during meeting    Goal Outcome Evaluation:    Plan of Care Reviewed With: patient     Overall Patient Progress: improving    Outcome Evaluation: Wean off Oxy. Pain management met with current regimen.

## 2022-06-04 NOTE — PLAN OF CARE
Discharge  D: Patient discharged to home.  I: Removed PICC line. Discussed discharge instructions with patient.  A: Patient states she understands discharge paperwork.  P: Meds to be picked up at discharge pharmacy on way out of the door. Family to transport patient home.    Goal Outcome Evaluation:    Plan of Care Reviewed With: patient

## 2022-06-04 NOTE — PLAN OF CARE
S/p CABG  5/24. Vitals remain stable on room air.     Shift changes - pt main concern today is pain management. Endorses pain 3-6/10 at sternal incision and bilateral lower extremities. Patient tolerated trying to wean prn oxy today and only took 5mg x1 and 10 mg x1 this 12 hour shift.     Up in room independently and ambulated in hallway.    Plan to dicharge to home with lifevest tomorrow 6/4.  Lifevest RN (Naveen) coming to teach and fit lifevest at 10:30 AM, Daughter (Germain) informed via phone call of time - she would like to be facetimed from pts cell phone during the meeting.

## 2022-06-04 NOTE — PROGRESS NOTES
Cardiovascular Surgery Progress Note  06/04/2022         Assessment and Plan:     Marianne Monroy is a 67 year old female with PMH of HTN, HLD, T2DM, GERD, MDD, JUSTUS, acute heart failure due to ischemic cardiomyopathy, presented with NSVT on Ziopatch, found to have multivessel CAD now s/p CABG x4 on 5/24/22 with Dr. Desai.     Cardiovascular:   Multivessel coronary artery disease s/p CABG x 4 on 5/24/22 with Dr. Desai: CHU to LAD, SVG (harvest form both thighs) to distal RCA, OM, ramus.   Bigeminy PVCs with mild hypotension  Acute HFrEF, ischemic CMP  Hx NSVT  Recent echo on 5/19, LVEF 20-30%  Post-op LVEF 35%  5/26 Echo --> LVEF 35 - 40 %, improved compared to prior. RV EF reduced   5/31 Postoperative echo demonstrated LVEF 35-40%, mildly reduced RV function, no pericardial effusion  HD stable overnight, in NSR  - Continue Atorvastatin 80 mg daily  - Continue  mg daily  - Continue Metoprolol 12.5 mg BID  - Lisinopril 2.5 mg daily  - EP consulted for Bigeminy while in ICU, recommended BB and will f/u for consideration of lifevest  - Repeat EKG 5/31: QTc 480 ms     Chest tubes: removed 5/27  TPW: removed 5/27     Pulmonary:  Extubated 5/25. Now saturating well on RA.   - Supplemental O2 PRN to keep sats > 92%. Wean off as tolerated.  - Pulm hygiene, IS, activity and deep breathing     Neurology / MSK:  Acute post-operative pain  Hx MDD and JUSTUS  Well controlled with current regimen:  - Acetaminophen, PO oxycodone PRN, IV dilaudid PRN, methocarbamol  - B/L ES catheters removed 5/28  - Resumed PTA fluoxetine and hydroxyzine      / Renal / Fluid / Electrolytes:  BL creat ~ 0.82, most recent creatinine stable; adequate UOP.   FLUID STATUS: Pre-op weight 148 lbs, weight today 157 lbs; I/O past 24 hours: -1.7 L  - Continue Lasix 60 mg po daily  - Replete lytes per protocol  - Strict I/O, daily weights  - Replete lytes per protocol     GI / Nutrition:   History of GERD  - Tolerating regular diet  - Continue bowel  regimen, last BM 5/30  - PPI     Endocrine:  Stress induced hyperglycemia   DMII  Hgb A1c 7.8%  - Initially managed on insulin drip postop, transitioned to lantus and high resistance sliding scale; goal BG <180  - PTA metformin resumed 5/31  - Resumed PTA Jardiance and glipizide on 5/31, discontinued lantus     Infectious Disease:  Stress induced leukocytosis  WBC 8.2, remains afebrile, no signs or symptoms of infection  - Completed perioperative antibiotics  - Continue to monitor fever curve, CBC     Hematology:   Acute blood loss anemia and thrombocytopenia  Hgb 7.5; Plt 285, no signs or symptoms of active bleeding  - CBC in AM  - B/L Venous duplex of lower extremity negative for DVT on 5/26     Anticoagulation:   - ASA only     MSK/Skin:  Sternotomy  Surgical Incision  - Wound vac removed 5/31  - Sternal precautions  - Postoperative incision management per protocol  - PT/OT/CR     Prophylaxis:   - Stress ulcer prophylaxis: Pantoprazole 40 mg daily  - DVT prophylaxis: Subcutaneous heparin, SCD     Disposition:   - Transferred to  on 5/29  - Therapies recommending discharge to home with OP cardiac rehabilitation. Will go home with son with 24 hour care.  - EP recommended of Zoll LifeVest for Hx sustained VT    Discussed with Dr Desai through both written and verbal communication.      Blaise Pineda PA-C  Cardiothoracic Surgery  Pager [phone]    8:20 AM   June 4, 2022        Interval History:     No overnight events. Waiting for LifeVest approval.  States pain is well managed on current regimen. Slept well overnight.  Tolerating diet, is passing flatus, + BM. No nausea or vomiting.  Breathing well without complaints.   Working with therapies and ambulating in halls without assistance.   Denies chest pain, palpitations, dizziness, syncopal symptoms, fevers, chills, myalgias, or sternal popping/clicking.         Physical Exam:   Blood pressure 124/54, pulse 77, temperature 98.8  F (37.1  C), temperature source  "Oral, resp. rate 18, height 1.626 m (5' 4\"), weight 67.6 kg (149 lb), SpO2 95 %, not currently breastfeeding.  Vitals:    06/02/22 0001 06/03/22 0523 06/04/22 0308   Weight: 70.2 kg (154 lb 12.8 oz) 69.3 kg (152 lb 11.2 oz) 67.6 kg (149 lb)      Gen: A&Ox4, NAD  Neuro: no focal deficits   CV: RRR, normal S1 S2, no murmurs, rubs or gallops.   Pulm: CTA, no wheezing or rhonchi, normal breathing on RA  Abd: nondistended, normal BS, soft, nontender  Ext: no peripheral edema  Incision: clean, dry, intact, no erythema, sternum stable  Tubes/drain sites: dressing clean and dry         Data:    Imaging:  reviewed recent imaging, no acute concerns    Labs:  BMP  Recent Labs   Lab 06/04/22  0727 06/04/22  0658 06/03/22  2246 06/03/22  1659 06/03/22  1212 06/03/22  0535 06/02/22  0818 06/02/22  0603 06/01/22  0733 06/01/22  0641 05/31/22  0652 05/31/22  0424     --   --   --   --  138  --  138  --  140  --  139   POTASSIUM 4.9  --   --   --   --  4.8  --  4.5  --  4.5  --  4.3   CHLORIDE 101  --   --   --   --  100  --  101  --  104  --  104   POONAM  --   --   --   --   --  9.3  --  8.8  --  9.1  --  8.6   CO2  --   --   --   --   --  32  --  32  --  30  --  31   BUN  --   --   --   --   --  20  --  21  --  22  --  22   CR  --   --   --   --   --  0.63  --  0.90  --  0.83  --  0.82   GLC  --  148* 173* 129* 190* 138*   < > 167*   < > 100*   < > 141*    < > = values in this interval not displayed.     CBC  Recent Labs   Lab 06/02/22  0603 06/01/22  0641 05/31/22  0424 05/30/22  0441   WBC 8.2 7.7 5.9 5.8   RBC 2.68* 2.71* 2.80* 2.85*   HGB 7.5* 7.5* 7.9* 8.1*   HCT 24.7* 24.5* 25.9* 26.0*   MCV 92 90 93 91   MCH 28.0 27.7 28.2 28.4   MCHC 30.4* 30.6* 30.5* 31.2*   RDW 14.6 14.4 14.2 14.0    241 223 184     INR  No lab results found in last 7 days.   Hepatic Panel  No lab results found in last 7 days.  GLUCOSE:   Recent Labs   Lab 06/04/22  0658 06/03/22  2246 06/03/22  1659 06/03/22  1212 06/03/22  0535 " 06/02/22 2116   * 173* 129* 190* 138* 108*

## 2022-06-05 DIAGNOSIS — I25.5 ISCHEMIC CARDIOMYOPATHY: Primary | ICD-10-CM

## 2022-06-05 DIAGNOSIS — Z71.89 OTHER SPECIFIED COUNSELING: ICD-10-CM

## 2022-06-06 ENCOUNTER — TELEPHONE (OUTPATIENT)
Dept: FAMILY MEDICINE | Facility: CLINIC | Age: 67
End: 2022-06-06
Payer: COMMERCIAL

## 2022-06-06 ENCOUNTER — TELEPHONE (OUTPATIENT)
Dept: CARDIOLOGY | Facility: CLINIC | Age: 67
End: 2022-06-06
Payer: COMMERCIAL

## 2022-06-06 ENCOUNTER — PATIENT OUTREACH (OUTPATIENT)
Dept: CARE COORDINATION | Facility: CLINIC | Age: 67
End: 2022-06-06
Payer: COMMERCIAL

## 2022-06-06 DIAGNOSIS — I50.21 ACUTE SYSTOLIC HEART FAILURE (H): Primary | ICD-10-CM

## 2022-06-06 NOTE — PROGRESS NOTES
Clinic Care Coordination Contact  [facility]/Voicemail       Clinical Data: Care Coordinator Outreach  Outreach attempted x 1.  Left message on patient's voicemail with call back information and requested return call.    Plan: Care Coordinator will try to reach patient again in 1-2 business days.    Ashley Baltazar  Community Health Worker  [facility]  Ph:[phone]

## 2022-06-06 NOTE — TELEPHONE ENCOUNTER
Mattie Canela from [facility] needs order for skilled nursing for tomorrow 06/07/22.  Shanika Burkett,

## 2022-06-06 NOTE — PLAN OF CARE
Occupational Therapy Discharge Summary    Reason for therapy discharge:    Discharged to home with home therapy.    Progress towards therapy goal(s). See goals on Care Plan in [facility] electronic health record for goal details.  Goals partially met.  Barriers to achieving goals:   discharge from facility.    Therapy recommendation(s):    Continued therapy is recommended.  Rationale/Recommendations:  HHOT to cont to progress IND with ADL/iADL tasks and for home safety eval.

## 2022-06-06 NOTE — PROGRESS NOTES
Care Management Discharge Note    Post discharge telephone encounter.    6/6 at 1133:  This writer received a call from Jody with [facility] confirming they can accept referral for HH PT/OT. Pt discharged over the weekend. Discharge orders and summary faxed.    [facility]  Phone: [phone]  Fax: [phone]    CC will continue to monitor patient's medical condition and progress towards discharge.  Benita Cardozo RN BSN  6C Unit Care Coordinator  Phone number: [phone]  Pager: [phone]

## 2022-06-06 NOTE — TELEPHONE ENCOUNTER
LVM for patient to return call to clinic scheduler to schedule lab appointment prior to 6/9 appointment with Belinda.    EDWIN Thakkar

## 2022-06-06 NOTE — TELEPHONE ENCOUNTER
Attempted to call Mattie Canela, no answer. Left detailed voicemail providing verbal order as requested. Advised Mattie Canela to call back at [phone] if there are any further questions.     Katie Robbins RN   [facility]

## 2022-06-06 NOTE — TELEPHONE ENCOUNTER
The Home Care/Assisted Living/Nursing Facility is calling regarding an established patient.  Has the patient seen Home Care in the past or is currently residing in Assisted Living or Nursing Facility? No.     Mattie Canela calling from [facility] requesting the following orders that are NOT within the Home Care, Assisted Living or Nursing Home Eval and Treatment standing order and must be ordered by a Licensed Practitioner.    Preferred Call Back Number: [phone]    Requesting order for skilled nursing for tomorrow 06/07/22.    Routing to Licensed Practitioner (Provider) to review request and provide approval or recommendation.    Katie Robbins RN   [facility]

## 2022-06-07 ENCOUNTER — MEDICAL CORRESPONDENCE (OUTPATIENT)
Dept: HEALTH INFORMATION MANAGEMENT | Facility: CLINIC | Age: 67
End: 2022-06-07

## 2022-06-07 ENCOUNTER — TELEPHONE (OUTPATIENT)
Dept: CARDIOLOGY | Facility: CLINIC | Age: 67
End: 2022-06-07
Payer: COMMERCIAL

## 2022-06-07 ENCOUNTER — TELEPHONE (OUTPATIENT)
Dept: FAMILY MEDICINE | Facility: CLINIC | Age: 67
End: 2022-06-07
Payer: COMMERCIAL

## 2022-06-07 NOTE — TELEPHONE ENCOUNTER
LVM for patient returning her call from this morning. Left the scheduling number for patient to call to get labs scheduled prior to 6/9/22.    EDWIN Thakkar

## 2022-06-07 NOTE — TELEPHONE ENCOUNTER
Called and gave okay for verbal orders for Home care Physical therapy as requested.    Per [facility] protocol/Policies: Diana Darby RN for Dr. Otoole.  Patient who has bee seen by [facility] primary care provider within the past 2 years (5/11/22).

## 2022-06-07 NOTE — TELEPHONE ENCOUNTER
Spoke to patient. Patient says she is unsure she will be able to get labs done prior to [facility] appointment on 6/9. Patient requested having labs done day-of appointment and I explained results may not be back for the appointment and we prefer 2 days before appointment. Patient requested a call back later today so she has time to figure out if she will be able to make it to a lab appointment prior to 6/9. Patient did not have a pen with her at the time of call to write down the clinic telephone number.    EDWIN Thakkar

## 2022-06-07 NOTE — PROGRESS NOTES
Clinic Care Coordination Contact  [facility]/Voicemail       Clinical Data: Care Coordinator Outreach    Outreach attempted x 2.  Left message on patient's voicemail with call back information and requested return call.    Plan: Care Coordinator will do no further outreaches at this time.    Leny Monet, [facility]  [phone]  [facility]

## 2022-06-07 NOTE — TELEPHONE ENCOUNTER
[employer] message sent to patient to schedule. Patient requested a 'text message' to be left for her with scheduling number.    EDWIN Thakkar

## 2022-06-07 NOTE — TELEPHONE ENCOUNTER
Talked to patients son, patient will turn on phone and return call tomorrow morning or this afternoon.

## 2022-06-07 NOTE — TELEPHONE ENCOUNTER
An RN from another clinic reached out to suggest reaching out to patient's son whom she lives with. LVM for patient's son to return call to clinic scheduler to get her scheduled for labs prior to 6/9 [facility].    EDWIN Thakkar

## 2022-06-07 NOTE — TELEPHONE ENCOUNTER
Reason for Call: Request for an order or referral:    Order or referral being requested: verbal orders for PT starting today for strength, endurance, balance, and walking for one time a week for 5 weeks, and then 2 times a week in 30 days.    When calling back to give verbal, please leave name and title.    Date needed: as soon as possible    Best Time:  anytime    Can we leave a detailed message on this number?  YES    Call taken on 6/7/2022 at 2:49 PM by Lisa Venegas

## 2022-06-08 ENCOUNTER — TELEPHONE (OUTPATIENT)
Dept: CARDIOLOGY | Facility: CLINIC | Age: 67
End: 2022-06-08
Payer: COMMERCIAL

## 2022-06-08 NOTE — TELEPHONE ENCOUNTER
"    CARDIOTHORACIC SURGERY  Discharge Follow Up Phone Call    POST OP MONITORING  How is your pain on a 0-10 scale, how are you managing your pain? Improving every day, patient taking tylenol and oxy only in the evenings. Encouraged patient to try switching robaxin for oxy.    ACTIVITY  How is your activity tolerance? Improving, feeling very good about how well she's up and moving. Walking around the house multiple times a day.  Are you still doing sternal precautions? Yes  Do you hear any clicking when you are moving or taking a deep breath? No  Are you using your incentive spirometer? Yes, \"not as often as I should\" encouraged patient to continue using and increasing frequency.  Are you weighing yourself daily? No, daughter will bring the scale.    SIGNS AND SYMPTOMS OF INFECTION  1. INCREASE IN PAIN - No  2. FEVER - No  3. DRAINAGE - No   4. REDNESS - Small amount of redness   5. SWELLING - No    ASSISTANCE  Do you have someone at home to assist you with your daily activities? Yes, staying with her son.    MEDICATIONS  Is someone helping you to set up your medications? Yes, [employer] came in and adjusted patients meds. [facility] [employer] called the writer yesterday and said patient had not been taking the medications she got from the hospital but she got her set up now to take them. Patient has some pitting edema in the legs because had stopped furosemide. Furosemide has been resumed and patient is seeing cardiology tomorrow to adjust dosage as needed.  Do you have any questions about your medications? no    FOLLOW UP  You are scheduled to see your primary care physician on 6/23.  You are scheduled to see our surgery advanced practive provider for post operative follow up on 6/14.  You are scheduled for cardiac rehab on 6/7.  You are scheduled to see your cardiologist on 6/9.    CONTACT INFORMATION  Please feel free to call us with any questions or symptoms that are concerning for you at [phone]. If it is after " 4:30 in the afternoon, or a weekend please call [phone] and ask for the on call specialist. We want to do everything we can to help prevent you needing to return to the ED, so please do not hesitate to call us.    Mechelle Hein, RNCC  Cardiothoracic Surgery  [phone]

## 2022-06-08 NOTE — TELEPHONE ENCOUNTER
Checked patient chart; patient daughter called and patient is scheduled for labs the morning of her appointment with [facility].    EDWIN Thakkar

## 2022-06-09 ENCOUNTER — MYC MEDICAL ADVICE (OUTPATIENT)
Dept: CARDIOLOGY | Facility: CLINIC | Age: 67
End: 2022-06-09

## 2022-06-09 ENCOUNTER — OFFICE VISIT (OUTPATIENT)
Dept: CARDIOLOGY | Facility: CLINIC | Age: 67
End: 2022-06-09
Payer: COMMERCIAL

## 2022-06-09 ENCOUNTER — LAB (OUTPATIENT)
Dept: LAB | Facility: CLINIC | Age: 67
End: 2022-06-09

## 2022-06-09 VITALS
OXYGEN SATURATION: 98 % | DIASTOLIC BLOOD PRESSURE: 66 MMHG | BODY MASS INDEX: 24.55 KG/M2 | WEIGHT: 143 LBS | HEART RATE: 77 BPM | SYSTOLIC BLOOD PRESSURE: 107 MMHG

## 2022-06-09 DIAGNOSIS — I10 HYPERTENSION, UNSPECIFIED TYPE: ICD-10-CM

## 2022-06-09 DIAGNOSIS — I50.21 ACUTE SYSTOLIC HEART FAILURE (H): ICD-10-CM

## 2022-06-09 DIAGNOSIS — I25.5 ISCHEMIC CARDIOMYOPATHY: ICD-10-CM

## 2022-06-09 DIAGNOSIS — E11.9 DIABETES MELLITUS TYPE 2, NONINSULIN DEPENDENT (H): ICD-10-CM

## 2022-06-09 DIAGNOSIS — I50.21 ACUTE SYSTOLIC HEART FAILURE (H): Primary | ICD-10-CM

## 2022-06-09 LAB
ANION GAP SERPL CALCULATED.3IONS-SCNC: 8 MMOL/L (ref 3–14)
BUN SERPL-MCNC: 25 MG/DL (ref 7–30)
CALCIUM SERPL-MCNC: 9.7 MG/DL (ref 8.5–10.1)
CHLORIDE BLD-SCNC: 101 MMOL/L (ref 94–109)
CO2 SERPL-SCNC: 30 MMOL/L (ref 20–32)
CREAT SERPL-MCNC: 0.98 MG/DL (ref 0.52–1.04)
GFR SERPL CREATININE-BSD FRML MDRD: 63 ML/MIN/1.73M2
GLUCOSE BLD-MCNC: 222 MG/DL (ref 70–99)
NT-PROBNP SERPL-MCNC: 2714 PG/ML (ref 0–900)
POTASSIUM BLD-SCNC: 4.5 MMOL/L (ref 3.4–5.3)
SODIUM SERPL-SCNC: 139 MMOL/L (ref 133–144)

## 2022-06-09 PROCEDURE — 99204 OFFICE O/P NEW MOD 45 MIN: CPT | Performed by: NURSE PRACTITIONER

## 2022-06-09 PROCEDURE — 80048 BASIC METABOLIC PNL TOTAL CA: CPT | Performed by: PATHOLOGY

## 2022-06-09 PROCEDURE — 36415 COLL VENOUS BLD VENIPUNCTURE: CPT | Performed by: PATHOLOGY

## 2022-06-09 PROCEDURE — 83880 ASSAY OF NATRIURETIC PEPTIDE: CPT | Performed by: PATHOLOGY

## 2022-06-09 RX ORDER — FUROSEMIDE 20 MG
20 TABLET ORAL DAILY
Qty: 90 TABLET | Refills: 3 | Status: SHIPPED | OUTPATIENT
Start: 2022-06-09 | End: 2022-07-06

## 2022-06-09 RX ORDER — FUROSEMIDE 20 MG
20 TABLET ORAL DAILY
Qty: 90 TABLET | Refills: 3 | Status: SHIPPED | OUTPATIENT
Start: 2022-06-09 | End: 2022-06-09

## 2022-06-09 NOTE — PROGRESS NOTES
HPI: Marianne is a 67 year old White female with a past medical history of T2DM, HTN, GERD, JUSTUS, MDD and recent episode of lightheadedness and dizziness on     Admission - - 2022.  Surgeon: Dr. Ulises Desai  1.  Coronary artery bypass grafting x 4 (left internal mammary artery to left anterior descending artery, saphenous vein graft to distal right coronary artery, saphenous vein graft to ramus intermedius artery, saphenous vein graft to obtuse marginal artery).  2.  Endoscopic vein harvest.    Patient has no SOB at rest.  She hasn't walked a lot to know if she has JEFFERSON.  She has some JEFFERSON she noted coming into the clinic. This is the most activity she has had in a while since being home. She has some swelling in the R leg - but its better. She has slight swelling in the left leg. She had no swelling before the hospitalization in the legs. She has fatigue. She has some lightheadedness with fast movements.  Weight at home 149 lbs      Denies SOB,PND, orthopnea, edema, weight gain, chest pain, palpitations, lightheadedness, dizziness, near syncopal/syncopal episodes. Marianne has been following salt and fluid restrictions.      PAST MEDICAL HISTORY:  Past Medical History:   Diagnosis Date     Abnormal Pap smear of cervix ~    repeat pap was normal     Allergic rhinitis, cause unspecified      Anxiety state, unspecified     panic episodes     Unspecified essential hypertension        FAMILY HISTORY:  Family History   Problem Relation Age of Onset     Diabetes Mother         hypertension, alive at 83     C.A.D. Mother      Cancer Father         lung cancer, smoker.   at age 53     Family History Negative Sister      Osteoporosis Sister        SOCIAL HISTORY:  Social History     Tobacco Use     Smoking status: Never Smoker     Smokeless tobacco: Never Used   Vaping Use     Vaping Use: Never used   Substance Use Topics     Alcohol use: Yes     Comment: social     Drug use: No           CURRENT  MEDICATIONS:  Current Outpatient Medications   Medication Sig Dispense Refill     acetaminophen (TYLENOL) 325 MG tablet Take 2 tablets (650 mg) by mouth every 4 hours as needed for other (For optimal non-opioid multimodal pain management to improve pain control.) 100 tablet 0     aspirin (ASA) 81 MG chewable tablet 2 tablets (162 mg) by Oral or NG Tube route daily 120 tablet 0     atorvastatin (LIPITOR) 80 MG tablet Take 1 tablet (80 mg) by mouth daily (Patient not taking: Reported on 6/14/2022) 30 tablet 0     atorvastatin (LIPITOR) 80 MG tablet Take 1 tablet (80 mg) by mouth daily +++NEED ANNUAL EXAM+++ 90 tablet 3     blood glucose (NO BRAND SPECIFIED) test strip Use to test blood sugar 2 times daily or as directed. 200 strip 1     blood glucose (ONETOUCH VERIO IQ) test strip Use to test blood sugar 2 times daily or as directed. 200 strip 1     blood glucose monitoring (ONETOUCH VERIO) meter device kit Use to test blood sugar 2 times daily or as directed. 1 kit 0     empagliflozin (JARDIANCE) 25 MG TABS tablet Take 1 tablet (25 mg) by mouth daily 30 tablet 4     FLUoxetine (PROZAC) 20 MG capsule TAKE 2 CAPSULES DAILY 180 capsule 1     furosemide (LASIX) 20 MG tablet Take 1 tablet (20 mg) by mouth daily 90 tablet 3     glipiZIDE (GLUCOTROL XL) 5 MG 24 hr tablet TAKE 2 TABLETS DAILY (NEED ANNUAL EXAM) 180 tablet 1     lisinopril (ZESTRIL) 2.5 MG tablet Take 1 tablet (2.5 mg) by mouth daily 60 tablet 0     LORazepam (ATIVAN) 0.5 MG tablet Take 1 tablet (0.5 mg) by mouth every 6 hours as needed for anxiety 6 tablet 0     metFORMIN (GLUCOPHAGE-XR) 500 MG 24 hr tablet Take 4 tablets (2,000 mg) by mouth daily (with dinner) Please see changed dose 360 tablet 1     metoprolol succinate ER (TOPROL XL) 25 MG 24 hr tablet Take 1 tablet (25 mg) by mouth daily 90 tablet 0     MULTIPLE VITAMIN OR TABS 1 TABLET DAILY       Multiple Vitamins-Minerals (PRESERVISION AREDS PO) Take 2 tablets by mouth daily       oxyCODONE  (ROXICODONE) 5 MG tablet Take 1 tablet (5 mg) by mouth every 6 hours as needed for pain 30 tablet 0     Continuous Blood Gluc Sensor (FREESTYLE VANESSA 14 DAY SENSOR) MISC 1 each every 14 days Change every 14 days. (Patient not taking: No sig reported) 2 each 5     cyclobenzaprine (FLEXERIL) 5 MG tablet Take 1 tablet (5 mg) by mouth every 8 hours as needed for muscle spasms 12 tablet 1       ROS:  Review Of Systems  Skin: negative  Eyes: negative  Ears/Nose/Throat: negative  Respiratory: No shortness of breath, dyspnea on exertion, cough, or hemoptysis  Cardiovascular: negative  Gastrointestinal: negative  Genitourinary: negative  Musculoskeletal: negative  Neurologic: negative  Psychiatric: negative  Hematologic/Lymphatic/Immunologic: negative  Endocrine: negative      EXAM:  /66 (BP Location: Right arm, Patient Position: Sitting, Cuff Size: Adult Regular)   Pulse 77   Wt 64.9 kg (143 lb)   SpO2 98%   BMI 24.55 kg/m    Home weight:  General: alert, articulate, and in no acute distress.  HEENT: normocephalic, atraumatic, anicteric sclera, EOMI, mucosa moist, no cyanosis.   Neck: neck supple.  No adenopathy, masses, or carotid bruits.  JVP @10 90 degrees  Heart: regular rhythm, normal S1/S2, no murmur, gallop, rub.  Precordium quiet with normal PMI.     Lungs: clear, no rales, ronchi, or wheezing.  No accessory muscle use, respirations unlabored.   Abdomen: soft, non-tender, bowel sounds present, no hepatomegaly  Extremities: no pitting edema.   No cyanosis.   Neurological: alert and oriented x 3.  normal speech and affect, no gross motor deficits  Skin:  No ecchymoses, rashes, or clubbing.    Labs:  CBC RESULTS:  Lab Results   Component Value Date    WBC 8.2 06/02/2022    WBC 7.1 09/09/2020    RBC 2.68 (L) 06/02/2022    RBC 4.57 09/09/2020    HGB 7.5 (L) 06/02/2022    HGB 13.3 09/09/2020    HCT 24.7 (L) 06/02/2022    HCT 41.1 09/09/2020    MCV 92 06/02/2022    MCV 90 09/09/2020    MCH 28.0 06/02/2022    MCH  29.1 09/09/2020    MCHC 30.4 (L) 06/02/2022    MCHC 32.4 09/09/2020    RDW 14.6 06/02/2022    RDW 12.5 09/09/2020     06/02/2022     09/09/2020       CMP RESULTS:  Lab Results   Component Value Date     06/09/2022     01/07/2021    POTASSIUM 4.5 06/09/2022    POTASSIUM 4.8 01/07/2021    CHLORIDE 101 06/09/2022    CHLORIDE 106 01/07/2021    CO2 30 06/09/2022    CO2 28 01/07/2021    ANIONGAP 8 06/09/2022    ANIONGAP 7 01/07/2021     (H) 06/09/2022     (H) 01/07/2021    BUN 25 06/09/2022    BUN 19 01/07/2021    CR 0.98 06/09/2022    CR 0.90 01/07/2021    GFRESTIMATED 63 06/09/2022    GFRESTIMATED 66 01/07/2021    GFRESTBLACK 77 01/07/2021    POONAM 9.7 06/09/2022    POONAM 9.5 01/07/2021    BILITOTAL 0.2 05/24/2022    BILITOTAL 0.4 09/09/2020    ALBUMIN 2.4 (L) 05/24/2022    ALBUMIN 3.7 09/09/2020    ALKPHOS 53 05/24/2022    ALKPHOS 90 09/09/2020    ALT 24 05/24/2022    ALT 33 09/09/2020    AST 34 05/24/2022    AST 14 09/09/2020        INR RESULTS:  Lab Results   Component Value Date    INR 1.29 (H) 05/25/2022       No components found for: CK  Lab Results   Component Value Date    MAG 2.0 06/04/2022     Lab Results   Component Value Date    NTBNP 2,714 (H) 06/09/2022     @BRIEFLABR (dig)@    Most recent echocardiogram:  No results found for this or any previous visit (from the past 8760 hour(s)).      Assessment and Plan:    In summary,Marianne  is a 67 year old.  She was placed on Lasix 60 mg for 5 days and she is out of it. She will start weighing herself daily. She appears hypervolemic today and we will restart the Lasix 20 mg daily. Outpatient cardiac rehab to start soon. She is doing in- home rehab right now.      1.  Chronic systolic heart failure secondary to ICM.  Stage C  NYHA Class III  ACEi/ARB: yes 2.5 mg Lisinopril- continue up titration   BB: yes- continue uptitration  Aldosterone antagonist: no  SCD prophylaxis: does not meet criteria for implant  Fluid status:  hypervolemic  Anticoagulation:   Antiplatelet:  ASA dose   Sleep apnea:  NSAID use:  Contraindicated.  Avoid use.  Remote Monitoring:none  SGLT 2 - Jardiance 25 mg    2.  Other comordbid conditions:      #T2DM - PCP to manage - glipizide , Jardiance, metformin    #HTN- 107/66- stable - Lisinopril and Metoprolol      #GERD- followed by PCP     3.  Follow-up   Restart Lasix 20 mg   Follow up [facility] in 3 weeks       Belinda MOORE CNP  [facility]     6/24/22 Addendum- increase Lisinopril to 5 mg daily

## 2022-06-09 NOTE — LETTER
6/9/2022      RE: Marianne Monroy  [address]  [facility] [facility] [place] [address]       Dear Colleague,    Thank you for the opportunity to participate in the care of your patient, Marianne Monroy, at the [facility]. Please see a copy of my visit note below.      HPI: Marianne is a 67 year old White female with a past medical history of T2DM, HTN, GERD, JUSTUS, MDD and recent episode of lightheadedness and dizziness on 4/30    Admission 5/18-6/4 - 5/24/2022.  Surgeon: Dr. Ulises Desai  1.  Coronary artery bypass grafting x 4 (left internal mammary artery to left anterior descending artery, saphenous vein graft to distal right coronary artery, saphenous vein graft to ramus intermedius artery, saphenous vein graft to obtuse marginal artery).  2.  Endoscopic vein harvest.    Patient has no SOB at rest.  She hasn't walked a lot to know if she has JEFFERSON.  She has some JEFFERSON she noted coming into the clinic. This is the most activity she has had in a while since being home. She has some swelling in the R leg - but its better. She has slight swelling in the left leg. She had no swelling before the hospitalization in the legs. She has fatigue. She has some lightheadedness with fast movements.  Weight at home 149 lbs      Denies SOB,PND, orthopnea, edema, weight gain, chest pain, palpitations, lightheadedness, dizziness, near syncopal/syncopal episodes. Marianne has been following salt and fluid restrictions.      PAST MEDICAL HISTORY:  Past Medical History:   Diagnosis Date     Abnormal Pap smear of cervix ~2000    repeat pap was normal     Allergic rhinitis, cause unspecified      Anxiety state, unspecified     panic episodes     Unspecified essential hypertension        FAMILY HISTORY:  Family History   Problem Relation Age of Onset     Diabetes Mother         hypertension, alive at 83     C.A.D. Mother      Cancer Father         lung cancer,  smoker.   at age 53     Family History Negative Sister      Osteoporosis Sister        SOCIAL HISTORY:  Social History     Tobacco Use     Smoking status: Never Smoker     Smokeless tobacco: Never Used   Vaping Use     Vaping Use: Never used   Substance Use Topics     Alcohol use: Yes     Comment: social     Drug use: No           CURRENT MEDICATIONS:  Current Outpatient Medications   Medication Sig Dispense Refill     acetaminophen (TYLENOL) 325 MG tablet Take 2 tablets (650 mg) by mouth every 4 hours as needed for other (For optimal non-opioid multimodal pain management to improve pain control.) 100 tablet 0     aspirin (ASA) 81 MG chewable tablet 2 tablets (162 mg) by Oral or NG Tube route daily 120 tablet 0     atorvastatin (LIPITOR) 80 MG tablet Take 1 tablet (80 mg) by mouth daily (Patient not taking: Reported on 2022) 30 tablet 0     atorvastatin (LIPITOR) 80 MG tablet Take 1 tablet (80 mg) by mouth daily +++NEED ANNUAL EXAM+++ 90 tablet 3     blood glucose (NO BRAND SPECIFIED) test strip Use to test blood sugar 2 times daily or as directed. 200 strip 1     blood glucose (ONETOUCH VERIO IQ) test strip Use to test blood sugar 2 times daily or as directed. 200 strip 1     blood glucose monitoring (ONETOUCH VERIO) meter device kit Use to test blood sugar 2 times daily or as directed. 1 kit 0     empagliflozin (JARDIANCE) 25 MG TABS tablet Take 1 tablet (25 mg) by mouth daily 30 tablet 4     FLUoxetine (PROZAC) 20 MG capsule TAKE 2 CAPSULES DAILY 180 capsule 1     furosemide (LASIX) 20 MG tablet Take 1 tablet (20 mg) by mouth daily 90 tablet 3     glipiZIDE (GLUCOTROL XL) 5 MG 24 hr tablet TAKE 2 TABLETS DAILY (NEED ANNUAL EXAM) 180 tablet 1     lisinopril (ZESTRIL) 2.5 MG tablet Take 1 tablet (2.5 mg) by mouth daily 60 tablet 0     LORazepam (ATIVAN) 0.5 MG tablet Take 1 tablet (0.5 mg) by mouth every 6 hours as needed for anxiety 6 tablet 0     metFORMIN (GLUCOPHAGE-XR) 500 MG 24 hr tablet Take 4  tablets (2,000 mg) by mouth daily (with dinner) Please see changed dose 360 tablet 1     metoprolol succinate ER (TOPROL XL) 25 MG 24 hr tablet Take 1 tablet (25 mg) by mouth daily 90 tablet 0     MULTIPLE VITAMIN OR TABS 1 TABLET DAILY       Multiple Vitamins-Minerals (PRESERVISION AREDS PO) Take 2 tablets by mouth daily       oxyCODONE (ROXICODONE) 5 MG tablet Take 1 tablet (5 mg) by mouth every 6 hours as needed for pain 30 tablet 0     Continuous Blood Gluc Sensor (FREESTYLE VANESSA 14 DAY SENSOR) MISC 1 each every 14 days Change every 14 days. (Patient not taking: No sig reported) 2 each 5     cyclobenzaprine (FLEXERIL) 5 MG tablet Take 1 tablet (5 mg) by mouth every 8 hours as needed for muscle spasms 12 tablet 1       ROS:  Review Of Systems  Skin: negative  Eyes: negative  Ears/Nose/Throat: negative  Respiratory: No shortness of breath, dyspnea on exertion, cough, or hemoptysis  Cardiovascular: negative  Gastrointestinal: negative  Genitourinary: negative  Musculoskeletal: negative  Neurologic: negative  Psychiatric: negative  Hematologic/Lymphatic/Immunologic: negative  Endocrine: negative      EXAM:  /66 (BP Location: Right arm, Patient Position: Sitting, Cuff Size: Adult Regular)   Pulse 77   Wt 64.9 kg (143 lb)   SpO2 98%   BMI 24.55 kg/m    Home weight:  General: alert, articulate, and in no acute distress.  HEENT: normocephalic, atraumatic, anicteric sclera, EOMI, mucosa moist, no cyanosis.   Neck: neck supple.  No adenopathy, masses, or carotid bruits.  JVP @10 90 degrees  Heart: regular rhythm, normal S1/S2, no murmur, gallop, rub.  Precordium quiet with normal PMI.     Lungs: clear, no rales, ronchi, or wheezing.  No accessory muscle use, respirations unlabored.   Abdomen: soft, non-tender, bowel sounds present, no hepatomegaly  Extremities: no pitting edema.   No cyanosis.   Neurological: alert and oriented x 3.  normal speech and affect, no gross motor deficits  Skin:  No ecchymoses,  rashes, or clubbing.    Labs:  CBC RESULTS:  Lab Results   Component Value Date    WBC 8.2 06/02/2022    WBC 7.1 09/09/2020    RBC 2.68 (L) 06/02/2022    RBC 4.57 09/09/2020    HGB 7.5 (L) 06/02/2022    HGB 13.3 09/09/2020    HCT 24.7 (L) 06/02/2022    HCT 41.1 09/09/2020    MCV 92 06/02/2022    MCV 90 09/09/2020    MCH 28.0 06/02/2022    MCH 29.1 09/09/2020    MCHC 30.4 (L) 06/02/2022    MCHC 32.4 09/09/2020    RDW 14.6 06/02/2022    RDW 12.5 09/09/2020     06/02/2022     09/09/2020       CMP RESULTS:  Lab Results   Component Value Date     06/09/2022     01/07/2021    POTASSIUM 4.5 06/09/2022    POTASSIUM 4.8 01/07/2021    CHLORIDE 101 06/09/2022    CHLORIDE 106 01/07/2021    CO2 30 06/09/2022    CO2 28 01/07/2021    ANIONGAP 8 06/09/2022    ANIONGAP 7 01/07/2021     (H) 06/09/2022     (H) 01/07/2021    BUN 25 06/09/2022    BUN 19 01/07/2021    CR 0.98 06/09/2022    CR 0.90 01/07/2021    GFRESTIMATED 63 06/09/2022    GFRESTIMATED 66 01/07/2021    GFRESTBLACK 77 01/07/2021    POONAM 9.7 06/09/2022    POONAM 9.5 01/07/2021    BILITOTAL 0.2 05/24/2022    BILITOTAL 0.4 09/09/2020    ALBUMIN 2.4 (L) 05/24/2022    ALBUMIN 3.7 09/09/2020    ALKPHOS 53 05/24/2022    ALKPHOS 90 09/09/2020    ALT 24 05/24/2022    ALT 33 09/09/2020    AST 34 05/24/2022    AST 14 09/09/2020        INR RESULTS:  Lab Results   Component Value Date    INR 1.29 (H) 05/25/2022       No components found for: CK  Lab Results   Component Value Date    MAG 2.0 06/04/2022     Lab Results   Component Value Date    NTBNP 2,714 (H) 06/09/2022     @BRIEFLABR (dig)@    Most recent echocardiogram:  No results found for this or any previous visit (from the past 8760 hour(s)).      Assessment and Plan:    In summary,Marianne  is a 67 year old.  She was placed on Lasix 60 mg for 5 days and she is out of it. She will start weighing herself daily. She appears hypervolemic today and we will restart the Lasix 20 mg daily. Outpatient  cardiac rehab to start soon. She is doing in- home rehab right now.      1.  Chronic systolic heart failure secondary to ICM.  Stage C  NYHA Class III  ACEi/ARB: yes 2.5 mg Lisinopril- continue up titration   BB: yes- continue uptitration  Aldosterone antagonist: no  SCD prophylaxis: does not meet criteria for implant  Fluid status: hypervolemic  Anticoagulation:   Antiplatelet:  ASA dose   Sleep apnea:  NSAID use:  Contraindicated.  Avoid use.  Remote Monitoring:none  SGLT 2 - Jardiance 25 mg    2.  Other comordbid conditions:      #T2DM - PCP to manage - glipizide , Jardiance, metformin    #HTN- 107/66- stable - Lisinopril and Metoprolol      #GERD- followed by PCP     3.  Follow-up   Restart Lasix 20 mg   Follow up [facility] in 3 weeks       Belinda MOORE CNP  [facility]     6/24/22 Addendum- increase Lisinopril to 5 mg daily

## 2022-06-09 NOTE — PATIENT INSTRUCTIONS
Take your medicines every day, as directed    Changes made today:  START taking lasix 20 mg once daily.  RN call for weight and symptom check in 1 week.  Please check your weight once every morning and write it down.  Compression stockings can help with the swelling.   Monitor Your Weight and Symptoms    Contact us if you:    Gain 2 pounds in one day or 5 pounds in one week  Feel more short of breath  Notice more leg swelling  Feel lightheadeded   Change your lifestyle    Limit Salt or Sodium:  2000 mg  Limit Fluids:  2000 mL or approximately 64 ounces  Eat a Heart Healthy Diet  Low in saturated fats  Stay Active:  Aim to move at least 150 minutes every  week         To Contact us    During Business Hours:  [phone], option # 1      After hours, weekends or holidays:   [phone], Option #4  Ask to speak to the On-Call Cardiologist. Inform them you are a [facility]/heart failure patient at the [facility].     Use [employer] allows you to communicate directly with your heart team through secure messaging.  [employer] can be accessed any time on your phone, computer, or tablet.  If you need assistance, we'd be happy to help!         Keep your Heart Appointments:    Follow up with [facility] in 3 weeks with labs prior.

## 2022-06-09 NOTE — NURSING NOTE
Diet: Patient instructed regarding a heart healthy diet, including discussion of reduced fat and sodium intake. Patient demonstrated understanding of this information and agreed to call with further questions or concerns.    Labs: Patient was given results of the laboratory testing obtained today. Patient was instructed to return for the next laboratory testing in 3 weeks. Patient demonstrated understanding of this information and agreed to call with further questions or concerns.     New Medication: Patient was educated regarding newly prescribed medication, including discussion of  the indication, administration, side effects, and when to report to MD or RN. Patient demonstrated understanding of this information and agreed to call with further questions or concerns. Start taking lasix 20 mg once daily.    Return Appointment: Patient given instructions regarding scheduling next clinic visit. Patient demonstrated understanding of this information and agreed to call with further questions or concerns. CORE in 3 weeks.    Patient stated she understood all health information given and agreed to call with further questions or concerns.    Dasia Amin RN

## 2022-06-09 NOTE — NURSING NOTE
"Chief Complaint   Patient presents with     New Patient     Pt reports JEFFERSON, heart palpitations/fluttering, lightheaded when moving too fast, lack of stamina/ more tired than normal, CORE Enrollment, 68 yo female with systolic heart failure presents for hospital follow up (CABG x4) and to establish care with labs prior       Initial /66 (BP Location: Right arm, Patient Position: Sitting, Cuff Size: Adult Regular)   Pulse 77   Wt 64.9 kg (143 lb)   SpO2 98%   BMI 24.55 kg/m   Estimated body mass index is 24.55 kg/m  as calculated from the following:    Height as of 5/24/22: 1.626 m (5' 4\").    Weight as of this encounter: 64.9 kg (143 lb)..  BP completed using cuff size: regular    EDWIN Joseph    "

## 2022-06-10 ENCOUNTER — TELEPHONE (OUTPATIENT)
Dept: FAMILY MEDICINE | Facility: CLINIC | Age: 67
End: 2022-06-10
Payer: COMMERCIAL

## 2022-06-10 DIAGNOSIS — G89.18 ACUTE POST-OPERATIVE PAIN: Primary | ICD-10-CM

## 2022-06-10 RX ORDER — CYCLOBENZAPRINE HCL 5 MG
5 TABLET ORAL EVERY 8 HOURS PRN
Qty: 12 TABLET | Refills: 1 | Status: SHIPPED | OUTPATIENT
Start: 2022-06-10 | End: 2022-08-22

## 2022-06-10 NOTE — TELEPHONE ENCOUNTER
Called and spoke with Mario (OT with [facility]).  Informed him of the provider's recommendations.  Mraio (OT) verbalized understanding and has no further questions or concerns at this time.

## 2022-06-10 NOTE — TELEPHONE ENCOUNTER
Reason for Call: Request for an order or referral:    Order or referral being requested: verbal orders for OT one time a week for 7 weeks to address upper extremity function, adaptive equipment needs and ADL/IADL independence.    When leaving verbal okay, please leave first name, last name initial, and title.    Date needed: as soon as possible    Best Time:  anytime    Can we leave a detailed message on this number?  YES    Call taken on 6/10/2022 at 12:11 PM by Lisa Venegas

## 2022-06-10 NOTE — TELEPHONE ENCOUNTER
Called patient and left a VM.   Called Adelaide Monroy (daughter) and informed her that we will be sending a muscle relaxer medication prescription to [facility] pharmacy for patient to ; it will be sent today. Daughter verbalized understanding and agreed to the plan.

## 2022-06-14 ENCOUNTER — OFFICE VISIT (OUTPATIENT)
Dept: CARDIOLOGY | Facility: CLINIC | Age: 67
End: 2022-06-14
Attending: NURSE PRACTITIONER
Payer: COMMERCIAL

## 2022-06-14 VITALS
DIASTOLIC BLOOD PRESSURE: 72 MMHG | WEIGHT: 141.4 LBS | HEART RATE: 82 BPM | BODY MASS INDEX: 24.14 KG/M2 | HEIGHT: 64 IN | OXYGEN SATURATION: 100 % | SYSTOLIC BLOOD PRESSURE: 118 MMHG

## 2022-06-14 DIAGNOSIS — Z95.1 S/P CABG (CORONARY ARTERY BYPASS GRAFT): Primary | ICD-10-CM

## 2022-06-14 PROCEDURE — G0463 HOSPITAL OUTPT CLINIC VISIT: HCPCS

## 2022-06-14 PROCEDURE — 99024 POSTOP FOLLOW-UP VISIT: CPT | Performed by: PHYSICIAN ASSISTANT

## 2022-06-14 ASSESSMENT — PAIN SCALES - GENERAL: PAINLEVEL: NO PAIN (0)

## 2022-06-14 NOTE — NURSING NOTE
Chief Complaint   Patient presents with     Follow Up     S/p CAB 5/24 dr riley Denton were taken and medications reconciled.    Terry Clancy, EMT  2:27 PM

## 2022-06-14 NOTE — PROGRESS NOTES
CARDIOTHORACIC SURGERY FOLLOW-UP VISIT     Marianne Monroy   1955   [id number]      Reason for visit: Post-Op emergent 4 v CAB with Dr. Ulises Desai on 5/24/2022    HPI: Marianne Monroy is a 67 year old year old female seen in clinic for a routine follow-up appointment after surgery.     She has a PMH significant for T2DM, HTN, GERD, JUSTUS, MDD and recent episode of lightheadedness and dizziness on 4/30  with associated palpitations, neck stiffness, bilateral jaw pain and SOB that did not resolve with rest. Her workup revealed  multivessel and she underwent 4 vessel coronary artery bypass grafting on 5/24. Hospital course was remarkable for post-op  NSVT that resolved with weaning of pressors and starting metoprolol. She was weaned off insulin and PTA oral  antihyperglycemics were resumed.  Patient was discharged with a LifeVest (EF 35-40%) to home on 6/4/22. She has already seen the [facility] HF clinic.    Patient has been doing well since discharge and now returns to clinic for postop visit.  Has been wearing her LiveVest.   Patient endorses edema that has improved.  Increasing endurance and less fatigue.     Patient reports incision is healing well.    Sleeping better over past week.  Patient denies any fever, chills, chest pain, palpitations and SOB.    Patient is having Normal bowel movements and voiding without problems.  Has been attending cardiac rehabilitation and that is going well, doing in house therapy.    Weight has been stable overall.      PAST MEDICAL HISTORY:  Past Medical History:   Diagnosis Date     Abnormal Pap smear of cervix ~2000    repeat pap was normal     Allergic rhinitis, cause unspecified      Anxiety state, unspecified     panic episodes     Unspecified essential hypertension        PAST SURGICAL HISTORY:  Past Surgical History:   Procedure Laterality Date     BYPASS GRAFT ARTERY CORONARY N/A 05/24/2022    Procedure: Median sternotomy.  Intraoperative transesophageal echocardiogram per  anesthesia.  Left internal mammary artery harvest.  Right and left endoscopically harvested  greater saphenouse vein.  Cardiopulmonary Bypass.  Coronary Artery Bypass Grafts x4.;  Surgeon: Ulises Desai MD;  Location: [facility] OR     CV CORONARY ANGIOGRAM  05/19/2022    Procedure: CV CORONARY ANGIOGRAM;  Surgeon: Huseyin Vogel MD;  Location:  HEART CARDIAC CATH LAB     CV CORONARY ANGIOGRAM  05/19/2022    Procedure: ;  Surgeon: Huseyin Vogel MD;  Location: [facility] CARDIAC CATH LAB     NO HISTORY OF SURGERY       PICC DOUBLE LUMEN PLACEMENT Right 05/27/2022    Failed PICC attempt right arm basilic vein     PICC INSERTION - TRIPLE LUMEN Left 05/28/2022    left basilic 5fr tl,picc44 cm       CURRENT MEDICATIONS:   Current Outpatient Medications   Medication     acetaminophen (TYLENOL) 325 MG tablet     aspirin (ASA) 81 MG chewable tablet     atorvastatin (LIPITOR) 80 MG tablet     blood glucose (NO BRAND SPECIFIED) test strip     blood glucose (ONETOUCH VERIO IQ) test strip     blood glucose monitoring (ONETOUCH VERIO) meter device kit     cyclobenzaprine (FLEXERIL) 5 MG tablet     empagliflozin (JARDIANCE) 25 MG TABS tablet     FLUoxetine (PROZAC) 20 MG capsule     furosemide (LASIX) 20 MG tablet     glipiZIDE (GLUCOTROL XL) 5 MG 24 hr tablet     lisinopril (ZESTRIL) 2.5 MG tablet     LORazepam (ATIVAN) 0.5 MG tablet     metFORMIN (GLUCOPHAGE-XR) 500 MG 24 hr tablet     metoprolol succinate ER (TOPROL XL) 25 MG 24 hr tablet     MULTIPLE VITAMIN OR TABS     Multiple Vitamins-Minerals (PRESERVISION AREDS PO)     oxyCODONE (ROXICODONE) 5 MG tablet     atorvastatin (LIPITOR) 80 MG tablet     Continuous Blood Gluc Sensor (FREESTYLE VANESSA 14 DAY SENSOR) [facility]     No current facility-administered medications for this visit.       ALLERGIES:   Allergies   Allergen Reactions     Effexor [Venlafaxine Hydrochloride]      Tachycardia, makes her extremely jittery--cant sit down, has to keep moving  "constantly     ROS:  Review of symptoms otherwise negative unless commented about in HPI.     LABS:  Last Basic Metabolic Panel:  Lab Results   Component Value Date     06/09/2022     01/07/2021      Lab Results   Component Value Date    POTASSIUM 4.5 06/09/2022    POTASSIUM 4.8 01/07/2021     Lab Results   Component Value Date    CHLORIDE 101 06/09/2022    CHLORIDE 106 01/07/2021     Lab Results   Component Value Date    POONAM 9.7 06/09/2022    POONAM 9.5 01/07/2021     Lab Results   Component Value Date    CO2 30 06/09/2022    CO2 28 01/07/2021     Lab Results   Component Value Date    BUN 25 06/09/2022    BUN 19 01/07/2021     Lab Results   Component Value Date    CR 0.98 06/09/2022    CR 0.90 01/07/2021     Lab Results   Component Value Date     06/09/2022     01/07/2021       Last CBC:   Lab Results   Component Value Date    WBC 8.2 06/02/2022    WBC 7.1 09/09/2020     Lab Results   Component Value Date    RBC 2.68 06/02/2022    RBC 4.57 09/09/2020     Lab Results   Component Value Date    HGB 7.5 06/02/2022    HGB 13.3 09/09/2020     Lab Results   Component Value Date    HCT 24.7 06/02/2022    HCT 41.1 09/09/2020     No components found for: MCT  Lab Results   Component Value Date    MCV 92 06/02/2022    MCV 90 09/09/2020     Lab Results   Component Value Date    MCH 28.0 06/02/2022    MCH 29.1 09/09/2020     Lab Results   Component Value Date    MCHC 30.4 06/02/2022    MCHC 32.4 09/09/2020     Lab Results   Component Value Date    RDW 14.6 06/02/2022    RDW 12.5 09/09/2020     Lab Results   Component Value Date     06/02/2022     09/09/2020        IMAGING:  None    PHYSICAL EXAM:   /72 (BP Location: Right arm, Patient Position: Chair, Cuff Size: Adult Regular)   Pulse 82   Ht 1.624 m (5' 3.94\")   Wt 64.1 kg (141 lb 6.4 oz)   SpO2 100%   BMI 24.32 kg/m    General: alert and oriented x 3, pleasant, no acute distress, normal mood and affect  Neuro: no focal deficits "   CV: S1 S2, no murmurs, rubs or gallops, regular rate and rhythm, no peripheral edema  Pulm: bilateral breath sounds, clear to auscultation, easy work of breathing  Incision: incisions clean dry and intact without erythema, swelling or drainage    PROCEDURES: None       ASSESSMENT/PLAN:  Marianne Monroy is a 67 year old year old female status post emergent CABG who returns to clinic for postop visit.     1. Surgically doing well overall.  Incisions are healing well with no signs of infection. Increasing activity and strength overall.   2. Hemodynamics are stable. No medication changes were needed today.  3. Follow up with your cardiologist and HF team as scheduled.  4. Follow up with your PCP (Dr Otoole) as scheduled on 6/23/22.  5. Continue Outpatient Cardiac Rehab until completed.   6. Continue sternal precautions for 12 weeks from surgery date.   7. No driving for 4 weeks from surgery date.   8. FMLA paperwork for her son.   9. Sleep: recommend acetaminophen and flexeril at night, wean off oxycodone as able.     The total time spent with the patient was 30 minutes, > 50% of which was spent in counseling.    CC  Roxanna Otoole

## 2022-06-14 NOTE — LETTER
6/14/2022      RE: aMrianne Monroy  [address]  [facility] [address]       Dear Colleague,    Thank you for the opportunity to participate in the care of your patient, Marianne Monroy, at the [facility] at [facility]. Please see a copy of my visit note below.    CARDIOTHORACIC SURGERY FOLLOW-UP VISIT     Marianne Monroy   1955   [id number]      Reason for visit: Post-Op emergent 4 v CAB with Dr. Ulises Desai on 5/24/2022    HPI: Marianne Monroy is a 67 year old year old female seen in clinic for a routine follow-up appointment after surgery.     She has a PMH significant for T2DM, HTN, GERD, JUSTUS, MDD and recent episode of lightheadedness and dizziness on 4/30  with associated palpitations, neck stiffness, bilateral jaw pain and SOB that did not resolve with rest. Her workup revealed  multivessel and she underwent 4 vessel coronary artery bypass grafting on 5/24. Hospital course was remarkable for post-op  NSVT that resolved with weaning of pressors and starting metoprolol. She was weaned off insulin and PTA oral  antihyperglycemics were resumed.  Patient was discharged with a LifeVest (EF 35-40%) to home on 6/4/22. She has already seen the [facility] HF clinic.    Patient has been doing well since discharge and now returns to clinic for postop visit.  Has been wearing her LiveVest.   Patient endorses edema that has improved.  Increasing endurance and less fatigue.     Patient reports incision is healing well.    Sleeping better over past week.  Patient denies any fever, chills, chest pain, palpitations and SOB.    Patient is having Normal bowel movements and voiding without problems.  Has been attending cardiac rehabilitation and that is going well, doing in house therapy.    Weight has been stable overall.      PAST MEDICAL HISTORY:  Past Medical History:   Diagnosis Date     Abnormal Pap smear of cervix ~2000    repeat pap was  normal     Allergic rhinitis, cause unspecified      Anxiety state, unspecified     panic episodes     Unspecified essential hypertension        PAST SURGICAL HISTORY:  Past Surgical History:   Procedure Laterality Date     BYPASS GRAFT ARTERY CORONARY N/A 05/24/2022    Procedure: Median sternotomy.  Intraoperative transesophageal echocardiogram per anesthesia.  Left internal mammary artery harvest.  Right and left endoscopically harvested  greater saphenouse vein.  Cardiopulmonary Bypass.  Coronary Artery Bypass Grafts x4.;  Surgeon: Ulises Desai MD;  Location: [facility] OR     CV CORONARY ANGIOGRAM  05/19/2022    Procedure: CV CORONARY ANGIOGRAM;  Surgeon: Huseyin Vogel MD;  Location:  HEART CARDIAC CATH LAB     CV CORONARY ANGIOGRAM  05/19/2022    Procedure: ;  Surgeon: Huseyin Vogel MD;  Location:  HEART CARDIAC CATH LAB     NO HISTORY OF SURGERY       PICC DOUBLE LUMEN PLACEMENT Right 05/27/2022    Failed PICC attempt right arm basilic vein     PICC INSERTION - TRIPLE LUMEN Left 05/28/2022    left basilic 5fr tl,picc44 cm       CURRENT MEDICATIONS:   Current Outpatient Medications   Medication     acetaminophen (TYLENOL) 325 MG tablet     aspirin (ASA) 81 MG chewable tablet     atorvastatin (LIPITOR) 80 MG tablet     blood glucose (NO BRAND SPECIFIED) test strip     blood glucose (ONETOUCH VERIO IQ) test strip     blood glucose monitoring (ONETOUCH VERIO) meter device kit     cyclobenzaprine (FLEXERIL) 5 MG tablet     empagliflozin (JARDIANCE) 25 MG TABS tablet     FLUoxetine (PROZAC) 20 MG capsule     furosemide (LASIX) 20 MG tablet     glipiZIDE (GLUCOTROL XL) 5 MG 24 hr tablet     lisinopril (ZESTRIL) 2.5 MG tablet     LORazepam (ATIVAN) 0.5 MG tablet     metFORMIN (GLUCOPHAGE-XR) 500 MG 24 hr tablet     metoprolol succinate ER (TOPROL XL) 25 MG 24 hr tablet     MULTIPLE VITAMIN OR TABS     Multiple Vitamins-Minerals (PRESERVISION AREDS PO)     oxyCODONE (ROXICODONE) 5 MG  tablet     atorvastatin (LIPITOR) 80 MG tablet     Continuous Blood Gluc Sensor (FREESTYLE VANESSA 14 DAY SENSOR) [facility]     No current facility-administered medications for this visit.       ALLERGIES:   Allergies   Allergen Reactions     Effexor [Venlafaxine Hydrochloride]      Tachycardia, makes her extremely jittery--cant sit down, has to keep moving constantly     ROS:  Review of symptoms otherwise negative unless commented about in HPI.     LABS:  Last Basic Metabolic Panel:  Lab Results   Component Value Date     06/09/2022     01/07/2021      Lab Results   Component Value Date    POTASSIUM 4.5 06/09/2022    POTASSIUM 4.8 01/07/2021     Lab Results   Component Value Date    CHLORIDE 101 06/09/2022    CHLORIDE 106 01/07/2021     Lab Results   Component Value Date    POONAM 9.7 06/09/2022    POONAM 9.5 01/07/2021     Lab Results   Component Value Date    CO2 30 06/09/2022    CO2 28 01/07/2021     Lab Results   Component Value Date    BUN 25 06/09/2022    BUN 19 01/07/2021     Lab Results   Component Value Date    CR 0.98 06/09/2022    CR 0.90 01/07/2021     Lab Results   Component Value Date     06/09/2022     01/07/2021       Last CBC:   Lab Results   Component Value Date    WBC 8.2 06/02/2022    WBC 7.1 09/09/2020     Lab Results   Component Value Date    RBC 2.68 06/02/2022    RBC 4.57 09/09/2020     Lab Results   Component Value Date    HGB 7.5 06/02/2022    HGB 13.3 09/09/2020     Lab Results   Component Value Date    HCT 24.7 06/02/2022    HCT 41.1 09/09/2020     No components found for: MCT  Lab Results   Component Value Date    MCV 92 06/02/2022    MCV 90 09/09/2020     Lab Results   Component Value Date    MCH 28.0 06/02/2022    MCH 29.1 09/09/2020     Lab Results   Component Value Date    MCHC 30.4 06/02/2022    MCHC 32.4 09/09/2020     Lab Results   Component Value Date    RDW 14.6 06/02/2022    RDW 12.5 09/09/2020     Lab Results   Component Value Date     06/02/2022    PLT  "222 09/09/2020        IMAGING:  None    PHYSICAL EXAM:   /72 (BP Location: Right arm, Patient Position: Chair, Cuff Size: Adult Regular)   Pulse 82   Ht 1.624 m (5' 3.94\")   Wt 64.1 kg (141 lb 6.4 oz)   SpO2 100%   BMI 24.32 kg/m    General: alert and oriented x 3, pleasant, no acute distress, normal mood and affect  Neuro: no focal deficits   CV: S1 S2, no murmurs, rubs or gallops, regular rate and rhythm, no peripheral edema  Pulm: bilateral breath sounds, clear to auscultation, easy work of breathing  Incision: incisions clean dry and intact without erythema, swelling or drainage    PROCEDURES: None       ASSESSMENT/PLAN:  Marianne Monroy is a 67 year old year old female status post emergent CABG who returns to clinic for postop visit.     1. Surgically doing well overall.  Incisions are healing well with no signs of infection. Increasing activity and strength overall.   2. Hemodynamics are stable. No medication changes were needed today.  3. Follow up with your cardiologist and HF team as scheduled.  4. Follow up with your PCP (Dr Otoole) as scheduled on 6/23/22.  5. Continue Outpatient Cardiac Rehab until completed.   6. Continue sternal precautions for 12 weeks from surgery date.   7. No driving for 4 weeks from surgery date.   8. FMLA paperwork for her son.   9. Sleep: recommend acetaminophen and flexeril at night, wean off oxycodone as able.     The total time spent with the patient was 30 minutes, > 50% of which was spent in counseling.    CC  Roxanna Otoole         Please do not hesitate to contact me if you have any questions/concerns.     Sincerely,     Cardiovascular Thoracic Surgery    "

## 2022-06-14 NOTE — PATIENT INSTRUCTIONS
1. Surgically doing well overall.  Incisions are healing well with no signs of infection. Increasing activity and strength overall.   2. Hemodynamics are stable. No medication changes were needed today.  3. Follow up with your cardiologist and HF team as scheduled.  4. Follow up with your PCP (Dr Otoole) as scheduled on 6/23/22.  5. Continue Outpatient Cardiac Rehab until completed.   6. Continue sternal precautions for 12 weeks from surgery date.   7. No driving for 4 weeks from surgery date.   8. FMLA paperwork for her son.   9. Sleep: recommend acetaminophen and flexeril at night, wean off oxycodone as able.     [facility] OFFICE:   [phone]

## 2022-06-15 ENCOUNTER — MYC MEDICAL ADVICE (OUTPATIENT)
Dept: FAMILY MEDICINE | Facility: CLINIC | Age: 67
End: 2022-06-15
Payer: COMMERCIAL

## 2022-06-15 ENCOUNTER — TELEPHONE (OUTPATIENT)
Dept: CARDIOLOGY | Facility: CLINIC | Age: 67
End: 2022-06-15
Payer: COMMERCIAL

## 2022-06-15 NOTE — TELEPHONE ENCOUNTER
[facility] Call Center    Phone Message    May a detailed message be left on voicemail: yes     Reason for Call: Other: Pt calling wanting to ask Dr. Steve if she can be prescribed a sleeping pill to help her sleep through the night. Pleaser review and call pt back to discuss. Thank you!     Action Taken: Other: Cardiology    Travel Screening: Not Applicable

## 2022-06-15 NOTE — TELEPHONE ENCOUNTER
Additional My chart message:    Larry Otoole  I am getting the run around - - I have asked all the teams about getting something to help me sleep at night - -  I  would like to get either a fast acting anxiety pill  -  or a sleeping pill with something to help with the stress of wearing the life vest - it is heavy and I feel restricted - [place] keeps saying to check with a different care team= - and now this latest one says to check with you - -PLEASE help me - Jody

## 2022-06-15 NOTE — TELEPHONE ENCOUNTER
Triage -Please schedule a virtual visit so I can talk to her  I have Not seen her post Hospital  I need to Know where she is and I will help her

## 2022-06-15 NOTE — CONFIDENTIAL NOTE
Malina message sent to patient explaining that Dr. Steve was her attending doctor while she was in the hospital and cardiology does not usually prescribe sleeping pills. Encouraged pt to reach out to her PCP where she has a follow-up appointment on 06/23.

## 2022-06-16 ENCOUNTER — TELEPHONE (OUTPATIENT)
Dept: FAMILY MEDICINE | Facility: CLINIC | Age: 67
End: 2022-06-16
Payer: COMMERCIAL

## 2022-06-16 ENCOUNTER — TELEPHONE (OUTPATIENT)
Dept: CARDIOLOGY | Facility: CLINIC | Age: 67
End: 2022-06-16
Payer: COMMERCIAL

## 2022-06-16 DIAGNOSIS — Z53.9 DIAGNOSIS NOT YET DEFINED: Primary | ICD-10-CM

## 2022-06-16 PROCEDURE — G0180 MD CERTIFICATION HHA PATIENT: HCPCS | Performed by: FAMILY MEDICINE

## 2022-06-16 NOTE — TELEPHONE ENCOUNTER
Patient called to inform that the [place] paperwork for her son was filled out and emailed to Matthew. Patient verbalized understanding and agreed to the plan.

## 2022-06-16 NOTE — TELEPHONE ENCOUNTER
I called Jody's daughter, Adelaide, to check in after her CORE appointment last week. Left message asking for her to call back and discuss.    Dasia Amin RN

## 2022-06-16 NOTE — TELEPHONE ENCOUNTER
Huddled with provider. No appointments available with provider or PCP clinic. PCP advised virtual appointment with any provider.     Left detailed message on patient's voicemail and [employer] message sent.     Zaida SNELL RN  [facility]

## 2022-06-16 NOTE — TELEPHONE ENCOUNTER
Please see additional [employer] message from 6/15. Response sent to patient    Zaida SNELL RN  [facility]

## 2022-06-16 NOTE — TELEPHONE ENCOUNTER
Reason for Call:  Form, our goal is to have forms completed with 72 hours, however, some forms may require a visit or additional information.    Type of letter, form or note:  Home Health Certification    Who is the form from?: Home care    Where did the form come from: form was faxed in    What clinic location was the form placed at?: [facility]    Where the form was placed: Given to physician    What number is listed as a contact on the form?: [phone]       Call taken on 6/16/2022 at 2:34 PM by Lisa Venegas

## 2022-06-16 NOTE — TELEPHONE ENCOUNTER
Please see additional [employer] message from 6/15. Response sent to patient 6/16/22     Zaida SNELL RN  [facility]

## 2022-06-17 DIAGNOSIS — I50.21 ACUTE SYSTOLIC HEART FAILURE (H): Primary | ICD-10-CM

## 2022-06-17 NOTE — TELEPHONE ENCOUNTER
I called Jody and left her a message to see how she is feeling after her CORE appointment last week.     Dasia Amin RN

## 2022-06-20 ENCOUNTER — MYC MEDICAL ADVICE (OUTPATIENT)
Dept: CARDIOLOGY | Facility: CLINIC | Age: 67
End: 2022-06-20

## 2022-06-20 NOTE — TELEPHONE ENCOUNTER
I called Jody and her daughter Adelaide to check in on Jody's weight and symptoms, no answer.    Dasia Amin RN

## 2022-06-24 ENCOUNTER — TELEPHONE (OUTPATIENT)
Dept: CARDIOLOGY | Facility: CLINIC | Age: 67
End: 2022-06-24

## 2022-06-24 DIAGNOSIS — Z95.1 S/P CABG (CORONARY ARTERY BYPASS GRAFT): ICD-10-CM

## 2022-06-24 RX ORDER — LISINOPRIL 5 MG/1
5 TABLET ORAL DAILY
Qty: 90 TABLET | Refills: 0 | Status: SHIPPED | OUTPATIENT
Start: 2022-06-24 | End: 2022-07-14

## 2022-06-24 NOTE — TELEPHONE ENCOUNTER
Date: 6/24/2022    Time of Call: 9:18 AM     Diagnosis:  HF     [ TORB ] Ordering provider: Belinda Dyer CNP  Order: Increase Lisinopril to 5 mg daily.      Order received by: Ashley Ennis RN       Follow-up/additional notes:     Called Jody and left VM to increase Lisinopril to 5 mg daily. Also called pt daughter Adelaide and left VM to increase Lisinopril to 5 mg daily.     Ashley Ennis RN

## 2022-06-24 NOTE — TELEPHONE ENCOUNTER
----- Message from OSCAR Joseph CNP sent at 6/24/2022  9:09 AM CDT -----  Regarding: update  Please have patient increase lisinopril to 5 mg - will check labs next week prior to appt

## 2022-06-27 ENCOUNTER — OFFICE VISIT (OUTPATIENT)
Dept: FAMILY MEDICINE | Facility: CLINIC | Age: 67
End: 2022-06-27
Payer: COMMERCIAL

## 2022-06-27 VITALS
SYSTOLIC BLOOD PRESSURE: 120 MMHG | OXYGEN SATURATION: 99 % | WEIGHT: 138 LBS | BODY MASS INDEX: 23.73 KG/M2 | HEART RATE: 96 BPM | DIASTOLIC BLOOD PRESSURE: 60 MMHG | TEMPERATURE: 98 F

## 2022-06-27 DIAGNOSIS — I25.119 CORONARY ARTERY DISEASE INVOLVING NATIVE HEART WITH ANGINA PECTORIS, UNSPECIFIED VESSEL OR LESION TYPE (H): Primary | ICD-10-CM

## 2022-06-27 DIAGNOSIS — I47.29 NON-SUSTAINED VENTRICULAR TACHYCARDIA (H): ICD-10-CM

## 2022-06-27 DIAGNOSIS — F41.1 GENERALIZED ANXIETY DISORDER: ICD-10-CM

## 2022-06-27 DIAGNOSIS — E11.40 TYPE 2 DIABETES MELLITUS WITH DIABETIC NEUROPATHY, WITHOUT LONG-TERM CURRENT USE OF INSULIN (H): ICD-10-CM

## 2022-06-27 DIAGNOSIS — Z95.1 S/P CABG (CORONARY ARTERY BYPASS GRAFT): ICD-10-CM

## 2022-06-27 DIAGNOSIS — M54.50 MIDLINE LOW BACK PAIN WITHOUT SCIATICA, UNSPECIFIED CHRONICITY: ICD-10-CM

## 2022-06-27 DIAGNOSIS — R60.0 PEDAL EDEMA: ICD-10-CM

## 2022-06-27 DIAGNOSIS — I50.22 CHRONIC SYSTOLIC HEART FAILURE (H): ICD-10-CM

## 2022-06-27 DIAGNOSIS — F33.0 MAJOR DEPRESSIVE DISORDER, RECURRENT EPISODE, MILD (H): ICD-10-CM

## 2022-06-27 DIAGNOSIS — I10 HYPERTENSION, GOAL BELOW 140/90: ICD-10-CM

## 2022-06-27 PROCEDURE — 99215 OFFICE O/P EST HI 40 MIN: CPT | Performed by: NURSE PRACTITIONER

## 2022-06-27 RX ORDER — HYDROXYZINE HYDROCHLORIDE 25 MG/1
25 TABLET, FILM COATED ORAL 3 TIMES DAILY PRN
Qty: 90 TABLET | Refills: 1 | Status: SHIPPED | OUTPATIENT
Start: 2022-06-27 | End: 2022-08-22

## 2022-06-27 NOTE — PROGRESS NOTES
Assessment & Plan     Coronary artery disease involving native heart with angina pectoris, unspecified vessel or lesion type (H)  Current therapy is BB, ACEi, statin, ASA. Recently underwent CABG. Follows with cardiology. Encourage starting cardiac rehab.     Non-sustained ventricular tachycardia (H)  Taking metoprolol. Asymptomatic at this time. Encouraged patient to continue to wear LifeVest until cleared by cardiology.     S/P CABG (coronary artery bypass graft)  4 vessel CABG 5/24/2022. Starting cardiac rehab in the next week. Incisions healing well, no signs of infection.    Chronic systolic heart failure (H)  Follows with [facility]. Dyspnea is stable.     Pedal edema  Has swelling in right leg only, will order US to r/o clot.   - Orthotics and Prosthetics DME Compression; Leg; Knee; Bilateral; 15/20 mmHg; 6 Pair  - US Lower Extremity Venous Duplex Right; Future    Hypertension, goal below 140/90  The current medical regimen is effective;  continue present plan and medications.    Type 2 diabetes mellitus with diabetic neuropathy, without long-term current use of insulin (H)  Continue present plan and medications. Diabetes check is due in one month.   - OPTOMETRY REFERRAL; Future    Midline low back pain without sciatica, unspecified chronicity  Counseled on self-care measures including: ice/heat, OTC acetaminophen, frequent short walks, comfortable positions; and warning signs of when to seek urgent medical care including: sudden change or worsening symptoms. Counseled to avoid NSAIDs such is aleve and ibuprofen.     Generalized anxiety disorder  Not well controlled on intermittent days. Continue fluoxetine and start hydroxyzine PRN.   - hydrOXYzine (ATARAX) 25 MG tablet; Take 1 tablet (25 mg) by mouth 3 times daily as needed for anxiety    Major depressive disorder, recurrent episode, mild (H)  The current medical regimen is effective;  continue present plan and medications.      Review of the result(s)  of each unique test - BMP, BNP, echo  Prescription drug management  54 minutes spent on the date of the encounter doing chart review, history and exam, documentation and further activities per the note     Return in about 4 weeks (around 7/25/2022) for diabetes check with Dr. Otoole.    OSCAR Alaniz CNP  [facility] NARDA Vera is a 67 year old accompanied by her daughter., presenting for the following health issues:  Hospital F/U      HPI       Hospital Follow-up Visit:    Hospital/Nursing Home/IP Rehab Facility: [facility]  Date of Admission: 5/18/2022  Date of Discharge: 06/04/2022  Reason(s) for Admission: Coronary Artery Bypass Graft    Was your hospitalization related to COVID-19? No   Problems taking medications regularly:  None  Medication changes since discharge: None  Problems adhering to non-medication therapy:  None    Summary of hospitalization:  [facility] discharge summary reviewed  Diagnostic Tests/Treatments reviewed.  Follow up needed: cardiology  Other Healthcare Providers Involved in Patient s Care:         Specialist appointment - cardiology and Physical Therapy  Update since discharge: improved.   Post Discharge Medication Reconciliation: discharge medications reconciled and changed, per note/orders.  Plan of care communicated with patient and family     Denies chest pain, pressure. Denies weight gain. Has swelling in right foot and ankle that is worse at night, resolved in the morning. Right ankle and foot not painful. Is more red than the other side.  Not wearing compression sock.    States that she will have a back ache that gradually worsens throughout the day. Improved in the mornings. Aleve helped. Heating pad helped. States that she does a lot of sitting during the day. Does one long walk during the day about half to 3/4 a mile without back or chest pain, will get a little dyspnea.      States that she has been having loose stools that past week, about 2 daily. States that she c dif in the past but  This does not feel the same. Stools are not watery.     Review of Systems   Constitutional, HEENT, cardiovascular, pulmonary, GI, , musculoskeletal, neuro, skin, endocrine and psych systems are negative, except as otherwise noted.      Objective    /60 (BP Location: Right arm, Patient Position: Chair, Cuff Size: Adult Regular)   Pulse 96   Temp 98  F (36.7  C) (Oral)   Wt 62.6 kg (138 lb)   SpO2 99%   BMI 23.73 kg/m    Body mass index is 23.73 kg/m .  Physical Exam   GENERAL: healthy, alert and no distress  RESP: lungs clear to auscultation - no rales, rhonchi or wheezes  CV: regular rates and rhythm, normal S1 S2, no S3 or S4, no murmur, click or rub, peripheral pulses strong and 1+ right lower extremity pitting edema to mid lower leg    SKIN: well healing sternal incision and leg punctures   PSYCH: mentation appears normal, affect normal/bright              .  ..

## 2022-06-28 ENCOUNTER — LAB (OUTPATIENT)
Dept: LAB | Facility: CLINIC | Age: 67
End: 2022-06-28
Payer: COMMERCIAL

## 2022-06-28 DIAGNOSIS — I50.21 ACUTE SYSTOLIC HEART FAILURE (H): ICD-10-CM

## 2022-06-28 LAB
ANION GAP SERPL CALCULATED.3IONS-SCNC: 13 MMOL/L (ref 3–14)
BUN SERPL-MCNC: 30 MG/DL (ref 7–30)
CALCIUM SERPL-MCNC: 9.4 MG/DL (ref 8.5–10.1)
CHLORIDE BLD-SCNC: 106 MMOL/L (ref 94–109)
CO2 SERPL-SCNC: 22 MMOL/L (ref 20–32)
CREAT SERPL-MCNC: 0.95 MG/DL (ref 0.52–1.04)
GFR SERPL CREATININE-BSD FRML MDRD: 65 ML/MIN/1.73M2
GLUCOSE BLD-MCNC: 137 MG/DL (ref 70–99)
POTASSIUM BLD-SCNC: 4.6 MMOL/L (ref 3.4–5.3)
SODIUM SERPL-SCNC: 141 MMOL/L (ref 133–144)

## 2022-06-28 PROCEDURE — 80048 BASIC METABOLIC PNL TOTAL CA: CPT | Performed by: PATHOLOGY

## 2022-06-28 PROCEDURE — 36415 COLL VENOUS BLD VENIPUNCTURE: CPT | Performed by: PATHOLOGY

## 2022-06-28 NOTE — TELEPHONE ENCOUNTER
Called Jody again; confirmed that she received VM to increase Lisinopril to 5 mg daily. Pt states that she started increased dose on 6/25/22.     Pt to be seen later this week in [facility] with Clyde Forman RN.     Ahsley Ennis RN

## 2022-06-29 NOTE — TELEPHONE ENCOUNTER
RECORDS RECEIVED FROM:   DATE RECEIVED:   NOTES STATUS DETAILS   OFFICE NOTE from referring provider    Internal 5/25/22 referral    OFFICE NOTE from other cardiologist    Internal 6/9/22 Belinda Colon APRN CNP ([facility])      DISCHARGE SUMMARY from hospital    N/A    DISCHARGE REPORT from the ER   N/A    OPERATIVE REPORT    Internal 5/24/22 Median sternotomy  5/19/22 coronary angiogram   MEDICATION LIST   Internal    LABS     BMP   Internal 6/28/22   CBC   Internal 6/2/22   CMP   Internal 5/24/22   Lipids   Internal 5/18/22   TSH   Internal 5/11/22   DIAGNOSTIC PROCEDURES     EKG   Internal 6/5/22   Monitor Reports   Internal 7/26/22   IMAGING (DISC & REPORT)      Echo   Internal 8/26/22    Stress Tests   N/A    Cath   N/A    MRI/MRA   N/A    CT/CTA   N/A

## 2022-06-30 ENCOUNTER — MYC MEDICAL ADVICE (OUTPATIENT)
Dept: FAMILY MEDICINE | Facility: CLINIC | Age: 67
End: 2022-06-30

## 2022-06-30 ENCOUNTER — OFFICE VISIT (OUTPATIENT)
Dept: CARDIOLOGY | Facility: CLINIC | Age: 67
End: 2022-06-30
Payer: COMMERCIAL

## 2022-06-30 VITALS
HEART RATE: 92 BPM | OXYGEN SATURATION: 97 % | WEIGHT: 136 LBS | DIASTOLIC BLOOD PRESSURE: 71 MMHG | SYSTOLIC BLOOD PRESSURE: 113 MMHG | BODY MASS INDEX: 23.39 KG/M2

## 2022-06-30 DIAGNOSIS — Z95.1 S/P CABG (CORONARY ARTERY BYPASS GRAFT): ICD-10-CM

## 2022-06-30 DIAGNOSIS — I50.20 HFREF (HEART FAILURE WITH REDUCED EJECTION FRACTION) (H): Primary | ICD-10-CM

## 2022-06-30 DIAGNOSIS — I10 HYPERTENSION, UNSPECIFIED TYPE: ICD-10-CM

## 2022-06-30 DIAGNOSIS — I50.21 ACUTE SYSTOLIC HEART FAILURE (H): Primary | ICD-10-CM

## 2022-06-30 DIAGNOSIS — E11.9 DIABETES MELLITUS TYPE 2, NONINSULIN DEPENDENT (H): ICD-10-CM

## 2022-06-30 DIAGNOSIS — I25.5 ISCHEMIC CARDIOMYOPATHY: ICD-10-CM

## 2022-06-30 PROCEDURE — 99214 OFFICE O/P EST MOD 30 MIN: CPT | Performed by: NURSE PRACTITIONER

## 2022-06-30 RX ORDER — METOPROLOL SUCCINATE 50 MG/1
50 TABLET, EXTENDED RELEASE ORAL DAILY
Qty: 90 TABLET | Refills: 3 | Status: SHIPPED | OUTPATIENT
Start: 2022-06-30 | End: 2022-07-25

## 2022-06-30 NOTE — LETTER
2022      RE: Marianne Monroy  [address]  [place] [address]       Dear Colleague,    Thank you for the opportunity to participate in the care of your patient, Marianne Monroy, at the [facility]. Please see a copy of my visit note below.      HPI: Marianne is a 67 year old White female with a past medical history of T2DM, HTN, GERD, JUSTUS, MDD and recent episode of lightheadedness and dizziness on     Admission - - 2022.  Surgeon: Dr. Ulises Desai  1.  Coronary artery bypass grafting x 4 (left internal mammary artery to left anterior descending artery, saphenous vein graft to distal right coronary artery, saphenous vein graft to ramus intermedius artery, saphenous vein graft to obtuse marginal artery).  2.  Endoscopic vein harvest.    Patient has no SOB at rest.  She is able to tolerate up to a 1 mile before she feels SOB. No swelling in the legs. She had no swelling before the hospitalization in the legs. She has fatigue. She has some lightheadedness with fast movements.  Weight at home 135 lbs. Nothing tastes good still. She stopped the Lasix - she can't recall     Denies SOB,PND, orthopnea, edema, weight gain, chest pain, palpitations, lightheadedness, dizziness, near syncopal/syncopal episodes. Marianne has been following salt and fluid restrictions.      PAST MEDICAL HISTORY:  Past Medical History:   Diagnosis Date     Abnormal Pap smear of cervix ~    repeat pap was normal     Allergic rhinitis, cause unspecified      Anxiety state, unspecified     panic episodes     Unspecified essential hypertension        FAMILY HISTORY:  Family History   Problem Relation Age of Onset     Diabetes Mother         hypertension, alive at 83     C.A.D. Mother      Cancer Father         lung cancer, smoker.   at age 53     Family History Negative Sister      Osteoporosis Sister        SOCIAL  HISTORY:  Social History     Tobacco Use     Smoking status: Never Smoker     Smokeless tobacco: Never Used   Vaping Use     Vaping Use: Never used   Substance Use Topics     Alcohol use: Yes     Comment: social     Drug use: No           CURRENT MEDICATIONS:  Current Outpatient Medications   Medication Sig Dispense Refill     acetaminophen (TYLENOL) 325 MG tablet Take 2 tablets (650 mg) by mouth every 4 hours as needed for other (For optimal non-opioid multimodal pain management to improve pain control.) 100 tablet 0     aspirin (ASA) 81 MG chewable tablet 2 tablets (162 mg) by Oral or NG Tube route daily 120 tablet 0     atorvastatin (LIPITOR) 80 MG tablet Take 1 tablet (80 mg) by mouth daily 30 tablet 0     blood glucose (NO BRAND SPECIFIED) test strip Use to test blood sugar 2 times daily or as directed. 200 strip 1     blood glucose (ONETOUCH VERIO IQ) test strip Use to test blood sugar 2 times daily or as directed. 200 strip 1     blood glucose monitoring (ONETOUCH VERIO) meter device kit Use to test blood sugar 2 times daily or as directed. 1 kit 0     cyclobenzaprine (FLEXERIL) 5 MG tablet Take 1 tablet (5 mg) by mouth every 8 hours as needed for muscle spasms 12 tablet 1     empagliflozin (JARDIANCE) 25 MG TABS tablet Take 1 tablet (25 mg) by mouth daily 30 tablet 4     FLUoxetine (PROZAC) 20 MG capsule TAKE 2 CAPSULES DAILY 180 capsule 1     glipiZIDE (GLUCOTROL XL) 5 MG 24 hr tablet TAKE 2 TABLETS DAILY (NEED ANNUAL EXAM) 180 tablet 1     hydrOXYzine (ATARAX) 25 MG tablet Take 1 tablet (25 mg) by mouth 3 times daily as needed for anxiety 90 tablet 1     lisinopril (ZESTRIL) 5 MG tablet Take 1 tablet (5 mg) by mouth daily 90 tablet 0     LORazepam (ATIVAN) 0.5 MG tablet Take 1 tablet (0.5 mg) by mouth every 6 hours as needed for anxiety 6 tablet 0     metFORMIN (GLUCOPHAGE-XR) 500 MG 24 hr tablet Take 4 tablets (2,000 mg) by mouth daily (with dinner) Please see changed dose 360 tablet 1     metoprolol  succinate ER (TOPROL XL) 25 MG 24 hr tablet Take 1 tablet (25 mg) by mouth daily 90 tablet 0     MULTIPLE VITAMIN OR TABS 1 TABLET DAILY       Multiple Vitamins-Minerals (PRESERVISION AREDS PO) Take 2 tablets by mouth daily       oxyCODONE (ROXICODONE) 5 MG tablet Take 1 tablet (5 mg) by mouth every 6 hours as needed for pain 30 tablet 0     Continuous Blood Gluc Sensor (FREESTYLE VANESSA 14 DAY SENSOR) MISC 1 each every 14 days Change every 14 days. (Patient not taking: No sig reported) 2 each 5     furosemide (LASIX) 20 MG tablet Take 1 tablet (20 mg) by mouth daily (Patient not taking: Reported on 6/30/2022) 90 tablet 3       ROS:  Review Of Systems  Skin: negative  Eyes: negative  Ears/Nose/Throat: negative  Respiratory: No shortness of breath, dyspnea on exertion, cough, or hemoptysis  Cardiovascular: negative  Gastrointestinal: negative  Genitourinary: negative  Musculoskeletal: negative  Neurologic: negative  Psychiatric: negative  Hematologic/Lymphatic/Immunologic: negative  Endocrine: negative      EXAM:  /71 (BP Location: Right arm, Patient Position: Sitting, Cuff Size: Adult Regular)   Pulse 92   Wt 61.7 kg (136 lb)   SpO2 97%   BMI 23.39 kg/m    Home weight:  General: alert, articulate, and in no acute distress.  HEENT: normocephalic, atraumatic, anicteric sclera, EOMI, mucosa moist, no cyanosis.   Neck: neck supple.  No adenopathy, masses, or carotid bruits.  JVP flat at 90 degrees  Heart: regular rhythm, normal S1/S2, no murmur, gallop, rub.  Precordium quiet with normal PMI.     Lungs: clear, no rales, ronchi, or wheezing.  No accessory muscle use, respirations unlabored.   Abdomen: soft, non-tender, bowel sounds present, no hepatomegaly  Extremities: no pitting edema.   No cyanosis.   Neurological: alert and oriented x 3.  normal speech and affect, no gross motor deficits  Skin:  No ecchymoses, rashes, or clubbing.    Labs:  CBC RESULTS:  Lab Results   Component Value Date    WBC 8.2  06/02/2022    WBC 7.1 09/09/2020    RBC 2.68 (L) 06/02/2022    RBC 4.57 09/09/2020    HGB 7.5 (L) 06/02/2022    HGB 13.3 09/09/2020    HCT 24.7 (L) 06/02/2022    HCT 41.1 09/09/2020    MCV 92 06/02/2022    MCV 90 09/09/2020    MCH 28.0 06/02/2022    MCH 29.1 09/09/2020    MCHC 30.4 (L) 06/02/2022    MCHC 32.4 09/09/2020    RDW 14.6 06/02/2022    RDW 12.5 09/09/2020     06/02/2022     09/09/2020       CMP RESULTS:  Lab Results   Component Value Date     06/28/2022     01/07/2021    POTASSIUM 4.6 06/28/2022    POTASSIUM 4.8 01/07/2021    CHLORIDE 106 06/28/2022    CHLORIDE 106 01/07/2021    CO2 22 06/28/2022    CO2 28 01/07/2021    ANIONGAP 13 06/28/2022    ANIONGAP 7 01/07/2021     (H) 06/28/2022     (H) 01/07/2021    BUN 30 06/28/2022    BUN 19 01/07/2021    CR 0.95 06/28/2022    CR 0.90 01/07/2021    GFRESTIMATED 65 06/28/2022    GFRESTIMATED 66 01/07/2021    GFRESTBLACK 77 01/07/2021    POONAM 9.4 06/28/2022    POONAM 9.5 01/07/2021    BILITOTAL 0.2 05/24/2022    BILITOTAL 0.4 09/09/2020    ALBUMIN 2.4 (L) 05/24/2022    ALBUMIN 3.7 09/09/2020    ALKPHOS 53 05/24/2022    ALKPHOS 90 09/09/2020    ALT 24 05/24/2022    ALT 33 09/09/2020    AST 34 05/24/2022    AST 14 09/09/2020        INR RESULTS:  Lab Results   Component Value Date    INR 1.29 (H) 05/25/2022       No components found for: CK  Lab Results   Component Value Date    MAG 2.0 06/04/2022     Lab Results   Component Value Date    NTBNP 2,714 (H) 06/09/2022     @BRIEFLABR (dig)@    Most recent echocardiogram:  No results found for this or any previous visit (from the past 8760 hour(s)).      Assessment and Plan:    In summary,Marianne  is a 67 year old. Increase Metoprolol to 50 mg daily , in a week increase Lisinopril 10 mg daily.. if doing well increase the Lisinopril to 20 mg and obtain labs in 2 weeks.  Follow up in clinic 8/4     1.  Chronic systolic heart failure secondary to ICM.  Stage C  NYHA Class III  ACEi/ARB: yes  5 mg Lisinopril- continue up titration   BB: yes- continue up titration- Metoprolol 50 mg   Aldosterone antagonist: no- will initiate at future visit.   SCD prophylaxis: does not meet criteria for implant  Fluid status: euvolemic  Anticoagulation:   Antiplatelet:  ASA dose   Sleep apnea:  NSAID use:  Contraindicated.  Avoid use.  Remote Monitoring:none  SGLT 2 - Jardiance 25 mg    2.  Other comordbid conditions:      #T2DM - PCP to manage - glipizide , Jardiance, metformin    #HTN- 107/66- stable - Lisinopril and Metoprolol      #GERD- followed by PCP     3.  Follow-up   Metoprolol 50 mg   In a week increase the Lisinopril to 10 mg daily   In 2 weeks increase Lisinopril 20 mg daily - then get labs  2 weeks later  Follow up [facility] in August- 4th       Belinda MOORE, CNP  [facility]                 Please do not hesitate to contact me if you have any questions/concerns.     Sincerely,     OSCAR Smith CNP

## 2022-06-30 NOTE — NURSING NOTE
"Chief Complaint   Patient presents with     Follow Up     Pt reports chest tightness, JEFFERSON when going long distances, lightheaded/dizziness, more tired than normal (especially today), [facility] Return, 66 yo female with systolic heart failure presents for follow up with labs prior.       Initial /71 (BP Location: Right arm, Patient Position: Sitting, Cuff Size: Adult Regular)   Pulse 92   Wt 61.7 kg (136 lb)   SpO2 97%   BMI 23.39 kg/m   Estimated body mass index is 23.39 kg/m  as calculated from the following:    Height as of 6/14/22: 1.624 m (5' 3.94\").    Weight as of this encounter: 61.7 kg (136 lb)..  BP completed using cuff size: regular    EDWIN Joseph    "

## 2022-06-30 NOTE — LETTER
2022       RE: Marianne Monroy  [address]  [place] [facility] [address]     Dear Colleague,    Thank you for referring your patient, Marianne Monroy, to the [facility] [place] at [facility]. Please see a copy of my visit note below.      HPI: Marianne is a 67 year old White female with a past medical history of T2DM, HTN, GERD, JUSTUS, MDD and recent episode of lightheadedness and dizziness on     Admission - - 2022.  Surgeon: Dr. Ulises Desai  1.  Coronary artery bypass grafting x 4 (left internal mammary artery to left anterior descending artery, saphenous vein graft to distal right coronary artery, saphenous vein graft to ramus intermedius artery, saphenous vein graft to obtuse marginal artery).  2.  Endoscopic vein harvest.    Patient has no SOB at rest.  She is able to tolerate up to a 1 mile before she feels SOB. No swelling in the legs. She had no swelling before the hospitalization in the legs. She has fatigue. She has some lightheadedness with fast movements.  Weight at home 135 lbs. Nothing tastes good still. She stopped the Lasix - she can't recall     Denies SOB,PND, orthopnea, edema, weight gain, chest pain, palpitations, lightheadedness, dizziness, near syncopal/syncopal episodes. Marianne has been following salt and fluid restrictions.      PAST MEDICAL HISTORY:  Past Medical History:   Diagnosis Date     Abnormal Pap smear of cervix ~    repeat pap was normal     Allergic rhinitis, cause unspecified      Anxiety state, unspecified     panic episodes     Unspecified essential hypertension        FAMILY HISTORY:  Family History   Problem Relation Age of Onset     Diabetes Mother         hypertension, alive at 83     C.A.D. Mother      Cancer Father         lung cancer, smoker.   at age 53     Family History Negative Sister      Osteoporosis Sister        SOCIAL HISTORY:  Social History     Tobacco Use      Smoking status: Never Smoker     Smokeless tobacco: Never Used   Vaping Use     Vaping Use: Never used   Substance Use Topics     Alcohol use: Yes     Comment: social     Drug use: No           CURRENT MEDICATIONS:  Current Outpatient Medications   Medication Sig Dispense Refill     acetaminophen (TYLENOL) 325 MG tablet Take 2 tablets (650 mg) by mouth every 4 hours as needed for other (For optimal non-opioid multimodal pain management to improve pain control.) 100 tablet 0     aspirin (ASA) 81 MG chewable tablet 2 tablets (162 mg) by Oral or NG Tube route daily 120 tablet 0     atorvastatin (LIPITOR) 80 MG tablet Take 1 tablet (80 mg) by mouth daily 30 tablet 0     blood glucose (NO BRAND SPECIFIED) test strip Use to test blood sugar 2 times daily or as directed. 200 strip 1     blood glucose (ONETOUCH VERIO IQ) test strip Use to test blood sugar 2 times daily or as directed. 200 strip 1     blood glucose monitoring (ONETOUCH VERIO) meter device kit Use to test blood sugar 2 times daily or as directed. 1 kit 0     cyclobenzaprine (FLEXERIL) 5 MG tablet Take 1 tablet (5 mg) by mouth every 8 hours as needed for muscle spasms 12 tablet 1     empagliflozin (JARDIANCE) 25 MG TABS tablet Take 1 tablet (25 mg) by mouth daily 30 tablet 4     FLUoxetine (PROZAC) 20 MG capsule TAKE 2 CAPSULES DAILY 180 capsule 1     glipiZIDE (GLUCOTROL XL) 5 MG 24 hr tablet TAKE 2 TABLETS DAILY (NEED ANNUAL EXAM) 180 tablet 1     hydrOXYzine (ATARAX) 25 MG tablet Take 1 tablet (25 mg) by mouth 3 times daily as needed for anxiety 90 tablet 1     lisinopril (ZESTRIL) 5 MG tablet Take 1 tablet (5 mg) by mouth daily 90 tablet 0     LORazepam (ATIVAN) 0.5 MG tablet Take 1 tablet (0.5 mg) by mouth every 6 hours as needed for anxiety 6 tablet 0     metFORMIN (GLUCOPHAGE-XR) 500 MG 24 hr tablet Take 4 tablets (2,000 mg) by mouth daily (with dinner) Please see changed dose 360 tablet 1     metoprolol succinate ER (TOPROL XL) 25 MG 24 hr tablet Take  1 tablet (25 mg) by mouth daily 90 tablet 0     MULTIPLE VITAMIN OR TABS 1 TABLET DAILY       Multiple Vitamins-Minerals (PRESERVISION AREDS PO) Take 2 tablets by mouth daily       oxyCODONE (ROXICODONE) 5 MG tablet Take 1 tablet (5 mg) by mouth every 6 hours as needed for pain 30 tablet 0     Continuous Blood Gluc Sensor (FREESTYLE VANESSA 14 DAY SENSOR) MISC 1 each every 14 days Change every 14 days. (Patient not taking: No sig reported) 2 each 5     furosemide (LASIX) 20 MG tablet Take 1 tablet (20 mg) by mouth daily (Patient not taking: Reported on 6/30/2022) 90 tablet 3       ROS:  Review Of Systems  Skin: negative  Eyes: negative  Ears/Nose/Throat: negative  Respiratory: No shortness of breath, dyspnea on exertion, cough, or hemoptysis  Cardiovascular: negative  Gastrointestinal: negative  Genitourinary: negative  Musculoskeletal: negative  Neurologic: negative  Psychiatric: negative  Hematologic/Lymphatic/Immunologic: negative  Endocrine: negative      EXAM:  /71 (BP Location: Right arm, Patient Position: Sitting, Cuff Size: Adult Regular)   Pulse 92   Wt 61.7 kg (136 lb)   SpO2 97%   BMI 23.39 kg/m    Home weight:  General: alert, articulate, and in no acute distress.  HEENT: normocephalic, atraumatic, anicteric sclera, EOMI, mucosa moist, no cyanosis.   Neck: neck supple.  No adenopathy, masses, or carotid bruits.  JVP flat at 90 degrees  Heart: regular rhythm, normal S1/S2, no murmur, gallop, rub.  Precordium quiet with normal PMI.     Lungs: clear, no rales, ronchi, or wheezing.  No accessory muscle use, respirations unlabored.   Abdomen: soft, non-tender, bowel sounds present, no hepatomegaly  Extremities: no pitting edema.   No cyanosis.   Neurological: alert and oriented x 3.  normal speech and affect, no gross motor deficits  Skin:  No ecchymoses, rashes, or clubbing.    Labs:  CBC RESULTS:  Lab Results   Component Value Date    WBC 8.2 06/02/2022    WBC 7.1 09/09/2020    RBC 2.68 (L)  06/02/2022    RBC 4.57 09/09/2020    HGB 7.5 (L) 06/02/2022    HGB 13.3 09/09/2020    HCT 24.7 (L) 06/02/2022    HCT 41.1 09/09/2020    MCV 92 06/02/2022    MCV 90 09/09/2020    MCH 28.0 06/02/2022    MCH 29.1 09/09/2020    MCHC 30.4 (L) 06/02/2022    MCHC 32.4 09/09/2020    RDW 14.6 06/02/2022    RDW 12.5 09/09/2020     06/02/2022     09/09/2020       CMP RESULTS:  Lab Results   Component Value Date     06/28/2022     01/07/2021    POTASSIUM 4.6 06/28/2022    POTASSIUM 4.8 01/07/2021    CHLORIDE 106 06/28/2022    CHLORIDE 106 01/07/2021    CO2 22 06/28/2022    CO2 28 01/07/2021    ANIONGAP 13 06/28/2022    ANIONGAP 7 01/07/2021     (H) 06/28/2022     (H) 01/07/2021    BUN 30 06/28/2022    BUN 19 01/07/2021    CR 0.95 06/28/2022    CR 0.90 01/07/2021    GFRESTIMATED 65 06/28/2022    GFRESTIMATED 66 01/07/2021    GFRESTBLACK 77 01/07/2021    POONAM 9.4 06/28/2022    POONAM 9.5 01/07/2021    BILITOTAL 0.2 05/24/2022    BILITOTAL 0.4 09/09/2020    ALBUMIN 2.4 (L) 05/24/2022    ALBUMIN 3.7 09/09/2020    ALKPHOS 53 05/24/2022    ALKPHOS 90 09/09/2020    ALT 24 05/24/2022    ALT 33 09/09/2020    AST 34 05/24/2022    AST 14 09/09/2020        INR RESULTS:  Lab Results   Component Value Date    INR 1.29 (H) 05/25/2022       No components found for: CK  Lab Results   Component Value Date    MAG 2.0 06/04/2022     Lab Results   Component Value Date    NTBNP 2,714 (H) 06/09/2022     @BRIEFLABR (dig)@    Most recent echocardiogram:  No results found for this or any previous visit (from the past 8760 hour(s)).      Assessment and Plan:    In summary,Marianne  is a 67 year old. Increase Metoprolol to 50 mg daily , in a week increase Lisinopril 10 mg daily.. if doing well increase the Lisinopril to 20 mg and obtain labs in 2 weeks.  Follow up in clinic 8/4     1.  Chronic systolic heart failure secondary to ICM.  Stage C  NYHA Class III  ACEi/ARB: yes 5 mg Lisinopril- continue up titration   BB:  yes- continue up titration- Metoprolol 50 mg   Aldosterone antagonist: no- will initiate at future visit.   SCD prophylaxis: does not meet criteria for implant  Fluid status: euvolemic  Anticoagulation:   Antiplatelet:  ASA dose   Sleep apnea:  NSAID use:  Contraindicated.  Avoid use.  Remote Monitoring:none  SGLT 2 - Jardiance 25 mg    2.  Other comordbid conditions:      #T2DM - PCP to manage - glipizide , Jardiance, metformin    #HTN- 107/66- stable - Lisinopril and Metoprolol      #GERD- followed by PCP     3.  Follow-up   Metoprolol 50 mg   In a week increase the Lisinopril to 10 mg daily   In 2 weeks increase Lisinopril 20 mg daily - then get labs  2 weeks later  Follow up [facility] in August- 4th       Belinda MOORE CNP  [facility]

## 2022-06-30 NOTE — NURSING NOTE
Labs: Patient was given results of the laboratory testing obtained today. Patient was instructed to return for the next laboratory testing in 1 month. Patient demonstrated understanding of this information and agreed to call with further questions or concerns.     Return Appointment: Patient given instructions regarding scheduling next clinic visit. Patient demonstrated understanding of this information and agreed to call with further questions or concerns. [facility] August 4.    Medication Change: Patient was educated regarding prescribed medication change, including discussion of the indication, administration, side effects, and when to report to MD or RN. Patient demonstrated understanding of this information and agreed to call with further questions or concerns. Increase metoprolol to 50 mg daily.    Patient stated she understood all health information given and agreed to call with further questions or concerns.    Dasia Amin, IKER

## 2022-06-30 NOTE — PROGRESS NOTES
HPI: Marianne is a 67 year old White female with a past medical history of T2DM, HTN, GERD, JUSTUS, MDD and recent episode of lightheadedness and dizziness on     Admission - - 2022.  Surgeon: Dr. Ulises Desai  1.  Coronary artery bypass grafting x 4 (left internal mammary artery to left anterior descending artery, saphenous vein graft to distal right coronary artery, saphenous vein graft to ramus intermedius artery, saphenous vein graft to obtuse marginal artery).  2.  Endoscopic vein harvest.    Patient has no SOB at rest.  She is able to tolerate up to a 1 mile before she feels SOB. No swelling in the legs. She had no swelling before the hospitalization in the legs. She has fatigue. She has some lightheadedness with fast movements.  Weight at home 135 lbs. Nothing tastes good still. She stopped the Lasix - she can't recall     Denies SOB,PND, orthopnea, edema, weight gain, chest pain, palpitations, lightheadedness, dizziness, near syncopal/syncopal episodes. Marianne has been following salt and fluid restrictions.      PAST MEDICAL HISTORY:  Past Medical History:   Diagnosis Date     Abnormal Pap smear of cervix ~    repeat pap was normal     Allergic rhinitis, cause unspecified      Anxiety state, unspecified     panic episodes     Unspecified essential hypertension        FAMILY HISTORY:  Family History   Problem Relation Age of Onset     Diabetes Mother         hypertension, alive at 83     C.A.D. Mother      Cancer Father         lung cancer, smoker.   at age 53     Family History Negative Sister      Osteoporosis Sister        SOCIAL HISTORY:  Social History     Tobacco Use     Smoking status: Never Smoker     Smokeless tobacco: Never Used   Vaping Use     Vaping Use: Never used   Substance Use Topics     Alcohol use: Yes     Comment: social     Drug use: No           CURRENT MEDICATIONS:  Current Outpatient Medications   Medication Sig Dispense Refill     acetaminophen (TYLENOL) 325 MG  tablet Take 2 tablets (650 mg) by mouth every 4 hours as needed for other (For optimal non-opioid multimodal pain management to improve pain control.) 100 tablet 0     aspirin (ASA) 81 MG chewable tablet 2 tablets (162 mg) by Oral or NG Tube route daily 120 tablet 0     atorvastatin (LIPITOR) 80 MG tablet Take 1 tablet (80 mg) by mouth daily 30 tablet 0     blood glucose (NO BRAND SPECIFIED) test strip Use to test blood sugar 2 times daily or as directed. 200 strip 1     blood glucose (ONETOUCH VERIO IQ) test strip Use to test blood sugar 2 times daily or as directed. 200 strip 1     blood glucose monitoring (ONETOUCH VERIO) meter device kit Use to test blood sugar 2 times daily or as directed. 1 kit 0     cyclobenzaprine (FLEXERIL) 5 MG tablet Take 1 tablet (5 mg) by mouth every 8 hours as needed for muscle spasms 12 tablet 1     empagliflozin (JARDIANCE) 25 MG TABS tablet Take 1 tablet (25 mg) by mouth daily 30 tablet 4     FLUoxetine (PROZAC) 20 MG capsule TAKE 2 CAPSULES DAILY 180 capsule 1     glipiZIDE (GLUCOTROL XL) 5 MG 24 hr tablet TAKE 2 TABLETS DAILY (NEED ANNUAL EXAM) 180 tablet 1     hydrOXYzine (ATARAX) 25 MG tablet Take 1 tablet (25 mg) by mouth 3 times daily as needed for anxiety 90 tablet 1     lisinopril (ZESTRIL) 5 MG tablet Take 1 tablet (5 mg) by mouth daily 90 tablet 0     LORazepam (ATIVAN) 0.5 MG tablet Take 1 tablet (0.5 mg) by mouth every 6 hours as needed for anxiety 6 tablet 0     metFORMIN (GLUCOPHAGE-XR) 500 MG 24 hr tablet Take 4 tablets (2,000 mg) by mouth daily (with dinner) Please see changed dose 360 tablet 1     metoprolol succinate ER (TOPROL XL) 25 MG 24 hr tablet Take 1 tablet (25 mg) by mouth daily 90 tablet 0     MULTIPLE VITAMIN OR TABS 1 TABLET DAILY       Multiple Vitamins-Minerals (PRESERVISION AREDS PO) Take 2 tablets by mouth daily       oxyCODONE (ROXICODONE) 5 MG tablet Take 1 tablet (5 mg) by mouth every 6 hours as needed for pain 30 tablet 0     Continuous Blood  Gluc Sensor (FREESTYLE VANESSA 14 DAY SENSOR) MISC 1 each every 14 days Change every 14 days. (Patient not taking: No sig reported) 2 each 5     furosemide (LASIX) 20 MG tablet Take 1 tablet (20 mg) by mouth daily (Patient not taking: Reported on 6/30/2022) 90 tablet 3       ROS:  Review Of Systems  Skin: negative  Eyes: negative  Ears/Nose/Throat: negative  Respiratory: No shortness of breath, dyspnea on exertion, cough, or hemoptysis  Cardiovascular: negative  Gastrointestinal: negative  Genitourinary: negative  Musculoskeletal: negative  Neurologic: negative  Psychiatric: negative  Hematologic/Lymphatic/Immunologic: negative  Endocrine: negative      EXAM:  /71 (BP Location: Right arm, Patient Position: Sitting, Cuff Size: Adult Regular)   Pulse 92   Wt 61.7 kg (136 lb)   SpO2 97%   BMI 23.39 kg/m    Home weight:  General: alert, articulate, and in no acute distress.  HEENT: normocephalic, atraumatic, anicteric sclera, EOMI, mucosa moist, no cyanosis.   Neck: neck supple.  No adenopathy, masses, or carotid bruits.  JVP flat at 90 degrees  Heart: regular rhythm, normal S1/S2, no murmur, gallop, rub.  Precordium quiet with normal PMI.     Lungs: clear, no rales, ronchi, or wheezing.  No accessory muscle use, respirations unlabored.   Abdomen: soft, non-tender, bowel sounds present, no hepatomegaly  Extremities: no pitting edema.   No cyanosis.   Neurological: alert and oriented x 3.  normal speech and affect, no gross motor deficits  Skin:  No ecchymoses, rashes, or clubbing.    Labs:  CBC RESULTS:  Lab Results   Component Value Date    WBC 8.2 06/02/2022    WBC 7.1 09/09/2020    RBC 2.68 (L) 06/02/2022    RBC 4.57 09/09/2020    HGB 7.5 (L) 06/02/2022    HGB 13.3 09/09/2020    HCT 24.7 (L) 06/02/2022    HCT 41.1 09/09/2020    MCV 92 06/02/2022    MCV 90 09/09/2020    MCH 28.0 06/02/2022    MCH 29.1 09/09/2020    MCHC 30.4 (L) 06/02/2022    MCHC 32.4 09/09/2020    RDW 14.6 06/02/2022    RDW 12.5 09/09/2020      06/02/2022     09/09/2020       CMP RESULTS:  Lab Results   Component Value Date     06/28/2022     01/07/2021    POTASSIUM 4.6 06/28/2022    POTASSIUM 4.8 01/07/2021    CHLORIDE 106 06/28/2022    CHLORIDE 106 01/07/2021    CO2 22 06/28/2022    CO2 28 01/07/2021    ANIONGAP 13 06/28/2022    ANIONGAP 7 01/07/2021     (H) 06/28/2022     (H) 01/07/2021    BUN 30 06/28/2022    BUN 19 01/07/2021    CR 0.95 06/28/2022    CR 0.90 01/07/2021    GFRESTIMATED 65 06/28/2022    GFRESTIMATED 66 01/07/2021    GFRESTBLACK 77 01/07/2021    POONAM 9.4 06/28/2022    POONAM 9.5 01/07/2021    BILITOTAL 0.2 05/24/2022    BILITOTAL 0.4 09/09/2020    ALBUMIN 2.4 (L) 05/24/2022    ALBUMIN 3.7 09/09/2020    ALKPHOS 53 05/24/2022    ALKPHOS 90 09/09/2020    ALT 24 05/24/2022    ALT 33 09/09/2020    AST 34 05/24/2022    AST 14 09/09/2020        INR RESULTS:  Lab Results   Component Value Date    INR 1.29 (H) 05/25/2022       No components found for: CK  Lab Results   Component Value Date    MAG 2.0 06/04/2022     Lab Results   Component Value Date    NTBNP 2,714 (H) 06/09/2022     @BRIEFLABR (dig)@    Most recent echocardiogram:  No results found for this or any previous visit (from the past 8760 hour(s)).      Assessment and Plan:    In summary,Marianne  is a 67 year old. Increase Metoprolol to 50 mg daily , in a week increase Lisinopril 10 mg daily.. if doing well increase the Lisinopril to 20 mg and obtain labs in 2 weeks.  Follow up in clinic 8/4     1.  Chronic systolic heart failure secondary to ICM.  Stage C  NYHA Class III  ACEi/ARB: yes 5 mg Lisinopril- continue up titration   BB: yes- continue up titration- Metoprolol 50 mg   Aldosterone antagonist: no- will initiate at future visit.   SCD prophylaxis: does not meet criteria for implant  Fluid status: euvolemic  Anticoagulation:   Antiplatelet:  ASA dose   Sleep apnea:  NSAID use:  Contraindicated.  Avoid use.  Remote Monitoring:none  SGLT 2 -  Jardiance 25 mg    2.  Other comordbid conditions:      #T2DM - PCP to manage - glipizide , Jardiance, metformin    #HTN- 107/66- stable - Lisinopril and Metoprolol      #GERD- followed by PCP     3.  Follow-up   Metoprolol 50 mg   In a week increase the Lisinopril to 10 mg daily   In 2 weeks increase Lisinopril 20 mg daily - then get labs  2 weeks later  Follow up [facility] in August- 4th       Belinda MOORE CNP  [facility]

## 2022-06-30 NOTE — PATIENT INSTRUCTIONS
Take your medicines every day, as directed    Changes made today:  Metoprolol 50 mg daily      Monitor Your Weight and Symptoms    Contact us if you:    Gain 2 pounds in one day or 5 pounds in one week  Feel more short of breath  Notice more leg swelling  Feel lightheadeded   Change your lifestyle    Limit Salt or Sodium:  2000 mg  Limit Fluids:  2000 mL or approximately 64 ounces  Eat a Heart Healthy Diet  Low in saturated fats  Stay Active:  Aim to move at least 150 minutes every  week         To Contact us    During Business Hours:  [phone], option # 1      After hours, weekends or holidays:   [phone], Option #4  Ask to speak to the On-Call Cardiologist. Inform them you are a CORE/heart failure patient at the [facility].     Use [employer] allows you to communicate directly with your heart team through secure messaging.  [facility] can be accessed any time on your phone, computer, or tablet.  If you need assistance, we'd be happy to help!         Keep your Heart Appointments:    Follow up with [facility] in a week    [facility] 8/4/22

## 2022-07-01 ENCOUNTER — MEDICAL CORRESPONDENCE (OUTPATIENT)
Dept: HEALTH INFORMATION MANAGEMENT | Facility: CLINIC | Age: 67
End: 2022-07-01

## 2022-07-01 PROBLEM — I50.20 HFREF (HEART FAILURE WITH REDUCED EJECTION FRACTION) (H): Status: ACTIVE | Noted: 2022-07-01

## 2022-07-05 ENCOUNTER — MYC MEDICAL ADVICE (OUTPATIENT)
Dept: FAMILY MEDICINE | Facility: CLINIC | Age: 67
End: 2022-07-05

## 2022-07-05 ENCOUNTER — LAB (OUTPATIENT)
Dept: LAB | Facility: CLINIC | Age: 67
End: 2022-07-05
Payer: COMMERCIAL

## 2022-07-05 DIAGNOSIS — E11.9 DIABETES MELLITUS, TYPE 2 (H): Primary | ICD-10-CM

## 2022-07-05 DIAGNOSIS — I50.20 HFREF (HEART FAILURE WITH REDUCED EJECTION FRACTION) (H): ICD-10-CM

## 2022-07-05 LAB
HBA1C MFR BLD: 6.8 % (ref 0–5.6)
HGB BLD-MCNC: 11.2 G/DL (ref 11.7–15.7)
HOLD SPECIMEN: NORMAL

## 2022-07-05 PROCEDURE — 83036 HEMOGLOBIN GLYCOSYLATED A1C: CPT

## 2022-07-05 PROCEDURE — 36415 COLL VENOUS BLD VENIPUNCTURE: CPT

## 2022-07-05 PROCEDURE — 85018 HEMOGLOBIN: CPT

## 2022-07-05 PROCEDURE — 82728 ASSAY OF FERRITIN: CPT

## 2022-07-05 PROCEDURE — 83550 IRON BINDING TEST: CPT

## 2022-07-05 NOTE — TELEPHONE ENCOUNTER
My practice is that it is reasonable for most patients to return to work 6-12 weeks after surgery. This can be closer to 6 weeks if the patient has completed cardiac rehab, is devoid of ischemic symptoms, and works in a desk job/a job without heavy physical work that would allow them to maintain sternal precautions. The determination can be made by any physician, although my preference is to see the patient in person or video first to confirm this if I am the one making this judgment. Since her appt with me is in Aug, she can go see her PCP or reach out to the [facility] Sur[facility] about this. It would be ideal if her appt with me can be moved up.     [facility] return to work question    Keyla Saul RN  Cardiology Care Coordinator  [facility]  Phone: [phone]

## 2022-07-06 ENCOUNTER — OFFICE VISIT (OUTPATIENT)
Dept: PEDIATRICS | Facility: CLINIC | Age: 67
End: 2022-07-06
Attending: FAMILY MEDICINE
Payer: COMMERCIAL

## 2022-07-06 ENCOUNTER — OFFICE VISIT (OUTPATIENT)
Dept: FAMILY MEDICINE | Facility: CLINIC | Age: 67
End: 2022-07-06
Payer: COMMERCIAL

## 2022-07-06 VITALS
WEIGHT: 131.4 LBS | BODY MASS INDEX: 22.6 KG/M2 | OXYGEN SATURATION: 99 % | TEMPERATURE: 98.4 F | HEART RATE: 99 BPM | DIASTOLIC BLOOD PRESSURE: 58 MMHG | SYSTOLIC BLOOD PRESSURE: 96 MMHG

## 2022-07-06 VITALS
DIASTOLIC BLOOD PRESSURE: 57 MMHG | SYSTOLIC BLOOD PRESSURE: 101 MMHG | OXYGEN SATURATION: 100 % | RESPIRATION RATE: 18 BRPM | HEART RATE: 85 BPM | TEMPERATURE: 98.4 F

## 2022-07-06 DIAGNOSIS — R19.7 DIARRHEA, UNSPECIFIED TYPE: ICD-10-CM

## 2022-07-06 DIAGNOSIS — Z86.19 HISTORY OF CLOSTRIDIOIDES DIFFICILE INFECTION: ICD-10-CM

## 2022-07-06 DIAGNOSIS — E86.0 MILD DEHYDRATION: ICD-10-CM

## 2022-07-06 DIAGNOSIS — Z95.1 S/P CABG (CORONARY ARTERY BYPASS GRAFT): ICD-10-CM

## 2022-07-06 DIAGNOSIS — I50.20 HFREF (HEART FAILURE WITH REDUCED EJECTION FRACTION) (H): ICD-10-CM

## 2022-07-06 DIAGNOSIS — I10 HYPERTENSION, GOAL BELOW 140/90: ICD-10-CM

## 2022-07-06 DIAGNOSIS — E11.40 TYPE 2 DIABETES MELLITUS WITH DIABETIC NEUROPATHY, WITHOUT LONG-TERM CURRENT USE OF INSULIN (H): ICD-10-CM

## 2022-07-06 DIAGNOSIS — R19.7 DIARRHEA, UNSPECIFIED TYPE: Primary | ICD-10-CM

## 2022-07-06 DIAGNOSIS — R11.0 NAUSEA: ICD-10-CM

## 2022-07-06 DIAGNOSIS — E86.0 MILD DEHYDRATION: Primary | ICD-10-CM

## 2022-07-06 LAB
ALBUMIN SERPL-MCNC: 3.7 G/DL (ref 3.4–5)
ALBUMIN UR-MCNC: 20 MG/DL
ALP SERPL-CCNC: 107 U/L (ref 40–150)
ALT SERPL W P-5'-P-CCNC: 24 U/L (ref 0–50)
AMYLASE SERPL-CCNC: 45 U/L (ref 30–110)
ANION GAP SERPL CALCULATED.3IONS-SCNC: 7 MMOL/L (ref 3–14)
APPEARANCE UR: CLEAR
AST SERPL W P-5'-P-CCNC: 14 U/L (ref 0–45)
BACTERIA #/AREA URNS HPF: ABNORMAL /HPF
BASOPHILS # BLD AUTO: 0.1 10E3/UL (ref 0–0.2)
BASOPHILS NFR BLD AUTO: 1 %
BILIRUB SERPL-MCNC: 0.4 MG/DL (ref 0.2–1.3)
BILIRUB UR QL STRIP: NEGATIVE
BUN SERPL-MCNC: 35 MG/DL (ref 7–30)
C DIFF TOX B STL QL: POSITIVE
CALCIUM SERPL-MCNC: 9.9 MG/DL (ref 8.5–10.1)
CHLORIDE BLD-SCNC: 109 MMOL/L (ref 94–109)
CO2 SERPL-SCNC: 19 MMOL/L (ref 20–32)
COLOR UR AUTO: YELLOW
CREAT SERPL-MCNC: 0.92 MG/DL (ref 0.52–1.04)
EOSINOPHIL # BLD AUTO: 0.2 10E3/UL (ref 0–0.7)
EOSINOPHIL NFR BLD AUTO: 3 %
ERYTHROCYTE [DISTWIDTH] IN BLOOD BY AUTOMATED COUNT: 14.6 % (ref 10–15)
ERYTHROCYTE [SEDIMENTATION RATE] IN BLOOD BY WESTERGREN METHOD: 18 MM/HR (ref 0–30)
FERRITIN SERPL-MCNC: 19 NG/ML (ref 8–252)
GFR SERPL CREATININE-BSD FRML MDRD: 67 ML/MIN/1.73M2
GLUCOSE BLD-MCNC: 128 MG/DL (ref 70–99)
GLUCOSE UR STRIP-MCNC: >1000 MG/DL
HCT VFR BLD AUTO: 36.5 % (ref 35–47)
HGB BLD-MCNC: 11.4 G/DL (ref 11.7–15.7)
HGB UR QL STRIP: NEGATIVE
IMM GRANULOCYTES # BLD: 0 10E3/UL
IMM GRANULOCYTES NFR BLD: 0 %
IRON SATN MFR SERPL: 6 % (ref 15–46)
IRON SERPL-MCNC: 25 UG/DL (ref 35–180)
KETONES UR STRIP-MCNC: NEGATIVE MG/DL
LEUKOCYTE ESTERASE UR QL STRIP: ABNORMAL
LYMPHOCYTES # BLD AUTO: 1.3 10E3/UL (ref 0.8–5.3)
LYMPHOCYTES NFR BLD AUTO: 17 %
MCH RBC QN AUTO: 25.2 PG (ref 26.5–33)
MCHC RBC AUTO-ENTMCNC: 31.2 G/DL (ref 31.5–36.5)
MCV RBC AUTO: 81 FL (ref 78–100)
MONOCYTES # BLD AUTO: 0.9 10E3/UL (ref 0–1.3)
MONOCYTES NFR BLD AUTO: 12 %
NEUTROPHILS # BLD AUTO: 5 10E3/UL (ref 1.6–8.3)
NEUTROPHILS NFR BLD AUTO: 67 %
NITRATE UR QL: NEGATIVE
NRBC # BLD AUTO: 0 10E3/UL
NRBC BLD AUTO-RTO: 0 /100
PH UR STRIP: 6 [PH] (ref 5–7)
PLATELET # BLD AUTO: 504 10E3/UL (ref 150–450)
POTASSIUM BLD-SCNC: 4 MMOL/L (ref 3.4–5.3)
PROT SERPL-MCNC: 8.3 G/DL (ref 6.8–8.8)
RBC # BLD AUTO: 4.52 10E6/UL (ref 3.8–5.2)
RBC #/AREA URNS AUTO: ABNORMAL /HPF
SODIUM SERPL-SCNC: 135 MMOL/L (ref 133–144)
SP GR UR STRIP: 1.03 (ref 1–1.03)
SQUAMOUS #/AREA URNS AUTO: ABNORMAL /LPF
TIBC SERPL-MCNC: 422 UG/DL (ref 240–430)
UROBILINOGEN UR STRIP-MCNC: NORMAL MG/DL
WBC # BLD AUTO: 7.6 10E3/UL (ref 4–11)
WBC #/AREA URNS AUTO: ABNORMAL /HPF

## 2022-07-06 PROCEDURE — 36415 COLL VENOUS BLD VENIPUNCTURE: CPT | Performed by: NURSE PRACTITIONER

## 2022-07-06 PROCEDURE — 85652 RBC SED RATE AUTOMATED: CPT | Performed by: NURSE PRACTITIONER

## 2022-07-06 PROCEDURE — 93000 ELECTROCARDIOGRAM COMPLETE: CPT | Performed by: NURSE PRACTITIONER

## 2022-07-06 PROCEDURE — 85025 COMPLETE CBC W/AUTO DIFF WBC: CPT | Performed by: NURSE PRACTITIONER

## 2022-07-06 PROCEDURE — 82150 ASSAY OF AMYLASE: CPT | Performed by: NURSE PRACTITIONER

## 2022-07-06 PROCEDURE — 81001 URINALYSIS AUTO W/SCOPE: CPT | Performed by: NURSE PRACTITIONER

## 2022-07-06 PROCEDURE — 80053 COMPREHEN METABOLIC PANEL: CPT | Performed by: NURSE PRACTITIONER

## 2022-07-06 PROCEDURE — 99417 PROLNG OP E/M EACH 15 MIN: CPT | Performed by: NURSE PRACTITIONER

## 2022-07-06 PROCEDURE — 96374 THER/PROPH/DIAG INJ IV PUSH: CPT | Performed by: NURSE PRACTITIONER

## 2022-07-06 PROCEDURE — 99215 OFFICE O/P EST HI 40 MIN: CPT | Mod: 25 | Performed by: NURSE PRACTITIONER

## 2022-07-06 PROCEDURE — 96361 HYDRATE IV INFUSION ADD-ON: CPT | Performed by: NURSE PRACTITIONER

## 2022-07-06 PROCEDURE — 87506 IADNA-DNA/RNA PROBE TQ 6-11: CPT | Performed by: NURSE PRACTITIONER

## 2022-07-06 PROCEDURE — 99207 REFERRAL TO ACUTE AND DIAGNOSTIC SERVICES: CPT | Performed by: FAMILY MEDICINE

## 2022-07-06 PROCEDURE — 87493 C DIFF AMPLIFIED PROBE: CPT | Mod: 59 | Performed by: NURSE PRACTITIONER

## 2022-07-06 RX ORDER — ONDANSETRON 4 MG/1
4 TABLET, ORALLY DISINTEGRATING ORAL EVERY 8 HOURS PRN
Qty: 21 TABLET | Refills: 0 | Status: SHIPPED | OUTPATIENT
Start: 2022-07-06 | End: 2022-07-13

## 2022-07-06 RX ORDER — ONDANSETRON 2 MG/ML
4 INJECTION INTRAMUSCULAR; INTRAVENOUS ONCE
Status: COMPLETED | OUTPATIENT
Start: 2022-07-06 | End: 2022-07-06

## 2022-07-06 RX ADMIN — Medication 500 ML: at 16:13

## 2022-07-06 RX ADMIN — ONDANSETRON 4 MG: 2 INJECTION INTRAMUSCULAR; INTRAVENOUS at 17:24

## 2022-07-06 ASSESSMENT — PAIN SCALES - GENERAL: PAINLEVEL: NO PAIN (0)

## 2022-07-06 NOTE — PROGRESS NOTES
Chief Complaint   Patient presents with     Diarrhea         ICD-10-CM    1. Mild dehydration  E86.0    2. Diarrhea, unspecified type  R19.7 Enteric Bacteria and Virus Panel by JUAN PABLO Stool     Clostridium difficile Toxin B PCR     0.9% sodium chloride BOLUS     sodium chloride (PF) 0.9% PF flush 3 mL     Erythrocyte sedimentation rate auto     Comprehensive metabolic panel     CBC with platelets differential     Amylase     UA with Microscopic reflex to Culture     XR Chest 2 Views     EKG 12-lead, tracing only     XR Chest 2 Views     UA with Microscopic reflex to Culture     Amylase     CBC with platelets differential     Comprehensive metabolic panel     Erythrocyte sedimentation rate auto     Clostridium difficile Toxin B PCR     Enteric Bacteria and Virus Panel by JUAN PABLO Stool     Urine Microscopic   3. Hypertension, goal below 140/90  I10 Comprehensive metabolic panel     XR Chest 2 Views     EKG 12-lead, tracing only     XR Chest 2 Views     Comprehensive metabolic panel   4. Type 2 diabetes mellitus with diabetic neuropathy, without long-term current use of insulin (H)  E11.40 Comprehensive metabolic panel     Comprehensive metabolic panel   5. HFrEF (heart failure with reduced ejection fraction) (H)  I50.20 Comprehensive metabolic panel     XR Chest 2 Views     EKG 12-lead, tracing only     XR Chest 2 Views     Comprehensive metabolic panel   6. Nausea  R11.0 ondansetron (ZOFRAN) injection 4 mg     ondansetron (ZOFRAN ODT) 4 MG ODT tab   Patient was hydrated with IV fluids slowly over the course of 2 hours.  Her lungs remain clear.  She did feel a bit better with the IV fluid infusion, more alert, and feel like she is thinking more clearly..  Stool samples were collected for C. difficile and culture. We discussed completing a CT scan of abdomen, but she wants to see results of Stool tests first. Recommended patient use Imodium per package instructions until results of stool testing is back to prevent her from  getting further dehydrated.  Ondansetron given to help moderate her nausea so she will be able to take in more fluids.  Reviewed fluids that are helpful for hydration and she expressed understanding.  If she develops any abdominal pain or fever she will go to the emergency room immediately for further assessment and evaluation    72 minutes spent on the date of the encounter doing chart review, history and exam, documentation and further activities per the note    Medical Decision Making    Differential diagnosis includes but is not limited to: Area, infections, viral, bacterial, lactose intolerance, traveler's diarrhea, GI bleeding, gastroenteritis, food poisoning, food allergy.    CXR - Reviewed and interpreted by me Normal- no infiltrates, effusions, pneumothoraces, cardiomegaly or masses.  Sternal wires are present.  EKG - Reviewed and interpreted by me appears normal, NSR, unchanged from previous tracings    Results for orders placed or performed in visit on 07/06/22 (from the past 24 hour(s))   UA with Microscopic reflex to Culture    Specimen: Urine, Midstream   Result Value Ref Range    Color Urine Yellow Colorless, Straw, Light Yellow, Yellow    Appearance Urine Clear Clear    Glucose Urine >1000 (A) Negative mg/dL    Bilirubin Urine Negative Negative    Ketones Urine Negative Negative mg/dL    Specific Gravity Urine 1.032 0.999 - 1.035    Blood Urine Negative Negative    pH Urine 6.0 5.0 - 7.0    Protein Albumin Urine 20  (A) Negative mg/dL    Nitrite Urine Negative Negative    Leukocyte Esterase Urine Small (A) Negative    Urobilinogen Urine Normal Normal, 2.0 mg/dL   Amylase   Result Value Ref Range    Amylase 45 30 - 110 U/L   CBC with platelets differential    Narrative    The following orders were created for panel order CBC with platelets differential.  Procedure                               Abnormality         Status                     ---------                               -----------          ------                     CBC with platelets and d...[[id number]]  Abnormal            Final result                 Please view results for these tests on the individual orders.   Comprehensive metabolic panel   Result Value Ref Range    Sodium 135 133 - 144 mmol/L    Potassium 4.0 3.4 - 5.3 mmol/L    Chloride 109 94 - 109 mmol/L    Carbon Dioxide (CO2) 19 (L) 20 - 32 mmol/L    Anion Gap 7 3 - 14 mmol/L    Urea Nitrogen 35 (H) 7 - 30 mg/dL    Creatinine 0.92 0.52 - 1.04 mg/dL    Calcium 9.9 8.5 - 10.1 mg/dL    Glucose 128 (H) 70 - 99 mg/dL    Alkaline Phosphatase 107 40 - 150 U/L    AST 14 0 - 45 U/L    ALT 24 0 - 50 U/L    Protein Total 8.3 6.8 - 8.8 g/dL    Albumin 3.7 3.4 - 5.0 g/dL    Bilirubin Total 0.4 0.2 - 1.3 mg/dL    GFR Estimate 67 >60 mL/min/1.73m2   Erythrocyte sedimentation rate auto   Result Value Ref Range    Erythrocyte Sedimentation Rate 18 0 - 30 mm/hr   CBC with platelets and differential   Result Value Ref Range    WBC Count 7.6 4.0 - 11.0 10e3/uL    RBC Count 4.52 3.80 - 5.20 10e6/uL    Hemoglobin 11.4 (L) 11.7 - 15.7 g/dL    Hematocrit 36.5 35.0 - 47.0 %    MCV 81 78 - 100 fL    MCH 25.2 (L) 26.5 - 33.0 pg    MCHC 31.2 (L) 31.5 - 36.5 g/dL    RDW 14.6 10.0 - 15.0 %    Platelet Count 504 (H) 150 - 450 10e3/uL    % Neutrophils 67 %    % Lymphocytes 17 %    % Monocytes 12 %    % Eosinophils 3 %    % Basophils 1 %    % Immature Granulocytes 0 %    NRBCs per 100 WBC 0 <1 /100    Absolute Neutrophils 5.0 1.6 - 8.3 10e3/uL    Absolute Lymphocytes 1.3 0.8 - 5.3 10e3/uL    Absolute Monocytes 0.9 0.0 - 1.3 10e3/uL    Absolute Eosinophils 0.2 0.0 - 0.7 10e3/uL    Absolute Basophils 0.1 0.0 - 0.2 10e3/uL    Absolute Immature Granulocytes 0.0 <=0.4 10e3/uL    Absolute NRBCs 0.0 10e3/uL   Urine Microscopic   Result Value Ref Range    Bacteria Urine Few (A) None Seen /HPF    RBC Urine 0-2 0-2 /HPF /HPF    WBC Urine 0-5 0-5 /HPF /HPF    Squamous Epithelials Urine Few (A) None Seen /LPF    Narrative    Urine  Culture not indicated   Extra Tube    Narrative    The following orders were created for panel order Extra Tube.  Procedure                               Abnormality         Status                     ---------                               -----------         ------                     Extra Blue Top Tube[[id number]]                                                         Extra Blue Top Tube[[id number]]                                                           Please view results for these tests on the individual orders.   XR Chest 2 Views    Narrative    Exam: XR CHEST 2 VW, 7/6/2022 4:21 PM    Indication: Diarrhea, unspecified type; Hypertension, goal below  140/90; HFrEF (heart failure with reduced ejection fraction) (H)    Comparison: Chest x-ray 5/20/2022 and CT 5/19/2022    Findings:   2 views of the chest. Median sternotomy wires and CABG. The cardiac  silhouette is within normal limits. No pneumothorax or effusions.  Vasculature is distinct. No focal airspace opacities. Unchanged  anterior wedge compression deformity of L1 vertebral body since  5/19/2022.      Impression    Impression: CABG. No focal airspace disease.    I have personally reviewed the examination and initial interpretation  and I agree with the findings.    GOLD JOHNSON MD         SYSTEM ID:  [id number]       Subjective   HPI     Patient had open heart surgery 1 month ago. She has been experiencing diarrhea for 2 weeks. She is feeling extremely weak, tired and dehydrated. Patients daughter and  are here with her today and states that after her heart surgery she was able to walk around quite a bit and that has decreased substantially.     Patient states she does feel light headed and her balance has been off as well.     Spoke with provider Debra Otoole MD at [facility] and discussed patient's case.    Diarrhea  Onset/Duration: 2 weeks ago  Description:       Consistency of stool: watery       Blood in stool: No       Number of  loose stools past 24 hours: 10  Progression of Symptoms: worsening  Accompanying signs and symptoms:       Fever: No       Nausea/Vomiting: YES, nausea but denies throwing up        Abdominal pain: No       Weight loss: YES, Patient has lost 5 pounds in 1 week       Episodes of constipation: No  History   Ill contacts: No  Recent use of antibiotics: No  Recent travels: No  Recent medication-new or changes(Rx or OTC): No  Precipitating or alleviating factors: None  Therapies tried and outcome: Imodium AD, PeptoBismol and Citracel/Metamucil. Patient states she does not get any relief when taking this.     ROS: 10 point ROS neg other than the symptoms noted above in the HPI.       Objective    /57 (BP Location: Right leg, Patient Position: Semi-Wei's, Cuff Size: Adult Regular)   Pulse 85   Temp 98.4  F (36.9  C) (Oral)   Resp 18   SpO2 100%   Nurses notes and VS have been reviewed.    Physical Exam     GENERAL APPEARANCE:mildly ill, pale     EYES: PERRL, EOMI, sclera non-icteric     HENT: oral exam benign, mucus membranes intact but tacky, without ulcers or lesions, lips are starting to     NECK: no adenopathy or asymmetry, thyroid normal to palpation     RESP: lungs clear to auscultation - no rales, rhonchi or wheezes     CV: regular rates and rhythm, no murmurs, rubs, or gallop, No JVD, no edema     ABDOMEN:  soft, nontender, no HSM or masses and hyperactive bowel sounds normal     MS: extremities normal- no gross deformities noted; normal muscle tone.     SKIN: no suspicious lesions or rashes     NEURO: Normal strength and tone, mentation intact and speech normal     PSYCH: normal thought process; no significant mood disturbance    Patient Instructions   May use Imodium per package instructions.  No more than 4 tablets a day.      Discontinue Metamucil and Pepto-Bismol    If you develop abdominal pain or fever over 100.4 please go to the emergency room immediately for further assessment.    Watch  Shireen for results of your stool testing.    If the results of your testing are not back in 48 to 72 hours and you are still having large amounts of diarrhea contact your primary care provider and they may refer you to the ADS again for hydration if needed.    Patient Education     Nonspecific Vomiting and Diarrhea (Adult)  Vomiting and diarrhea can have many causes, including:    Helping your body get rid of harmful substances     Gastroenteritis caused by viruses, parasites, bacteria, or toxins.    Allergy to or side effect of a food or medicine    Severe stress or worry (anxiety)     Other illnesses    Pregnancy  It is often hard to pinpoint an exact cause, even with testing. Vomiting and diarrhea often go away within a day or two without problems. If they continue, though, they can lead to too much loss of fluid (dehydration). This can be serious if not treated.    Home care  Medicines    You may use acetaminophen or NSAID medicines like ibuprofen or naproxen to control fever, unless another medicine was prescribed. If you have chronic liver or kidney disease, talk with your healthcare provider before using these medicines. Also talk with your provider if you've had a stomach ulcer or gastrointestinal bleeding. Don't give aspirin to anyone under 18 years of age who is ill with a fever because it may cause severe disease or death. Don't use NSAID medicines if you are already taking one for another condition (like arthritis) or are on aspirin (such as for heart disease or after a stroke)    Over-the-counter medicines for diarrhea, nausea, and vomiting are generally OK unless you have bleeding, fever, or severe abdominal pain.  General care    If symptoms are severe, rest at home for the next 24 hours, or until you are feeling better.    Washing your hands with soap and water, or using alcohol-based hand  is the best way to stop the spread of infection. Wash your hands after touching anyone who is  sick.    Wash your hands after using the toilet and before meals. Clean the toilet after each use.    Dry your hands with a single use towel.    Caffeine, tobacco, and alcohol can make the diarrhea, cramping, and pain worse. Remember, caffeine not only is in coffee, but also is in chocolate, some energy drinks, and teas.  Diet    Water and clear liquids are important so you don't get dehydrated. Drink a small amount at a time. Don't guzzle down the drinks. That may increase your nausea, make cramping worse, and cause the drinks to come back up.    Sports drinks may also help if you are healthy and not too dehydrated. They have too much sugar and not enough electrolytes and can sometimes make things worse. Also, don't drink beverages that are too acidic, like orange juice and grape juice.    If you are very dehydrated, commercially available products called oral rehydration solutions are best.  Food    Don't force yourself to eat, especially if you have cramps, diarrhea, or vomiting. Eat just a little at a time, and then wait a few minutes before you try to eat more.    Don't eat fatty, greasy, spicy, or fried foods.    Don't eat dairy products if you have diarrhea. They can make it worse.  During the first 24 hours (the first full day), follow the diet below:    Beverages: Oral rehydration solutions, sports drinks, soft drinks without caffeine, mineral water, and decaffeinated tea and coffee    Soups: Clear broth, consommé, and bouillon    Desserts: Plain gelatin, popsicles, and fruit juice bars  During the next 24 hours (the second day), you may add the following to the above if you are better. If not, continue what you did the first day:    Hot cereal, plain toast, bread, rolls, crackers    Plain noodles, rice, mashed potatoes, chicken noodle or rice soup    Unsweetened canned fruit (avoid pineapple), bananas    Limit fat intake to less than 15 grams per day by avoiding margarine, butter, oils, mayonnaise, sauces,  gravies, fried foods, peanut butter, meat, poultry, and fish.    Limit fiber. Avoid raw or cooked vegetables, fresh fruits (except bananas) and bran cereals.    Limit caffeine and chocolate. No spices or seasonings except salt.  During the next 24 hours:    Gradually resume a normal diet, as you feel better and your symptoms improve.    If at any time your symptoms start getting worse again, go back to clear liquids until you feel better.  Food preparation    If you have diarrhea, you should not prepare food for others. When preparing foods, wash your hands before and after.    Wash your hands or use alcohol-based  after using cutting boards, countertops, and knives that have been in contact with raw food.    Dry your hands with a single use towel.    Keep uncooked meats away from cooked and ready-to-eat foods.    Follow-up care  Follow up with your healthcare provider, or as advised. Call if you don't get better in the next 2 to 3 days. If a stool (diarrhea) sample was taken, or cultures done, you will be told if they are positive, or if your treatment needs to be changed. You may call as directed for the results.  If X-rays were taken, you will be notified of any new findings that may affect your care  Call 911  Call 911 if any of these occur:    Trouble breathing    Chest pain    Confusion    Severe drowsiness or trouble awakening    Fainting or loss of consciousness    Rapid heart rate    Seizure    Stiff neck    Severe weakness, dizziness, or lightheadedness  When to seek medical advice  Call your healthcare provider right away if any of these occur:    Bloody or black vomit or stools    Severe, steady abdominal pain or any abdominal pain that is getting worse    Severe headache or stiff neck    An inability to hold down even sips of liquids for more than 12 hours    Vomiting that lasts more than 24 hours    Diarrhea that lasts more than 24 hours    Fever of 100.4 F (38.0 C) or higher, or as directed  by your healthcare provider    Yellowish color to your skin or the whites of your eyes    Signs of dehydration, such as dry mouth, little urine (less than every 6 hours), or very dark urine  Nikita last reviewed this educational content on 6/1/2018 2000-2021 [employer]. All rights reserved. This information is not intended as a substitute for professional medical care. Always follow your healthcare professional's instructions.                   OSCAR Church, [facility] Provider    The use of Dragon/Ariad[employer] dictation services may have been used to construct the content in this note; any grammatical or spelling errors are non-intentional. Please contact the author of this note directly if you are in need of any clarification.         .  ..

## 2022-07-06 NOTE — PATIENT INSTRUCTIONS
May use Imodium per package instructions.  No more than 4 tablets a day.      Discontinue Metamucil and Pepto-Bismol    If you develop abdominal pain or fever over 100.4 please go to the emergency room immediately for further assessment.    Watch MyChart for results of your stool testing.    If the results of your testing are not back in 48 to 72 hours and you are still having large amounts of diarrhea contact your primary care provider and they may refer you to the ADS again for hydration if needed.    Patient Education     Nonspecific Vomiting and Diarrhea (Adult)  Vomiting and diarrhea can have many causes, including:  Helping your body get rid of harmful substances   Gastroenteritis caused by viruses, parasites, bacteria, or toxins.  Allergy to or side effect of a food or medicine  Severe stress or worry (anxiety)   Other illnesses  Pregnancy  It is often hard to pinpoint an exact cause, even with testing. Vomiting and diarrhea often go away within a day or two without problems. If they continue, though, they can lead to too much loss of fluid (dehydration). This can be serious if not treated.    Home care  Medicines  You may use acetaminophen or NSAID medicines like ibuprofen or naproxen to control fever, unless another medicine was prescribed. If you have chronic liver or kidney disease, talk with your healthcare provider before using these medicines. Also talk with your provider if you've had a stomach ulcer or gastrointestinal bleeding. Don't give aspirin to anyone under 18 years of age who is ill with a fever because it may cause severe disease or death. Don't use NSAID medicines if you are already taking one for another condition (like arthritis) or are on aspirin (such as for heart disease or after a stroke)  Over-the-counter medicines for diarrhea, nausea, and vomiting are generally OK unless you have bleeding, fever, or severe abdominal pain.  General care  If symptoms are severe, rest at home for the  next 24 hours, or until you are feeling better.  Washing your hands with soap and water, or using alcohol-based hand  is the best way to stop the spread of infection. Wash your hands after touching anyone who is sick.  Wash your hands after using the toilet and before meals. Clean the toilet after each use.  Dry your hands with a single use towel.  Caffeine, tobacco, and alcohol can make the diarrhea, cramping, and pain worse. Remember, caffeine not only is in coffee, but also is in chocolate, some energy drinks, and teas.  Diet  Water and clear liquids are important so you don't get dehydrated. Drink a small amount at a time. Don't guzzle down the drinks. That may increase your nausea, make cramping worse, and cause the drinks to come back up.  Sports drinks may also help if you are healthy and not too dehydrated. They have too much sugar and not enough electrolytes and can sometimes make things worse. Also, don't drink beverages that are too acidic, like orange juice and grape juice.  If you are very dehydrated, commercially available products called oral rehydration solutions are best.  Food  Don't force yourself to eat, especially if you have cramps, diarrhea, or vomiting. Eat just a little at a time, and then wait a few minutes before you try to eat more.  Don't eat fatty, greasy, spicy, or fried foods.  Don't eat dairy products if you have diarrhea. They can make it worse.  During the first 24 hours (the first full day), follow the diet below:  Beverages: Oral rehydration solutions, sports drinks, soft drinks without caffeine, mineral water, and decaffeinated tea and coffee  Soups: Clear broth, consommé, and bouillon  Desserts: Plain gelatin, popsicles, and fruit juice bars  During the next 24 hours (the second day), you may add the following to the above if you are better. If not, continue what you did the first day:  Hot cereal, plain toast, bread, rolls, crackers  Plain noodles, rice, mashed  potatoes, chicken noodle or rice soup  Unsweetened canned fruit (avoid pineapple), bananas  Limit fat intake to less than 15 grams per day by avoiding margarine, butter, oils, mayonnaise, sauces, gravies, fried foods, peanut butter, meat, poultry, and fish.  Limit fiber. Avoid raw or cooked vegetables, fresh fruits (except bananas) and bran cereals.  Limit caffeine and chocolate. No spices or seasonings except salt.  During the next 24 hours:  Gradually resume a normal diet, as you feel better and your symptoms improve.  If at any time your symptoms start getting worse again, go back to clear liquids until you feel better.  Food preparation  If you have diarrhea, you should not prepare food for others. When preparing foods, wash your hands before and after.  Wash your hands or use alcohol-based  after using cutting boards, countertops, and knives that have been in contact with raw food.  Dry your hands with a single use towel.  Keep uncooked meats away from cooked and ready-to-eat foods.    Follow-up care  Follow up with your healthcare provider, or as advised. Call if you don't get better in the next 2 to 3 days. If a stool (diarrhea) sample was taken, or cultures done, you will be told if they are positive, or if your treatment needs to be changed. You may call as directed for the results.  If X-rays were taken, you will be notified of any new findings that may affect your care  Call 911  Call 911 if any of these occur:  Trouble breathing  Chest pain  Confusion  Severe drowsiness or trouble awakening  Fainting or loss of consciousness  Rapid heart rate  Seizure  Stiff neck  Severe weakness, dizziness, or lightheadedness  When to seek medical advice  Call your healthcare provider right away if any of these occur:  Bloody or black vomit or stools  Severe, steady abdominal pain or any abdominal pain that is getting worse  Severe headache or stiff neck  An inability to hold down even sips of liquids for more  than 12 hours  Vomiting that lasts more than 24 hours  Diarrhea that lasts more than 24 hours  Fever of 100.4 F (38.0 C) or higher, or as directed by your healthcare provider  Yellowish color to your skin or the whites of your eyes  Signs of dehydration, such as dry mouth, little urine (less than every 6 hours), or very dark urine  Nikita last reviewed this educational content on 6/1/2018 2000-2021 [employer]. All rights reserved. This information is not intended as a substitute for professional medical care. Always follow your healthcare professional's instructions.

## 2022-07-06 NOTE — PROGRESS NOTES
ICD-10-CM    1. Diarrhea, unspecified type  R19.7    2. Mild dehydration  E86.0    3. History of Clostridioides difficile infection  Z86.19    4. S/P CABG (coronary artery bypass graft)  Z95.1     5/2022   5. HFrEF (heart failure with reduced ejection fraction) (H)  I50.20      Discussed with [facility] Physician  Pt will  Get IV Fluids  Recommend testing for C diff  Baseline labs   Referral to Acute and Diagnostic Services    The [facility] has been contacted at [phone] ([place]) to confirm patient acceptance. The transition to Acute & Diagnostic Services Clinic has been discussed with patient, and she agrees with next level of care.  Patient understands that evaluation/treatment at [facility] typically takes significantly longer than in clinic/urgent care (>2 hours).        Special issues:  None          Referral placed: Yes  Patient has transportation arranged and will travel to the [facility] without delay: Yes  Patient aware not to eat or drink. No    The following provider has assessed this patient for intervention at [facility], and directed the patient for referral: MD Roxanna Marlow MD          Sil Vera is a 67 year old accompanied by her daughter and brother, presenting for the following health issues:  Fatigue  s/p CABG-end of May. 2022  Pt has had Loose watery Diarrhea 2 weeks  Not been on any antibiotics  No travel  No abdominal pain  Has Lost 5 pounds  No Known exposure      HPI     Diarrhea  Onset/Duration: over 2 weeks  Description:       Consistency of stool: watery       Blood in stool: No       Number of loose stools past 24 hours: 10  Progression of Symptoms: same  Accompanying signs and symptoms: Fatigue, unable to eat.        Fever: No       Nausea/Vomiting: YES       Abdominal pain: No       Weight loss: YES       Episodes of constipation: No  History   Ill contacts: No  Recent use of antibiotics: No  Recent travels: No  Recent  medication-new or changes(Rx or OTC): No  Precipitating or alleviating factors: None  Therapies tried and outcome: Imodium AD and Citracel/Metamucil    Review of Systems   CONSTITUTIONAL:as above  ENT/MOUTH: NEGATIVE for ear, mouth and throat problems  RESP: NEGATIVE for significant cough or SOB  CV: NEGATIVE for chest pain, palpitations or peripheral edema  GI: as above  No nausea or vomiting  No Blood in stools  : none  ROS otherwise negative      Objective    BP 96/58   Pulse 99   Temp 98.4  F (36.9  C) (Temporal)   Wt 59.6 kg (131 lb 6.4 oz)   SpO2 99%   BMI 22.60 kg/m    Body mass index is 22.6 kg/m .   Pulse 110-120 while in clinic  Physical Exam   GENERAL: healthy, alert and no distress  EYES: Eyes grossly normal to inspection  HENT: normal cephalic/atraumatic, nose and mouth without ulcers or lesions, oropharynx clear and slightly Dry  NECK: no adenopathy, no asymmetry, masses, or scars and thyroid normal to palpation  RESP: lungs clear to auscultation - no rales, rhonchi or wheezes  CV: regular rate and rhythm, normal S1 S2, no S3 or S4, no murmur, click or rub, no peripheral edema and peripheral pulses strong  ABDOMEN: soft, nontender, no hepatosplenomegaly, no masses and bowel sounds normal  MS: no gross musculoskeletal defects noted, no edema  PSYCH: mentation appears normal    Pending             .  ..

## 2022-07-07 ENCOUNTER — E-VISIT (OUTPATIENT)
Dept: FAMILY MEDICINE | Facility: CLINIC | Age: 67
End: 2022-07-07
Payer: COMMERCIAL

## 2022-07-07 DIAGNOSIS — A04.72 C. DIFFICILE COLITIS: Primary | ICD-10-CM

## 2022-07-07 DIAGNOSIS — A04.72 C. DIFFICILE DIARRHEA: Primary | ICD-10-CM

## 2022-07-07 LAB
C COLI+JEJUNI+LARI FUSA STL QL NAA+PROBE: NOT DETECTED
EC STX1 GENE STL QL NAA+PROBE: NOT DETECTED
EC STX2 GENE STL QL NAA+PROBE: NOT DETECTED
HOLD SPECIMEN: NORMAL
HOLD SPECIMEN: NORMAL
NOROV GI+II ORF1-ORF2 JNC STL QL NAA+PR: NOT DETECTED
RVA NSP5 STL QL NAA+PROBE: NOT DETECTED
SALMONELLA SP RPOD STL QL NAA+PROBE: NOT DETECTED
SHIGELLA SP+EIEC IPAH STL QL NAA+PROBE: NOT DETECTED
V CHOL+PARA RFBL+TRKH+TNAA STL QL NAA+PR: NOT DETECTED
Y ENTERO RECN STL QL NAA+PROBE: NOT DETECTED

## 2022-07-07 PROCEDURE — 99421 OL DIG E/M SVC 5-10 MIN: CPT | Performed by: FAMILY MEDICINE

## 2022-07-07 RX ORDER — VANCOMYCIN HYDROCHLORIDE 125 MG/1
125 CAPSULE ORAL 4 TIMES DAILY
Qty: 40 CAPSULE | Refills: 0 | Status: SHIPPED | OUTPATIENT
Start: 2022-07-07 | End: 2022-07-15

## 2022-07-12 ENCOUNTER — HOSPITAL ENCOUNTER (OUTPATIENT)
Dept: CARDIAC REHAB | Facility: CLINIC | Age: 67
Discharge: HOME | End: 2022-07-12
Attending: SURGERY
Payer: COMMERCIAL

## 2022-07-12 ENCOUNTER — MYC MEDICAL ADVICE (OUTPATIENT)
Dept: FAMILY MEDICINE | Facility: CLINIC | Age: 67
End: 2022-07-12

## 2022-07-12 DIAGNOSIS — Z95.1 S/P CABG (CORONARY ARTERY BYPASS GRAFT): ICD-10-CM

## 2022-07-12 PROCEDURE — 93797 PHYS/QHP OP CAR RHAB WO ECG: CPT | Mod: XU

## 2022-07-12 PROCEDURE — 93798 PHYS/QHP OP CAR RHAB W/ECG: CPT

## 2022-07-13 NOTE — TELEPHONE ENCOUNTER
Per 7/7/22 evisit:    Roxanna Otoole MD  to Jody Monroy    SK      11:07 AM  Hi Jody,   I suspected c-diff when I sent you to [facility] with your History of c -diff in the past  Please take Vancomycin as recommended   Drink Lots of Fluids  If you continue to have Diarrhea -Go to ER  Sincerely,  Roxanna Carroll RN  [facility]

## 2022-07-14 ENCOUNTER — OFFICE VISIT (OUTPATIENT)
Dept: CARDIOLOGY | Facility: CLINIC | Age: 67
End: 2022-07-14
Payer: COMMERCIAL

## 2022-07-14 ENCOUNTER — TELEPHONE (OUTPATIENT)
Dept: FAMILY MEDICINE | Facility: CLINIC | Age: 67
End: 2022-07-14

## 2022-07-14 VITALS
HEART RATE: 72 BPM | BODY MASS INDEX: 23.22 KG/M2 | OXYGEN SATURATION: 100 % | WEIGHT: 135 LBS | DIASTOLIC BLOOD PRESSURE: 64 MMHG | SYSTOLIC BLOOD PRESSURE: 105 MMHG

## 2022-07-14 DIAGNOSIS — E11.9 DIABETES MELLITUS TYPE 2, NONINSULIN DEPENDENT (H): ICD-10-CM

## 2022-07-14 DIAGNOSIS — Z95.1 S/P CABG (CORONARY ARTERY BYPASS GRAFT): ICD-10-CM

## 2022-07-14 DIAGNOSIS — I10 HYPERTENSION, UNSPECIFIED TYPE: ICD-10-CM

## 2022-07-14 DIAGNOSIS — I25.5 ISCHEMIC CARDIOMYOPATHY: ICD-10-CM

## 2022-07-14 DIAGNOSIS — I50.21 ACUTE SYSTOLIC HEART FAILURE (H): Primary | ICD-10-CM

## 2022-07-14 PROCEDURE — 99214 OFFICE O/P EST MOD 30 MIN: CPT | Performed by: NURSE PRACTITIONER

## 2022-07-14 RX ORDER — LISINOPRIL 10 MG/1
10 TABLET ORAL DAILY
Qty: 90 TABLET | Refills: 1 | Status: SHIPPED | OUTPATIENT
Start: 2022-07-14 | End: 2022-08-16

## 2022-07-14 NOTE — PATIENT INSTRUCTIONS
Take your medicines every day, as directed    Changes made today:  Continue metoprolol 50 mg daily.  Increase lisinopril to 10 mg daily.   Monitor Your Weight and Symptoms    Contact us if you:    Gain 2 pounds in one day or 5 pounds in one week  Feel more short of breath  Notice more leg swelling  Feel lightheadeded   Change your lifestyle    Limit Salt or Sodium:  2000 mg  Limit Fluids:  2000 mL or approximately 64 ounces  Eat a Heart Healthy Diet  Low in saturated fats  Stay Active:  Aim to move at least 150 minutes every  week         To Contact us    During Business Hours:  [phone], option # 1      After hours, weekends or holidays:   [phone], Option #4  Ask to speak to the On-Call Cardiologist. Inform them you are a CORE/heart failure patient at the [facility].     [employer] allows you to communicate directly with your heart team through secure messaging.  [employer] can be accessed any time on your phone, computer, or tablet.  If you need assistance, we'd be happy to help!         Keep your Heart Appointments:    7/20 with Dr. Steve in [place]    8/2 Labs    8/4 CORE

## 2022-07-14 NOTE — PROGRESS NOTES
"    HPI: Marianne is a 67 year old White female with a past medical history of T2DM, HTN, GERD, JUSTUS, MDD and recent episode of lightheadedness and dizziness on     Admission - - 2022.  Surgeon: Dr. Ulises Desai  1.  Coronary artery bypass grafting x 4 (left internal mammary artery to left anterior descending artery, saphenous vein graft to distal right coronary artery, saphenous vein graft to ramus intermedius artery, saphenous vein graft to obtuse marginal artery).  2.  Endoscopic vein harvest.    Patient has no SOB at rest.  She recently got diagnosed with c-diff.  She is now working up her strength. No swelling in the legs. She had no swelling before the hospitalization in the legs.  She has some lightheadedness with fast movements.  Weight at home 135 lbs. She is working on eating more. The taste buds are \"coming back.\" She stopped the Lasix - she can't recall when exactly.    Denies SOB,PND, orthopnea, edema, weight gain, chest pain, palpitations, lightheadedness, dizziness, near syncopal/syncopal episodes. Marianne has been following salt and fluid restrictions.      PAST MEDICAL HISTORY:  Past Medical History:   Diagnosis Date     Abnormal Pap smear of cervix ~    repeat pap was normal     Allergic rhinitis, cause unspecified      Anxiety state, unspecified     panic episodes     HFrEF (heart failure with reduced ejection fraction) (H)      Unspecified essential hypertension        FAMILY HISTORY:  Family History   Problem Relation Age of Onset     Diabetes Mother         hypertension, alive at 83     C.A.D. Mother      Cancer Father         lung cancer, smoker.   at age 53     Family History Negative Sister      Osteoporosis Sister        SOCIAL HISTORY:  Social History     Tobacco Use     Smoking status: Never Smoker     Smokeless tobacco: Never Used   Vaping Use     Vaping Use: Never used   Substance Use Topics     Alcohol use: Yes     Comment: social     Drug use: No           CURRENT " MEDICATIONS:  Current Outpatient Medications   Medication Sig Dispense Refill     acetaminophen (TYLENOL) 325 MG tablet Take 2 tablets (650 mg) by mouth every 4 hours as needed for other (For optimal non-opioid multimodal pain management to improve pain control.) 100 tablet 0     aspirin (ASA) 81 MG chewable tablet 2 tablets (162 mg) by Oral or NG Tube route daily 120 tablet 0     atorvastatin (LIPITOR) 80 MG tablet Take 1 tablet (80 mg) by mouth daily 30 tablet 0     blood glucose (NO BRAND SPECIFIED) test strip Use to test blood sugar 2 times daily or as directed. 200 strip 1     blood glucose (ONETOUCH VERIO IQ) test strip Use to test blood sugar 2 times daily or as directed. 200 strip 1     blood glucose monitoring (ONETOUCH VERIO) meter device kit Use to test blood sugar 2 times daily or as directed. 1 kit 0     cyclobenzaprine (FLEXERIL) 5 MG tablet Take 1 tablet (5 mg) by mouth every 8 hours as needed for muscle spasms 12 tablet 1     empagliflozin (JARDIANCE) 25 MG TABS tablet Take 1 tablet (25 mg) by mouth daily 30 tablet 4     FLUoxetine (PROZAC) 20 MG capsule TAKE 2 CAPSULES DAILY 180 capsule 1     glipiZIDE (GLUCOTROL XL) 5 MG 24 hr tablet TAKE 2 TABLETS DAILY (NEED ANNUAL EXAM) 180 tablet 1     hydrOXYzine (ATARAX) 25 MG tablet Take 1 tablet (25 mg) by mouth 3 times daily as needed for anxiety 90 tablet 1     lisinopril (ZESTRIL) 5 MG tablet Take 1 tablet (5 mg) by mouth daily 90 tablet 0     LORazepam (ATIVAN) 0.5 MG tablet Take 1 tablet (0.5 mg) by mouth every 6 hours as needed for anxiety 6 tablet 0     metFORMIN (GLUCOPHAGE-XR) 500 MG 24 hr tablet Take 4 tablets (2,000 mg) by mouth daily (with dinner) Please see changed dose 360 tablet 1     metoprolol succinate ER (TOPROL XL) 50 MG 24 hr tablet Take 1 tablet (50 mg) by mouth daily 90 tablet 3     MULTIPLE VITAMIN OR TABS 1 TABLET DAILY       Multiple Vitamins-Minerals (PRESERVISION AREDS PO) Take 2 tablets by mouth daily       vancomycin (VANCOCIN)  125 MG capsule Take 1 capsule (125 mg) by mouth 4 times daily for 10 days 40 capsule 0       ROS:  Review Of Systems  Skin: negative  Eyes: negative  Ears/Nose/Throat: negative  Respiratory: No shortness of breath, dyspnea on exertion, cough, or hemoptysis  Cardiovascular: negative  Gastrointestinal: negative  Genitourinary: negative  Musculoskeletal: negative  Neurologic: negative  Psychiatric: negative  Hematologic/Lymphatic/Immunologic: negative  Endocrine: negative      EXAM:  /64 (BP Location: Right arm, Patient Position: Sitting, Cuff Size: Adult Regular)   Pulse 72   Wt 61.2 kg (135 lb)   SpO2 100%   BMI 23.22 kg/m    Home weight:  General: alert, articulate, and in no acute distress.  HEENT: normocephalic, atraumatic, anicteric sclera, EOMI, mucosa moist, no cyanosis.   Neck: neck supple.  No adenopathy, masses, or carotid bruits.  JVP flat at 90 degrees  Heart: regular rhythm, normal S1/S2, no murmur, gallop, rub.  Precordium quiet with normal PMI.     Lungs: clear, no rales, ronchi, or wheezing.  No accessory muscle use, respirations unlabored.   Abdomen: soft, non-tender, bowel sounds present, no hepatomegaly  Extremities: no pitting edema.   No cyanosis.   Neurological: alert and oriented x 3.  normal speech and affect, no gross motor deficits  Skin:  No ecchymoses, rashes, or clubbing.    Labs:  CBC RESULTS:  Lab Results   Component Value Date    WBC 7.6 07/06/2022    WBC 7.1 09/09/2020    RBC 4.52 07/06/2022    RBC 4.57 09/09/2020    HGB 11.4 (L) 07/06/2022    HGB 13.3 09/09/2020    HCT 36.5 07/06/2022    HCT 41.1 09/09/2020    MCV 81 07/06/2022    MCV 90 09/09/2020    MCH 25.2 (L) 07/06/2022    MCH 29.1 09/09/2020    MCHC 31.2 (L) 07/06/2022    MCHC 32.4 09/09/2020    RDW 14.6 07/06/2022    RDW 12.5 09/09/2020     (H) 07/06/2022     09/09/2020       CMP RESULTS:  Lab Results   Component Value Date     07/06/2022     01/07/2021    POTASSIUM 4.0 07/06/2022     POTASSIUM 4.8 01/07/2021    CHLORIDE 109 07/06/2022    CHLORIDE 106 01/07/2021    CO2 19 (L) 07/06/2022    CO2 28 01/07/2021    ANIONGAP 7 07/06/2022    ANIONGAP 7 01/07/2021     (H) 07/06/2022     (H) 01/07/2021    BUN 35 (H) 07/06/2022    BUN 19 01/07/2021    CR 0.92 07/06/2022    CR 0.90 01/07/2021    GFRESTIMATED 67 07/06/2022    GFRESTIMATED 66 01/07/2021    GFRESTBLACK 77 01/07/2021    POONAM 9.9 07/06/2022    POONAM 9.5 01/07/2021    BILITOTAL 0.4 07/06/2022    BILITOTAL 0.4 09/09/2020    ALBUMIN 3.7 07/06/2022    ALBUMIN 3.7 09/09/2020    ALKPHOS 107 07/06/2022    ALKPHOS 90 09/09/2020    ALT 24 07/06/2022    ALT 33 09/09/2020    AST 14 07/06/2022    AST 14 09/09/2020        INR RESULTS:  Lab Results   Component Value Date    INR 1.29 (H) 05/25/2022       No components found for: CK  Lab Results   Component Value Date    MAG 2.0 06/04/2022     Lab Results   Component Value Date    NTBNP 2,714 (H) 06/09/2022     @BRIEFLABR (dig)@    Most recent echocardiogram:  No results found for this or any previous visit (from the past 8760 hour(s)).      Assessment and Plan:    In summary,Marianne  is a 67 year old. Increased Metoprolol to 50 mg daily at the last visit.  Today we will recommend the increase in Lisinopril to 10 mg .  Follow up in clinic 8/4     1.  Chronic systolic heart failure secondary to ICM.  Stage C  NYHA Class III  ACEi/ARB: yes 10 mg Lisinopril- continue up titration   BB: yes- continue up titration- Metoprolol 50 mg   Aldosterone antagonist: no- will initiate at future visit.   SCD prophylaxis: does not meet criteria for implant  Fluid status: euvolemic  Anticoagulation:   Antiplatelet:  ASA dose   Sleep apnea:  NSAID use:  Contraindicated.  Avoid use.  Remote Monitoring:none  SGLT 2 - Jardiance 25 mg    2.  Other comordbid conditions:      #T2DM - PCP to manage - glipizide , Jardiance, metformin    #HTN- 105/64- stable - Lisinopril and Metoprolol      #GERD- followed by PCP     3.   Follow-up   Metoprolol 50 mg - continue  increase the Lisinopril to 10 mg daily   Follow up CORE in August- 4th       Belinda MOORE, CNP  [facility]

## 2022-07-14 NOTE — TELEPHONE ENCOUNTER
Reached out to patient. In terms of her C-diff infection- she states that she is doing much better. She is currently having about 2 BM's/day which are becoming more normal. She is not experiencing any abdominal pain or cramping. She is concerned however that she is going to run out of medication before her infection is entirely clear, although it is better. She feels she needs a couple more days worth.     Pharmacy: [id number] [address] AT [facility]    GONSALO Ha RN  [facility]

## 2022-07-14 NOTE — TELEPHONE ENCOUNTER
Please finish antibiotics   Please follow up with me a week after You Finish antibiotics  No need for Further as she is down to 2 stools daily  Drink Lots of Fluids

## 2022-07-14 NOTE — TELEPHONE ENCOUNTER
Left message on voicemail advising patient to return call at [phone].    When patient returns call, please see below by Dr. Otoole.    Desire PAYANN, RN  [facility]

## 2022-07-14 NOTE — NURSING NOTE
Return Appointment: Patient given instructions regarding scheduling next clinic visit. Patient demonstrated understanding of this information and agreed to call with further questions or concerns. CORE in 3 weeks.    Medication Change: Patient was educated regarding prescribed medication change, including discussion of the indication, administration, side effects, and when to report to MD or RN. Patient demonstrated understanding of this information and agreed to call with further questions or concerns. Increase lisinopril to 10 mg daily.    Patient stated she understood all health information given and agreed to call with further questions or concerns.    Dasia Amin, RN

## 2022-07-14 NOTE — LETTER
"7/14/2022      RE: Marianne Monroy  [address]  [place] [address]       Dear Colleague,    Thank you for the opportunity to participate in the care of your patient, Marianne Monroy, at the [facility]. Please see a copy of my visit note below.        HPI: Marianne is a 67 year old White female with a past medical history of T2DM, HTN, GERD, JUSTUS, MDD and recent episode of lightheadedness and dizziness on 4/30    Admission 5/18-6/4 - 5/24/2022.  Surgeon: Dr. Ulises Desai  1.  Coronary artery bypass grafting x 4 (left internal mammary artery to left anterior descending artery, saphenous vein graft to distal right coronary artery, saphenous vein graft to ramus intermedius artery, saphenous vein graft to obtuse marginal artery).  2.  Endoscopic vein harvest.    Patient has no SOB at rest.  She recently got diagnosed with c-diff.  She is now working up her strength. No swelling in the legs. She had no swelling before the hospitalization in the legs.  She has some lightheadedness with fast movements.  Weight at home 135 lbs. She is working on eating more. The taste buds are \"coming back.\" She stopped the Lasix - she can't recall when exactly.    Denies SOB,PND, orthopnea, edema, weight gain, chest pain, palpitations, lightheadedness, dizziness, near syncopal/syncopal episodes. Marianne has been following salt and fluid restrictions.      PAST MEDICAL HISTORY:  Past Medical History:   Diagnosis Date     Abnormal Pap smear of cervix ~2000    repeat pap was normal     Allergic rhinitis, cause unspecified      Anxiety state, unspecified     panic episodes     HFrEF (heart failure with reduced ejection fraction) (H)      Unspecified essential hypertension        FAMILY HISTORY:  Family History   Problem Relation Age of Onset     Diabetes Mother         hypertension, alive at 83     C.A.D. Mother      Cancer Father         lung " cancer, smoker.   at age 53     Family History Negative Sister      Osteoporosis Sister        SOCIAL HISTORY:  Social History     Tobacco Use     Smoking status: Never Smoker     Smokeless tobacco: Never Used   Vaping Use     Vaping Use: Never used   Substance Use Topics     Alcohol use: Yes     Comment: social     Drug use: No           CURRENT MEDICATIONS:  Current Outpatient Medications   Medication Sig Dispense Refill     acetaminophen (TYLENOL) 325 MG tablet Take 2 tablets (650 mg) by mouth every 4 hours as needed for other (For optimal non-opioid multimodal pain management to improve pain control.) 100 tablet 0     aspirin (ASA) 81 MG chewable tablet 2 tablets (162 mg) by Oral or NG Tube route daily 120 tablet 0     atorvastatin (LIPITOR) 80 MG tablet Take 1 tablet (80 mg) by mouth daily 30 tablet 0     blood glucose (NO BRAND SPECIFIED) test strip Use to test blood sugar 2 times daily or as directed. 200 strip 1     blood glucose (ONETOUCH VERIO IQ) test strip Use to test blood sugar 2 times daily or as directed. 200 strip 1     blood glucose monitoring (ONETOUCH VERIO) meter device kit Use to test blood sugar 2 times daily or as directed. 1 kit 0     cyclobenzaprine (FLEXERIL) 5 MG tablet Take 1 tablet (5 mg) by mouth every 8 hours as needed for muscle spasms 12 tablet 1     empagliflozin (JARDIANCE) 25 MG TABS tablet Take 1 tablet (25 mg) by mouth daily 30 tablet 4     FLUoxetine (PROZAC) 20 MG capsule TAKE 2 CAPSULES DAILY 180 capsule 1     glipiZIDE (GLUCOTROL XL) 5 MG 24 hr tablet TAKE 2 TABLETS DAILY (NEED ANNUAL EXAM) 180 tablet 1     hydrOXYzine (ATARAX) 25 MG tablet Take 1 tablet (25 mg) by mouth 3 times daily as needed for anxiety 90 tablet 1     lisinopril (ZESTRIL) 5 MG tablet Take 1 tablet (5 mg) by mouth daily 90 tablet 0     LORazepam (ATIVAN) 0.5 MG tablet Take 1 tablet (0.5 mg) by mouth every 6 hours as needed for anxiety 6 tablet 0     metFORMIN (GLUCOPHAGE-XR) 500 MG 24 hr tablet Take  4 tablets (2,000 mg) by mouth daily (with dinner) Please see changed dose 360 tablet 1     metoprolol succinate ER (TOPROL XL) 50 MG 24 hr tablet Take 1 tablet (50 mg) by mouth daily 90 tablet 3     MULTIPLE VITAMIN OR TABS 1 TABLET DAILY       Multiple Vitamins-Minerals (PRESERVISION AREDS PO) Take 2 tablets by mouth daily       vancomycin (VANCOCIN) 125 MG capsule Take 1 capsule (125 mg) by mouth 4 times daily for 10 days 40 capsule 0       ROS:  Review Of Systems  Skin: negative  Eyes: negative  Ears/Nose/Throat: negative  Respiratory: No shortness of breath, dyspnea on exertion, cough, or hemoptysis  Cardiovascular: negative  Gastrointestinal: negative  Genitourinary: negative  Musculoskeletal: negative  Neurologic: negative  Psychiatric: negative  Hematologic/Lymphatic/Immunologic: negative  Endocrine: negative      EXAM:  /64 (BP Location: Right arm, Patient Position: Sitting, Cuff Size: Adult Regular)   Pulse 72   Wt 61.2 kg (135 lb)   SpO2 100%   BMI 23.22 kg/m    Home weight:  General: alert, articulate, and in no acute distress.  HEENT: normocephalic, atraumatic, anicteric sclera, EOMI, mucosa moist, no cyanosis.   Neck: neck supple.  No adenopathy, masses, or carotid bruits.  JVP flat at 90 degrees  Heart: regular rhythm, normal S1/S2, no murmur, gallop, rub.  Precordium quiet with normal PMI.     Lungs: clear, no rales, ronchi, or wheezing.  No accessory muscle use, respirations unlabored.   Abdomen: soft, non-tender, bowel sounds present, no hepatomegaly  Extremities: no pitting edema.   No cyanosis.   Neurological: alert and oriented x 3.  normal speech and affect, no gross motor deficits  Skin:  No ecchymoses, rashes, or clubbing.    Labs:  CBC RESULTS:  Lab Results   Component Value Date    WBC 7.6 07/06/2022    WBC 7.1 09/09/2020    RBC 4.52 07/06/2022    RBC 4.57 09/09/2020    HGB 11.4 (L) 07/06/2022    HGB 13.3 09/09/2020    HCT 36.5 07/06/2022    HCT 41.1 09/09/2020    MCV 81 07/06/2022     MCV 90 09/09/2020    MCH 25.2 (L) 07/06/2022    MCH 29.1 09/09/2020    MCHC 31.2 (L) 07/06/2022    MCHC 32.4 09/09/2020    RDW 14.6 07/06/2022    RDW 12.5 09/09/2020     (H) 07/06/2022     09/09/2020       CMP RESULTS:  Lab Results   Component Value Date     07/06/2022     01/07/2021    POTASSIUM 4.0 07/06/2022    POTASSIUM 4.8 01/07/2021    CHLORIDE 109 07/06/2022    CHLORIDE 106 01/07/2021    CO2 19 (L) 07/06/2022    CO2 28 01/07/2021    ANIONGAP 7 07/06/2022    ANIONGAP 7 01/07/2021     (H) 07/06/2022     (H) 01/07/2021    BUN 35 (H) 07/06/2022    BUN 19 01/07/2021    CR 0.92 07/06/2022    CR 0.90 01/07/2021    GFRESTIMATED 67 07/06/2022    GFRESTIMATED 66 01/07/2021    GFRESTBLACK 77 01/07/2021    POONAM 9.9 07/06/2022    POONAM 9.5 01/07/2021    BILITOTAL 0.4 07/06/2022    BILITOTAL 0.4 09/09/2020    ALBUMIN 3.7 07/06/2022    ALBUMIN 3.7 09/09/2020    ALKPHOS 107 07/06/2022    ALKPHOS 90 09/09/2020    ALT 24 07/06/2022    ALT 33 09/09/2020    AST 14 07/06/2022    AST 14 09/09/2020        INR RESULTS:  Lab Results   Component Value Date    INR 1.29 (H) 05/25/2022       No components found for: CK  Lab Results   Component Value Date    MAG 2.0 06/04/2022     Lab Results   Component Value Date    NTBNP 2,714 (H) 06/09/2022     @BRIEFLABR (dig)@    Most recent echocardiogram:  No results found for this or any previous visit (from the past 8760 hour(s)).      Assessment and Plan:    In summary,Marianne  is a 67 year old. Increased Metoprolol to 50 mg daily at the last visit.  Today we will recommend the increase in Lisinopril to 10 mg .  Follow up in clinic 8/4     1.  Chronic systolic heart failure secondary to ICM.  Stage C  NYHA Class III  ACEi/ARB: yes 10 mg Lisinopril- continue up titration   BB: yes- continue up titration- Metoprolol 50 mg   Aldosterone antagonist: no- will initiate at future visit.   SCD prophylaxis: does not meet criteria for implant  Fluid status:  euvolemic  Anticoagulation:   Antiplatelet:  ASA dose   Sleep apnea:  NSAID use:  Contraindicated.  Avoid use.  Remote Monitoring:none  SGLT 2 - Jardiance 25 mg    2.  Other comordbid conditions:      #T2DM - PCP to manage - glipizide , Jardiance, metformin    #HTN- 105/64- stable - Lisinopril and Metoprolol      #GERD- followed by PCP     3.  Follow-up   Metoprolol 50 mg - continue  increase the Lisinopril to 10 mg daily   Follow up CORE in August- 4th       Belinda MOORE CNP  [facility]

## 2022-07-14 NOTE — NURSING NOTE
"Chief Complaint   Patient presents with     Follow Up     Pt reports SOB, heart palpitations/fluttering once in awhile, lightheaded/dizziness, tiredness/fatigue is getting better, CORE Return, 68 yo female with systolic heart failure presents for follow up with labs prior.       Initial /64 (BP Location: Right arm, Patient Position: Sitting, Cuff Size: Adult Regular)   Pulse 72   Wt 61.2 kg (135 lb)   SpO2 100%   BMI 23.22 kg/m   Estimated body mass index is 23.22 kg/m  as calculated from the following:    Height as of 6/14/22: 1.624 m (5' 3.94\").    Weight as of this encounter: 61.2 kg (135 lb)..  BP completed using cuff size: regular    EDWIN Joseph    "

## 2022-07-14 NOTE — TELEPHONE ENCOUNTER
Triage -Please call and see How Pt is doing  If not better she should make a follow up appointment for recheck in clinic  She should take antibiotics as recommended

## 2022-07-14 NOTE — TELEPHONE ENCOUNTER
Patient has two encounter open regarding the same concern. Closing one of the encounters to reduce miscommunication.     Thanks,  IKER Mario  [facility]

## 2022-07-15 ENCOUNTER — HOSPITAL ENCOUNTER (OUTPATIENT)
Dept: CARDIAC REHAB | Facility: CLINIC | Age: 67
Discharge: HOME | End: 2022-07-15
Attending: SURGERY
Payer: COMMERCIAL

## 2022-07-15 DIAGNOSIS — E11.40 TYPE 2 DIABETES MELLITUS WITH DIABETIC NEUROPATHY, WITHOUT LONG-TERM CURRENT USE OF INSULIN (H): ICD-10-CM

## 2022-07-15 PROCEDURE — 93798 PHYS/QHP OP CAR RHAB W/ECG: CPT

## 2022-07-15 NOTE — TELEPHONE ENCOUNTER
Rx sent with 90 day supply instead of 30 day supply.    SCHUYLER HaN RN  [facility], [facility]

## 2022-07-15 NOTE — TELEPHONE ENCOUNTER
Reason for call:  Medication   If this is a refill request, has the caller requested the refill from the pharmacy already? Yes  Will the patient be using a [facility]? No  Name of the pharmacy and phone number for the current request:    [facility] - [place], [place] - [facility]  Name of the medication requested: She needs 90 day for her mail order. Can this be changed and sent over ASAP.  empagliflozin (JARDIANCE) 25 MG TABS tablet 30 tablet 4 4/14/2022  --   Sig - Route: Take 1 tablet (25 mg) by mouth daily - Oral     Other request: This costs her more out of pocket if it is not 90 days. Please send over.    Phone number to reach patient:  Cell number on file:    Telephone Information:   Mobile [phone]       Best Time:      Can we leave a detailed message on this number?  YES    Travel screening: Not Applicable

## 2022-07-18 DIAGNOSIS — I50.21 ACUTE SYSTOLIC HEART FAILURE (H): Primary | ICD-10-CM

## 2022-07-20 ENCOUNTER — MYC MEDICAL ADVICE (OUTPATIENT)
Dept: FAMILY MEDICINE | Facility: CLINIC | Age: 67
End: 2022-07-20

## 2022-07-20 ENCOUNTER — OFFICE VISIT (OUTPATIENT)
Dept: CARDIOLOGY | Facility: CLINIC | Age: 67
End: 2022-07-20
Payer: COMMERCIAL

## 2022-07-20 ENCOUNTER — HOSPITAL ENCOUNTER (OUTPATIENT)
Dept: CARDIAC REHAB | Facility: CLINIC | Age: 67
Discharge: HOME | End: 2022-07-20
Attending: SURGERY
Payer: COMMERCIAL

## 2022-07-20 VITALS
BODY MASS INDEX: 22.86 KG/M2 | DIASTOLIC BLOOD PRESSURE: 64 MMHG | WEIGHT: 132.9 LBS | HEART RATE: 80 BPM | OXYGEN SATURATION: 100 % | SYSTOLIC BLOOD PRESSURE: 120 MMHG

## 2022-07-20 DIAGNOSIS — E78.5 HYPERLIPIDEMIA LDL GOAL <70: ICD-10-CM

## 2022-07-20 DIAGNOSIS — Z95.1 S/P CABG (CORONARY ARTERY BYPASS GRAFT): ICD-10-CM

## 2022-07-20 DIAGNOSIS — I47.29 NON-SUSTAINED VENTRICULAR TACHYCARDIA (H): ICD-10-CM

## 2022-07-20 DIAGNOSIS — E11.59 TYPE 2 DIABETES MELLITUS WITH OTHER CIRCULATORY COMPLICATION, WITHOUT LONG-TERM CURRENT USE OF INSULIN (H): ICD-10-CM

## 2022-07-20 DIAGNOSIS — I50.20 HFREF (HEART FAILURE WITH REDUCED EJECTION FRACTION) (H): Primary | ICD-10-CM

## 2022-07-20 PROCEDURE — 93798 PHYS/QHP OP CAR RHAB W/ECG: CPT

## 2022-07-20 PROCEDURE — 99215 OFFICE O/P EST HI 40 MIN: CPT | Performed by: STUDENT IN AN ORGANIZED HEALTH CARE EDUCATION/TRAINING PROGRAM

## 2022-07-20 NOTE — NURSING NOTE
Marianne Monroy's goals for this visit include:   Chief Complaint   Patient presents with     New Patient     Referred by Dr. Otoole  Cardiomyopathy and CABG       She requests these members of her care team be copied on today's visit information: yes     PCP: Roxanna Otoole    Referring Provider:  No referring provider defined for this encounter.    /64 (BP Location: Left arm, Patient Position: Chair, Cuff Size: Adult Regular)   Pulse 80   Wt 60.3 kg (132 lb 14.4 oz)   SpO2 100%   BMI 22.86 kg/m      Do you need any medication refills at today's visit? No       DANO Felix   Cardiology Team  [facility]

## 2022-07-20 NOTE — TELEPHONE ENCOUNTER
Triage   Please romero l pt  She needs to be seen if she is getting worse as Last Time  Her BP was Low  Please have her see any Provider or go to Urgent care

## 2022-07-20 NOTE — PATIENT INSTRUCTIONS
The following is a summary of your office visit today:    Continue your medications as is  You may return to work -- light duty  Follow-up with me in 8 weeks   Please proceed with heart ultrasound and heart monitor as previously scheduled    Nurse contact information:  Cardiology Care Coordinators:  Keyla Saul RN and Harlan Taylor, RN   Phone  [phone]    Fax [phone]       HOW TO CHECK YOUR BLOOD PRESSURE AT HOME:     Avoid eating, smoking, and exercising for at least 30 minutes before taking a reading.    Be sure you have taken your BP medication at least 2-3 hours before you check it.     Sit quietly for 10 minutes before a reading.     Sit in a chair with your feet flat on the floor. Rest your  arm on a table so that the arm cuff is at the same level as your heart.    Remain still during the reading.    Record your blood pressure and pulse in a log and bring to your next appointment.     If you have had any blood work, imaging or other testing completed we will be in touch within 1-2 weeks regarding the results. If you have any questions, concerns or need to schedule a follow up, please contact us at [phone]. If you are needing refills please contact your pharmacy. For urgent after hour care please call the [facility] at [phone] or the [facility] at [phone] and ask to speak to the cardiologist on call.    It was a pleasure meeting with you today. Please let us know if there is anything else we can do for you so that we can be sure you are leaving completely satisfied with your care experience.     Your Cardiology Team at [facility]  RN Care Coordinators: Paul  Support Staff: Jean

## 2022-07-20 NOTE — PROGRESS NOTES
Chief Complaint: Establish general cardiovascular care    HPI: Marianne Monroy is a 67 year old female with a past medical history of coronary artery disease (s/p CABG), ischemic cardiomyopathy, and hypertension who was referred to me to establish general cardiovascular care.  She presented today with her niece and brother.    Briefly, Ms. Monroy first came to my attention in the inpatient setting. There, she was admitted to my service in May 2022 with a tachyarrhythmia. She had a TTE and was found to have severe LV dysfunction (LVEF 20-30%) with normal RV function. I subsequently referred her for a coronary angiogram, which revealed severe CAD. This prompted CABG, which was done on 05/24/22. Her post-operative course was notable for PVCs without ventricular tachycardia.  Her discharge LVEF (done on June 1) was 35-40%.  She also had C. Difficile infection shortly after leaving the hospital. Ms. Monroy has been followed in the core clinic, where she has had progressive upward titration of her neurohormonal blockade.  Given her reduced EF and ventricular tachycardia preoperatively, the EP team recommended that she wear her LifeVest until her 3-month follow-up with them.    Today, Ms. Monroy notes that she feel that she is slowly improving after surgery.  The C. difficile infection was back in she is not feel like she has fully recovered in spite of taking 12 out of 14 days of the medication course.  From a cardiovascular standpoint, her chief complaint is lightheadedness, which is primarily postural.  She also notes that she has been having some imbalance, but this is improved by using a cane.  She can walk at least 1/2 mile level ground. Ms. Monroy also notes some hesitation about being fully physically active given her recent major surgery.  At the time of the consultation, she notes an absence of chest pain at rest, dyspnea at rest or with exertion, PND, orthopnea, peripheral edema, palpitations, or  syncope.  She continues to wear her LifeVest during the day but takes it off to sleep.    A comprehensive ROS was done and the details are included above in the HPI.    Past Medical History:  Ischemic cardiomyopathy  Coronary artery disease status post CABG  Hypertension  Type 2 diabetes mellitus, non-insulin-dependent  - No prior history of  dyslipidemia, TIA/stroke, vascular aneurysms, PAD  Past Medical History:   Diagnosis Date     Abnormal Pap smear of cervix ~2000    repeat pap was normal     Allergic rhinitis, cause unspecified      Anxiety state, unspecified     panic episodes     HFrEF (heart failure with reduced ejection fraction) (H)      Unspecified essential hypertension        Past Surgical History:  CABG 05/24/22 by Ulises Desai at [facility]: LIMA-LAD, SVG-dRCA, SVG-RI, SVG-OM  Past Surgical History:   Procedure Laterality Date     BYPASS GRAFT ARTERY CORONARY N/A 05/24/2022    Procedure: Median sternotomy.  Intraoperative transesophageal echocardiogram per anesthesia.  Left internal mammary artery harvest.  Right and left endoscopically harvested  greater saphenouse vein.  Cardiopulmonary Bypass.  Coronary Artery Bypass Grafts x4.;  Surgeon: Ulises Desai MD;  Location: [facility] OR     CV CORONARY ANGIOGRAM  05/19/2022    Procedure: CV CORONARY ANGIOGRAM;  Surgeon: Huseyin Vogel MD;  Location: [facility] CARDIAC CATH LAB     CV CORONARY ANGIOGRAM  05/19/2022    Procedure: ;  Surgeon: Huseyin Vogel MD;  Location: [facility] CARDIAC CATH LAB     NO HISTORY OF SURGERY       PICC DOUBLE LUMEN PLACEMENT Right 05/27/2022    Failed PICC attempt right arm basilic vein     PICC INSERTION - TRIPLE LUMEN Left 05/28/2022    left basilic 5fr tl,picc44 cm       Drug History:  Home cardiac meds: Aspirin 81 mg once daily, atorvastatin 80 mg once daily, empagliflozin 25 mg daily, lisinopril 10 mg daily, metoprolol succinate 50 mg daily.  Current Outpatient Medications   Medication Sig Dispense Refill      acetaminophen (TYLENOL) 325 MG tablet Take 2 tablets (650 mg) by mouth every 4 hours as needed for other (For optimal non-opioid multimodal pain management to improve pain control.) 100 tablet 0     aspirin (ASA) 81 MG chewable tablet 2 tablets (162 mg) by Oral or NG Tube route daily 120 tablet 0     atorvastatin (LIPITOR) 80 MG tablet Take 1 tablet (80 mg) by mouth daily 30 tablet 0     blood glucose (NO BRAND SPECIFIED) test strip Use to test blood sugar 2 times daily or as directed. 200 strip 1     blood glucose (ONETOUCH VERIO IQ) test strip Use to test blood sugar 2 times daily or as directed. 200 strip 1     blood glucose monitoring (ONETOUCH VERIO) meter device kit Use to test blood sugar 2 times daily or as directed. 1 kit 0     cyclobenzaprine (FLEXERIL) 5 MG tablet Take 1 tablet (5 mg) by mouth every 8 hours as needed for muscle spasms 12 tablet 1     empagliflozin (JARDIANCE) 25 MG TABS tablet Take 1 tablet (25 mg) by mouth daily 90 tablet 1     FLUoxetine (PROZAC) 20 MG capsule TAKE 2 CAPSULES DAILY 180 capsule 1     glipiZIDE (GLUCOTROL XL) 5 MG 24 hr tablet TAKE 2 TABLETS DAILY (NEED ANNUAL EXAM) 180 tablet 1     hydrOXYzine (ATARAX) 25 MG tablet Take 1 tablet (25 mg) by mouth 3 times daily as needed for anxiety 90 tablet 1     lisinopril (ZESTRIL) 10 MG tablet Take 1 tablet (10 mg) by mouth daily 90 tablet 1     LORazepam (ATIVAN) 0.5 MG tablet Take 1 tablet (0.5 mg) by mouth every 6 hours as needed for anxiety 6 tablet 0     metFORMIN (GLUCOPHAGE-XR) 500 MG 24 hr tablet Take 4 tablets (2,000 mg) by mouth daily (with dinner) Please see changed dose 360 tablet 1     metoprolol succinate ER (TOPROL XL) 50 MG 24 hr tablet Take 1 tablet (50 mg) by mouth daily 90 tablet 3     MULTIPLE VITAMIN OR TABS 1 TABLET DAILY       Multiple Vitamins-Minerals (PRESERVISION AREDS PO) Take 2 tablets by mouth daily       vancomycin (VANCOCIN) 125 MG capsule Take 1 capsule (125 mg) by mouth 4 times daily for 5 days 20  capsule 0         Family History:    Family History   Problem Relation Age of Onset     Diabetes Mother         hypertension, alive at 83     C.A.D. Mother      Cancer Father         lung cancer, smoker.   at age 53     Family History Negative Sister      Osteoporosis Sister        Social History:    Social History     Tobacco Use     Smoking status: Never Smoker     Smokeless tobacco: Never Used   Substance Use Topics     Alcohol use: Yes     Comment: social       Allergies   Allergen Reactions     Effexor [Venlafaxine Hydrochloride]      Tachycardia, makes her extremely jittery--cant sit down, has to keep moving constantly         Physical Examination:  Vitals: /64 (BP Location: Left arm, Patient Position: Chair, Cuff Size: Adult Regular)   Pulse 80   Wt 60.3 kg (132 lb 14.4 oz)   SpO2 100%   BMI 22.86 kg/m    BMI= Body mass index is 22.86 kg/m .    GENERAL: Healthy, alert and no distress  Eyes: No xanthelasmas.  NECK: No palpable neck masses/goitre and no evidence of retrosternal goitre.   ENT: Moist mucosal membranes.  RESPIRATORY: No signs of resp distress. Lungs CTAB.  CARDIOVASCULAR: LifeVest in place.  Sternotomy scar appears to be healing well without any drainage or open areas in the wound.  No JVD, regular, normal S1+S2 without added sounds or murmurs.  ABDOMEN: ND, soft, non-tender, normal bowel sounds.  EXTREMITIES: Warm, well-perfused, no edema.   NEUROLOGY: GCS 15/15, no focal deficits.  VASC: No carotid bruits. Carotid, radial, brachial, popliteal, dorsalis pedis and posterior tibialis pulses are normal in volumes and symmetric bilaterally.   SKIN: No ecchymoses, no rashes.  PSYCH: Cooperative, pleasant affect.       Investigations:  I personally viewed and interpreted the following investigations:    Labs:    CBC RESULTS:  Lab Results   Component Value Date    WBC 7.6 2022    WBC 7.1 2020    RBC 4.52 2022    RBC 4.57 2020    HGB 11.4 (L) 2022    HGB 13.3  09/09/2020    HCT 36.5 07/06/2022    HCT 41.1 09/09/2020    MCV 81 07/06/2022    MCV 90 09/09/2020    MCH 25.2 (L) 07/06/2022    MCH 29.1 09/09/2020    MCHC 31.2 (L) 07/06/2022    MCHC 32.4 09/09/2020    RDW 14.6 07/06/2022    RDW 12.5 09/09/2020     (H) 07/06/2022     09/09/2020       CMP RESULTS:  Lab Results   Component Value Date     07/06/2022     01/07/2021    POTASSIUM 4.0 07/06/2022    POTASSIUM 4.8 01/07/2021    CHLORIDE 109 07/06/2022    CHLORIDE 106 01/07/2021    CO2 19 (L) 07/06/2022    CO2 28 01/07/2021    ANIONGAP 7 07/06/2022    ANIONGAP 7 01/07/2021     (H) 07/06/2022     (H) 01/07/2021    BUN 35 (H) 07/06/2022    BUN 19 01/07/2021    CR 0.92 07/06/2022    CR 0.90 01/07/2021    GFRESTIMATED 67 07/06/2022    GFRESTIMATED 66 01/07/2021    GFRESTBLACK 77 01/07/2021    POONAM 9.9 07/06/2022    POONAM 9.5 01/07/2021    BILITOTAL 0.4 07/06/2022    BILITOTAL 0.4 09/09/2020    ALBUMIN 3.7 07/06/2022    ALBUMIN 3.7 09/09/2020    ALKPHOS 107 07/06/2022    ALKPHOS 90 09/09/2020    ALT 24 07/06/2022    ALT 33 09/09/2020    AST 14 07/06/2022    AST 14 09/09/2020        INR RESULTS:  Lab Results   Component Value Date    INR 1.29 (H) 05/25/2022       BNP RESULTS:  Lab Results   Component Value Date    NTBNPI 664 05/18/2022     No results found for: BNP    LIPIDS:  Lab Results   Component Value Date    CHOL 147 05/18/2022    CHOL 144 05/18/2021     Lab Results   Component Value Date    HDL 47 05/18/2022    HDL 50 05/18/2021     Lab Results   Component Value Date    LDL 57 05/18/2022    LDL 59 05/18/2021     Lab Results   Component Value Date    TRIG 214 05/18/2022    TRIG 173 05/18/2021       Recent Tests:    Electrocardiogram (07/06/2022):  Normal sinus rhythm, ventricular 90 bpm, LVH, inferior Q waves.  QRS 94.    Echocardiogram (06/01/2022):    Left ventricular size is normal.  The visual ejection fraction is 35-40%.  Right ventricular function, chamber size, wall motion, and  thickness are normal.  The inferior vena cava is normal.  No pericardial effusion is present.      Assessment and Plan:   Marianne Monroy is a 67 year old female with a past medical history of coronary artery disease (status post CABG), cardiomyopathy, and patient recently presented to me to establish general cardiovascular July 2022.    Ms. Monroy continues to have improvement in her functional status and absence of high risk/ischemic symptoms while she recovers from her surgery.  She has made excellent progress with the titration of neurohormonal blockade, in spite of a recent C. difficile infection.    My plan for Ms. Monroy to continue the upward titration of neurohormonal blockade clinic with Mrs. Colon.  I would like to increase her metoprolol today, but she already notes postural dizziness.  Hence, I advised her on conservative measures, such as slowly rising from a seated position, compression stockings/bike shorts, and remaining hydrated during the heat of summer.  Should her dizziness symptoms improve with the aforementioned, I would be very interested in transitioning her over to an ARB/ARNI and increasing her metoprolol.  When she is on an ARNI/ARB and higher doses of metoprolol, spironolactone can also be commenced.    Additionally, she will have a TTE at the end of August to determine her LVEF is and discern her candidacy for the ICD.  Arguably, she could have discontinued the LifeVest after her first LVEF (June 1), but her prior history of ventricular arrhythmia suggests that she is a high risk candidate. As per the EP team's (Dr. Morrow's) discharge instructions, she is due to follow-up with my colleague Dr. Too Morrow.     Finally, Ms. Monroy did inquire about returning to work.  Given that she has quite a good medical regimen with an excellent functional status, I told her that she is okay to return to light work.  I did advise her that cardiac rehab should remain a priority, particularly with  her concerns about not feeling mentally ready to use her body. She should also wear her LifeVest throughout the day, including with sleep    Problems:  1. Ischemic cardiomyopathy (ACC/AHA stage C, NYHA I)  2. Coronary artery disease s/p CABG in 05/2022 (LIMA-LAD, SVG-dRCA, SVG-RI, SVG-OM)  3. Hypertension  4. Type 2 diabetes mellitus, non-insulin-dependent  5. Depression  6. Generalized anxiety disorder    Plan:    - Continue aspirin 81 mg once daily and atorvastatin 80 mg once daily lifelong for CAD  - Continue metoprolol succinate 50 mg daily and lisinopril 10 mg daily for neurohormonal blockade  - Continue empagliflozin 20 mg daily for type 2 diabetes mellitus and CAD/ischemic cardiomyopathy  - TTE and EP follow-up at the end of August to determine course of action with sudden cardiac death prophylaxis  - Completion of cardiac rehab    A total of 70 minutes were spent on the day of the visit for chart review, care coordination, face-to-face consultation with the patient, and documentation.       Kris Steve [facility], [place]    Cardiovascular Division  Pager [phone]    CC  Patient Care Team:  Roxanna Otoole MD as PCP - General (Family Medicine)  Roxanna Otoole MD as Assigned PCP  Regina Sutherland as Diabetes Educator (Dietitian, Registered)  Dasia Amin, RN as Specialty Care Coordinator (Cardiology)  Belinda Colon APRN CNP as Nurse Practitioner (Cardiovascular Disease)  Kris Steve MD as MD (Cardiovascular Disease)  Belinda Colon APRN CNP as Assigned Heart and Vascular Provider

## 2022-07-21 ENCOUNTER — OFFICE VISIT (OUTPATIENT)
Dept: FAMILY MEDICINE | Facility: CLINIC | Age: 67
End: 2022-07-21
Payer: COMMERCIAL

## 2022-07-21 VITALS
HEART RATE: 78 BPM | TEMPERATURE: 98.4 F | DIASTOLIC BLOOD PRESSURE: 68 MMHG | OXYGEN SATURATION: 98 % | BODY MASS INDEX: 23.18 KG/M2 | WEIGHT: 134.8 LBS | SYSTOLIC BLOOD PRESSURE: 100 MMHG

## 2022-07-21 DIAGNOSIS — Z95.1 S/P CABG (CORONARY ARTERY BYPASS GRAFT): ICD-10-CM

## 2022-07-21 DIAGNOSIS — A04.72 C. DIFFICILE DIARRHEA: Primary | ICD-10-CM

## 2022-07-21 LAB
ANION GAP SERPL CALCULATED.3IONS-SCNC: 9 MMOL/L (ref 3–14)
BUN SERPL-MCNC: 26 MG/DL (ref 7–30)
CALCIUM SERPL-MCNC: 9.9 MG/DL (ref 8.5–10.1)
CHLORIDE BLD-SCNC: 111 MMOL/L (ref 94–109)
CO2 SERPL-SCNC: 22 MMOL/L (ref 20–32)
CREAT SERPL-MCNC: 0.89 MG/DL (ref 0.52–1.04)
ERYTHROCYTE [DISTWIDTH] IN BLOOD BY AUTOMATED COUNT: 16 % (ref 10–15)
GFR SERPL CREATININE-BSD FRML MDRD: 70 ML/MIN/1.73M2
GLUCOSE BLD-MCNC: 112 MG/DL (ref 70–99)
HCT VFR BLD AUTO: 35.2 % (ref 35–47)
HGB BLD-MCNC: 10.5 G/DL (ref 11.7–15.7)
MCH RBC QN AUTO: 24.5 PG (ref 26.5–33)
MCHC RBC AUTO-ENTMCNC: 29.8 G/DL (ref 31.5–36.5)
MCV RBC AUTO: 82 FL (ref 78–100)
PLATELET # BLD AUTO: 354 10E3/UL (ref 150–450)
POTASSIUM BLD-SCNC: 5.1 MMOL/L (ref 3.4–5.3)
RBC # BLD AUTO: 4.28 10E6/UL (ref 3.8–5.2)
SODIUM SERPL-SCNC: 142 MMOL/L (ref 133–144)
WBC # BLD AUTO: 8.9 10E3/UL (ref 4–11)

## 2022-07-21 PROCEDURE — 36415 COLL VENOUS BLD VENIPUNCTURE: CPT | Performed by: FAMILY MEDICINE

## 2022-07-21 PROCEDURE — 80048 BASIC METABOLIC PNL TOTAL CA: CPT | Performed by: FAMILY MEDICINE

## 2022-07-21 PROCEDURE — 99214 OFFICE O/P EST MOD 30 MIN: CPT | Performed by: FAMILY MEDICINE

## 2022-07-21 PROCEDURE — 85027 COMPLETE CBC AUTOMATED: CPT | Performed by: FAMILY MEDICINE

## 2022-07-21 RX ORDER — VANCOMYCIN HYDROCHLORIDE 125 MG/1
CAPSULE ORAL
Qty: 61 CAPSULE | Refills: 0 | Status: SHIPPED | OUTPATIENT
Start: 2022-07-21 | End: 2022-08-26

## 2022-07-21 NOTE — TELEPHONE ENCOUNTER
Attempted to reach patient to schedule visit today at mailbox full unable to leave message. Linda aLndis MA

## 2022-07-21 NOTE — TELEPHONE ENCOUNTER
Attempted to reach patient regarding below.    Mailbox is full - cannot leave voicemail.    Will need to re-attempt at a later time.    SCHUYLER HaN RN  [facility], [facility]

## 2022-07-21 NOTE — PROGRESS NOTES
Assessment & Plan     C. difficile diarrhea  Advised drink Fluids  Discussed taper dose of Vancomycin  Make appointment with ID  If not better can consider Fidaxomicin    - Infectious Disease Referral; Future  - CBC with platelets; Future  - Basic metabolic panel  (Ca, Cl, CO2, Creat, Gluc, K, Na, BUN); Future  - CBC with platelets  - Basic metabolic panel  (Ca, Cl, CO2, Creat, Gluc, K, Na, BUN)  - vancomycin (VANCOCIN) 125 MG capsule; 1 tablet 4 times daily x 10 days and the Twice daily x 7 days and then ones daily x 7 days  If gets worse again advised go TO [facility]-referral done for IV fluids    S/P CABG (coronary artery bypass graft)  Stable       Return in about 1 week (around 7/28/2022), or if symptoms worsen or fail to improve, for recheck/ sooner if worse or New symptoms.   Total Time 30 minutes talking to Pt, reviewing chart, documenting and prescriptions, coordinating care  Discussed with [facility]    Roxanna Otoole MD  [facility] NARDA Vera is a 67 year old, presenting for the following health issues:  RECHECK (C-diff)      History of Present Illness       Reason for visit:  C diff  Symptoms include:  Ongoing fatigue, diarrhea  Symptom progression:  Worsening    She eats 2-3 servings of fruits and vegetables daily.She consumes 0 sweetened beverage(s) daily.She exercises with enough effort to increase her heart rate 10 to 19 minutes per day.  She exercises with enough effort to increase her heart rate 4 days per week.   She is taking medications regularly.     Pt is on qid vancomycin  2 days ago she was having Diarrhea every 45 minutes and Now she down to 3-4 Times aday  No blood in her stools  She is drinking Fluids  No abdominal pain  No fever or chills  {    Review of Systems   Rest of the ROS is Negative except see above and Problem list [stable]        Objective    BP 96/60   Pulse 78   Temp 98.4  F (36.9  C) (Tympanic)   Wt 61.1 kg (134 lb 12.8 oz)   SpO2 98%   BMI 23.18  kg/m    Body mass index is 23.18 kg/m .  Physical Exam   GENERAL: healthy, alert and no distress  EYES: Eyes grossly normal to inspection  HENT: normal cephalic/atraumatic, nose and mouth without ulcers or lesions, oropharynx clear and oral mucous membranes moist  NECK: no adenopathy, no asymmetry, masses, or scars and thyroid normal to palpation  RESP: lungs clear to auscultation - no rales, rhonchi or wheezes  CV: regular rate and rhythm, normal S1 S2, no S3 or S4, no murmur, click or rub, no peripheral edema and peripheral pulses strong  ABDOMEN: soft, nontender, no hepatosplenomegaly, no masses and bowel sounds normal  MS: no gross musculoskeletal defects noted, no edema  Referral to Acute and Diagnostic Services    The [facility] has been contacted at [phone] ([place]) to confirm patient acceptance. The transition to Acute & Diagnostic Services Clinic has been discussed with patient, and she agrees with next level of care.  Patient understands that evaluation/treatment at [facility] typically takes significantly longer than in clinic/urgent care (>2 hours).                  Referral placed: Yes  Patient has transportation arranged and will travel to the [facility] without delay: Yes  Patient aware not to eat or drink. No    The following provider has assessed this patient for intervention at [facility], and directed the patient for referral: Roxanna Otoole MD  Discussed with [facility] physician  If Pt has More Diarrhea -she will go to [facility] for IV fluids and evaluation tomorrow  Roxanna Otoole MD                      .  ..

## 2022-07-21 NOTE — TELEPHONE ENCOUNTER
Called and spoke to patient. She declines visit today and would like to wait for scheduled appointment on Monday. Linda Landis MA

## 2022-07-22 ENCOUNTER — HOSPITAL ENCOUNTER (OUTPATIENT)
Dept: CARDIAC REHAB | Facility: CLINIC | Age: 67
Discharge: HOME | End: 2022-07-22
Attending: SURGERY
Payer: COMMERCIAL

## 2022-07-22 PROCEDURE — 93798 PHYS/QHP OP CAR RHAB W/ECG: CPT

## 2022-07-25 ENCOUNTER — OFFICE VISIT (OUTPATIENT)
Dept: FAMILY MEDICINE | Facility: CLINIC | Age: 67
End: 2022-07-25
Payer: COMMERCIAL

## 2022-07-25 VITALS
DIASTOLIC BLOOD PRESSURE: 60 MMHG | SYSTOLIC BLOOD PRESSURE: 96 MMHG | WEIGHT: 134 LBS | BODY MASS INDEX: 23.05 KG/M2 | OXYGEN SATURATION: 100 % | HEART RATE: 83 BPM | TEMPERATURE: 98.5 F

## 2022-07-25 DIAGNOSIS — I50.20 HFREF (HEART FAILURE WITH REDUCED EJECTION FRACTION) (H): ICD-10-CM

## 2022-07-25 DIAGNOSIS — I10 BENIGN ESSENTIAL HYPERTENSION: ICD-10-CM

## 2022-07-25 DIAGNOSIS — D64.9 ANEMIA, UNSPECIFIED TYPE: Primary | ICD-10-CM

## 2022-07-25 DIAGNOSIS — Z95.1 S/P CABG (CORONARY ARTERY BYPASS GRAFT): ICD-10-CM

## 2022-07-25 DIAGNOSIS — A04.72 C. DIFFICILE DIARRHEA: ICD-10-CM

## 2022-07-25 DIAGNOSIS — I10 HYPERTENSION, GOAL BELOW 140/90: ICD-10-CM

## 2022-07-25 PROCEDURE — 99207 E-CONSULT TO INFECTIOUS DISEASE (ADULT OUTPT PROVIDER TO SPECIALIST WRITTEN QUESTION & RESPONSE): CPT | Performed by: FAMILY MEDICINE

## 2022-07-25 PROCEDURE — 99214 OFFICE O/P EST MOD 30 MIN: CPT | Performed by: FAMILY MEDICINE

## 2022-07-25 RX ORDER — METOPROLOL SUCCINATE 50 MG/1
25 TABLET, EXTENDED RELEASE ORAL DAILY
Qty: 45 TABLET | Refills: 3 | Status: SHIPPED | OUTPATIENT
Start: 2022-07-25 | End: 2022-08-04

## 2022-07-25 NOTE — PROGRESS NOTES
Assessment & Plan     C. difficile diarrhea  Continue vancomycin  Make ID appointment if not better as discussed   Currently slowly weaning Vancomycin    - Adult E-Consult to Infectious Disease (Outpt Provider to Specialist Written Question & Response)  Fidaxomicin if not better  Anemia, unspecified type  Advised repeat HGB 1 month    Benign essential hypertension  Decrease Toprol to 25 mg daily    S/P CABG (coronary artery bypass graft)    - metoprolol succinate ER (TOPROL XL) 50 MG 24 hr tablet; Take 0.5 tablets (25 mg) by mouth daily    HFrEF (heart failure with reduced ejection fraction) (H)  Stable   Follow up as recommended By Cardiology  23}    Return in about 1 month (around 8/25/2022) for recheck/ sooner if worse or New symptoms.    Roxanna Otoole MD  [facility] NARDA Vera is a 67 year old accompanied by her brother and daughter, presenting for the following health issues:  RECHECK      History of Present Illness       Reason for visit:  C diff  Symptom progression:  Staying the same    She eats 2-3 servings of fruits and vegetables daily.She consumes 0 sweetened beverage(s) daily.She exercises with enough effort to increase her heart rate 10 to 19 minutes per day.  She exercises with enough effort to increase her heart rate 4 days per week.   She is taking medications regularly.     Diarrhea 2-3 Times daily  Feels better  No abdominal pain  Last week her Diarrhea was every 45-60 Minutes  She is on vancomycin  Wants To know when this will completely stop  She is s/p CABG and stable -see epic Notes      Review of Systems   CONSTITUTIONAL: NEGATIVE for fever, chills, change in weight  ENT/MOUTH: NEGATIVE for ear, mouth and throat problems  RESP: NEGATIVE for significant cough or SOB  CV: NEGATIVE for chest pain, palpitations or peripheral edema  GI: as above  No pain  No nausea    PSYCHIATRIC: none  ROS otherwise negative      Objective    BP 96/60   Pulse 83   Temp 98.5  F  (36.9  C) (Tympanic)   Wt 60.8 kg (134 lb)   SpO2 100%   BMI 23.05 kg/m    Body mass index is 23.05 kg/m .  Physical Exam   GENERAL: alert and no distress  NECK: no adenopathy, no asymmetry, masses, or scars and thyroid normal to palpation  RESP: lungs clear to auscultation - no rales, rhonchi or wheezes  CV: regular rate and rhythm, normal S1 S2, no S3 or S4, no murmur, click or rub, no peripheral edema and peripheral pulses strong  ABDOMEN: soft, nontender, no hepatosplenomegaly, no masses and bowel sounds normal  MS: no gross musculoskeletal defects noted, no edema  PSYCH: mentation appears normal    Office Visit on 07/21/2022   Component Date Value Ref Range Status     WBC Count 07/21/2022 8.9  4.0 - 11.0 10e3/uL Final     RBC Count 07/21/2022 4.28  3.80 - 5.20 10e6/uL Final     Hemoglobin 07/21/2022 10.5 (A) 11.7 - 15.7 g/dL Final     Hematocrit 07/21/2022 35.2  35.0 - 47.0 % Final     MCV 07/21/2022 82  78 - 100 fL Final     MCH 07/21/2022 24.5 (A) 26.5 - 33.0 pg Final     MCHC 07/21/2022 29.8 (A) 31.5 - 36.5 g/dL Final     RDW 07/21/2022 16.0 (A) 10.0 - 15.0 % Final     Platelet Count 07/21/2022 354  150 - 450 10e3/uL Final     Sodium 07/21/2022 142  133 - 144 mmol/L Final     Potassium 07/21/2022 5.1  3.4 - 5.3 mmol/L Final     Chloride 07/21/2022 111 (A) 94 - 109 mmol/L Final     Carbon Dioxide (CO2) 07/21/2022 22  20 - 32 mmol/L Final     Anion Gap 07/21/2022 9  3 - 14 mmol/L Final     Urea Nitrogen 07/21/2022 26  7 - 30 mg/dL Final     Creatinine 07/21/2022 0.89  0.52 - 1.04 mg/dL Final     Calcium 07/21/2022 9.9  8.5 - 10.1 mg/dL Final     Glucose 07/21/2022 112 (A) 70 - 99 mg/dL Final     GFR Estimate 07/21/2022 70  >60 mL/min/1.73m2 Final    Effective December 21, 2021 eGFRcr in adults is calculated using the 2021 CKD-EPI creatinine equation which includes age and gender (Laverne gallardo al., NEJM, DOI: 10.1056/KZDKjl3188580)         Over 30 minutes spent reviewing chart, talking to Family and  documenting  .  ..

## 2022-07-26 ENCOUNTER — ANCILLARY PROCEDURE (OUTPATIENT)
Dept: CARDIOLOGY | Facility: CLINIC | Age: 67
End: 2022-07-26
Attending: NURSE PRACTITIONER
Payer: COMMERCIAL

## 2022-07-26 DIAGNOSIS — I25.5 ISCHEMIC CARDIOMYOPATHY: ICD-10-CM

## 2022-07-26 PROCEDURE — 93242 EXT ECG>48HR<7D RECORDING: CPT

## 2022-07-26 PROCEDURE — 93248 EXT ECG>7D<15D REV&INTERPJ: CPT | Performed by: INTERNAL MEDICINE

## 2022-07-26 NOTE — PROGRESS NOTES
Per Cherie Soto, patient to have 7-day Zio monitor placed.  Diagnosis: Ischemic Cardiomyopathy   Monitor placed: Yes  Patient Instructed: Yes  Patient verbalized understanding: Yes  Holter # [id number]    Monitor was placed by REFUGIO Luna   10:12 AM

## 2022-07-27 ENCOUNTER — HOSPITAL ENCOUNTER (OUTPATIENT)
Dept: CARDIAC REHAB | Facility: CLINIC | Age: 67
Discharge: HOME | End: 2022-07-27
Attending: SURGERY
Payer: COMMERCIAL

## 2022-07-27 PROCEDURE — 93798 PHYS/QHP OP CAR RHAB W/ECG: CPT

## 2022-07-29 ENCOUNTER — HOSPITAL ENCOUNTER (OUTPATIENT)
Dept: CARDIAC REHAB | Facility: CLINIC | Age: 67
Discharge: HOME | End: 2022-07-29
Attending: SURGERY
Payer: COMMERCIAL

## 2022-07-29 PROCEDURE — 93798 PHYS/QHP OP CAR RHAB W/ECG: CPT

## 2022-07-29 NOTE — TELEPHONE ENCOUNTER
Triage -Please check what labs is pt talking about  Also can You call Infectious disease at [place]  I had put in a E-consult  I have not heard back from them  Roxanna Otoole MD

## 2022-08-01 ENCOUNTER — HOSPITAL ENCOUNTER (OUTPATIENT)
Dept: CARDIAC REHAB | Facility: CLINIC | Age: 67
Discharge: HOME | End: 2022-08-01
Attending: SURGERY
Payer: COMMERCIAL

## 2022-08-01 PROCEDURE — 93798 PHYS/QHP OP CAR RHAB W/ECG: CPT

## 2022-08-02 ENCOUNTER — LAB (OUTPATIENT)
Dept: LAB | Facility: CLINIC | Age: 67
End: 2022-08-02
Payer: COMMERCIAL

## 2022-08-02 DIAGNOSIS — I50.21 ACUTE SYSTOLIC HEART FAILURE (H): ICD-10-CM

## 2022-08-02 LAB
ANION GAP SERPL CALCULATED.3IONS-SCNC: 6 MMOL/L (ref 3–14)
BUN SERPL-MCNC: 21 MG/DL (ref 7–30)
CALCIUM SERPL-MCNC: 9.4 MG/DL (ref 8.5–10.1)
CHLORIDE BLD-SCNC: 110 MMOL/L (ref 94–109)
CO2 SERPL-SCNC: 24 MMOL/L (ref 20–32)
CREAT SERPL-MCNC: 0.78 MG/DL (ref 0.52–1.04)
GFR SERPL CREATININE-BSD FRML MDRD: 82 ML/MIN/1.73M2
GLUCOSE BLD-MCNC: 251 MG/DL (ref 70–99)
POTASSIUM BLD-SCNC: 4.8 MMOL/L (ref 3.4–5.3)
SODIUM SERPL-SCNC: 140 MMOL/L (ref 133–144)

## 2022-08-02 PROCEDURE — 36415 COLL VENOUS BLD VENIPUNCTURE: CPT

## 2022-08-02 PROCEDURE — 80048 BASIC METABOLIC PNL TOTAL CA: CPT

## 2022-08-03 NOTE — TELEPHONE ENCOUNTER
TRiage -Please call Infectious Disease  I had sent a E-consult and they have NOT replied  Please check with them whem will they get to this

## 2022-08-03 NOTE — TELEPHONE ENCOUNTER
Referral indicates pt to call and schedule an appointment. Gave phone number to call and schedule.      Suzan ORTEGA RN, BSN  [facility]

## 2022-08-03 NOTE — TELEPHONE ENCOUNTER
Routing Mychart response to PCP - Have you heard back from ID E-consult?     Zoie Miller, RN, BSN, PHN  [facility] [place]

## 2022-08-04 ENCOUNTER — TRANSFERRED RECORDS (OUTPATIENT)
Dept: FAMILY MEDICINE | Facility: CLINIC | Age: 67
End: 2022-08-04

## 2022-08-04 ENCOUNTER — OFFICE VISIT (OUTPATIENT)
Dept: CARDIOLOGY | Facility: CLINIC | Age: 67
End: 2022-08-04
Payer: COMMERCIAL

## 2022-08-04 VITALS
WEIGHT: 132 LBS | SYSTOLIC BLOOD PRESSURE: 102 MMHG | DIASTOLIC BLOOD PRESSURE: 53 MMHG | HEART RATE: 79 BPM | BODY MASS INDEX: 22.7 KG/M2 | OXYGEN SATURATION: 97 %

## 2022-08-04 DIAGNOSIS — I10 HYPERTENSION, UNSPECIFIED TYPE: ICD-10-CM

## 2022-08-04 DIAGNOSIS — E11.59 TYPE 2 DIABETES MELLITUS WITH OTHER CIRCULATORY COMPLICATION, WITHOUT LONG-TERM CURRENT USE OF INSULIN (H): ICD-10-CM

## 2022-08-04 DIAGNOSIS — I25.5 ISCHEMIC CARDIOMYOPATHY: ICD-10-CM

## 2022-08-04 DIAGNOSIS — Z95.1 S/P CABG (CORONARY ARTERY BYPASS GRAFT): ICD-10-CM

## 2022-08-04 DIAGNOSIS — I50.21 ACUTE SYSTOLIC HEART FAILURE (H): Primary | ICD-10-CM

## 2022-08-04 LAB — RETINOPATHY: NEGATIVE

## 2022-08-04 PROCEDURE — 99214 OFFICE O/P EST MOD 30 MIN: CPT | Performed by: NURSE PRACTITIONER

## 2022-08-04 RX ORDER — METOPROLOL SUCCINATE 50 MG/1
50 TABLET, EXTENDED RELEASE ORAL DAILY
Qty: 90 TABLET | Refills: 3 | Status: SHIPPED | OUTPATIENT
Start: 2022-08-04 | End: 2022-12-01

## 2022-08-04 NOTE — NURSING NOTE
Labs: Patient was given results of the laboratory testing obtained today. Patient was instructed to return for the next laboratory testing in 2 weeks. Patient demonstrated understanding of this information and agreed to call with further questions or concerns.     Med Reconcile: Reviewed and verified all current medications with the patient. The updated medication list was printed and given to the patient.    Return Appointment: Patient given instructions regarding scheduling next clinic visit. Patient demonstrated understanding of this information and agreed to call with further questions or concerns. CORE in 2 weeks.    Medication Change: Patient was educated regarding prescribed medication change, including discussion of the indication, administration, side effects, and when to report to MD or RN. Patient demonstrated understanding of this information and agreed to call with further questions or concerns. Increase metoprolol to 50 mg daily.    Patient stated she understood all health information given and agreed to call with further questions or concerns.    Dasia Amin, IKER

## 2022-08-04 NOTE — LETTER
2022      RE: Marianne Monroy  [address]  [place] [id number]       Dear Colleague,    Thank you for the opportunity to participate in the care of your patient, Marianne Monroy, at the [facility]. Please see a copy of my visit note below.        HPI: Marianne is a 67 year old White female with a past medical history of T2DM, HTN, GERD, JUSTUS, MDD and recent episode of lightheadedness and dizziness on     Admission - - 2022.  Surgeon: Dr. Ulises Desai  1.  Coronary artery bypass grafting x 4 (left internal mammary artery to left anterior descending artery, saphenous vein graft to distal right coronary artery, saphenous vein graft to ramus intermedius artery, saphenous vein graft to obtuse marginal artery).  2.  Endoscopic vein harvest.    Patient has no SOB at rest.   She is now working up her strength. No swelling in the legs. She had no swelling before the hospitalization in the legs.  She has some lightheadedness with fast movements.  Weight at home 130 lbs. She is working on eating more. She has some JEFFERSON with the hot weather.    Denies SOB,PND, orthopnea, edema, weight gain, chest pain, palpitations, lightheadedness, dizziness, near syncopal/syncopal episodes. Marianne has been following salt and fluid restrictions.      PAST MEDICAL HISTORY:  Past Medical History:   Diagnosis Date     Abnormal Pap smear of cervix ~    repeat pap was normal     Allergic rhinitis, cause unspecified      Anxiety state, unspecified     panic episodes     HFrEF (heart failure with reduced ejection fraction) (H)      Unspecified essential hypertension        FAMILY HISTORY:  Family History   Problem Relation Age of Onset     Diabetes Mother         hypertension, alive at 83     C.A.D. Mother      Cancer Father         lung cancer, smoker.   at age 53     Family History Negative Sister      Osteoporosis Sister         SOCIAL HISTORY:  Social History     Tobacco Use     Smoking status: Never Smoker     Smokeless tobacco: Never Used   Vaping Use     Vaping Use: Never used   Substance Use Topics     Alcohol use: Yes     Comment: social     Drug use: No           CURRENT MEDICATIONS:  Current Outpatient Medications   Medication Sig Dispense Refill     acetaminophen (TYLENOL) 325 MG tablet Take 2 tablets (650 mg) by mouth every 4 hours as needed for other (For optimal non-opioid multimodal pain management to improve pain control.) 100 tablet 0     aspirin (ASA) 81 MG chewable tablet 2 tablets (162 mg) by Oral or NG Tube route daily 120 tablet 0     atorvastatin (LIPITOR) 80 MG tablet Take 1 tablet (80 mg) by mouth daily 30 tablet 0     blood glucose (NO BRAND SPECIFIED) test strip Use to test blood sugar 2 times daily or as directed. 200 strip 1     blood glucose (ONETOUCH VERIO IQ) test strip Use to test blood sugar 2 times daily or as directed. 200 strip 1     blood glucose monitoring (ONETOUCH VERIO) meter device kit Use to test blood sugar 2 times daily or as directed. 1 kit 0     cyclobenzaprine (FLEXERIL) 5 MG tablet Take 1 tablet (5 mg) by mouth every 8 hours as needed for muscle spasms 12 tablet 1     empagliflozin (JARDIANCE) 25 MG TABS tablet Take 1 tablet (25 mg) by mouth daily 90 tablet 1     FLUoxetine (PROZAC) 20 MG capsule TAKE 2 CAPSULES DAILY 180 capsule 1     glipiZIDE (GLUCOTROL XL) 5 MG 24 hr tablet TAKE 2 TABLETS DAILY (NEED ANNUAL EXAM) 180 tablet 1     hydrOXYzine (ATARAX) 25 MG tablet Take 1 tablet (25 mg) by mouth 3 times daily as needed for anxiety 90 tablet 1     lisinopril (ZESTRIL) 10 MG tablet Take 1 tablet (10 mg) by mouth daily 90 tablet 1     LORazepam (ATIVAN) 0.5 MG tablet Take 1 tablet (0.5 mg) by mouth every 6 hours as needed for anxiety 6 tablet 0     metFORMIN (GLUCOPHAGE-XR) 500 MG 24 hr tablet Take 4 tablets (2,000 mg) by mouth daily (with dinner) Please see changed dose 360 tablet 1      metoprolol succinate ER (TOPROL XL) 50 MG 24 hr tablet Take 0.5 tablets (25 mg) by mouth daily 45 tablet 3     MULTIPLE VITAMIN OR TABS 1 TABLET DAILY       Multiple Vitamins-Minerals (PRESERVISION AREDS PO) Take 2 tablets by mouth daily       vancomycin (VANCOCIN) 125 MG capsule 1 tablet 4 times daily x 10 days and the Twice daily x 7 days and then ones daily x 7 days 61 capsule 0       ROS:  Review Of Systems  Skin: negative  Eyes: negative  Ears/Nose/Throat: negative  Respiratory: No shortness of breath, dyspnea on exertion, cough, or hemoptysis  Cardiovascular: negative  Gastrointestinal: negative  Genitourinary: negative  Musculoskeletal: negative  Neurologic: negative  Psychiatric: negative  Hematologic/Lymphatic/Immunologic: negative  Endocrine: negative      EXAM:  /53 (BP Location: Right arm, Patient Position: Sitting, Cuff Size: Adult Regular)   Pulse 79   Wt 59.9 kg (132 lb)   SpO2 97%   BMI 22.70 kg/m    Home weight:  General: alert, articulate, and in no acute distress.  HEENT: normocephalic, atraumatic, anicteric sclera, EOMI, mucosa moist, no cyanosis.   Neck: neck supple.  No adenopathy, masses, or carotid bruits.  JVP flat at 90 degrees  Heart: regular rhythm, normal S1/S2, no murmur, gallop, rub.  Precordium quiet with normal PMI.     Lungs: clear, no rales, ronchi, or wheezing.  No accessory muscle use, respirations unlabored.   Abdomen: soft, non-tender, bowel sounds present, no hepatomegaly  Extremities: no pitting edema.   No cyanosis.   Neurological: alert and oriented x 3.  normal speech and affect, no gross motor deficits  Skin:  No ecchymoses, rashes, or clubbing.    Labs:  CBC RESULTS:  Lab Results   Component Value Date    WBC 8.9 07/21/2022    WBC 7.1 09/09/2020    RBC 4.28 07/21/2022    RBC 4.57 09/09/2020    HGB 10.5 (L) 07/21/2022    HGB 13.3 09/09/2020    HCT 35.2 07/21/2022    HCT 41.1 09/09/2020    MCV 82 07/21/2022    MCV 90 09/09/2020    MCH 24.5 (L) 07/21/2022    MCH  29.1 09/09/2020    MCHC 29.8 (L) 07/21/2022    MCHC 32.4 09/09/2020    RDW 16.0 (H) 07/21/2022    RDW 12.5 09/09/2020     07/21/2022     09/09/2020       CMP RESULTS:  Lab Results   Component Value Date     08/02/2022     01/07/2021    POTASSIUM 4.8 08/02/2022    POTASSIUM 4.8 01/07/2021    CHLORIDE 110 (H) 08/02/2022    CHLORIDE 106 01/07/2021    CO2 24 08/02/2022    CO2 28 01/07/2021    ANIONGAP 6 08/02/2022    ANIONGAP 7 01/07/2021     (H) 08/02/2022     (H) 01/07/2021    BUN 21 08/02/2022    BUN 19 01/07/2021    CR 0.78 08/02/2022    CR 0.90 01/07/2021    GFRESTIMATED 82 08/02/2022    GFRESTIMATED 66 01/07/2021    GFRESTBLACK 77 01/07/2021    POONAM 9.4 08/02/2022    POONAM 9.5 01/07/2021    BILITOTAL 0.4 07/06/2022    BILITOTAL 0.4 09/09/2020    ALBUMIN 3.7 07/06/2022    ALBUMIN 3.7 09/09/2020    ALKPHOS 107 07/06/2022    ALKPHOS 90 09/09/2020    ALT 24 07/06/2022    ALT 33 09/09/2020    AST 14 07/06/2022    AST 14 09/09/2020        INR RESULTS:  Lab Results   Component Value Date    INR 1.29 (H) 05/25/2022       No components found for: CK  Lab Results   Component Value Date    MAG 2.0 06/04/2022     Lab Results   Component Value Date    NTBNP 2,714 (H) 06/09/2022     @BRIEFLABR (dig)@    Most recent echocardiogram:  No results found for this or any previous visit (from the past 8760 hour(s)).      Assessment and Plan:    In summary,Marianne  is a 67 year old. Increased Metoprolol to 50 mg daily  And follow up with RN call in one week to see if we can increase the Lisinopril.    1.  Chronic systolic heart failure secondary to ICM.  Stage C  NYHA Class III  ACEi/ARB: yes 10 mg Lisinopril- continue up titration   BB: yes- continue up titration- Metoprolol 50 mg   Aldosterone antagonist: no- will initiate at future visit. - higher potassium   SCD prophylaxis: does not meet criteria for implant  Fluid status: euvolemic  Anticoagulation:   Antiplatelet:  ASA dose   Sleep  apnea:  NSAID use:  Contraindicated.  Avoid use.  Remote Monitoring:none  SGLT 2 - Jardiance 25 mg    2.  Other comordbid conditions:      #T2DM - PCP to manage - glipizide , Jardiance, metformin    #HTN- 102/53- stable - Lisinopril and Metoprolol      #GERD- followed by PCP     3.  Follow-up   Increase Metoprolol 50 mg daily   RN call in one week if - if tolerating we will increase lisinopril to 15 mg daily   [facility] 8/18/22        Belinda MOORE, CNP  [facility]                 Please do not hesitate to contact me if you have any questions/concerns.     Sincerely,     OSCAR Smith CNP

## 2022-08-04 NOTE — PATIENT INSTRUCTIONS
Take your medicines every day, as directed    Changes made today:  Increase metoprolol to 50 mg daily.  RN check in 1 week - if you are feeling well, we will increase lisinopril.   Monitor Your Weight and Symptoms    Contact us if you:    Gain 2 pounds in one day or 5 pounds in one week  Feel more short of breath  Notice more leg swelling  Feel lightheadeded   Change your lifestyle    Limit Salt or Sodium:  2000 mg  Limit Fluids:  2000 mL or approximately 64 ounces  Eat a Heart Healthy Diet  Low in saturated fats  Stay Active:  Aim to move at least 150 minutes every  week         To Contact us    During Business Hours:  [phone], option # 1      After hours, weekends or holidays:   [phone], Option #4  Ask to speak to the On-Call Cardiologist. Inform them you are a CORE/heart failure patient at the [place] [facility].     Use [employer] allows you to communicate directly with your heart team through secure messaging.  [employer] can be accessed any time on your phone, computer, or tablet.  If you need assistance, we'd be happy to help!         Keep your Heart Appointments:    CORE 8/18 with labs prior

## 2022-08-04 NOTE — PROGRESS NOTES
HPI: Marianne is a 67 year old White female with a past medical history of T2DM, HTN, GERD, JUSTUS, MDD and recent episode of lightheadedness and dizziness on     Admission - - 2022.  Surgeon: Dr. Ulises Desai  1.  Coronary artery bypass grafting x 4 (left internal mammary artery to left anterior descending artery, saphenous vein graft to distal right coronary artery, saphenous vein graft to ramus intermedius artery, saphenous vein graft to obtuse marginal artery).  2.  Endoscopic vein harvest.    Patient has no SOB at rest.   She is now working up her strength. No swelling in the legs. She had no swelling before the hospitalization in the legs.  She has some lightheadedness with fast movements.  Weight at home 130 lbs. She is working on eating more. She has some JEFFERSON with the hot weather.    Denies SOB,PND, orthopnea, edema, weight gain, chest pain, palpitations, lightheadedness, dizziness, near syncopal/syncopal episodes. Marianne has been following salt and fluid restrictions.      PAST MEDICAL HISTORY:  Past Medical History:   Diagnosis Date     Abnormal Pap smear of cervix ~    repeat pap was normal     Allergic rhinitis, cause unspecified      Anxiety state, unspecified     panic episodes     HFrEF (heart failure with reduced ejection fraction) (H)      Unspecified essential hypertension        FAMILY HISTORY:  Family History   Problem Relation Age of Onset     Diabetes Mother         hypertension, alive at 83     C.A.D. Mother      Cancer Father         lung cancer, smoker.   at age 53     Family History Negative Sister      Osteoporosis Sister        SOCIAL HISTORY:  Social History     Tobacco Use     Smoking status: Never Smoker     Smokeless tobacco: Never Used   Vaping Use     Vaping Use: Never used   Substance Use Topics     Alcohol use: Yes     Comment: social     Drug use: No           CURRENT MEDICATIONS:  Current Outpatient Medications   Medication Sig Dispense Refill      acetaminophen (TYLENOL) 325 MG tablet Take 2 tablets (650 mg) by mouth every 4 hours as needed for other (For optimal non-opioid multimodal pain management to improve pain control.) 100 tablet 0     aspirin (ASA) 81 MG chewable tablet 2 tablets (162 mg) by Oral or NG Tube route daily 120 tablet 0     atorvastatin (LIPITOR) 80 MG tablet Take 1 tablet (80 mg) by mouth daily 30 tablet 0     blood glucose (NO BRAND SPECIFIED) test strip Use to test blood sugar 2 times daily or as directed. 200 strip 1     blood glucose (ONETOUCH VERIO IQ) test strip Use to test blood sugar 2 times daily or as directed. 200 strip 1     blood glucose monitoring (ONETOUCH VERIO) meter device kit Use to test blood sugar 2 times daily or as directed. 1 kit 0     cyclobenzaprine (FLEXERIL) 5 MG tablet Take 1 tablet (5 mg) by mouth every 8 hours as needed for muscle spasms 12 tablet 1     empagliflozin (JARDIANCE) 25 MG TABS tablet Take 1 tablet (25 mg) by mouth daily 90 tablet 1     FLUoxetine (PROZAC) 20 MG capsule TAKE 2 CAPSULES DAILY 180 capsule 1     glipiZIDE (GLUCOTROL XL) 5 MG 24 hr tablet TAKE 2 TABLETS DAILY (NEED ANNUAL EXAM) 180 tablet 1     hydrOXYzine (ATARAX) 25 MG tablet Take 1 tablet (25 mg) by mouth 3 times daily as needed for anxiety 90 tablet 1     lisinopril (ZESTRIL) 10 MG tablet Take 1 tablet (10 mg) by mouth daily 90 tablet 1     LORazepam (ATIVAN) 0.5 MG tablet Take 1 tablet (0.5 mg) by mouth every 6 hours as needed for anxiety 6 tablet 0     metFORMIN (GLUCOPHAGE-XR) 500 MG 24 hr tablet Take 4 tablets (2,000 mg) by mouth daily (with dinner) Please see changed dose 360 tablet 1     metoprolol succinate ER (TOPROL XL) 50 MG 24 hr tablet Take 0.5 tablets (25 mg) by mouth daily 45 tablet 3     MULTIPLE VITAMIN OR TABS 1 TABLET DAILY       Multiple Vitamins-Minerals (PRESERVISION AREDS PO) Take 2 tablets by mouth daily       vancomycin (VANCOCIN) 125 MG capsule 1 tablet 4 times daily x 10 days and the Twice daily x 7 days  and then ones daily x 7 days 61 capsule 0       ROS:  Review Of Systems  Skin: negative  Eyes: negative  Ears/Nose/Throat: negative  Respiratory: No shortness of breath, dyspnea on exertion, cough, or hemoptysis  Cardiovascular: negative  Gastrointestinal: negative  Genitourinary: negative  Musculoskeletal: negative  Neurologic: negative  Psychiatric: negative  Hematologic/Lymphatic/Immunologic: negative  Endocrine: negative      EXAM:  /53 (BP Location: Right arm, Patient Position: Sitting, Cuff Size: Adult Regular)   Pulse 79   Wt 59.9 kg (132 lb)   SpO2 97%   BMI 22.70 kg/m    Home weight:  General: alert, articulate, and in no acute distress.  HEENT: normocephalic, atraumatic, anicteric sclera, EOMI, mucosa moist, no cyanosis.   Neck: neck supple.  No adenopathy, masses, or carotid bruits.  JVP flat at 90 degrees  Heart: regular rhythm, normal S1/S2, no murmur, gallop, rub.  Precordium quiet with normal PMI.     Lungs: clear, no rales, ronchi, or wheezing.  No accessory muscle use, respirations unlabored.   Abdomen: soft, non-tender, bowel sounds present, no hepatomegaly  Extremities: no pitting edema.   No cyanosis.   Neurological: alert and oriented x 3.  normal speech and affect, no gross motor deficits  Skin:  No ecchymoses, rashes, or clubbing.    Labs:  CBC RESULTS:  Lab Results   Component Value Date    WBC 8.9 07/21/2022    WBC 7.1 09/09/2020    RBC 4.28 07/21/2022    RBC 4.57 09/09/2020    HGB 10.5 (L) 07/21/2022    HGB 13.3 09/09/2020    HCT 35.2 07/21/2022    HCT 41.1 09/09/2020    MCV 82 07/21/2022    MCV 90 09/09/2020    MCH 24.5 (L) 07/21/2022    MCH 29.1 09/09/2020    MCHC 29.8 (L) 07/21/2022    MCHC 32.4 09/09/2020    RDW 16.0 (H) 07/21/2022    RDW 12.5 09/09/2020     07/21/2022     09/09/2020       CMP RESULTS:  Lab Results   Component Value Date     08/02/2022     01/07/2021    POTASSIUM 4.8 08/02/2022    POTASSIUM 4.8 01/07/2021    CHLORIDE 110 (H)  08/02/2022    CHLORIDE 106 01/07/2021    CO2 24 08/02/2022    CO2 28 01/07/2021    ANIONGAP 6 08/02/2022    ANIONGAP 7 01/07/2021     (H) 08/02/2022     (H) 01/07/2021    BUN 21 08/02/2022    BUN 19 01/07/2021    CR 0.78 08/02/2022    CR 0.90 01/07/2021    GFRESTIMATED 82 08/02/2022    GFRESTIMATED 66 01/07/2021    GFRESTBLACK 77 01/07/2021    POONAM 9.4 08/02/2022    POONAM 9.5 01/07/2021    BILITOTAL 0.4 07/06/2022    BILITOTAL 0.4 09/09/2020    ALBUMIN 3.7 07/06/2022    ALBUMIN 3.7 09/09/2020    ALKPHOS 107 07/06/2022    ALKPHOS 90 09/09/2020    ALT 24 07/06/2022    ALT 33 09/09/2020    AST 14 07/06/2022    AST 14 09/09/2020        INR RESULTS:  Lab Results   Component Value Date    INR 1.29 (H) 05/25/2022       No components found for: CK  Lab Results   Component Value Date    MAG 2.0 06/04/2022     Lab Results   Component Value Date    NTBNP 2,714 (H) 06/09/2022     @BRIEFLABR (dig)@    Most recent echocardiogram:  No results found for this or any previous visit (from the past 8760 hour(s)).      Assessment and Plan:    In summary,Marianne  is a 67 year old. Increased Metoprolol to 50 mg daily  And follow up with RN call in one week to see if we can increase the Lisinopril.    1.  Chronic systolic heart failure secondary to ICM.  Stage C  NYHA Class III  ACEi/ARB: yes 10 mg Lisinopril- continue up titration   BB: yes- continue up titration- Metoprolol 50 mg   Aldosterone antagonist: no- will initiate at future visit. - higher potassium   SCD prophylaxis: does not meet criteria for implant  Fluid status: euvolemic  Anticoagulation:   Antiplatelet:  ASA dose   Sleep apnea:  NSAID use:  Contraindicated.  Avoid use.  Remote Monitoring:none  SGLT 2 - Jardiance 25 mg    2.  Other comordbid conditions:      #T2DM - PCP to manage - glipizide , Jardiance, metformin    #HTN- 102/53- stable - Lisinopril and Metoprolol      #GERD- followed by PCP     3.  Follow-up   Increase Metoprolol 50 mg daily   RN call in one  week if - if tolerating we will increase lisinopril to 15 mg daily   [facility] 8/18/22          Belinda MOORE, CNP  [facility]

## 2022-08-04 NOTE — NURSING NOTE
"Chief Complaint   Patient presents with     Follow Up     Pt reports JEFFERSON, a sharp chest pain for a couple of days that has gone away now, lightheaded/dizziness, CORE Return, 68 yo female with systolic heart failure presents for follow up with labs prior.       Initial /53 (BP Location: Right arm, Patient Position: Sitting, Cuff Size: Adult Regular)   Pulse 79   Wt 59.9 kg (132 lb)   SpO2 97%   BMI 22.70 kg/m   Estimated body mass index is 22.7 kg/m  as calculated from the following:    Height as of 6/14/22: 1.624 m (5' 3.94\").    Weight as of this encounter: 59.9 kg (132 lb)..  BP completed using cuff size: regular    EDWIN Joseph    "

## 2022-08-05 ENCOUNTER — HOSPITAL ENCOUNTER (OUTPATIENT)
Dept: CARDIAC REHAB | Facility: CLINIC | Age: 67
Discharge: HOME | End: 2022-08-05
Attending: SURGERY
Payer: COMMERCIAL

## 2022-08-05 PROCEDURE — 93798 PHYS/QHP OP CAR RHAB W/ECG: CPT

## 2022-08-08 ENCOUNTER — HOSPITAL ENCOUNTER (OUTPATIENT)
Dept: CARDIAC REHAB | Facility: CLINIC | Age: 67
Discharge: HOME | End: 2022-08-08
Attending: SURGERY
Payer: COMMERCIAL

## 2022-08-08 DIAGNOSIS — Z95.1 S/P CABG (CORONARY ARTERY BYPASS GRAFT): Primary | ICD-10-CM

## 2022-08-08 PROCEDURE — 93798 PHYS/QHP OP CAR RHAB W/ECG: CPT

## 2022-08-08 PROCEDURE — 93797 PHYS/QHP OP CAR RHAB WO ECG: CPT | Mod: XU

## 2022-08-09 ENCOUNTER — HOSPITAL ENCOUNTER (OUTPATIENT)
Dept: CARDIAC REHAB | Facility: CLINIC | Age: 67
Discharge: HOME | End: 2022-08-09
Attending: SURGERY
Payer: COMMERCIAL

## 2022-08-09 DIAGNOSIS — I50.20 HFREF (HEART FAILURE WITH REDUCED EJECTION FRACTION) (H): ICD-10-CM

## 2022-08-09 DIAGNOSIS — I50.21 ACUTE SYSTOLIC HEART FAILURE (H): Primary | ICD-10-CM

## 2022-08-09 PROCEDURE — 93798 PHYS/QHP OP CAR RHAB W/ECG: CPT

## 2022-08-11 ENCOUNTER — HOSPITAL ENCOUNTER (OUTPATIENT)
Dept: CARDIAC REHAB | Facility: CLINIC | Age: 67
Discharge: HOME | End: 2022-08-11
Attending: SURGERY
Payer: COMMERCIAL

## 2022-08-11 PROCEDURE — 93798 PHYS/QHP OP CAR RHAB W/ECG: CPT

## 2022-08-12 ENCOUNTER — E-VISIT (OUTPATIENT)
Dept: FAMILY MEDICINE | Facility: CLINIC | Age: 67
End: 2022-08-12
Payer: COMMERCIAL

## 2022-08-12 DIAGNOSIS — A04.72 C. DIFFICILE COLITIS: Primary | ICD-10-CM

## 2022-08-12 PROCEDURE — 99207 PR NO CHARGE LOS: CPT | Performed by: FAMILY MEDICINE

## 2022-08-13 DIAGNOSIS — Z95.1 S/P CABG (CORONARY ARTERY BYPASS GRAFT): ICD-10-CM

## 2022-08-15 RX ORDER — ATORVASTATIN CALCIUM 80 MG/1
TABLET, FILM COATED ORAL
Qty: 90 TABLET | Refills: 3 | Status: SHIPPED | OUTPATIENT
Start: 2022-08-15 | End: 2022-11-23

## 2022-08-15 NOTE — TELEPHONE ENCOUNTER
"Routing refill request to provider for review/approval because:  Last prescribed by inpatient provider.     Requested Prescriptions   Pending Prescriptions Disp Refills    atorvastatin (LIPITOR) 80 MG tablet [Pharmacy Med Name: ATORVASTATIN TABS 80MG] 90 tablet 3     Sig: TAKE 1 TABLET DAILY (NEED ANNUAL EXAM)        Statins Protocol Passed - 8/13/2022  1:50 PM        Passed - LDL on file in past 12 months     Recent Labs   Lab Test 05/18/22  1715   LDL 57               Passed - No abnormal creatine kinase in past 12 months     No lab results found.             Passed - Recent (12 mo) or future (30 days) visit within the authorizing provider's specialty     Patient has had an office visit with the authorizing provider or a provider within the authorizing providers department within the previous 12 mos or has a future within next 30 days. See \"Patient Info\" tab in inbasket, or \"Choose Columns\" in Meds & Orders section of the refill encounter.              Passed - Medication is active on med list        Passed - Patient is age 18 or older        Passed - No active pregnancy on record        Passed - No positive pregnancy test in past 12 months              Katie Robbins RN   [facility]     "

## 2022-08-16 ENCOUNTER — HOSPITAL ENCOUNTER (OUTPATIENT)
Dept: CARDIAC REHAB | Facility: CLINIC | Age: 67
Discharge: HOME | End: 2022-08-16
Attending: SURGERY
Payer: COMMERCIAL

## 2022-08-16 ENCOUNTER — LAB (OUTPATIENT)
Dept: LAB | Facility: CLINIC | Age: 67
End: 2022-08-16
Payer: COMMERCIAL

## 2022-08-16 DIAGNOSIS — I50.21 ACUTE SYSTOLIC HEART FAILURE (H): ICD-10-CM

## 2022-08-16 DIAGNOSIS — Z95.1 S/P CABG (CORONARY ARTERY BYPASS GRAFT): ICD-10-CM

## 2022-08-16 LAB
ANION GAP SERPL CALCULATED.3IONS-SCNC: 6 MMOL/L (ref 3–14)
BUN SERPL-MCNC: 29 MG/DL (ref 7–30)
CALCIUM SERPL-MCNC: 9 MG/DL (ref 8.5–10.1)
CHLORIDE BLD-SCNC: 106 MMOL/L (ref 94–109)
CO2 SERPL-SCNC: 26 MMOL/L (ref 20–32)
CREAT SERPL-MCNC: 0.86 MG/DL (ref 0.52–1.04)
GFR SERPL CREATININE-BSD FRML MDRD: 73 ML/MIN/1.73M2
GLUCOSE BLD-MCNC: 220 MG/DL (ref 70–99)
POTASSIUM BLD-SCNC: 4.6 MMOL/L (ref 3.4–5.3)
SODIUM SERPL-SCNC: 138 MMOL/L (ref 133–144)

## 2022-08-16 PROCEDURE — 36415 COLL VENOUS BLD VENIPUNCTURE: CPT

## 2022-08-16 PROCEDURE — 80048 BASIC METABOLIC PNL TOTAL CA: CPT

## 2022-08-16 PROCEDURE — 93798 PHYS/QHP OP CAR RHAB W/ECG: CPT

## 2022-08-16 RX ORDER — LISINOPRIL 10 MG/1
TABLET ORAL
Qty: 135 TABLET | Refills: 3 | Status: SHIPPED | OUTPATIENT
Start: 2022-08-16 | End: 2022-08-23

## 2022-08-16 NOTE — TELEPHONE ENCOUNTER
RECORDS RECEIVED FROM: Internal   DATE RECEIVED: 09.21.2022   NOTES (Gather within 2 years) STATUS DETAILS   OFFICE NOTE from referring provider   Internal 07.21.2022 Roxanna Otoole MD   OFFICE NOTE from other specialist     DISCHARGE SUMMARY from hospital     DISCHARGE REPORT from the ER     LABS (any labs) Internal    MEDICATION LIST Internal    IMAGING  (NEED IMAGES AND REPORTS)     Osteomyelitis: Foot imaging      Liver Abscess: Abdominal imaging     Other (anything related to diagnoses

## 2022-08-16 NOTE — PROGRESS NOTES
Date: 8/16/2022    Time of Call: 12:45 PM     Diagnosis:  Heart failure     [ TORB ] Ordering provider: Belinda Colon NP  Order: Increase lisinopril to 15 mg daily (5 mg in AM, 10 mg in PM)     Order received by: Dasia Amin RN

## 2022-08-22 ENCOUNTER — TELEPHONE (OUTPATIENT)
Dept: CARDIOLOGY | Facility: CLINIC | Age: 67
End: 2022-08-22

## 2022-08-22 ENCOUNTER — OFFICE VISIT (OUTPATIENT)
Dept: FAMILY MEDICINE | Facility: CLINIC | Age: 67
End: 2022-08-22
Payer: COMMERCIAL

## 2022-08-22 VITALS
BODY MASS INDEX: 23.6 KG/M2 | WEIGHT: 133.2 LBS | HEIGHT: 63 IN | HEART RATE: 72 BPM | OXYGEN SATURATION: 95 % | DIASTOLIC BLOOD PRESSURE: 58 MMHG | SYSTOLIC BLOOD PRESSURE: 100 MMHG | TEMPERATURE: 97.6 F

## 2022-08-22 DIAGNOSIS — Z78.0 ASYMPTOMATIC POSTMENOPAUSAL STATUS: ICD-10-CM

## 2022-08-22 DIAGNOSIS — E78.5 HYPERLIPIDEMIA LDL GOAL <70: ICD-10-CM

## 2022-08-22 DIAGNOSIS — I10 HYPERTENSION, GOAL BELOW 140/90: ICD-10-CM

## 2022-08-22 DIAGNOSIS — Z95.1 S/P CABG (CORONARY ARTERY BYPASS GRAFT): ICD-10-CM

## 2022-08-22 DIAGNOSIS — I50.20 HFREF (HEART FAILURE WITH REDUCED EJECTION FRACTION) (H): Primary | ICD-10-CM

## 2022-08-22 DIAGNOSIS — A04.72 C. DIFFICILE DIARRHEA: ICD-10-CM

## 2022-08-22 DIAGNOSIS — E11.59 TYPE 2 DIABETES MELLITUS WITH OTHER CIRCULATORY COMPLICATION, WITHOUT LONG-TERM CURRENT USE OF INSULIN (H): ICD-10-CM

## 2022-08-22 DIAGNOSIS — I50.21 ACUTE SYSTOLIC HEART FAILURE (H): ICD-10-CM

## 2022-08-22 DIAGNOSIS — D64.9 ANEMIA, UNSPECIFIED TYPE: ICD-10-CM

## 2022-08-22 LAB
BASOPHILS # BLD AUTO: 0 10E3/UL (ref 0–0.2)
BASOPHILS NFR BLD AUTO: 1 %
EOSINOPHIL # BLD AUTO: 0.2 10E3/UL (ref 0–0.7)
EOSINOPHIL NFR BLD AUTO: 4 %
ERYTHROCYTE [DISTWIDTH] IN BLOOD BY AUTOMATED COUNT: 16.1 % (ref 10–15)
FERRITIN SERPL-MCNC: 5 NG/ML (ref 8–252)
HCT VFR BLD AUTO: 33.3 % (ref 35–47)
HGB BLD-MCNC: 9.9 G/DL (ref 11.7–15.7)
HOLD SPECIMEN: NORMAL
HOLD SPECIMEN: NORMAL
IRON SATN MFR SERPL: 6 % (ref 15–46)
IRON SERPL-MCNC: 22 UG/DL (ref 35–180)
LYMPHOCYTES # BLD AUTO: 1.4 10E3/UL (ref 0.8–5.3)
LYMPHOCYTES NFR BLD AUTO: 20 %
MCH RBC QN AUTO: 23.5 PG (ref 26.5–33)
MCHC RBC AUTO-ENTMCNC: 29.7 G/DL (ref 31.5–36.5)
MCV RBC AUTO: 79 FL (ref 78–100)
MONOCYTES # BLD AUTO: 0.6 10E3/UL (ref 0–1.3)
MONOCYTES NFR BLD AUTO: 10 %
NEUTROPHILS # BLD AUTO: 4.5 10E3/UL (ref 1.6–8.3)
NEUTROPHILS NFR BLD AUTO: 66 %
PLATELET # BLD AUTO: 282 10E3/UL (ref 150–450)
RBC # BLD AUTO: 4.22 10E6/UL (ref 3.8–5.2)
TIBC SERPL-MCNC: 368 UG/DL (ref 240–430)
WBC # BLD AUTO: 6.8 10E3/UL (ref 4–11)

## 2022-08-22 PROCEDURE — 83550 IRON BINDING TEST: CPT | Performed by: FAMILY MEDICINE

## 2022-08-22 PROCEDURE — 82607 VITAMIN B-12: CPT | Performed by: FAMILY MEDICINE

## 2022-08-22 PROCEDURE — 82728 ASSAY OF FERRITIN: CPT | Performed by: FAMILY MEDICINE

## 2022-08-22 PROCEDURE — 80048 BASIC METABOLIC PNL TOTAL CA: CPT | Performed by: FAMILY MEDICINE

## 2022-08-22 PROCEDURE — 99207 E-CONSULT TO GASTROENTEROLOGY (ADULT OUTPT PROVIDER TO SPECIALIST WRITTEN QUESTION & RESPONSE): CPT | Performed by: FAMILY MEDICINE

## 2022-08-22 PROCEDURE — 36415 COLL VENOUS BLD VENIPUNCTURE: CPT | Performed by: FAMILY MEDICINE

## 2022-08-22 PROCEDURE — 85025 COMPLETE CBC W/AUTO DIFF WBC: CPT | Performed by: FAMILY MEDICINE

## 2022-08-22 PROCEDURE — 99214 OFFICE O/P EST MOD 30 MIN: CPT | Performed by: FAMILY MEDICINE

## 2022-08-22 RX ORDER — GLIPIZIDE 5 MG/1
TABLET, FILM COATED, EXTENDED RELEASE ORAL
Qty: 180 TABLET | Refills: 1 | Status: SHIPPED | OUTPATIENT
Start: 2022-08-22 | End: 2023-03-01

## 2022-08-22 RX ORDER — METFORMIN HCL 500 MG
1500 TABLET, EXTENDED RELEASE 24 HR ORAL
Qty: 270 TABLET | Refills: 1 | Status: SHIPPED | OUTPATIENT
Start: 2022-08-22 | End: 2023-02-17

## 2022-08-22 ASSESSMENT — PAIN SCALES - GENERAL: PAINLEVEL: NO PAIN (0)

## 2022-08-22 NOTE — TELEPHONE ENCOUNTER
----- Message from Dasia Amin RN sent at 8/22/2022 12:23 PM CDT -----  Regarding: FW: Appointments  Hello,    Can you please reschedule Jody for an appointment 9/1? She is scheduled to see Dr. Steve on 8/26, the day after her CORE appointment (which was rescheduled from last week's cancelled clinic.)    It does not make sense for her to have these appointments two days in a row.    Thanks,  IKER Forman    ----- Message -----  From: Belinda Colon APRN CNP  Sent: 8/22/2022  11:57 AM CDT  To: Dasia Amin RN  Subject: RE: Appointments                                 Lets keep the Dr. Steve appt and can we move ours to the following week?  ----- Message -----  From: Dasia Amin RN  Sent: 8/19/2022  11:46 AM CDT  To: OSCAR Joseph CNP  Subject: Rigoberto Trevino,    I realized that with the reschedule, Jody will be seeing us on 8/25 and Dr. Steve on 8/26. Does this make sense? Or should we reschedule?    ThanksDasia

## 2022-08-22 NOTE — TELEPHONE ENCOUNTER
LVM for patient to return call to clinic scheduler to reschedule 8/25 CORE appointment.    EDWIN Thakkar

## 2022-08-22 NOTE — PROGRESS NOTES
Assessment & Plan     HFrEF (heart failure with reduced ejection fraction) (H)  Sees Cardiology and has follow up appointment     Type 2 diabetes mellitus with other circulatory complication, without long-term current use of insulin (H)  Pending labs   accucheck are stable   - glipiZIDE (GLUCOTROL XL) 5 MG 24 hr tablet; TAKE 2 TABLETS DAILY (NEED ANNUAL EXAM)  - metFORMIN (GLUCOPHAGE XR) 500 MG 24 hr tablet; Take 3 tablets (1,500 mg) by mouth daily (with dinner) Please see changed dose    S/P CABG (coronary artery bypass graft)  Stable     C. difficile diarrhea  Pending labs     - Clostridium difficile Toxin B PCR; Future  - Clostridium difficile Toxin B PCR    Asymptomatic postmenopausal status  Advised   - DX Hip/Pelvis/Spine; Future    Anemia, unspecified type  Pending   - CBC with platelets and differential; Future  - CBC with platelets and differential  Anemia panel pending   Pt is asymptomatic  Hyperlipidemia LDL goal <70  Stable     Hypertension, goal below 140/90  Controlled  Await labs  Will advise accordingly  If c diff negative -we can decrease Metformin    Return in about 3 months (around 11/22/2022) for recheck/ sooner if worse or New symptoms.    Roxanna Otoole MD  [facility] NARDA Vera is a 67 year old, presenting for the following health issues:  Diabetes      History of Present Illness       Diabetes:   She presents for follow up of diabetes.  She is checking home blood glucose one time daily. She checks blood glucose before meals and after meals.  Blood glucose is never over 200 and sometimes under 70. She is aware of hypoglycemia symptoms including shakiness, dizziness and blurred vision. She has no concerns regarding her diabetes at this time.  She is having burning in feet and blurry vision. The patient has had a diabetic eye exam in the last 12 months.         Heart Failure:  She presents for follow up of heart failure. She is experiencing shortness of breath  "with activity only, which is same as usual. She is experiencing lower extremity edema which is same as usual. She denies orthopenea and is not coughing at night. Patient is checking weight daily. She has recently had a None. She has side effects from medications including dizziness, fatigue and slow heartbeat. She has had no other medical visits for heart failure since the last visit.    She eats 2-3 servings of fruits and vegetables daily.She consumes 0 sweetened beverage(s) daily.She exercises with enough effort to increase her heart rate 20 to 29 minutes per day.  She exercises with enough effort to increase her heart rate 4 days per week.   She is taking medications regularly.       Hyperlipidemia Follow-Up      Are you regularly taking any medication or supplement to lower your cholesterol?   Yes- stable    Are you having muscle aches or other side effects that you think could be caused by your cholesterol lowering medication?  No    Hypertension Follow-up      Do you check your blood pressure regularly outside of the clinic? Yes     Are you following a low salt diet? Yes    Are your blood pressures ever more than 140 on the top number (systolic) OR more   than 90 on the bottom number (diastolic), for example 140/90? No     Pt still has Diarrhea though better than before  3 times daily  Not sure if Merformin or C diff  Feels better  Voiding  Weight is stable   No fever    Review of Systems   CONSTITUTIONAL: NEGATIVE for fever, chills, change in weight  ENT/MOUTH: NEGATIVE for ear, mouth and throat problems  RESP: NEGATIVE for significant cough or SOB  CV: NEGATIVE for chest pain, palpitations or peripheral edema  GI: as above, no pain  PSYCHIATRIC: NEGATIVE for changes in mood or affect  ROS otherwise negative      Objective    /58   Pulse 72   Temp 97.6  F (36.4  C) (Temporal)   Ht 1.609 m (5' 3.35\")   Wt 60.4 kg (133 lb 3.2 oz)   SpO2 95%   BMI 23.34 kg/m    Body mass index is 23.34 " kg/m .  Physical Exam   GENERAL: healthy, alert and no distress  EYES: Eyes grossly normal to inspection  NECK: no adenopathy, no asymmetry, masses, or scars and thyroid normal to palpation  RESP: lungs clear to auscultation - no rales, rhonchi or wheezes  CV: regular rate and rhythm, normal S1 S2, no S3 or S4, no murmur, click or rub, no peripheral edema and peripheral pulses strong  ABDOMEN: soft, nontender, no hepatosplenomegaly, no masses and bowel sounds normal  MS: no gross musculoskeletal defects noted, no edema  PSYCH: mentation appears normal    Pending       .  ..

## 2022-08-23 ENCOUNTER — TELEPHONE (OUTPATIENT)
Dept: FAMILY MEDICINE | Facility: CLINIC | Age: 67
End: 2022-08-23

## 2022-08-23 ENCOUNTER — HOSPITAL ENCOUNTER (OUTPATIENT)
Dept: CARDIAC REHAB | Facility: CLINIC | Age: 67
Discharge: HOME | End: 2022-08-23
Attending: SURGERY
Payer: COMMERCIAL

## 2022-08-23 DIAGNOSIS — Z95.1 S/P CABG (CORONARY ARTERY BYPASS GRAFT): ICD-10-CM

## 2022-08-23 DIAGNOSIS — I50.21 ACUTE SYSTOLIC HEART FAILURE (H): Primary | ICD-10-CM

## 2022-08-23 LAB
ANION GAP SERPL CALCULATED.3IONS-SCNC: 18 MMOL/L (ref 7–15)
BUN SERPL-MCNC: 25 MG/DL (ref 8–23)
CALCIUM SERPL-MCNC: 9.7 MG/DL (ref 8.8–10.2)
CHLORIDE SERPL-SCNC: 103 MMOL/L (ref 98–107)
CREAT SERPL-MCNC: 0.84 MG/DL (ref 0.51–0.95)
DEPRECATED HCO3 PLAS-SCNC: 21 MMOL/L (ref 22–29)
GFR SERPL CREATININE-BSD FRML MDRD: 75 ML/MIN/1.73M2
GLUCOSE SERPL-MCNC: 198 MG/DL (ref 70–99)
POTASSIUM SERPL-SCNC: 5.1 MMOL/L (ref 3.4–5.3)
SODIUM SERPL-SCNC: 142 MMOL/L (ref 136–145)
VIT B12 SERPL-MCNC: 261 PG/ML (ref 232–1245)

## 2022-08-23 PROCEDURE — 93798 PHYS/QHP OP CAR RHAB W/ECG: CPT

## 2022-08-23 RX ORDER — LISINOPRIL 10 MG/1
10 TABLET ORAL 2 TIMES DAILY
Qty: 135 TABLET | Refills: 3 | Status: SHIPPED | OUTPATIENT
Start: 2022-08-23 | End: 2022-09-01 | Stop reason: ALTCHOICE

## 2022-08-23 NOTE — PROGRESS NOTES
Date: 8/23/2022    Time of Call: 1:35 PM     Diagnosis:  Heart failure     [ TORB ] Ordering provider: Belinda Colon NP  Order: lisinopril 10 mg BID     Order received by: Dasia Amin RN

## 2022-08-23 NOTE — TELEPHONE ENCOUNTER
Just wanted your advise-mutual pt  Pt is anemic more so than before  Had CABG  Was treated for c -diff with vancomycin  She is Iron defciency  Normally -I would recommend a EGD and colonoscopy  Need your advise  Thank you  Roxanna Otoole MD

## 2022-08-24 ENCOUNTER — HOSPITAL ENCOUNTER (OUTPATIENT)
Dept: CARDIOLOGY | Facility: CLINIC | Age: 67
Discharge: HOME | End: 2022-08-24
Attending: NURSE PRACTITIONER | Admitting: NURSE PRACTITIONER
Payer: COMMERCIAL

## 2022-08-24 ENCOUNTER — MYC MEDICAL ADVICE (OUTPATIENT)
Dept: FAMILY MEDICINE | Facility: CLINIC | Age: 67
End: 2022-08-24

## 2022-08-24 DIAGNOSIS — I25.5 ISCHEMIC CARDIOMYOPATHY: ICD-10-CM

## 2022-08-24 LAB — LVEF ECHO: NORMAL

## 2022-08-24 PROCEDURE — 93321 DOPPLER ECHO F-UP/LMTD STD: CPT | Mod: 26 | Performed by: INTERNAL MEDICINE

## 2022-08-24 PROCEDURE — 255N000002 HC RX 255 OP 636: Performed by: STUDENT IN AN ORGANIZED HEALTH CARE EDUCATION/TRAINING PROGRAM

## 2022-08-24 PROCEDURE — 93321 DOPPLER ECHO F-UP/LMTD STD: CPT

## 2022-08-24 PROCEDURE — 93325 DOPPLER ECHO COLOR FLOW MAPG: CPT

## 2022-08-24 PROCEDURE — 93308 TTE F-UP OR LMTD: CPT | Mod: 26 | Performed by: INTERNAL MEDICINE

## 2022-08-24 PROCEDURE — 93325 DOPPLER ECHO COLOR FLOW MAPG: CPT | Mod: 26 | Performed by: INTERNAL MEDICINE

## 2022-08-24 PROCEDURE — 87493 C DIFF AMPLIFIED PROBE: CPT | Performed by: FAMILY MEDICINE

## 2022-08-24 RX ADMIN — HUMAN ALBUMIN MICROSPHERES AND PERFLUTREN 5 ML: 10; .22 INJECTION, SOLUTION INTRAVENOUS at 14:40

## 2022-08-25 ENCOUNTER — E-CONSULT (OUTPATIENT)
Dept: GASTROENTEROLOGY | Facility: CLINIC | Age: 67
End: 2022-08-25
Payer: COMMERCIAL

## 2022-08-25 ENCOUNTER — E-VISIT (OUTPATIENT)
Dept: FAMILY MEDICINE | Facility: CLINIC | Age: 67
End: 2022-08-25

## 2022-08-25 ENCOUNTER — MYC MEDICAL ADVICE (OUTPATIENT)
Dept: INFECTIOUS DISEASES | Facility: CLINIC | Age: 67
End: 2022-08-25

## 2022-08-25 ENCOUNTER — MYC MEDICAL ADVICE (OUTPATIENT)
Dept: FAMILY MEDICINE | Facility: CLINIC | Age: 67
End: 2022-08-25

## 2022-08-25 ENCOUNTER — HOSPITAL ENCOUNTER (OUTPATIENT)
Dept: CARDIAC REHAB | Facility: CLINIC | Age: 67
Discharge: HOME | End: 2022-08-25
Attending: SURGERY
Payer: COMMERCIAL

## 2022-08-25 DIAGNOSIS — A04.72 C. DIFFICILE DIARRHEA: ICD-10-CM

## 2022-08-25 DIAGNOSIS — A04.72 C. DIFFICILE COLITIS: Primary | ICD-10-CM

## 2022-08-25 LAB — C DIFF TOX B STL QL: POSITIVE

## 2022-08-25 PROCEDURE — 93798 PHYS/QHP OP CAR RHAB W/ECG: CPT

## 2022-08-25 PROCEDURE — 99451 NTRPROF PH1/NTRNET/EHR 5/>: CPT | Performed by: INTERNAL MEDICINE

## 2022-08-25 PROCEDURE — 99421 OL DIG E/M SVC 5-10 MIN: CPT | Performed by: FAMILY MEDICINE

## 2022-08-25 NOTE — TELEPHONE ENCOUNTER
Forwarding to PCP to review and advise. Cannot find any older order in order review or orders for DME.    Tabitha Dalton RN   [facility]

## 2022-08-25 NOTE — PROGRESS NOTES
ALL SMARTFIELDS MUST BE COMPLETED FOR PATIENT CARE AND BILLING    8/25/2022     E-Consult has been accepted.    Interprofessional consultation requested by:  Roxanna Otoole MD      Clinical Question/Purpose: MY CLINICAL QUESTION IS: Iron deficiency anemia and C -diff-what is Your recommendation- still has cardiac issues being worked up    S/p Cabg on 5-24-22   Treated for c-diff with vancomycin-almost for a month   She is better but still has Diarrhea 3 times a day-off vanco-awaiting c-diff test   Pt will need a colonoscopy and endoscopy   I started her on Iron tablets Today   Per cardiology-Dr orlando     I think that it is still reasonable to pursue the EGD and colonoscopy but I would recommend waiting till we have resolved the issues with the non-sustained VT and determined her need for ICD. It would probably be safer for her to have this under control before receiving sedation and undergoing the EGD/colonoscopy.         Patient assessment and information reviewed:   1. History of c diff (positive 5-24-22 and 7/6/22) - treated with vancomycin for > 3 weeks.   2. Improved stools but persistent loose stools 3/day - repeat c diff pending  3. Iron deficiency  4. Non-sustained VT with pending cardiology evaluation/management  5. DMII - on metformin    C diff can precipitate post-infectious irritable bowel syndrome (IBS) which is a common cause of residual diarrhea after c diff infection. Agree with rechecking c diff. If negative then would start fiber supplementation (metamucil or citrucel) and can take imodium as needed. If positive would give 6 week taper of vancomycin. If it recurs after taper then can refer to GI clinic for further evaluation which would likely include a colonoscopy and fecal microbiotia transplant. Ensure all other antibiotics are avoided as this can precipitate recurrence of c diff.    Loose stool may also be due to other medication effects (metformin or fluoxetine).    For her iron deficiency  recommend EGD/colon when stable and approved from cardiology standpoint    Recommendations:   -await c diff result as outlined above  -if negative start fiber and imodium  -if positive give 6 week vancomycin taper and refer to GI clinic for discussion of FMT  -avoid all antibiotics  -adjust medications that can cause loose stools (metformin, fluoxetine)  -EGD/colonoscopy when acceptable from a cardiology standpoint (after VT addressed)       The recommendations provided in this E-Consult are based on a review of clinical data pertinent to the clinical question presented, without a review of the patient's complete medical record or, the benefit of a comprehensive in-person or virtual patient evaluation. This consultation should not replace the clinical judgement and evaluation of the provider ordering this E-Consult. Any new clinical issues, or changes in patient status since the filing of this E-Consult will need to be taken into account when assessing these recommendations. Please contact me if you have further questions.    My total time spent reviewing clinical information and formulating assessment was 25 minutes.    Report sent automatically to requesting provider once signed.     Ajith Merida MD

## 2022-08-26 ENCOUNTER — LAB (OUTPATIENT)
Dept: LAB | Facility: CLINIC | Age: 67
End: 2022-08-26
Payer: COMMERCIAL

## 2022-08-26 ENCOUNTER — PRE VISIT (OUTPATIENT)
Dept: CARDIOLOGY | Facility: CLINIC | Age: 67
End: 2022-08-26

## 2022-08-26 ENCOUNTER — TELEPHONE (OUTPATIENT)
Dept: FAMILY MEDICINE | Facility: CLINIC | Age: 67
End: 2022-08-26

## 2022-08-26 DIAGNOSIS — R19.7 DIARRHEA, UNSPECIFIED TYPE: Primary | ICD-10-CM

## 2022-08-26 DIAGNOSIS — R19.7 DIARRHEA, UNSPECIFIED TYPE: ICD-10-CM

## 2022-08-26 RX ORDER — VANCOMYCIN HYDROCHLORIDE 125 MG/1
CAPSULE ORAL
Qty: 150 CAPSULE | Refills: 0 | Status: SHIPPED | OUTPATIENT
Start: 2022-08-26 | End: 2022-11-14

## 2022-08-26 NOTE — TELEPHONE ENCOUNTER
Patient notified of provider message as written.     Patient has concerns repeating course of vancomycin. It didn't work the first time. The medication is expensive and she does not want to do this again if it doesn't work.    Please advise      Zaida Murphy RN

## 2022-08-26 NOTE — TELEPHONE ENCOUNTER
Please call pt or daughter  Pt to do Fecal calprotectin test to see if she has a active infection  Meanwhile take vanconycin as recommended

## 2022-08-28 PROCEDURE — 83993 ASSAY FOR CALPROTECTIN FECAL: CPT

## 2022-08-29 NOTE — TELEPHONE ENCOUNTER
Spoke with patient who states that she did start the Vanco on Saturday. She is okay with moving forward with this and seeing if it is effective.   Pt was notified of message from Dr. Otoole and is aware that calprotectin fecal test is currently in process.    Tabitha Dalton RN   [facility]

## 2022-08-29 NOTE — TELEPHONE ENCOUNTER
I tried calling Pt  Left message on machine to call back last week    We have had a GI consult about her c-diff  They recommend taking this  We can wait for the Fecal calprotectin test to come back

## 2022-08-30 ENCOUNTER — HOSPITAL ENCOUNTER (OUTPATIENT)
Dept: CARDIAC REHAB | Facility: CLINIC | Age: 67
Discharge: HOME | End: 2022-08-30
Attending: SURGERY
Payer: COMMERCIAL

## 2022-08-30 PROCEDURE — 93798 PHYS/QHP OP CAR RHAB W/ECG: CPT

## 2022-08-31 ENCOUNTER — MYC MEDICAL ADVICE (OUTPATIENT)
Dept: INFECTIOUS DISEASES | Facility: CLINIC | Age: 67
End: 2022-08-31

## 2022-08-31 ENCOUNTER — OFFICE VISIT (OUTPATIENT)
Dept: CARDIOLOGY | Facility: CLINIC | Age: 67
End: 2022-08-31
Attending: INTERNAL MEDICINE
Payer: COMMERCIAL

## 2022-08-31 ENCOUNTER — LAB (OUTPATIENT)
Dept: LAB | Facility: CLINIC | Age: 67
End: 2022-08-31
Payer: COMMERCIAL

## 2022-08-31 VITALS
DIASTOLIC BLOOD PRESSURE: 66 MMHG | WEIGHT: 131.4 LBS | BODY MASS INDEX: 22.43 KG/M2 | SYSTOLIC BLOOD PRESSURE: 116 MMHG | HEART RATE: 61 BPM | OXYGEN SATURATION: 99 % | HEIGHT: 64 IN

## 2022-08-31 DIAGNOSIS — A04.72 C. DIFFICILE DIARRHEA: ICD-10-CM

## 2022-08-31 DIAGNOSIS — D64.9 ANEMIA, UNSPECIFIED TYPE: ICD-10-CM

## 2022-08-31 DIAGNOSIS — I25.5 ISCHEMIC CARDIOMYOPATHY: ICD-10-CM

## 2022-08-31 DIAGNOSIS — I25.5 ISCHEMIC CARDIOMYOPATHY: Primary | ICD-10-CM

## 2022-08-31 DIAGNOSIS — Z95.810 ICD (IMPLANTABLE CARDIOVERTER-DEFIBRILLATOR), SINGLE, IN SITU: ICD-10-CM

## 2022-08-31 LAB
ANION GAP SERPL CALCULATED.3IONS-SCNC: 14 MMOL/L (ref 7–15)
ATRIAL RATE - MUSE: 72 BPM
BASOPHILS # BLD AUTO: 0.1 10E3/UL (ref 0–0.2)
BASOPHILS NFR BLD AUTO: 1 %
BUN SERPL-MCNC: 24.6 MG/DL (ref 8–23)
CALCIUM SERPL-MCNC: 9.9 MG/DL (ref 8.8–10.2)
CHLORIDE SERPL-SCNC: 105 MMOL/L (ref 98–107)
CREAT SERPL-MCNC: 0.77 MG/DL (ref 0.51–0.95)
DEPRECATED HCO3 PLAS-SCNC: 23 MMOL/L (ref 22–29)
DIASTOLIC BLOOD PRESSURE - MUSE: NORMAL MMHG
EOSINOPHIL # BLD AUTO: 0.4 10E3/UL (ref 0–0.7)
EOSINOPHIL NFR BLD AUTO: 5 %
ERYTHROCYTE [DISTWIDTH] IN BLOOD BY AUTOMATED COUNT: 16 % (ref 10–15)
GFR SERPL CREATININE-BSD FRML MDRD: 84 ML/MIN/1.73M2
GLUCOSE SERPL-MCNC: 189 MG/DL (ref 70–99)
HCT VFR BLD AUTO: 36.6 % (ref 35–47)
HGB BLD-MCNC: 10.6 G/DL (ref 11.7–15.7)
IMM GRANULOCYTES # BLD: 0 10E3/UL
IMM GRANULOCYTES NFR BLD: 0 %
INTERPRETATION ECG - MUSE: NORMAL
LYMPHOCYTES # BLD AUTO: 1.5 10E3/UL (ref 0.8–5.3)
LYMPHOCYTES NFR BLD AUTO: 17 %
MCH RBC QN AUTO: 22.8 PG (ref 26.5–33)
MCHC RBC AUTO-ENTMCNC: 29 G/DL (ref 31.5–36.5)
MCV RBC AUTO: 79 FL (ref 78–100)
MONOCYTES # BLD AUTO: 0.7 10E3/UL (ref 0–1.3)
MONOCYTES NFR BLD AUTO: 8 %
NEUTROPHILS # BLD AUTO: 5.8 10E3/UL (ref 1.6–8.3)
NEUTROPHILS NFR BLD AUTO: 69 %
NRBC # BLD AUTO: 0 10E3/UL
NRBC BLD AUTO-RTO: 0 /100
P AXIS - MUSE: 50 DEGREES
PLATELET # BLD AUTO: 314 10E3/UL (ref 150–450)
POTASSIUM SERPL-SCNC: 4.9 MMOL/L (ref 3.4–5.3)
PR INTERVAL - MUSE: 116 MS
QRS DURATION - MUSE: 84 MS
QT - MUSE: 452 MS
QTC - MUSE: 494 MS
R AXIS - MUSE: 15 DEGREES
RBC # BLD AUTO: 4.64 10E6/UL (ref 3.8–5.2)
RETICS # AUTO: 0.05 10E6/UL (ref 0.03–0.1)
RETICS/RBC NFR AUTO: 1.1 % (ref 0.5–2)
SODIUM SERPL-SCNC: 142 MMOL/L (ref 136–145)
SYSTOLIC BLOOD PRESSURE - MUSE: NORMAL MMHG
T AXIS - MUSE: 81 DEGREES
VENTRICULAR RATE- MUSE: 72 BPM
WBC # BLD AUTO: 8.5 10E3/UL (ref 4–11)

## 2022-08-31 PROCEDURE — 36415 COLL VENOUS BLD VENIPUNCTURE: CPT | Performed by: PATHOLOGY

## 2022-08-31 PROCEDURE — 93005 ELECTROCARDIOGRAM TRACING: CPT

## 2022-08-31 PROCEDURE — 85045 AUTOMATED RETICULOCYTE COUNT: CPT | Performed by: PATHOLOGY

## 2022-08-31 PROCEDURE — 80048 BASIC METABOLIC PNL TOTAL CA: CPT | Performed by: PATHOLOGY

## 2022-08-31 PROCEDURE — 99000 SPECIMEN HANDLING OFFICE-LAB: CPT | Performed by: PATHOLOGY

## 2022-08-31 PROCEDURE — G0463 HOSPITAL OUTPT CLINIC VISIT: HCPCS | Mod: 25

## 2022-08-31 PROCEDURE — 99215 OFFICE O/P EST HI 40 MIN: CPT | Performed by: INTERNAL MEDICINE

## 2022-08-31 PROCEDURE — 82746 ASSAY OF FOLIC ACID SERUM: CPT | Mod: 90 | Performed by: PATHOLOGY

## 2022-08-31 PROCEDURE — 85025 COMPLETE CBC W/AUTO DIFF WBC: CPT | Performed by: PATHOLOGY

## 2022-08-31 PROCEDURE — 85060 BLOOD SMEAR INTERPRETATION: CPT | Performed by: PATHOLOGY

## 2022-08-31 RX ORDER — SODIUM CHLORIDE 9 MG/ML
INJECTION, SOLUTION INTRAVENOUS CONTINUOUS
Status: CANCELLED | OUTPATIENT
Start: 2022-08-31

## 2022-08-31 RX ORDER — LIDOCAINE 40 MG/G
CREAM TOPICAL
Status: CANCELLED | OUTPATIENT
Start: 2022-08-31

## 2022-08-31 RX ORDER — CEFAZOLIN SODIUM 2 G/50ML
2 SOLUTION INTRAVENOUS
Status: CANCELLED | OUTPATIENT
Start: 2022-08-31

## 2022-08-31 ASSESSMENT — PAIN SCALES - GENERAL: PAINLEVEL: NO PAIN (0)

## 2022-08-31 NOTE — NURSING NOTE
Chief Complaint   Patient presents with     Follow Up     ICD consult. Echo done 8/24. Review zio showing NSVT up to 10 beats, asymptomatic. Currently wearing lifevest. Hx CABG 5/24/22, VT pre-operatively, ICM.          Vitals were taken and medications reconciled.     Humble Cortez, EMT   10:33 AM

## 2022-08-31 NOTE — LETTER
8/31/2022      RE: Marianne Monryo  [address]  [place] [place] [address]       Dear Colleague,    Thank you for the opportunity to participate in the care of your patient, Marianne Monroy, at the [facility] [place] [facility]. Please see a copy of my visit note below.        ELECTROPHYSIOLOGY CLINIC VISIT    Assessment/Recommendations   Assessment/Plan:    Ms. Monroy is a 67 year old female who has a past medical history significant for CAD s/p CABG X4 5/31/22, ICM LVEF 30-35%, HTN, HLD, DM2, GERD, MDD, and JUSTUS.    CAD s/p CABG X4 5/31/22, ICM LVEF 30-35%, NYHA II-III:  1. ACEi/ARB: Continue lisinopril.  2. BB: Continue Toprol-XL  3. Aldosterone antagonist: No currently on.   4. SCD prophylaxis: LVEF remains reduced despite GDMT and revascularization. Therefore, she has class I indication for ICD as primary prevention of SCD> We discussed with the patient the rationale for ICD placement, alternative therapies,  technical aspects of the surgical procedure, risks/benefits of therapy and need for long-term follow-up in the Device Clinic.  We discussed with the patient the rationale for ICD placement, alternative therapies,  technical aspects of the surgical procedure, risks/benefits of therapy and need for long-term follow-up in the Device Clinic.  The risk of defibrillator placement include: over sedation, reaction to local anesthetic, reaction to narcotics or benzodiazipines used for moderate secation, localized bleeding, internal bleeding, collapsed lung, and acute or late infections. There is the possibilty of unforseen complications as well such as device or lead failure, lead dislodgement, and inappropriate shocks from the defibrillator. An educational handout regarding ICD therapy was reviewed with the patient.  All question/concerns were addressed. After our discussion, the patient verbalized understanding and wishes to proceed with ICD  implant. We will plan for single chamber ICD implant. She is currently on LifeVest and will continue with this until   5. Fluid status: Continue Lasix    Follow up 3 months after implant       History of Present Illness/Subjective    Ms. Marianne Monroy is a 67 year old female who comes in today for EP consultation of consideration for ICD.     Ms. Monroy is a 67 year old female who has a past medical history significant for CAD s/p CABG X4 5/31/22, ICM LVEF 30-35%, HTN, HLD, DM2, GERD, MDD, and JUSTUS.    She found to have on 430/22 around noon she started having sudden onset of increased lightheadedness, racing heart, and shortness of breath that persisted despite taking in sugar (patient initially attributed to hypoglycemia) and sitting/lying down.  She reports that her apple watch read  bpm.  Episode lasted for about 7 hours leading her to go into the ER but her symptoms spontaneously had resolved.  During ER evaluation D-dimer was negative, ECG and delta 2-hour troponin were significant for possible prior inferior MI, LVH, LA enlargement, and CXR normal.  She saw her ECG who placed her on a cardiac monitor and planned for a stress echo.  Zio patch monitor showed that she was having episodes of sustained VT episodes about 21 minutes and she was told to present to the ER.  Coronary angiogram revealed severe multivessel disease and an echo showed severely reduced LVEF 20-30% with moderate to severe diffuse hypokinesis in the RCA territory.  She was started on GDMT and optimized and referred to CVTS for surgical intervention.  She underwent CABG x4 (LIMA-LAD, SVG- distal RCA, SVG-OM, SVG-ramus) on 5/24/2022.  Postoperatively, while in ICU, she had bigeminal PVCs with mild hypotension.  Most recent echo on 5/31/2022 showed LVEF improved to 35-40%, mildly reduced RV dysfunction, and no pericardial effusion.       She presents today to establish outpatient EP care/ She reports feeling well and is making a good  "recovery after surgery. She has some SOB with walking fast. She denies chest discomfort, palpitations, abdominal fullness/bloating or peripheral edema, shortness of breath, paroxysmal nocturnal dyspnea, orthopnea, lightheadedness, dizziness, pre-syncope, or syncope. 7/26/22-8/2/22 showed 12 NSVT up to 10 seconds  A recent echo showed LVEF 30-35% with normal RV size/function. Presenting 12 lead ECG shows SR with PVCs Vent Rate 72 bpm,  ms, QRS 84 ms, QTc 494 ms. Current cardiac medications include: Lisinopril. Lipitor, Toprol XL, ASA.     I have reviewed and updated the patient's Past Medical History, Social History, Family History and Medication List.     Cardiographics (Personally Reviewed) :   8/24/22 Echo:   Interpretation Summary  Left ventricular size is normal.  The visual ejection fraction is 30-35%.  Right ventricular function, chamber size, wall motion, and thickness are  normal       Physical Examination   /66 (BP Location: Right arm, Patient Position: Chair, Cuff Size: Adult Regular)   Pulse 61   Ht 1.625 m (5' 3.98\")   Wt 59.6 kg (131 lb 6.4 oz)   SpO2 99%   BMI 22.57 kg/m    Wt Readings from Last 3 Encounters:   08/22/22 60.4 kg (133 lb 3.2 oz)   08/04/22 59.9 kg (132 lb)   07/25/22 60.8 kg (134 lb)   /66 (BP Location: Right arm, Patient Position: Chair, Cuff Size: Adult Regular)   Pulse 61   Ht 1.625 m (5' 3.98\")   Wt 59.6 kg (131 lb 6.4 oz)   SpO2 99%   BMI 22.57 kg/m      General Appearance:   Alert, well-appearing and in no acute distress.   HEENT: Atraumatic, normocephalic. PERRL.  MMM.   Chest/Lungs:   Respirations unlabored.  Lungs are clear to auscultation.   Cardiovascular:   Regular rate and rhythm.  S1/S2. No murmur.    Abdomen:  Soft, nontender, nondistended.   Extremities: No cyanosis or clubbing. No edema.    Musculoskeletal: Moves all extremities.  Gait normal.   Skin: Warm, dry, intact.    Neurologic: Mood and affect are appropriate.  Alert and oriented to " person, place, time, and situation.          Medications  Allergies   Current Outpatient Medications   Medication Sig Dispense Refill     acetaminophen (TYLENOL) 325 MG tablet Take 2 tablets (650 mg) by mouth every 4 hours as needed for other (For optimal non-opioid multimodal pain management to improve pain control.) 100 tablet 0     aspirin (ASA) 81 MG chewable tablet 2 tablets (162 mg) by Oral or NG Tube route daily 120 tablet 0     atorvastatin (LIPITOR) 80 MG tablet TAKE 1 TABLET DAILY (NEED ANNUAL EXAM) 90 tablet 3     blood glucose (NO BRAND SPECIFIED) test strip Use to test blood sugar 2 times daily or as directed. 200 strip 1     blood glucose (ONETOUCH VERIO IQ) test strip Use to test blood sugar 2 times daily or as directed. 200 strip 1     blood glucose monitoring (ONETOUCH VERIO) meter device kit Use to test blood sugar 2 times daily or as directed. 1 kit 0     empagliflozin (JARDIANCE) 25 MG TABS tablet Take 1 tablet (25 mg) by mouth daily 90 tablet 1     FLUoxetine (PROZAC) 20 MG capsule TAKE 2 CAPSULES DAILY 180 capsule 1     glipiZIDE (GLUCOTROL XL) 5 MG 24 hr tablet TAKE 2 TABLETS DAILY (NEED ANNUAL EXAM) 180 tablet 1     lisinopril (ZESTRIL) 10 MG tablet Take 1 tablet (10 mg) by mouth 2 times daily 135 tablet 3     metFORMIN (GLUCOPHAGE XR) 500 MG 24 hr tablet Take 3 tablets (1,500 mg) by mouth daily (with dinner) Please see changed dose 270 tablet 1     metoprolol succinate ER (TOPROL XL) 50 MG 24 hr tablet Take 1 tablet (50 mg) by mouth daily 90 tablet 3     MULTIPLE VITAMIN OR TABS 1 TABLET DAILY       Multiple Vitamins-Minerals (PRESERVISION AREDS PO) Take 2 tablets by mouth daily       vancomycin (VANCOCIN) 125 MG capsule 1 tablet 4 times daily x 15 days and then Twice daily x 15  days and then ones daily x 15 days 150 capsule 0    Allergies   Allergen Reactions     Effexor [Venlafaxine Hydrochloride]      Tachycardia, makes her extremely jittery--cant sit down, has to keep moving constantly          Lab Results (Personally Reviewed)    Chemistry/lipid CBC Cardiac Enzymes/BNP/TSH/INR   Lab Results   Component Value Date    BUN 25.0 (H) 08/22/2022     08/22/2022    CO2 21 (L) 08/22/2022     Creatinine   Date Value Ref Range Status   08/22/2022 0.84 0.51 - 0.95 mg/dL Final   01/07/2021 0.90 0.52 - 1.04 mg/dL Final       Lab Results   Component Value Date    CHOL 147 05/18/2022    HDL 47 (L) 05/18/2022    LDL 57 05/18/2022      Lab Results   Component Value Date    WBC 6.8 08/22/2022    HGB 9.9 (L) 08/22/2022    HCT 33.3 (L) 08/22/2022    MCV 79 08/22/2022     08/22/2022    Lab Results   Component Value Date    TSH 1.41 05/11/2022    INR 1.29 (H) 05/25/2022        The patient states understanding and is agreeable with the plan.   Too Morrow MD [facility]  Cardiology - Electrophysiology      Total time spent on patient visit, reviewing notes, imaging, labs, orders, and completing necessary documentation: 45 minutes.                      Please do not hesitate to contact me if you have any questions/concerns.     Sincerely,     Too Morrow MD

## 2022-08-31 NOTE — PATIENT INSTRUCTIONS
You are scheduled for an implant of a Cardiac Defibrillator (ICD).      You will need to have a covid swab done prior to procedure.          - At-home, rapid antigen test:              - Perform within 1-2 days of procedure              - If negative, take a photo of the result or report the test result verbally on the day of procedure               - If positive, contact [facility] at [phone]              - Where to find: For purchase at pharmacies, or you can order free tests at covid.gov/tests           - PCR (if you can't find or do a rapid antigen test, OR if you are staying overnight):              - Perform within 4 days of procedure                             - If being performed at an outside lab, result needs to be faxed to [phone].     Visitor Policy: One visitor.    Below are instructions, if you have questions please contact our office.     1. You should arrive at the [facility]   ([address], [facility]) to the [facility]ing Room at ___________ on   ______________.  2. Do not eat for 8 hours prior to arrival, you can drink water up until 2 hours prior.  3. If you are diabetic follow the following instructions: (Contact your diabetes team if you have questions)   * Hold oral diabetic medications (metformin, glipizide, empagliflozin) the morning of procedure.  4. The morning of your procedure, you may take your scheduled medications with a sip of water.  5. You will discharge the same day. You will need a  and someone to stay with you for 24 hours.  6. Use the antibacterial wipes the night before and morning of your procedure. Follow the included instructions.       ICD Educational Tool Kit provided today:   ICD Decision Making Tool  https://patientdecisionaid.org/wp-content/uploads/2020/03/ICD-Pamphlet-[email or web address].pdf    Post-Procedure Instructions  Wound Care  If no Dermabond has been applied to your incision: Keep your incision (surgery wound) dry for 3 days.   After 3 days, you may remove the outer bandage.  Keep the strips of tape on.  They will be removed at your clinic visit.  If Dermabond has been applied to your incision,  do not apply powder or lotion to the incision for 14 days. You may shower in the morning.  Check for signs of infection each day.  These include increased redness, swelling, drainage or a fever over 101 F (38.3 C).  Call us immediately if you see any of   these signs.  If there are no signs of infection, you may shower in 3 days.  Do not submerge the incision (in a bath tub, hot tub, or swimming pool) until fully healed.  Pain  You may have mild to moderate pain for 3 to 5 days.  Take acetaminophen (Tylenol) or ibuprofen (Advil) for the pain.  Call us if the pain is severe or lasts more than 5 days.  Activity  You should slowly go back to your normal activities after 24 hours.  Healing will take 4 to 6 weeks.  No driving for 3 days  Avoid climbing a ladder alone.  It is best to stay within 4 feet of the ground.  Avoid anything that may cause rough contact or a hard hit to your chest.  This includes football, hockey, and other contact sports.  Do not go swimming or boating alone.    FOR ATLEAST 4 WEEKS:  Do not raise your affected arm above your shoulder.  Do not use your affected arm to push, pull, or lift anything over 10 pounds.  Avoid repetitive upper body activities for 6 weeks (ie: golf, swimming, and weight lifting)      Follow Up Appointments Date & Time   7-10 day incision check with device clinic    3 month follow up visit with device check & provider            If you have further questions, please utilize [facility] to contact us. If you do not have [facility], please contact us as below.    Sarah Isidro, RN   Electrophysiology Nurse Coordinator  [phone]    [facility]   [phone]    Device Clinic (Pacemakers, Defibrillators, Loop Recorders)  [phone]      Preparing the Skin Before Surgery  Preparing or  "\"prepping\" skin before surgery can reduce the risk of infection at the surgical site. To make the process easier, this facility has chosen disposable cloths moistened with rinse-free 2% Chlorhexidine Gluconate antiseptic solution designed to reduce the bacteria on the skin. The steps below outline the prepping process and should be carefully followed.    Prep the skin at the following time(s):    - If you wish to shower, bathe or shampoo your hair, do so the night before and prep your skin afterwards for the first time using one package of cloths.    - Skin must be prepped the morning of the procedure using the second package of cloths.        Prep The Night Before Procedure  Do not allow this product to come in contact with your eyes, ears, mouth or mucous membranes.   Reach into one of the packages, remove the two cloths and place onto a clean table.  Use one clean cloth to prep each are of the body. One cloth for #1 - neck & chest. One cloth for #2 & #3 - both arms, shoulder to fingertips including armpits. Wipe each area in a back and forth motion for 3 minutes. Be sure to wipe each area thoroughly.  Do not rinse or apply any lotions, moisturizer or make up after prepping.  Discard cloths in trash can.   Allow your skin to air dry. Dress in clean clothes/sleepwear      Prepping your skin on the morning of surgery:  Do not shower, bathe or shampoo hair.  Open a new package and follow the instructions listed above.                "

## 2022-08-31 NOTE — LETTER
8/31/2022       RE: Marianne Monroy  [address]  [facility] [address]     Dear Colleague,    Thank you for referring your patient, Marianne Monroy, to the [facility] at [facility]. Please see a copy of my visit note below.        ELECTROPHYSIOLOGY CLINIC VISIT    Assessment/Recommendations   Assessment/Plan:    Ms. Monroy is a 67 year old female who has a past medical history significant for CAD s/p CABG X4 5/31/22, ICM LVEF 30-35%, HTN, HLD, DM2, GERD, MDD, and JUSTUS.    CAD s/p CABG X4 5/31/22, ICM LVEF 30-35%, NYHA II-III:  1. ACEi/ARB: Continue lisinopril.  2. BB: Continue Toprol-XL  3. Aldosterone antagonist: No currently on.   4. SCD prophylaxis: LVEF remains reduced despite GDMT and revascularization. Therefore, she has class I indication for ICD as primary prevention of SCD> We discussed with the patient the rationale for ICD placement, alternative therapies,  technical aspects of the surgical procedure, risks/benefits of therapy and need for long-term follow-up in the Device Clinic.  We discussed with the patient the rationale for ICD placement, alternative therapies,  technical aspects of the surgical procedure, risks/benefits of therapy and need for long-term follow-up in the Device Clinic.  The risk of defibrillator placement include: over sedation, reaction to local anesthetic, reaction to narcotics or benzodiazipines used for moderate secation, localized bleeding, internal bleeding, collapsed lung, and acute or late infections. There is the possibilty of unforseen complications as well such as device or lead failure, lead dislodgement, and inappropriate shocks from the defibrillator. An educational handout regarding ICD therapy was reviewed with the patient.  All question/concerns were addressed. After our discussion, the patient verbalized understanding and wishes to proceed with ICD implant. We will plan for single  chamber ICD implant. She is currently on LifeVest and will continue with this until   5. Fluid status: Continue Lasix    Follow up 3 months after implant       History of Present Illness/Subjective    Ms. Marianne Monroy is a 67 year old female who comes in today for EP consultation of consideration for ICD.     Ms. Monroy is a 67 year old female who has a past medical history significant for CAD s/p CABG X4 5/31/22, ICM LVEF 30-35%, HTN, HLD, DM2, GERD, MDD, and JUSTUS.    She found to have on 430/22 around noon she started having sudden onset of increased lightheadedness, racing heart, and shortness of breath that persisted despite taking in sugar (patient initially attributed to hypoglycemia) and sitting/lying down.  She reports that her apple watch read  bpm.  Episode lasted for about 7 hours leading her to go into the ER but her symptoms spontaneously had resolved.  During ER evaluation D-dimer was negative, ECG and delta 2-hour troponin were significant for possible prior inferior MI, LVH, LA enlargement, and CXR normal.  She saw her ECG who placed her on a cardiac monitor and planned for a stress echo.  Zio patch monitor showed that she was having episodes of sustained VT episodes about 21 minutes and she was told to present to the ER.  Coronary angiogram revealed severe multivessel disease and an echo showed severely reduced LVEF 20-30% with moderate to severe diffuse hypokinesis in the RCA territory.  She was started on GDMT and optimized and referred to CVTS for surgical intervention.  She underwent CABG x4 (LIMA-LAD, SVG- distal RCA, SVG-OM, SVG-ramus) on 5/24/2022.  Postoperatively, while in ICU, she had bigeminal PVCs with mild hypotension.  Most recent echo on 5/31/2022 showed LVEF improved to 35-40%, mildly reduced RV dysfunction, and no pericardial effusion.       She presents today to establish outpatient EP care/ She reports feeling well and is making a good recovery after surgery. She has some  "SOB with walking fast. She denies chest discomfort, palpitations, abdominal fullness/bloating or peripheral edema, shortness of breath, paroxysmal nocturnal dyspnea, orthopnea, lightheadedness, dizziness, pre-syncope, or syncope. 7/26/22-8/2/22 showed 12 NSVT up to 10 seconds  A recent echo showed LVEF 30-35% with normal RV size/function. Presenting 12 lead ECG shows SR with PVCs Vent Rate 72 bpm,  ms, QRS 84 ms, QTc 494 ms. Current cardiac medications include: Lisinopril. Lipitor, Toprol XL, ASA.     I have reviewed and updated the patient's Past Medical History, Social History, Family History and Medication List.     Cardiographics (Personally Reviewed) :   8/24/22 Echo:   Interpretation Summary  Left ventricular size is normal.  The visual ejection fraction is 30-35%.  Right ventricular function, chamber size, wall motion, and thickness are  normal       Physical Examination   /66 (BP Location: Right arm, Patient Position: Chair, Cuff Size: Adult Regular)   Pulse 61   Ht 1.625 m (5' 3.98\")   Wt 59.6 kg (131 lb 6.4 oz)   SpO2 99%   BMI 22.57 kg/m    Wt Readings from Last 3 Encounters:   08/22/22 60.4 kg (133 lb 3.2 oz)   08/04/22 59.9 kg (132 lb)   07/25/22 60.8 kg (134 lb)   /66 (BP Location: Right arm, Patient Position: Chair, Cuff Size: Adult Regular)   Pulse 61   Ht 1.625 m (5' 3.98\")   Wt 59.6 kg (131 lb 6.4 oz)   SpO2 99%   BMI 22.57 kg/m      General Appearance:   Alert, well-appearing and in no acute distress.   HEENT: Atraumatic, normocephalic. PERRL.  MMM.   Chest/Lungs:   Respirations unlabored.  Lungs are clear to auscultation.   Cardiovascular:   Regular rate and rhythm.  S1/S2. No murmur.    Abdomen:  Soft, nontender, nondistended.   Extremities: No cyanosis or clubbing. No edema.    Musculoskeletal: Moves all extremities.  Gait normal.   Skin: Warm, dry, intact.    Neurologic: Mood and affect are appropriate.  Alert and oriented to person, place, time, and situation.    "       Medications  Allergies   Current Outpatient Medications   Medication Sig Dispense Refill     acetaminophen (TYLENOL) 325 MG tablet Take 2 tablets (650 mg) by mouth every 4 hours as needed for other (For optimal non-opioid multimodal pain management to improve pain control.) 100 tablet 0     aspirin (ASA) 81 MG chewable tablet 2 tablets (162 mg) by Oral or NG Tube route daily 120 tablet 0     atorvastatin (LIPITOR) 80 MG tablet TAKE 1 TABLET DAILY (NEED ANNUAL EXAM) 90 tablet 3     blood glucose (NO BRAND SPECIFIED) test strip Use to test blood sugar 2 times daily or as directed. 200 strip 1     blood glucose (ONETOUCH VERIO IQ) test strip Use to test blood sugar 2 times daily or as directed. 200 strip 1     blood glucose monitoring (ONETOUCH VERIO) meter device kit Use to test blood sugar 2 times daily or as directed. 1 kit 0     empagliflozin (JARDIANCE) 25 MG TABS tablet Take 1 tablet (25 mg) by mouth daily 90 tablet 1     FLUoxetine (PROZAC) 20 MG capsule TAKE 2 CAPSULES DAILY 180 capsule 1     glipiZIDE (GLUCOTROL XL) 5 MG 24 hr tablet TAKE 2 TABLETS DAILY (NEED ANNUAL EXAM) 180 tablet 1     lisinopril (ZESTRIL) 10 MG tablet Take 1 tablet (10 mg) by mouth 2 times daily 135 tablet 3     metFORMIN (GLUCOPHAGE XR) 500 MG 24 hr tablet Take 3 tablets (1,500 mg) by mouth daily (with dinner) Please see changed dose 270 tablet 1     metoprolol succinate ER (TOPROL XL) 50 MG 24 hr tablet Take 1 tablet (50 mg) by mouth daily 90 tablet 3     MULTIPLE VITAMIN OR TABS 1 TABLET DAILY       Multiple Vitamins-Minerals (PRESERVISION AREDS PO) Take 2 tablets by mouth daily       vancomycin (VANCOCIN) 125 MG capsule 1 tablet 4 times daily x 15 days and then Twice daily x 15  days and then ones daily x 15 days 150 capsule 0    Allergies   Allergen Reactions     Effexor [Venlafaxine Hydrochloride]      Tachycardia, makes her extremely jittery--cant sit down, has to keep moving constantly         Lab Results (Personally  Reviewed)    Chemistry/lipid CBC Cardiac Enzymes/BNP/TSH/INR   Lab Results   Component Value Date    BUN 25.0 (H) 08/22/2022     08/22/2022    CO2 21 (L) 08/22/2022     Creatinine   Date Value Ref Range Status   08/22/2022 0.84 0.51 - 0.95 mg/dL Final   01/07/2021 0.90 0.52 - 1.04 mg/dL Final       Lab Results   Component Value Date    CHOL 147 05/18/2022    HDL 47 (L) 05/18/2022    LDL 57 05/18/2022      Lab Results   Component Value Date    WBC 6.8 08/22/2022    HGB 9.9 (L) 08/22/2022    HCT 33.3 (L) 08/22/2022    MCV 79 08/22/2022     08/22/2022    Lab Results   Component Value Date    TSH 1.41 05/11/2022    INR 1.29 (H) 05/25/2022        The patient states understanding and is agreeable with the plan.   Too Morrow MD [facility]  Cardiology - Electrophysiology      Total time spent on patient visit, reviewing notes, imaging, labs, orders, and completing necessary documentation: 45 minutes.

## 2022-08-31 NOTE — NURSING NOTE
Medication Reconciliation:  Rooming staff reviewed and verified all current medications with patient. An updated med list was included in the AVS.    Test Results Reviewed:  EKG results were reviewed by provider with patient today.     EP Procedure:  Patient was given instructions regarding ICD implant. Patient received an instruction letter today for reference. Discussed purpose, preparation, and procedure with patient. Patient verbalized understanding and agreed to call with further questions or concerns. The EP  was notified of need to schedule procedure and post op follow up appointments.    Return appointment:   Patient given instructions regarding scheduling next clinic visit. Patient verbalized understanding.       Sarah Isidro RN on 8/31/2022 at 11:35 AM

## 2022-08-31 NOTE — PROGRESS NOTES
ELECTROPHYSIOLOGY CLINIC VISIT    Assessment/Recommendations   Assessment/Plan:    Ms. Monroy is a 67 year old female who has a past medical history significant for CAD s/p CABG X4 5/31/22, ICM LVEF 30-35%, HTN, HLD, DM2, GERD, MDD, and JUSTUS.    CAD s/p CABG X4 5/31/22, ICM LVEF 30-35%, NYHA II-III:  1. ACEi/ARB: Continue lisinopril.  2. BB: Continue Toprol-XL  3. Aldosterone antagonist: No currently on.   4. SCD prophylaxis: LVEF remains reduced despite GDMT and revascularization. Therefore, she has class I indication for ICD as primary prevention of SCD> We discussed with the patient the rationale for ICD placement, alternative therapies,  technical aspects of the surgical procedure, risks/benefits of therapy and need for long-term follow-up in the Device Clinic.  We discussed with the patient the rationale for ICD placement, alternative therapies,  technical aspects of the surgical procedure, risks/benefits of therapy and need for long-term follow-up in the Device Clinic.  The risk of defibrillator placement include: over sedation, reaction to local anesthetic, reaction to narcotics or benzodiazipines used for moderate secation, localized bleeding, internal bleeding, collapsed lung, and acute or late infections. There is the possibilty of unforseen complications as well such as device or lead failure, lead dislodgement, and inappropriate shocks from the defibrillator. An educational handout regarding ICD therapy was reviewed with the patient.  All question/concerns were addressed. After our discussion, the patient verbalized understanding and wishes to proceed with ICD implant. We will plan for single chamber ICD implant. She is currently on LifeVest and will continue with this until   5. Fluid status: Continue Lasix    Follow up 3 months after implant       History of Present Illness/Subjective    Ms. Marianne Monroy is a 67 year old female who comes in today for EP consultation of consideration for ICD.      Ms. Monroy is a 67 year old female who has a past medical history significant for CAD s/p CABG X4 5/31/22, ICM LVEF 30-35%, HTN, HLD, DM2, GERD, MDD, and JUSTUS.    She found to have on 430/22 around noon she started having sudden onset of increased lightheadedness, racing heart, and shortness of breath that persisted despite taking in sugar (patient initially attributed to hypoglycemia) and sitting/lying down.  She reports that her apple watch read  bpm.  Episode lasted for about 7 hours leading her to go into the ER but her symptoms spontaneously had resolved.  During ER evaluation D-dimer was negative, ECG and delta 2-hour troponin were significant for possible prior inferior MI, LVH, LA enlargement, and CXR normal.  She saw her ECG who placed her on a cardiac monitor and planned for a stress echo.  Zio patch monitor showed that she was having episodes of sustained VT episodes about 21 minutes and she was told to present to the ER.  Coronary angiogram revealed severe multivessel disease and an echo showed severely reduced LVEF 20-30% with moderate to severe diffuse hypokinesis in the RCA territory.  She was started on GDMT and optimized and referred to CVTS for surgical intervention.  She underwent CABG x4 (LIMA-LAD, SVG- distal RCA, SVG-OM, SVG-ramus) on 5/24/2022.  Postoperatively, while in ICU, she had bigeminal PVCs with mild hypotension.  Most recent echo on 5/31/2022 showed LVEF improved to 35-40%, mildly reduced RV dysfunction, and no pericardial effusion.       She presents today to establish outpatient EP care/ She reports feeling well and is making a good recovery after surgery. She has some SOB with walking fast. She denies chest discomfort, palpitations, abdominal fullness/bloating or peripheral edema, shortness of breath, paroxysmal nocturnal dyspnea, orthopnea, lightheadedness, dizziness, pre-syncope, or syncope. 7/26/22-8/2/22 showed 12 NSVT up to 10 seconds  A recent echo showed LVEF 30-35%  "with normal RV size/function. Presenting 12 lead ECG shows SR with PVCs Vent Rate 72 bpm,  ms, QRS 84 ms, QTc 494 ms. Current cardiac medications include: Lisinopril. Lipitor, Toprol XL, ASA.     I have reviewed and updated the patient's Past Medical History, Social History, Family History and Medication List.     Cardiographics (Personally Reviewed) :   8/24/22 Echo:   Interpretation Summary  Left ventricular size is normal.  The visual ejection fraction is 30-35%.  Right ventricular function, chamber size, wall motion, and thickness are  normal       Physical Examination   /66 (BP Location: Right arm, Patient Position: Chair, Cuff Size: Adult Regular)   Pulse 61   Ht 1.625 m (5' 3.98\")   Wt 59.6 kg (131 lb 6.4 oz)   SpO2 99%   BMI 22.57 kg/m    Wt Readings from Last 3 Encounters:   08/22/22 60.4 kg (133 lb 3.2 oz)   08/04/22 59.9 kg (132 lb)   07/25/22 60.8 kg (134 lb)   /66 (BP Location: Right arm, Patient Position: Chair, Cuff Size: Adult Regular)   Pulse 61   Ht 1.625 m (5' 3.98\")   Wt 59.6 kg (131 lb 6.4 oz)   SpO2 99%   BMI 22.57 kg/m      General Appearance:   Alert, well-appearing and in no acute distress.   HEENT: Atraumatic, normocephalic. PERRL.  MMM.   Chest/Lungs:   Respirations unlabored.  Lungs are clear to auscultation.   Cardiovascular:   Regular rate and rhythm.  S1/S2. No murmur.    Abdomen:  Soft, nontender, nondistended.   Extremities: No cyanosis or clubbing. No edema.    Musculoskeletal: Moves all extremities.  Gait normal.   Skin: Warm, dry, intact.    Neurologic: Mood and affect are appropriate.  Alert and oriented to person, place, time, and situation.          Medications  Allergies   Current Outpatient Medications   Medication Sig Dispense Refill     acetaminophen (TYLENOL) 325 MG tablet Take 2 tablets (650 mg) by mouth every 4 hours as needed for other (For optimal non-opioid multimodal pain management to improve pain control.) 100 tablet 0     aspirin (ASA) " 81 MG chewable tablet 2 tablets (162 mg) by Oral or NG Tube route daily 120 tablet 0     atorvastatin (LIPITOR) 80 MG tablet TAKE 1 TABLET DAILY (NEED ANNUAL EXAM) 90 tablet 3     blood glucose (NO BRAND SPECIFIED) test strip Use to test blood sugar 2 times daily or as directed. 200 strip 1     blood glucose (ONETOUCH VERIO IQ) test strip Use to test blood sugar 2 times daily or as directed. 200 strip 1     blood glucose monitoring (ONETOUCH VERIO) meter device kit Use to test blood sugar 2 times daily or as directed. 1 kit 0     empagliflozin (JARDIANCE) 25 MG TABS tablet Take 1 tablet (25 mg) by mouth daily 90 tablet 1     FLUoxetine (PROZAC) 20 MG capsule TAKE 2 CAPSULES DAILY 180 capsule 1     glipiZIDE (GLUCOTROL XL) 5 MG 24 hr tablet TAKE 2 TABLETS DAILY (NEED ANNUAL EXAM) 180 tablet 1     lisinopril (ZESTRIL) 10 MG tablet Take 1 tablet (10 mg) by mouth 2 times daily 135 tablet 3     metFORMIN (GLUCOPHAGE XR) 500 MG 24 hr tablet Take 3 tablets (1,500 mg) by mouth daily (with dinner) Please see changed dose 270 tablet 1     metoprolol succinate ER (TOPROL XL) 50 MG 24 hr tablet Take 1 tablet (50 mg) by mouth daily 90 tablet 3     MULTIPLE VITAMIN OR TABS 1 TABLET DAILY       Multiple Vitamins-Minerals (PRESERVISION AREDS PO) Take 2 tablets by mouth daily       vancomycin (VANCOCIN) 125 MG capsule 1 tablet 4 times daily x 15 days and then Twice daily x 15  days and then ones daily x 15 days 150 capsule 0    Allergies   Allergen Reactions     Effexor [Venlafaxine Hydrochloride]      Tachycardia, makes her extremely jittery--cant sit down, has to keep moving constantly         Lab Results (Personally Reviewed)    Chemistry/lipid CBC Cardiac Enzymes/BNP/TSH/INR   Lab Results   Component Value Date    BUN 25.0 (H) 08/22/2022     08/22/2022    CO2 21 (L) 08/22/2022     Creatinine   Date Value Ref Range Status   08/22/2022 0.84 0.51 - 0.95 mg/dL Final   01/07/2021 0.90 0.52 - 1.04 mg/dL Final       Lab Results    Component Value Date    CHOL 147 05/18/2022    HDL 47 (L) 05/18/2022    LDL 57 05/18/2022      Lab Results   Component Value Date    WBC 6.8 08/22/2022    HGB 9.9 (L) 08/22/2022    HCT 33.3 (L) 08/22/2022    MCV 79 08/22/2022     08/22/2022    Lab Results   Component Value Date    TSH 1.41 05/11/2022    INR 1.29 (H) 05/25/2022        The patient states understanding and is agreeable with the plan.   Too Morrow MD [facility]  Cardiology - Electrophysiology      Total time spent on patient visit, reviewing notes, imaging, labs, orders, and completing necessary documentation: 45 minutes.

## 2022-08-31 NOTE — TELEPHONE ENCOUNTER
Called daughter to offer appt and informed could not hold appt longer than 9/1.       Tristen Mcdaniels RN  Infectious Disease

## 2022-09-01 ENCOUNTER — TELEPHONE (OUTPATIENT)
Dept: CARDIOLOGY | Facility: CLINIC | Age: 67
End: 2022-09-01

## 2022-09-01 ENCOUNTER — HOSPITAL ENCOUNTER (OUTPATIENT)
Dept: CARDIAC REHAB | Facility: CLINIC | Age: 67
Discharge: HOME | End: 2022-09-01
Attending: SURGERY
Payer: COMMERCIAL

## 2022-09-01 ENCOUNTER — OFFICE VISIT (OUTPATIENT)
Dept: CARDIOLOGY | Facility: CLINIC | Age: 67
End: 2022-09-01
Attending: NURSE PRACTITIONER
Payer: COMMERCIAL

## 2022-09-01 VITALS
BODY MASS INDEX: 23.02 KG/M2 | HEART RATE: 69 BPM | WEIGHT: 134 LBS | DIASTOLIC BLOOD PRESSURE: 66 MMHG | SYSTOLIC BLOOD PRESSURE: 114 MMHG | OXYGEN SATURATION: 99 %

## 2022-09-01 DIAGNOSIS — I50.20 HFREF (HEART FAILURE WITH REDUCED EJECTION FRACTION) (H): Primary | ICD-10-CM

## 2022-09-01 DIAGNOSIS — E11.59 TYPE 2 DIABETES MELLITUS WITH OTHER CIRCULATORY COMPLICATION, WITHOUT LONG-TERM CURRENT USE OF INSULIN (H): ICD-10-CM

## 2022-09-01 DIAGNOSIS — F41.1 GENERALIZED ANXIETY DISORDER: ICD-10-CM

## 2022-09-01 DIAGNOSIS — A04.72 C. DIFFICILE DIARRHEA: ICD-10-CM

## 2022-09-01 DIAGNOSIS — I50.21 ACUTE SYSTOLIC HEART FAILURE (H): ICD-10-CM

## 2022-09-01 DIAGNOSIS — I10 HYPERTENSION, UNSPECIFIED TYPE: ICD-10-CM

## 2022-09-01 LAB — FOLATE SERPL-MCNC: 36.2 NG/ML (ref 4.6–34.8)

## 2022-09-01 PROCEDURE — 99214 OFFICE O/P EST MOD 30 MIN: CPT | Performed by: NURSE PRACTITIONER

## 2022-09-01 PROCEDURE — 93798 PHYS/QHP OP CAR RHAB W/ECG: CPT

## 2022-09-01 RX ORDER — SACUBITRIL AND VALSARTAN 24; 26 MG/1; MG/1
1 TABLET, FILM COATED ORAL 2 TIMES DAILY
Qty: 180 TABLET | Refills: 3 | Status: SHIPPED | OUTPATIENT
Start: 2022-09-01 | End: 2022-09-01

## 2022-09-01 NOTE — NURSING NOTE
Labs: Patient was given results of the laboratory testing obtained today. Patient was instructed to return for the next laboratory testing in 3 weeks. Patient demonstrated understanding of this information and agreed to call with further questions or concerns.     Med Reconcile: Reviewed and verified all current medications with the patient. The updated medication list was printed and given to the patient.    Return Appointment: Patient given instructions regarding scheduling next clinic visit. Patient demonstrated understanding of this information and agreed to call with further questions or concerns. CORE in 3 weeks.    Medication Change: Patient was educated regarding prescribed medication change, including discussion of the indication, administration, side effects, and when to report to MD or RN. Patient demonstrated understanding of this information and agreed to call with further questions or concerns. Increase lisinopril to 20 mg in AM / 10 mg in PM.    Patient stated she understood all health information given and agreed to call with further questions or concerns.    Dasia Amin RN

## 2022-09-01 NOTE — TELEPHONE ENCOUNTER
Patient returned schedulers call and stated that she's on meds for C-diff. She's wanting to schedule for a Friday and the October calendar is not out yet. The  will call the patient back one the schedule has been released.     Hamida Crockett  [facility]   [phone]

## 2022-09-01 NOTE — LETTER
9/1/2022      RE: Marianne Monroy  [address]  [facility] [address]       Dear Colleague,    Thank you for the opportunity to participate in the care of your patient, Marianne Monroy, at the [facility]. Please see a copy of my visit note below.    Marianne Monroy's goals for this visit include:     She requests these members of her care team be copied on today's visit information: PCP    PCP: Roxanna Otoole    Referring Provider:  Belinda Colon, APRN CNP  [address] [place] [address]    /66 (BP Location: Right arm, Patient Position: Sitting, Cuff Size: Adult Regular)   Pulse 69   Wt 60.8 kg (134 lb)   SpO2 99%   BMI 23.02 kg/m      Do you need any medication refills at today's visit? No.    Keith Guerra, EMT  Clinic Support  [facility]    [phone]    Employed by [facility] [employer]              HPI: Marianne is a 67 year old White female with a past medical history of T2DM, HTN, GERD, JUSTUS, MDD and recent episode of lightheadedness and dizziness on 4/30    Admission 5/18-6/4 - 5/24/2022.  Surgeon: Dr. Ulises Desai  1.  Coronary artery bypass grafting x 4 (left internal mammary artery to left anterior descending artery, saphenous vein graft to distal right coronary artery, saphenous vein graft to ramus intermedius artery, saphenous vein graft to obtuse marginal artery).  2.  Endoscopic vein harvest.    Patient has no SOB at rest.  She is now working up her strength. No swelling in the legs. She had no swelling before the hospitalization in the legs.  She has some lightheadedness with fast movements.  Weight at home 130 lbs. She is working on eating more. She has some JEFFERSON with the hot weather. Rehab has been going well. ICD placement to be scheduled for later this month.     Denies SOB,PND, orthopnea, edema, weight gain, chest pain, palpitations,  lightheadedness, dizziness, near syncopal/syncopal episodes. Marianne has been following salt and fluid restrictions.      PAST MEDICAL HISTORY:  Past Medical History:   Diagnosis Date     Abnormal Pap smear of cervix ~    repeat pap was normal     Allergic rhinitis, cause unspecified      Anxiety state, unspecified     panic episodes     HFrEF (heart failure with reduced ejection fraction) (H)      Unspecified essential hypertension        FAMILY HISTORY:  Family History   Problem Relation Age of Onset     Diabetes Mother         hypertension, alive at 83     C.A.D. Mother      Cancer Father         lung cancer, smoker.   at age 53     Family History Negative Sister      Osteoporosis Sister        SOCIAL HISTORY:  Social History     Tobacco Use     Smoking status: Never Smoker     Smokeless tobacco: Never Used   Vaping Use     Vaping Use: Never used   Substance Use Topics     Alcohol use: Yes     Comment: social     Drug use: No           CURRENT MEDICATIONS:  Current Outpatient Medications   Medication Sig Dispense Refill     acetaminophen (TYLENOL) 325 MG tablet Take 2 tablets (650 mg) by mouth every 4 hours as needed for other (For optimal non-opioid multimodal pain management to improve pain control.) 100 tablet 0     aspirin (ASA) 81 MG chewable tablet 2 tablets (162 mg) by Oral or NG Tube route daily 120 tablet 0     atorvastatin (LIPITOR) 80 MG tablet TAKE 1 TABLET DAILY (NEED ANNUAL EXAM) 90 tablet 3     blood glucose (NO BRAND SPECIFIED) test strip Use to test blood sugar 2 times daily or as directed. 200 strip 1     blood glucose (ONETOUCH VERIO IQ) test strip Use to test blood sugar 2 times daily or as directed. 200 strip 1     blood glucose monitoring (ONETOUCH VERIO) meter device kit Use to test blood sugar 2 times daily or as directed. 1 kit 0     empagliflozin (JARDIANCE) 25 MG TABS tablet Take 1 tablet (25 mg) by mouth daily 90 tablet 1     FLUoxetine (PROZAC) 20 MG capsule TAKE 2 CAPSULES  DAILY 180 capsule 1     glipiZIDE (GLUCOTROL XL) 5 MG 24 hr tablet TAKE 2 TABLETS DAILY (NEED ANNUAL EXAM) 180 tablet 1     metFORMIN (GLUCOPHAGE XR) 500 MG 24 hr tablet Take 3 tablets (1,500 mg) by mouth daily (with dinner) Please see changed dose 270 tablet 1     metoprolol succinate ER (TOPROL XL) 50 MG 24 hr tablet Take 1 tablet (50 mg) by mouth daily 90 tablet 3     MULTIPLE VITAMIN OR TABS 1 TABLET DAILY       Multiple Vitamins-Minerals (PRESERVISION AREDS PO) Take 2 tablets by mouth daily       vancomycin (VANCOCIN) 125 MG capsule 1 tablet 4 times daily x 15 days and then Twice daily x 15  days and then ones daily x 15 days 150 capsule 0       ROS:  Review Of Systems  Skin: negative  Eyes: negative  Ears/Nose/Throat: negative  Respiratory: No shortness of breath, dyspnea on exertion, cough, or hemoptysis  Cardiovascular: negative  Gastrointestinal: negative  Genitourinary: negative  Musculoskeletal: negative  Neurologic: negative  Psychiatric: negative  Hematologic/Lymphatic/Immunologic: negative  Endocrine: negative      EXAM:  /66 (BP Location: Right arm, Patient Position: Sitting, Cuff Size: Adult Regular)   Pulse 69   Wt 60.8 kg (134 lb)   SpO2 99%   BMI 23.02 kg/m    Home weight:  General: alert, articulate, and in no acute distress.  HEENT: normocephalic, atraumatic, anicteric sclera, EOMI, mucosa moist, no cyanosis.   Neck: neck supple.  No adenopathy, masses, or carotid bruits.  JVP flat at 90 degrees  Heart: regular rhythm, normal S1/S2, no murmur, gallop, rub.  Precordium quiet with normal PMI.     Lungs: clear, no rales, ronchi, or wheezing.  No accessory muscle use, respirations unlabored.   Abdomen: soft, non-tender, bowel sounds present, no hepatomegaly  Extremities: no pitting edema.   No cyanosis.   Neurological: alert and oriented x 3.  normal speech and affect, no gross motor deficits  Skin:  No ecchymoses, rashes, or clubbing.    Labs:  CBC RESULTS:  Lab Results   Component Value  Date    WBC 8.5 08/31/2022    WBC 7.1 09/09/2020    RBC 4.64 08/31/2022    RBC 4.57 09/09/2020    HGB 10.6 (L) 08/31/2022    HGB 13.3 09/09/2020    HCT 36.6 08/31/2022    HCT 41.1 09/09/2020    MCV 79 08/31/2022    MCV 90 09/09/2020    MCH 22.8 (L) 08/31/2022    MCH 29.1 09/09/2020    MCHC 29.0 (L) 08/31/2022    MCHC 32.4 09/09/2020    RDW 16.0 (H) 08/31/2022    RDW 12.5 09/09/2020     08/31/2022     09/09/2020       CMP RESULTS:  Lab Results   Component Value Date     08/31/2022     01/07/2021    POTASSIUM 4.9 08/31/2022    POTASSIUM 4.6 08/16/2022    POTASSIUM 4.8 01/07/2021    CHLORIDE 105 08/31/2022    CHLORIDE 106 08/16/2022    CHLORIDE 106 01/07/2021    CO2 23 08/31/2022    CO2 26 08/16/2022    CO2 28 01/07/2021    ANIONGAP 14 08/31/2022    ANIONGAP 6 08/16/2022    ANIONGAP 7 01/07/2021     (H) 08/31/2022     (H) 08/16/2022     (H) 01/07/2021    BUN 24.6 (H) 08/31/2022    BUN 29 08/16/2022    BUN 19 01/07/2021    CR 0.77 08/31/2022    CR 0.90 01/07/2021    GFRESTIMATED 84 08/31/2022    GFRESTIMATED 66 01/07/2021    GFRESTBLACK 77 01/07/2021    POONAM 9.9 08/31/2022    POONAM 9.5 01/07/2021    BILITOTAL 0.4 07/06/2022    BILITOTAL 0.4 09/09/2020    ALBUMIN 3.7 07/06/2022    ALBUMIN 3.7 09/09/2020    ALKPHOS 107 07/06/2022    ALKPHOS 90 09/09/2020    ALT 24 07/06/2022    ALT 33 09/09/2020    AST 14 07/06/2022    AST 14 09/09/2020        INR RESULTS:  Lab Results   Component Value Date    INR 1.29 (H) 05/25/2022       No components found for: CK  Lab Results   Component Value Date    MAG 2.0 06/04/2022     Lab Results   Component Value Date    NTBNP 2,714 (H) 06/09/2022     @BRIEFLABR (dig)@    Most recent echocardiogram:  No results found for this or any previous visit (from the past 8760 hour(s)).      Assessment and Plan:    In summary,Marianne  is a 67 year old here for a CORE follow up. Increase Lisinopril 30 mg daily ( was on 20 mg daily). We will work on patient  assistance program for Entresto . Out of pocket cost is too high for Entresto at the moment. Patient euvolemic today.   1.  Chronic systolic heart failure secondary to ICM.  Stage C  NYHA Class III  ACEi/ARB: stop 30 mg Lisinopril-Entresto start 24/26 BID- once patient assistance goes through  BB: yes- continue up titration- Metoprolol 50 mg   Aldosterone antagonist: no- will initiate at future visit. - higher potassium levels   SCD prophylaxis: does not meet criteria for implant  Fluid status: euvolemic  Anticoagulation:   Antiplatelet:  ASA dose   Sleep apnea:  NSAID use:  Contraindicated.  Avoid use.  Remote Monitoring:none  SGLT 2 - Jardiance 25 mg    2.  Other comordbid conditions:      #T2DM - PCP to manage - glipizide , Jardiance, metformin    #HTN- 114/66- stable -stop Lisinopril- start entresto and Metoprolol      #GERD- followed by PCP     3.  Follow-up   Lisinopril 30 mg daily - increase to start next week  Stop Lisinopril two days earlier ( if starting Entresto) will try for patient assistance program  Start Entresto 24/26 mg 9/9/22  BMP in 2 weeks after med change  CORE 2-3 weeks         Belinda MOORE, CNP  [facility]

## 2022-09-01 NOTE — PROGRESS NOTES
HPI: Marianne is a 67 year old White female with a past medical history of T2DM, HTN, GERD, JUSTUS, MDD and recent episode of lightheadedness and dizziness on     Admission - - 2022.  Surgeon: Dr. Ulises Desai  1.  Coronary artery bypass grafting x 4 (left internal mammary artery to left anterior descending artery, saphenous vein graft to distal right coronary artery, saphenous vein graft to ramus intermedius artery, saphenous vein graft to obtuse marginal artery).  2.  Endoscopic vein harvest.    Patient has no SOB at rest.  She is now working up her strength. No swelling in the legs. She had no swelling before the hospitalization in the legs.  She has some lightheadedness with fast movements.  Weight at home 130 lbs. She is working on eating more. She has some JEFFERSON with the hot weather. Rehab has been going well. ICD placement to be scheduled for later this month.     Denies SOB,PND, orthopnea, edema, weight gain, chest pain, palpitations, lightheadedness, dizziness, near syncopal/syncopal episodes. Marianne has been following salt and fluid restrictions.      PAST MEDICAL HISTORY:  Past Medical History:   Diagnosis Date     Abnormal Pap smear of cervix ~    repeat pap was normal     Allergic rhinitis, cause unspecified      Anxiety state, unspecified     panic episodes     HFrEF (heart failure with reduced ejection fraction) (H)      Unspecified essential hypertension        FAMILY HISTORY:  Family History   Problem Relation Age of Onset     Diabetes Mother         hypertension, alive at 83     C.A.D. Mother      Cancer Father         lung cancer, smoker.   at age 53     Family History Negative Sister      Osteoporosis Sister        SOCIAL HISTORY:  Social History     Tobacco Use     Smoking status: Never Smoker     Smokeless tobacco: Never Used   Vaping Use     Vaping Use: Never used   Substance Use Topics     Alcohol use: Yes     Comment: social     Drug use: No           CURRENT  MEDICATIONS:  Current Outpatient Medications   Medication Sig Dispense Refill     acetaminophen (TYLENOL) 325 MG tablet Take 2 tablets (650 mg) by mouth every 4 hours as needed for other (For optimal non-opioid multimodal pain management to improve pain control.) 100 tablet 0     aspirin (ASA) 81 MG chewable tablet 2 tablets (162 mg) by Oral or NG Tube route daily 120 tablet 0     atorvastatin (LIPITOR) 80 MG tablet TAKE 1 TABLET DAILY (NEED ANNUAL EXAM) 90 tablet 3     blood glucose (NO BRAND SPECIFIED) test strip Use to test blood sugar 2 times daily or as directed. 200 strip 1     blood glucose (ONETOUCH VERIO IQ) test strip Use to test blood sugar 2 times daily or as directed. 200 strip 1     blood glucose monitoring (ONETOUCH VERIO) meter device kit Use to test blood sugar 2 times daily or as directed. 1 kit 0     empagliflozin (JARDIANCE) 25 MG TABS tablet Take 1 tablet (25 mg) by mouth daily 90 tablet 1     FLUoxetine (PROZAC) 20 MG capsule TAKE 2 CAPSULES DAILY 180 capsule 1     glipiZIDE (GLUCOTROL XL) 5 MG 24 hr tablet TAKE 2 TABLETS DAILY (NEED ANNUAL EXAM) 180 tablet 1     metFORMIN (GLUCOPHAGE XR) 500 MG 24 hr tablet Take 3 tablets (1,500 mg) by mouth daily (with dinner) Please see changed dose 270 tablet 1     metoprolol succinate ER (TOPROL XL) 50 MG 24 hr tablet Take 1 tablet (50 mg) by mouth daily 90 tablet 3     MULTIPLE VITAMIN OR TABS 1 TABLET DAILY       Multiple Vitamins-Minerals (PRESERVISION AREDS PO) Take 2 tablets by mouth daily       vancomycin (VANCOCIN) 125 MG capsule 1 tablet 4 times daily x 15 days and then Twice daily x 15  days and then ones daily x 15 days 150 capsule 0       ROS:  Review Of Systems  Skin: negative  Eyes: negative  Ears/Nose/Throat: negative  Respiratory: No shortness of breath, dyspnea on exertion, cough, or hemoptysis  Cardiovascular: negative  Gastrointestinal: negative  Genitourinary: negative  Musculoskeletal: negative  Neurologic: negative  Psychiatric:  negative  Hematologic/Lymphatic/Immunologic: negative  Endocrine: negative      EXAM:  /66 (BP Location: Right arm, Patient Position: Sitting, Cuff Size: Adult Regular)   Pulse 69   Wt 60.8 kg (134 lb)   SpO2 99%   BMI 23.02 kg/m    Home weight:  General: alert, articulate, and in no acute distress.  HEENT: normocephalic, atraumatic, anicteric sclera, EOMI, mucosa moist, no cyanosis.   Neck: neck supple.  No adenopathy, masses, or carotid bruits.  JVP flat at 90 degrees  Heart: regular rhythm, normal S1/S2, no murmur, gallop, rub.  Precordium quiet with normal PMI.     Lungs: clear, no rales, ronchi, or wheezing.  No accessory muscle use, respirations unlabored.   Abdomen: soft, non-tender, bowel sounds present, no hepatomegaly  Extremities: no pitting edema.   No cyanosis.   Neurological: alert and oriented x 3.  normal speech and affect, no gross motor deficits  Skin:  No ecchymoses, rashes, or clubbing.    Labs:  CBC RESULTS:  Lab Results   Component Value Date    WBC 8.5 08/31/2022    WBC 7.1 09/09/2020    RBC 4.64 08/31/2022    RBC 4.57 09/09/2020    HGB 10.6 (L) 08/31/2022    HGB 13.3 09/09/2020    HCT 36.6 08/31/2022    HCT 41.1 09/09/2020    MCV 79 08/31/2022    MCV 90 09/09/2020    MCH 22.8 (L) 08/31/2022    MCH 29.1 09/09/2020    MCHC 29.0 (L) 08/31/2022    MCHC 32.4 09/09/2020    RDW 16.0 (H) 08/31/2022    RDW 12.5 09/09/2020     08/31/2022     09/09/2020       CMP RESULTS:  Lab Results   Component Value Date     08/31/2022     01/07/2021    POTASSIUM 4.9 08/31/2022    POTASSIUM 4.6 08/16/2022    POTASSIUM 4.8 01/07/2021    CHLORIDE 105 08/31/2022    CHLORIDE 106 08/16/2022    CHLORIDE 106 01/07/2021    CO2 23 08/31/2022    CO2 26 08/16/2022    CO2 28 01/07/2021    ANIONGAP 14 08/31/2022    ANIONGAP 6 08/16/2022    ANIONGAP 7 01/07/2021     (H) 08/31/2022     (H) 08/16/2022     (H) 01/07/2021    BUN 24.6 (H) 08/31/2022    BUN 29 08/16/2022    BUN 19  01/07/2021    CR 0.77 08/31/2022    CR 0.90 01/07/2021    GFRESTIMATED 84 08/31/2022    GFRESTIMATED 66 01/07/2021    GFRESTBLACK 77 01/07/2021    POONAM 9.9 08/31/2022    POONAM 9.5 01/07/2021    BILITOTAL 0.4 07/06/2022    BILITOTAL 0.4 09/09/2020    ALBUMIN 3.7 07/06/2022    ALBUMIN 3.7 09/09/2020    ALKPHOS 107 07/06/2022    ALKPHOS 90 09/09/2020    ALT 24 07/06/2022    ALT 33 09/09/2020    AST 14 07/06/2022    AST 14 09/09/2020        INR RESULTS:  Lab Results   Component Value Date    INR 1.29 (H) 05/25/2022       No components found for: CK  Lab Results   Component Value Date    MAG 2.0 06/04/2022     Lab Results   Component Value Date    NTBNP 2,714 (H) 06/09/2022     @BRIEFLABR (dig)@    Most recent echocardiogram:  No results found for this or any previous visit (from the past 8760 hour(s)).      Assessment and Plan:    In summary,Marianne  is a 67 year old here for a CORE follow up. Increase Lisinopril 30 mg daily ( was on 20 mg daily). We will work on patient assistance program for Entresto . Out of pocket cost is too high for Entresto at the moment. Patient euvolemic today.   1.  Chronic systolic heart failure secondary to ICM.  Stage C  NYHA Class III  ACEi/ARB: stop 30 mg Lisinopril-Entresto start 24/26 BID- once patient assistance goes through  BB: yes- continue up titration- Metoprolol 50 mg   Aldosterone antagonist: no- will initiate at future visit. - higher potassium levels   SCD prophylaxis: does not meet criteria for implant  Fluid status: euvolemic  Anticoagulation:   Antiplatelet:  ASA dose   Sleep apnea:  NSAID use:  Contraindicated.  Avoid use.  Remote Monitoring:none  SGLT 2 - Jardiance 25 mg    2.  Other comordbid conditions:      #T2DM - PCP to manage - glipizide , Jardiance, metformin    #HTN- 114/66- stable -stop Lisinopril- start entresto and Metoprolol      #GERD- followed by PCP     3.  Follow-up   Lisinopril 30 mg daily - increase to start next week  Stop Lisinopril two days earlier ( if  starting Entresto) will try for patient assistance program  Start Entresto 24/26 mg 9/9/22  BMP in 2 weeks after med change  CORE 2-3 weeks         Belinda MOORE, CNP  [facility]

## 2022-09-01 NOTE — PROGRESS NOTES
Marianne Monroy's goals for this visit include:     She requests these members of her care team be copied on today's visit information: PCP    PCP: Roxanna Otoole    Referring Provider:  OSCAR Joseph CNP  [address] [place] [address]    /66 (BP Location: Right arm, Patient Position: Sitting, Cuff Size: Adult Regular)   Pulse 69   Wt 60.8 kg (134 lb)   SpO2 99%   BMI 23.02 kg/m      Do you need any medication refills at today's visit? No.    Keith Guerra, EMT  Clinic Support  [facility]    [phone]    Employed by [facility] [employer]

## 2022-09-01 NOTE — PATIENT INSTRUCTIONS
Take your medicines every day, as directed    Changes made today:  Lisinopril 30 mg daily - start on Sept 6th  Working on Entresto    Monitor Your Weight and Symptoms    Contact us if you:    Gain 2 pounds in one day or 5 pounds in one week  Feel more short of breath  Notice more leg swelling  Feel lightheadeded   Change your lifestyle    Limit Salt or Sodium:  2000 mg  Limit Fluids:  2000 mL or approximately 64 ounces  Eat a Heart Healthy Diet  Low in saturated fats  Stay Active:  Aim to move at least 150 minutes every  week         To Contact us    During Business Hours:  [phone], option # 1      After hours, weekends or holidays:   [phone], Option #4  Ask to speak to the On-Call Cardiologist. Inform them you are a CORE/heart failure patient at the [facility].     Use [employer] (ABAM) allows you to communicate directly with your heart team through secure messaging.  [employer] can be accessed any time on your phone, computer, or tablet.  If you need assistance, we'd be happy to help!         Keep your Heart Appointments:    Call us if top number on blood pressure goes below 90    CORE 2-3 weeks

## 2022-09-02 LAB
CALPROTECTIN STL-MCNT: 59.8 MG/KG (ref 0–49.9)
PATH REPORT.COMMENTS IMP SPEC: NORMAL
PATH REPORT.COMMENTS IMP SPEC: NORMAL
PATH REPORT.FINAL DX SPEC: NORMAL
PATH REPORT.MICROSCOPIC SPEC OTHER STN: NORMAL
PATH REPORT.MICROSCOPIC SPEC OTHER STN: NORMAL
PATH REPORT.RELEVANT HX SPEC: NORMAL

## 2022-09-02 NOTE — RESULT ENCOUNTER NOTE
Jody,    Your anemia after heart surgery is improving. Consider taking an over-the-counter iron supplement such as iron sulfate 325 mg daily. Follow-up in 1 month for a wellness visit.     Mena Dawkins MD

## 2022-09-06 ENCOUNTER — HOSPITAL ENCOUNTER (OUTPATIENT)
Dept: CARDIAC REHAB | Facility: CLINIC | Age: 67
Discharge: HOME | End: 2022-09-06
Attending: SURGERY
Payer: COMMERCIAL

## 2022-09-06 ENCOUNTER — MYC MEDICAL ADVICE (OUTPATIENT)
Dept: CARDIOLOGY | Facility: CLINIC | Age: 67
End: 2022-09-06

## 2022-09-06 PROCEDURE — 93798 PHYS/QHP OP CAR RHAB W/ECG: CPT

## 2022-09-06 NOTE — RESULT ENCOUNTER NOTE
Jody,    Your feces calprotecin test is borderline abnormal. Call or return to clinic as needed if these symptoms worsen or fail to improve as anticipated to consider retesting.     Mena Dawkins MD

## 2022-09-07 ENCOUNTER — ANCILLARY PROCEDURE (OUTPATIENT)
Dept: BONE DENSITY | Facility: CLINIC | Age: 67
End: 2022-09-07
Attending: FAMILY MEDICINE
Payer: COMMERCIAL

## 2022-09-07 DIAGNOSIS — Z78.0 ASYMPTOMATIC POSTMENOPAUSAL STATUS: ICD-10-CM

## 2022-09-07 PROCEDURE — 77080 DXA BONE DENSITY AXIAL: CPT | Performed by: INTERNAL MEDICINE

## 2022-09-07 NOTE — TELEPHONE ENCOUNTER
[employer] message sent.     Cherie Lira, APRN CNP  You 3 hours ago (2:09 PM)     LV    Hello,   OK to do a colonoscopy but prefer to have it 6 weeks after ICD or more.   Thanks,   LVW

## 2022-09-08 ENCOUNTER — HOSPITAL ENCOUNTER (OUTPATIENT)
Dept: CARDIAC REHAB | Facility: CLINIC | Age: 67
Discharge: HOME | End: 2022-09-08
Attending: SURGERY
Payer: COMMERCIAL

## 2022-09-08 PROCEDURE — 93798 PHYS/QHP OP CAR RHAB W/ECG: CPT

## 2022-09-13 PROBLEM — M85.80 OSTEOPENIA: Status: ACTIVE | Noted: 2022-09-13

## 2022-09-13 NOTE — RESULT ENCOUNTER NOTE
Your bone mass is mildly low, called osteopenia. We can continue to follow this by repeating the DEXA test in 3 to 5 years.  Get several servings of dairy daily (or calcium 1000 - 1200 mg daily split into 2 doses), take vitamin D 1000 units daily over-the-counter, exercise regularly, and avoid falls.      Mena Dawkins MD

## 2022-09-13 NOTE — TELEPHONE ENCOUNTER
Called [facility] again to offer appt with another provider earlier this week if wanted. Left message with information and asked to call back.       Tristen Mcdaniels RN  Infectious Disease 12:00 PM 09/13/22

## 2022-09-14 ENCOUNTER — VIRTUAL VISIT (OUTPATIENT)
Dept: PSYCHOLOGY | Facility: CLINIC | Age: 67
End: 2022-09-14
Payer: COMMERCIAL

## 2022-09-14 ENCOUNTER — HOSPITAL ENCOUNTER (OUTPATIENT)
Dept: CARDIAC REHAB | Facility: CLINIC | Age: 67
Discharge: HOME | End: 2022-09-14
Attending: SURGERY
Payer: COMMERCIAL

## 2022-09-14 DIAGNOSIS — F41.1 GENERALIZED ANXIETY DISORDER: Primary | ICD-10-CM

## 2022-09-14 PROCEDURE — 90832 PSYTX W PT 30 MINUTES: CPT | Mod: 95

## 2022-09-14 PROCEDURE — 93798 PHYS/QHP OP CAR RHAB W/ECG: CPT

## 2022-09-14 PROCEDURE — 93797 PHYS/QHP OP CAR RHAB WO ECG: CPT | Mod: XU

## 2022-09-14 ASSESSMENT — PATIENT HEALTH QUESTIONNAIRE - PHQ9
SUM OF ALL RESPONSES TO PHQ QUESTIONS 1-9: 5
10. IF YOU CHECKED OFF ANY PROBLEMS, HOW DIFFICULT HAVE THESE PROBLEMS MADE IT FOR YOU TO DO YOUR WORK, TAKE CARE OF THINGS AT HOME, OR GET ALONG WITH OTHER PEOPLE: SOMEWHAT DIFFICULT
SUM OF ALL RESPONSES TO PHQ QUESTIONS 1-9: 5

## 2022-09-14 ASSESSMENT — COLUMBIA-SUICIDE SEVERITY RATING SCALE - C-SSRS
TOTAL  NUMBER OF ABORTED OR SELF INTERRUPTED ATTEMPTS LIFETIME: NO
TOTAL  NUMBER OF INTERRUPTED ATTEMPTS LIFETIME: NO
2. HAVE YOU ACTUALLY HAD ANY THOUGHTS OF KILLING YOURSELF?: NO
ATTEMPT LIFETIME: NO
1. HAVE YOU WISHED YOU WERE DEAD OR WISHED YOU COULD GO TO SLEEP AND NOT WAKE UP?: NO
6. HAVE YOU EVER DONE ANYTHING, STARTED TO DO ANYTHING, OR PREPARED TO DO ANYTHING TO END YOUR LIFE?: NO

## 2022-09-14 NOTE — TELEPHONE ENCOUNTER
Called and left message for patient to call to get earlier appt.       Tristen Mcdaniels RN  Infectious Disease 10:19 AM 09/14/22

## 2022-09-14 NOTE — PROGRESS NOTES
[facility]   Mental Health & Addiction Services     Progress Note - Initial Visit    Patient  Name:  Marianne Monroy Date: 22           Service Type: Individual     Visit Start Time: 9:55  Visit End Time: 10:25    Visit #: 1    Attendees: Client attended alone    Service Modality:  Video Visit:      Provider verified identity through the following two step process.  Patient provided:  Patient photo, Patient  and Patient address    Telemedicine Visit: The patient's condition can be safely assessed and treated via synchronous audio and visual telemedicine encounter.      Reason for Telemedicine Visit: Services only offered telehealth    Originating Site (Patient Location): Patient's home    Distant Site (Provider Location): Provider Remote Setting- Home Office    Consent:  The patient/guardian has verbally consented to: the potential risks and benefits of telemedicine (video visit) versus in person care; bill my insurance or make self-payment for services provided; and responsibility for payment of non-covered services.     Patient would like the video invitation sent by:  My Chart    Mode of Communication:  Video Conference via Amwell    As the provider I attest to compliance with applicable laws and regulations related to telemedicine.       DATA:   Interactive Complexity: No   Crisis: No     Presenting Concerns/  Current Stressors:   Today, patient and therapist started the diagnostic assessment, but were unable to complete due to time constraints.  Therapist assessed for risk and safety - no concerns at this time.  Patient and therapist will continue with the DA during next session. Patient had to leave 30 minutes into the session due to work.       ASSESSMENT:  Mental Status Assessment:  Appearance:   Appropriate   Eye Contact:   Good   Psychomotor Behavior: Normal   Attitude:   Cooperative   Orientation:   All  Speech   Rate / Production: Normal/ Responsive   Volume:  Normal    Mood:    Normal  Affect:    Appropriate   Thought Content:  Clear   Thought Form:  Coherent   Insight:    Good       Safety Issues and Plan for Safety and Risk Management:  [place] Suicide Severity Rating Scale (Lifetime/Recent)  [place] Suicide Severity Rating (Lifetime/Recent) 9/14/2022   1. Wish to be Dead (Lifetime) 0   2. Non-Specific Active Suicidal Thoughts (Lifetime) 0   Actual Attempt (Lifetime) 0   Has subject engaged in non-suicidal self-injurious behavior? (Lifetime) 0   Interrupted Attempts (Lifetime) 0   Aborted or Self-Interrupted Attempt (Lifetime) 0   Preparatory Acts or Behavior (Lifetime) 0   Calculated C-SSRS Risk Score (Lifetime/Recent) No Risk Indicated     Patient denies current fears or concerns for personal safety.  Patient denies current or recent suicidal ideation or behaviors.  Patient denies current or recent homicidal ideation or behaviors.  Patient denies current or recent self injurious behavior or ideation.  Patient denies other safety concerns.  Recommended that patient call 911 or go to the local ED should there be a change in any of these risk factors.  Patient reports there are no firearms in the house.     Diagnostic Criteria:  Generalized Anxiety Disorder  A. Excessive anxiety and worry about a number of events or activities (such as work or school performance).   B. The person finds it difficult to control the worry.   - Restlessness or feeling keyed up or on edge.    - Being easily fatigued.    - Sleep disturbance (difficulty falling or staying asleep, or restless unsatisfying sleep).   F. The disturbance is not due to the direct physiological effects of a substance (e.g., a drug of abuse, a medication) or a general medical condition (e.g., hyperthyroidism) and does not occur exclusively during a Mood Disorder, a Psychotic Disorder, or a Pervasive Developmental Disorder.    - The aformentioned symptoms began 5/25/2022 , ago and occurs 3 days per week and is experienced as  moderate.      DSM5 Diagnoses: (Sustained by DSM5 Criteria Listed Above)  Diagnoses: 300.02 (F41.1) Generalized Anxiety Disorder  Psychosocial & Contextual Factors: Patient reports experienced anxiety in relation to experienced heart failure and an upcoming placement of an ICD machine.   WHODAS 2.0 (12 item):   WHODAS 2.0 Total Score 9/14/2022   Total Score 19   Total Score MyChart 19     Intervention:   Diagnostic Intervention- An intervention has not yet been introduced to the client.   Collateral Reports Completed:  Not Applicable      PLAN: (Homework, other):  1. Provider will continue Diagnostic Assessment.  Patient was given the following to do until next session:  Prepare questions for Cardiology team in regards to questions she has about her ICD machine.     2. Provider recommended the following referrals: N/A. .      3.  Suicide Risk and Safety Concerns were assessed for Marianne Monroy.    Patient meets the following risk assessment and triage: Patient denied any current/recent/lifetime history of suicidal ideation and/or behaviors.  No safety plan indicated at this time.       WILLIAM CROW  September 14, 2022    This note has been reviewed and I agree with the plan of care. This note is co-signed by VANDA Rob, LICSW, Supervisor, on: September 15, 2022

## 2022-09-15 ENCOUNTER — HOSPITAL ENCOUNTER (OUTPATIENT)
Dept: CARDIAC REHAB | Facility: CLINIC | Age: 67
Discharge: HOME | End: 2022-09-15
Attending: SURGERY
Payer: COMMERCIAL

## 2022-09-15 DIAGNOSIS — I50.21 ACUTE SYSTOLIC HEART FAILURE (H): Primary | ICD-10-CM

## 2022-09-15 PROCEDURE — 93798 PHYS/QHP OP CAR RHAB W/ECG: CPT

## 2022-09-19 NOTE — PROGRESS NOTES
GENERAL ID CLINIC           Assessment and Recommendations:   Problem List:    # C diff    Discussion:    Marianne Monroy is a 67 year-old female with PMHx of coronary artery disease (s/p CABG), ischemic cardiomyopathy, DM2 and hypertension  who comes in to the Infectious Diseases clinic referred by Dr. Otoole given positive C difficile testing.     Patient had a first positive C diff test back on 5/24/12. At that time she had diarrhea. She was treated with flagyl with improvement. More recently, a few weeks after hospitalization for cardiac bypass surgery, she developed watery diarrhea with multiple episodes a day. She states she could not count because she would have one episode every 10 min. She denies blood in the stools. She had abdominal cramps. She denies fever. C diff from 7/6/22 was positive. She received 14 days treatment with oral vancomycin with just partial improvement of symptoms. She states that the episodes went from every 10 min to every 1,5 hours. She was seen by GI (Dr. Ajith Merida) who ordered a new C diff test on 8/24/22. It was positive and Dr. Merida started oral vancomycin followed by taper. She is currently on the taper (twice a day) and she has 2-3 more weeks. She is nor having 2 episodes a day of diarrhea. Dr. Merida is considering a colonoscopy and fecal microbiotia transplant if the symptoms recur after the current taper.       Recommendations:    1. I agree with continuation of taper as recommended by Dr. Merida    2. I agree with Dr. Merida's plan to perform colonoscopy and FMT if recurrence    3. I will reach out to Dr. Merida and make myself available if any question. I will also ask if there is any benefit in planning for fidaxomicin and bezlotoxumab if symptoms recur while arrangements for FMT are made    4. Follow up in clinic if needed         Recommendations discussed with the patient, her daughter and her son      Evette Cameron MD  Date of Service: 09/21/22    On  9/21/22, I spent 80 min with chart review, patient visit documentation and coordination of care.       ADDENDUM (11/4/22):    Dr. Merida (GI provider) will plan to schedule a visit with him to discuss a plan in case of  recurrence -> FMT vs possibly bezlotoxumab if needed.          History of Present Illness:   Marianne Monroy is a 67 year-old female with PMHx of coronary artery disease (s/p CABG), ischemic cardiomyopathy, DM2 and hypertension  who comes in to the Infectious Diseases clinic referred by Dr. Otoole given positive C difficile testing.     Patient had a first positive C diff test back on 5/24/12. At that time she had diarrhea. She was treated with flagyl with improvement. More recently, a few weeks after hospitalization for cardiac bypass surgery, she developed watery diarrhea with multiple episodes a day. She states she could not count because she would have one episode every 10 min. She denies blood in the stools. She had abdominal cramps. She denies fever. C diff from 7/6/22 was positive. She received 14 days treatment with oral vancomycin with just partial improvement of symptoms. She states that the episodes went from every 10 min to every 1,5 hours. She was seen by GI (Dr. Ajith Merida) who ordered a new C diff test on 8/24/22. It was positive and Dr. Merida started oral vancomycin followed by taper. She is currently on the taper (twice a day) and she has 2-3 more weeks. She is nor having 2 episodes a day of diarrhea. Dr. Merida is considering a colonoscopy and fecal microbiotia transplant if the symptoms recur after the current taper.     As mentioned above she is still on the paper and the diarrhea improved to 2 episodes a day. She denies fever or shills. She denies abdominal pain. She denies blood in the stools.    She is also followed by cardiology and, given low EF, she is scheduled to have a CDI placed on October 4. She currently has a vest.              Review of Systems:      CONSTITUTIONAL:  No fevers or chills  INTEGUMENTARY/SKIN: NEGATIVE for worrisome rashes, moles or lesions  EYES: Negative for icterus, vision changes or irritation  ENT/MOUTH:  Negative for oral lesions and sore throat  RESPIRATORY:  Negative for cough and dyspnea  CARDIOVASCULAR:  Negative for chest pain, palpitations and  shortness of breath  GASTROINTESTINAL:  See HPI  GENITOURINARY:  Negative for dysuria, hematuria, frequency and urgency  MUSCULOSKELETAL: Negative for joint pain, swelling, motion restriction, negative for musculoskeletal pain  NEURO:  Negative for headache, altered mental status, numbness or weakness  PSYCHIATRIC: Negative for changes in mood or affect  HEMATOLOGIC/LYMPHATIC: negative for lymphadenopathy or bleeding  ALLERGIC/IMMUNOLOGIC: Negative for allergic reaction   ENDOCRINE: Negative for temperature intolerance, skin/hair changes         Past Medical History:     Past Medical History:   Diagnosis Date     Abnormal Pap smear of cervix ~    repeat pap was normal     Allergic rhinitis, cause unspecified      Anxiety state, unspecified     panic episodes     HFrEF (heart failure with reduced ejection fraction) (H)      Osteopenia      Unspecified essential hypertension             Allergies:         Allergies   Allergen Reactions     Effexor [Venlafaxine Hydrochloride]      Tachycardia, makes her extremely jittery--cant sit down, has to keep moving constantly             Family History:     Family History   Problem Relation Age of Onset     Diabetes Mother         hypertension, alive at 83     C.A.D. Mother      Cancer Father         lung cancer, smoker.   at age 53     Family History Negative Sister      Osteoporosis Sister              Social History:     Social History     Socioeconomic History     Marital status:      Spouse name: Not on file     Number of children: Not on file     Years of education: Not on file     Highest education level: Not on file   Occupational  History     Not on file   Tobacco Use     Smoking status: Never Smoker     Smokeless tobacco: Never Used   Vaping Use     Vaping Use: Never used   Substance and Sexual Activity     Alcohol use: Yes     Comment: social     Drug use: No     Sexual activity: Not Currently     Partners: Male   Other Topics Concern     Parent/sibling w/ CABG, MI or angioplasty before 65F 55M? No   Social History Narrative     Not on file     Social Determinants of Health     Financial Resource Strain: Not on file   Food Insecurity: Not on file   Transportation Needs: Not on file   Physical Activity: Not on file   Stress: Not on file   Social Connections: Not on file   Intimate Partner Violence: Not on file   Housing Stability: Not on file               Physical Exam:   /75   Pulse 64   Temp 98.7  F (37.1  C)   Wt 59.7 kg (131 lb 11.2 oz)   SpO2 98%   BMI 22.62 kg/m      Exam:  GENERAL:  Well-developed, well-nourished, not in acute distress.   HEAD: Normocephalic and atraumatic  ENT:  No hearing impairment,  oral mucous membranes moist  EYES:  Eyes grossly normal to inspection, PERRL and conjunctivae and sclerae normal   LUNGS:  Clear to auscultation - no rales, rhonchi or wheezes  CARDIOVASCULAR:  Regular rate and rhythm, normal S1 S2, no murmur. Has a vest.  ABDOMEN:  Soft, nontender  EXT: Extremities warm and without edema.  MS: No gross musculoskeletal defects noted, no edema  SKIN:  No acute rashes or suspicious lesions  NEUROLOGIC:  Grossly nonfocal. Normal strength and tone, mentation intact and speech normal  PSYCHIATRIC: Mood stable, mentation appears normal, affect normal

## 2022-09-20 ENCOUNTER — HOSPITAL ENCOUNTER (OUTPATIENT)
Dept: CARDIAC REHAB | Facility: CLINIC | Age: 67
Discharge: HOME | End: 2022-09-20
Attending: SURGERY
Payer: COMMERCIAL

## 2022-09-20 PROCEDURE — 93798 PHYS/QHP OP CAR RHAB W/ECG: CPT

## 2022-09-21 ENCOUNTER — LAB (OUTPATIENT)
Dept: LAB | Facility: CLINIC | Age: 67
End: 2022-09-21
Payer: COMMERCIAL

## 2022-09-21 ENCOUNTER — OFFICE VISIT (OUTPATIENT)
Dept: INFECTIOUS DISEASES | Facility: CLINIC | Age: 67
End: 2022-09-21
Attending: FAMILY MEDICINE
Payer: COMMERCIAL

## 2022-09-21 ENCOUNTER — PRE VISIT (OUTPATIENT)
Dept: INFECTIOUS DISEASES | Facility: CLINIC | Age: 67
End: 2022-09-21

## 2022-09-21 VITALS
SYSTOLIC BLOOD PRESSURE: 119 MMHG | WEIGHT: 131.7 LBS | DIASTOLIC BLOOD PRESSURE: 75 MMHG | BODY MASS INDEX: 22.62 KG/M2 | TEMPERATURE: 98.7 F | OXYGEN SATURATION: 98 % | HEART RATE: 64 BPM

## 2022-09-21 DIAGNOSIS — A04.72 C. DIFFICILE DIARRHEA: ICD-10-CM

## 2022-09-21 DIAGNOSIS — I50.21 ACUTE SYSTOLIC HEART FAILURE (H): ICD-10-CM

## 2022-09-21 LAB
ANION GAP SERPL CALCULATED.3IONS-SCNC: 10 MMOL/L (ref 7–15)
BUN SERPL-MCNC: 19.7 MG/DL (ref 8–23)
CALCIUM SERPL-MCNC: 9.6 MG/DL (ref 8.8–10.2)
CHLORIDE SERPL-SCNC: 107 MMOL/L (ref 98–107)
CREAT SERPL-MCNC: 0.82 MG/DL (ref 0.51–0.95)
DEPRECATED HCO3 PLAS-SCNC: 24 MMOL/L (ref 22–29)
GFR SERPL CREATININE-BSD FRML MDRD: 77 ML/MIN/1.73M2
GLUCOSE SERPL-MCNC: 207 MG/DL (ref 70–99)
POTASSIUM SERPL-SCNC: 5.2 MMOL/L (ref 3.4–5.3)
SODIUM SERPL-SCNC: 141 MMOL/L (ref 136–145)

## 2022-09-21 PROCEDURE — 36415 COLL VENOUS BLD VENIPUNCTURE: CPT | Performed by: PATHOLOGY

## 2022-09-21 PROCEDURE — 99205 OFFICE O/P NEW HI 60 MIN: CPT | Performed by: INTERNAL MEDICINE

## 2022-09-21 PROCEDURE — 80048 BASIC METABOLIC PNL TOTAL CA: CPT | Performed by: PATHOLOGY

## 2022-09-21 PROCEDURE — G0463 HOSPITAL OUTPT CLINIC VISIT: HCPCS

## 2022-09-21 ASSESSMENT — PAIN SCALES - GENERAL: PAINLEVEL: NO PAIN (0)

## 2022-09-21 NOTE — LETTER
9/21/2022       RE: Marianne Monroy  [address]  [place] [address]     Dear Colleague,    Thank you for referring your patient, Marianne Monroy, to the [facility] [place] [facility]. Please see a copy of my visit note below.       GENERAL ID CLINIC           Assessment and Recommendations:   Problem List:    # C diff    Discussion:    Marianne Monroy is a 67 year-old female with PMHx of coronary artery disease (s/p CABG), ischemic cardiomyopathy, DM2 and hypertension  who comes in to the Infectious Diseases clinic referred by Dr. Otoole given positive C difficile testing.     Patient had a first positive C diff test back on 5/24/12. At that time she had diarrhea. She was treated with flagyl with improvement. More recently, a few weeks after hospitalization for cardiac bypass surgery, she developed watery diarrhea with multiple episodes a day. She states she could not count because she would have one episode every 10 min. She denies blood in the stools. She had abdominal cramps. She denies fever. C diff from 7/6/22 was positive. She received 14 days treatment with oral vancomycin with just partial improvement of symptoms. She states that the episodes went from every 10 min to every 1,5 hours. She was seen by GI (Dr. Ajith Merida) who ordered a new C diff test on 8/24/22. It was positive and Dr. Merida started oral vancomycin followed by taper. She is currently on the taper (twice a day) and she has 2-3 more weeks. She is nor having 2 episodes a day of diarrhea. Dr. Merida is considering a colonoscopy and fecal microbiotia transplant if the symptoms recur after the current taper.       Recommendations:    1. I agree with continuation of taper as recommended by Dr. Merida    2. I agree with Dr. Merida's plan to perform colonoscopy and FMT if recurrence    3. I will reach out to Dr. Merida and make myself available if  any question. I will also ask if there is any benefit in planning for fidaxomicin and bezlotoxumab if symptoms recur while arrangements for FMT are made    4. Follow up in clinic if needed         Recommendations discussed with the patient, her daughter and her son      Evette Cameron MD  Date of Service: 09/21/22    On 9/21/22, I spent 80 min with chart review, patient visit documentation and coordination of care.          History of Present Illness:   Marianne Monroy is a 67 year-old female with PMHx of coronary artery disease (s/p CABG), ischemic cardiomyopathy, DM2 and hypertension  who comes in to the Infectious Diseases clinic referred by Dr. Otoole given positive C difficile testing.     Patient had a first positive C diff test back on 5/24/12. At that time she had diarrhea. She was treated with flagyl with improvement. More recently, a few weeks after hospitalization for cardiac bypass surgery, she developed watery diarrhea with multiple episodes a day. She states she could not count because she would have one episode every 10 min. She denies blood in the stools. She had abdominal cramps. She denies fever. C diff from 7/6/22 was positive. She received 14 days treatment with oral vancomycin with just partial improvement of symptoms. She states that the episodes went from every 10 min to every 1,5 hours. She was seen by GI (Dr. Ajith Merida) who ordered a new C diff test on 8/24/22. It was positive and Dr. Merida started oral vancomycin followed by taper. She is currently on the taper (twice a day) and she has 2-3 more weeks. She is nor having 2 episodes a day of diarrhea. Dr. Merida is considering a colonoscopy and fecal microbiotia transplant if the symptoms recur after the current taper.     As mentioned above she is still on the paper and the diarrhea improved to 2 episodes a day. She denies fever or shills. She denies abdominal pain. She denies blood in the stools.    She is also followed by  cardiology and, given low EF, she is scheduled to have a CDI placed on . She currently has a vest.              Review of Systems:     CONSTITUTIONAL:  No fevers or chills  INTEGUMENTARY/SKIN: NEGATIVE for worrisome rashes, moles or lesions  EYES: Negative for icterus, vision changes or irritation  ENT/MOUTH:  Negative for oral lesions and sore throat  RESPIRATORY:  Negative for cough and dyspnea  CARDIOVASCULAR:  Negative for chest pain, palpitations and  shortness of breath  GASTROINTESTINAL:  See HPI  GENITOURINARY:  Negative for dysuria, hematuria, frequency and urgency  MUSCULOSKELETAL: Negative for joint pain, swelling, motion restriction, negative for musculoskeletal pain  NEURO:  Negative for headache, altered mental status, numbness or weakness  PSYCHIATRIC: Negative for changes in mood or affect  HEMATOLOGIC/LYMPHATIC: negative for lymphadenopathy or bleeding  ALLERGIC/IMMUNOLOGIC: Negative for allergic reaction   ENDOCRINE: Negative for temperature intolerance, skin/hair changes         Past Medical History:     Past Medical History:   Diagnosis Date     Abnormal Pap smear of cervix ~    repeat pap was normal     Allergic rhinitis, cause unspecified      Anxiety state, unspecified     panic episodes     HFrEF (heart failure with reduced ejection fraction) (H)      Osteopenia      Unspecified essential hypertension             Allergies:         Allergies   Allergen Reactions     Effexor [Venlafaxine Hydrochloride]      Tachycardia, makes her extremely jittery--cant sit down, has to keep moving constantly             Family History:     Family History   Problem Relation Age of Onset     Diabetes Mother         hypertension, alive at 83     C.A.D. Mother      Cancer Father         lung cancer, smoker.   at age 53     Family History Negative Sister      Osteoporosis Sister              Social History:     Social History     Socioeconomic History     Marital status:      Spouse name:  Not on file     Number of children: Not on file     Years of education: Not on file     Highest education level: Not on file   Occupational History     Not on file   Tobacco Use     Smoking status: Never Smoker     Smokeless tobacco: Never Used   Vaping Use     Vaping Use: Never used   Substance and Sexual Activity     Alcohol use: Yes     Comment: social     Drug use: No     Sexual activity: Not Currently     Partners: Male   Other Topics Concern     Parent/sibling w/ CABG, MI or angioplasty before 65F 55M? No   Social History Narrative     Not on file     Social Determinants of Health     Financial Resource Strain: Not on file   Food Insecurity: Not on file   Transportation Needs: Not on file   Physical Activity: Not on file   Stress: Not on file   Social Connections: Not on file   Intimate Partner Violence: Not on file   Housing Stability: Not on file               Physical Exam:   /75   Pulse 64   Temp 98.7  F (37.1  C)   Wt 59.7 kg (131 lb 11.2 oz)   SpO2 98%   BMI 22.62 kg/m      Exam:  GENERAL:  Well-developed, well-nourished, not in acute distress.   HEAD: Normocephalic and atraumatic  ENT:  No hearing impairment,  oral mucous membranes moist  EYES:  Eyes grossly normal to inspection, PERRL and conjunctivae and sclerae normal   LUNGS:  Clear to auscultation - no rales, rhonchi or wheezes  CARDIOVASCULAR:  Regular rate and rhythm, normal S1 S2, no murmur. Has a vest.  ABDOMEN:  Soft, nontender  EXT: Extremities warm and without edema.  MS: No gross musculoskeletal defects noted, no edema  SKIN:  No acute rashes or suspicious lesions  NEUROLOGIC:  Grossly nonfocal. Normal strength and tone, mentation intact and speech normal  PSYCHIATRIC: Mood stable, mentation appears normal, affect normal      Evette Cameron MD

## 2022-09-21 NOTE — NURSING NOTE
Chief Complaint   Patient presents with     Consult     New pt. Consult.       Blood pressure 119/75, pulse 64, temperature 98.7  F (37.1  C), weight 59.7 kg (131 lb 11.2 oz), SpO2 98 %, not currently breastfeeding.    DEMETRIA CAST

## 2022-09-22 ENCOUNTER — TELEPHONE (OUTPATIENT)
Dept: CARDIOLOGY | Facility: CLINIC | Age: 67
End: 2022-09-22

## 2022-09-22 ENCOUNTER — HOSPITAL ENCOUNTER (OUTPATIENT)
Dept: CARDIAC REHAB | Facility: CLINIC | Age: 67
Discharge: HOME | End: 2022-09-22
Attending: SURGERY
Payer: COMMERCIAL

## 2022-09-22 ENCOUNTER — OFFICE VISIT (OUTPATIENT)
Dept: CARDIOLOGY | Facility: CLINIC | Age: 67
End: 2022-09-22
Payer: COMMERCIAL

## 2022-09-22 VITALS
OXYGEN SATURATION: 99 % | BODY MASS INDEX: 23.09 KG/M2 | HEART RATE: 69 BPM | DIASTOLIC BLOOD PRESSURE: 62 MMHG | WEIGHT: 134.4 LBS | SYSTOLIC BLOOD PRESSURE: 101 MMHG

## 2022-09-22 DIAGNOSIS — I50.20 HFREF (HEART FAILURE WITH REDUCED EJECTION FRACTION) (H): Primary | ICD-10-CM

## 2022-09-22 DIAGNOSIS — I25.5 ISCHEMIC CARDIOMYOPATHY: ICD-10-CM

## 2022-09-22 DIAGNOSIS — I10 HYPERTENSION, UNSPECIFIED TYPE: ICD-10-CM

## 2022-09-22 DIAGNOSIS — F41.1 GENERALIZED ANXIETY DISORDER: ICD-10-CM

## 2022-09-22 DIAGNOSIS — I50.21 ACUTE SYSTOLIC HEART FAILURE (H): ICD-10-CM

## 2022-09-22 DIAGNOSIS — E11.59 TYPE 2 DIABETES MELLITUS WITH OTHER CIRCULATORY COMPLICATION, WITHOUT LONG-TERM CURRENT USE OF INSULIN (H): ICD-10-CM

## 2022-09-22 PROCEDURE — 99214 OFFICE O/P EST MOD 30 MIN: CPT | Performed by: NURSE PRACTITIONER

## 2022-09-22 PROCEDURE — 93798 PHYS/QHP OP CAR RHAB W/ECG: CPT

## 2022-09-22 RX ORDER — SACUBITRIL AND VALSARTAN 24; 26 MG/1; MG/1
1 TABLET, FILM COATED ORAL 2 TIMES DAILY
Qty: 180 TABLET | Refills: 3 | Status: SHIPPED | OUTPATIENT
Start: 2022-09-22 | End: 2022-09-22

## 2022-09-22 RX ORDER — SACUBITRIL AND VALSARTAN 24; 26 MG/1; MG/1
1 TABLET, FILM COATED ORAL 2 TIMES DAILY
Qty: 180 TABLET | Refills: 3 | Status: SHIPPED | OUTPATIENT
Start: 2022-09-22 | End: 2022-09-26

## 2022-09-22 NOTE — TELEPHONE ENCOUNTER
[facility] Call Center    Phone Message    May a detailed message be left on voicemail: yes     Reason for Call: Other:     Naveen is requesting a call back in regards to the life vest for Jody.    Action Taken: Other: cardio    Travel Screening: Not Applicable

## 2022-09-22 NOTE — LETTER
9/22/2022      RE: Marianne Monroy  [address]  [facility] [address]       Dear Colleague,    Thank you for the opportunity to participate in the care of your patient, Marianne Monroy, at the [facility]. Please see a copy of my visit note below.        HPI: Marianne is a 67 year old White female with a past medical history of T2DM, HTN, GERD, JUSTUS, MDD and recent episode of lightheadedness and dizziness on 4/30    Admission 5/18-6/4 - 5/24/2022.  Surgeon: Dr. Ulises Desai  1.  Coronary artery bypass grafting x 4 (left internal mammary artery to left anterior descending artery, saphenous vein graft to distal right coronary artery, saphenous vein graft to ramus intermedius artery, saphenous vein graft to obtuse marginal artery).  2.  Endoscopic vein harvest.    Patient has no SOB at rest.  She is now working up her strength. No swelling in the legs today - does notice some swelling at times. . She had no swelling before the hospitalization in the legs.  She has some lightheadedness with fast movements.  Weight at home 131 lbs. She is working on eating more. She has some JEFFERSON with the hot weather. Rehab has been going well. ICD placement to be scheduled for later this month. She isn't sure about starting the Entresto.    Denies SOB,PND, orthopnea, edema, weight gain, chest pain, palpitations, lightheadedness, dizziness, near syncopal/syncopal episodes. Marianne has been following salt and fluid restrictions.      PAST MEDICAL HISTORY:  Past Medical History:   Diagnosis Date     Abnormal Pap smear of cervix ~2000    repeat pap was normal     Allergic rhinitis, cause unspecified      Anxiety state, unspecified     panic episodes     HFrEF (heart failure with reduced ejection fraction) (H)      Osteopenia      Unspecified essential hypertension        FAMILY HISTORY:  Family History   Problem Relation Age of Onset     Diabetes  Mother         hypertension, alive at 83     C.A.D. Mother      Cancer Father         lung cancer, smoker.   at age 53     Family History Negative Sister      Osteoporosis Sister        SOCIAL HISTORY:  Social History     Tobacco Use     Smoking status: Never Smoker     Smokeless tobacco: Never Used   Vaping Use     Vaping Use: Never used   Substance Use Topics     Alcohol use: Yes     Comment: social     Drug use: No           CURRENT MEDICATIONS:  Current Outpatient Medications   Medication Sig Dispense Refill     acetaminophen (TYLENOL) 325 MG tablet Take 2 tablets (650 mg) by mouth every 4 hours as needed for other (For optimal non-opioid multimodal pain management to improve pain control.) 100 tablet 0     aspirin (ASA) 81 MG chewable tablet 2 tablets (162 mg) by Oral or NG Tube route daily 120 tablet 0     atorvastatin (LIPITOR) 80 MG tablet TAKE 1 TABLET DAILY (NEED ANNUAL EXAM) 90 tablet 3     blood glucose (NO BRAND SPECIFIED) test strip Use to test blood sugar 2 times daily or as directed. 200 strip 1     blood glucose (ONETOUCH VERIO IQ) test strip Use to test blood sugar 2 times daily or as directed. 200 strip 1     blood glucose monitoring (ONETOUCH VERIO) meter device kit Use to test blood sugar 2 times daily or as directed. 1 kit 0     empagliflozin (JARDIANCE) 25 MG TABS tablet Take 1 tablet (25 mg) by mouth daily 90 tablet 1     FLUoxetine (PROZAC) 20 MG capsule TAKE 2 CAPSULES DAILY 180 capsule 1     glipiZIDE (GLUCOTROL XL) 5 MG 24 hr tablet TAKE 2 TABLETS DAILY (NEED ANNUAL EXAM) 180 tablet 1     metFORMIN (GLUCOPHAGE XR) 500 MG 24 hr tablet Take 3 tablets (1,500 mg) by mouth daily (with dinner) Please see changed dose 270 tablet 1     metoprolol succinate ER (TOPROL XL) 50 MG 24 hr tablet Take 1 tablet (50 mg) by mouth daily 90 tablet 3     MULTIPLE VITAMIN OR TABS 1 TABLET DAILY       Multiple Vitamins-Minerals (PRESERVISION AREDS PO) Take 2 tablets by mouth daily       vancomycin  (VANCOCIN) 125 MG capsule 1 tablet 4 times daily x 15 days and then Twice daily x 15  days and then ones daily x 15 days 150 capsule 0       ROS:  Review Of Systems  Skin: negative  Eyes: negative  Ears/Nose/Throat: negative  Respiratory: No shortness of breath, dyspnea on exertion, cough, or hemoptysis  Cardiovascular: negative  Gastrointestinal: negative  Genitourinary: negative  Musculoskeletal: negative  Neurologic: negative  Psychiatric: negative  Hematologic/Lymphatic/Immunologic: negative  Endocrine: negative      EXAM:  /62 (BP Location: Left arm, Patient Position: Sitting, Cuff Size: Adult Regular)   Pulse 69   Wt 61 kg (134 lb 6.4 oz)   SpO2 99%   BMI 23.09 kg/m    Home weight:  General: alert, articulate, and in no acute distress.  HEENT: normocephalic, atraumatic, anicteric sclera, EOMI, mucosa moist, no cyanosis.   Neck: neck supple.  No adenopathy, masses, or carotid bruits.  JVP flat at 90 degrees  Heart: regular rhythm, normal S1/S2, no murmur, gallop, rub.  Precordium quiet with normal PMI.     Lungs: clear, no rales, ronchi, or wheezing.  No accessory muscle use, respirations unlabored.   Abdomen: soft, non-tender, bowel sounds present, no hepatomegaly  Extremities: no pitting edema.   No cyanosis.   Neurological: alert and oriented x 3.  normal speech and affect, no gross motor deficits  Skin:  No ecchymoses, rashes, or clubbing.    Labs:  CBC RESULTS:  Lab Results   Component Value Date    WBC 8.5 08/31/2022    WBC 7.1 09/09/2020    RBC 4.64 08/31/2022    RBC 4.57 09/09/2020    HGB 10.6 (L) 08/31/2022    HGB 13.3 09/09/2020    HCT 36.6 08/31/2022    HCT 41.1 09/09/2020    MCV 79 08/31/2022    MCV 90 09/09/2020    MCH 22.8 (L) 08/31/2022    MCH 29.1 09/09/2020    MCHC 29.0 (L) 08/31/2022    MCHC 32.4 09/09/2020    RDW 16.0 (H) 08/31/2022    RDW 12.5 09/09/2020     08/31/2022     09/09/2020       CMP RESULTS:  Lab Results   Component Value Date     09/21/2022      01/07/2021    POTASSIUM 5.2 09/21/2022    POTASSIUM 4.6 08/16/2022    POTASSIUM 4.8 01/07/2021    CHLORIDE 107 09/21/2022    CHLORIDE 106 08/16/2022    CHLORIDE 106 01/07/2021    CO2 24 09/21/2022    CO2 26 08/16/2022    CO2 28 01/07/2021    ANIONGAP 10 09/21/2022    ANIONGAP 6 08/16/2022    ANIONGAP 7 01/07/2021     (H) 09/21/2022     (H) 08/16/2022     (H) 01/07/2021    BUN 19.7 09/21/2022    BUN 29 08/16/2022    BUN 19 01/07/2021    CR 0.82 09/21/2022    CR 0.90 01/07/2021    GFRESTIMATED 77 09/21/2022    GFRESTIMATED 66 01/07/2021    GFRESTBLACK 77 01/07/2021    POONAM 9.6 09/21/2022    POONAM 9.5 01/07/2021    BILITOTAL 0.4 07/06/2022    BILITOTAL 0.4 09/09/2020    ALBUMIN 3.7 07/06/2022    ALBUMIN 3.7 09/09/2020    ALKPHOS 107 07/06/2022    ALKPHOS 90 09/09/2020    ALT 24 07/06/2022    ALT 33 09/09/2020    AST 14 07/06/2022    AST 14 09/09/2020        INR RESULTS:  Lab Results   Component Value Date    INR 1.29 (H) 05/25/2022       No components found for: CK  Lab Results   Component Value Date    MAG 2.0 06/04/2022     Lab Results   Component Value Date    NTBNP 2,714 (H) 06/09/2022     @BRIEFLABR (dig)@    Most recent echocardiogram:  No results found for this or any previous visit (from the past 8760 hour(s)).      Assessment and Plan:    In summary,Marianne  is a 67 year old here for a CORE follow up. Increase Lisinopril 30 mg daily ( was on 20 mg daily). We will work on patient assistance program for Entresto . Out of pocket cost is too high for Entresto at the moment. Patient euvolemic today.     1.  Chronic systolic heart failure secondary to ICM.  Stage C  NYHA Class III  ACEi/ARB: stop 30 mg Lisinopril-Entresto start 24/26 BID-   BB: yes- continue up titration- Metoprolol 50 mg   Aldosterone antagonist: no- will initiate at future visit. - higher potassium levels   SCD prophylaxis: does not meet criteria for implant  Fluid status: euvolemic  Anticoagulation:   Antiplatelet:  ASA dose    Sleep apnea:  NSAID use:  Contraindicated.  Avoid use.  Remote Monitoring:none  SGLT 2 - Jardiance 25 mg    2.  Other comordbid conditions:      #T2DM - PCP to manage - glipizide , Jardiance, metformin    #HTN- 101/62- stable -stop Lisinopril- start Entresto and Metoprolol      #GERD- followed by PCP     3.  Follow-up   Stop the Lisinopril 2 days before start of Entresto  Start Entresto 24/26 mg BID   BMP in 2 weeks  [facility] on 10/13/22      Belinda MOORE, CNP  [facility]

## 2022-09-22 NOTE — PATIENT INSTRUCTIONS
Take your medicines every day, as directed    Changes made today:  Stop taking lisinopril - wait 36 hours before starting Entresto  Start taking Entresto 24/26 mg twice daily   Monitor Your Weight and Symptoms    Contact us if you:    Gain 2 pounds in one day or 5 pounds in one week  Feel more short of breath  Notice more leg swelling  Feel lightheadeded   Change your lifestyle    Limit Salt or Sodium:  2000 mg  Limit Fluids:  2000 mL or approximately 64 ounces  Eat a Heart Healthy Diet  Low in saturated fats  Stay Active:  Aim to move at least 150 minutes every  week         To Contact us    During Business Hours:  [phone], option # 1      After hours, weekends or holidays:   [phone], Option #4  Ask to speak to the On-Call Cardiologist. Inform them you are a CORE/heart failure patient at the [facility].     Use [employer] allows you to communicate directly with your heart team through secure messaging.  [facility] can be accessed any time on your phone, computer, or tablet.  If you need assistance, we'd be happy to help!         Keep your Heart Appointments:    Labs in 2 weeks    [facility] 10/13/22

## 2022-09-22 NOTE — NURSING NOTE
Labs: Patient was given results of the laboratory testing obtained today. Patient was instructed to return for the next laboratory testing in 2 weeks. Patient demonstrated understanding of this information and agreed to call with further questions or concerns.     Med Reconcile: Reviewed and verified all current medications with the patient. The updated medication list was printed and given to the patient.    New Medication: Patient was educated regarding newly prescribed medication, including discussion of  the indication, administration, side effects, and when to report to MD or RN. Patient demonstrated understanding of this information and agreed to call with further questions or concerns. Start Entretso 24-26 mg BID after 36 hour washout period.    Return Appointment: Patient given instructions regarding scheduling next clinic visit. Patient demonstrated understanding of this information and agreed to call with further questions or concerns. CORE in 3 weeks.    Medication Change: Patient was educated regarding prescribed medication change, including discussion of the indication, administration, side effects, and when to report to MD or RN. Patient demonstrated understanding of this information and agreed to call with further questions or concerns. Stop lisinopril, 36 hour wash out period.    Patient stated she understood all health information given and agreed to call with further questions or concerns.    Dasia Amin, RN

## 2022-09-22 NOTE — TELEPHONE ENCOUNTER
Dr. Morrow patient.  Will route to [facility].    Ranjana George, RN  Cardiology Care Coordinator  [facility]  [phone] option 1

## 2022-09-22 NOTE — NURSING NOTE
"Chief Complaint   Patient presents with     Follow Up     Pt reports lightheaded/dizziness often, CORE Return, 66 yo female with systolic heart failure presents for follow up with labs prior.       Initial /62 (BP Location: Left arm, Patient Position: Sitting, Cuff Size: Adult Regular)   Pulse 69   Wt 61 kg (134 lb 6.4 oz)   SpO2 99%   BMI 23.09 kg/m   Estimated body mass index is 23.09 kg/m  as calculated from the following:    Height as of 8/31/22: 1.625 m (5' 3.98\").    Weight as of this encounter: 61 kg (134 lb 6.4 oz)..  BP completed using cuff size: regular    EDWIN Joseph    "

## 2022-09-22 NOTE — PROGRESS NOTES
HPI: Marianne is a 67 year old White female with a past medical history of T2DM, HTN, GERD, JUSTUS, MDD and recent episode of lightheadedness and dizziness on     Admission - - 2022.  Surgeon: Dr. Ulises Desai  1.  Coronary artery bypass grafting x 4 (left internal mammary artery to left anterior descending artery, saphenous vein graft to distal right coronary artery, saphenous vein graft to ramus intermedius artery, saphenous vein graft to obtuse marginal artery).  2.  Endoscopic vein harvest.    Patient has no SOB at rest.  She is now working up her strength. No swelling in the legs today - does notice some swelling at times. . She had no swelling before the hospitalization in the legs.  She has some lightheadedness with fast movements.  Weight at home 131 lbs. She is working on eating more. She has some JEFFERSON with the hot weather. Rehab has been going well. ICD placement to be scheduled for later this month. She isn't sure about starting the Entresto.    Denies SOB,PND, orthopnea, edema, weight gain, chest pain, palpitations, lightheadedness, dizziness, near syncopal/syncopal episodes. Marianne has been following salt and fluid restrictions.      PAST MEDICAL HISTORY:  Past Medical History:   Diagnosis Date     Abnormal Pap smear of cervix ~    repeat pap was normal     Allergic rhinitis, cause unspecified      Anxiety state, unspecified     panic episodes     HFrEF (heart failure with reduced ejection fraction) (H)      Osteopenia      Unspecified essential hypertension        FAMILY HISTORY:  Family History   Problem Relation Age of Onset     Diabetes Mother         hypertension, alive at 83     C.A.D. Mother      Cancer Father         lung cancer, smoker.   at age 53     Family History Negative Sister      Osteoporosis Sister        SOCIAL HISTORY:  Social History     Tobacco Use     Smoking status: Never Smoker     Smokeless tobacco: Never Used   Vaping Use     Vaping Use: Never used    Substance Use Topics     Alcohol use: Yes     Comment: social     Drug use: No           CURRENT MEDICATIONS:  Current Outpatient Medications   Medication Sig Dispense Refill     acetaminophen (TYLENOL) 325 MG tablet Take 2 tablets (650 mg) by mouth every 4 hours as needed for other (For optimal non-opioid multimodal pain management to improve pain control.) 100 tablet 0     aspirin (ASA) 81 MG chewable tablet 2 tablets (162 mg) by Oral or NG Tube route daily 120 tablet 0     atorvastatin (LIPITOR) 80 MG tablet TAKE 1 TABLET DAILY (NEED ANNUAL EXAM) 90 tablet 3     blood glucose (NO BRAND SPECIFIED) test strip Use to test blood sugar 2 times daily or as directed. 200 strip 1     blood glucose (ONETOUCH VERIO IQ) test strip Use to test blood sugar 2 times daily or as directed. 200 strip 1     blood glucose monitoring (ONETOUCH VERIO) meter device kit Use to test blood sugar 2 times daily or as directed. 1 kit 0     empagliflozin (JARDIANCE) 25 MG TABS tablet Take 1 tablet (25 mg) by mouth daily 90 tablet 1     FLUoxetine (PROZAC) 20 MG capsule TAKE 2 CAPSULES DAILY 180 capsule 1     glipiZIDE (GLUCOTROL XL) 5 MG 24 hr tablet TAKE 2 TABLETS DAILY (NEED ANNUAL EXAM) 180 tablet 1     metFORMIN (GLUCOPHAGE XR) 500 MG 24 hr tablet Take 3 tablets (1,500 mg) by mouth daily (with dinner) Please see changed dose 270 tablet 1     metoprolol succinate ER (TOPROL XL) 50 MG 24 hr tablet Take 1 tablet (50 mg) by mouth daily 90 tablet 3     MULTIPLE VITAMIN OR TABS 1 TABLET DAILY       Multiple Vitamins-Minerals (PRESERVISION AREDS PO) Take 2 tablets by mouth daily       vancomycin (VANCOCIN) 125 MG capsule 1 tablet 4 times daily x 15 days and then Twice daily x 15  days and then ones daily x 15 days 150 capsule 0       ROS:  Review Of Systems  Skin: negative  Eyes: negative  Ears/Nose/Throat: negative  Respiratory: No shortness of breath, dyspnea on exertion, cough, or hemoptysis  Cardiovascular: negative  Gastrointestinal:  negative  Genitourinary: negative  Musculoskeletal: negative  Neurologic: negative  Psychiatric: negative  Hematologic/Lymphatic/Immunologic: negative  Endocrine: negative      EXAM:  /62 (BP Location: Left arm, Patient Position: Sitting, Cuff Size: Adult Regular)   Pulse 69   Wt 61 kg (134 lb 6.4 oz)   SpO2 99%   BMI 23.09 kg/m    Home weight:  General: alert, articulate, and in no acute distress.  HEENT: normocephalic, atraumatic, anicteric sclera, EOMI, mucosa moist, no cyanosis.   Neck: neck supple.  No adenopathy, masses, or carotid bruits.  JVP flat at 90 degrees  Heart: regular rhythm, normal S1/S2, no murmur, gallop, rub.  Precordium quiet with normal PMI.     Lungs: clear, no rales, ronchi, or wheezing.  No accessory muscle use, respirations unlabored.   Abdomen: soft, non-tender, bowel sounds present, no hepatomegaly  Extremities: no pitting edema.   No cyanosis.   Neurological: alert and oriented x 3.  normal speech and affect, no gross motor deficits  Skin:  No ecchymoses, rashes, or clubbing.    Labs:  CBC RESULTS:  Lab Results   Component Value Date    WBC 8.5 08/31/2022    WBC 7.1 09/09/2020    RBC 4.64 08/31/2022    RBC 4.57 09/09/2020    HGB 10.6 (L) 08/31/2022    HGB 13.3 09/09/2020    HCT 36.6 08/31/2022    HCT 41.1 09/09/2020    MCV 79 08/31/2022    MCV 90 09/09/2020    MCH 22.8 (L) 08/31/2022    MCH 29.1 09/09/2020    MCHC 29.0 (L) 08/31/2022    MCHC 32.4 09/09/2020    RDW 16.0 (H) 08/31/2022    RDW 12.5 09/09/2020     08/31/2022     09/09/2020       CMP RESULTS:  Lab Results   Component Value Date     09/21/2022     01/07/2021    POTASSIUM 5.2 09/21/2022    POTASSIUM 4.6 08/16/2022    POTASSIUM 4.8 01/07/2021    CHLORIDE 107 09/21/2022    CHLORIDE 106 08/16/2022    CHLORIDE 106 01/07/2021    CO2 24 09/21/2022    CO2 26 08/16/2022    CO2 28 01/07/2021    ANIONGAP 10 09/21/2022    ANIONGAP 6 08/16/2022    ANIONGAP 7 01/07/2021     (H) 09/21/2022    GLC  220 (H) 08/16/2022     (H) 01/07/2021    BUN 19.7 09/21/2022    BUN 29 08/16/2022    BUN 19 01/07/2021    CR 0.82 09/21/2022    CR 0.90 01/07/2021    GFRESTIMATED 77 09/21/2022    GFRESTIMATED 66 01/07/2021    GFRESTBLACK 77 01/07/2021    POONAM 9.6 09/21/2022    PONOAM 9.5 01/07/2021    BILITOTAL 0.4 07/06/2022    BILITOTAL 0.4 09/09/2020    ALBUMIN 3.7 07/06/2022    ALBUMIN 3.7 09/09/2020    ALKPHOS 107 07/06/2022    ALKPHOS 90 09/09/2020    ALT 24 07/06/2022    ALT 33 09/09/2020    AST 14 07/06/2022    AST 14 09/09/2020        INR RESULTS:  Lab Results   Component Value Date    INR 1.29 (H) 05/25/2022       No components found for: CK  Lab Results   Component Value Date    MAG 2.0 06/04/2022     Lab Results   Component Value Date    NTBNP 2,714 (H) 06/09/2022     @BRIEFLABR (dig)@    Most recent echocardiogram:  No results found for this or any previous visit (from the past 8760 hour(s)).      Assessment and Plan:    In summary,Marianne  is a 67 year old here for a CORE follow up. Increase Lisinopril 30 mg daily ( was on 20 mg daily). We will work on patient assistance program for Entresto . Out of pocket cost is too high for Entresto at the moment. Patient euvolemic today.     1.  Chronic systolic heart failure secondary to ICM.  Stage C  NYHA Class III  ACEi/ARB: stop 30 mg Lisinopril-Entresto start 24/26 BID-   BB: yes- continue up titration- Metoprolol 50 mg   Aldosterone antagonist: no- will initiate at future visit. - higher potassium levels   SCD prophylaxis: does not meet criteria for implant  Fluid status: euvolemic  Anticoagulation:   Antiplatelet:  ASA dose   Sleep apnea:  NSAID use:  Contraindicated.  Avoid use.  Remote Monitoring:none  SGLT 2 - Jardiance 25 mg    2.  Other comordbid conditions:      #T2DM - PCP to manage - glipizide , Jardiance, metformin    #HTN- 101/62- stable -stop Lisinopril- start Entresto and Metoprolol      #GERD- followed by PCP     3.  Follow-up   Stop the Lisinopril 2 days  before start of Entresto  Start Entresto 24/26 mg BID   BMP in 2 weeks  [facility] on 10/13/22      Belinda MOORE, CNP  [facility]

## 2022-09-23 NOTE — TELEPHONE ENCOUNTER
Spoke to Naveen from [facility]. Asked him to re-fax the [employer] paperwork to Dr Morrow.

## 2022-09-26 RX ORDER — SACUBITRIL AND VALSARTAN 24; 26 MG/1; MG/1
1 TABLET, FILM COATED ORAL 2 TIMES DAILY
Qty: 60 TABLET | Refills: 3 | Status: SHIPPED | OUTPATIENT
Start: 2022-09-26 | End: 2022-10-13 | Stop reason: DRUGHIGH

## 2022-09-26 NOTE — PROGRESS NOTES
Chief Complaint: Establish general cardiovascular care    HPI: Marianne Monroy is a 67 year old female with a past medical history of coronary artery disease (s/p CABG), ischemic cardiomyopathy, and hypertension who was referred to me to establish general cardiovascular care.  She presented today with her niece and brother.    Briefly, Ms. Monroy first came to my attention in the inpatient setting. There, she was admitted to my service in May 2022 with a tachyarrhythmia. She had a TTE and was found to have severe LV dysfunction (LVEF 20-30%) with normal RV function. I subsequently referred her for a coronary angiogram, which revealed severe CAD. This prompted CABG, which was done on 05/24/22. Her post-operative course was notable for PVCs without ventricular tachycardia.  Her discharge LVEF (done on June 1) was 35-40%.  She also had C. Difficile infection shortly after leaving the hospital. Ms. Monroy has been followed in the core clinic, where she has had progressive upward titration of her neurohormonal blockade.  Given her reduced EF and ventricular tachycardia preoperatively, the EP team recommended that she wear her LifeVest until her 3-month follow-up with them.    Today, Ms. Monroy notes that she feel that she is slowly improving after surgery.  The C. difficile infection was back in she is not feel like she has fully recovered in spite of taking 12 out of 14 days of the medication course.  From a cardiovascular standpoint, her chief complaint is lightheadedness, which is primarily postural.  She also notes that she has been having some imbalance, but this is improved by using a cane.  She can walk at least 1/2 mile level ground. Ms. Monroy also notes some hesitation about being fully physically active given her recent major surgery.  At the time of the consultation, she notes an absence of chest pain at rest, dyspnea at rest or with exertion, PND, orthopnea, peripheral edema, palpitations, or  syncope.  She continues to wear her LifeVest during the day but takes it off to sleep.    A comprehensive ROS was done and the details are included above in the HPI.    Interval History 9/28/22:    Since Ms. Monroy's last visit, she has seen Ep and they have decided to proceed with ICD implantation. She has also followed with the CORE clinic, and she has been transitioned to higher doses of metoprolol and sacubutril-valsartan.    From a symptomatic standpoint, Ms. Monroy feels extremely well.  She has really enjoyed cardiac rehab and has signed up to a membership in a regular gym to supplement her cardiac rehab sessions.  She is able to do all the cardiac rehab exercises without any limitation from shortness of breath, angina, lightheadedness, dizziness, or presyncope.  This amounts to approximately 60 minutes of aerobic and resistance activity with brief breaks in between.  She has occasionally been experiencing lightheadedness when she stands up quickly.  She presented today with her niece.    A comprehensive ROS was done and the details are included above in the HPI.    Past Medical History:  Ischemic cardiomyopathy  Coronary artery disease status post CABG  Hypertension  Type 2 diabetes mellitus, non-insulin-dependent  - No prior history of  dyslipidemia, TIA/stroke, vascular aneurysms, PAD  Past Medical History:   Diagnosis Date     Abnormal Pap smear of cervix ~2000    repeat pap was normal     Allergic rhinitis, cause unspecified      Anxiety state, unspecified     panic episodes     HFrEF (heart failure with reduced ejection fraction) (H)      Osteopenia      Unspecified essential hypertension        Past Surgical History:  CABG 05/24/22 by Ulises Desai at [facility]: LIMA-LAD, SVG-dRCA, SVG-RI, SVG-OM  Past Surgical History:   Procedure Laterality Date     BYPASS GRAFT ARTERY CORONARY N/A 05/24/2022    Procedure: Median sternotomy.  Intraoperative transesophageal echocardiogram per anesthesia.  Left internal  mammary artery harvest.  Right and left endoscopically harvested  greater saphenouse vein.  Cardiopulmonary Bypass.  Coronary Artery Bypass Grafts x4.;  Surgeon: Ulises Desai MD;  Location: [facility] OR     CV CORONARY ANGIOGRAM  05/19/2022    Procedure: CV CORONARY ANGIOGRAM;  Surgeon: Huseyin Vogel MD;  Location: [facility] CARDIAC CATH LAB     CV CORONARY ANGIOGRAM  05/19/2022    Procedure: ;  Surgeon: Huseyin Vogel MD;  Location: [facility] CARDIAC CATH LAB     NO HISTORY OF SURGERY       PICC DOUBLE LUMEN PLACEMENT Right 05/27/2022    Failed PICC attempt right arm basilic vein     PICC INSERTION - TRIPLE LUMEN Left 05/28/2022    left basilic 5fr tl,picc44 cm       Drug History:  Home cardiac meds: Aspirin 81 mg once daily, atorvastatin 80 mg once daily, empagliflozin 25 mg daily,  metoprolol succinate 50 mg daily, sacubitril-valsartan 24-26 mg twice daily .  Current Outpatient Medications   Medication Sig Dispense Refill     acetaminophen (TYLENOL) 325 MG tablet Take 2 tablets (650 mg) by mouth every 4 hours as needed for other (For optimal non-opioid multimodal pain management to improve pain control.) 100 tablet 0     aspirin (ASA) 81 MG chewable tablet 2 tablets (162 mg) by Oral or NG Tube route daily 120 tablet 0     atorvastatin (LIPITOR) 80 MG tablet TAKE 1 TABLET DAILY (NEED ANNUAL EXAM) 90 tablet 3     blood glucose (NO BRAND SPECIFIED) test strip Use to test blood sugar 2 times daily or as directed. 200 strip 1     blood glucose (ONETOUCH VERIO IQ) test strip Use to test blood sugar 2 times daily or as directed. 200 strip 1     blood glucose monitoring (ONETOUCH VERIO) meter device kit Use to test blood sugar 2 times daily or as directed. 1 kit 0     empagliflozin (JARDIANCE) 25 MG TABS tablet Take 1 tablet (25 mg) by mouth daily 90 tablet 1     FLUoxetine (PROZAC) 20 MG capsule TAKE 2 CAPSULES DAILY 180 capsule 1     glipiZIDE (GLUCOTROL XL) 5 MG 24 hr tablet TAKE 2 TABLETS  DAILY (NEED ANNUAL EXAM) 180 tablet 1     metFORMIN (GLUCOPHAGE XR) 500 MG 24 hr tablet Take 3 tablets (1,500 mg) by mouth daily (with dinner) Please see changed dose 270 tablet 1     metoprolol succinate ER (TOPROL XL) 50 MG 24 hr tablet Take 1 tablet (50 mg) by mouth daily 90 tablet 3     MULTIPLE VITAMIN OR TABS 1 TABLET DAILY       Multiple Vitamins-Minerals (PRESERVISION AREDS PO) Take 2 tablets by mouth daily       sacubitril-valsartan (ENTRESTO) 24-26 MG per tablet Take 1 tablet by mouth 2 times daily 60 tablet 3     vancomycin (VANCOCIN) 125 MG capsule 1 tablet 4 times daily x 15 days and then Twice daily x 15  days and then ones daily x 15 days 150 capsule 0         Family History:    Family History   Problem Relation Age of Onset     Diabetes Mother         hypertension, alive at 83     C.A.D. Mother      Cancer Father         lung cancer, smoker.   at age 53     Family History Negative Sister      Osteoporosis Sister        Social History:    Social History     Tobacco Use     Smoking status: Never Smoker     Smokeless tobacco: Never Used   Substance Use Topics     Alcohol use: Yes     Comment: social       Allergies   Allergen Reactions     Effexor [Venlafaxine Hydrochloride]      Tachycardia, makes her extremely jittery--cant sit down, has to keep moving constantly         Physical Examination:  Vitals: /62 (BP Location: Left arm, Patient Position: Chair, Cuff Size: Adult Regular)   Pulse 71   Wt 60.2 kg (132 lb 11.2 oz)   SpO2 97%   BMI 22.79 kg/m    BMI= Body mass index is 22.79 kg/m .    GENERAL: Healthy, alert and no distress  Eyes: No xanthelasmas.  NECK: No palpable neck masses/goitre and no evidence of retrosternal goitre.   ENT: Moist mucosal membranes.  RESPIRATORY: No signs of resp distress. Lungs CTAB.  CARDIOVASCULAR: LifeVest in place.  Sternotomy scar appears to be healing well without any drainage or open areas in the wound.  No JVD, regular, normal S1+S2 without added  sounds or murmurs.  ABDOMEN: ND, soft, non-tender, normal bowel sounds.  EXTREMITIES: Warm, well-perfused, no edema.   NEUROLOGY: GCS 15/15, no focal deficits.  SKIN: No ecchymoses, no rashes.  PSYCH: Cooperative, pleasant affect.       Investigations:  I personally viewed and interpreted the following investigations:    Labs:    CBC RESULTS:  Lab Results   Component Value Date    WBC 8.5 08/31/2022    WBC 7.1 09/09/2020    RBC 4.64 08/31/2022    RBC 4.57 09/09/2020    HGB 10.6 (L) 08/31/2022    HGB 13.3 09/09/2020    HCT 36.6 08/31/2022    HCT 41.1 09/09/2020    MCV 79 08/31/2022    MCV 90 09/09/2020    MCH 22.8 (L) 08/31/2022    MCH 29.1 09/09/2020    MCHC 29.0 (L) 08/31/2022    MCHC 32.4 09/09/2020    RDW 16.0 (H) 08/31/2022    RDW 12.5 09/09/2020     08/31/2022     09/09/2020       CMP RESULTS:  Lab Results   Component Value Date     09/21/2022     01/07/2021    POTASSIUM 5.2 09/21/2022    POTASSIUM 4.6 08/16/2022    POTASSIUM 4.8 01/07/2021    CHLORIDE 107 09/21/2022    CHLORIDE 106 08/16/2022    CHLORIDE 106 01/07/2021    CO2 24 09/21/2022    CO2 26 08/16/2022    CO2 28 01/07/2021    ANIONGAP 10 09/21/2022    ANIONGAP 6 08/16/2022    ANIONGAP 7 01/07/2021     (H) 09/21/2022     (H) 08/16/2022     (H) 01/07/2021    BUN 19.7 09/21/2022    BUN 29 08/16/2022    BUN 19 01/07/2021    CR 0.82 09/21/2022    CR 0.90 01/07/2021    GFRESTIMATED 77 09/21/2022    GFRESTIMATED 66 01/07/2021    GFRESTBLACK 77 01/07/2021    POONAM 9.6 09/21/2022    POONAM 9.5 01/07/2021    BILITOTAL 0.4 07/06/2022    BILITOTAL 0.4 09/09/2020    ALBUMIN 3.7 07/06/2022    ALBUMIN 3.7 09/09/2020    ALKPHOS 107 07/06/2022    ALKPHOS 90 09/09/2020    ALT 24 07/06/2022    ALT 33 09/09/2020    AST 14 07/06/2022    AST 14 09/09/2020        INR RESULTS:  Lab Results   Component Value Date    INR 1.29 (H) 05/25/2022       BNP RESULTS:  Lab Results   Component Value Date    NTBNPI 664 05/18/2022          LIPIDS:  Lab Results   Component Value Date    CHOL 147 05/18/2022    CHOL 152 04/14/2022    CHOL 144 05/18/2021    CHOL 180 01/03/2020     Lab Results   Component Value Date    HDL 47 05/18/2022    HDL 50 04/14/2022    HDL 50 05/18/2021    HDL 48 01/03/2020     Lab Results   Component Value Date    LDL 57 05/18/2022    LDL 64 04/14/2022    LDL 59 05/18/2021    LDL 67 01/03/2020     Lab Results   Component Value Date    TRIG 214 05/18/2022    TRIG 192 04/14/2022    TRIG 173 05/18/2021    TRIG 326 01/03/2020     Lab Results   Component Value Date    CHOLHDLRATIO 6.0 04/26/2012    CHOLHDLRATIO 7.0 04/01/2010         Recent Tests:    Electrocardiogram (07/06/2022):  Normal sinus rhythm, ventricular 90 bpm, LVH, inferior Q waves.  QRS 94.    Echocardiogram (06/01/2022):    Left ventricular size is normal.  The visual ejection fraction is 35-40%.  Right ventricular function, chamber size, wall motion, and thickness are normal.  The inferior vena cava is normal.  No pericardial effusion is present.      Assessment and Plan:   Marianne Monroy is a 67 year old female with a past medical history of coronary artery disease (status post CABG), cardiomyopathy, and patient recently presented to me to establish general cardiovascular July 2022.      Ms. Monroy's functional status remains excellent and I am very encouraged to see her taking the initiative to be as active as possible.  From a guideline directed medical therapy standpoint, she is scheduled to have her ICD placed next week and her neurohormonal therapy titration continues in the core clinic.    Problems:  1. Ischemic cardiomyopathy (ACC/AHA stage C, NYHA II)  2. Coronary artery disease s/p CABG in 05/2022 (LIMA-LAD, SVG-dRCA, SVG-RI, SVG-OM)  3. Hypertension  4. Type 2 diabetes mellitus, non-insulin-dependent (last A1c 6.8% in July 2022)  5. Depression  6. Generalized anxiety disorder    Plan:  - ICD next week  - Continue aspirin 81 mg once daily and  atorvastatin 80 mg once daily lifelong for CAD  - Continue metoprolol succinate 50 mg daily and sacubitril-valsartan 24-26 twice daily for neurohormonal blockade  - Add spironolactone when on max doses of metoprolol/sacubitril-valsartan  - Continue empagliflozin 20 mg daily for type 2 diabetes mellitus and CAD/ischemic cardiomyopathy  - RTC 3 months      A total of 40 minutes were spent on the day of the visit for chart review, care coordination, face-to-face consultation with the patient, and documentation.       Kris Steve, [facility], [place]    Cardiovascular Division  Pager [phone]    CC  Patient Care Team:  Roxanna Otoole MD as PCP - General (Family Medicine)  Roxanna Otoole MD as Assigned PCP  Regina Sutherland as Diabetes Educator (Dietitian, Registered)  Dasia Amin, RN as Specialty Care Coordinator (Cardiology)  Belinda Colon APRN CNP as Nurse Practitioner (Cardiovascular Disease)  Kris Steve MD as MD (Cardiovascular Disease)  Belinda Colon APRN CNP as Assigned Heart and Vascular Provider  Evette Cameron MD as MD (Infectious Diseases)  Sarah Isidro, RN as Specialty Care Coordinator (Cardiology)  Too Morrow MD as MD (Cardiovascular Disease)

## 2022-09-27 ENCOUNTER — HOSPITAL ENCOUNTER (OUTPATIENT)
Dept: CARDIAC REHAB | Facility: CLINIC | Age: 67
Discharge: HOME | End: 2022-09-27
Attending: SURGERY
Payer: COMMERCIAL

## 2022-09-27 PROCEDURE — 93798 PHYS/QHP OP CAR RHAB W/ECG: CPT

## 2022-09-28 ENCOUNTER — OFFICE VISIT (OUTPATIENT)
Dept: CARDIOLOGY | Facility: CLINIC | Age: 67
End: 2022-09-28
Payer: COMMERCIAL

## 2022-09-28 VITALS
BODY MASS INDEX: 22.79 KG/M2 | OXYGEN SATURATION: 97 % | HEART RATE: 71 BPM | DIASTOLIC BLOOD PRESSURE: 62 MMHG | WEIGHT: 132.7 LBS | SYSTOLIC BLOOD PRESSURE: 110 MMHG

## 2022-09-28 DIAGNOSIS — Z95.1 S/P CABG (CORONARY ARTERY BYPASS GRAFT): ICD-10-CM

## 2022-09-28 DIAGNOSIS — E11.59 TYPE 2 DIABETES MELLITUS WITH OTHER CIRCULATORY COMPLICATION, WITHOUT LONG-TERM CURRENT USE OF INSULIN (H): Primary | ICD-10-CM

## 2022-09-28 DIAGNOSIS — I10 HYPERTENSION, UNSPECIFIED TYPE: ICD-10-CM

## 2022-09-28 PROCEDURE — 99215 OFFICE O/P EST HI 40 MIN: CPT | Performed by: STUDENT IN AN ORGANIZED HEALTH CARE EDUCATION/TRAINING PROGRAM

## 2022-09-28 NOTE — NURSING NOTE
Marianne Monroy's goals for this visit include:   Chief Complaint   Patient presents with     Follow Up     8 week follow up       She requests these members of her care team be copied on today's visit information: yes     PCP: Roxanna Otoole    Referring Provider:  No referring provider defined for this encounter.    /62 (BP Location: Left arm, Patient Position: Chair, Cuff Size: Adult Regular)   Pulse 71   Wt 60.2 kg (132 lb 11.2 oz)   SpO2 97%   BMI 22.79 kg/m      Do you need any medication refills at today's visit? No       DANO Felix   Cardiology Team  [facility]

## 2022-09-28 NOTE — PATIENT INSTRUCTIONS
You were seen at the [facility] Cardiology clinic today.   You saw Dr. Kris tSeve, [facility], [place].    The following is a summary of your office visit today:    Continue your current medical regimen   Please try and pursue moderate-intensity exercise for 30 minutes at least five times weekly.  Please try and maintain a diet rich in fruit and vegetables and low in saturated fats and sugars.  Follow-up with me in three months (January 2023)     Nurse contact information:    Cardiology Care Coordinators: Keyla Saul RN   Phone: [phone]   Fax: [phone]      HOW TO CHECK YOUR BLOOD PRESSURE AT HOME:     Avoid eating, smoking, and exercising for at least 30 minutes before taking a reading.    Be sure you have taken your BP medication at least 2-3 hours before you check it.     Sit quietly for 10 minutes before a reading.     Sit in a chair with your feet flat on the floor. Rest your  arm on a table so that the arm cuff is at the same level as your heart.    Remain still during the reading.    Record your blood pressure and pulse in a log and bring to your next appointment.     If you have had any blood work, imaging or other testing completed we will be in touch within 1-2 weeks regarding the results. If you have any questions, concerns or need to schedule a follow up, please contact us at [phone]. If you are needing refills please contact your pharmacy. For urgent after hour care please call the [facility] at [phone] or the [facility] at [phone] and ask to speak to the on-call Cardiologist.    It was a pleasure meeting with you today. Please let us know if there is anything else we can do for you so that we can be sure you are leaving completely satisfied with your care experience.     Your Cardiology Team at [facility]  RN Care Coordinator: Keyla PEARL: Jean

## 2022-09-29 ENCOUNTER — HOSPITAL ENCOUNTER (OUTPATIENT)
Dept: CARDIAC REHAB | Facility: CLINIC | Age: 67
Discharge: HOME | End: 2022-09-29
Attending: SURGERY
Payer: COMMERCIAL

## 2022-09-29 PROCEDURE — 93798 PHYS/QHP OP CAR RHAB W/ECG: CPT

## 2022-09-30 ENCOUNTER — TELEPHONE (OUTPATIENT)
Dept: PSYCHOLOGY | Facility: CLINIC | Age: 67
End: 2022-09-30

## 2022-09-30 NOTE — TELEPHONE ENCOUNTER
Patient did not arrive for the [facility] visit as of 8:22Am. Provider called patient and left two messages to call my office number, to reschedule an appointment with me. I also let the patient know they can call [facility] as well to reschedule. Provider will reach out to patient on Monday, 10/3 to check in with the patient.

## 2022-10-03 ENCOUNTER — TELEPHONE (OUTPATIENT)
Dept: CARDIOLOGY | Facility: CLINIC | Age: 67
End: 2022-10-03

## 2022-10-03 NOTE — TELEPHONE ENCOUNTER
Left voicemail for patient to complete Travel Screen for Cardiac Cath Lab appointment on 10/4 and inform patient of updated Visitor Policy.     Informed pt on message of need for covid test 1-2 days prior and to take picture of results.

## 2022-10-04 ENCOUNTER — APPOINTMENT (OUTPATIENT)
Dept: GENERAL RADIOLOGY | Facility: CLINIC | Age: 67
End: 2022-10-04
Attending: NURSE PRACTITIONER
Payer: COMMERCIAL

## 2022-10-04 ENCOUNTER — HOSPITAL ENCOUNTER (OUTPATIENT)
Facility: CLINIC | Age: 67
Discharge: HOME | End: 2022-10-04
Attending: INTERNAL MEDICINE | Admitting: INTERNAL MEDICINE
Payer: COMMERCIAL

## 2022-10-04 ENCOUNTER — APPOINTMENT (OUTPATIENT)
Dept: MEDSURG UNIT | Facility: CLINIC | Age: 67
End: 2022-10-04
Attending: INTERNAL MEDICINE
Payer: COMMERCIAL

## 2022-10-04 ENCOUNTER — APPOINTMENT (OUTPATIENT)
Dept: LAB | Facility: CLINIC | Age: 67
End: 2022-10-04
Attending: INTERNAL MEDICINE
Payer: COMMERCIAL

## 2022-10-04 VITALS
WEIGHT: 130.73 LBS | BODY MASS INDEX: 22.32 KG/M2 | HEART RATE: 70 BPM | HEIGHT: 64 IN | SYSTOLIC BLOOD PRESSURE: 134 MMHG | OXYGEN SATURATION: 97 % | TEMPERATURE: 97.6 F | RESPIRATION RATE: 15 BRPM | DIASTOLIC BLOOD PRESSURE: 66 MMHG

## 2022-10-04 DIAGNOSIS — Z95.810 S/P ICD (INTERNAL CARDIAC DEFIBRILLATOR) PROCEDURE: Primary | ICD-10-CM

## 2022-10-04 DIAGNOSIS — I25.5 ISCHEMIC CARDIOMYOPATHY: ICD-10-CM

## 2022-10-04 LAB
ANION GAP SERPL CALCULATED.3IONS-SCNC: 11 MMOL/L (ref 7–15)
BUN SERPL-MCNC: 17.1 MG/DL (ref 8–23)
CALCIUM SERPL-MCNC: 9.2 MG/DL (ref 8.8–10.2)
CHLORIDE SERPL-SCNC: 106 MMOL/L (ref 98–107)
CREAT SERPL-MCNC: 0.79 MG/DL (ref 0.51–0.95)
DEPRECATED HCO3 PLAS-SCNC: 23 MMOL/L (ref 22–29)
ERYTHROCYTE [DISTWIDTH] IN BLOOD BY AUTOMATED COUNT: 19.6 % (ref 10–15)
GFR SERPL CREATININE-BSD FRML MDRD: 81 ML/MIN/1.73M2
GLUCOSE SERPL-MCNC: 137 MG/DL (ref 70–99)
HCT VFR BLD AUTO: 39.1 % (ref 35–47)
HGB BLD-MCNC: 11.4 G/DL (ref 11.7–15.7)
MCH RBC QN AUTO: 22.8 PG (ref 26.5–33)
MCHC RBC AUTO-ENTMCNC: 29.2 G/DL (ref 31.5–36.5)
MCV RBC AUTO: 78 FL (ref 78–100)
PLATELET # BLD AUTO: 295 10E3/UL (ref 150–450)
POTASSIUM SERPL-SCNC: 4.4 MMOL/L (ref 3.4–5.3)
RBC # BLD AUTO: 5 10E6/UL (ref 3.8–5.2)
SODIUM SERPL-SCNC: 140 MMOL/L (ref 136–145)
WBC # BLD AUTO: 7.4 10E3/UL (ref 4–11)

## 2022-10-04 PROCEDURE — 93005 ELECTROCARDIOGRAM TRACING: CPT

## 2022-10-04 PROCEDURE — 93010 ELECTROCARDIOGRAM REPORT: CPT | Mod: 76 | Performed by: INTERNAL MEDICINE

## 2022-10-04 PROCEDURE — C1722 AICD, SINGLE CHAMBER: HCPCS | Performed by: INTERNAL MEDICINE

## 2022-10-04 PROCEDURE — 85041 AUTOMATED RBC COUNT: CPT | Performed by: INTERNAL MEDICINE

## 2022-10-04 PROCEDURE — C1779 LEAD, PMKR, TRANSVENOUS VDD: HCPCS | Performed by: INTERNAL MEDICINE

## 2022-10-04 PROCEDURE — 999N000054 HC STATISTIC EKG NON-CHARGEABLE

## 2022-10-04 PROCEDURE — 999N000142 HC STATISTIC PROCEDURE PREP ONLY

## 2022-10-04 PROCEDURE — 33249 INSJ/RPLCMT DEFIB W/LEAD(S): CPT | Performed by: INTERNAL MEDICINE

## 2022-10-04 PROCEDURE — 33249 INSJ/RPLCMT DEFIB W/LEAD(S): CPT | Mod: GC | Performed by: INTERNAL MEDICINE

## 2022-10-04 PROCEDURE — 99152 MOD SED SAME PHYS/QHP 5/>YRS: CPT | Performed by: INTERNAL MEDICINE

## 2022-10-04 PROCEDURE — 99152 MOD SED SAME PHYS/QHP 5/>YRS: CPT | Mod: GC | Performed by: INTERNAL MEDICINE

## 2022-10-04 PROCEDURE — 258N000003 HC RX IP 258 OP 636: Performed by: INTERNAL MEDICINE

## 2022-10-04 PROCEDURE — 82310 ASSAY OF CALCIUM: CPT | Performed by: INTERNAL MEDICINE

## 2022-10-04 PROCEDURE — 99153 MOD SED SAME PHYS/QHP EA: CPT | Performed by: INTERNAL MEDICINE

## 2022-10-04 PROCEDURE — 71045 X-RAY EXAM CHEST 1 VIEW: CPT | Mod: 26 | Performed by: RADIOLOGY

## 2022-10-04 PROCEDURE — 999N000065 XR CHEST PORT 1 VIEW

## 2022-10-04 PROCEDURE — 36415 COLL VENOUS BLD VENIPUNCTURE: CPT | Performed by: INTERNAL MEDICINE

## 2022-10-04 PROCEDURE — 999N000132 HC STATISTIC PP CARE STAGE 1

## 2022-10-04 PROCEDURE — 250N000011 HC RX IP 250 OP 636: Performed by: INTERNAL MEDICINE

## 2022-10-04 PROCEDURE — 250N000009 HC RX 250: Performed by: INTERNAL MEDICINE

## 2022-10-04 PROCEDURE — C1894 INTRO/SHEATH, NON-LASER: HCPCS | Performed by: INTERNAL MEDICINE

## 2022-10-04 PROCEDURE — 272N000001 HC OR GENERAL SUPPLY STERILE: Performed by: INTERNAL MEDICINE

## 2022-10-04 DEVICE — DEFIB ICD COBALT XT TITANIUM 64X51X13MM DVPA2D4: Type: IMPLANTABLE DEVICE | Status: FUNCTIONAL

## 2022-10-04 DEVICE — LEAD SPRINT QUATTRO SECURE S 55 6935M55: Type: IMPLANTABLE DEVICE | Status: FUNCTIONAL

## 2022-10-04 RX ORDER — CEPHALEXIN 500 MG/1
500 CAPSULE ORAL 3 TIMES DAILY
Qty: 15 CAPSULE | Refills: 0 | Status: SHIPPED | OUTPATIENT
Start: 2022-10-04 | End: 2022-10-09

## 2022-10-04 RX ORDER — NALOXONE HYDROCHLORIDE 0.4 MG/ML
0.4 INJECTION, SOLUTION INTRAMUSCULAR; INTRAVENOUS; SUBCUTANEOUS
Status: DISCONTINUED | OUTPATIENT
Start: 2022-10-04 | End: 2022-10-04 | Stop reason: HOSPADM

## 2022-10-04 RX ORDER — LIDOCAINE 40 MG/G
CREAM TOPICAL
Status: DISCONTINUED | OUTPATIENT
Start: 2022-10-04 | End: 2022-10-04 | Stop reason: HOSPADM

## 2022-10-04 RX ORDER — CEFAZOLIN SODIUM 2 G/100ML
2 INJECTION, SOLUTION INTRAVENOUS
Status: COMPLETED | OUTPATIENT
Start: 2022-10-04 | End: 2022-10-04

## 2022-10-04 RX ORDER — NALOXONE HYDROCHLORIDE 0.4 MG/ML
0.2 INJECTION, SOLUTION INTRAMUSCULAR; INTRAVENOUS; SUBCUTANEOUS
Status: DISCONTINUED | OUTPATIENT
Start: 2022-10-04 | End: 2022-10-04 | Stop reason: HOSPADM

## 2022-10-04 RX ORDER — OXYCODONE AND ACETAMINOPHEN 5; 325 MG/1; MG/1
1 TABLET ORAL EVERY 4 HOURS PRN
Status: DISCONTINUED | OUTPATIENT
Start: 2022-10-04 | End: 2022-10-04 | Stop reason: HOSPADM

## 2022-10-04 RX ORDER — FENTANYL CITRATE 50 UG/ML
INJECTION, SOLUTION INTRAMUSCULAR; INTRAVENOUS
Status: DISCONTINUED | OUTPATIENT
Start: 2022-10-04 | End: 2022-10-04 | Stop reason: HOSPADM

## 2022-10-04 RX ORDER — LIDOCAINE HYDROCHLORIDE 20 MG/ML
INJECTION, SOLUTION INFILTRATION; PERINEURAL
Status: DISCONTINUED | OUTPATIENT
Start: 2022-10-04 | End: 2022-10-04 | Stop reason: HOSPADM

## 2022-10-04 RX ORDER — SODIUM CHLORIDE 9 MG/ML
INJECTION, SOLUTION INTRAVENOUS CONTINUOUS
Status: DISCONTINUED | OUTPATIENT
Start: 2022-10-04 | End: 2022-10-04 | Stop reason: HOSPADM

## 2022-10-04 RX ADMIN — CEFAZOLIN 2 G: 10 INJECTION, POWDER, FOR SOLUTION INTRAVENOUS at 17:37

## 2022-10-04 RX ADMIN — SODIUM CHLORIDE: 9 INJECTION, SOLUTION INTRAVENOUS at 13:51

## 2022-10-04 ASSESSMENT — ACTIVITIES OF DAILY LIVING (ADL)
ADLS_ACUITY_SCORE: 35

## 2022-10-04 NOTE — PROGRESS NOTES
Pt prepped for procedure save consent. Son and daughter Matthew and Adelaide will give her a ride later ([phone] or [phone]) respectively.

## 2022-10-04 NOTE — H&P
Electrophysiology Pre-Procedure History and Physical    Marianne Monroy MRN# [id number]   Age: 67 year old YOB: 1955      Date of Procedure: 10/4/22 [facility]      Date of Exam 10/4/2022 Facility (Same day)       Home clinic: [facility]  Primary care provider: Roxanna Otoole         Active problem list:     Patient Active Problem List    Diagnosis Date Noted    Osteopenia 09/13/2022     Priority: Medium    S/P CABG (coronary artery bypass graft) 07/06/2022     Priority: Medium     5/2022      HFrEF (heart failure with reduced ejection fraction) (H) 07/01/2022     Priority: Medium    Acute systolic heart failure (H) 05/19/2022     Priority: Medium    Non-sustained ventricular tachycardia 05/18/2022     Priority: Medium    Near syncope 05/18/2022     Priority: Medium    Diabetes mellitus, type 2 (H) 02/12/2020     Priority: Medium    Generalized anxiety disorder 04/03/2018     Priority: Medium    Major depressive disorder, recurrent episode, mild (H) 11/21/2017     Priority: Medium     Added per visit on 11/17/17.      Hypertension, goal below 140/90 09/02/2014     Priority: Medium     Diagnosis abstracted from previous encounter      Advanced directives, counseling/discussion 02/18/2014     Priority: Medium    Hyperlipidemia LDL goal <70 01/27/2013     Priority: Medium     January 27, 2013 above goal. , will change to atorvastatin 40 mg, recheck lipids in 1-2 months.       C. difficile diarrhea 05/25/2012     Priority: Medium     May 25, 2012 culture positive, will treat with flagyl.   January 27, 2013 clinically resolved.       GERD (gastroesophageal reflux disease) 04/07/2010     Priority: Medium     December 23, 2015: Omeprazole 20 mg prn, helps symptoms    April 21, 2011 stable on chronic PPI therapy. One year refill.       Phobia 02/11/2008     Priority: Medium     February 9, 2008  Intolerant of Effexor. Will give  small rx of ativan. Max: 10 to 20 tablets per year.   September 17, 2009 improved. Still has 12 of 20 tablets of ativan.   April 21, 2011 limited usage, refill given.   Problem list name updated by automated process. Provider to review              Medications (include herbals and vitamins):      Current Facility-Administered Medications   Medication    ceFAZolin (ANCEF) 2 g in 100 mL D5W intermittent infusion    HOLD all oral hypoglycemics glipiZIDE (GLUCOTROL), glyBURIDE (DIABETA/MICRONASE/GLYNASE), glimepiride (AMARYL), gliclazide, rosiglitazone (AVANDIA), pioglitazone (ACTOS),  sitagliptin (JANUVIA), saxagliptin (ONGLYZA) and linagliptin (TRAJENTA) metformin (GLUCOPHAGE, GLUMETZA, FORTAMET, RIOMET) and metformin containing medications: alogliptin/metformin (KAZANO), glipizide/metformin (METAGLIP), glyburide/metformin (GLUCOVANCE), rosiglitazone/metformin (AVANDAMET), dapagliflozin/metformin (XIGDUO XR), sitagliptin/metformin (JANUMET, JANUMET XR), linagliptin/metformin (JENTADUETO), repaglinide/metformin (PRANDIMET), saxagliptin/metformin (KOMBIGLYZE XR), canagliflozin/metformin (INVOKAMET), and pioglitazone/metformin (ACTOPLUS MET, ACTOPLUS MET XR) the morning of the procedure.    lidocaine (LMX4) cream    lidocaine 1 % 0.1-1 mL    sodium chloride (PF) 0.9% PF flush 3 mL    sodium chloride (PF) 0.9% PF flush 3 mL    sodium chloride (PF) 0.9% PF flush 3 mL    sodium chloride 0.9% infusion           Medication List      There are no discharge medications for this visit.              Allergies:      Allergies   Allergen Reactions    Effexor [Venlafaxine Hydrochloride]      Tachycardia, makes her extremely jittery--cant sit down, has to keep moving constantly             Social History:     Social History     Tobacco Use    Smoking status: Never Smoker    Smokeless tobacco: Never Used   Substance Use Topics    Alcohol use: Yes     Comment: social            Physical Exam:   All vitals have been reviewed  Patient  "Vitals for the past 8 hrs:   BP Temp Temp src Pulse Resp SpO2 Height Weight   10/04/22 1243 130/73 98.3  F (36.8  C) Oral 79 16 99 % 1.626 m (5' 4\") 59.3 kg (130 lb 11.7 oz)     No intake/output data recorded.  Airway assessment:   Patient is able to open mouth wide  Patient is able to stick out tongue}      ENT:   Normocephalic, without obvious abnormality, atraumatic, sinuses nontender on palpation, external ears without lesions, oral pharynx with moist mucous membranes, tonsils without erythema or exudates, gums normal and good dentition.     Neck:   Supple, symmetrical, trachea midline, no adenopathy, thyroid symmetric, not enlarged and no tenderness, skin normal     Lungs:   No increased work of breathing, good air exchange, clear to auscultation bilaterally, no crackles or wheezing     Cardiovascular:   Normal apical impulse, regular rate and rhythm, normal S1 and S2, no S3 or S4, and no murmur noted             Lab / Radiology Results:     Lab Results   Component Value Date    WBC 7.4 10/04/2022    WBC 7.1 09/09/2020    RBC 5.00 10/04/2022    RBC 4.57 09/09/2020    HGB 11.4 10/04/2022    HGB 13.3 09/09/2020    HCT 39.1 10/04/2022    HCT 41.1 09/09/2020    MCV 78 10/04/2022    MCV 90 09/09/2020    RDW 19.6 10/04/2022    RDW 12.5 09/09/2020     10/04/2022     09/09/2020      Lab Results   Component Value Date    WBC 7.4 10/04/2022    WBC 7.1 09/09/2020     Lab Results   Component Value Date     10/04/2022     09/09/2020     Lab Results   Component Value Date    HGB 11.4 10/04/2022    HGB 13.3 09/09/2020    HCT 39.1 10/04/2022    HCT 41.1 09/09/2020     Lab Results   Component Value Date     10/04/2022     01/07/2021    CO2 23 10/04/2022    CO2 26 08/16/2022    CO2 28 01/07/2021    BUN 17.1 10/04/2022    BUN 29 08/16/2022    BUN 19 01/07/2021     Lab Results   Component Value Date     10/04/2022     01/07/2021    CO2 23 10/04/2022    CO2 26 08/16/2022    CO2 28 " 01/07/2021    BUN 17.1 10/04/2022    BUN 29 08/16/2022    BUN 19 01/07/2021     Lab Results   Component Value Date     10/04/2022     01/07/2021     Lab Results   Component Value Date    BUN 17.1 10/04/2022    BUN 29 08/16/2022    BUN 19 01/07/2021     Lab Results   Component Value Date    TSH 1.41 05/11/2022    TSH 0.89 09/24/2019             Plan:   Patient's active problems diagnostically and therapeutically optimized for the planned procedure. Patient here for ICD implant. Procedure explained in detail to patient including indications, risks, and benefits. He states understanding and wishes to procced.     OSCAR Verde CNP  Electrophysiology Consult Service  Pager: [phone]

## 2022-10-04 NOTE — DISCHARGE INSTRUCTIONS
Home Care after an ICD implant    Wound care:  Keep your incision (surgery wound) dry for 3 days.  After 3 days, you may remove the outer bandage.  Keep the strips of tape on.  They will be removed at your clinic visit.  Check for signs of infection each day.  These include increased redness, swelling, drainage or a fever over 101 F (38.3 C).  Call us immediately if you see any of these signs.  If there are no signs of infection, you may shower in 3 days.  Do not submerge the incision (in a bath tub, hot tub, or swimming pool) until fully healed.  Pain:   You may have mild to moderate pain for 3 to 5 days.  Take acetaminophen (Tylenol) or ibuprofen (Advil) for the pain.  Call us if the pain is severe or lasts more than 5 days.    Activity:  You should slowly go back to your normal activities after 24 hours.  Healing will take 4 to 6 weeks.  No driving for 3 days  Avoid climbing a ladder alone.  It is best to stay within 4 feet of the ground.  Avoid anything that may cause rough contact or a hard hit to your chest.  This includes football, hockey, and other contact sports.  Do not go swimming or boating alone.      For at least 4 weeks:  Do not raise your affected arm above your shoulder.  Do not use your affected arm to push, pull, or lift anything over 10 pounds.  Avoid repetitive upper body activities for 6 weeks (ie: golf, swimming, and weight lifting)    Follow Up Visits:  Return to the clinic in 7 to 10 days to have your device and wound checked.    Telling others about your device:  Before you have any medical tests or treatments, tell the doctors, dentists, and other care providers about your device.  There are a few tests and treatments that may interfere with your device.  (These include MRI, radiation therapy, electrocautery, and others.)  Your care team may need to take special steps to keep you safe.  Before you leave the hospital, you will receive a temporary ID card.  A permanent card will be mailed  to you about 6 to 8 weeks later.  Always carry the ID card with you.  It has important details about your device.  You should also get a MedicAlert ID.  Please ask us for a MedicAlert brochure, or go to [email or web address].    Safety near electrical equipment:  All of these are safe to use when in good repair:  Microwaves  Radios  Cordless phone  Remote controls  Small electrical tools  Cell phones: Keep cell phones at least 6 inches from your device.  Do not carry the phone in a pocket near your device.  Security parikh: It is okay to walk through security parikh at the airports and department stores.  Tell airport security that you have a defibrillator.  They should keep the screening wand at least 6 inches from your device.  Full-body scanners are safe.    Avoid the following:   MRI tests in the hospital unless you have a MRI safe defibrillator.   Arc welding, chain saws and high-powered industrial or commercial tools.   Power lines, power plants and large power generators.   Electric body fat scales.   Magnetic mattress pads or pillow.    What to do after a shock from your ICD:  Stop what you re doing and rest.  If you feel fine before and after the shock, call the device clinic.  If you feel unwell or receive more than one shock, call 911 or go to the emergency room.  A shock could mean that your condition has changed and you may need to see a doctor.    Questions?  Please call [facility]   Device Nurse:          Business Hours:  [phone]    After Hours:  [phone]   Choose option 4, then ask for the on-call device nurse at job code [phone].    Your next device clinic appointment is scheduled on:     Wednesday Oct 12, 2022 8:45 AM                                        [facility]  [facility] - [facility] [address]  [address] [place] [address]  [id number]

## 2022-10-05 LAB
ATRIAL RATE - MUSE: 67 BPM
ATRIAL RATE - MUSE: 67 BPM
DIASTOLIC BLOOD PRESSURE - MUSE: NORMAL MMHG
DIASTOLIC BLOOD PRESSURE - MUSE: NORMAL MMHG
INTERPRETATION ECG - MUSE: NORMAL
INTERPRETATION ECG - MUSE: NORMAL
P AXIS - MUSE: 35 DEGREES
P AXIS - MUSE: 46 DEGREES
PR INTERVAL - MUSE: 114 MS
PR INTERVAL - MUSE: 124 MS
QRS DURATION - MUSE: 88 MS
QRS DURATION - MUSE: 92 MS
QT - MUSE: 460 MS
QT - MUSE: 474 MS
QTC - MUSE: 486 MS
QTC - MUSE: 500 MS
R AXIS - MUSE: -4 DEGREES
R AXIS - MUSE: -7 DEGREES
SYSTOLIC BLOOD PRESSURE - MUSE: NORMAL MMHG
SYSTOLIC BLOOD PRESSURE - MUSE: NORMAL MMHG
T AXIS - MUSE: -16 DEGREES
T AXIS - MUSE: 40 DEGREES
VENTRICULAR RATE- MUSE: 67 BPM
VENTRICULAR RATE- MUSE: 67 BPM

## 2022-10-05 NOTE — PROGRESS NOTES
Patient tolerated recovery stage well. VSS, left chest site clean/dry/intact, no hematoma, and denies pain. Patient tolerated PO food and fluids. Teaching was done and discharge instructions were given. Patient ambulated, voided, and PIV was removed. Radiology confirmed chest xray did not show pneumo or lead dislodgement. Patient discharged from the hospital via wheelchair to home with son and daughter.

## 2022-10-11 ENCOUNTER — HOSPITAL ENCOUNTER (OUTPATIENT)
Dept: CARDIAC REHAB | Facility: CLINIC | Age: 67
Discharge: HOME | End: 2022-10-11
Attending: SURGERY
Payer: COMMERCIAL

## 2022-10-11 PROCEDURE — 93798 PHYS/QHP OP CAR RHAB W/ECG: CPT

## 2022-10-12 ENCOUNTER — ANCILLARY PROCEDURE (OUTPATIENT)
Dept: CARDIOLOGY | Facility: CLINIC | Age: 67
End: 2022-10-12
Attending: INTERNAL MEDICINE
Payer: COMMERCIAL

## 2022-10-12 DIAGNOSIS — Z95.810 ICD (IMPLANTABLE CARDIOVERTER-DEFIBRILLATOR), SINGLE, IN SITU: ICD-10-CM

## 2022-10-12 PROCEDURE — 93282 PRGRMG EVAL IMPLANTABLE DFB: CPT | Performed by: INTERNAL MEDICINE

## 2022-10-13 ENCOUNTER — HOSPITAL ENCOUNTER (OUTPATIENT)
Dept: CARDIAC REHAB | Facility: CLINIC | Age: 67
Discharge: HOME | End: 2022-10-13
Attending: SURGERY
Payer: COMMERCIAL

## 2022-10-13 ENCOUNTER — OFFICE VISIT (OUTPATIENT)
Dept: CARDIOLOGY | Facility: CLINIC | Age: 67
End: 2022-10-13
Payer: COMMERCIAL

## 2022-10-13 VITALS
HEART RATE: 64 BPM | BODY MASS INDEX: 22.9 KG/M2 | WEIGHT: 133.4 LBS | OXYGEN SATURATION: 97 % | DIASTOLIC BLOOD PRESSURE: 68 MMHG | SYSTOLIC BLOOD PRESSURE: 127 MMHG

## 2022-10-13 DIAGNOSIS — I25.5 ISCHEMIC CARDIOMYOPATHY: ICD-10-CM

## 2022-10-13 DIAGNOSIS — I50.21 ACUTE SYSTOLIC HEART FAILURE (H): ICD-10-CM

## 2022-10-13 DIAGNOSIS — Z95.810 S/P ICD (INTERNAL CARDIAC DEFIBRILLATOR) PROCEDURE: ICD-10-CM

## 2022-10-13 DIAGNOSIS — I10 HYPERTENSION, UNSPECIFIED TYPE: ICD-10-CM

## 2022-10-13 DIAGNOSIS — E11.59 TYPE 2 DIABETES MELLITUS WITH OTHER CIRCULATORY COMPLICATION, WITHOUT LONG-TERM CURRENT USE OF INSULIN (H): ICD-10-CM

## 2022-10-13 DIAGNOSIS — I50.20 HFREF (HEART FAILURE WITH REDUCED EJECTION FRACTION) (H): Primary | ICD-10-CM

## 2022-10-13 PROCEDURE — 93798 PHYS/QHP OP CAR RHAB W/ECG: CPT

## 2022-10-13 PROCEDURE — 99214 OFFICE O/P EST MOD 30 MIN: CPT | Mod: 24 | Performed by: NURSE PRACTITIONER

## 2022-10-13 RX ORDER — SACUBITRIL AND VALSARTAN 49; 51 MG/1; MG/1
1 TABLET, FILM COATED ORAL 2 TIMES DAILY
Qty: 180 TABLET | Refills: 3 | Status: SHIPPED | OUTPATIENT
Start: 2022-10-13 | End: 2022-11-03 | Stop reason: DRUGHIGH

## 2022-10-13 NOTE — PROGRESS NOTES
HPI: Marianne is a 67 year old White female with a past medical history of T2DM, HTN, GERD, JUSTUS, MDD and recent episode of lightheadedness and dizziness on     Admission - - 2022.  Surgeon: Dr. Ulises Desai  1.  Coronary artery bypass grafting x 4 (left internal mammary artery to left anterior descending artery, saphenous vein graft to distal right coronary artery, saphenous vein graft to ramus intermedius artery, saphenous vein graft to obtuse marginal artery).  2.  Endoscopic vein harvest.    Patient has no SOB at rest.  She is now working up her strength. No swelling in the legs today - does notice some swelling at times. . She had no swelling before the hospitalization in the legs.  She has some lightheadedness with fast movements.  Weight at home 131 lbs. She is working on eating more. She has some JEFFERSON with the hot weather. Rehab has been going well. ICD placement to be scheduled for later this month. She isn't sure about starting the Entresto.    Denies SOB,PND, orthopnea, edema, weight gain, chest pain, palpitations, lightheadedness, dizziness, near syncopal/syncopal episodes. Marianne has been following salt and fluid restrictions.      PAST MEDICAL HISTORY:  Past Medical History:   Diagnosis Date     Abnormal Pap smear of cervix ~    repeat pap was normal     Allergic rhinitis, cause unspecified      Anxiety state, unspecified     panic episodes     HFrEF (heart failure with reduced ejection fraction) (H)      Osteopenia      Unspecified essential hypertension        FAMILY HISTORY:  Family History   Problem Relation Age of Onset     Diabetes Mother         hypertension, alive at 83     C.A.D. Mother      Cancer Father         lung cancer, smoker.   at age 53     Family History Negative Sister      Osteoporosis Sister        SOCIAL HISTORY:  Social History     Tobacco Use     Smoking status: Never     Smokeless tobacco: Never   Vaping Use     Vaping Use: Never used   Substance Use  Topics     Alcohol use: Yes     Comment: social     Drug use: No           CURRENT MEDICATIONS:  Current Outpatient Medications   Medication Sig Dispense Refill     acetaminophen (TYLENOL) 325 MG tablet Take 2 tablets (650 mg) by mouth every 4 hours as needed for other (For optimal non-opioid multimodal pain management to improve pain control.) 100 tablet 0     aspirin (ASA) 81 MG chewable tablet 2 tablets (162 mg) by Oral or NG Tube route daily 120 tablet 0     atorvastatin (LIPITOR) 80 MG tablet TAKE 1 TABLET DAILY (NEED ANNUAL EXAM) 90 tablet 3     blood glucose (NO BRAND SPECIFIED) test strip Use to test blood sugar 2 times daily or as directed. 200 strip 1     blood glucose (ONETOUCH VERIO IQ) test strip Use to test blood sugar 2 times daily or as directed. 200 strip 1     blood glucose monitoring (ONETOUCH VERIO) meter device kit Use to test blood sugar 2 times daily or as directed. 1 kit 0     empagliflozin (JARDIANCE) 25 MG TABS tablet Take 1 tablet (25 mg) by mouth daily 90 tablet 1     FLUoxetine (PROZAC) 20 MG capsule TAKE 2 CAPSULES DAILY 180 capsule 1     glipiZIDE (GLUCOTROL XL) 5 MG 24 hr tablet TAKE 2 TABLETS DAILY (NEED ANNUAL EXAM) 180 tablet 1     metFORMIN (GLUCOPHAGE XR) 500 MG 24 hr tablet Take 3 tablets (1,500 mg) by mouth daily (with dinner) Please see changed dose 270 tablet 1     metoprolol succinate ER (TOPROL XL) 50 MG 24 hr tablet Take 1 tablet (50 mg) by mouth daily 90 tablet 3     MULTIPLE VITAMIN OR TABS 1 TABLET DAILY       Multiple Vitamins-Minerals (PRESERVISION AREDS PO) Take 2 tablets by mouth daily       sacubitril-valsartan (ENTRESTO) 24-26 MG per tablet Take 1 tablet by mouth 2 times daily 60 tablet 3     vancomycin (VANCOCIN) 125 MG capsule 1 tablet 4 times daily x 15 days and then Twice daily x 15  days and then ones daily x 15 days 150 capsule 0       ROS:  Review Of Systems  Skin: negative  Eyes: negative  Ears/Nose/Throat: negative  Respiratory: No shortness of breath,  dyspnea on exertion, cough, or hemoptysis  Cardiovascular: negative  Gastrointestinal: negative  Genitourinary: negative  Musculoskeletal: negative  Neurologic: negative  Psychiatric: negative  Hematologic/Lymphatic/Immunologic: negative  Endocrine: negative      EXAM:  /68 (BP Location: Right arm, Patient Position: Sitting, Cuff Size: Adult Regular)   Pulse 64   Wt 60.5 kg (133 lb 6.4 oz)   SpO2 97%   BMI 22.90 kg/m    Home weight:  General: alert, articulate, and in no acute distress.  HEENT: normocephalic, atraumatic, anicteric sclera, EOMI, mucosa moist, no cyanosis.   Neck: neck supple.  No adenopathy, masses, or carotid bruits.  JVP flat at 90 degrees  Heart: regular rhythm, normal S1/S2, no murmur, gallop, rub.  Precordium quiet with normal PMI.     Lungs: clear, no rales, ronchi, or wheezing.  No accessory muscle use, respirations unlabored.   Abdomen: soft, non-tender, bowel sounds present, no hepatomegaly  Extremities: no pitting edema.   No cyanosis.   Neurological: alert and oriented x 3.  normal speech and affect, no gross motor deficits  Skin:  No ecchymoses, rashes, or clubbing.    Labs:  CBC RESULTS:  Lab Results   Component Value Date    WBC 7.4 10/04/2022    WBC 7.1 09/09/2020    RBC 5.00 10/04/2022    RBC 4.57 09/09/2020    HGB 11.4 (L) 10/04/2022    HGB 13.3 09/09/2020    HCT 39.1 10/04/2022    HCT 41.1 09/09/2020    MCV 78 10/04/2022    MCV 90 09/09/2020    MCH 22.8 (L) 10/04/2022    MCH 29.1 09/09/2020    MCHC 29.2 (L) 10/04/2022    MCHC 32.4 09/09/2020    RDW 19.6 (H) 10/04/2022    RDW 12.5 09/09/2020     10/04/2022     09/09/2020       CMP RESULTS:  Lab Results   Component Value Date     10/04/2022     01/07/2021    POTASSIUM 4.4 10/04/2022    POTASSIUM 4.6 08/16/2022    POTASSIUM 4.8 01/07/2021    CHLORIDE 106 10/04/2022    CHLORIDE 106 08/16/2022    CHLORIDE 106 01/07/2021    CO2 23 10/04/2022    CO2 26 08/16/2022    CO2 28 01/07/2021    ANIONGAP 11  10/04/2022    ANIONGAP 6 08/16/2022    ANIONGAP 7 01/07/2021     (H) 10/04/2022     (H) 08/16/2022     (H) 01/07/2021    BUN 17.1 10/04/2022    BUN 29 08/16/2022    BUN 19 01/07/2021    CR 0.79 10/04/2022    CR 0.90 01/07/2021    GFRESTIMATED 81 10/04/2022    GFRESTIMATED 66 01/07/2021    GFRESTBLACK 77 01/07/2021    POONAM 9.2 10/04/2022    POONAM 9.5 01/07/2021    BILITOTAL 0.4 07/06/2022    BILITOTAL 0.4 09/09/2020    ALBUMIN 3.7 07/06/2022    ALBUMIN 3.7 09/09/2020    ALKPHOS 107 07/06/2022    ALKPHOS 90 09/09/2020    ALT 24 07/06/2022    ALT 33 09/09/2020    AST 14 07/06/2022    AST 14 09/09/2020        INR RESULTS:  Lab Results   Component Value Date    INR 1.29 (H) 05/25/2022       No components found for: CK  Lab Results   Component Value Date    MAG 2.0 06/04/2022     Lab Results   Component Value Date    NTBNP 2,714 (H) 06/09/2022     @BRIEFLABR (dig)@    Most recent echocardiogram:  No results found for this or any previous visit (from the past 8760 hour(s)).      Assessment and Plan:    In summary,Marianne  is a 67 year old here for a CORE follow up.  Out of pocket cost is too high for Entresto at the moment- working on patient assistance program. She is in favor of buying it out of pocket. Patient is euvolemic today and we will increase Entresto today.     1.  Chronic systolic heart failure secondary to ICM.  Stage C  NYHA Class III  ACEi/ARB: -Entresto 24/26 BID-   BB: yes- continue up titration- Metoprolol 50 mg   Aldosterone antagonist: no- will initiate at future visit. - due to higher levels of potassium waiting to start  SCD prophylaxis: does not meet criteria for implant  Fluid status: euvolemic  Anticoagulation:   Antiplatelet:  ASA dose   Sleep apnea:  NSAID use:  Contraindicated.  Avoid use.  Remote Monitoring:none  SGLT 2 - Jardiance 25 mg    2.  Other comordbid conditions:      #T2DM - PCP to manage - glipizide , Jardiance, metformin    #HTN- 127/68- Entresto and Metoprolol      #GERD- followed by PCP     3.  Follow-up   Entresto 49/51 mg BID   Labs 2 weeks   CORE in 2 weeks      Belinda MOORE, CNP  [facility]

## 2022-10-13 NOTE — LETTER
10/13/2022      RE: Marianne Monroy  [address]  [facility] [address]       Dear Colleague,    Thank you for the opportunity to participate in the care of your patient, Marianne Monroy, at the [facility]. Please see a copy of my visit note below.        HPI: Marianne is a 67 year old White female with a past medical history of T2DM, HTN, GERD, JUSTUS, MDD and recent episode of lightheadedness and dizziness on 4/30    Admission 5/18-6/4 - 5/24/2022.  Surgeon: Dr. Ulises Desai  1.  Coronary artery bypass grafting x 4 (left internal mammary artery to left anterior descending artery, saphenous vein graft to distal right coronary artery, saphenous vein graft to ramus intermedius artery, saphenous vein graft to obtuse marginal artery).  2.  Endoscopic vein harvest.    Patient has no SOB at rest.  She is now working up her strength. No swelling in the legs today - does notice some swelling at times. . She had no swelling before the hospitalization in the legs.  She has some lightheadedness with fast movements.  Weight at home 131 lbs. She is working on eating more. She has some JEFFERSON with the hot weather. Rehab has been going well. ICD placement to be scheduled for later this month. She isn't sure about starting the Entresto.    Denies SOB,PND, orthopnea, edema, weight gain, chest pain, palpitations, lightheadedness, dizziness, near syncopal/syncopal episodes. Marianne has been following salt and fluid restrictions.      PAST MEDICAL HISTORY:  Past Medical History:   Diagnosis Date     Abnormal Pap smear of cervix ~2000    repeat pap was normal     Allergic rhinitis, cause unspecified      Anxiety state, unspecified     panic episodes     HFrEF (heart failure with reduced ejection fraction) (H)      Osteopenia      Unspecified essential hypertension        FAMILY HISTORY:  Family History   Problem Relation Age of Onset     Diabetes  Mother         hypertension, alive at 83     C.A.D. Mother      Cancer Father         lung cancer, smoker.   at age 53     Family History Negative Sister      Osteoporosis Sister        SOCIAL HISTORY:  Social History     Tobacco Use     Smoking status: Never     Smokeless tobacco: Never   Vaping Use     Vaping Use: Never used   Substance Use Topics     Alcohol use: Yes     Comment: social     Drug use: No           CURRENT MEDICATIONS:  Current Outpatient Medications   Medication Sig Dispense Refill     acetaminophen (TYLENOL) 325 MG tablet Take 2 tablets (650 mg) by mouth every 4 hours as needed for other (For optimal non-opioid multimodal pain management to improve pain control.) 100 tablet 0     aspirin (ASA) 81 MG chewable tablet 2 tablets (162 mg) by Oral or NG Tube route daily 120 tablet 0     atorvastatin (LIPITOR) 80 MG tablet TAKE 1 TABLET DAILY (NEED ANNUAL EXAM) 90 tablet 3     blood glucose (NO BRAND SPECIFIED) test strip Use to test blood sugar 2 times daily or as directed. 200 strip 1     blood glucose (ONETOUCH VERIO IQ) test strip Use to test blood sugar 2 times daily or as directed. 200 strip 1     blood glucose monitoring (ONETOUCH VERIO) meter device kit Use to test blood sugar 2 times daily or as directed. 1 kit 0     empagliflozin (JARDIANCE) 25 MG TABS tablet Take 1 tablet (25 mg) by mouth daily 90 tablet 1     FLUoxetine (PROZAC) 20 MG capsule TAKE 2 CAPSULES DAILY 180 capsule 1     glipiZIDE (GLUCOTROL XL) 5 MG 24 hr tablet TAKE 2 TABLETS DAILY (NEED ANNUAL EXAM) 180 tablet 1     metFORMIN (GLUCOPHAGE XR) 500 MG 24 hr tablet Take 3 tablets (1,500 mg) by mouth daily (with dinner) Please see changed dose 270 tablet 1     metoprolol succinate ER (TOPROL XL) 50 MG 24 hr tablet Take 1 tablet (50 mg) by mouth daily 90 tablet 3     MULTIPLE VITAMIN OR TABS 1 TABLET DAILY       Multiple Vitamins-Minerals (PRESERVISION AREDS PO) Take 2 tablets by mouth daily       sacubitril-valsartan (ENTRESTO)  24-26 MG per tablet Take 1 tablet by mouth 2 times daily 60 tablet 3     vancomycin (VANCOCIN) 125 MG capsule 1 tablet 4 times daily x 15 days and then Twice daily x 15  days and then ones daily x 15 days 150 capsule 0       ROS:  Review Of Systems  Skin: negative  Eyes: negative  Ears/Nose/Throat: negative  Respiratory: No shortness of breath, dyspnea on exertion, cough, or hemoptysis  Cardiovascular: negative  Gastrointestinal: negative  Genitourinary: negative  Musculoskeletal: negative  Neurologic: negative  Psychiatric: negative  Hematologic/Lymphatic/Immunologic: negative  Endocrine: negative      EXAM:  /68 (BP Location: Right arm, Patient Position: Sitting, Cuff Size: Adult Regular)   Pulse 64   Wt 60.5 kg (133 lb 6.4 oz)   SpO2 97%   BMI 22.90 kg/m    Home weight:  General: alert, articulate, and in no acute distress.  HEENT: normocephalic, atraumatic, anicteric sclera, EOMI, mucosa moist, no cyanosis.   Neck: neck supple.  No adenopathy, masses, or carotid bruits.  JVP flat at 90 degrees  Heart: regular rhythm, normal S1/S2, no murmur, gallop, rub.  Precordium quiet with normal PMI.     Lungs: clear, no rales, ronchi, or wheezing.  No accessory muscle use, respirations unlabored.   Abdomen: soft, non-tender, bowel sounds present, no hepatomegaly  Extremities: no pitting edema.   No cyanosis.   Neurological: alert and oriented x 3.  normal speech and affect, no gross motor deficits  Skin:  No ecchymoses, rashes, or clubbing.    Labs:  CBC RESULTS:  Lab Results   Component Value Date    WBC 7.4 10/04/2022    WBC 7.1 09/09/2020    RBC 5.00 10/04/2022    RBC 4.57 09/09/2020    HGB 11.4 (L) 10/04/2022    HGB 13.3 09/09/2020    HCT 39.1 10/04/2022    HCT 41.1 09/09/2020    MCV 78 10/04/2022    MCV 90 09/09/2020    MCH 22.8 (L) 10/04/2022    MCH 29.1 09/09/2020    MCHC 29.2 (L) 10/04/2022    MCHC 32.4 09/09/2020    RDW 19.6 (H) 10/04/2022    RDW 12.5 09/09/2020     10/04/2022      09/09/2020       CMP RESULTS:  Lab Results   Component Value Date     10/04/2022     01/07/2021    POTASSIUM 4.4 10/04/2022    POTASSIUM 4.6 08/16/2022    POTASSIUM 4.8 01/07/2021    CHLORIDE 106 10/04/2022    CHLORIDE 106 08/16/2022    CHLORIDE 106 01/07/2021    CO2 23 10/04/2022    CO2 26 08/16/2022    CO2 28 01/07/2021    ANIONGAP 11 10/04/2022    ANIONGAP 6 08/16/2022    ANIONGAP 7 01/07/2021     (H) 10/04/2022     (H) 08/16/2022     (H) 01/07/2021    BUN 17.1 10/04/2022    BUN 29 08/16/2022    BUN 19 01/07/2021    CR 0.79 10/04/2022    CR 0.90 01/07/2021    GFRESTIMATED 81 10/04/2022    GFRESTIMATED 66 01/07/2021    GFRESTBLACK 77 01/07/2021    POONAM 9.2 10/04/2022    POONAM 9.5 01/07/2021    BILITOTAL 0.4 07/06/2022    BILITOTAL 0.4 09/09/2020    ALBUMIN 3.7 07/06/2022    ALBUMIN 3.7 09/09/2020    ALKPHOS 107 07/06/2022    ALKPHOS 90 09/09/2020    ALT 24 07/06/2022    ALT 33 09/09/2020    AST 14 07/06/2022    AST 14 09/09/2020        INR RESULTS:  Lab Results   Component Value Date    INR 1.29 (H) 05/25/2022       No components found for: CK  Lab Results   Component Value Date    MAG 2.0 06/04/2022     Lab Results   Component Value Date    NTBNP 2,714 (H) 06/09/2022     @BRIEFLABR (dig)@    Most recent echocardiogram:  No results found for this or any previous visit (from the past 8760 hour(s)).      Assessment and Plan:    In summary,Marianne  is a 67 year old here for a CORE follow up.  Out of pocket cost is too high for Entresto at the moment- working on patient assistance program. She is in favor of buying it out of pocket. Patient is euvolemic today and we will increase Entresto today.     1.  Chronic systolic heart failure secondary to ICM.  Stage C  NYHA Class III  ACEi/ARB: -Entresto 24/26 BID-   BB: yes- continue up titration- Metoprolol 50 mg   Aldosterone antagonist: no- will initiate at future visit. - due to higher levels of potassium waiting to start  SCD prophylaxis:  does not meet criteria for implant  Fluid status: euvolemic  Anticoagulation:   Antiplatelet:  ASA dose   Sleep apnea:  NSAID use:  Contraindicated.  Avoid use.  Remote Monitoring:none  SGLT 2 - Jardiance 25 mg    2.  Other comordbid conditions:      #T2DM - PCP to manage - glipizide , Jardiance, metformin    #HTN- 127/68- Entresto and Metoprolol     #GERD- followed by PCP     3.  Follow-up   Entresto 49/51 mg BID   Labs 2 weeks   CORE in 2 weeks      Belinda MOORE, CNP  [facility]

## 2022-10-13 NOTE — PATIENT INSTRUCTIONS
Take your medicines every day, as directed    Changes made today:  Increase Entresto to 49-51 mg twice daily.     Monitor Your Weight and Symptoms    Contact us if you:    Gain 2 pounds in one day or 5 pounds in one week  Feel more short of breath  Notice more leg swelling  Feel lightheadeded   Change your lifestyle    Limit Salt or Sodium:  2000 mg  Limit Fluids:  2000 mL or approximately 64 ounces  Eat a Heart Healthy Diet  Low in saturated fats  Stay Active:  Aim to move at least 150 minutes every  week         To Contact us    During Business Hours:  [phone], option # 1      After hours, weekends or holidays:   [phone], Option #4  Ask to speak to the On-Call Cardiologist. Inform them you are a CORE/heart failure patient at the [facility].     Use [employer] allows you to communicate directly with your heart team through secure messaging.  [employer] can be accessed any time on your phone, computer, or tablet.  If you need assistance, we'd be happy to help!         Keep your Heart Appointments:    Labs in 2 weeks    CORE in 2 weeks

## 2022-10-13 NOTE — NURSING NOTE
"Chief Complaint   Patient presents with     Follow Up     Pt reports no heart symptoms, reports feeling better, CORE Return, 66 yo female with systolic heart failure presents for follow up with labs prior.       Initial /68 (BP Location: Right arm, Patient Position: Sitting, Cuff Size: Adult Regular)   Pulse 64   Wt 60.5 kg (133 lb 6.4 oz)   SpO2 97%   BMI 22.90 kg/m   Estimated body mass index is 22.9 kg/m  as calculated from the following:    Height as of 10/4/22: 1.626 m (5' 4\").    Weight as of this encounter: 60.5 kg (133 lb 6.4 oz)..  BP completed using cuff size: regular    EDWIN Joseph    "

## 2022-10-13 NOTE — NURSING NOTE
Labs: Patient was given results of the laboratory testing obtained today. Patient was instructed to return for the next laboratory testing in 2 weeks. Patient demonstrated understanding of this information and agreed to call with further questions or concerns.     Med Reconcile: Reviewed and verified all current medications with the patient. The updated medication list was printed and given to the patient.    Return Appointment: Patient given instructions regarding scheduling next clinic visit. Patient demonstrated understanding of this information and agreed to call with further questions or concerns. CORE in 2 weeks.    Medication Change: Patient was educated regarding prescribed medication change, including discussion of the indication, administration, side effects, and when to report to MD or RN. Patient demonstrated understanding of this information and agreed to call with further questions or concerns. Increase Entresto to 49-51 mg BID.    Patient stated she understood all health information given and agreed to call with further questions or concerns.    Dasia Amin, IKER

## 2022-10-14 LAB
MDC_IDC_LEAD_IMPLANT_DT: NORMAL
MDC_IDC_LEAD_LOCATION: NORMAL
MDC_IDC_LEAD_LOCATION_DETAIL_1: NORMAL
MDC_IDC_LEAD_MFG: NORMAL
MDC_IDC_LEAD_MODEL: NORMAL
MDC_IDC_LEAD_POLARITY_TYPE: NORMAL
MDC_IDC_LEAD_SERIAL: NORMAL
MDC_IDC_LEAD_SPECIAL_FUNCTION: NORMAL
MDC_IDC_MSMT_BATTERY_DTM: NORMAL
MDC_IDC_MSMT_BATTERY_REMAINING_LONGEVITY: 171 MO
MDC_IDC_MSMT_BATTERY_RRT_TRIGGER: NORMAL
MDC_IDC_MSMT_BATTERY_VOLTAGE: 3.16 V
MDC_IDC_MSMT_CAP_CHARGE_ENERGY: 40 J
MDC_IDC_MSMT_CAP_CHARGE_TIME: 0 S
MDC_IDC_MSMT_CAP_CHARGE_TYPE: NORMAL
MDC_IDC_MSMT_LEADCHNL_RV_IMPEDANCE_VALUE: 247 OHM
MDC_IDC_MSMT_LEADCHNL_RV_IMPEDANCE_VALUE: 323 OHM
MDC_IDC_MSMT_LEADCHNL_RV_PACING_THRESHOLD_AMPLITUDE: 0.5 V
MDC_IDC_MSMT_LEADCHNL_RV_PACING_THRESHOLD_PULSEWIDTH: 0.4 MS
MDC_IDC_MSMT_LEADCHNL_RV_SENSING_INTR_AMPL: 7.5 MV
MDC_IDC_PG_IMPLANT_DTM: NORMAL
MDC_IDC_PG_MFG: NORMAL
MDC_IDC_PG_MODEL: NORMAL
MDC_IDC_PG_SERIAL: NORMAL
MDC_IDC_PG_TYPE: NORMAL
MDC_IDC_SESS_CLINIC_NAME: NORMAL
MDC_IDC_SESS_DTM: NORMAL
MDC_IDC_SESS_TYPE: NORMAL
MDC_IDC_SET_BRADY_HYSTRATE: NORMAL
MDC_IDC_SET_BRADY_LOWRATE: 40 {BEATS}/MIN
MDC_IDC_SET_BRADY_MODE: NORMAL
MDC_IDC_SET_LEADCHNL_RV_PACING_AMPLITUDE: 3.5 V
MDC_IDC_SET_LEADCHNL_RV_PACING_ANODE_ELECTRODE_1: NORMAL
MDC_IDC_SET_LEADCHNL_RV_PACING_ANODE_LOCATION_1: NORMAL
MDC_IDC_SET_LEADCHNL_RV_PACING_CAPTURE_MODE: NORMAL
MDC_IDC_SET_LEADCHNL_RV_PACING_CATHODE_ELECTRODE_1: NORMAL
MDC_IDC_SET_LEADCHNL_RV_PACING_CATHODE_LOCATION_1: NORMAL
MDC_IDC_SET_LEADCHNL_RV_PACING_POLARITY: NORMAL
MDC_IDC_SET_LEADCHNL_RV_PACING_PULSEWIDTH: 0.4 MS
MDC_IDC_SET_LEADCHNL_RV_SENSING_ANODE_ELECTRODE_1: NORMAL
MDC_IDC_SET_LEADCHNL_RV_SENSING_ANODE_LOCATION_1: NORMAL
MDC_IDC_SET_LEADCHNL_RV_SENSING_CATHODE_ELECTRODE_1: NORMAL
MDC_IDC_SET_LEADCHNL_RV_SENSING_CATHODE_LOCATION_1: NORMAL
MDC_IDC_SET_LEADCHNL_RV_SENSING_POLARITY: NORMAL
MDC_IDC_SET_LEADCHNL_RV_SENSING_SENSITIVITY: 0.3 MV
MDC_IDC_SET_ZONE_DETECTION_BEATS_DENOMINATOR: 40 {BEATS}
MDC_IDC_SET_ZONE_DETECTION_BEATS_NUMERATOR: 30 {BEATS}
MDC_IDC_SET_ZONE_DETECTION_INTERVAL: 270 MS
MDC_IDC_SET_ZONE_DETECTION_INTERVAL: 320 MS
MDC_IDC_SET_ZONE_DETECTION_INTERVAL: 360 MS
MDC_IDC_SET_ZONE_DETECTION_INTERVAL: 360 MS
MDC_IDC_SET_ZONE_TYPE: NORMAL
MDC_IDC_STAT_AT_BURDEN_PERCENT: 0 %
MDC_IDC_STAT_AT_DTM_END: NORMAL
MDC_IDC_STAT_AT_DTM_START: NORMAL
MDC_IDC_STAT_BRADY_DTM_END: NORMAL
MDC_IDC_STAT_BRADY_DTM_START: NORMAL
MDC_IDC_STAT_BRADY_RA_PERCENT_PACED: NORMAL
MDC_IDC_STAT_BRADY_RV_PERCENT_PACED: 0.01 %
MDC_IDC_STAT_CRT_DTM_END: NORMAL
MDC_IDC_STAT_CRT_DTM_START: NORMAL
MDC_IDC_STAT_EPISODE_RECENT_COUNT: 0
MDC_IDC_STAT_EPISODE_RECENT_COUNT_DTM_END: NORMAL
MDC_IDC_STAT_EPISODE_RECENT_COUNT_DTM_START: NORMAL
MDC_IDC_STAT_EPISODE_TOTAL_COUNT: 0
MDC_IDC_STAT_EPISODE_TOTAL_COUNT_DTM_END: NORMAL
MDC_IDC_STAT_EPISODE_TOTAL_COUNT_DTM_START: NORMAL
MDC_IDC_STAT_EPISODE_TYPE: NORMAL
MDC_IDC_STAT_TACHYTHERAPY_ATP_DELIVERED_RECENT: 0
MDC_IDC_STAT_TACHYTHERAPY_ATP_DELIVERED_TOTAL: 0
MDC_IDC_STAT_TACHYTHERAPY_RECENT_DTM_END: NORMAL
MDC_IDC_STAT_TACHYTHERAPY_RECENT_DTM_START: NORMAL
MDC_IDC_STAT_TACHYTHERAPY_SHOCKS_ABORTED_RECENT: 0
MDC_IDC_STAT_TACHYTHERAPY_SHOCKS_ABORTED_TOTAL: 0
MDC_IDC_STAT_TACHYTHERAPY_SHOCKS_DELIVERED_RECENT: 0
MDC_IDC_STAT_TACHYTHERAPY_SHOCKS_DELIVERED_TOTAL: 0
MDC_IDC_STAT_TACHYTHERAPY_TOTAL_DTM_END: NORMAL
MDC_IDC_STAT_TACHYTHERAPY_TOTAL_DTM_START: NORMAL

## 2022-10-18 ENCOUNTER — HOSPITAL ENCOUNTER (OUTPATIENT)
Dept: CARDIAC REHAB | Facility: CLINIC | Age: 67
Discharge: HOME | End: 2022-10-18
Attending: SURGERY
Payer: COMMERCIAL

## 2022-10-18 PROCEDURE — 93798 PHYS/QHP OP CAR RHAB W/ECG: CPT

## 2022-10-20 ENCOUNTER — HOSPITAL ENCOUNTER (OUTPATIENT)
Dept: CARDIAC REHAB | Facility: CLINIC | Age: 67
Discharge: HOME | End: 2022-10-20
Attending: SURGERY
Payer: COMMERCIAL

## 2022-10-20 PROCEDURE — 93798 PHYS/QHP OP CAR RHAB W/ECG: CPT

## 2022-10-25 ENCOUNTER — HOSPITAL ENCOUNTER (OUTPATIENT)
Dept: CARDIAC REHAB | Facility: CLINIC | Age: 67
Discharge: HOME | End: 2022-10-25
Attending: SURGERY
Payer: COMMERCIAL

## 2022-10-25 PROCEDURE — 93798 PHYS/QHP OP CAR RHAB W/ECG: CPT

## 2022-10-27 ENCOUNTER — HOSPITAL ENCOUNTER (OUTPATIENT)
Dept: CARDIAC REHAB | Facility: CLINIC | Age: 67
Discharge: HOME | End: 2022-10-27
Attending: SURGERY
Payer: COMMERCIAL

## 2022-10-27 PROCEDURE — 93798 PHYS/QHP OP CAR RHAB W/ECG: CPT

## 2022-11-01 ENCOUNTER — HOSPITAL ENCOUNTER (OUTPATIENT)
Dept: CARDIAC REHAB | Facility: CLINIC | Age: 67
Discharge: HOME | End: 2022-11-01
Attending: SURGERY
Payer: COMMERCIAL

## 2022-11-01 ENCOUNTER — LAB (OUTPATIENT)
Dept: LAB | Facility: CLINIC | Age: 67
End: 2022-11-01
Payer: COMMERCIAL

## 2022-11-01 DIAGNOSIS — I50.20 HFREF (HEART FAILURE WITH REDUCED EJECTION FRACTION) (H): ICD-10-CM

## 2022-11-01 PROCEDURE — 36415 COLL VENOUS BLD VENIPUNCTURE: CPT

## 2022-11-01 PROCEDURE — 80048 BASIC METABOLIC PNL TOTAL CA: CPT

## 2022-11-02 ENCOUNTER — TELEPHONE (OUTPATIENT)
Dept: GASTROENTEROLOGY | Facility: CLINIC | Age: 67
End: 2022-11-02

## 2022-11-02 LAB
ANION GAP SERPL CALCULATED.3IONS-SCNC: 6 MMOL/L (ref 3–14)
BUN SERPL-MCNC: 23 MG/DL (ref 7–30)
CALCIUM SERPL-MCNC: 9.2 MG/DL (ref 8.5–10.1)
CHLORIDE BLD-SCNC: 107 MMOL/L (ref 94–109)
CO2 SERPL-SCNC: 27 MMOL/L (ref 20–32)
CREAT SERPL-MCNC: 0.91 MG/DL (ref 0.52–1.04)
GFR SERPL CREATININE-BSD FRML MDRD: 68 ML/MIN/1.73M2
GLUCOSE BLD-MCNC: 100 MG/DL (ref 70–99)
POTASSIUM BLD-SCNC: 5.3 MMOL/L (ref 3.4–5.3)
SODIUM SERPL-SCNC: 140 MMOL/L (ref 133–144)

## 2022-11-02 NOTE — TELEPHONE ENCOUNTER
11/2 [facility] for patient to call back to schedule appointemtn with GI to establish care with Edna or Adela. Provided patient with callback [phone].     Zakia Martinez  Procedure    Cardiology, Rheumatology, GI, Pulmonology, Nephrology Specialties   [facility]  [phone]

## 2022-11-03 ENCOUNTER — OFFICE VISIT (OUTPATIENT)
Dept: CARDIOLOGY | Facility: CLINIC | Age: 67
End: 2022-11-03
Attending: NURSE PRACTITIONER
Payer: COMMERCIAL

## 2022-11-03 VITALS
HEART RATE: 64 BPM | BODY MASS INDEX: 22.59 KG/M2 | DIASTOLIC BLOOD PRESSURE: 72 MMHG | SYSTOLIC BLOOD PRESSURE: 126 MMHG | WEIGHT: 131.6 LBS | OXYGEN SATURATION: 98 %

## 2022-11-03 DIAGNOSIS — I50.20 HFREF (HEART FAILURE WITH REDUCED EJECTION FRACTION) (H): Primary | ICD-10-CM

## 2022-11-03 DIAGNOSIS — I25.5 ISCHEMIC CARDIOMYOPATHY: ICD-10-CM

## 2022-11-03 DIAGNOSIS — E11.59 TYPE 2 DIABETES MELLITUS WITH OTHER CIRCULATORY COMPLICATION, WITHOUT LONG-TERM CURRENT USE OF INSULIN (H): ICD-10-CM

## 2022-11-03 DIAGNOSIS — I10 HYPERTENSION, UNSPECIFIED TYPE: ICD-10-CM

## 2022-11-03 DIAGNOSIS — F41.1 GENERALIZED ANXIETY DISORDER: ICD-10-CM

## 2022-11-03 DIAGNOSIS — E11.40 TYPE 2 DIABETES MELLITUS WITH DIABETIC NEUROPATHY, WITHOUT LONG-TERM CURRENT USE OF INSULIN (H): ICD-10-CM

## 2022-11-03 PROCEDURE — 99214 OFFICE O/P EST MOD 30 MIN: CPT | Performed by: NURSE PRACTITIONER

## 2022-11-03 RX ORDER — SACUBITRIL AND VALSARTAN 97; 103 MG/1; MG/1
1 TABLET, FILM COATED ORAL 2 TIMES DAILY
Qty: 60 TABLET | Refills: 3 | Status: SHIPPED | OUTPATIENT
Start: 2022-11-03 | End: 2022-12-28

## 2022-11-03 NOTE — LETTER
11/3/2022      RE: Marianne Monroy  [address]  [facility] [address]       Dear Colleague,    Thank you for the opportunity to participate in the care of your patient, Marianne Monroy, at the [facility]. Please see a copy of my visit note below.        HPI: Marianne is a 67 year old White female with a past medical history of T2DM, HTN, GERD, JUSTUS, MDD and recent episode of lightheadedness and dizziness on     Admission - - 2022.  Surgeon: Dr. Ulises Desai  1.  Coronary artery bypass grafting x 4 (left internal mammary artery to left anterior descending artery, saphenous vein graft to distal right coronary artery, saphenous vein graft to ramus intermedius artery, saphenous vein graft to obtuse marginal artery).  2.  Endoscopic vein harvest.    Patient has no SOB at rest.  She is now working up her strength. No swelling in the legs today. She has been taking the 49/51 mg BID of Entresto for a couple weeks.    She has some lightheadedness with fast movements.  Weight at home 131 lbs. She is working on eating more. On occasion she has JEFFERSON.  Rehab has been going well. ICD is in place.    Denies SOB,PND, orthopnea, edema, weight gain, chest pain, palpitations, lightheadedness, dizziness, near syncopal/syncopal episodes. Marianne has been following salt and fluid restrictions.      PAST MEDICAL HISTORY:  Past Medical History:   Diagnosis Date     Abnormal Pap smear of cervix ~    repeat pap was normal     Allergic rhinitis, cause unspecified      Anxiety state, unspecified     panic episodes     HFrEF (heart failure with reduced ejection fraction) (H)      Osteopenia      Unspecified essential hypertension        FAMILY HISTORY:  Family History   Problem Relation Age of Onset     Diabetes Mother         hypertension, alive at 83     C.A.D. Mother      Cancer Father         lung cancer, smoker.   at age 53      Family History Negative Sister      Osteoporosis Sister        SOCIAL HISTORY:  Social History     Tobacco Use     Smoking status: Never     Smokeless tobacco: Never   Vaping Use     Vaping Use: Never used   Substance Use Topics     Alcohol use: Yes     Comment: social     Drug use: No           CURRENT MEDICATIONS:  Current Outpatient Medications   Medication Sig Dispense Refill     acetaminophen (TYLENOL) 325 MG tablet Take 2 tablets (650 mg) by mouth every 4 hours as needed for other (For optimal non-opioid multimodal pain management to improve pain control.) 100 tablet 0     aspirin (ASA) 81 MG chewable tablet 2 tablets (162 mg) by Oral or NG Tube route daily 120 tablet 0     atorvastatin (LIPITOR) 80 MG tablet TAKE 1 TABLET DAILY (NEED ANNUAL EXAM) 90 tablet 3     blood glucose (NO BRAND SPECIFIED) test strip Use to test blood sugar 2 times daily or as directed. 200 strip 1     blood glucose (ONETOUCH VERIO IQ) test strip Use to test blood sugar 2 times daily or as directed. 200 strip 1     blood glucose monitoring (ONETOUCH VERIO) meter device kit Use to test blood sugar 2 times daily or as directed. 1 kit 0     empagliflozin (JARDIANCE) 25 MG TABS tablet Take 1 tablet (25 mg) by mouth daily 90 tablet 1     FLUoxetine (PROZAC) 20 MG capsule TAKE 2 CAPSULES DAILY 180 capsule 1     glipiZIDE (GLUCOTROL XL) 5 MG 24 hr tablet TAKE 2 TABLETS DAILY (NEED ANNUAL EXAM) 180 tablet 1     metFORMIN (GLUCOPHAGE XR) 500 MG 24 hr tablet Take 3 tablets (1,500 mg) by mouth daily (with dinner) Please see changed dose 270 tablet 1     metoprolol succinate ER (TOPROL XL) 50 MG 24 hr tablet Take 1 tablet (50 mg) by mouth daily 90 tablet 3     MULTIPLE VITAMIN OR TABS 1 TABLET DAILY       Multiple Vitamins-Minerals (PRESERVISION AREDS PO) Take 2 tablets by mouth daily       sacubitril-valsartan (ENTRESTO) 49-51 MG per tablet Take 1 tablet by mouth 2 times daily 180 tablet 3     vancomycin (VANCOCIN) 125 MG capsule 1 tablet 4  times daily x 15 days and then Twice daily x 15  days and then ones daily x 15 days 150 capsule 0       ROS:  Review Of Systems  Skin: negative  Eyes: negative  Ears/Nose/Throat: negative  Respiratory: No shortness of breath, dyspnea on exertion, cough, or hemoptysis  Cardiovascular: negative  Gastrointestinal: negative  Genitourinary: negative  Musculoskeletal: negative  Neurologic: negative  Psychiatric: negative  Hematologic/Lymphatic/Immunologic: negative  Endocrine: negative      EXAM:  /72 (BP Location: Right arm, Patient Position: Sitting, Cuff Size: Adult Regular)   Pulse 64   Wt 59.7 kg (131 lb 9.6 oz)   SpO2 98%   BMI 22.59 kg/m    Home weight:  General: alert, articulate, and in no acute distress.  HEENT: normocephalic, atraumatic, anicteric sclera, EOMI, mucosa moist, no cyanosis.   Neck: neck supple.  No adenopathy, masses, or carotid bruits.  JVP flat at 90 degrees  Heart: regular rhythm, normal S1/S2, no murmur, gallop, rub.  Precordium quiet with normal PMI.     Lungs: clear, no rales, ronchi, or wheezing.  No accessory muscle use, respirations unlabored.   Abdomen: soft, non-tender, bowel sounds present, no hepatomegaly  Extremities: no pitting edema.   No cyanosis.   Neurological: alert and oriented x 3.  normal speech and affect, no gross motor deficits  Skin:  No ecchymoses, rashes, or clubbing.    Labs:  CBC RESULTS:  Lab Results   Component Value Date    WBC 7.4 10/04/2022    WBC 7.1 09/09/2020    RBC 5.00 10/04/2022    RBC 4.57 09/09/2020    HGB 11.4 (L) 10/04/2022    HGB 13.3 09/09/2020    HCT 39.1 10/04/2022    HCT 41.1 09/09/2020    MCV 78 10/04/2022    MCV 90 09/09/2020    MCH 22.8 (L) 10/04/2022    MCH 29.1 09/09/2020    MCHC 29.2 (L) 10/04/2022    MCHC 32.4 09/09/2020    RDW 19.6 (H) 10/04/2022    RDW 12.5 09/09/2020     10/04/2022     09/09/2020       CMP RESULTS:  Lab Results   Component Value Date     11/01/2022     01/07/2021    POTASSIUM 5.3  11/01/2022    POTASSIUM 4.8 01/07/2021    CHLORIDE 107 11/01/2022    CHLORIDE 106 01/07/2021    CO2 27 11/01/2022    CO2 28 01/07/2021    ANIONGAP 6 11/01/2022    ANIONGAP 7 01/07/2021     (H) 11/01/2022     (H) 01/07/2021    BUN 23 11/01/2022    BUN 19 01/07/2021    CR 0.91 11/01/2022    CR 0.90 01/07/2021    GFRESTIMATED 68 11/01/2022    GFRESTIMATED 66 01/07/2021    GFRESTBLACK 77 01/07/2021    POONAM 9.2 11/01/2022    POONAM 9.5 01/07/2021    BILITOTAL 0.4 07/06/2022    BILITOTAL 0.4 09/09/2020    ALBUMIN 3.7 07/06/2022    ALBUMIN 3.7 09/09/2020    ALKPHOS 107 07/06/2022    ALKPHOS 90 09/09/2020    ALT 24 07/06/2022    ALT 33 09/09/2020    AST 14 07/06/2022    AST 14 09/09/2020        INR RESULTS:  Lab Results   Component Value Date    INR 1.29 (H) 05/25/2022       No components found for: CK  Lab Results   Component Value Date    MAG 2.0 06/04/2022     Lab Results   Component Value Date    NTBNP 2,714 (H) 06/09/2022     @BRIEFLABR (dig)@    Most recent echocardiogram:  No results found for this or any previous visit (from the past 8760 hour(s)).      Assessment and Plan:    In summary,Marianne  is a 67 year old here for a CORE follow up.  Patient is euvolemic today and we will increase Entresto today.     1.  Chronic systolic heart failure secondary to ICM.  Stage C  NYHA Class III  ACEi/ARB: -Entresto 97/103 BID-   BB: yes- continue up titration- Metoprolol 50 mg   Aldosterone antagonist: no- will initiate at future visit. - due to higher levels of potassium waiting to start  SCD prophylaxis: does not meet criteria for implant  Fluid status: euvolemic  Anticoagulation:   Antiplatelet:  ASA dose   Sleep apnea:  NSAID use:  Contraindicated.  Avoid use.  Remote Monitoring:none  SGLT 2 - Jardiance 25 mg    2.  Other comordbid conditions:      #T2DM - PCP to manage - glipizide , Jardiance, metformin    #HTN- 126/72 Entresto and Metoprolol     #GERD- followed by PCP     3.  Follow-up   Entresto 97/103 mg BID    Labs 2 weeks   CORE 12/1/22- labs prior      Belinda Colon  APRN, CNP  [facility]

## 2022-11-03 NOTE — PATIENT INSTRUCTIONS
Take your medicines every day, as directed    Changes made today:  Increase Entresto to  mg twice daily  Jardiance paperwork filled out and faxed   Monitor Your Weight and Symptoms    Contact us if you:    Gain 2 pounds in one day or 5 pounds in one week  Feel more short of breath  Notice more leg swelling  Feel lightheadeded   Change your lifestyle    Limit Salt or Sodium:  2000 mg  Limit Fluids:  2000 mL or approximately 64 ounces  Eat a Heart Healthy Diet  Low in saturated fats  Stay Active:  Aim to move at least 150 minutes every  week         To Contact us    During Business Hours:  [phone], option # 1      After hours, weekends or holidays:   [phone], Option #4  Ask to speak to the On-Call Cardiologist. Inform them you are a CORE/heart failure patient at the [facility].     Use [employer] allows you to communicate directly with your heart team through secure messaging.  [employer] can be accessed any time on your phone, computer, or tablet.  If you need assistance, we'd be happy to help!         Keep your Heart Appointments:    Labs in 2 weeks    CORE in 1 month with labs the day prior     Please consider attending our virtual support group which is held monthly. Please reach out to Lloyd at [phone] for more information if you are interested in attending.     2022 dates:   - Monday, November 7th, 1-2pm: topic: What's new in heart failure research and treatment?  - Monday, December 5th, 1-2pm: topic: How to thrive during winter and the holiday season with heart failure    2023 dates:  Monday, January 2nd , 1-2pm  Monday, February 6th , 1-2pm  Monday, March 6th , 1-2pm  Monday, April 3rd, 1-2pm  Monday, May 1st, 1-2pm  Monday, June 5th, 1-2pm  Monday, July 3rd, 1-2pm  Monday, August 7th, 1-2pm  Monday, September 11th, 1-2pm  Monday, October 2nd, 1-2pm  Monday, November 6th, 1-2pm  Monday, December 4th, 1-2pm    
denies pain/discomfort

## 2022-11-03 NOTE — PROGRESS NOTES
HPI: Marianne is a 67 year old White female with a past medical history of T2DM, HTN, GERD, JUSTUS, MDD and recent episode of lightheadedness and dizziness on     Admission - - 2022.  Surgeon: Dr. Ulises Desai  1.  Coronary artery bypass grafting x 4 (left internal mammary artery to left anterior descending artery, saphenous vein graft to distal right coronary artery, saphenous vein graft to ramus intermedius artery, saphenous vein graft to obtuse marginal artery).  2.  Endoscopic vein harvest.    Patient has no SOB at rest.  She is now working up her strength. No swelling in the legs today. She has been taking the 49/51 mg BID of Entresto for a couple weeks.    She has some lightheadedness with fast movements.  Weight at home 131 lbs. She is working on eating more. On occasion she has JEFFERSON.  Rehab has been going well. ICD is in place.    Denies SOB,PND, orthopnea, edema, weight gain, chest pain, palpitations, lightheadedness, dizziness, near syncopal/syncopal episodes. Marianne has been following salt and fluid restrictions.      PAST MEDICAL HISTORY:  Past Medical History:   Diagnosis Date     Abnormal Pap smear of cervix ~    repeat pap was normal     Allergic rhinitis, cause unspecified      Anxiety state, unspecified     panic episodes     HFrEF (heart failure with reduced ejection fraction) (H)      Osteopenia      Unspecified essential hypertension        FAMILY HISTORY:  Family History   Problem Relation Age of Onset     Diabetes Mother         hypertension, alive at 83     C.A.D. Mother      Cancer Father         lung cancer, smoker.   at age 53     Family History Negative Sister      Osteoporosis Sister        SOCIAL HISTORY:  Social History     Tobacco Use     Smoking status: Never     Smokeless tobacco: Never   Vaping Use     Vaping Use: Never used   Substance Use Topics     Alcohol use: Yes     Comment: social     Drug use: No           CURRENT MEDICATIONS:  Current Outpatient  Medications   Medication Sig Dispense Refill     acetaminophen (TYLENOL) 325 MG tablet Take 2 tablets (650 mg) by mouth every 4 hours as needed for other (For optimal non-opioid multimodal pain management to improve pain control.) 100 tablet 0     aspirin (ASA) 81 MG chewable tablet 2 tablets (162 mg) by Oral or NG Tube route daily 120 tablet 0     atorvastatin (LIPITOR) 80 MG tablet TAKE 1 TABLET DAILY (NEED ANNUAL EXAM) 90 tablet 3     blood glucose (NO BRAND SPECIFIED) test strip Use to test blood sugar 2 times daily or as directed. 200 strip 1     blood glucose (ONETOUCH VERIO IQ) test strip Use to test blood sugar 2 times daily or as directed. 200 strip 1     blood glucose monitoring (ONETOUCH VERIO) meter device kit Use to test blood sugar 2 times daily or as directed. 1 kit 0     empagliflozin (JARDIANCE) 25 MG TABS tablet Take 1 tablet (25 mg) by mouth daily 90 tablet 1     FLUoxetine (PROZAC) 20 MG capsule TAKE 2 CAPSULES DAILY 180 capsule 1     glipiZIDE (GLUCOTROL XL) 5 MG 24 hr tablet TAKE 2 TABLETS DAILY (NEED ANNUAL EXAM) 180 tablet 1     metFORMIN (GLUCOPHAGE XR) 500 MG 24 hr tablet Take 3 tablets (1,500 mg) by mouth daily (with dinner) Please see changed dose 270 tablet 1     metoprolol succinate ER (TOPROL XL) 50 MG 24 hr tablet Take 1 tablet (50 mg) by mouth daily 90 tablet 3     MULTIPLE VITAMIN OR TABS 1 TABLET DAILY       Multiple Vitamins-Minerals (PRESERVISION AREDS PO) Take 2 tablets by mouth daily       sacubitril-valsartan (ENTRESTO) 49-51 MG per tablet Take 1 tablet by mouth 2 times daily 180 tablet 3     vancomycin (VANCOCIN) 125 MG capsule 1 tablet 4 times daily x 15 days and then Twice daily x 15  days and then ones daily x 15 days 150 capsule 0       ROS:  Review Of Systems  Skin: negative  Eyes: negative  Ears/Nose/Throat: negative  Respiratory: No shortness of breath, dyspnea on exertion, cough, or hemoptysis  Cardiovascular: negative  Gastrointestinal: negative  Genitourinary:  negative  Musculoskeletal: negative  Neurologic: negative  Psychiatric: negative  Hematologic/Lymphatic/Immunologic: negative  Endocrine: negative      EXAM:  /72 (BP Location: Right arm, Patient Position: Sitting, Cuff Size: Adult Regular)   Pulse 64   Wt 59.7 kg (131 lb 9.6 oz)   SpO2 98%   BMI 22.59 kg/m    Home weight:  General: alert, articulate, and in no acute distress.  HEENT: normocephalic, atraumatic, anicteric sclera, EOMI, mucosa moist, no cyanosis.   Neck: neck supple.  No adenopathy, masses, or carotid bruits.  JVP flat at 90 degrees  Heart: regular rhythm, normal S1/S2, no murmur, gallop, rub.  Precordium quiet with normal PMI.     Lungs: clear, no rales, ronchi, or wheezing.  No accessory muscle use, respirations unlabored.   Abdomen: soft, non-tender, bowel sounds present, no hepatomegaly  Extremities: no pitting edema.   No cyanosis.   Neurological: alert and oriented x 3.  normal speech and affect, no gross motor deficits  Skin:  No ecchymoses, rashes, or clubbing.    Labs:  CBC RESULTS:  Lab Results   Component Value Date    WBC 7.4 10/04/2022    WBC 7.1 09/09/2020    RBC 5.00 10/04/2022    RBC 4.57 09/09/2020    HGB 11.4 (L) 10/04/2022    HGB 13.3 09/09/2020    HCT 39.1 10/04/2022    HCT 41.1 09/09/2020    MCV 78 10/04/2022    MCV 90 09/09/2020    MCH 22.8 (L) 10/04/2022    MCH 29.1 09/09/2020    MCHC 29.2 (L) 10/04/2022    MCHC 32.4 09/09/2020    RDW 19.6 (H) 10/04/2022    RDW 12.5 09/09/2020     10/04/2022     09/09/2020       CMP RESULTS:  Lab Results   Component Value Date     11/01/2022     01/07/2021    POTASSIUM 5.3 11/01/2022    POTASSIUM 4.8 01/07/2021    CHLORIDE 107 11/01/2022    CHLORIDE 106 01/07/2021    CO2 27 11/01/2022    CO2 28 01/07/2021    ANIONGAP 6 11/01/2022    ANIONGAP 7 01/07/2021     (H) 11/01/2022     (H) 01/07/2021    BUN 23 11/01/2022    BUN 19 01/07/2021    CR 0.91 11/01/2022    CR 0.90 01/07/2021    GFRESTIMATED 68  11/01/2022    GFRESTIMATED 66 01/07/2021    GFRESTBLACK 77 01/07/2021    POONAM 9.2 11/01/2022    POONAM 9.5 01/07/2021    BILITOTAL 0.4 07/06/2022    BILITOTAL 0.4 09/09/2020    ALBUMIN 3.7 07/06/2022    ALBUMIN 3.7 09/09/2020    ALKPHOS 107 07/06/2022    ALKPHOS 90 09/09/2020    ALT 24 07/06/2022    ALT 33 09/09/2020    AST 14 07/06/2022    AST 14 09/09/2020        INR RESULTS:  Lab Results   Component Value Date    INR 1.29 (H) 05/25/2022       No components found for: CK  Lab Results   Component Value Date    MAG 2.0 06/04/2022     Lab Results   Component Value Date    NTBNP 2,714 (H) 06/09/2022     @BRIEFLABR (dig)@    Most recent echocardiogram:  No results found for this or any previous visit (from the past 8760 hour(s)).      Assessment and Plan:    In summary,Marianne  is a 67 year old here for a CORE follow up.  Patient is euvolemic today and we will increase Entresto today.     1.  Chronic systolic heart failure secondary to ICM.  Stage C  NYHA Class III  ACEi/ARB: -Entresto 97/103 BID-   BB: yes- continue up titration- Metoprolol 50 mg   Aldosterone antagonist: no- will initiate at future visit. - due to higher levels of potassium waiting to start  SCD prophylaxis: does not meet criteria for implant  Fluid status: euvolemic  Anticoagulation:   Antiplatelet:  ASA dose   Sleep apnea:  NSAID use:  Contraindicated.  Avoid use.  Remote Monitoring:none  SGLT 2 - Jardiance 25 mg    2.  Other comordbid conditions:      #T2DM - PCP to manage - glipizide , Jardiance, metformin    #HTN- 126/72 Entresto and Metoprolol     #GERD- followed by PCP     3.  Follow-up   Entresto 97/103 mg BID   Labs 2 weeks   CORE 12/1/22- labs prior      Belinda MOORE, CNP  [facility]

## 2022-11-03 NOTE — NURSING NOTE
Labs: Patient was given results of the laboratory testing obtained today. Patient was instructed to return for the next laboratory testing in 2 weeks. Patient demonstrated understanding of this information and agreed to call with further questions or concerns.     Med Reconcile: Reviewed and verified all current medications with the patient. The updated medication list was printed and given to the patient.    Return Appointment: Patient given instructions regarding scheduling next clinic visit. Patient demonstrated understanding of this information and agreed to call with further questions or concerns. CORE in 1 month with labs prior.    Medication Change: Patient was educated regarding prescribed medication change, including discussion of the indication, administration, side effects, and when to report to MD or RN. Patient demonstrated understanding of this information and agreed to call with further questions or concerns. Increase Entresto to  mg BID.    Paperwork filled out for Jardiance patient assistance and faxed today.    Patient stated she understood all health information given and agreed to call with further questions or concerns.    Dasia Amin, RN

## 2022-11-07 ENCOUNTER — PATIENT OUTREACH (OUTPATIENT)
Dept: GASTROENTEROLOGY | Facility: CLINIC | Age: 67
End: 2022-11-07

## 2022-11-07 NOTE — TELEPHONE ENCOUNTER
Left a message for patient to call back to discuss appointment with Ms Chpaman  Also my chart message    The following result note has been reviewed. Please refer to telephone encounter created for further documentation.

## 2022-11-08 ENCOUNTER — TELEPHONE (OUTPATIENT)
Dept: CT IMAGING | Facility: CLINIC | Age: 67
End: 2022-11-08

## 2022-11-08 NOTE — TELEPHONE ENCOUNTER
REFERRAL INFORMATION:    Referring Provider:      Referring Clinic:     Reason for Visit/Diagnosis: Recurrent C. Diff      FUTURE VISIT INFORMATION:    Appointment Date: 11/15/2022    Appointment Time: 12 PM      NOTES STATUS DETAILS   OFFICE NOTE from Referring Provider N/A    OFFICE NOTE from Other Specialist Internal 9/21/2022 Office visit with Dr. Evette Cameron ([facility])    8/25/2022 E-Consult with Dr. Ajith Merida ([facility])      7/7/2022, 7/6/2022 Office visit with Dr. Roxanna Otoole ([facility])      HOSPITAL DISCHARGE SUMMARY/  ED VISITS N/A    OPERATIVE REPORT N/A    MEDICATION LIST Internal         ENDOSCOPY  N/A    COLONOSCOPY N/A    ERCP N/A    EUS N/A    STOOL TESTING Internal 8/28/2022, 8/24/2022, 7/6/2022   PERTINENT LABS Internal    PATHOLOGY REPORTS (RELATED) N/A    IMAGING (CT, MRI, EGD, MRCP, Small Bowel Follow Through/SBT, MR/CT Enterography) N/A

## 2022-11-08 NOTE — TELEPHONE ENCOUNTER
[facility] 11/8 to schedule follow up with Dr. Ventura on 11/30 at [place] location.       ----- Message from Tristen Mcdaniels RN sent at 11/8/2022  2:36 PM CST -----  Please schedule at [place] as she has opening on 11/30       Tristen Mcdaniels RN  Infectious Disease 2:36 PM 11/08/22     ----- Message -----  From: Dhara Ventura MD  Sent: 11/8/2022   2:22 PM CST  To: Reji Bishop MD, #    CCing ID clinic staff to assess my availability.    Thanks-  Dhara    ----- Message -----  From: Bora Owusu MD  Sent: 11/7/2022   8:22 AM CST  To: Reji Bishop MD, #    Hey - is there any C diff capacity to see this patient?     On vancomycin. Should be seen in next 6 weeks     Thanks !    ----- Message -----  From: Ajith Merida MD  Sent: 11/1/2022   5:07 PM CST  To: Bora Owusu MD    No one replied.    ----- Message -----  From: Bora Owusu MD  Sent: 10/31/2022   5:23 PM CDT  To: Ajith Merida MD    Did anyone reply?  I can help triage if not      ----- Message -----  From: Ajith Merida MD  Sent: 10/28/2022  12:27 PM CDT  To: Reji Bishop MD, Evette Cameron MD, #    Hi All,    This is a patient I did an E consult on last month. Has recurrent c diff and is finishing a vanco taper. She also saw Dr. Cameron in ID clinic. Her comorbidities with significant heart issues (history of non-sustained VT) which has limited ability for getting endoscopic evaluation. Per reports she has improved on 6 week taper.     Can we get her into c diff clinic (non-urgent visit) so can discuss a plan if there is a recurrence - IMT vs possibly bezlotoxumab if needed.    Thanks everyone,  Ajith    ----- Message -----  From: Evette Cameron MD  Sent: 9/22/2022   3:41 PM CDT  To: Ajith Merida MD    Dear Dr. Merida,    I saw Jody in clinic on Wednesday. I completely agree with your plan of completing vancomycin taper. She is improving. I also agree with  your plan to perform  colonoscopy and FMT if recurrence. I wanted to make myself available if any question. I also would like to ask if there is any benefit in planning for fidaxomicin and bezlotoxumab if symptoms recur while arrangements for FMT are made     Thank you very much    Evette

## 2022-11-11 ENCOUNTER — TELEPHONE (OUTPATIENT)
Dept: CARDIOLOGY | Facility: CLINIC | Age: 67
End: 2022-11-11

## 2022-11-11 NOTE — TELEPHONE ENCOUNTER
Attempted to call patient to reschedule appointment with Dr Steve on 1/4/23. Jody picked up and we started to talk, but all of a sudden she could no longer hear me. Jody disconnected the call.     Lolis Hernández CMA ([facility])

## 2022-11-14 ENCOUNTER — OFFICE VISIT (OUTPATIENT)
Dept: PHARMACY | Facility: CLINIC | Age: 67
End: 2022-11-14
Payer: COMMERCIAL

## 2022-11-14 ENCOUNTER — PATIENT OUTREACH (OUTPATIENT)
Dept: GASTROENTEROLOGY | Facility: CLINIC | Age: 67
End: 2022-11-14

## 2022-11-14 VITALS — WEIGHT: 130.5 LBS | BODY MASS INDEX: 22.4 KG/M2

## 2022-11-14 DIAGNOSIS — E78.5 HYPERLIPIDEMIA LDL GOAL <70: ICD-10-CM

## 2022-11-14 DIAGNOSIS — A04.72 C. DIFFICILE DIARRHEA: Primary | ICD-10-CM

## 2022-11-14 DIAGNOSIS — K21.9 GASTROESOPHAGEAL REFLUX DISEASE, UNSPECIFIED WHETHER ESOPHAGITIS PRESENT: ICD-10-CM

## 2022-11-14 DIAGNOSIS — I47.29 NON-SUSTAINED VENTRICULAR TACHYCARDIA (H): ICD-10-CM

## 2022-11-14 DIAGNOSIS — I50.20 HFREF (HEART FAILURE WITH REDUCED EJECTION FRACTION) (H): ICD-10-CM

## 2022-11-14 DIAGNOSIS — Z95.1 S/P CABG (CORONARY ARTERY BYPASS GRAFT): ICD-10-CM

## 2022-11-14 DIAGNOSIS — E11.65 TYPE 2 DIABETES MELLITUS WITH HYPERGLYCEMIA, WITHOUT LONG-TERM CURRENT USE OF INSULIN (H): ICD-10-CM

## 2022-11-14 DIAGNOSIS — Z78.9 TAKES DIETARY SUPPLEMENTS: ICD-10-CM

## 2022-11-14 DIAGNOSIS — I10 HYPERTENSION, GOAL BELOW 140/90: ICD-10-CM

## 2022-11-14 PROCEDURE — 99607 MTMS BY PHARM ADDL 15 MIN: CPT | Performed by: PHARMACIST

## 2022-11-14 PROCEDURE — 99605 MTMS BY PHARM NP 15 MIN: CPT | Performed by: PHARMACIST

## 2022-11-14 RX ORDER — ACETAMINOPHEN 500 MG
1000 TABLET ORAL 3 TIMES DAILY PRN
COMMUNITY
End: 2024-04-16

## 2022-11-14 RX ORDER — ASPIRIN 81 MG/1
81 TABLET ORAL EVERY EVENING
COMMUNITY

## 2022-11-14 NOTE — Clinical Note
KRISTINE SANDOVAL note, thanks!  Zeynep Yap, PharmD Medication Therapy Management Pharmacist [phone]

## 2022-11-14 NOTE — TELEPHONE ENCOUNTER
Patient left a voice mail asking about son joining into the video visit on the 15th with Ms Chapman   Contacted patient   Placed note in appt   Also asked patient to let the virtual facilitator that she is giving consent for her son to join in

## 2022-11-14 NOTE — PATIENT INSTRUCTIONS
"Recommendations from today's MTM visit:                                                         1. Think about the possibility of changing glipizide to Januvia, lower risk of low blood sugars. This would be safer for you.     2. Continue to minimize use of omeprazole, daily use can increase risk of getting c diff.     3. Acetaminophen (Tylenol) is safe to use.    4. Avoid ibuprofen (Aleve, Advil), this can increase risk of stomach bleeds/ulcers.     Follow-up: Return in about 1 year (around 11/14/2023).    It was great speaking with you today.  I value your experience and would be very thankful for your time in providing feedback in our clinic survey. In the next few days, you may receive an email or text message from [employer] with a link to a survey related to your  clinical pharmacist.\"     To schedule another MTM appointment, please call the clinic directly or you may call the MTM scheduling line at [phone] or toll-free at [phone].     My Clinical Pharmacist's contact information:                                                      Please feel free to contact me with any questions or concerns you have.      Zeynep Yap, PharmD  Medication Therapy Management Pharmacist  [phone]   "

## 2022-11-14 NOTE — LETTER
"Recommended To-Do List      Prepared on: Nov 14, 2022       You can get the best results from your medications by completing the items on this \"To-Do List.\"      Bring your To-Do List when you go to your doctor. And, share it with your family or caregivers.    My To-Do List:  What we talked about: What I should do:   The importance of taking your medication as intended    Education:   1. Think about the possibility of changing glipizide to Januvia, lower risk of low blood sugars. This would be safer for you.     2. Continue to minimize use of omeprazole, daily use can increase risk of getting c diff.     3. Acetaminophen (Tylenol) is safe to use.    4. Avoid ibuprofen (Aleve, Advil), this can increase risk of stomach bleeds/ulcers.            What we talked about: What I should do:                       "

## 2022-11-14 NOTE — LETTER
November 14, 2022  Marianne GAMA Porfirio  [address]   [facility] [address]    Dear Ms. Monroy, [facility]     Thank you for talking with me on Nov 14, 2022 about your health and medications. As a follow-up to our conversation, I have included two documents:      1. Your Recommended To-Do List has steps you should take to get the best results from your medications.  2. Your Medication List will help you keep track of your medications and how to take them.    If you want to talk about these documents, please call Malu Yap RPH at phone: [phone], Monday-Friday 8-4:30pm.    I look forward to working with you and your doctors to make sure your medications work well for you.    Sincerely,  Malu Yap RPH  [facility] Pharmacist, [facility]

## 2022-11-14 NOTE — PROGRESS NOTES
Medication Therapy Management (MTM) Encounter    ASSESSMENT:                            Medication Adherence/Access: No issues identified    C diff:  Plan in place    Type 2 Diabetes: Patient is meeting A1c goal of < 7%.  Glipizide may be contributing to her hypoglycemia, may benefit from changing to Januvia, however she prefers to think about this. Would rather eat a piece of toast at night to avoid a low, which is appropriate at this time.      HFrEF/Hypertension/CAD/pacemaker: Stable, blood pressure was at goal < 140/90. Discussed risks of frequent ibuprofen use, her use is minimal, but best to avoid, also considering history of ulcer.     Hyperlipidemia: Stable.  Patient is on high intensity statin which is indicated based on 2019 ACC/AHA guidelines for lipid management.      GERD/Hx c diff: Omeprazole may increase risk of c diff.     Depression/anxiety: Stable.     Supplements: Stable.     PLAN:                            1. Think about the possibility of changing glipizide to Januvia, lower risk of low blood sugars. This would be safer for you.     2. Continue to minimize use of omeprazole, daily use can increase risk of getting c diff.     3. Acetaminophen (Tylenol) is safe to use.    4. Avoid ibuprofen (Aleve, Advil), this can increase risk of stomach bleeds/ulcers.     Follow-up: Return in about 1 year (around 11/14/2023) for Medication Therapy Management.    SUBJECTIVE/OBJECTIVE:                          Marianne Monroy is a 67 year old female coming in for a follow-up visit.  Today's visit is a follow-up MTM visit from 11/30/22.     Reason for visit: med review.    Allergies/ADRs: Reviewed in chart  Past Medical History: Reviewed in chart  Tobacco: She reports that she has never smoked. She has never used smokeless tobacco.  Alcohol: Less than 1 beverages / week  Caffeine: 2 cups/day of coffee  Activity: [employer] every other day  Past Medical History: Reviewed in chart    Medication Adherence/Access:    Patient uses pill box(es).  Patient takes medications 2 time(s) per day.   Pharmacy: [facility] doses 0 times/week.    C diff:    Finnished vancomycin three weeks ago, she thinks it might be back though, is having diarrhea again, fatigue. Will be seeing infectious disease tomorrow.   Using Peptobismol.    Type 2 Diabetes:    Metformin 1500 mg with supper  Glipizide XL 10 mg daily  Jardiance 25 mg once daily    Has diarrhea related to C diff.  History of diarrhea with metformin 2000 mg/day.   Blood sugar monitorin-4 time(s) daily Ranges (patient reported):  Fasting: ~110  1.5-2 hours after meal: 180s before lunch  Symptoms of low blood sugar? 50 of 60 overnight, wakes up with this, eye sight is off, treats with snack size M &M. Frequency of lows- 1-2x/month  Symptoms of high blood sugar? none  Eye exam: up to date  Foot exam: up to date  Diet/Exercise: did not discuss  Aspirin: Taking 81mg daily and denies side effects  Statin: Yes: atorvastatin   ACEi/ARB: Yes: lisinopril.   Urine Albumin:   Lab Results   Component Value Date    UMALCR  2022      Comment:      Unable to calculate:  Urine creatinine or albumin value below detectable level     Lab Results   Component Value Date    A1C 6.8 2022    A1C 7.8 2022    A1C 8.1 2021    A1C 7.6 2021    A1C 7.9 2021    A1C 8.5 2020    A1C 8.8 2020    A1C 7.6 2019     HFrEF/Hypertension/CAD/pacemaker:  Entresto 97-10 mg twice daily   Jardiance (as above)  Metoprolol ER 50 mg daily   Aspirin 81 mg daily     Sometimes gets a shoulder pain since her pacemaker was placed, ibuprofen helps with this, rarely uses.  Patient does self-monitor blood pressure. Home BP monitoring in range of 130's systolic over 70's diastolic.  Patient reports no current medication side effects.    Weight is stable ~ 130-132 lb.  Follows with Dr. Steve, cardiology.  EF: 35-40% 22  Did not obtain clinic blood pressure reading due to  possible c diff and stable blood pressure per home readings.   BP Readings from Last 3 Encounters:   11/03/22 126/72   10/13/22 127/68   10/04/22 134/66     Hyperlipidemia:   atorvastatin 80 mg daily    Patient reports no significant myalgias or other side effects.  Recent Labs   Lab Test 05/18/22  1715 04/14/22  1529   CHOL 147 152   HDL 47* 50   LDL 57 64   TRIG 214* 192*     GERD/Hx c diff:   Prilosec (omeprazole) 20 mg daily as needed (less than 1x/week)    Has history of ulcer, no bleed.  Pt reports no current symptoms.  Patient feels that current regimen is effective.     Depression/anxiety:    Fluoxetine 40 mg daily     Patient reports that depression symptoms are improved.  PHQ-9 SCORE 5/18/2021 4/14/2022 9/14/2022   PHQ-9 Total Score - - -   PHQ-9 Total Score MyChart - 5 (Mild depression) 5 (Mild depression)   PHQ-9 Total Score 3 4 5     Supplements:   Multivitamin daily  Preservision 1 daily     States this/these are effective. Denies side effects.   Vitamin D Deficiency Screening Results:  No results found for: VITDT     Today's Vitals: Wt 130 lb 8 oz (59.2 kg)   BMI 22.40 kg/m    ----------------      I spent 30 minutes with this patient today. All changes were made via collaborative practice agreement with Roxanna Otoole MD. A copy of the visit note was provided to the patient's provider(s).    The patient was sent via [employer] a summary of these recommendations.     Zeynep Yap, PharmD  Medication Therapy Management Pharmacist  [phone]     Medication Therapy Recommendations  Diabetes mellitus, type 2 (H)    Current Medication: glipiZIDE (GLUCOTROL XL) 5 MG 24 hr tablet   Rationale: Unsafe medication for the patient - Adverse medication event - Safety   Recommendation: Change Medication - Januvia 50 MG Tabs   Status: Declined per Patient         Takes dietary supplements    Rationale: Does not understand instructions - Adherence - Adherence   Recommendation: Provide Education - ibuprofen   Status:  Patient Agreed - Adherence/Education

## 2022-11-14 NOTE — LETTER
_  Medication List        Prepared on: Nov 14, 2022     Bring your Medication List when you go to the doctor, hospital, or   emergency room. And, share it with your family or caregivers.     Note any changes to how you take your medications.  Cross out medications when you no longer use them.    Medication How I take it Why I use it Prescriber   acetaminophen (TYLENOL) 500 MG tablet Take 1,000 mg by mouth 3 times daily as needed for mild pain pain Patient Reported   aspirin 81 MG EC tablet Take 81 mg by mouth daily General Health Patient Reported   atorvastatin (LIPITOR) 80 MG tablet TAKE 1 TABLET DAILY (NEED ANNUAL EXAM) S/P CABG (Coronary Artery Bypass Graft) Roxanna Otoole MD   blood glucose (NO BRAND SPECIFIED) test strip Use to test blood sugar 2 times daily or as directed. Diabetes Mellitus, Type 2 (H) Roxanna Otoole MD   blood glucose (ONETOUCH VERIO IQ) test strip Use to test blood sugar 2 times daily or as directed. Type 2 diabetes mellitus with hyperglycemia, without long-term current use of insulin (H) Roxanna Otoole MD   blood glucose monitoring (ONETOUCH VERIO) meter device kit Use to test blood sugar 2 times daily or as directed. Type 2 diabetes mellitus with hyperglycemia, without long-term current use of insulin (H) Roxanna Otoole MD   empagliflozin (JARDIANCE) 25 MG TABS tablet Take 1 tablet (25 mg) by mouth daily Type 2 diabetes mellitus with diabetic neuropathy, without long-term current use of insulin (H) Roxanna Otoole MD   FLUoxetine (PROZAC) 20 MG capsule TAKE 2 CAPSULES DAILY Major Depressive Disorder, Recurrent Episode, Mild (H) Roxanna Otoole MD   glipiZIDE (GLUCOTROL XL) 5 MG 24 hr tablet TAKE 2 TABLETS DAILY (NEED ANNUAL EXAM) Type 2 diabetes mellitus with other circulatory complication, without long-term current use of insulin (H) Roxanna Otoole MD   metFORMIN (GLUCOPHAGE XR) 500 MG 24 hr tablet Take 3 tablets (1,500 mg) by mouth daily (with dinner) Please see changed dose Type 2 diabetes mellitus  with other circulatory complication, without long-term current use of insulin (H) Roxanna Otoole MD   metoprolol succinate ER (TOPROL XL) 50 MG 24 hr tablet Take 1 tablet (50 mg) by mouth daily S/P CABG (Coronary Artery Bypass Graft) OSCAR Joseph CNP   MULTIPLE VITAMIN OR TABS 1 TABLET DAILY General Health Patient Reported   Multiple Vitamins-Minerals (PRESERVISION AREDS PO) Take 2 tablets by mouth daily General Health Patient Reported   sacubitril-valsartan (ENTRESTO)  MG per tablet Take 1 tablet by mouth 2 times daily HFrEF (heart failure with reduced ejection fraction) (H) OSCAR Joseph CNP         Add new medications, over-the-counter drugs, herbals, vitamins, or  minerals in the blank rows below.    Medication How I take it Why I use it Prescriber                          Allergies:      effexor [venlafaxine hydrochloride]; latex        Side effects I have had:               Other Information:              My notes and questions:

## 2022-11-15 ENCOUNTER — MYC MEDICAL ADVICE (OUTPATIENT)
Dept: FAMILY MEDICINE | Facility: CLINIC | Age: 67
End: 2022-11-15

## 2022-11-15 ENCOUNTER — PRE VISIT (OUTPATIENT)
Dept: GASTROENTEROLOGY | Facility: CLINIC | Age: 67
End: 2022-11-15

## 2022-11-15 NOTE — TELEPHONE ENCOUNTER
Pt calling. States mychart will not allow her to do an evisit for covid treatment. She tried to schedule for covid treatment, but there was nothing available. States she has a fever, cough, and sneezing. Feels like she has a head cold. Was tired and sleeping most of the day yesterday. Today was when the head cold started. Just had a quad bypass and pacemaker placed. Pt is requesting covid treatment. Routing to PCP to advise.    Janie Carroll RN  [facility]

## 2022-11-16 ENCOUNTER — VIRTUAL VISIT (OUTPATIENT)
Dept: FAMILY MEDICINE | Facility: CLINIC | Age: 67
End: 2022-11-16
Payer: COMMERCIAL

## 2022-11-16 DIAGNOSIS — R19.7 DIARRHEA, UNSPECIFIED TYPE: ICD-10-CM

## 2022-11-16 DIAGNOSIS — U07.1 INFECTION DUE TO 2019 NOVEL CORONAVIRUS: Primary | ICD-10-CM

## 2022-11-16 PROCEDURE — 99213 OFFICE O/P EST LOW 20 MIN: CPT | Mod: 95 | Performed by: FAMILY MEDICINE

## 2022-11-16 NOTE — PROGRESS NOTES
Jody is a 67 year old who is being evaluated via a billable video visit.      How would you like to obtain your AVS? MyChart  If the video visit is dropped, the invitation should be resent by: Text to cell phone: [phone]  Will anyone else be joining your video visit? No        Assessment & Plan     Infection due to 2019 novel coronavirus  SEE EPIC care orders  The potential side effects of this medication have been discussed with the patient.  Call if any significant problems with these are experienced.    - nirmatrelvir and ritonavir (PAXLOVID) therapy pack; Take 3 tablets by mouth 2 times daily for 5 days (Take 2 Nirmatrelvir tablets and 1 Ritonavir tablet twice daily for 5 days)    Diarrhea, unspecified type  Pt has appointment with Infectious Disease  Advised check C diff  - C. difficile Toxin B PCR with reflex to C. difficile Antigen and Toxins A/B EIA; Future  Drink Lots of Fluids  Let me Know if worse  Ordering of each unique test  Prescription drug management  [id number]}  Return in about 1 week (around 11/23/2022), or if symptoms worsen or fail to improve, for recheck/ sooner if worse or New symptoms.    Roxanna Otoole MD  [facility]    Subjective   Jody is a 67 year old{, presenting for the following health issues:  Covid Concern      HPI       COVID-19 Symptom Review  How many days ago did these symptoms start? Patient took an at home covid  test yesterday and tested positive. Symptoms started on 11/14/22 with fatigue.     Are any of the following symptoms significant for you?    New or worsening difficulty breathing? No    Worsening cough? Productive  cough. Yellow sputum.     Fever or chills? No    Headache: YES.    Sore throat: No    Chest pain: No    Diarrhea: YES.    Body aches? YES.    What treatments has patient tried?  None.  Does patient live in a nursing home, group home, or shelter? No.       Pt  Has  had Problems with recurring c -diff  Has ap with Infectious Disease has  had some diarrhea 3-4 Times  No  Review of Systems   CONSTITUTIONAL: NEGATIVE for fever, chills, change in weight  RESP:as above  CV: NEGATIVE for chest pain, palpitations or peripheral edema  GI: as above-No pain  NEURO: NEGATIVE for weakness, dizziness or paresthesias  ROS otherwise negative      Objective           Vitals:  No vitals were obtained today due to virtual visit.    Physical Exam   GENERAL: Healthy, alert and no distress  EYES: Eyes grossly normal to inspection.  No discharge or erythema, or obvious scleral/conjunctival abnormalities.  RESP: No audible wheeze, cough, or visible cyanosis.  No visible retractions or increased work of breathing.    SKIN: Visible skin clear. No significant rash, abnormal pigmentation or lesions.  NEURO: Cranial nerves grossly intact.  Mentation and speech appropriate for age.  PSYCH: Mentation appears normal, affect normal/bright, judgement and insight intact, normal speech and appearance well-groomed.    Covid positive  Video-Visit Details    Video Start Time: 1:16 PM    Type of service:  Video Visit    Video End Time:1:28 PM    Originating Location (pt. Location): Home   1:37 PM -visit complete    Distant Location (provider location):  On-site    Platform used for Video Visit: Rosalia

## 2022-11-16 NOTE — PATIENT INSTRUCTIONS
COVID-19 Outpatient Treatments  Your care team can help you find the best treatments for COVID-19. Talk to a health care provider or refer to the FDA medicine fact sheets below.    Important: You CAN'T have molnupiravir or Paxlovid if you are starting the medicine more than 5 days after your symptoms have started.  Paxlovid: https://www.fda.gov/media/995179/download  Molnupiravir: https://www.fda.gov/media/485452/download  Monoclonal antibodies: https://combatcovid.hhs.gov/what-are-monoclonal-antibodies  Paxlovid (nimatrelvir and ritonavir)  How it works  Two medicines (nirmatrelvir and ritonavir) are taken together. They stop the virus from growing. Less amount of virus is easier for your body to fight.  Benefits  Lowers risk of a hospital stay or death from COVID-19.  How to take    Medicine comes in a daily container with both medicine tablets. Take by mouth twice daily (once in the morning, once at night) for 5 days.    The number of tablets to take varies by patient.    Don't chew or break capsules. Swallow whole.  When to take  Take as soon as possible after positive COVID-19 test result, and within 5 days of your first symptoms.  Who can take it  Patients must be 12 years or older, weigh at least 88 pounds, and have tested positive for COVID-19. This is the preferred treatment for pregnant patients.  Possible side effects  Can cause altered sense of taste, diarrhea (loose, watery stools), high blood pressure, muscle aches.  Medicine conflicts    Some medicines may conflict with Paxlovid and may cause serious side effects.    Tell your care team about all the medicines you take, including prescription and over-the-counter medicines, vitamins and herbal supplements.    Your provider will review your medicines to make sure that you can safely take Paxlovid.  Cautions    Paxlovid is not advised for patients with severe kidney or liver disease. If you have kidney or liver problems, the dose may need to be  changed.    If you are pregnant or breastfeeding, talk to your care team about your options.    If you are sexually active, use trusted birth control while taking Paxlovid.  Molnupiravir  How it works  Stops the virus from growing. Less amount of virus is easier for your body to fight.  Benefits  Lowers risk of a hospital stay or death from COVID-19.  How to take  Take 4 capsules by mouth every 12 hours (4 in the morning and 4 at night) for 5 days. Don't chew or break capsules. Swallow whole.  When to take  Take as soon as possible after positive COVID-19 test result, and within 5 days of your first symptoms.  Who can take it  Patients must be 18 years or older and have tested positive for COVID-19.  Possible side effects  Diarrhea (loose, watery stools), nausea (feeling sick to your stomach), dizziness, headaches.  Medicine conflicts  Right now, there are no known conflicts with other drugs. But tell your care team all medicines you take.  Cautions    This is not advised for patients who are pregnant.    Patients who could become pregnant should use trusted birth control until 4 days after their last dose.    Sexually active people of any gender should use trusted birth control for 3 months after their last dose.  Monoclonal antibodies  How it works  Monoclonal antibodies can detect pieces of the COVID virus and stop it from infecting your cells.  Benefits  Lowers risk of a hospital stay or death from COVID-19. Monoclonal antibodies are known to work well against the omicron variant.  How it is given to you  You will receive the treatment either by an infusion through your vein (IV) or shots.  When to take  Get as soon as possible after you test positive for COVID-19, and within 7 days of your first symptoms.  Who can take it  Patients must be 12 years or older, weigh at least 88 pounds and have tested positive for COVID-19.  Possible side effects  Fever, chills, diarrhea (loose, watery stools), dizziness,  itchiness and rash.  More serious side effects include: fever, difficulty breathing, low oxygen level in your blood, chills, tiredness, fast or slow heart rate, chest discomfort or pain, weakness, confusion, nausea, headache, shortness of breath, low or high blood pressure, wheezing, swelling of your lips, face, or throat, rash including hives, itching, muscle aches, dizziness, feeling faint and sweating.  If you receive an IV, you may have brief pain, bleeding and bruising of the skin, soreness, swelling and possible infection at the place where you get the IV needle.  Medicine conflicts  Please tell you care team other medicines you take so they can assess if there are any conflicts.  Cautions  Your doctor will talk with you about risks and benefits of this treatment and will help choose the best option for you.  For informational purposes only. Not to replace the advice of your health care provider.  Copyright   2022 [facility]. All rights reserved. Clinically reviewed by Denisha Singletary. [employer] [id number] - 08/22.    Instructions for Patients      What are the symptoms of COVID-19?  Symptoms can include fever, cough, shortness of breath, chills, headache, muscle pain sore throat, fatigue, runny or stuffy nose, and loss of taste and smell. Other less common symptoms include nausea, vomiting, or diarrhea (watery stools).    Know when to call 911. Emergency warning signs include:    Trouble breathing or shortness of breath    Pain or pressure in the chest that doesn't go away    Feeling confused like you haven't felt before, or not being able to wake up    Bluish-colored lips or face    How can I take care of myself?  1. Get lots of rest. Drink extra fluids (unless a doctor has told you not to).  2. Take Tylenol (acetaminophen) for fever or pain. If you have liver or kidney problems, ask your family doctor if it's okay to take Tylenol   Adults:   650 mg (two 325 mg pills or tablets) every 4 to 6 hours,  or...   1,000 mg (two 500 mg pills or tablets) every 8 hours as needed.  Note: Don't take more than 3,000 mg in one day. Acetaminophen is found in many medicines (both prescribed and over the counter). Read all labels to be sure you don't take too much.  For children, check the Tylenol bottle for the right dose. The dose is based on the child's age or weight.  3. Take over the counter medicines for your symptoms as needed. Talk to your pharmacist.  4. If you have other health problems (like cancer, heart failure, an organ transplant, or severe kidney disease): Call your specialty clinic if you don't feel better in the next 2 days.    Where can I get more information?     [facility] COVID-19 Resource Hub: www.[facility].[email or web address]/covid19/     CDC Quarantine & Isolation: https://www.cdc.gov/coronavirus/2019-ncov/your-health/quarantine-isolation.html     CDC - What to Do If You're Sick: https://www.cdc.gov/coronavirus/2019-ncov/if-you-are-sick/index.html    [facility] clinical trials (COVID-19 research studies): clinicalaffairs.[facility]/umn-clinical-trials    [place] Department of Health COVID-19 Public Hotline: 1-346.979.3090

## 2022-11-21 ENCOUNTER — HEALTH MAINTENANCE LETTER (OUTPATIENT)
Age: 67
End: 2022-11-21

## 2022-11-22 ENCOUNTER — VIRTUAL VISIT (OUTPATIENT)
Dept: GASTROENTEROLOGY | Facility: CLINIC | Age: 67
End: 2022-11-22
Payer: COMMERCIAL

## 2022-11-22 VITALS — BODY MASS INDEX: 22.31 KG/M2 | OXYGEN SATURATION: 97 % | WEIGHT: 130 LBS

## 2022-11-22 DIAGNOSIS — A04.72 C. DIFFICILE DIARRHEA: ICD-10-CM

## 2022-11-22 DIAGNOSIS — D50.0 IRON DEFICIENCY ANEMIA DUE TO CHRONIC BLOOD LOSS: Primary | ICD-10-CM

## 2022-11-22 DIAGNOSIS — K52.9 CHRONIC DIARRHEA: ICD-10-CM

## 2022-11-22 DIAGNOSIS — R63.4 UNINTENTIONAL WEIGHT LOSS: ICD-10-CM

## 2022-11-22 PROCEDURE — 99205 OFFICE O/P NEW HI 60 MIN: CPT | Mod: 95 | Performed by: PHYSICIAN ASSISTANT

## 2022-11-22 ASSESSMENT — PAIN SCALES - GENERAL: PAINLEVEL: NO PAIN (0)

## 2022-11-22 NOTE — LETTER
11/22/2022         RE: Marianne Monroy  [address]  [facility] [address]        Dear Colleague,    Thank you for referring your patient, Marianne Monroy, to the [facility] [place]. Please see a copy of my visit note below.    Jody is a 67 year old who is being evaluated via a billable video visit.      How would you like to obtain your AVS? MyChart  If the video visit is dropped, the invitation should be resent by: Send to e-mail at: [email or web address]  Will anyone else be joining your video visit? Yes: email. [email or web address]. How would they like to receive their invitation? Send to e-mail at: [email or web address]      Video-Visit Details    Video Start Time: 1157    Type of service:  Video Visit    Video End Time:12:50 PM    Originating Location (pt. Location): Home        Distant Location (provider location):  Off-site    Platform used for Video Visit: [facility]        GI CLINIC VISIT    CC/REFERRING PROVIDER: No ref. provider found  REASON FOR CONSULTATION: rCDI    HPI: 67 year old female with PMH of Type 2 diabetes, HFrEF, hypertension, CAD, GERD on PPI, who is s/p pacemaker insertion presenting to GI clinic for  recurrent C difficile     Jody is joined today by her son, Matthew. Briefly, Jody was hospitalized for several weeks for cardiac bypass surgery, and developed watery stools associated with fecal incontinence and abdominal cramping while hospitalized, with positive C diff testing 7/6/2022. At time of surgery, she had been given IV Ancef, otherwise does not historically receive frequent antibiotics. She was treated with 14 days of oral vancomycin with reported partial improvement in symptoms. Given ongoing diarrhea, she was given an empiric second course of oral vancomycin, without confirmatory stool testing. Due to ongoing loose stools, she underwent repeat C diff testing on 8/24/2022 which was positive, with slightly elevated fecal romero at 59.8. She  "was given a vancomycin taper, again with partial improvement in loose stools. She describes the stools would improve from watery to \"mud\", and improvement from 15 bowel movements per day to 3-4 times per day. At this time, she has completed vancomycin and has been off therapy for about 3 weeks. She is having 3-4 watery stools during the day, and an additional 1-2 stools overnight, awakening her from sleep. Occasional abdominal cramping.  She states her rectum is itching and hurts. She has noticed bright red blood on the toilet paper, new since the C diff infections. Prior to CDI, reports having a daily formed stool, with occasional loose stools with increased metformin dosing. She estimates she has lost 20#.    She has also recently been found to have iron deficiency anemia with a ferritin of 5.     Received Ancef 10/4/2022, followed by prophlyactic oral course of Keflex at time of pacemaker- no future procedures or antibioics    Grandmother - stomach cancer      ROS: 10pt ROS performed and otherwise negative.    PAST MEDICAL HISTORY:  Past Medical History:   Diagnosis Date     Abnormal Pap smear of cervix ~2000    repeat pap was normal     Allergic rhinitis, cause unspecified      Anxiety state, unspecified     panic episodes     HFrEF (heart failure with reduced ejection fraction) (H)      Osteopenia      Unspecified essential hypertension        PREVIOUS ABDOMINAL/GYNECOLOGIC SURGERIES:    Past Surgical History:   Procedure Laterality Date     BYPASS GRAFT ARTERY CORONARY N/A 05/24/2022    Procedure: Median sternotomy.  Intraoperative transesophageal echocardiogram per anesthesia.  Left internal mammary artery harvest.  Right and left endoscopically harvested  greater saphenouse vein.  Cardiopulmonary Bypass.  Coronary Artery Bypass Grafts x4.;  Surgeon: Ulises Desai MD;  Location: [facility] OR     CV CORONARY ANGIOGRAM  05/19/2022    Procedure: CV CORONARY ANGIOGRAM;  Surgeon: Huseyin Vogel MD;  " Location:  HEART CARDIAC CATH LAB     CV CORONARY ANGIOGRAM  05/19/2022    Procedure: ;  Surgeon: Huseyin Vogel MD;  Location: [facility] CARDIAC CATH LAB     EP ICD INSERT SINGLE N/A 10/4/2022    Procedure: Implantable Cardioverter Defibrillator Device & Lead Implant Single or Dual;  Surgeon: Too Morrow MD;  Location: [facility] CARDIAC CATH LAB     NO HISTORY OF SURGERY       PICC DOUBLE LUMEN PLACEMENT Right 05/27/2022    Failed PICC attempt right arm basilic vein     PICC INSERTION - TRIPLE LUMEN Left 05/28/2022    left basilic 5fr tl,picc44 cm         PERTINENT MEDICATIONS:  Current Outpatient Medications   Medication Sig Dispense Refill     acetaminophen (TYLENOL) 500 MG tablet Take 1,000 mg by mouth 3 times daily as needed for mild pain       aspirin 81 MG EC tablet Take 81 mg by mouth daily       atorvastatin (LIPITOR) 80 MG tablet TAKE 1 TABLET DAILY (NEED ANNUAL EXAM) 90 tablet 3     blood glucose (NO BRAND SPECIFIED) test strip Use to test blood sugar 2 times daily or as directed. 200 strip 1     blood glucose (ONETOUCH VERIO IQ) test strip Use to test blood sugar 2 times daily or as directed. 200 strip 1     blood glucose monitoring (ONETOUCH VERIO) meter device kit Use to test blood sugar 2 times daily or as directed. 1 kit 0     empagliflozin (JARDIANCE) 25 MG TABS tablet Take 1 tablet (25 mg) by mouth daily 90 tablet 1     FLUoxetine (PROZAC) 20 MG capsule TAKE 2 CAPSULES DAILY 180 capsule 1     glipiZIDE (GLUCOTROL XL) 5 MG 24 hr tablet TAKE 2 TABLETS DAILY (NEED ANNUAL EXAM) 180 tablet 1     metFORMIN (GLUCOPHAGE XR) 500 MG 24 hr tablet Take 3 tablets (1,500 mg) by mouth daily (with dinner) Please see changed dose 270 tablet 1     metoprolol succinate ER (TOPROL XL) 50 MG 24 hr tablet Take 1 tablet (50 mg) by mouth daily 90 tablet 3     MULTIPLE VITAMIN OR TABS 1 TABLET DAILY       Multiple Vitamins-Minerals (PRESERVISION AREDS PO) Take 2 tablets by mouth daily        sacubitril-valsartan (ENTRESTO)  MG per tablet Take 1 tablet by mouth 2 times daily 60 tablet 3       SOCIAL HISTORY:  Social History     Socioeconomic History     Marital status:      Spouse name: Not on file     Number of children: Not on file     Years of education: Not on file     Highest education level: Not on file   Occupational History     Not on file   Tobacco Use     Smoking status: Never     Smokeless tobacco: Never   Vaping Use     Vaping Use: Never used   Substance and Sexual Activity     Alcohol use: Yes     Comment: social     Drug use: No     Sexual activity: Not Currently     Partners: Male   Other Topics Concern     Parent/sibling w/ CABG, MI or angioplasty before 65F 55M? No   Social History Narrative     Not on file     Social Determinants of Health     Financial Resource Strain: Not on file   Food Insecurity: Not on file   Transportation Needs: Not on file   Physical Activity: Not on file   Stress: Not on file   Social Connections: Not on file   Intimate Partner Violence: Not on file   Housing Stability: Not on file       FAMILY HISTORY:  Family History   Problem Relation Age of Onset     Diabetes Mother         hypertension, alive at 83     C.A.D. Mother      Cancer Father         lung cancer, smoker.   at age 53     Family History Negative Sister      Osteoporosis Sister        PHYSICAL EXAMINATION:  Vitals reviewed  There were no vitals taken for this visit.  Video physical exam  General: Patient appears well in no acute distress.   Skin: No visualized rash or lesions on visualized skin  Eyes: EOMI, no erythema, sclera icterus or discharge noted  Resp: Appears to be breathing comfortably without accessory muscle usage, speaking in full sentences, no cough  MSK: Appears to have normal range of motion based on visualized movements  Neurologic: No apparent tremors, facial movements symmetric  Psych: affect normal, alert and oriented    The rest of a comprehensive physical  examination is deferred due to PHE (public health emergency) video restrictions      PERTINENT STUDIES Reviewed in EMR    ASSESSMENT/PLAN:    # Recurrent C difficile  # Chronic diarrhea  # Iron deficiency anemia  Jody presented with index CDI while hospitalized, following IV antibiotics administered during cardiac bypass surgery. Since that time, she has been treated empirically for presumed recurrence, followed by a documented recurrence 8/2022, treated with a vancomycin taper. While on vancomycin, she has had partial improvement in diarrhea, from 15+ watery stools per day, to 3-4 watery stools per day. She is currently off of all C diff-directed therapies, and have 6-7 bowel movements daily, associated with nocturnal awakenings.    We discussed that first, we need to establish if she has ongoing CDI or not. We discussed the concept of C diff colonization, however should she have ongoing C diff positive PCR, regardless of the EIA, GDH results, we likely will continue with plan to treat as recurrent C difficile syndrome given she has responded to oral vancomycin, at least partially, and it will be difficult to make progress on treating any underlying diarrhea that may be contributing in the setting of ongoing C diff positivity.    Should the C diff test be positive, we discussed several options, including suppressive vancomycin and intestinal microbiota transplant (IMT). She is not a candidate for Bezlotoxumab due to cardiac function. We had a long discussion about the risks and benefits of IMT for multiple recurrent C difficile infection.  We discussed that the efficacy for multiple recurrent C diff is greater than 90% and that there have not been serious side effects reported in the literature.  We discussed the risks including transmission of potentially pathogenic microbiota (with possible infectious, inflammatory or metabolic consequences).  We discussed that there were likely unknown risks and that the long  term risks were currently unknown. We discussed our donor screening protocol.  Patient will be given handouts on IMT as well as IMT consent process to review, should C diff testing be positive. Given recently identified NEO, would recommend completing IMT via colonoscopy to investigate for sources of blood loss, as well as to obtain biopsies for microscopic colitis in effort to rule out other potential contributing etiologies for chronic diarrhea. This would be done after being on oral vancomycin for at least four weeks.    Prior to IMT, we discussed obtaining stool testing for H pylori. Her grandmother passed from stomach cancer, and H pylori can cause NEO. This would be important to assess prior to IMT, as ideally any necessary antibiotic exposures should be prior to IMT. She has been using Pepto Bismol and PPI intermittently; we discussed holing BOTH of these agents for at least two weeks, and then completing stool testing. If stools are positive for H pylori, would recommend quadruple therapy before IMT.      RTC 3 months    Thank you for this consultation. It was a pleasure to participate in the care of this patient; please contact us with any further questions.      86 minutes spent on the date of the encounter doing chart review, review of test results, patient visit and documentation    Note - send IMT info to pt and son, [email or web address]          Again, thank you for allowing me to participate in the care of your patient.      Sincerely,    Zoie Chapman PA-C

## 2022-11-22 NOTE — PROGRESS NOTES
"Jody is a 67 year old who is being evaluated via a billable video visit.      How would you like to obtain your AVS? MyChart  If the video visit is dropped, the invitation should be resent by: Send to e-mail at: [email or web address]  Will anyone else be joining your video visit? Yes: email. [email or web address]. How would they like to receive their invitation? Send to e-mail at: [email or web address]      Video-Visit Details    Video Start Time: 1157    Type of service:  Video Visit    Video End Time:12:50 PM    Originating Location (pt. Location): Home        Distant Location (provider location):  Off-site    Platform used for Video Visit: Well        GI CLINIC VISIT    CC/REFERRING PROVIDER: No ref. provider found  REASON FOR CONSULTATION: rCDI    HPI: 67 year old female with PMH of Type 2 diabetes, HFrEF, hypertension, CAD, GERD on PPI, who is s/p pacemaker insertion presenting to GI clinic for  recurrent C difficile     Jody is joined today by her son, Matthew. Briefly, Jody was hospitalized for several weeks for cardiac bypass surgery, and developed watery stools associated with fecal incontinence and abdominal cramping while hospitalized, with positive C diff testing 7/6/2022. At time of surgery, she had been given IV Ancef, otherwise does not historically receive frequent antibiotics. She was treated with 14 days of oral vancomycin with reported partial improvement in symptoms. Given ongoing diarrhea, she was given an empiric second course of oral vancomycin, without confirmatory stool testing. Due to ongoing loose stools, she underwent repeat C diff testing on 8/24/2022 which was positive, with slightly elevated fecal romero at 59.8. She was given a vancomycin taper, again with partial improvement in loose stools. She describes the stools would improve from watery to \"mud\", and improvement from 15 bowel movements per day to 3-4 times per day. At this time, she has completed vancomycin and has been off " therapy for about 3 weeks. She is having 3-4 watery stools during the day, and an additional 1-2 stools overnight, awakening her from sleep. Occasional abdominal cramping.  She states her rectum is itching and hurts. She has noticed bright red blood on the toilet paper, new since the C diff infections. Prior to CDI, reports having a daily formed stool, with occasional loose stools with increased metformin dosing. She estimates she has lost 20#.    She has also recently been found to have iron deficiency anemia with a ferritin of 5.     Received Ancef 10/4/2022, followed by prophlyactic oral course of Keflex at time of pacemaker- no future procedures or antibioics    Grandmother - stomach cancer      ROS: 10pt ROS performed and otherwise negative.    PAST MEDICAL HISTORY:  Past Medical History:   Diagnosis Date     Abnormal Pap smear of cervix ~2000    repeat pap was normal     Allergic rhinitis, cause unspecified      Anxiety state, unspecified     panic episodes     HFrEF (heart failure with reduced ejection fraction) (H)      Osteopenia      Unspecified essential hypertension        PREVIOUS ABDOMINAL/GYNECOLOGIC SURGERIES:    Past Surgical History:   Procedure Laterality Date     BYPASS GRAFT ARTERY CORONARY N/A 05/24/2022    Procedure: Median sternotomy.  Intraoperative transesophageal echocardiogram per anesthesia.  Left internal mammary artery harvest.  Right and left endoscopically harvested  greater saphenouse vein.  Cardiopulmonary Bypass.  Coronary Artery Bypass Grafts x4.;  Surgeon: Ulises Desai MD;  Location:  OR     CV CORONARY ANGIOGRAM  05/19/2022    Procedure: CV CORONARY ANGIOGRAM;  Surgeon: Huseyin Vogel MD;  Location:  HEART CARDIAC CATH LAB     CV CORONARY ANGIOGRAM  05/19/2022    Procedure: ;  Surgeon: Huseyin Vogel MD;  Location:  HEART CARDIAC CATH LAB     EP ICD INSERT SINGLE N/A 10/4/2022    Procedure: Implantable Cardioverter Defibrillator Device &  Lead Implant Single or Dual;  Surgeon: Too Morrow MD;  Location:  HEART CARDIAC CATH LAB     NO HISTORY OF SURGERY       PICC DOUBLE LUMEN PLACEMENT Right 05/27/2022    Failed PICC attempt right arm basilic vein     PICC INSERTION - TRIPLE LUMEN Left 05/28/2022    left basilic 5fr tl,picc44 cm         PERTINENT MEDICATIONS:  Current Outpatient Medications   Medication Sig Dispense Refill     acetaminophen (TYLENOL) 500 MG tablet Take 1,000 mg by mouth 3 times daily as needed for mild pain       aspirin 81 MG EC tablet Take 81 mg by mouth daily       atorvastatin (LIPITOR) 80 MG tablet TAKE 1 TABLET DAILY (NEED ANNUAL EXAM) 90 tablet 3     blood glucose (NO BRAND SPECIFIED) test strip Use to test blood sugar 2 times daily or as directed. 200 strip 1     blood glucose (ONETOUCH VERIO IQ) test strip Use to test blood sugar 2 times daily or as directed. 200 strip 1     blood glucose monitoring (ONETOUCH VERIO) meter device kit Use to test blood sugar 2 times daily or as directed. 1 kit 0     empagliflozin (JARDIANCE) 25 MG TABS tablet Take 1 tablet (25 mg) by mouth daily 90 tablet 1     FLUoxetine (PROZAC) 20 MG capsule TAKE 2 CAPSULES DAILY 180 capsule 1     glipiZIDE (GLUCOTROL XL) 5 MG 24 hr tablet TAKE 2 TABLETS DAILY (NEED ANNUAL EXAM) 180 tablet 1     metFORMIN (GLUCOPHAGE XR) 500 MG 24 hr tablet Take 3 tablets (1,500 mg) by mouth daily (with dinner) Please see changed dose 270 tablet 1     metoprolol succinate ER (TOPROL XL) 50 MG 24 hr tablet Take 1 tablet (50 mg) by mouth daily 90 tablet 3     MULTIPLE VITAMIN OR TABS 1 TABLET DAILY       Multiple Vitamins-Minerals (PRESERVISION AREDS PO) Take 2 tablets by mouth daily       sacubitril-valsartan (ENTRESTO)  MG per tablet Take 1 tablet by mouth 2 times daily 60 tablet 3       SOCIAL HISTORY:  Social History     Socioeconomic History     Marital status:      Spouse name: Not on file     Number of children: Not on file     Years of education:  Not on file     Highest education level: Not on file   Occupational History     Not on file   Tobacco Use     Smoking status: Never     Smokeless tobacco: Never   Vaping Use     Vaping Use: Never used   Substance and Sexual Activity     Alcohol use: Yes     Comment: social     Drug use: No     Sexual activity: Not Currently     Partners: Male   Other Topics Concern     Parent/sibling w/ CABG, MI or angioplasty before 65F 55M? No   Social History Narrative     Not on file     Social Determinants of Health     Financial Resource Strain: Not on file   Food Insecurity: Not on file   Transportation Needs: Not on file   Physical Activity: Not on file   Stress: Not on file   Social Connections: Not on file   Intimate Partner Violence: Not on file   Housing Stability: Not on file       FAMILY HISTORY:  Family History   Problem Relation Age of Onset     Diabetes Mother         hypertension, alive at 83     C.A.D. Mother      Cancer Father         lung cancer, smoker.   at age 53     Family History Negative Sister      Osteoporosis Sister        PHYSICAL EXAMINATION:  Vitals reviewed  There were no vitals taken for this visit.  Video physical exam  General: Patient appears well in no acute distress.   Skin: No visualized rash or lesions on visualized skin  Eyes: EOMI, no erythema, sclera icterus or discharge noted  Resp: Appears to be breathing comfortably without accessory muscle usage, speaking in full sentences, no cough  MSK: Appears to have normal range of motion based on visualized movements  Neurologic: No apparent tremors, facial movements symmetric  Psych: affect normal, alert and oriented    The rest of a comprehensive physical examination is deferred due to PHE (public health emergency) video restrictions      PERTINENT STUDIES Reviewed in EMR    ASSESSMENT/PLAN:    # Recurrent C difficile  # Chronic diarrhea  # Iron deficiency anemia  Jody presented with index CDI while hospitalized, following IV antibiotics  administered during cardiac bypass surgery. Since that time, she has been treated empirically for presumed recurrence, followed by a documented recurrence 8/2022, treated with a vancomycin taper. While on vancomycin, she has had partial improvement in diarrhea, from 15+ watery stools per day, to 3-4 watery stools per day. She is currently off of all C diff-directed therapies, and have 6-7 bowel movements daily, associated with nocturnal awakenings.    We discussed that first, we need to establish if she has ongoing CDI or not. We discussed the concept of C diff colonization, however should she have ongoing C diff positive PCR, regardless of the EIA, GDH results, we likely will continue with plan to treat as recurrent C difficile syndrome given she has responded to oral vancomycin, at least partially, and it will be difficult to make progress on treating any underlying diarrhea that may be contributing in the setting of ongoing C diff positivity.    Should the C diff test be positive, we discussed several options, including suppressive vancomycin and intestinal microbiota transplant (IMT). She is not a candidate for Bezlotoxumab due to cardiac function. We had a long discussion about the risks and benefits of IMT for multiple recurrent C difficile infection.  We discussed that the efficacy for multiple recurrent C diff is greater than 90% and that there have not been serious side effects reported in the literature.  We discussed the risks including transmission of potentially pathogenic microbiota (with possible infectious, inflammatory or metabolic consequences).  We discussed that there were likely unknown risks and that the long term risks were currently unknown. We discussed our donor screening protocol.  Patient will be given handouts on IMT as well as IMT consent process to review, should C diff testing be positive. Given recently identified NEO, would recommend completing IMT via colonoscopy to investigate for  sources of blood loss, as well as to obtain biopsies for microscopic colitis in effort to rule out other potential contributing etiologies for chronic diarrhea. This would be done after being on oral vancomycin for at least four weeks.    Prior to IMT, we discussed obtaining stool testing for H pylori. Her grandmother passed from stomach cancer, and H pylori can cause NEO. This would be important to assess prior to IMT, as ideally any necessary antibiotic exposures should be prior to IMT. She has been using Pepto Bismol and PPI intermittently; we discussed holing BOTH of these agents for at least two weeks, and then completing stool testing. If stools are positive for H pylori, would recommend quadruple therapy before IMT.        RTC 3 months    Thank you for this consultation. It was a pleasure to participate in the care of this patient; please contact us with any further questions.    Zoie Chapman PA-C    86 minutes spent on the date of the encounter doing chart review, review of test results, patient visit and documentation    Note - send IMT info to pt and [email or web address]    Addendum:   November 29, 2022    C diff PCR, GDH positive, toxin negative --> suggestive of colonization by C diff  Attempted to call Jody x 2 without success.  Sent detailedd "[employer]" message outlining plan.  D/w Dr. Owusu, given colonized, no further vancomycin warranted  EGD/colon for evaluation of diarrhea, weight loss, NEO  ECT

## 2022-11-22 NOTE — TELEPHONE ENCOUNTER
ypu are due to be seen for your Diabetic check  Please have her make appointment with me in January and we will check then

## 2022-11-23 ENCOUNTER — LAB (OUTPATIENT)
Dept: LAB | Facility: CLINIC | Age: 67
End: 2022-11-23
Payer: COMMERCIAL

## 2022-11-23 ENCOUNTER — TELEPHONE (OUTPATIENT)
Dept: GASTROENTEROLOGY | Facility: CLINIC | Age: 67
End: 2022-11-23

## 2022-11-23 DIAGNOSIS — A04.72 C. DIFFICILE DIARRHEA: ICD-10-CM

## 2022-11-23 DIAGNOSIS — D50.0 IRON DEFICIENCY ANEMIA DUE TO CHRONIC BLOOD LOSS: ICD-10-CM

## 2022-11-23 NOTE — TELEPHONE ENCOUNTER
Called and spoke with patient about scheduling a follow up appt with Zoie Chapman in 3 months (around 2/22/23).    Pt will call back at a later time to schedule the appt. Sent [employer] message with # to call.

## 2022-11-24 PROCEDURE — 87324 CLOSTRIDIUM AG IA: CPT | Mod: 59

## 2022-11-24 PROCEDURE — 87493 C DIFF AMPLIFIED PROBE: CPT

## 2022-11-25 ENCOUNTER — TELEPHONE (OUTPATIENT)
Dept: GASTROENTEROLOGY | Facility: CLINIC | Age: 67
End: 2022-11-25

## 2022-11-25 ENCOUNTER — LAB (OUTPATIENT)
Dept: LAB | Facility: CLINIC | Age: 67
End: 2022-11-25
Payer: COMMERCIAL

## 2022-11-25 LAB
C DIFF GDH STL QL IA: POSITIVE
C DIFF TOX A+B STL QL IA: NEGATIVE
C DIFF TOX B STL QL: POSITIVE

## 2022-11-25 NOTE — TELEPHONE ENCOUNTER
LVM with scheduling phone number.    Left Voicemail (1st Attempt) for the patient to call back and schedule the following:    Appointment type: 3 month follow up video  Provider: Zoie Chapman PA-C  Return date: February 2023  Specialty phone number: n/a  Additional appointment(s) needed: n/a  Additonal Notes: n/a

## 2022-11-29 ENCOUNTER — TELEPHONE (OUTPATIENT)
Dept: GASTROENTEROLOGY | Facility: CLINIC | Age: 67
End: 2022-11-29

## 2022-11-29 NOTE — TELEPHONE ENCOUNTER
Screening Questions  BLUE  KIND OF PREP RED  LOCATION [review exclusion criteria] GREEN  SEDATION TYPE        Yes Are you active on mychart?       [facility]?        [employer] What type of coverage do you have?      N Have you had a positive covid test in the last 14 days?     22.3  BMI  [BMI 40+ - review exclusion criteria]    Yes  2. Are you able to give consent for your medical care? [IF NO,RN REVIEW]        N  3. Are you taking any prescription pain medications on a routine schedule?      NA  3a. EXTENDED PREP What kind of prescription?     N 4. Do you have any chemical dependencies such as alcohol, street drugs, or methadone?    Yes 5. Do you have any history of post-traumatic stress syndrome, severe anxiety or history of psychosis?      **If yes 3- 5 , please schedule with MAC sedation.**          IF YES TO ANY 6 - 10 - HOSPITAL SETTING ONLY.     N 6.   Do you need assistance transferring?     N 7.   Have you had a heart or lung transplant?    N 8.   Are you currently on dialysis?   N 9.   Do you use daily home oxygen?   N 10. Do you take nitroglycerin?   10a. NA If yes, how often?     11. [FEMALES]  NA Are you currently pregnant?    11a. NA If yes, how many weeks? [ Greater than 12 weeks, OR NEEDED]    N 12. Do you have Pulmonary Hypertension? *NEED PAC APPT AT [facility]*     Yes-Pacemaker 13. [review exclusion criteria]  Do you have any implantable devices in your body (pacemaker, defib, LVAD)?    Yes- CABG 5/31/22 and pacemaker placement on 10/4/22  14. In the past 6 months, have you had any heart related issues including cardiomyopathy or heart attack?     14a. N If yes, did it require cardiac stenting if so when?     N 15. Have you had a stroke or Transient ischemic attack (TIA - aka  mini stroke ) within 6 months?      N 16. Do you have mod to severe Obstructive Sleep Apnea?  [Hospital only - Ok at [facility]]    N 17. Do you have SEVERE AND UNCONTROLLED asthma? *NEED  "PAC APPT AT [facility]*     N 18. Are you currently taking any blood thinners?     18a. If yes, inform patient to \"follow up w/ ordering provider for bridging instructions.\"    N 19. Do you take the medication Phentermine?    19a. If yes, \"Hold for 7 days before procedure.  Please consult your prescribing provider if you have questions about holding this medication.\"     N  20. Do you have chronic kidney disease?      Yes  21. Do you have a diagnosis of diabetes?     N  22. On a regular basis do you go 3-5 days between bowel movements?      23. Preferred LOCAL Pharmacy for Pre Prescription    [ LIST ONLY ONE PHARMACY]        [facility] [address]      - CLOSING REMINDERS -    Informed patient they will need an adult    Cannot take any type of public or medical transportation alone    Conscious Sedation- Needs  for 6 hours after the procedure       MAC/General-Needs  for 24 hours after procedure    Pre-Procedure Covid test to be completed [[facility] PCR Testing Required]    Confirmed Nurse will call to complete assessment       - SCHEDULING DETAILS -     Izquierdo  Surgeon    2/16/23  Date of Procedure  Upper and Lower Endoscopy [EGD and Colonoscopy]  Type of Procedure Scheduled  [facility]-If you answer yes to questions #8, #20, #21Which Colonoscopy Prep was Sent?     MAC per order Sedation Type     Yes-PAC appt scheduled on 2/1/23   PAC / Pre-op Required         Additional comments:            "

## 2022-11-30 ENCOUNTER — LAB (OUTPATIENT)
Dept: LAB | Facility: CLINIC | Age: 67
End: 2022-11-30
Payer: COMMERCIAL

## 2022-11-30 DIAGNOSIS — E11.9 DIABETES MELLITUS, TYPE 2 (H): ICD-10-CM

## 2022-11-30 DIAGNOSIS — I50.20 HFREF (HEART FAILURE WITH REDUCED EJECTION FRACTION) (H): ICD-10-CM

## 2022-11-30 LAB
ANION GAP SERPL CALCULATED.3IONS-SCNC: 5 MMOL/L (ref 3–14)
BUN SERPL-MCNC: 24 MG/DL (ref 7–30)
CALCIUM SERPL-MCNC: 9.1 MG/DL (ref 8.5–10.1)
CHLORIDE BLD-SCNC: 108 MMOL/L (ref 94–109)
CO2 SERPL-SCNC: 27 MMOL/L (ref 20–32)
CREAT SERPL-MCNC: 0.68 MG/DL (ref 0.52–1.04)
GFR SERPL CREATININE-BSD FRML MDRD: >90 ML/MIN/1.73M2
GLUCOSE BLD-MCNC: 161 MG/DL (ref 70–99)
HBA1C MFR BLD: 7.7 % (ref 0–5.6)
POTASSIUM BLD-SCNC: 4.6 MMOL/L (ref 3.4–5.3)
SODIUM SERPL-SCNC: 140 MMOL/L (ref 133–144)

## 2022-11-30 PROCEDURE — 36415 COLL VENOUS BLD VENIPUNCTURE: CPT

## 2022-11-30 PROCEDURE — 83036 HEMOGLOBIN GLYCOSYLATED A1C: CPT

## 2022-11-30 PROCEDURE — 80048 BASIC METABOLIC PNL TOTAL CA: CPT

## 2022-11-30 NOTE — TELEPHONE ENCOUNTER
FUTURE VISIT INFORMATION      SURGERY INFORMATION:    Date: 2/16/23    Location:  gi    Surgeon:  Nikhil Lees MD    Anesthesia Type:  MAC    Procedure: ESOPHAGOGASTRODUODENOSCOPY (EGD)    RECORDS REQUESTED FROM:       Primary Care Provider: Roxanna Otoole MD- [facility]    Pertinent Medical History: hypertension, near syncope, acute systolic heart failure, HFrEF    Most recent EKG+ Tracing: 10/4/22    Most recent ECHO: 8/24/22    Most recent Coronary Angiogram: 5/19/22

## 2022-12-01 ENCOUNTER — OFFICE VISIT (OUTPATIENT)
Dept: CARDIOLOGY | Facility: CLINIC | Age: 67
End: 2022-12-01
Payer: COMMERCIAL

## 2022-12-01 VITALS
HEART RATE: 80 BPM | BODY MASS INDEX: 22.59 KG/M2 | OXYGEN SATURATION: 96 % | WEIGHT: 131.6 LBS | SYSTOLIC BLOOD PRESSURE: 121 MMHG | DIASTOLIC BLOOD PRESSURE: 79 MMHG

## 2022-12-01 DIAGNOSIS — I50.20 HFREF (HEART FAILURE WITH REDUCED EJECTION FRACTION) (H): Primary | ICD-10-CM

## 2022-12-01 DIAGNOSIS — I10 HYPERTENSION, UNSPECIFIED TYPE: ICD-10-CM

## 2022-12-01 DIAGNOSIS — I25.5 ISCHEMIC CARDIOMYOPATHY: ICD-10-CM

## 2022-12-01 DIAGNOSIS — E11.40 TYPE 2 DIABETES MELLITUS WITH DIABETIC NEUROPATHY, WITHOUT LONG-TERM CURRENT USE OF INSULIN (H): ICD-10-CM

## 2022-12-01 DIAGNOSIS — Z95.1 S/P CABG (CORONARY ARTERY BYPASS GRAFT): ICD-10-CM

## 2022-12-01 PROCEDURE — 99214 OFFICE O/P EST MOD 30 MIN: CPT | Performed by: NURSE PRACTITIONER

## 2022-12-01 RX ORDER — METOPROLOL SUCCINATE 50 MG/1
75 TABLET, EXTENDED RELEASE ORAL DAILY
Qty: 90 TABLET | Refills: 3 | Status: SHIPPED | OUTPATIENT
Start: 2022-12-01 | End: 2023-09-21

## 2022-12-01 NOTE — NURSING NOTE
Labs: Patient was given results of the laboratory testing obtained today. Patient demonstrated understanding of this information and agreed to call with further questions or concerns.     Med Reconcile: Reviewed and verified all current medications with the patient. The updated medication list was printed and given to the patient.    Return Appointment: Patient given instructions regarding scheduling next clinic visit. Patient demonstrated understanding of this information and agreed to call with further questions or concerns. CORE in 4 weeks.    Medication Change: Patient was educated regarding prescribed medication change, including discussion of the indication, administration, side effects, and when to report to MD or RN. Patient demonstrated understanding of this information and agreed to call with further questions or concerns. Increase metoprolol to 75 mg daily.    Patient stated she understood all health information given and agreed to call with further questions or concerns.    Dasia Amin RN

## 2022-12-01 NOTE — PROGRESS NOTES
HPI: Marianne is a 67 year old White female with a past medical history of T2DM, HTN, GERD, JUSTUS, MDD and recent episode of lightheadedness and dizziness on     Admission - - 2022.  Surgeon: Dr. Ulises Desai  1.  Coronary artery bypass grafting x 4 (left internal mammary artery to left anterior descending artery, saphenous vein graft to distal right coronary artery, saphenous vein graft to ramus intermedius artery, saphenous vein graft to obtuse marginal artery).  2.  Endoscopic vein harvest.    Patient has no SOB at rest.  She is now working up her strength. No swelling in the legs today. She has been taking the 97/103 mg BID of Entresto for a couple weeks.    She has some lightheadedness with fast movements.  Weight at home 128-130 lbs. She is working on eating more. On occasion she has JEFFERSON.  Rehab has been going well. ICD is in place.    Denies SOB,PND, orthopnea, edema, weight gain, chest pain, palpitations, lightheadedness, dizziness, near syncopal/syncopal episodes. Marianne has been following salt and fluid restrictions.      PAST MEDICAL HISTORY:  Past Medical History:   Diagnosis Date     Abnormal Pap smear of cervix ~    repeat pap was normal     Allergic rhinitis, cause unspecified      Anxiety state, unspecified     panic episodes     HFrEF (heart failure with reduced ejection fraction) (H)      Osteopenia      Unspecified essential hypertension        FAMILY HISTORY:  Family History   Problem Relation Age of Onset     Diabetes Mother         hypertension, alive at 83     C.A.D. Mother      Cancer Father         lung cancer, smoker.   at age 53     Family History Negative Sister      Osteoporosis Sister        SOCIAL HISTORY:  Social History     Tobacco Use     Smoking status: Never     Smokeless tobacco: Never   Vaping Use     Vaping Use: Never used   Substance Use Topics     Alcohol use: Yes     Comment: social     Drug use: No           CURRENT MEDICATIONS:  Current Outpatient  Medications   Medication Sig Dispense Refill     acetaminophen (TYLENOL) 500 MG tablet Take 1,000 mg by mouth 3 times daily as needed for mild pain       aspirin 81 MG EC tablet Take 81 mg by mouth daily       atorvastatin (LIPITOR) 80 MG tablet TAKE 1 TABLET DAILY (NEED ANNUAL EXAM) Strength: 80 mg 90 tablet 2     blood glucose (NO BRAND SPECIFIED) test strip Use to test blood sugar 2 times daily or as directed. 200 strip 1     blood glucose (ONETOUCH VERIO IQ) test strip Use to test blood sugar 2 times daily or as directed. 200 strip 1     blood glucose monitoring (ONETOUCH VERIO) meter device kit Use to test blood sugar 2 times daily or as directed. 1 kit 0     empagliflozin (JARDIANCE) 25 MG TABS tablet Take 1 tablet (25 mg) by mouth daily 90 tablet 0     FLUoxetine (PROZAC) 20 MG capsule TAKE 2 CAPSULES DAILY 180 capsule 1     glipiZIDE (GLUCOTROL XL) 5 MG 24 hr tablet TAKE 2 TABLETS DAILY (NEED ANNUAL EXAM) 180 tablet 1     metFORMIN (GLUCOPHAGE XR) 500 MG 24 hr tablet Take 3 tablets (1,500 mg) by mouth daily (with dinner) Please see changed dose 270 tablet 1     metoprolol succinate ER (TOPROL XL) 50 MG 24 hr tablet Take 1 tablet (50 mg) by mouth daily 90 tablet 3     MULTIPLE VITAMIN OR TABS 1 TABLET DAILY       Multiple Vitamins-Minerals (PRESERVISION AREDS PO) Take 2 tablets by mouth daily       sacubitril-valsartan (ENTRESTO)  MG per tablet Take 1 tablet by mouth 2 times daily 60 tablet 3       ROS:  Review Of Systems  Skin: negative  Eyes: negative  Ears/Nose/Throat: negative  Respiratory: No shortness of breath, dyspnea on exertion, cough, or hemoptysis  Cardiovascular: negative  Gastrointestinal: negative  Genitourinary: negative  Musculoskeletal: negative  Neurologic: negative  Psychiatric: negative  Hematologic/Lymphatic/Immunologic: negative  Endocrine: negative      EXAM:  /79 (BP Location: Right arm, Patient Position: Sitting, Cuff Size: Adult Regular)   Pulse 80   Wt 59.7 kg (131 lb  9.6 oz)   SpO2 96%   BMI 22.59 kg/m    Home weight:  General: alert, articulate, and in no acute distress.  HEENT: normocephalic, atraumatic, anicteric sclera, EOMI, mucosa moist, no cyanosis.   Neck: neck supple.  No adenopathy, masses, or carotid bruits.  JVP flat at 90 degrees  Heart: regular rhythm, normal S1/S2, no murmur, gallop, rub.  Precordium quiet with normal PMI.     Lungs: clear, no rales, ronchi, or wheezing.  No accessory muscle use, respirations unlabored.   Abdomen: soft, non-tender, bowel sounds present, no hepatomegaly  Extremities: no pitting edema.   No cyanosis.   Neurological: alert and oriented x 3.  normal speech and affect, no gross motor deficits  Skin:  No ecchymoses, rashes, or clubbing.    Labs:  CBC RESULTS:  Lab Results   Component Value Date    WBC 7.4 10/04/2022    WBC 7.1 09/09/2020    RBC 5.00 10/04/2022    RBC 4.57 09/09/2020    HGB 11.4 (L) 10/04/2022    HGB 13.3 09/09/2020    HCT 39.1 10/04/2022    HCT 41.1 09/09/2020    MCV 78 10/04/2022    MCV 90 09/09/2020    MCH 22.8 (L) 10/04/2022    MCH 29.1 09/09/2020    MCHC 29.2 (L) 10/04/2022    MCHC 32.4 09/09/2020    RDW 19.6 (H) 10/04/2022    RDW 12.5 09/09/2020     10/04/2022     09/09/2020       CMP RESULTS:  Lab Results   Component Value Date     11/30/2022     01/07/2021    POTASSIUM 4.6 11/30/2022    POTASSIUM 4.8 01/07/2021    CHLORIDE 108 11/30/2022    CHLORIDE 106 01/07/2021    CO2 27 11/30/2022    CO2 28 01/07/2021    ANIONGAP 5 11/30/2022    ANIONGAP 7 01/07/2021     (H) 11/30/2022     (H) 01/07/2021    BUN 24 11/30/2022    BUN 19 01/07/2021    CR 0.68 11/30/2022    CR 0.90 01/07/2021    GFRESTIMATED >90 11/30/2022    GFRESTIMATED 66 01/07/2021    GFRESTBLACK 77 01/07/2021    POONAM 9.1 11/30/2022    POONAM 9.5 01/07/2021    BILITOTAL 0.4 07/06/2022    BILITOTAL 0.4 09/09/2020    ALBUMIN 3.7 07/06/2022    ALBUMIN 3.7 09/09/2020    ALKPHOS 107 07/06/2022    ALKPHOS 90 09/09/2020    ALT  24 07/06/2022    ALT 33 09/09/2020    AST 14 07/06/2022    AST 14 09/09/2020        INR RESULTS:  Lab Results   Component Value Date    INR 1.29 (H) 05/25/2022       No components found for: CK  Lab Results   Component Value Date    MAG 2.0 06/04/2022     Lab Results   Component Value Date    NTBNP 2,714 (H) 06/09/2022     @BRIEFLABR (dig)@    Most recent echocardiogram:  No results found for this or any previous visit (from the past 8760 hour(s)).      Assessment and Plan:    In summary,Marianne  is a 67 year old here for a CORE follow up. Patient will increase the Toprol to 75 mg daily and in 2 weeks we will see she is doing if she is tolerating the medications then we will start 12.5 mg Aldactone then return to clinic.    1.  Chronic systolic heart failure secondary to ICM.  Stage C  NYHA Class III  ACEi/ARB: -Entresto 97/103 BID-   BB: yes- -increase- Metoprolol 75 mg   Aldosterone antagonist: start in 2 weeks   SCD prophylaxis: does not meet criteria for implant  Fluid status: euvolemic  Anticoagulation:   Antiplatelet:  ASA dose   Sleep apnea:  NSAID use:  Contraindicated.  Avoid use.  Remote Monitoring:none  SGLT 2 - Jardiance 25 mg    2.  Other comordbid conditions:      #T2DM - PCP to manage - glipizide , Jardiance, metformin    #HTN- 126/72 Entresto and Metoprolol     #GERD- followed by PCP     3.  Follow-up   Increase Metoprolol to 75 mg from 50 mg   Start 12/15- 1/2 tab Spironolactone   Labs 12/28  CORE 12/29      Belinda MOORE, CNP  [facility]

## 2022-12-01 NOTE — LETTER
2022      RE: Marianne Monroy  [address]  [place] [facility] [address]       Dear Colleague,    Thank you for the opportunity to participate in the care of your patient, Marianne Monroy, at the [facility]. Please see a copy of my visit note below.        HPI: Marianne is a 67 year old White female with a past medical history of T2DM, HTN, GERD, JUSTUS, MDD and recent episode of lightheadedness and dizziness on     Admission - - 2022.  Surgeon: Dr. Ulises Desai  1.  Coronary artery bypass grafting x 4 (left internal mammary artery to left anterior descending artery, saphenous vein graft to distal right coronary artery, saphenous vein graft to ramus intermedius artery, saphenous vein graft to obtuse marginal artery).  2.  Endoscopic vein harvest.    Patient has no SOB at rest.  She is now working up her strength. No swelling in the legs today. She has been taking the 97/103 mg BID of Entresto for a couple weeks.    She has some lightheadedness with fast movements.  Weight at home 128-130 lbs. She is working on eating more. On occasion she has JEFFERSON.  Rehab has been going well. ICD is in place.    Denies SOB,PND, orthopnea, edema, weight gain, chest pain, palpitations, lightheadedness, dizziness, near syncopal/syncopal episodes. Marianne has been following salt and fluid restrictions.      PAST MEDICAL HISTORY:  Past Medical History:   Diagnosis Date     Abnormal Pap smear of cervix ~    repeat pap was normal     Allergic rhinitis, cause unspecified      Anxiety state, unspecified     panic episodes     HFrEF (heart failure with reduced ejection fraction) (H)      Osteopenia      Unspecified essential hypertension        FAMILY HISTORY:  Family History   Problem Relation Age of Onset     Diabetes Mother         hypertension, alive at 83     C.A.D. Mother      Cancer Father         lung cancer, smoker.   at  age 53     Family History Negative Sister      Osteoporosis Sister        SOCIAL HISTORY:  Social History     Tobacco Use     Smoking status: Never     Smokeless tobacco: Never   Vaping Use     Vaping Use: Never used   Substance Use Topics     Alcohol use: Yes     Comment: social     Drug use: No           CURRENT MEDICATIONS:  Current Outpatient Medications   Medication Sig Dispense Refill     acetaminophen (TYLENOL) 500 MG tablet Take 1,000 mg by mouth 3 times daily as needed for mild pain       aspirin 81 MG EC tablet Take 81 mg by mouth daily       atorvastatin (LIPITOR) 80 MG tablet TAKE 1 TABLET DAILY (NEED ANNUAL EXAM) Strength: 80 mg 90 tablet 2     blood glucose (NO BRAND SPECIFIED) test strip Use to test blood sugar 2 times daily or as directed. 200 strip 1     blood glucose (ONETOUCH VERIO IQ) test strip Use to test blood sugar 2 times daily or as directed. 200 strip 1     blood glucose monitoring (ONETOUCH VERIO) meter device kit Use to test blood sugar 2 times daily or as directed. 1 kit 0     empagliflozin (JARDIANCE) 25 MG TABS tablet Take 1 tablet (25 mg) by mouth daily 90 tablet 0     FLUoxetine (PROZAC) 20 MG capsule TAKE 2 CAPSULES DAILY 180 capsule 1     glipiZIDE (GLUCOTROL XL) 5 MG 24 hr tablet TAKE 2 TABLETS DAILY (NEED ANNUAL EXAM) 180 tablet 1     metFORMIN (GLUCOPHAGE XR) 500 MG 24 hr tablet Take 3 tablets (1,500 mg) by mouth daily (with dinner) Please see changed dose 270 tablet 1     metoprolol succinate ER (TOPROL XL) 50 MG 24 hr tablet Take 1 tablet (50 mg) by mouth daily 90 tablet 3     MULTIPLE VITAMIN OR TABS 1 TABLET DAILY       Multiple Vitamins-Minerals (PRESERVISION AREDS PO) Take 2 tablets by mouth daily       sacubitril-valsartan (ENTRESTO)  MG per tablet Take 1 tablet by mouth 2 times daily 60 tablet 3       ROS:  Review Of Systems  Skin: negative  Eyes: negative  Ears/Nose/Throat: negative  Respiratory: No shortness of breath, dyspnea on exertion, cough, or  hemoptysis  Cardiovascular: negative  Gastrointestinal: negative  Genitourinary: negative  Musculoskeletal: negative  Neurologic: negative  Psychiatric: negative  Hematologic/Lymphatic/Immunologic: negative  Endocrine: negative      EXAM:  /79 (BP Location: Right arm, Patient Position: Sitting, Cuff Size: Adult Regular)   Pulse 80   Wt 59.7 kg (131 lb 9.6 oz)   SpO2 96%   BMI 22.59 kg/m    Home weight:  General: alert, articulate, and in no acute distress.  HEENT: normocephalic, atraumatic, anicteric sclera, EOMI, mucosa moist, no cyanosis.   Neck: neck supple.  No adenopathy, masses, or carotid bruits.  JVP flat at 90 degrees  Heart: regular rhythm, normal S1/S2, no murmur, gallop, rub.  Precordium quiet with normal PMI.     Lungs: clear, no rales, ronchi, or wheezing.  No accessory muscle use, respirations unlabored.   Abdomen: soft, non-tender, bowel sounds present, no hepatomegaly  Extremities: no pitting edema.   No cyanosis.   Neurological: alert and oriented x 3.  normal speech and affect, no gross motor deficits  Skin:  No ecchymoses, rashes, or clubbing.    Labs:  CBC RESULTS:  Lab Results   Component Value Date    WBC 7.4 10/04/2022    WBC 7.1 09/09/2020    RBC 5.00 10/04/2022    RBC 4.57 09/09/2020    HGB 11.4 (L) 10/04/2022    HGB 13.3 09/09/2020    HCT 39.1 10/04/2022    HCT 41.1 09/09/2020    MCV 78 10/04/2022    MCV 90 09/09/2020    MCH 22.8 (L) 10/04/2022    MCH 29.1 09/09/2020    MCHC 29.2 (L) 10/04/2022    MCHC 32.4 09/09/2020    RDW 19.6 (H) 10/04/2022    RDW 12.5 09/09/2020     10/04/2022     09/09/2020       CMP RESULTS:  Lab Results   Component Value Date     11/30/2022     01/07/2021    POTASSIUM 4.6 11/30/2022    POTASSIUM 4.8 01/07/2021    CHLORIDE 108 11/30/2022    CHLORIDE 106 01/07/2021    CO2 27 11/30/2022    CO2 28 01/07/2021    ANIONGAP 5 11/30/2022    ANIONGAP 7 01/07/2021     (H) 11/30/2022     (H) 01/07/2021    BUN 24 11/30/2022     BUN 19 01/07/2021    CR 0.68 11/30/2022    CR 0.90 01/07/2021    GFRESTIMATED >90 11/30/2022    GFRESTIMATED 66 01/07/2021    GFRESTBLACK 77 01/07/2021    POONAM 9.1 11/30/2022    POONAM 9.5 01/07/2021    BILITOTAL 0.4 07/06/2022    BILITOTAL 0.4 09/09/2020    ALBUMIN 3.7 07/06/2022    ALBUMIN 3.7 09/09/2020    ALKPHOS 107 07/06/2022    ALKPHOS 90 09/09/2020    ALT 24 07/06/2022    ALT 33 09/09/2020    AST 14 07/06/2022    AST 14 09/09/2020        INR RESULTS:  Lab Results   Component Value Date    INR 1.29 (H) 05/25/2022       No components found for: CK  Lab Results   Component Value Date    MAG 2.0 06/04/2022     Lab Results   Component Value Date    NTBNP 2,714 (H) 06/09/2022     @BRIEFLABR (dig)@    Most recent echocardiogram:  No results found for this or any previous visit (from the past 8760 hour(s)).      Assessment and Plan:    In summary,Marianne  is a 67 year old here for a CORE follow up. Patient will increase the Toprol to 75 mg daily and in 2 weeks we will see she is doing if she is tolerating the medications then we will start 12.5 mg Aldactone then return to clinic.    1.  Chronic systolic heart failure secondary to ICM.  Stage C  NYHA Class III  ACEi/ARB: -Entresto 97/103 BID-   BB: yes- -increase- Metoprolol 75 mg   Aldosterone antagonist: start in 2 weeks   SCD prophylaxis: does not meet criteria for implant  Fluid status: euvolemic  Anticoagulation:   Antiplatelet:  ASA dose   Sleep apnea:  NSAID use:  Contraindicated.  Avoid use.  Remote Monitoring:none  SGLT 2 - Jardiance 25 mg    2.  Other comordbid conditions:      #T2DM - PCP to manage - glipizide , Jardiance, metformin    #HTN- 126/72 Entresto and Metoprolol     #GERD- followed by PCP     3.  Follow-up   Increase Metoprolol to 75 mg from 50 mg   Start 12/15- 1/2 tab Spironolactone   Labs 12/28  CORE 12/29      Belinda MOORE, CNP  [facility]

## 2022-12-01 NOTE — PATIENT INSTRUCTIONS
Take your medicines every day, as directed    Changes made today:  Metoprolol 75 mg daily     Monitor Your Weight and Symptoms    Contact us if you:    Gain 2 pounds in one day or 5 pounds in one week  Feel more short of breath  Notice more leg swelling  Feel lightheadeded   Change your lifestyle    Limit Salt or Sodium:  2000 mg  Limit Fluids:  2000 mL or approximately 64 ounces  Eat a Heart Healthy Diet  Low in saturated fats  Stay Active:  Aim to move at least 150 minutes every  week         To Contact us    During Business Hours:  [phone], option # 1      After hours, weekends or holidays:   [phone], Option #4  Ask to speak to the On-Call Cardiologist. Inform them you are a CORE/heart failure patient at the [place].     Use [employer] allows you to communicate directly with your heart team through secure messaging.  [employer] can be accessed any time on your phone, computer, or tablet.  If you need assistance, we'd be happy to help!         Keep your Heart Appointments:    Check in with Dasia DONG in 2 weeks    [facility] on 12/29     Please consider attending our virtual support group which is held monthly. Please reach out to Lloyd at [phone] for more information if you are interested in attending.     2022 dates:   - Monday, December 5th, 1-2pm: topic: How to thrive during winter and the holiday season with heart failure    2023 dates:  Monday, January 2nd , 1-2pm  Monday, February 6th , 1-2pm  Monday, March 6th , 1-2pm  Monday, April 3rd, 1-2pm  Monday, May 1st, 1-2pm  Monday, June 5th, 1-2pm  Monday, July 3rd, 1-2pm  Monday, August 7th, 1-2pm  Monday, September 11th, 1-2pm  Monday, October 2nd, 1-2pm  Monday, November 6th, 1-2pm  Monday, December 4th, 1-2pm

## 2022-12-01 NOTE — NURSING NOTE
"Chief Complaint   Patient presents with     Follow Up     Pt reports no heart symptoms, CORE Return, 68 yo female with systolic heart failure presents for follow up with labs prior.       Initial /79 (BP Location: Right arm, Patient Position: Sitting, Cuff Size: Adult Regular)   Pulse 80   Wt 59.7 kg (131 lb 9.6 oz)   SpO2 96%   BMI 22.59 kg/m   Estimated body mass index is 22.59 kg/m  as calculated from the following:    Height as of 10/4/22: 1.626 m (5' 4\").    Weight as of this encounter: 59.7 kg (131 lb 9.6 oz)..  BP completed using cuff size: regular    EDWIN Joseph      "

## 2022-12-04 PROCEDURE — 87338 HPYLORI STOOL AG IA: CPT

## 2022-12-05 ENCOUNTER — LAB (OUTPATIENT)
Dept: LAB | Facility: CLINIC | Age: 67
End: 2022-12-05
Payer: COMMERCIAL

## 2022-12-06 LAB — H PYLORI AG STL QL IA: NEGATIVE

## 2022-12-27 ENCOUNTER — MYC MEDICAL ADVICE (OUTPATIENT)
Dept: CARDIOLOGY | Facility: CLINIC | Age: 67
End: 2022-12-27

## 2022-12-27 ENCOUNTER — LAB (OUTPATIENT)
Dept: LAB | Facility: CLINIC | Age: 67
End: 2022-12-27
Payer: COMMERCIAL

## 2022-12-27 DIAGNOSIS — I50.20 HFREF (HEART FAILURE WITH REDUCED EJECTION FRACTION) (H): ICD-10-CM

## 2022-12-27 DIAGNOSIS — I50.21 ACUTE SYSTOLIC HEART FAILURE (H): ICD-10-CM

## 2022-12-27 LAB
ANION GAP SERPL CALCULATED.3IONS-SCNC: 5 MMOL/L (ref 3–14)
BUN SERPL-MCNC: 21 MG/DL (ref 7–30)
CALCIUM SERPL-MCNC: 9.6 MG/DL (ref 8.5–10.1)
CHLORIDE BLD-SCNC: 108 MMOL/L (ref 94–109)
CO2 SERPL-SCNC: 28 MMOL/L (ref 20–32)
CREAT SERPL-MCNC: 0.82 MG/DL (ref 0.52–1.04)
GFR SERPL CREATININE-BSD FRML MDRD: 77 ML/MIN/1.73M2
GLUCOSE BLD-MCNC: 154 MG/DL (ref 70–99)
POTASSIUM BLD-SCNC: 4.6 MMOL/L (ref 3.4–5.3)
SODIUM SERPL-SCNC: 141 MMOL/L (ref 133–144)

## 2022-12-27 PROCEDURE — 36415 COLL VENOUS BLD VENIPUNCTURE: CPT

## 2022-12-27 PROCEDURE — 80048 BASIC METABOLIC PNL TOTAL CA: CPT

## 2022-12-28 DIAGNOSIS — F33.0 MAJOR DEPRESSIVE DISORDER, RECURRENT EPISODE, MILD (H): ICD-10-CM

## 2022-12-28 RX ORDER — SACUBITRIL AND VALSARTAN 97; 103 MG/1; MG/1
1 TABLET, FILM COATED ORAL 2 TIMES DAILY
Qty: 60 TABLET | Refills: 3 | Status: SHIPPED | OUTPATIENT
Start: 2022-12-28 | End: 2022-12-28

## 2022-12-28 RX ORDER — SACUBITRIL AND VALSARTAN 97; 103 MG/1; MG/1
1 TABLET, FILM COATED ORAL 2 TIMES DAILY
Qty: 180 TABLET | Refills: 3 | Status: SHIPPED | OUTPATIENT
Start: 2022-12-28 | End: 2023-02-09 | Stop reason: DRUGHIGH

## 2022-12-29 ENCOUNTER — VIRTUAL VISIT (OUTPATIENT)
Dept: CARDIOLOGY | Facility: CLINIC | Age: 67
End: 2022-12-29
Payer: COMMERCIAL

## 2022-12-29 DIAGNOSIS — E11.40 TYPE 2 DIABETES MELLITUS WITH DIABETIC NEUROPATHY, WITHOUT LONG-TERM CURRENT USE OF INSULIN (H): ICD-10-CM

## 2022-12-29 DIAGNOSIS — I10 HYPERTENSION, UNSPECIFIED TYPE: ICD-10-CM

## 2022-12-29 DIAGNOSIS — Z95.810 S/P ICD (INTERNAL CARDIAC DEFIBRILLATOR) PROCEDURE: ICD-10-CM

## 2022-12-29 DIAGNOSIS — I25.5 ISCHEMIC CARDIOMYOPATHY: ICD-10-CM

## 2022-12-29 DIAGNOSIS — I50.20 HFREF (HEART FAILURE WITH REDUCED EJECTION FRACTION) (H): Primary | ICD-10-CM

## 2022-12-29 PROCEDURE — 99214 OFFICE O/P EST MOD 30 MIN: CPT | Mod: 95 | Performed by: NURSE PRACTITIONER

## 2022-12-29 RX ORDER — SPIRONOLACTONE 25 MG/1
12.5 TABLET ORAL DAILY
Qty: 30 TABLET | Refills: 1 | Status: SHIPPED | OUTPATIENT
Start: 2022-12-29 | End: 2023-04-20

## 2022-12-29 NOTE — PROGRESS NOTES
Jody is a 67 year old who is being evaluated via a billable video visit.      How would you like to obtain your AVS? MyChart  If the video visit is dropped, the invitation should be resent by: Text to cell phone: [phone]  Will anyone else be joining your video visit? Yes: daughter. How would they like to receive their invitation? Text to cell phone: [phone]       EDWIN Thakkar

## 2022-12-29 NOTE — PROGRESS NOTES
HPI: Marianne is a 67 year old White female with a past medical history of T2DM, HTN, GERD, JUSTUS, MDD and recent episode of lightheadedness and dizziness on     Admission - - 2022.  Surgeon: Dr. Ulises Desai  1.  Coronary artery bypass grafting x 4 (left internal mammary artery to left anterior descending artery, saphenous vein graft to distal right coronary artery, saphenous vein graft to ramus intermedius artery, saphenous vein graft to obtuse marginal artery).  2.  Endoscopic vein harvest.    Patient has no SOB at rest.  She is now working up her strength. No swelling in the legs today. She has been taking the 97/103 mg BID of Entresto for a few weeks.  126/56 HR 56- usually HR is in the 60's . She has some lightheadedness with fast movements.  Weight at home 120 lbs. She is working on eating more. On occasion she has JEFFERSON.  Rehab has been going well. ICD is in place.    Denies SOB,PND, orthopnea, edema, weight gain, chest pain, palpitations, lightheadedness, dizziness, near syncopal/syncopal episodes. Marianne has been following salt and fluid restrictions.      PAST MEDICAL HISTORY:  Past Medical History:   Diagnosis Date     Abnormal Pap smear of cervix ~    repeat pap was normal     Allergic rhinitis, cause unspecified      Anxiety state, unspecified     panic episodes     HFrEF (heart failure with reduced ejection fraction) (H)      Osteopenia      Unspecified essential hypertension        FAMILY HISTORY:  Family History   Problem Relation Age of Onset     Diabetes Mother         hypertension, alive at 83     C.A.D. Mother      Cancer Father         lung cancer, smoker.   at age 53     Family History Negative Sister      Osteoporosis Sister        SOCIAL HISTORY:  Social History     Tobacco Use     Smoking status: Never     Smokeless tobacco: Never   Vaping Use     Vaping Use: Never used   Substance Use Topics     Alcohol use: Yes     Comment: social     Drug use: No           CURRENT  MEDICATIONS:  Current Outpatient Medications   Medication Sig Dispense Refill     acetaminophen (TYLENOL) 500 MG tablet Take 1,000 mg by mouth 3 times daily as needed for mild pain       aspirin 81 MG EC tablet Take 81 mg by mouth daily       atorvastatin (LIPITOR) 80 MG tablet TAKE 1 TABLET DAILY (NEED ANNUAL EXAM) Strength: 80 mg 90 tablet 2     blood glucose (NO BRAND SPECIFIED) test strip Use to test blood sugar 2 times daily or as directed. 200 strip 1     blood glucose (ONETOUCH VERIO IQ) test strip Use to test blood sugar 2 times daily or as directed. 200 strip 1     blood glucose monitoring (ONETOUCH VERIO) meter device kit Use to test blood sugar 2 times daily or as directed. 1 kit 0     empagliflozin (JARDIANCE) 25 MG TABS tablet Take 1 tablet (25 mg) by mouth daily 90 tablet 0     FLUoxetine (PROZAC) 20 MG capsule TAKE 2 CAPSULES DAILY 180 capsule 1     glipiZIDE (GLUCOTROL XL) 5 MG 24 hr tablet TAKE 2 TABLETS DAILY (NEED ANNUAL EXAM) 180 tablet 1     metFORMIN (GLUCOPHAGE XR) 500 MG 24 hr tablet Take 3 tablets (1,500 mg) by mouth daily (with dinner) Please see changed dose 270 tablet 1     metoprolol succinate ER (TOPROL XL) 50 MG 24 hr tablet Take 1.5 tablets (75 mg) by mouth daily 90 tablet 3     MULTIPLE VITAMIN OR TABS 1 TABLET DAILY       Multiple Vitamins-Minerals (PRESERVISION AREDS PO) Take 2 tablets by mouth daily       sacubitril-valsartan (ENTRESTO)  MG per tablet Take 1 tablet by mouth 2 times daily 180 tablet 3           ROS:   Constitutional: No fever, chills, or sweats.  ENT: No visual disturbance, ear ache, epistaxis, sore throat.   Allergies/Immunologic: Negative  Respiratory: No cough, hemoptysis.   Cardiovascular: As per HPI.   GI: As per HPI.   : No urinary frequency, dysuria, or hematuria.   Integument: Negative.   Psychiatric: Negative.   Neuro: Negative.   Endocrinology: negative   Musculoskeletal: negative    EXAM:   Constitutional - WDWN, no acute distress   Eyes - no  redness or discharge, nonicteric sclera  Respiratory - nonlabored breathing, able to speak in full sentences without difficulty  CV: no visible edema of visualized upper extremities.  Neurological - alert and oriented, speech fluent/appropriate  PSYCH: cooperative, affect appropriate  DERM: no rashes on visualized face/neck/upper extremities     The rest of a comprehensive physical examination is deferred due to PHE (public health emergency) video visit restrictions    Labs:  CBC RESULTS:  Lab Results   Component Value Date    WBC 7.4 10/04/2022    WBC 7.1 09/09/2020    RBC 5.00 10/04/2022    RBC 4.57 09/09/2020    HGB 11.4 (L) 10/04/2022    HGB 13.3 09/09/2020    HCT 39.1 10/04/2022    HCT 41.1 09/09/2020    MCV 78 10/04/2022    MCV 90 09/09/2020    MCH 22.8 (L) 10/04/2022    MCH 29.1 09/09/2020    MCHC 29.2 (L) 10/04/2022    MCHC 32.4 09/09/2020    RDW 19.6 (H) 10/04/2022    RDW 12.5 09/09/2020     10/04/2022     09/09/2020       CMP RESULTS:  Lab Results   Component Value Date     12/27/2022     01/07/2021    POTASSIUM 4.6 12/27/2022    POTASSIUM 4.8 01/07/2021    CHLORIDE 108 12/27/2022    CHLORIDE 106 01/07/2021    CO2 28 12/27/2022    CO2 28 01/07/2021    ANIONGAP 5 12/27/2022    ANIONGAP 7 01/07/2021     (H) 12/27/2022     (H) 01/07/2021    BUN 21 12/27/2022    BUN 19 01/07/2021    CR 0.82 12/27/2022    CR 0.90 01/07/2021    GFRESTIMATED 77 12/27/2022    GFRESTIMATED 66 01/07/2021    GFRESTBLACK 77 01/07/2021    POONAM 9.6 12/27/2022    POONAM 9.5 01/07/2021    BILITOTAL 0.4 07/06/2022    BILITOTAL 0.4 09/09/2020    ALBUMIN 3.7 07/06/2022    ALBUMIN 3.7 09/09/2020    ALKPHOS 107 07/06/2022    ALKPHOS 90 09/09/2020    ALT 24 07/06/2022    ALT 33 09/09/2020    AST 14 07/06/2022    AST 14 09/09/2020        INR RESULTS:  Lab Results   Component Value Date    INR 1.29 (H) 05/25/2022       No components found for: CK  Lab Results   Component Value Date    MAG 2.0 06/04/2022     Lab  Results   Component Value Date    NTBNP 2714 (H) 06/09/2022     @BRIEFLABR (dig)@    Most recent echocardiogram:  No results found for this or any previous visit (from the past 8760 hour(s)).      Assessment and Plan:    In summary,Marianne  is a 67 year old here for a CORE follow up. Patient will  start 12.5 mg Aldactone. BMP in 2 weeks after start.    1.  Chronic systolic heart failure secondary to ICM.  Stage C  NYHA Class III  ACEi/ARB: -Entresto 97/103 BID-   BB: yes-  Metoprolol 75 mg   Aldosterone antagonist: 12.5 mg   SCD prophylaxis: does not meet criteria for implant  Fluid status: euvolemic  Anticoagulation:   Antiplatelet:  ASA dose   Sleep apnea:  NSAID use:  Contraindicated.  Avoid use.  Remote Monitoring:none  SGLT 2 - Jardiance 25 mg    2.  Other comordbid conditions:      #T2DM - PCP to manage - glipizide , Jardiance, metformin    #HTN- Entresto and Metoprolol     #GERD- followed by PCP     3.  Follow-up   1/2 tab Spironolactone   Labs 1/11-   CORE February       Belinda Colon  APRSAUNDRA, CNP  [facility]       Start time 10:32 am  End time 10:50 am  Patient was at home, daughter connected as per request from patient  [employer]   Provider is clinic.  Total time with review of chart and video visit 30 min

## 2022-12-29 NOTE — LETTER
12/29/2022      RE: Marianne Monroy  [address]  [facility] [address]       Dear Colleague,    Thank you for the opportunity to participate in the care of your patient, Marianne Monroy, at the [facility]. Please see a copy of my visit note below.    Jody is a 67 year old who is being evaluated via a billable video visit.      How would you like to obtain your AVS? MyChart  If the video visit is dropped, the invitation should be resent by: Text to cell phone: [phone]  Will anyone else be joining your video visit? Yes: daughter. How would they like to receive their invitation? Text to cell phone: [phone]       EDWIN Thakkar                HPI: Marianne is a 67 year old White female with a past medical history of T2DM, HTN, GERD, JUSTUS, MDD and recent episode of lightheadedness and dizziness on 4/30    Admission 5/18-6/4 - 5/24/2022.  Surgeon: Dr. Ulises Desai  1.  Coronary artery bypass grafting x 4 (left internal mammary artery to left anterior descending artery, saphenous vein graft to distal right coronary artery, saphenous vein graft to ramus intermedius artery, saphenous vein graft to obtuse marginal artery).  2.  Endoscopic vein harvest.    Patient has no SOB at rest.  She is now working up her strength. No swelling in the legs today. She has been taking the 97/103 mg BID of Entresto for a few weeks.  126/56 HR 56- usually HR is in the 60's . She has some lightheadedness with fast movements.  Weight at home 120 lbs. She is working on eating more. On occasion she has JEFFERSON.  Rehab has been going well. ICD is in place.    Denies SOB,PND, orthopnea, edema, weight gain, chest pain, palpitations, lightheadedness, dizziness, near syncopal/syncopal episodes. Marianne has been following salt and fluid restrictions.      PAST MEDICAL HISTORY:  Past Medical History:   Diagnosis Date     Abnormal Pap smear of cervix ~2000     repeat pap was normal     Allergic rhinitis, cause unspecified      Anxiety state, unspecified     panic episodes     HFrEF (heart failure with reduced ejection fraction) (H)      Osteopenia      Unspecified essential hypertension        FAMILY HISTORY:  Family History   Problem Relation Age of Onset     Diabetes Mother         hypertension, alive at 83     C.A.D. Mother      Cancer Father         lung cancer, smoker.   at age 53     Family History Negative Sister      Osteoporosis Sister        SOCIAL HISTORY:  Social History     Tobacco Use     Smoking status: Never     Smokeless tobacco: Never   Vaping Use     Vaping Use: Never used   Substance Use Topics     Alcohol use: Yes     Comment: social     Drug use: No           CURRENT MEDICATIONS:  Current Outpatient Medications   Medication Sig Dispense Refill     acetaminophen (TYLENOL) 500 MG tablet Take 1,000 mg by mouth 3 times daily as needed for mild pain       aspirin 81 MG EC tablet Take 81 mg by mouth daily       atorvastatin (LIPITOR) 80 MG tablet TAKE 1 TABLET DAILY (NEED ANNUAL EXAM) Strength: 80 mg 90 tablet 2     blood glucose (NO BRAND SPECIFIED) test strip Use to test blood sugar 2 times daily or as directed. 200 strip 1     blood glucose (ONETOUCH VERIO IQ) test strip Use to test blood sugar 2 times daily or as directed. 200 strip 1     blood glucose monitoring (ONETOUCH VERIO) meter device kit Use to test blood sugar 2 times daily or as directed. 1 kit 0     empagliflozin (JARDIANCE) 25 MG TABS tablet Take 1 tablet (25 mg) by mouth daily 90 tablet 0     FLUoxetine (PROZAC) 20 MG capsule TAKE 2 CAPSULES DAILY 180 capsule 1     glipiZIDE (GLUCOTROL XL) 5 MG 24 hr tablet TAKE 2 TABLETS DAILY (NEED ANNUAL EXAM) 180 tablet 1     metFORMIN (GLUCOPHAGE XR) 500 MG 24 hr tablet Take 3 tablets (1,500 mg) by mouth daily (with dinner) Please see changed dose 270 tablet 1     metoprolol succinate ER (TOPROL XL) 50 MG 24 hr tablet Take 1.5 tablets (75 mg)  by mouth daily 90 tablet 3     MULTIPLE VITAMIN OR TABS 1 TABLET DAILY       Multiple Vitamins-Minerals (PRESERVISION AREDS PO) Take 2 tablets by mouth daily       sacubitril-valsartan (ENTRESTO)  MG per tablet Take 1 tablet by mouth 2 times daily 180 tablet 3           ROS:   Constitutional: No fever, chills, or sweats.  ENT: No visual disturbance, ear ache, epistaxis, sore throat.   Allergies/Immunologic: Negative  Respiratory: No cough, hemoptysis.   Cardiovascular: As per HPI.   GI: As per HPI.   : No urinary frequency, dysuria, or hematuria.   Integument: Negative.   Psychiatric: Negative.   Neuro: Negative.   Endocrinology: negative   Musculoskeletal: negative    EXAM:   Constitutional - WDWN, no acute distress   Eyes - no redness or discharge, nonicteric sclera  Respiratory - nonlabored breathing, able to speak in full sentences without difficulty  CV: no visible edema of visualized upper extremities.  Neurological - alert and oriented, speech fluent/appropriate  PSYCH: cooperative, affect appropriate  DERM: no rashes on visualized face/neck/upper extremities     The rest of a comprehensive physical examination is deferred due to PHE (public health emergency) video visit restrictions    Labs:  CBC RESULTS:  Lab Results   Component Value Date    WBC 7.4 10/04/2022    WBC 7.1 09/09/2020    RBC 5.00 10/04/2022    RBC 4.57 09/09/2020    HGB 11.4 (L) 10/04/2022    HGB 13.3 09/09/2020    HCT 39.1 10/04/2022    HCT 41.1 09/09/2020    MCV 78 10/04/2022    MCV 90 09/09/2020    MCH 22.8 (L) 10/04/2022    MCH 29.1 09/09/2020    MCHC 29.2 (L) 10/04/2022    MCHC 32.4 09/09/2020    RDW 19.6 (H) 10/04/2022    RDW 12.5 09/09/2020     10/04/2022     09/09/2020       CMP RESULTS:  Lab Results   Component Value Date     12/27/2022     01/07/2021    POTASSIUM 4.6 12/27/2022    POTASSIUM 4.8 01/07/2021    CHLORIDE 108 12/27/2022    CHLORIDE 106 01/07/2021    CO2 28 12/27/2022    CO2 28 01/07/2021     ANIONGAP 5 12/27/2022    ANIONGAP 7 01/07/2021     (H) 12/27/2022     (H) 01/07/2021    BUN 21 12/27/2022    BUN 19 01/07/2021    CR 0.82 12/27/2022    CR 0.90 01/07/2021    GFRESTIMATED 77 12/27/2022    GFRESTIMATED 66 01/07/2021    GFRESTBLACK 77 01/07/2021    POONAM 9.6 12/27/2022    POONAM 9.5 01/07/2021    BILITOTAL 0.4 07/06/2022    BILITOTAL 0.4 09/09/2020    ALBUMIN 3.7 07/06/2022    ALBUMIN 3.7 09/09/2020    ALKPHOS 107 07/06/2022    ALKPHOS 90 09/09/2020    ALT 24 07/06/2022    ALT 33 09/09/2020    AST 14 07/06/2022    AST 14 09/09/2020        INR RESULTS:  Lab Results   Component Value Date    INR 1.29 (H) 05/25/2022       No components found for: CK  Lab Results   Component Value Date    MAG 2.0 06/04/2022     Lab Results   Component Value Date    NTBNP 2,714 (H) 06/09/2022     @BRIEFLABR (dig)@    Most recent echocardiogram:  No results found for this or any previous visit (from the past 8760 hour(s)).      Assessment and Plan:    In summary,Marianne  is a 67 year old here for a CORE follow up. Patient will  start 12.5 mg Aldactone. BMP in 2 weeks after start.    1.  Chronic systolic heart failure secondary to ICM.  Stage C  NYHA Class III  ACEi/ARB: -Entresto 97/103 BID-   BB: yes-  Metoprolol 75 mg   Aldosterone antagonist: 12.5 mg   SCD prophylaxis: does not meet criteria for implant  Fluid status: euvolemic  Anticoagulation:   Antiplatelet:  ASA dose   Sleep apnea:  NSAID use:  Contraindicated.  Avoid use.  Remote Monitoring:none  SGLT 2 - Jardiance 25 mg    2.  Other comordbid conditions:      #T2DM - PCP to manage - glipizide , Jardiance, metformin    #HTN- Entresto and Metoprolol     #GERD- followed by PCP     3.  Follow-up   1/2 tab Spironolactone   Labs 1/11-   [facility] February       Start time 10:32 am  End time 10:50 am  Patient was at home, daughter connected as per request from patient  [employer]   Provider is clinic.  Total time with review of chart and video visit 30  min          Please do not hesitate to contact me if you have any questions/concerns.     Sincerely,     OSCAR Smith CNP

## 2022-12-29 NOTE — PATIENT INSTRUCTIONS
Take your medicines every day, as directed    Changes made today:  Start Spironolactone 12.5 mg daily     Monitor Your Weight and Symptoms    Contact us if you:    Gain 2 pounds in one day or 5 pounds in one week  Feel more short of breath  Notice more leg swelling  Feel lightheadeded   Change your lifestyle    Limit Salt or Sodium:  2000 mg  Limit Fluids:  2000 mL or approximately 64 ounces  Eat a Heart Healthy Diet  Low in saturated fats  Stay Active:  Aim to move at least 150 minutes every  week         To Contact us    During Business Hours:  [phone], option # 1      After hours, weekends or holidays:   [phone], Option #4  Ask to speak to the On-Call Cardiologist. Inform them you are a CORE/heart failure patient at the [facility].     Use [employer] allows you to communicate directly with your heart team through secure messaging.  [employer] can be accessed any time on your phone, computer, or tablet.  If you need assistance, we'd be happy to help!         Keep your Heart Appointments:    [facility] on 1/11/23  [facility] mid February      Please consider attending our virtual support group which is held monthly. Please reach out to Lloyd at [phone] for more information if you are interested in attending.       2023 dates:  Monday, January 2nd , 1-2pm: Heart Failure and Electrophysiology  Monday, February 6th , 1-2pm  Monday, March 6th , 1-2pm  Monday, April 3rd, 1-2pm  Monday, May 1st, 1-2pm  Monday, June 5th, 1-2pm  Monday, July 3rd, 1-2pm  Monday, August 7th, 1-2pm  Monday, September 11th, 1-2pm  Monday, October 2nd, 1-2pm  Monday, November 6th, 1-2pm  Monday, December 4th, 1-2pm

## 2022-12-29 NOTE — NURSING NOTE
Labs: Patient was given results of the laboratory testing obtained today. Patient demonstrated understanding of this information and agreed to call with further questions or concerns.     Med Reconcile: Reviewed and verified all current medications with the patient. The updated medication list was printed and given to the patient.    New Medication: Patient was educated regarding newly prescribed medication, including discussion of  the indication, administration, side effects, and when to report to MD or RN. Patient demonstrated understanding of this information and agreed to call with further questions or concerns. Start Spironolactone 12.5 mg daily.    Return Appointment: Patient given instructions regarding scheduling next clinic visit. Patient demonstrated understanding of this information and agreed to call with further questions or concerns. CORE in February.     Patient stated she understood all health information given and agreed to call with further questions or concerns.    Krissy Silverman RN

## 2023-01-04 DIAGNOSIS — R63.4 UNINTENTIONAL WEIGHT LOSS: ICD-10-CM

## 2023-01-04 DIAGNOSIS — K52.9 CHRONIC DIARRHEA: Primary | ICD-10-CM

## 2023-01-06 ENCOUNTER — ANCILLARY PROCEDURE (OUTPATIENT)
Dept: CT IMAGING | Facility: CLINIC | Age: 68
End: 2023-01-06
Attending: PHYSICIAN ASSISTANT
Payer: COMMERCIAL

## 2023-01-06 DIAGNOSIS — R63.4 UNINTENTIONAL WEIGHT LOSS: ICD-10-CM

## 2023-01-06 DIAGNOSIS — K52.9 CHRONIC DIARRHEA: ICD-10-CM

## 2023-01-06 PROCEDURE — 74177 CT ABD & PELVIS W/CONTRAST: CPT | Mod: TC | Performed by: RADIOLOGY

## 2023-01-06 PROCEDURE — 71260 CT THORAX DX C+: CPT | Mod: TC | Performed by: RADIOLOGY

## 2023-01-06 RX ORDER — IOPAMIDOL 755 MG/ML
80 INJECTION, SOLUTION INTRAVASCULAR ONCE
Status: COMPLETED | OUTPATIENT
Start: 2023-01-06 | End: 2023-01-06

## 2023-01-06 RX ADMIN — IOPAMIDOL 80 ML: 755 INJECTION, SOLUTION INTRAVASCULAR at 09:09

## 2023-01-10 ENCOUNTER — MYC MEDICAL ADVICE (OUTPATIENT)
Dept: FAMILY MEDICINE | Facility: CLINIC | Age: 68
End: 2023-01-10

## 2023-01-10 ENCOUNTER — TELEPHONE (OUTPATIENT)
Dept: GASTROENTEROLOGY | Facility: CLINIC | Age: 68
End: 2023-01-10

## 2023-01-10 DIAGNOSIS — A04.72 C. DIFFICILE DIARRHEA: Primary | ICD-10-CM

## 2023-01-10 NOTE — TELEPHONE ENCOUNTER
January 10, 2023    Spoke with Jody regarding the CT result.    NO findings to explain her symptoms.    There were several incidental findings, including bronchiectasis, left lower lobe consolidation (atelectasis versus PNA), and stable enlarged thyroid with calcifications (first noted May 2022 while hospitalized).    Jody notes she does have JEFFERSON and cough productive of phlegm. She was unaware of the thyroid calcifications from May 2022.     I asked Jody to touch base with her PCP regarding her JEFFERSON, cough in light of the CT findings. A follow up chest CT is recommended in 4-6 weeks per radiology.    Should an antibiotic be used to treat possible PNA, if appropriate, doxycycline does cause less gut microbiome disruption with less risk of C diff. We discussed that regardless of antibotiic, should she be exposed to antibioics and experience worsening diarrhea, would recommend retesting stool for C diff. There are standing orders.    In regard to diarrhea, this continues but has improved with imodium.    ECT

## 2023-01-11 ENCOUNTER — TELEPHONE (OUTPATIENT)
Dept: CARDIOLOGY | Facility: CLINIC | Age: 68
End: 2023-01-11

## 2023-01-11 ENCOUNTER — MYC MEDICAL ADVICE (OUTPATIENT)
Dept: CARDIOLOGY | Facility: CLINIC | Age: 68
End: 2023-01-11

## 2023-01-11 NOTE — TELEPHONE ENCOUNTER
[facility] Call Center    Phone Message    May a detailed message be left on voicemail: yes     Reason for Call: Other: This patient has been added to [facility] on Friday 1/13 after rescheduling from 1/11     Action Taken: Other: Cardiology    Travel Screening: Not Applicable     Thank you!  [facility]

## 2023-01-12 ENCOUNTER — LAB (OUTPATIENT)
Dept: LAB | Facility: CLINIC | Age: 68
End: 2023-01-12
Payer: COMMERCIAL

## 2023-01-12 DIAGNOSIS — I50.20 HFREF (HEART FAILURE WITH REDUCED EJECTION FRACTION) (H): ICD-10-CM

## 2023-01-12 LAB
ANION GAP SERPL CALCULATED.3IONS-SCNC: 7 MMOL/L (ref 3–14)
BUN SERPL-MCNC: 31 MG/DL (ref 7–30)
CALCIUM SERPL-MCNC: 9.4 MG/DL (ref 8.5–10.1)
CHLORIDE BLD-SCNC: 105 MMOL/L (ref 94–109)
CO2 SERPL-SCNC: 29 MMOL/L (ref 20–32)
CREAT SERPL-MCNC: 0.88 MG/DL (ref 0.52–1.04)
GFR SERPL CREATININE-BSD FRML MDRD: 71 ML/MIN/1.73M2
GLUCOSE BLD-MCNC: 169 MG/DL (ref 70–99)
POTASSIUM BLD-SCNC: 4.5 MMOL/L (ref 3.4–5.3)
SODIUM SERPL-SCNC: 141 MMOL/L (ref 133–144)

## 2023-01-12 PROCEDURE — 36415 COLL VENOUS BLD VENIPUNCTURE: CPT

## 2023-01-12 PROCEDURE — 80048 BASIC METABOLIC PNL TOTAL CA: CPT

## 2023-01-12 NOTE — TELEPHONE ENCOUNTER
I called Jody to further explain the difference between EP and CORE Clinic. Left a message letting her know that tomorrow's appointment is unnecessary and that the EP team would like to help her reschedule with the appropriate provider. I left a phone number for her to call and discuss this.    Dasia Amin RN

## 2023-01-12 NOTE — TELEPHONE ENCOUNTER
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    Spoke with Dasia to call pt back so she can further advise

## 2023-01-12 NOTE — TELEPHONE ENCOUNTER
I called Jody's daughter, Adelaide, to try to get the message to Jody regarding her appointment for tomorrow. LVM.    Dasia Amin RN

## 2023-01-17 ENCOUNTER — OFFICE VISIT (OUTPATIENT)
Dept: FAMILY MEDICINE | Facility: CLINIC | Age: 68
End: 2023-01-17
Payer: COMMERCIAL

## 2023-01-17 VITALS
BODY MASS INDEX: 22.2 KG/M2 | TEMPERATURE: 97.4 F | HEART RATE: 65 BPM | OXYGEN SATURATION: 96 % | HEIGHT: 64 IN | SYSTOLIC BLOOD PRESSURE: 105 MMHG | WEIGHT: 130 LBS | DIASTOLIC BLOOD PRESSURE: 65 MMHG

## 2023-01-17 DIAGNOSIS — I50.20 HFREF (HEART FAILURE WITH REDUCED EJECTION FRACTION) (H): ICD-10-CM

## 2023-01-17 DIAGNOSIS — E11.59 TYPE 2 DIABETES MELLITUS WITH OTHER CIRCULATORY COMPLICATION, WITHOUT LONG-TERM CURRENT USE OF INSULIN (H): ICD-10-CM

## 2023-01-17 DIAGNOSIS — Z71.84 TRAVEL ADVICE ENCOUNTER: ICD-10-CM

## 2023-01-17 DIAGNOSIS — I10 HYPERTENSION, GOAL BELOW 140/90: ICD-10-CM

## 2023-01-17 DIAGNOSIS — J18.9 PNEUMONIA DUE TO INFECTIOUS ORGANISM, UNSPECIFIED LATERALITY, UNSPECIFIED PART OF LUNG: Primary | ICD-10-CM

## 2023-01-17 DIAGNOSIS — Z86.19 HISTORY OF CLOSTRIDIOIDES DIFFICILE COLITIS: ICD-10-CM

## 2023-01-17 PROBLEM — I50.21 ACUTE SYSTOLIC HEART FAILURE (H): Status: RESOLVED | Noted: 2022-05-19 | Resolved: 2023-01-17

## 2023-01-17 PROCEDURE — 99214 OFFICE O/P EST MOD 30 MIN: CPT | Performed by: FAMILY MEDICINE

## 2023-01-17 RX ORDER — AZITHROMYCIN 250 MG/1
TABLET, FILM COATED ORAL
Qty: 6 TABLET | Refills: 0 | Status: SHIPPED | OUTPATIENT
Start: 2023-01-17 | End: 2023-01-22

## 2023-01-17 ASSESSMENT — PAIN SCALES - GENERAL: PAINLEVEL: NO PAIN (0)

## 2023-01-17 NOTE — PROGRESS NOTES
Chief Complaint: Establish general cardiovascular care    \[facility] [place] [facility]\"" (07/20/2022): Marianne Monroy is a 67 year old female with a past medical history of coronary artery disease (s/p CABG), ischemic cardiomyopathy, and hypertension who was referred to me to establish general cardiovascular care.  She presented today with her niece and brother.    Briefly, Ms. Monroy first came to my attention in the inpatient setting. There, she was admitted to my service in May 2022 with a tachyarrhythmia. She had a TTE and was found to have severe LV dysfunction (LVEF 20-30%) with normal RV function. I subsequently referred her for a coronary angiogram, which revealed severe CAD. This prompted CABG, which was done on 05/24/22. Her post-operative course was notable for PVCs without ventricular tachycardia.  Her discharge LVEF (done on June 1) was 35-40%.  She also had C. Difficile infection shortly after leaving the hospital. Ms. Monroy has been followed in the core clinic, where she has had progressive upward titration of her neurohormonal blockade.  Given her reduced EF and ventricular tachycardia preoperatively, the EP team recommended that she wear her LifeVest until her 3-month follow-up with them.    Today, Ms. Monroy notes that she feel that she is slowly improving after surgery.  The C. difficile infection was back in she is not feel like she has fully recovered in spite of taking 12 out of 14 days of the medication course.  From a cardiovascular standpoint, her chief complaint is lightheadedness, which is primarily postural.  She also notes that she has been having some imbalance, but this is improved by using a cane.  She can walk at least 1/2 mile level ground. Ms. Monroy also notes some hesitation about being fully physically active given her recent major surgery.  At the time of the consultation, she notes an absence of chest pain at rest, dyspnea at rest or with exertion, PND, orthopnea, peripheral edema,  "palpitations, or syncope.  She continues to wear her LifeVest during the day but takes it off to sleep.    A comprehensive ROS was done and the details are included above in the HPI.    Interval History 9/28/22:  Since Ms. Monroy's last visit, she has seen EP and they have decided to proceed with ICD implantation. She has also followed with the [facility] clinic, and she has been transitioned to higher doses of metoprolol and sacubutril-valsartan.    From a symptomatic standpoint, Ms. Monroy feels extremely well.  She has really enjoyed cardiac rehab and has signed up to a membership in a regular gym to supplement her cardiac rehab sessions.  She is able to do all the cardiac rehab exercises without any limitation from shortness of breath, angina, lightheadedness, dizziness, or presyncope.  This amounts to approximately 60 minutes of aerobic and resistance activity with brief breaks in between.  She has occasionally been experiencing lightheadedness when she stands up quickly.  She presented today with her niece.    A comprehensive ROS was done and the details are included above in the HPI.    Interval History 1/18/23:  Since Ms. Monroy's last visit, she had her ICD placed. Her medical therapy has been titrated to the point that she is on maximal doses of ARB/ARNI. She presented with Cori today.     From a functional standpoint, Ms. Monroy continues to feel well, with the exception of frequent dizziness.  The dizziness does occur with postural change and if she turns quickly.  However, she is going to the gym multiple times weekly and has not had any presyncope/syncope.  She is also planning multiple trips in the spring, including to [place]. Ms. Monroy does have occasional \"pulling\"at the site of the ICD when she rolls over in bed.    A comprehensive ROS was done and the details are included above in the HPI.    Past Medical History:  Ischemic cardiomyopathy  Coronary artery disease status post CABG  Hypertension  Type 2 " diabetes mellitus, non-insulin-dependent  - No prior history of  dyslipidemia, TIA/stroke, vascular aneurysms, PAD  Past Medical History:   Diagnosis Date     Abnormal Pap smear of cervix ~2000    repeat pap was normal     Allergic rhinitis, cause unspecified      Anxiety state, unspecified     panic episodes     HFrEF (heart failure with reduced ejection fraction) (H)      Osteopenia      Unspecified essential hypertension        Past Surgical History:  CABG 05/24/22 by Ulises Desai at [facility]: LIMA-LAD, SVG-dRCA, SVG-RI, SVG-OM  Past Surgical History:   Procedure Laterality Date     BYPASS GRAFT ARTERY CORONARY N/A 05/24/2022    Procedure: Median sternotomy.  Intraoperative transesophageal echocardiogram per anesthesia.  Left internal mammary artery harvest.  Right and left endoscopically harvested  greater saphenouse vein.  Cardiopulmonary Bypass.  Coronary Artery Bypass Grafts x4.;  Surgeon: Ulises Desai MD;  Location: [facility] OR     CV CORONARY ANGIOGRAM  05/19/2022    Procedure: CV CORONARY ANGIOGRAM;  Surgeon: Huseyin Vogel MD;  Location:  HEART CARDIAC CATH LAB     CV CORONARY ANGIOGRAM  05/19/2022    Procedure: ;  Surgeon: Huseyin Vogel MD;  Location: [facility] CARDIAC CATH LAB     EP ICD INSERT SINGLE N/A 10/4/2022    Procedure: Implantable Cardioverter Defibrillator Device & Lead Implant Single or Dual;  Surgeon: Too Morrow MD;  Location: [facility] CARDIAC CATH LAB     NO HISTORY OF SURGERY       PICC DOUBLE LUMEN PLACEMENT Right 05/27/2022    Failed PICC attempt right arm basilic vein     PICC INSERTION - TRIPLE LUMEN Left 05/28/2022    left basilic 5fr tl,picc44 cm       Drug History:  Home cardiac meds: Aspirin 81 mg once daily, atorvastatin 80 mg once daily, empagliflozin 25 mg daily,  metoprolol succinate 50 mg daily, sacubitril-valsartan  mg twice daily , spironolactone 12.5 daily.  Current Outpatient Medications   Medication Sig Dispense Refill     acetaminophen  (TYLENOL) 500 MG tablet Take 1,000 mg by mouth 3 times daily as needed for mild pain       aspirin 81 MG EC tablet Take 81 mg by mouth daily       atorvastatin (LIPITOR) 80 MG tablet TAKE 1 TABLET DAILY (NEED ANNUAL EXAM) Strength: 80 mg 90 tablet 2     azithromycin (ZITHROMAX) 250 MG tablet Take 2 tablets (500 mg) by mouth daily for 1 day, THEN 1 tablet (250 mg) daily for 4 days. 6 tablet 0     blood glucose (ONETOUCH VERIO IQ) test strip Use to test blood sugar 2 times daily or as directed. 200 strip 1     blood glucose monitoring (ONETOUCH VERIO) meter device kit Use to test blood sugar 2 times daily or as directed. 1 kit 0     empagliflozin (JARDIANCE) 25 MG TABS tablet Take 1 tablet (25 mg) by mouth daily 90 tablet 0     FLUoxetine (PROZAC) 20 MG capsule TAKE 2 CAPSULES DAILY 180 capsule 1     glipiZIDE (GLUCOTROL XL) 5 MG 24 hr tablet TAKE 2 TABLETS DAILY (NEED ANNUAL EXAM) 180 tablet 1     metFORMIN (GLUCOPHAGE XR) 500 MG 24 hr tablet Take 3 tablets (1,500 mg) by mouth daily (with dinner) Please see changed dose 270 tablet 1     metoprolol succinate ER (TOPROL XL) 50 MG 24 hr tablet Take 1.5 tablets (75 mg) by mouth daily 90 tablet 3     MULTIPLE VITAMIN OR TABS 1 TABLET DAILY       sacubitril-valsartan (ENTRESTO)  MG per tablet Take 1 tablet by mouth 2 times daily 180 tablet 3     spironolactone (ALDACTONE) 25 MG tablet Take 0.5 tablets (12.5 mg) by mouth daily 30 tablet 1     Multiple Vitamins-Minerals (PRESERVISION AREDS PO) Take 2 tablets by mouth daily (Patient not taking: Reported on 2023)           Family History:    Family History   Problem Relation Age of Onset     Diabetes Mother         hypertension, alive at 83     C.A.D. Mother      Cancer Father         lung cancer, smoker.   at age 53     Family History Negative Sister      Osteoporosis Sister        Social History:    Social History     Tobacco Use     Smoking status: Never     Smokeless tobacco: Never   Substance Use Topics      Alcohol use: Yes     Comment: social       Allergies   Allergen Reactions     Effexor [Venlafaxine Hydrochloride]      Tachycardia, makes her extremely jittery--cant sit down, has to keep moving constantly     Latex      Reported by patient         Physical Examination:  Vitals: /65 (BP Location: Left arm, Patient Position: Chair, Cuff Size: Adult Regular)   Pulse 70   Wt 59.6 kg (131 lb 8 oz)   SpO2 95%   BMI 22.57 kg/m    BMI= Body mass index is 22.57 kg/m .    GENERAL: Healthy, alert and no distress  Eyes: No xanthelasmas.  NECK: No palpable neck masses/goitre and no evidence of retrosternal goitre.   ENT: Moist mucosal membranes.  RESPIRATORY: No signs of resp distress. Lungs CTAB.  CARDIOVASCULAR: ICD site appears healthy.  Sternotomy scar appears to be healing well without any drainage or open areas in the wound.  No JVD, regular, normal S1+S2 without added sounds or murmurs.  EXTREMITIES: Warm, well-perfused, no edema.   NEUROLOGY: GCS 15/15, no focal deficits.  SKIN: No ecchymoses, no rashes.  PSYCH: Cooperative, pleasant affect.       Investigations:  I personally viewed and interpreted the following investigations:    Labs:    CBC RESULTS:  Lab Results   Component Value Date    WBC 7.4 10/04/2022    WBC 7.1 09/09/2020    RBC 5.00 10/04/2022    RBC 4.57 09/09/2020    HGB 11.4 (L) 10/04/2022    HGB 13.3 09/09/2020    HCT 39.1 10/04/2022    HCT 41.1 09/09/2020    MCV 78 10/04/2022    MCV 90 09/09/2020    MCH 22.8 (L) 10/04/2022    MCH 29.1 09/09/2020    MCHC 29.2 (L) 10/04/2022    MCHC 32.4 09/09/2020    RDW 19.6 (H) 10/04/2022    RDW 12.5 09/09/2020     10/04/2022     09/09/2020       CMP RESULTS:  Lab Results   Component Value Date     01/12/2023     01/07/2021    POTASSIUM 4.5 01/12/2023    POTASSIUM 4.8 01/07/2021    CHLORIDE 105 01/12/2023    CHLORIDE 106 01/07/2021    CO2 29 01/12/2023    CO2 28 01/07/2021    ANIONGAP 7 01/12/2023    ANIONGAP 7 01/07/2021      (H) 01/12/2023     (H) 01/07/2021    BUN 31 (H) 01/12/2023    BUN 19 01/07/2021    CR 0.88 01/12/2023    CR 0.90 01/07/2021    GFRESTIMATED 71 01/12/2023    GFRESTIMATED 66 01/07/2021    GFRESTBLACK 77 01/07/2021    POONAM 9.4 01/12/2023    POONAM 9.5 01/07/2021    BILITOTAL 0.4 07/06/2022    BILITOTAL 0.4 09/09/2020    ALBUMIN 3.7 07/06/2022    ALBUMIN 3.7 09/09/2020    ALKPHOS 107 07/06/2022    ALKPHOS 90 09/09/2020    ALT 24 07/06/2022    ALT 33 09/09/2020    AST 14 07/06/2022    AST 14 09/09/2020        INR RESULTS:  Lab Results   Component Value Date    INR 1.29 (H) 05/25/2022       BNP RESULTS:  Lab Results   Component Value Date    NTBNPI 664 05/18/2022         LIPIDS:  Lab Results   Component Value Date    CHOL 147 05/18/2022    CHOL 152 04/14/2022    CHOL 144 05/18/2021    CHOL 180 01/03/2020     Lab Results   Component Value Date    HDL 47 05/18/2022    HDL 50 04/14/2022    HDL 50 05/18/2021    HDL 48 01/03/2020     Lab Results   Component Value Date    LDL 57 05/18/2022    LDL 64 04/14/2022    LDL 59 05/18/2021    LDL 67 01/03/2020     Lab Results   Component Value Date    TRIG 214 05/18/2022    TRIG 192 04/14/2022    TRIG 173 05/18/2021    TRIG 326 01/03/2020     Lab Results   Component Value Date    CHOLHDLRATIO 6.0 04/26/2012    CHOLHDLRATIO 7.0 04/01/2010         Recent Tests:    Electrocardiogram (07/06/2022):  Normal sinus rhythm, ventricular 90 bpm, LVH, inferior Q waves.  QRS 94.    Echocardiogram (08/24/2022):  Left ventricular size is normal.  The visual ejection fraction is 30-35%.  Right ventricular function, chamber size, wall motion, and thickness are  normal.      Assessment and Plan:   Marianne Monroy is a 67 year old female with a past medical history of coronary artery disease (status post CABG), ischemic cardiomyopathy, and patient recently presented to me to establish general cardiovascular July 2022.    Ms. Monroy's functional status remains excellent.  I am a little concerned about  her dizziness/lightheadedness and asked her to stay hydrated as the weather warms up in coming months.  I would suggest that further increases in her neurohormonal therapy are instituted very cautiously given the degree of symptoms that she is having.  Otherwise, I do think that she has made a nice recovery and I am very pleased to see that she continues to remain very active.    Problems:  1. Ischemic cardiomyopathy (ACC/AHA stage C, NYHA III)  2. Coronary artery disease s/p CABG in 05/2022 (LIMA-LAD, SVG-dRCA, SVG-RI, SVG-OM)  3. Hypertension  4. Type 2 diabetes mellitus, non-insulin-dependent (last A1c 7.7% in November 2022)  5. Depression  6. Generalized anxiety disorder    Plan:  - Continue aspirin 81 mg once daily and atorvastatin 80 mg once daily lifelong for CAD  - Continue metoprolol succinate 50 mg daily, sacubitril-valsartan  twice daily, and spironolactone 12.5 mg daily for neurohormonal blockade  - Continue empagliflozin 20 mg daily for type 2 diabetes mellitus and CAD/ischemic cardiomyopathy  - RTC 4-6 months    A total of 40 minutes were spent on the day of the visit for chart review, care coordination, face-to-face consultation with the patient, and documentation.     Kris Steve [facility], [place]    Cardiovascular Division  Pager [phone]    CC  Patient Care Team:  Roxanna Otoole MD as PCP - General (Family Medicine)  Roxanna Otoole MD as Assigned PCP  Regina Sutherland as Diabetes Educator (Dietitian, Registered)  Dasia Amin, RN as Specialty Care Coordinator (Cardiology)  Belinda Colon APRN CNP as Nurse Practitioner (Cardiovascular Disease)  Kris Steve MD as MD (Cardiovascular Disease)  Belinda Colon APRN CNP as Assigned Heart and Vascular Provider  Evette Castro MD as MD (Infectious Diseases)  Sarah Isidro, IKER as Specialty Care Coordinator (Cardiology)  Too Morrow MD as MD (Cardiovascular Disease)  Malu Yap [facility] as Assigned [facility]  Pharmacist  Zoie Chapman PA-C as Assigned Cancer Care Provider  Evette Castro MD as Assigned Infectious Disease Provider  Cherie Lira APRN CNP as Nurse Practitioner (Cardiovascular Disease)

## 2023-01-17 NOTE — PROGRESS NOTES
Assessment & Plan     Pneumonia due to infectious organism, unspecified laterality, unspecified part of lung  Advised   Discussed lways a possibility C diff may come back  Take Probiotics  - azithromycin (ZITHROMAX) 250 MG tablet; Take 2 tablets (500 mg) by mouth daily for 1 day, THEN 1 tablet (250 mg) daily for 4 days.  - CT Chest w/o Contrast; Future    Travel advice encounter  Referral done   - Travel Clinic Referral; Future    History of Clostridioides difficile colitis  Sees GI     Type 2 diabetes mellitus with other circulatory complication, without long-term current use of insulin (H)  Stable     Hypertension, goal below 140/90  Stable     HFrEF (heart failure with reduced ejection fraction) (H)  Sees Cardiology  Notes have been reviewed     Follow up 6 weeks-repeat CT scan to ensure resolution    Return in about 2 months (around 3/17/2023) for Medicare wellness Exam.    Roxanna Otoole MD  [facility] NARDA Vera is a 67 year old, presenting for the following health issues:  CT Results      History of Present Illness       Reason for visit:  Ct scan result left lower lung  Symptom onset:  3-7 days ago  Symptom intensity:  Moderate  Symptom progression:  Staying the same  Had these symptoms before:  No  What makes it worse:  Steps walking to fast  What makes it better:  Sleeping a lot    She eats 2-3 servings of fruits and vegetables daily.She consumes 0 sweetened beverage(s) daily.She exercises with enough effort to increase her heart rate 10 to 19 minutes per day.  She exercises with enough effort to increase her heart rate 4 days per week.   She is taking medications regularly.     Pt had a CT scan on 1-6-23  Which showed LLL consolidation  Pt has had a cough 2 months  No fever  Has had some sob  Does not smoke  .  Mild left lower lobar consolidation which may reflect atelectasis  or pneumonia. Recommend short interval CT follow-up within 6-8 weeks  to assess for  "resolution/improvement and to exclude an underlying  lesion.  2.  No acute or suspicious findings within the abdomen or pelvis. No  evidence of an inflammatory process or mass lesion.  Pt C diff is better though still gets Upset stomach and is seeing Gastroenterology for this   Review of Systems   CONSTITUTIONAL: NEGATIVE for fever, chills, change in weight  ENT/MOUTH: NEGATIVE for ear, mouth and throat problems  RESP:as above  CV: NEGATIVE for chest pain, palpitations or peripheral edema  GI: as above  :none  ROS otherwise negative      Objective    /65   Pulse 65   Temp 97.4  F (36.3  C) (Temporal)   Ht 1.626 m (5' 4\")   Wt 59 kg (130 lb)   SpO2 96%   BMI 22.31 kg/m    Body mass index is 22.31 kg/m .  Physical Exam   GENERAL: healthy, alert and no distress  EYES: Eyes grossly normal to inspection  NECK: no adenopathy, no asymmetry, masses, or scars and thyroid normal to palpation  RESP: lungs clear to auscultation - no rales, rhonchi or wheezes  CV: regular rate and rhythm, normal S1 S2, no S3 or S4, no murmur, click or rub, no peripheral edema and peripheral pulses strong  ABDOMEN: soft, nontender, no hepatosplenomegaly, no masses and bowel sounds normal  MS: no gross musculoskeletal defects noted, no edema    Lab on 01/12/2023   Component Date Value Ref Range Status     Sodium 01/12/2023 141  133 - 144 mmol/L Final     Potassium 01/12/2023 4.5  3.4 - 5.3 mmol/L Final     Chloride 01/12/2023 105  94 - 109 mmol/L Final     Carbon Dioxide (CO2) 01/12/2023 29  20 - 32 mmol/L Final     Anion Gap 01/12/2023 7  3 - 14 mmol/L Final     Urea Nitrogen 01/12/2023 31 (H)  7 - 30 mg/dL Final     Creatinine 01/12/2023 0.88  0.52 - 1.04 mg/dL Final     Calcium 01/12/2023 9.4  8.5 - 10.1 mg/dL Final     Glucose 01/12/2023 169 (H)  70 - 99 mg/dL Final     GFR Estimate 01/12/2023 71  >60 mL/min/1.73m2 Final    Effective December 21, 2021 eGFRcr in adults is calculated using the 2021 CKD-EPI creatinine equation which " includes age and gender (Laverne et al., NEJM, DOI: 10.1056/BAQWys5354407)

## 2023-01-17 NOTE — Clinical Note
I had to Put pt on zithromax based on CT scan you did With her C diff -Please let me Know if there are any further recommendations Roxanna Otoole MD

## 2023-01-18 ENCOUNTER — OFFICE VISIT (OUTPATIENT)
Dept: CARDIOLOGY | Facility: CLINIC | Age: 68
End: 2023-01-18
Payer: COMMERCIAL

## 2023-01-18 VITALS
WEIGHT: 131.5 LBS | HEART RATE: 70 BPM | SYSTOLIC BLOOD PRESSURE: 117 MMHG | DIASTOLIC BLOOD PRESSURE: 65 MMHG | OXYGEN SATURATION: 95 % | BODY MASS INDEX: 22.57 KG/M2

## 2023-01-18 DIAGNOSIS — Z95.1 S/P CABG (CORONARY ARTERY BYPASS GRAFT): ICD-10-CM

## 2023-01-18 DIAGNOSIS — I10 HYPERTENSION, UNSPECIFIED TYPE: ICD-10-CM

## 2023-01-18 DIAGNOSIS — E11.59 TYPE 2 DIABETES MELLITUS WITH OTHER CIRCULATORY COMPLICATION, WITHOUT LONG-TERM CURRENT USE OF INSULIN (H): ICD-10-CM

## 2023-01-18 PROCEDURE — 99215 OFFICE O/P EST HI 40 MIN: CPT | Performed by: STUDENT IN AN ORGANIZED HEALTH CARE EDUCATION/TRAINING PROGRAM

## 2023-01-18 NOTE — NURSING NOTE
Marianne Monroy's goals for this visit include:   Chief Complaint   Patient presents with     Follow Up       She requests these members of her care team be copied on today's visit information: yes     PCP: Roxanna Otoole    Referring Provider:  Kris Steve MD  [address] [place] [id number]    /65 (BP Location: Left arm, Patient Position: Chair, Cuff Size: Adult Regular)   Pulse 70   Wt 59.6 kg (131 lb 8 oz)   SpO2 95%   BMI 22.57 kg/m      Do you need any medication refills at today's visit? No       DANO Felix   Cardiology Team  [facility]

## 2023-01-18 NOTE — PATIENT INSTRUCTIONS
You were seen at the [facility] Cardiology clinic today.   You saw Dr. Kris Steve, [facility], [place].    The following is a summary of your office visit today:    No changes to medications   Please try and pursue moderate-intensity exercise for 30 minutes at least five times weekly.  Please try and maintain a diet rich in fruit and vegetables and low in saturated fats and sugars.  Follow-up with 3-6 months     Nurse contact information:    Cardiology Care Coordinators: Keyla Saul RN   Phone: [phone]  Fax: [phone]      HOW TO CHECK YOUR BLOOD PRESSURE AT HOME:     Avoid eating, smoking, and exercising for at least 30 minutes before taking a reading.    Be sure you have taken your BP medication at least 2-3 hours before you check it.     Sit quietly for 10 minutes before a reading.     Sit in a chair with your feet flat on the floor. Rest your  arm on a table so that the arm cuff is at the same level as your heart.    Remain still during the reading.    Record your blood pressure and pulse in a log and bring to your next appointment.     If you have had any blood work, imaging or other testing completed we will be in touch within 1-2 weeks regarding the results. If you have any questions, concerns or need to schedule a follow up, please contact us at [phone]. If you are needing refills please contact your pharmacy. For urgent after hour care please call the [facility] at [phone] or the [facility] at [phone] and ask to speak to the on-call Cardiologist.    It was a pleasure meeting with you today. Please let us know if there is anything else we can do for you so that we can be sure you are leaving completely satisfied with your care experience.     Your Cardiology Team at [facility]  RN Care Coordinator: Keyla PEARL: Jean

## 2023-01-21 ENCOUNTER — MYC MEDICAL ADVICE (OUTPATIENT)
Dept: FAMILY MEDICINE | Facility: CLINIC | Age: 68
End: 2023-01-21
Payer: COMMERCIAL

## 2023-01-24 DIAGNOSIS — I50.20 HFREF (HEART FAILURE WITH REDUCED EJECTION FRACTION) (H): Primary | ICD-10-CM

## 2023-01-25 ENCOUNTER — TELEPHONE (OUTPATIENT)
Dept: CARDIOLOGY | Facility: CLINIC | Age: 68
End: 2023-01-25
Payer: COMMERCIAL

## 2023-01-25 NOTE — TELEPHONE ENCOUNTER
----- Message from Dasia Amin RN sent at 1/24/2023  2:03 PM CST -----  Regarding: CORE labs 2.9  Hi!  Can you help Jody with scheduling a lab appointment prior to CORE 2/9?  Thanks,  IKER Forman

## 2023-01-25 NOTE — TELEPHONE ENCOUNTER
[facility] Call Center    Phone Message    May a detailed message be left on voicemail: yes     Reason for Call: Other: Pt requested appt for labs 2/7- scheduled, but no orders listed.   Please place orders.    Action Taken: Other: cardio    Travel Screening: Not Applicable

## 2023-01-27 NOTE — TELEPHONE ENCOUNTER
Labs scheduled for 2/7/23. No call placed.     Lolis Hernández CMA ([facility])

## 2023-02-01 ENCOUNTER — VIRTUAL VISIT (OUTPATIENT)
Dept: SURGERY | Facility: CLINIC | Age: 68
End: 2023-02-01
Payer: COMMERCIAL

## 2023-02-01 ENCOUNTER — ANESTHESIA EVENT (OUTPATIENT)
Dept: SURGERY | Facility: CLINIC | Age: 68
End: 2023-02-01

## 2023-02-01 ENCOUNTER — PRE VISIT (OUTPATIENT)
Dept: SURGERY | Facility: CLINIC | Age: 68
End: 2023-02-01

## 2023-02-01 DIAGNOSIS — Z01.818 PREOP EXAMINATION: Primary | ICD-10-CM

## 2023-02-01 PROCEDURE — 99204 OFFICE O/P NEW MOD 45 MIN: CPT | Mod: 95 | Performed by: PHYSICIAN ASSISTANT

## 2023-02-01 ASSESSMENT — ENCOUNTER SYMPTOMS: SEIZURES: 0

## 2023-02-01 ASSESSMENT — LIFESTYLE VARIABLES: TOBACCO_USE: 0

## 2023-02-01 ASSESSMENT — NEW YORK HEART ASSOCIATION (NYHA) CLASSIFICATION: NYHA FUNCTIONAL CLASS: III

## 2023-02-01 NOTE — H&P
Pre-Operative H & P     CC:  Preoperative exam to assess for increased cardiopulmonary risk while undergoing surgery and anesthesia.    Date of Encounter: 2/1/2023  Primary Care Physician:  Roxanna Otoole     Reason for Visit: Iron deficiency anemia due to chronic blood loss; Chronic diarrhea; Unintentional weight loss    HPI  Marianne Monroy is a 68 y/o female who presents for pre-operative H&P in preparation for ESOPHAGOGASTRODUODENOSCOPY (EGD); COLONOSCOPY with Nikhil Montoya MD on 2/16/23 at [facility] for treatment of Iron deficiency anemia due to chronic blood loss; Chronic diarrhea; Unintentional weight loss.     Patient is being evaluated for comorbid conditions of HTN, CAD s/p CABG 5/24/22, HFrEF, s/p ICD implant, allergic rhinitis, anxiety, depression, DM2, iron deficiency, GERD, h/o C.Diff.    Ms. Monroy has a history of recurrent C difficile following IV antibiotics administered during cardiac bypass surgery in May 2022. She has been treated empirically for presumed recurrence, followed by a documented recurrence 8/2022, treated with a vancomycin taper. While on vancomycin, she has had partial improvement in diarrhea, from 15+ watery stools per day, to 3-4 watery stools per day. She is currently off of all C diff-directed therapies, and have 6-7 bowel movements daily, associated with nocturnal awakenings. Prior to CDI, she reports having a daily formed stool, with occasional loose stools with increased metformin dosing. She estimates she has lost 20#. She now presents for the above procedure.    She was last seen by cardiology on 1/18/23. Last spring, she was found to have severe LV dysfunction (LVEF 20-30%) with normal RV function. She then underwent CABG on 05/24/22. Her discharge LVEF (done on June 1) was 35-40%. She had an ICD implanted on 10/4/22. She has recovered well overall and her functional status remains excellent.    History was  obtained from patient & chart review.     Hx of abnormal bleeding or anti-platelet use: on ASA 81mg    Menstrual history: No LMP recorded. Patient is postmenopausal.:       Past Medical History  Past Medical History:   Diagnosis Date     Abnormal Pap smear of cervix ~2000    repeat pap was normal     Allergic rhinitis, cause unspecified      Anxiety state, unspecified     panic episodes     C. difficile colitis      CAD (coronary artery disease)      HFrEF (heart failure with reduced ejection fraction) (H)      ICD (implantable cardioverter-defibrillator) in place      Osteopenia      Unspecified essential hypertension        Past Surgical History  Past Surgical History:   Procedure Laterality Date     BYPASS GRAFT ARTERY CORONARY N/A 05/24/2022    Procedure: Median sternotomy.  Intraoperative transesophageal echocardiogram per anesthesia.  Left internal mammary artery harvest.  Right and left endoscopically harvested  greater saphenouse vein.  Cardiopulmonary Bypass.  Coronary Artery Bypass Grafts x4.;  Surgeon: Ulises Desai MD;  Location: [facility] OR     CV CORONARY ANGIOGRAM  05/19/2022    Procedure: CV CORONARY ANGIOGRAM;  Surgeon: Huseyin Vogel MD;  Location: [facility] CARDIAC CATH LAB     CV CORONARY ANGIOGRAM  05/19/2022    Procedure: ;  Surgeon: Huseyin Vogel MD;  Location: [facility] CARDIAC CATH LAB     EP ICD INSERT SINGLE N/A 10/4/2022    Procedure: Implantable Cardioverter Defibrillator Device & Lead Implant Single or Dual;  Surgeon: Too Morrow MD;  Location: [facility] CARDIAC CATH LAB     NO HISTORY OF SURGERY       PICC DOUBLE LUMEN PLACEMENT Right 05/27/2022    Failed PICC attempt right arm basilic vein     PICC INSERTION - TRIPLE LUMEN Left 05/28/2022    left basilic 5fr tl,[id number] cm       Prior to Admission Medications  Current Outpatient Medications   Medication Sig Dispense Refill     acetaminophen (TYLENOL) 500 MG tablet Take 1,000 mg by mouth 3 times daily as needed  for mild pain       aspirin 81 MG EC tablet Take 81 mg by mouth every evening       atorvastatin (LIPITOR) 80 MG tablet TAKE 1 TABLET DAILY (NEED ANNUAL EXAM) Strength: 80 mg (Patient taking differently: Take 80 mg by mouth every morning TAKE 1 TABLET DAILY (NEED ANNUAL EXAM) Strength: 80 mg) 90 tablet 2     empagliflozin (JARDIANCE) 25 MG TABS tablet Take 1 tablet (25 mg) by mouth daily (Patient taking differently: Take 25 mg by mouth every morning) 90 tablet 0     FLUoxetine (PROZAC) 20 MG capsule TAKE 2 CAPSULES DAILY (Patient taking differently: Take 20 mg by mouth 2 times daily TAKE 2 CAPSULES DAILY) 180 capsule 1     glipiZIDE (GLUCOTROL XL) 5 MG 24 hr tablet TAKE 2 TABLETS DAILY (NEED ANNUAL EXAM) (Patient taking differently: 5 mg 2 times daily TAKE 2 TABLETS DAILY (NEED ANNUAL EXAM)) 180 tablet 1     metFORMIN (GLUCOPHAGE XR) 500 MG 24 hr tablet Take 3 tablets (1,500 mg) by mouth daily (with dinner) Please see changed dose (Patient taking differently: Take 500-1,000 mg by mouth 2 times daily (with meals) 1000mg - AM and 500 mg PM) 270 tablet 1     metoprolol succinate ER (TOPROL XL) 50 MG 24 hr tablet Take 1.5 tablets (75 mg) by mouth daily (Patient taking differently: Take 75 mg by mouth every morning) 90 tablet 3     MULTIPLE VITAMIN OR TABS Take by mouth every morning       sacubitril-valsartan (ENTRESTO)  MG per tablet Take 1 tablet by mouth 2 times daily 180 tablet 3     blood glucose (ONETOUCH VERIO IQ) test strip Use to test blood sugar 2 times daily or as directed. 200 strip 1     blood glucose monitoring (ONETOUCH VERIO) meter device kit Use to test blood sugar 2 times daily or as directed. 1 kit 0     Multiple Vitamins-Minerals (PRESERVISION AREDS PO) Take 2 tablets by mouth daily (Patient not taking: Reported on 1/17/2023)       spironolactone (ALDACTONE) 25 MG tablet Take 0.5 tablets (12.5 mg) by mouth daily (Patient taking differently: Take 12.5 mg by mouth every morning) 30 tablet 1        Allergies  Allergies   Allergen Reactions     Effexor [Venlafaxine Hydrochloride]      Tachycardia, makes her extremely jittery--cant sit down, has to keep moving constantly     Latex      Reported by patient       Social History  Social History     Socioeconomic History     Marital status:      Spouse name: Not on file     Number of children: Not on file     Years of education: Not on file     Highest education level: Not on file   Occupational History     Not on file   Tobacco Use     Smoking status: Never     Smokeless tobacco: Never   Vaping Use     Vaping Use: Never used   Substance and Sexual Activity     Alcohol use: Yes     Comment: social     Drug use: No     Sexual activity: Not Currently     Partners: Male   Other Topics Concern     Parent/sibling w/ CABG, MI or angioplasty before 65F 55M? No   Social History Narrative     Not on file     Social Determinants of Health     Financial Resource Strain: Not on file   Food Insecurity: Not on file   Transportation Needs: Not on file   Physical Activity: Not on file   Stress: Not on file   Social Connections: Not on file   Intimate Partner Violence: Not on file   Housing Stability: Not on file       Family History  Family History   Problem Relation Age of Onset     Diabetes Mother         hypertension, alive at 83     C.A.D. Mother      Cancer Father         lung cancer, smoker.   at age 53     Family History Negative Sister      Osteoporosis Sister      Anesthesia Reaction No family hx of      Deep Vein Thrombosis (DVT) No family hx of        Review of Systems  The complete review of systems is negative other than noted in the HPI or here.     Anesthesia Evaluation   Pt has had prior anesthetic.     No history of anesthetic complications       ROS/MED HX  ENT/Pulmonary: Comment: Patient has had chronic cough for several months. CT on 23 showed mild LLL consolidation, atelectasis vs pneumonia. Treated w/ azithromycin. Pt reports no  improvement in chronic cough. Denies fevers.     (+) allergic rhinitis,  (-) tobacco use, asthma and sleep apnea   Neurologic:  - neg neurologic ROS  (-) no seizures and no CVA   Cardiovascular: Comment: Last seen by cardiology 1/18/23, stable.      (+) hypertension--CAD -CABG-date: 5/24/22. -Taking blood thinners Instructions Given to patient: ASA 81 mg. CHF etiology: ICM Last EF: 30-35% date: 8/24/22 NYHA classification: III. ICD (Denies any shocks) Reason placed:ICM.  type;Medtronic Previous cardiac testing   Echo: Date: 8/24/22 Results:  Left ventricular size is normal.  The visual ejection fraction is 30-35%.  Right ventricular function, chamber size, wall motion, and thickness are  normal.    Stress Test: Date: Results:    ECG Reviewed: Date: 10/4/22 Results:  Sinus rhythm   Possible Left atrial enlargement   Inferior infarct (cited on or before 30-APR-2022)   Abnormal ECG   When compared with ECG of 04-OCT-2022 13:58, (unconfirmed)   No significant change was found  Ventricular rate 67 bpm    Cath:  Date: 5/19/22 Results:  Three vessel coronary artery disease involving the LAD, second OM, and mid RCA.    METS/Exercise Tolerance: 4 - Raking leaves, gardening Comment: Goes to [employer] 2-3x/week, walks on treadmill, recumbant bike, lifts weights. Endorses SOB when walking on inclines.    Hematologic:  - neg hematologic  ROS  (-) history of blood clots and history of blood transfusion   Musculoskeletal: Comment: Mild diffuse arthritis        GI/Hepatic: Comment: Recurrent C. Diff     (-) GERD and liver disease   Renal/Genitourinary:  - neg Renal ROS  (-) renal disease   Endo:     (+) type II DM, Last HgA1c: 7.7, date: 11/30/22, Not using insulin,     Psychiatric/Substance Use:     (+) psychiatric history anxiety and depression     Infectious Disease: Comment: recurrent C difficile         Malignancy:  - neg malignancy ROS     Other:  - neg other ROS          Virtual visit -  No vitals were  obtained    Physical Exam  Constitutional: Awake, alert, cooperative, no apparent distress, and appears stated age.  HENT: Normocephalic  Respiratory: non labored breathing   Neurologic: Awake, alert, oriented to name, place and time.   Neuropsychiatric: Calm, cooperative. Normal affect.      Prior Labs/Diagnostic Studies   All labs and imaging personally reviewed     EKG/ echocardiogram/ heart cath -  please see in ROS above     CT CHEST/ABDOMEN/PELVIS W CONTRAST 1/6/2023  IMPRESSION:  1.  Mild left lower lobar consolidation which may reflect atelectasis  or pneumonia. Recommend short interval CT follow-up within 6-8 weeks  to assess for resolution/improvement and to exclude an underlying  lesion.  2.  No acute or suspicious findings within the abdomen or pelvis. No  evidence of an inflammatory process or mass lesion.      The patient's records and results personally reviewed by this provider.         Assessment      Marianne Monroy is a 67 year old female seen as a PAC referral for risk assessment and optimization for anesthesia.    Plan/Recommendations  Pt will be optimized for the proposed procedure.  See below for details on the assessment, risk, and preoperative recommendations    NEUROLOGY  - No history of TIA, CVA or seizure    -Post Op delirium risk factors:  High co-morbid index    ENT  - No current airway concerns.  Will need to be reassessed day of surgery.  Mallampati: Unable to assess  TM: Unable to assess    CARDIAC  - CAD, s/p CABG 5/24/22  - HFrEF, NYHA III.  Ischemic cardiomyopathy.  - Echo 8/24/23: EF 30-35%  - s/p Medtronic ICD implant 10/4/22. Pt denies any shocks.  - Last seen by cardiology 1/18/23. Pt has recovered well overall and her functional status remains excellent.    - METS (Metabolic Equivalents)  Goes to [employer] 2-3x/week, walks on treadmill, recumbant bike, lifts weights. Endorses SOB when walking on inclines.     Patient performs 4 or more METS exercise without symptoms      "       Total Score: 0      RCRI-Moderate risk: Class 3  6.6% complication rate            Total Score: 2    RCRI: CAD    RCRI: CHF        PULMONARY  - Patient has had chronic cough for several months. CT on 1/6/23 showed mild LLL consolidation, atelectasis vs pneumonia. Treated w/ azithromycin. Pt reports no improvement in chronic cough. Denies fevers.     SANDIE Low Risk            Total Score: 2    SANDIE: Hypertension    SANDIE: Over 50 ys old      - Denies asthma or inhaler use  - Tobacco History      History   Smoking Status     Never   Smokeless Tobacco     Never       GI  - Denies GERD   - Recurrent C. Diff    PONV Medium Risk  Total Score: 2           1 AN PONV: Pt is Female    1 AN PONV: Patient is not a current smoker          ENDOCRINE    - BMI: Estimated body mass index is 22.57 kg/m  as calculated from the following:    Height as of 1/17/23: 1.626 m (5' 4\").    Weight as of 1/18/23: 59.6 kg (131 lb 8 oz).  Healthy Weight (BMI 18.5-24.9)  - DM2, will hold metformin, jardiance & glipizide DOS  - A1c on 11/30/22 was 7.7    HEME  VTE Low Risk 0.26%            Total Score: 1    VTE: Greater than 59 yrs old      - On ASA 81 mg, will hold x2d prior  - Iron deficiency due to blood loss      The patient is aware that the final anesthesia plan will be decided by the assigned anesthesia provider on the date of service.      The patient is optimized for their procedure. AVS with information on surgery time/arrival time, meds and NPO status given by nursing staff. No further diagnostic testing indicated.    Please refer to the physical examination documented by the anesthesiologist in the anesthesia record on the day of surgery.    Video-Visit Details    Type of service:  Video Visit    Provider received verbal consent for a Video Visit from the patient? Yes   Video Start Time: 1029  Video End Time:1044    Originating Location (pt. Location): Home    Distant Location (provider location):  Off-site  Mode of Communication:  " Video Conference via [employer]  On the day of service:     Prep time: 16 minutes  Visit time: 15 minutes  Documentation time: 13 minutes  ------------------------------------------  Total time: 44 minutes      Azul Monet PA-C  Preoperative Assessment Center  [facility]  Clinic and Surgery Center  Phone: [phone]  Fax: [phone]

## 2023-02-01 NOTE — PATIENT INSTRUCTIONS
Preparing for Your Surgery      Name:  Marianne Monroy   MRN:  [id number]   :  1955   Today's Date:  2023         GASTROINTESTINAL STAFF WILL CALL WITH ADDITIONAL INSTRUCTIONS REGARDING ARRIVAL TIME AND LOCATION, EATING AND DRINKING RESTRICTIONS BEFORE PROCEDURES.      Which medicines can I take?    Hold Aspirin for 2 days before surgery.   Hold Multivitamins for 7 days before surgery.  Hold Supplements for 7 days before surgery.  Hold Ibuprofen (Advil, Motrin) for 1 day before surgery--unless otherwise directed by surgeon.  Hold Naproxen (Aleve) for 4 days before surgery.      -  DO NOT take these medications the day of surgery:  Diabetic am medications: jardiance + metformin + glipizide.    -  PLEASE TAKE these medications the day of surgery:  Tylenol if needed; take other morning medications.

## 2023-02-01 NOTE — PROGRESS NOTES
Jody is a 67 year old who is being evaluated via a billable video visit.      How would you like to obtain your AVS? Reneet            Subjective   Jody is a 67 year old;  presenting for the following health issues:  Pre-Op Exam (/)      HPI         Review of Systems       Physical Exam     SAUNDRA Barker LPN

## 2023-02-06 ENCOUNTER — TELEPHONE (OUTPATIENT)
Dept: GASTROENTEROLOGY | Facility: CLINIC | Age: 68
End: 2023-02-06

## 2023-02-06 DIAGNOSIS — K52.9 CHRONIC DIARRHEA: Primary | ICD-10-CM

## 2023-02-06 RX ORDER — BISACODYL 5 MG/1
TABLET, DELAYED RELEASE ORAL
Qty: 4 TABLET | Refills: 0 | Status: SHIPPED | OUTPATIENT
Start: 2023-02-06 | End: 2024-01-22

## 2023-02-06 NOTE — TELEPHONE ENCOUNTER
Pre assessment questions completed for upcoming Colonoscopy/Upper endoscopy (EGD) procedure scheduled on 2/16/2023    COVID policy reviewed.     Pre-op scheduled  Yes 2/1/2023 with PAC    Reviewed procedural arrival time 0700, procedure time 0930 and facility location [facility]; [address], [address], [place] [address]    Designated  policy reviewed. Instructed to have someone stay 24 hours post procedure.     Anticoagulation/blood thinners? ASA    Electronic implanted devices? Yes: ICD    Diabetic? Yes - Patient to hold oral diabetic medications day of procedure    Procedure indication: NEO, wt loss, family history of gastric cancer, chronic diarrhea, please bx stomach for H pylori    Bowel prep recommendation: Standard Golytely     Reviewed procedure prep instructions.     Prep instructions sent via [employer].  Bowel prep script sent to [id number] [place], [place] - [address].     Patient verbalized understanding and had no questions or concerns at this time.    Janie Savage RN  Endoscopy Procedure Pre Assessment RN

## 2023-02-07 ENCOUNTER — LAB (OUTPATIENT)
Dept: LAB | Facility: CLINIC | Age: 68
End: 2023-02-07
Payer: COMMERCIAL

## 2023-02-07 DIAGNOSIS — I50.20 HFREF (HEART FAILURE WITH REDUCED EJECTION FRACTION) (H): ICD-10-CM

## 2023-02-07 LAB
ANION GAP SERPL CALCULATED.3IONS-SCNC: 3 MMOL/L (ref 3–14)
BUN SERPL-MCNC: 25 MG/DL (ref 7–30)
CALCIUM SERPL-MCNC: 9.6 MG/DL (ref 8.5–10.1)
CHLORIDE BLD-SCNC: 108 MMOL/L (ref 94–109)
CO2 SERPL-SCNC: 29 MMOL/L (ref 20–32)
CREAT SERPL-MCNC: 0.9 MG/DL (ref 0.52–1.04)
GFR SERPL CREATININE-BSD FRML MDRD: 69 ML/MIN/1.73M2
GLUCOSE BLD-MCNC: 290 MG/DL (ref 70–99)
POTASSIUM BLD-SCNC: 5 MMOL/L (ref 3.4–5.3)
SODIUM SERPL-SCNC: 140 MMOL/L (ref 133–144)

## 2023-02-07 PROCEDURE — 36415 COLL VENOUS BLD VENIPUNCTURE: CPT

## 2023-02-07 PROCEDURE — 80048 BASIC METABOLIC PNL TOTAL CA: CPT

## 2023-02-09 ENCOUNTER — OFFICE VISIT (OUTPATIENT)
Dept: CARDIOLOGY | Facility: CLINIC | Age: 68
End: 2023-02-09
Payer: COMMERCIAL

## 2023-02-09 ENCOUNTER — MYC MEDICAL ADVICE (OUTPATIENT)
Dept: CARDIOLOGY | Facility: CLINIC | Age: 68
End: 2023-02-09

## 2023-02-09 VITALS
OXYGEN SATURATION: 99 % | BODY MASS INDEX: 22.38 KG/M2 | HEART RATE: 61 BPM | WEIGHT: 130.4 LBS | SYSTOLIC BLOOD PRESSURE: 101 MMHG | DIASTOLIC BLOOD PRESSURE: 60 MMHG

## 2023-02-09 DIAGNOSIS — A04.72 C. DIFFICILE DIARRHEA: ICD-10-CM

## 2023-02-09 DIAGNOSIS — I50.20 HFREF (HEART FAILURE WITH REDUCED EJECTION FRACTION) (H): Primary | ICD-10-CM

## 2023-02-09 DIAGNOSIS — E11.59 TYPE 2 DIABETES MELLITUS WITH OTHER CIRCULATORY COMPLICATION, WITHOUT LONG-TERM CURRENT USE OF INSULIN (H): ICD-10-CM

## 2023-02-09 DIAGNOSIS — I10 HYPERTENSION, UNSPECIFIED TYPE: ICD-10-CM

## 2023-02-09 DIAGNOSIS — I25.5 ISCHEMIC CARDIOMYOPATHY: ICD-10-CM

## 2023-02-09 DIAGNOSIS — Z95.1 S/P CABG (CORONARY ARTERY BYPASS GRAFT): ICD-10-CM

## 2023-02-09 DIAGNOSIS — I50.20 HFREF (HEART FAILURE WITH REDUCED EJECTION FRACTION) (H): ICD-10-CM

## 2023-02-09 PROCEDURE — 99214 OFFICE O/P EST MOD 30 MIN: CPT | Performed by: NURSE PRACTITIONER

## 2023-02-09 RX ORDER — SACUBITRIL AND VALSARTAN 49; 51 MG/1; MG/1
1.5 TABLET, FILM COATED ORAL 2 TIMES DAILY
Qty: 270 TABLET | Refills: 3 | Status: SHIPPED | OUTPATIENT
Start: 2023-02-09 | End: 2023-05-11 | Stop reason: DRUGHIGH

## 2023-02-09 NOTE — PATIENT INSTRUCTIONS
Take your medicines every day, as directed    Changes made today:  Entresto 49/51 mg - take 1.5 tablets BID   RN check in 1 week to see how you are feeling   Monitor Your Weight and Symptoms    Contact us if you:    Gain 2 pounds in one day or 5 pounds in one week  Feel more short of breath  Notice more leg swelling  Feel lightheadeded   Change your lifestyle    Limit Salt or Sodium:  2000 mg  Limit Fluids:  2000 mL or approximately 64 ounces  Eat a Heart Healthy Diet  Low in saturated fats  Stay Active:  Aim to move at least 150 minutes every  week         To Contact us    During Business Hours:  [phone], option # 1      After hours, weekends or holidays:   [phone], Option #4  Ask to speak to the On-Call Cardiologist. Inform them you are a CORE/heart failure patient at the [facility].     Use [employer] allows you to communicate directly with your heart team through secure messaging.  [employer] can be accessed any time on your phone, computer, or tablet.  If you need assistance, we'd be happy to help!         Keep your Heart Appointments:    CORE in 3 months       Please consider attending our virtual support group which is held monthly. Please reach out to Lloyd at [phone] for more information if you are interested in attending.       2023 dates:    Monday, March 6th , 1-2pm (Medications Review)  Monday, April 3rd, 1-2pm  Monday, May 1st, 1-2pm  Monday, June 5th, 1-2pm  Monday, July 3rd, 1-2pm  Monday, August 7th, 1-2pm  Monday, September 11th, 1-2pm  Monday, October 2nd, 1-2pm  Monday, November 6th, 1-2pm  Monday, December 4th, 1-2pm

## 2023-02-09 NOTE — LETTER
2/9/2023      RE: Marianne Monroy  [address]  [facility] [address]       Dear Colleague,    Thank you for the opportunity to participate in the care of your patient, Marianne Monroy, at the [facility] at [facility]. Please see a copy of my visit note below.        HPI: Marianne is a 68 year old White female with a past medical history of T2DM, HTN, GERD, JUSTUS, MDD and recent episode of lightheadedness and dizziness on 4/30    Admission 5/18-6/4 - 5/24/2022.  Surgeon: Dr. Ulises Desai  1.  Coronary artery bypass grafting x 4 (left internal mammary artery to left anterior descending artery, saphenous vein graft to distal right coronary artery, saphenous vein graft to ramus intermedius artery, saphenous vein graft to obtuse marginal artery).  2.  Endoscopic vein harvest.    Patient has no SOB at rest.  She has some JEFFERSON    She is now working up her strength. No swelling in the legs today. She has been taking the 97/103 mg BID of Entresto for a few weeks.  101/60 HR 61- . She has some lightheadedness which has been ongoing.  She has experienced some breathlessness when walking up an incline, she feels nervous as she had this symptom last year before the CABG.  Weight at home 127 lbs ( stable). She is working on eating more.  Rehab is done and she liked the program.     Denies SOB,PND, orthopnea, edema, weight gain, chest pain, palpitations, lightheadedness, dizziness, near syncopal/syncopal episodes. Marianne has been following salt and fluid restrictions.      PAST MEDICAL HISTORY:  Past Medical History:   Diagnosis Date     Abnormal Pap smear of cervix ~2000    repeat pap was normal     Allergic rhinitis, cause unspecified      Anxiety state, unspecified     panic episodes     C. difficile colitis      CAD (coronary artery disease)      HFrEF (heart failure with reduced ejection fraction) (H)      ICD (implantable  cardioverter-defibrillator) in place      Osteopenia      Unspecified essential hypertension        FAMILY HISTORY:  Family History   Problem Relation Age of Onset     Diabetes Mother         hypertension, alive at 83     C.A.D. Mother      Cancer Father         lung cancer, smoker.   at age 53     Family History Negative Sister      Osteoporosis Sister      Anesthesia Reaction No family hx of      Deep Vein Thrombosis (DVT) No family hx of        SOCIAL HISTORY:  Social History     Tobacco Use     Smoking status: Never     Smokeless tobacco: Never   Vaping Use     Vaping Use: Never used   Substance Use Topics     Alcohol use: Yes     Comment: social     Drug use: No           CURRENT MEDICATIONS:  Current Outpatient Medications   Medication Sig Dispense Refill     acetaminophen (TYLENOL) 500 MG tablet Take 1,000 mg by mouth 3 times daily as needed for mild pain       aspirin 81 MG EC tablet Take 81 mg by mouth every evening       atorvastatin (LIPITOR) 80 MG tablet TAKE 1 TABLET DAILY (NEED ANNUAL EXAM) Strength: 80 mg (Patient taking differently: Take 80 mg by mouth every morning TAKE 1 TABLET DAILY (NEED ANNUAL EXAM) Strength: 80 mg) 90 tablet 2     blood glucose (ONETOUCH VERIO IQ) test strip Use to test blood sugar 2 times daily or as directed. 200 strip 1     blood glucose monitoring (ONETOUCH VERIO) meter device kit Use to test blood sugar 2 times daily or as directed. 1 kit 0     empagliflozin (JARDIANCE) 25 MG TABS tablet Take 1 tablet (25 mg) by mouth daily (Patient taking differently: Take 25 mg by mouth every morning) 90 tablet 0     FLUoxetine (PROZAC) 20 MG capsule TAKE 2 CAPSULES DAILY (Patient taking differently: Take 20 mg by mouth 2 times daily TAKE 2 CAPSULES DAILY) 180 capsule 1     glipiZIDE (GLUCOTROL XL) 5 MG 24 hr tablet TAKE 2 TABLETS DAILY (NEED ANNUAL EXAM) (Patient taking differently: 5 mg 2 times daily TAKE 2 TABLETS DAILY (NEED ANNUAL EXAM)) 180 tablet 1     metFORMIN (GLUCOPHAGE  XR) 500 MG 24 hr tablet Take 3 tablets (1,500 mg) by mouth daily (with dinner) Please see changed dose (Patient taking differently: Take 500-1,000 mg by mouth 2 times daily (with meals) 1000mg - AM and 500 mg PM) 270 tablet 1     metoprolol succinate ER (TOPROL XL) 50 MG 24 hr tablet Take 1.5 tablets (75 mg) by mouth daily (Patient taking differently: Take 75 mg by mouth every morning) 90 tablet 3     MULTIPLE VITAMIN OR TABS Take by mouth every morning       sacubitril-valsartan (ENTRESTO)  MG per tablet Take 1 tablet by mouth 2 times daily 180 tablet 3     spironolactone (ALDACTONE) 25 MG tablet Take 0.5 tablets (12.5 mg) by mouth daily (Patient taking differently: Take 12.5 mg by mouth every morning) 30 tablet 1     bisacodyl (DULCOLAX) 5 MG EC tablet Take 2 tablets at 3 pm the day before your procedure. If your procedure is before 11 am, take 2 additional tablets at 11 pm. If your procedure is after 11 am, take 2 additional tablets at 6 am. For additional instructions refer to your colonoscopy prep instructions. (Patient not taking: Reported on 2/9/2023) 4 tablet 0     Multiple Vitamins-Minerals (PRESERVISION AREDS PO) Take 2 tablets by mouth daily (Patient not taking: Reported on 1/17/2023)       polyethylene glycol (GOLYTELY) 236 g suspension The night before the exam at 6 pm drink an 8-ounce glass every 15 minutes until the jug is half empty. If you arrive before 11 AM: Drink the other half of the Golytely jug at 11 PM night before procedure. If you arrive after 11 AM: Drink the other half of the Golytely jug at 6 AM day of procedure. For additional instructions refer to your colonoscopy prep instructions. (Patient not taking: Reported on 2/9/2023) 4000 mL 0           ROS:   Constitutional: No fever, chills, or sweats.  ENT: No visual disturbance, ear ache, epistaxis, sore throat.   Allergies/Immunologic: Negative  Respiratory: No cough, hemoptysis.   Cardiovascular: As per HPI.   GI: As per HPI.   :  No urinary frequency, dysuria, or hematuria.   Integument: Negative.   Psychiatric: Negative.   Neuro: Negative.   Endocrinology: negative   Musculoskeletal: negative    EXAM:   General: alert, articulate, and in no acute distress.  HEENT: normocephalic, atraumatic, anicteric sclera, EOMI, mucosa moist, no cyanosis.   Neck: neck supple.  No adenopathy, masses, or carotid bruits.  JVP flat at 90 degrees  Heart: regular rhythm, normal S1/S2, no murmur, gallop, rub.  Precordium quiet with normal PMI.     Lungs: clear, no rales, ronchi, or wheezing.  No accessory muscle use, respirations unlabored.   Abdomen: soft, non-tender, bowel sounds present, no hepatomegaly  Extremities: no pitting edema.   No cyanosis.   Neurological: alert and oriented x 3.  normal speech and affect, no gross motor deficits  Skin:  No ecchymoses, rashes, or clubbing.    Labs:  CBC RESULTS:  Lab Results   Component Value Date    WBC 7.4 10/04/2022    WBC 7.1 09/09/2020    RBC 5.00 10/04/2022    RBC 4.57 09/09/2020    HGB 11.4 (L) 10/04/2022    HGB 13.3 09/09/2020    HCT 39.1 10/04/2022    HCT 41.1 09/09/2020    MCV 78 10/04/2022    MCV 90 09/09/2020    MCH 22.8 (L) 10/04/2022    MCH 29.1 09/09/2020    MCHC 29.2 (L) 10/04/2022    MCHC 32.4 09/09/2020    RDW 19.6 (H) 10/04/2022    RDW 12.5 09/09/2020     10/04/2022     09/09/2020       CMP RESULTS:  Lab Results   Component Value Date     02/07/2023     01/07/2021    POTASSIUM 5.0 02/07/2023    POTASSIUM 4.8 01/07/2021    CHLORIDE 108 02/07/2023    CHLORIDE 106 01/07/2021    CO2 29 02/07/2023    CO2 28 01/07/2021    ANIONGAP 3 02/07/2023    ANIONGAP 7 01/07/2021     (H) 02/07/2023     (H) 01/07/2021    BUN 25 02/07/2023    BUN 19 01/07/2021    CR 0.90 02/07/2023    CR 0.90 01/07/2021    GFRESTIMATED 69 02/07/2023    GFRESTIMATED 66 01/07/2021    GFRESTBLACK 77 01/07/2021    POONAM 9.6 02/07/2023    POONAM 9.5 01/07/2021    BILITOTAL 0.4 07/06/2022    BILITOTAL 0.4  "09/09/2020    ALBUMIN 3.7 07/06/2022    ALBUMIN 3.7 09/09/2020    ALKPHOS 107 07/06/2022    ALKPHOS 90 09/09/2020    ALT 24 07/06/2022    ALT 33 09/09/2020    AST 14 07/06/2022    AST 14 09/09/2020        INR RESULTS:  Lab Results   Component Value Date    INR 1.29 (H) 05/25/2022       No components found for: CK  Lab Results   Component Value Date    MAG 2.0 06/04/2022     Lab Results   Component Value Date    NTBNP 2,714 (H) 06/09/2022     @BRIEFLABR (dig)@    Most recent echocardiogram:  No results found for this or any previous visit (from the past 8760 hour(s)).      Assessment and Plan:    In summary,Marianne  is a 68 year old here for a CORE follow up. Since the patient has been having periods of lightheadedness and dizziness - we will decrease the Entresto slightly . I will also reach out to Dr. Steve to make him aware and obtain his thoughts on the JEFFERSON when walking up an incline that the patient has been experiencing - she is nervous about this as \" this is how it all started for me a year ago before my bypass.\"     1.  Chronic systolic heart failure secondary to ICM.  Stage C  NYHA Class III  ACEi/ARB: -Entresto decrease to 49/51 mg take 1.5 tabs BID  BB: yes-  Metoprolol 75 mg   Aldosterone antagonist: 12.5 mg   SCD prophylaxis: does not meet criteria for implant  Fluid status: euvolemic  Anticoagulation:   Antiplatelet:  ASA dose   Sleep apnea:  NSAID use:  Contraindicated.  Avoid use.  Remote Monitoring:none  SGLT 2 - Jardiance 25 mg    2.  Other comordbid conditions:      #T2DM - PCP to manage - glipizide , Jardiance, metformin    #HTN- Entresto and Metoprolol     #GERD- followed by PCP     3.  Follow-up   Entresto 49/51 tabs- take 1.5 tabs BID   CORE in 3 months         Belinda MOORE, CNP  [facility]                   "

## 2023-02-09 NOTE — PROGRESS NOTES
HPI: Marianne is a 68 year old White female with a past medical history of T2DM, HTN, GERD, JUSTUS, MDD and recent episode of lightheadedness and dizziness on     Admission - - 2022.  Surgeon: Dr. Ulises Desai  1.  Coronary artery bypass grafting x 4 (left internal mammary artery to left anterior descending artery, saphenous vein graft to distal right coronary artery, saphenous vein graft to ramus intermedius artery, saphenous vein graft to obtuse marginal artery).  2.  Endoscopic vein harvest.    Patient has no SOB at rest.  She has some JEFFERSON    She is now working up her strength. No swelling in the legs today. She has been taking the 97/103 mg BID of Entresto for a few weeks.  101/60 HR 61- . She has some lightheadedness which has been ongoing.  She has experienced some breathlessness when walking up an incline, she feels nervous as she had this symptom last year before the CABG.  Weight at home 127 lbs ( stable). She is working on eating more.  Rehab is done and she liked the program.     Denies SOB,PND, orthopnea, edema, weight gain, chest pain, palpitations, lightheadedness, dizziness, near syncopal/syncopal episodes. Marianne has been following salt and fluid restrictions.      PAST MEDICAL HISTORY:  Past Medical History:   Diagnosis Date     Abnormal Pap smear of cervix ~    repeat pap was normal     Allergic rhinitis, cause unspecified      Anxiety state, unspecified     panic episodes     C. difficile colitis      CAD (coronary artery disease)      HFrEF (heart failure with reduced ejection fraction) (H)      ICD (implantable cardioverter-defibrillator) in place      Osteopenia      Unspecified essential hypertension        FAMILY HISTORY:  Family History   Problem Relation Age of Onset     Diabetes Mother         hypertension, alive at 83     C.A.D. Mother      Cancer Father         lung cancer, smoker.   at age 53     Family History Negative Sister      Osteoporosis Sister       Anesthesia Reaction No family hx of      Deep Vein Thrombosis (DVT) No family hx of        SOCIAL HISTORY:  Social History     Tobacco Use     Smoking status: Never     Smokeless tobacco: Never   Vaping Use     Vaping Use: Never used   Substance Use Topics     Alcohol use: Yes     Comment: social     Drug use: No           CURRENT MEDICATIONS:  Current Outpatient Medications   Medication Sig Dispense Refill     acetaminophen (TYLENOL) 500 MG tablet Take 1,000 mg by mouth 3 times daily as needed for mild pain       aspirin 81 MG EC tablet Take 81 mg by mouth every evening       atorvastatin (LIPITOR) 80 MG tablet TAKE 1 TABLET DAILY (NEED ANNUAL EXAM) Strength: 80 mg (Patient taking differently: Take 80 mg by mouth every morning TAKE 1 TABLET DAILY (NEED ANNUAL EXAM) Strength: 80 mg) 90 tablet 2     blood glucose (ONETOUCH VERIO IQ) test strip Use to test blood sugar 2 times daily or as directed. 200 strip 1     blood glucose monitoring (ONETOUCH VERIO) meter device kit Use to test blood sugar 2 times daily or as directed. 1 kit 0     empagliflozin (JARDIANCE) 25 MG TABS tablet Take 1 tablet (25 mg) by mouth daily (Patient taking differently: Take 25 mg by mouth every morning) 90 tablet 0     FLUoxetine (PROZAC) 20 MG capsule TAKE 2 CAPSULES DAILY (Patient taking differently: Take 20 mg by mouth 2 times daily TAKE 2 CAPSULES DAILY) 180 capsule 1     glipiZIDE (GLUCOTROL XL) 5 MG 24 hr tablet TAKE 2 TABLETS DAILY (NEED ANNUAL EXAM) (Patient taking differently: 5 mg 2 times daily TAKE 2 TABLETS DAILY (NEED ANNUAL EXAM)) 180 tablet 1     metFORMIN (GLUCOPHAGE XR) 500 MG 24 hr tablet Take 3 tablets (1,500 mg) by mouth daily (with dinner) Please see changed dose (Patient taking differently: Take 500-1,000 mg by mouth 2 times daily (with meals) 1000mg - AM and 500 mg PM) 270 tablet 1     metoprolol succinate ER (TOPROL XL) 50 MG 24 hr tablet Take 1.5 tablets (75 mg) by mouth daily (Patient taking differently: Take 75 mg by  mouth every morning) 90 tablet 3     MULTIPLE VITAMIN OR TABS Take by mouth every morning       sacubitril-valsartan (ENTRESTO)  MG per tablet Take 1 tablet by mouth 2 times daily 180 tablet 3     spironolactone (ALDACTONE) 25 MG tablet Take 0.5 tablets (12.5 mg) by mouth daily (Patient taking differently: Take 12.5 mg by mouth every morning) 30 tablet 1     bisacodyl (DULCOLAX) 5 MG EC tablet Take 2 tablets at 3 pm the day before your procedure. If your procedure is before 11 am, take 2 additional tablets at 11 pm. If your procedure is after 11 am, take 2 additional tablets at 6 am. For additional instructions refer to your colonoscopy prep instructions. (Patient not taking: Reported on 2/9/2023) 4 tablet 0     Multiple Vitamins-Minerals (PRESERVISION AREDS PO) Take 2 tablets by mouth daily (Patient not taking: Reported on 1/17/2023)       polyethylene glycol (GOLYTELY) 236 g suspension The night before the exam at 6 pm drink an 8-ounce glass every 15 minutes until the jug is half empty. If you arrive before 11 AM: Drink the other half of the Golytely jug at 11 PM night before procedure. If you arrive after 11 AM: Drink the other half of the Golytely jug at 6 AM day of procedure. For additional instructions refer to your colonoscopy prep instructions. (Patient not taking: Reported on 2/9/2023) 4000 mL 0           ROS:   Constitutional: No fever, chills, or sweats.  ENT: No visual disturbance, ear ache, epistaxis, sore throat.   Allergies/Immunologic: Negative  Respiratory: No cough, hemoptysis.   Cardiovascular: As per HPI.   GI: As per HPI.   : No urinary frequency, dysuria, or hematuria.   Integument: Negative.   Psychiatric: Negative.   Neuro: Negative.   Endocrinology: negative   Musculoskeletal: negative    EXAM:   General: alert, articulate, and in no acute distress.  HEENT: normocephalic, atraumatic, anicteric sclera, EOMI, mucosa moist, no cyanosis.   Neck: neck supple.  No adenopathy, masses, or  carotid bruits.  JVP flat at 90 degrees  Heart: regular rhythm, normal S1/S2, no murmur, gallop, rub.  Precordium quiet with normal PMI.     Lungs: clear, no rales, ronchi, or wheezing.  No accessory muscle use, respirations unlabored.   Abdomen: soft, non-tender, bowel sounds present, no hepatomegaly  Extremities: no pitting edema.   No cyanosis.   Neurological: alert and oriented x 3.  normal speech and affect, no gross motor deficits  Skin:  No ecchymoses, rashes, or clubbing.    Labs:  CBC RESULTS:  Lab Results   Component Value Date    WBC 7.4 10/04/2022    WBC 7.1 09/09/2020    RBC 5.00 10/04/2022    RBC 4.57 09/09/2020    HGB 11.4 (L) 10/04/2022    HGB 13.3 09/09/2020    HCT 39.1 10/04/2022    HCT 41.1 09/09/2020    MCV 78 10/04/2022    MCV 90 09/09/2020    MCH 22.8 (L) 10/04/2022    MCH 29.1 09/09/2020    MCHC 29.2 (L) 10/04/2022    MCHC 32.4 09/09/2020    RDW 19.6 (H) 10/04/2022    RDW 12.5 09/09/2020     10/04/2022     09/09/2020       CMP RESULTS:  Lab Results   Component Value Date     02/07/2023     01/07/2021    POTASSIUM 5.0 02/07/2023    POTASSIUM 4.8 01/07/2021    CHLORIDE 108 02/07/2023    CHLORIDE 106 01/07/2021    CO2 29 02/07/2023    CO2 28 01/07/2021    ANIONGAP 3 02/07/2023    ANIONGAP 7 01/07/2021     (H) 02/07/2023     (H) 01/07/2021    BUN 25 02/07/2023    BUN 19 01/07/2021    CR 0.90 02/07/2023    CR 0.90 01/07/2021    GFRESTIMATED 69 02/07/2023    GFRESTIMATED 66 01/07/2021    GFRESTBLACK 77 01/07/2021    POONAM 9.6 02/07/2023    POONAM 9.5 01/07/2021    BILITOTAL 0.4 07/06/2022    BILITOTAL 0.4 09/09/2020    ALBUMIN 3.7 07/06/2022    ALBUMIN 3.7 09/09/2020    ALKPHOS 107 07/06/2022    ALKPHOS 90 09/09/2020    ALT 24 07/06/2022    ALT 33 09/09/2020    AST 14 07/06/2022    AST 14 09/09/2020        INR RESULTS:  Lab Results   Component Value Date    INR 1.29 (H) 05/25/2022       No components found for: CK  Lab Results   Component Value Date    MAG 2.0  "06/04/2022     Lab Results   Component Value Date    NTBNP 2714 (H) 06/09/2022     @BRIEFLABR (dig)@    Most recent echocardiogram:  No results found for this or any previous visit (from the past 8760 hour(s)).      Assessment and Plan:    In summary,Marianne  is a 68 year old here for a CORE follow up. Since the patient has been having periods of lightheadedness and dizziness - we will decrease the Entresto slightly . I will also reach out to Dr. Steve to make him aware and obtain his thoughts on the JEFFERSON when walking up an incline that the patient has been experiencing - she is nervous about this as \" this is how it all started for me a year ago before my bypass.\"     1.  Chronic systolic heart failure secondary to ICM.  Stage C  NYHA Class III  ACEi/ARB: -Entresto decrease to 49/51 mg take 1.5 tabs BID  BB: yes-  Metoprolol 75 mg   Aldosterone antagonist: 12.5 mg   SCD prophylaxis: does not meet criteria for implant  Fluid status: euvolemic  Anticoagulation:   Antiplatelet:  ASA dose   Sleep apnea:  NSAID use:  Contraindicated.  Avoid use.  Remote Monitoring:none  SGLT 2 - Jardiance 25 mg    2.  Other comordbid conditions:      #T2DM - PCP to manage - glipizide , Jardiance, metformin    #HTN- Entresto and Metoprolol     #GERD- followed by PCP     3.  Follow-up   Entresto 49/51 tabs- take 1.5 tabs BID   CORE in 3 months         Belinda MOORE, CNP  [facility]               "

## 2023-02-09 NOTE — NURSING NOTE
Labs: Patient was given results of the laboratory testing obtained today. Patient demonstrated understanding of this information and agreed to call with further questions or concerns.     Med Reconcile: Reviewed and verified all current medications with the patient. The updated medication list was printed and given to the patient.    Return Appointment: Patient given instructions regarding scheduling next clinic visit. Patient demonstrated understanding of this information and agreed to call with further questions or concerns. CORE in 3 months.    Medication Change: Patient was educated regarding prescribed medication change, including discussion of the indication, administration, side effects, and when to report to MD or RN. Patient demonstrated understanding of this information and agreed to call with further questions or concerns. Decrease Entresto to 1.5 tabs of 49-51 strength BID.    Patient stated she understood all health information given and agreed to call with further questions or concerns.    Dasia Amin RN

## 2023-02-09 NOTE — NURSING NOTE
"Chief Complaint   Patient presents with     Follow Up     Pt reports JEFFERSON (episode walking up flight of stairs lately), CORE Return, 68 yo female with systolic heart failure presents for follow up with labs prior.       Initial /60 (BP Location: Left arm, Patient Position: Sitting, Cuff Size: Adult Regular)   Pulse 61   Wt 59.1 kg (130 lb 6.4 oz)   SpO2 99%   BMI 22.38 kg/m   Estimated body mass index is 22.38 kg/m  as calculated from the following:    Height as of 1/17/23: 1.626 m (5' 4\").    Weight as of this encounter: 59.1 kg (130 lb 6.4 oz)..  BP completed using cuff size: regular    EDWIN Joseph    "

## 2023-02-10 RX ORDER — SACUBITRIL AND VALSARTAN 97; 103 MG/1; MG/1
TABLET, FILM COATED ORAL
Qty: 180 TABLET | Refills: 3 | Status: SHIPPED | OUTPATIENT
Start: 2023-02-10 | End: 2024-01-05

## 2023-02-15 ENCOUNTER — TRANSFERRED RECORDS (OUTPATIENT)
Dept: MULTI SPECIALTY CLINIC | Facility: CLINIC | Age: 68
End: 2023-02-15

## 2023-02-15 ENCOUNTER — MYC MEDICAL ADVICE (OUTPATIENT)
Dept: CARDIOLOGY | Facility: CLINIC | Age: 68
End: 2023-02-15
Payer: COMMERCIAL

## 2023-02-15 DIAGNOSIS — R06.02 SHORTNESS OF BREATH: ICD-10-CM

## 2023-02-15 DIAGNOSIS — I25.10 CAD (CORONARY ARTERY DISEASE): ICD-10-CM

## 2023-02-15 DIAGNOSIS — Z95.1 S/P CABG (CORONARY ARTERY BYPASS GRAFT): Primary | ICD-10-CM

## 2023-02-15 LAB — RETINOPATHY: NORMAL

## 2023-02-16 ENCOUNTER — ANESTHESIA EVENT (OUTPATIENT)
Dept: GASTROENTEROLOGY | Facility: CLINIC | Age: 68
End: 2023-02-16
Payer: COMMERCIAL

## 2023-02-16 ENCOUNTER — ANESTHESIA (OUTPATIENT)
Dept: GASTROENTEROLOGY | Facility: CLINIC | Age: 68
End: 2023-02-16
Payer: COMMERCIAL

## 2023-02-16 ENCOUNTER — ANCILLARY ORDERS (OUTPATIENT)
Dept: CARDIOLOGY | Facility: CLINIC | Age: 68
End: 2023-02-16

## 2023-02-16 ENCOUNTER — HOSPITAL ENCOUNTER (OUTPATIENT)
Facility: CLINIC | Age: 68
Discharge: HOME | End: 2023-02-16
Attending: INTERNAL MEDICINE | Admitting: INTERNAL MEDICINE
Payer: COMMERCIAL

## 2023-02-16 VITALS
HEIGHT: 64 IN | HEART RATE: 84 BPM | DIASTOLIC BLOOD PRESSURE: 67 MMHG | TEMPERATURE: 98.3 F | WEIGHT: 129 LBS | SYSTOLIC BLOOD PRESSURE: 101 MMHG | OXYGEN SATURATION: 100 % | BODY MASS INDEX: 22.02 KG/M2

## 2023-02-16 DIAGNOSIS — I25.10 CAD (CORONARY ARTERY DISEASE): ICD-10-CM

## 2023-02-16 DIAGNOSIS — Z95.1 S/P CABG (CORONARY ARTERY BYPASS GRAFT): ICD-10-CM

## 2023-02-16 DIAGNOSIS — K25.3 ACUTE GASTRIC ULCER WITHOUT HEMORRHAGE OR PERFORATION: Primary | ICD-10-CM

## 2023-02-16 DIAGNOSIS — R06.02 SHORTNESS OF BREATH: ICD-10-CM

## 2023-02-16 LAB
COLONOSCOPY: NORMAL
GLUCOSE BLDC GLUCOMTR-MCNC: 161 MG/DL (ref 70–99)
UPPER GI ENDOSCOPY: NORMAL

## 2023-02-16 PROCEDURE — 250N000011 HC RX IP 250 OP 636: Performed by: ANESTHESIOLOGY

## 2023-02-16 PROCEDURE — 250N000011 HC RX IP 250 OP 636: Performed by: STUDENT IN AN ORGANIZED HEALTH CARE EDUCATION/TRAINING PROGRAM

## 2023-02-16 PROCEDURE — 258N000003 HC RX IP 258 OP 636: Performed by: ANESTHESIOLOGY

## 2023-02-16 PROCEDURE — 88342 IMHCHEM/IMCYTCHM 1ST ANTB: CPT | Mod: 26 | Performed by: PATHOLOGY

## 2023-02-16 PROCEDURE — 45380 COLONOSCOPY AND BIOPSY: CPT | Performed by: INTERNAL MEDICINE

## 2023-02-16 PROCEDURE — 370N000017 HC ANESTHESIA TECHNICAL FEE, PER MIN: Performed by: INTERNAL MEDICINE

## 2023-02-16 PROCEDURE — 250N000009 HC RX 250: Performed by: STUDENT IN AN ORGANIZED HEALTH CARE EDUCATION/TRAINING PROGRAM

## 2023-02-16 PROCEDURE — 250N000009 HC RX 250: Performed by: ANESTHESIOLOGY

## 2023-02-16 PROCEDURE — 258N000003 HC RX IP 258 OP 636: Performed by: STUDENT IN AN ORGANIZED HEALTH CARE EDUCATION/TRAINING PROGRAM

## 2023-02-16 PROCEDURE — 43239 EGD BIOPSY SINGLE/MULTIPLE: CPT | Performed by: INTERNAL MEDICINE

## 2023-02-16 PROCEDURE — 82962 GLUCOSE BLOOD TEST: CPT

## 2023-02-16 PROCEDURE — 88305 TISSUE EXAM BY PATHOLOGIST: CPT | Mod: TC | Performed by: INTERNAL MEDICINE

## 2023-02-16 PROCEDURE — 88305 TISSUE EXAM BY PATHOLOGIST: CPT | Mod: 26 | Performed by: PATHOLOGY

## 2023-02-16 RX ORDER — FENTANYL CITRATE 50 UG/ML
25 INJECTION, SOLUTION INTRAMUSCULAR; INTRAVENOUS EVERY 5 MIN PRN
Status: DISCONTINUED | OUTPATIENT
Start: 2023-02-16 | End: 2023-02-16 | Stop reason: HOSPADM

## 2023-02-16 RX ORDER — OXYCODONE HYDROCHLORIDE 10 MG/1
10 TABLET ORAL EVERY 4 HOURS PRN
Status: DISCONTINUED | OUTPATIENT
Start: 2023-02-16 | End: 2023-02-16 | Stop reason: HOSPADM

## 2023-02-16 RX ORDER — NALOXONE HYDROCHLORIDE 0.4 MG/ML
0.4 INJECTION, SOLUTION INTRAMUSCULAR; INTRAVENOUS; SUBCUTANEOUS
Status: DISCONTINUED | OUTPATIENT
Start: 2023-02-16 | End: 2023-02-16 | Stop reason: HOSPADM

## 2023-02-16 RX ORDER — NALOXONE HYDROCHLORIDE 0.4 MG/ML
0.2 INJECTION, SOLUTION INTRAMUSCULAR; INTRAVENOUS; SUBCUTANEOUS
Status: DISCONTINUED | OUTPATIENT
Start: 2023-02-16 | End: 2023-02-16 | Stop reason: HOSPADM

## 2023-02-16 RX ORDER — FLUMAZENIL 0.1 MG/ML
0.2 INJECTION, SOLUTION INTRAVENOUS
Status: DISCONTINUED | OUTPATIENT
Start: 2023-02-16 | End: 2023-02-16 | Stop reason: HOSPADM

## 2023-02-16 RX ORDER — ONDANSETRON 4 MG/1
4 TABLET, ORALLY DISINTEGRATING ORAL EVERY 30 MIN PRN
Status: DISCONTINUED | OUTPATIENT
Start: 2023-02-16 | End: 2023-02-16 | Stop reason: HOSPADM

## 2023-02-16 RX ORDER — SODIUM CHLORIDE, SODIUM LACTATE, POTASSIUM CHLORIDE, CALCIUM CHLORIDE 600; 310; 30; 20 MG/100ML; MG/100ML; MG/100ML; MG/100ML
INJECTION, SOLUTION INTRAVENOUS CONTINUOUS PRN
Status: DISCONTINUED | OUTPATIENT
Start: 2023-02-16 | End: 2023-02-16

## 2023-02-16 RX ORDER — ONDANSETRON 2 MG/ML
4 INJECTION INTRAMUSCULAR; INTRAVENOUS EVERY 6 HOURS PRN
Status: DISCONTINUED | OUTPATIENT
Start: 2023-02-16 | End: 2023-02-16 | Stop reason: HOSPADM

## 2023-02-16 RX ORDER — LIDOCAINE HYDROCHLORIDE 20 MG/ML
INJECTION, SOLUTION INFILTRATION; PERINEURAL PRN
Status: DISCONTINUED | OUTPATIENT
Start: 2023-02-16 | End: 2023-02-16

## 2023-02-16 RX ORDER — PROPOFOL 10 MG/ML
INJECTION, EMULSION INTRAVENOUS CONTINUOUS PRN
Status: DISCONTINUED | OUTPATIENT
Start: 2023-02-16 | End: 2023-02-16

## 2023-02-16 RX ORDER — SODIUM CHLORIDE, SODIUM GLUCONATE, SODIUM ACETATE, POTASSIUM CHLORIDE AND MAGNESIUM CHLORIDE 526; 502; 368; 37; 30 MG/100ML; MG/100ML; MG/100ML; MG/100ML; MG/100ML
INJECTION, SOLUTION INTRAVENOUS CONTINUOUS PRN
Status: DISCONTINUED | OUTPATIENT
Start: 2023-02-16 | End: 2023-02-16

## 2023-02-16 RX ORDER — HYDROMORPHONE HCL IN WATER/PF 6 MG/30 ML
0.4 PATIENT CONTROLLED ANALGESIA SYRINGE INTRAVENOUS EVERY 5 MIN PRN
Status: DISCONTINUED | OUTPATIENT
Start: 2023-02-16 | End: 2023-02-16 | Stop reason: HOSPADM

## 2023-02-16 RX ORDER — ONDANSETRON 4 MG/1
4 TABLET, ORALLY DISINTEGRATING ORAL EVERY 6 HOURS PRN
Status: DISCONTINUED | OUTPATIENT
Start: 2023-02-16 | End: 2023-02-16 | Stop reason: HOSPADM

## 2023-02-16 RX ORDER — ONDANSETRON 2 MG/ML
4 INJECTION INTRAMUSCULAR; INTRAVENOUS
Status: DISCONTINUED | OUTPATIENT
Start: 2023-02-16 | End: 2023-02-16 | Stop reason: HOSPADM

## 2023-02-16 RX ORDER — HYDROMORPHONE HCL IN WATER/PF 6 MG/30 ML
0.2 PATIENT CONTROLLED ANALGESIA SYRINGE INTRAVENOUS EVERY 5 MIN PRN
Status: DISCONTINUED | OUTPATIENT
Start: 2023-02-16 | End: 2023-02-16 | Stop reason: HOSPADM

## 2023-02-16 RX ORDER — FENTANYL CITRATE 50 UG/ML
50 INJECTION, SOLUTION INTRAMUSCULAR; INTRAVENOUS EVERY 5 MIN PRN
Status: DISCONTINUED | OUTPATIENT
Start: 2023-02-16 | End: 2023-02-16 | Stop reason: HOSPADM

## 2023-02-16 RX ORDER — OMEPRAZOLE 40 MG/1
40 CAPSULE, DELAYED RELEASE ORAL
Qty: 120 CAPSULE | Refills: 0 | Status: SHIPPED | OUTPATIENT
Start: 2023-02-16 | End: 2023-04-17

## 2023-02-16 RX ORDER — ESMOLOL HYDROCHLORIDE 10 MG/ML
INJECTION INTRAVENOUS PRN
Status: DISCONTINUED | OUTPATIENT
Start: 2023-02-16 | End: 2023-02-16

## 2023-02-16 RX ORDER — PROCHLORPERAZINE MALEATE 5 MG
5 TABLET ORAL EVERY 6 HOURS PRN
Status: DISCONTINUED | OUTPATIENT
Start: 2023-02-16 | End: 2023-02-16 | Stop reason: HOSPADM

## 2023-02-16 RX ORDER — LIDOCAINE 40 MG/G
CREAM TOPICAL
Status: DISCONTINUED | OUTPATIENT
Start: 2023-02-16 | End: 2023-02-16 | Stop reason: HOSPADM

## 2023-02-16 RX ORDER — OXYCODONE HYDROCHLORIDE 5 MG/1
5 TABLET ORAL EVERY 4 HOURS PRN
Status: DISCONTINUED | OUTPATIENT
Start: 2023-02-16 | End: 2023-02-16 | Stop reason: HOSPADM

## 2023-02-16 RX ORDER — ONDANSETRON 2 MG/ML
4 INJECTION INTRAMUSCULAR; INTRAVENOUS EVERY 30 MIN PRN
Status: DISCONTINUED | OUTPATIENT
Start: 2023-02-16 | End: 2023-02-16 | Stop reason: HOSPADM

## 2023-02-16 RX ORDER — SODIUM CHLORIDE, SODIUM LACTATE, POTASSIUM CHLORIDE, CALCIUM CHLORIDE 600; 310; 30; 20 MG/100ML; MG/100ML; MG/100ML; MG/100ML
INJECTION, SOLUTION INTRAVENOUS CONTINUOUS
Status: DISCONTINUED | OUTPATIENT
Start: 2023-02-16 | End: 2023-02-16 | Stop reason: HOSPADM

## 2023-02-16 RX ORDER — PROPOFOL 10 MG/ML
INJECTION, EMULSION INTRAVENOUS PRN
Status: DISCONTINUED | OUTPATIENT
Start: 2023-02-16 | End: 2023-02-16

## 2023-02-16 RX ADMIN — PROPOFOL 50 MG: 10 INJECTION, EMULSION INTRAVENOUS at 09:40

## 2023-02-16 RX ADMIN — ESMOLOL HYDROCHLORIDE 20 MG: 10 INJECTION, SOLUTION INTRAVENOUS at 09:43

## 2023-02-16 RX ADMIN — SODIUM CHLORIDE, POTASSIUM CHLORIDE, SODIUM LACTATE AND CALCIUM CHLORIDE: 600; 310; 30; 20 INJECTION, SOLUTION INTRAVENOUS at 09:26

## 2023-02-16 RX ADMIN — PROPOFOL 150 MCG/KG/MIN: 10 INJECTION, EMULSION INTRAVENOUS at 09:31

## 2023-02-16 RX ADMIN — LIDOCAINE HYDROCHLORIDE 40 MG: 20 INJECTION, SOLUTION INFILTRATION; PERINEURAL at 09:31

## 2023-02-16 RX ADMIN — PROPOFOL 20 MG: 10 INJECTION, EMULSION INTRAVENOUS at 10:14

## 2023-02-16 RX ADMIN — PROPOFOL 50 MG: 10 INJECTION, EMULSION INTRAVENOUS at 09:31

## 2023-02-16 RX ADMIN — PHENYLEPHRINE HYDROCHLORIDE 100 MCG: 10 INJECTION INTRAVENOUS at 10:00

## 2023-02-16 RX ADMIN — ESMOLOL HYDROCHLORIDE 40 MG: 10 INJECTION, SOLUTION INTRAVENOUS at 09:47

## 2023-02-16 RX ADMIN — PROPOFOL 50 MG: 10 INJECTION, EMULSION INTRAVENOUS at 09:43

## 2023-02-16 ASSESSMENT — ACTIVITIES OF DAILY LIVING (ADL)
ADLS_ACUITY_SCORE: 33
ADLS_ACUITY_SCORE: 35

## 2023-02-16 NOTE — ANESTHESIA CARE TRANSFER NOTE
Patient: Marianne Monroy    Procedure: Procedure(s):  ESOPHAGOGASTRODUODENOSCOPY, WITH BIOPSY  COLONOSCOPY, WITH POLYPECTOMY AND BIOPSY       Diagnosis: Iron deficiency anemia due to chronic blood loss [D50.0]  Chronic diarrhea [K52.9]  Unintentional weight loss [R63.4]  Diagnosis Additional Information: No value filed.    Anesthesia Type:   MAC     Note:    Oropharynx: oropharynx clear of all foreign objects  Level of Consciousness: awake  Oxygen Supplementation: nasal cannula    Independent Airway: airway patency satisfactory and stable  Dentition: dentition unchanged    Report to RN Given: handoff report given  Patient transferred to: PACU    Handoff Report: Identifed the Patient, Identified the Reponsible Provider, Reviewed the pertinent medical history, Discussed the surgical course, Reviewed Intra-OP anesthesia mangement and issues during anesthesia, Set expectations for post-procedure period and Allowed opportunity for questions and acknowledgement of understanding      Vitals:  Vitals Value Taken Time   /80 02/16/23 1042   Temp     Pulse 82 02/16/23 1042   Resp     SpO2 95 % 02/16/23 1045   Vitals shown include unvalidated device data.    Electronically Signed By: Christopher White MD  February 16, 2023  11:13 AM

## 2023-02-16 NOTE — ANESTHESIA PREPROCEDURE EVALUATION
Anesthesia Pre-Procedure Evaluation    Patient: Marianne Monroy   MRN: [id number] : 1955        Procedure : Procedure(s):  ESOPHAGOGASTRODUODENOSCOPY (EGD)  COLONOSCOPY            Past Medical History:   Diagnosis Date     Abnormal Pap smear of cervix ~    repeat pap was normal     Allergic rhinitis, cause unspecified      Anxiety state, unspecified     panic episodes     C. difficile colitis      CAD (coronary artery disease)      HFrEF (heart failure with reduced ejection fraction) (H)      ICD (implantable cardioverter-defibrillator) in place      Osteopenia      Unspecified essential hypertension       Past Surgical History:   Procedure Laterality Date     BYPASS GRAFT ARTERY CORONARY N/A 2022    Procedure: Median sternotomy.  Intraoperative transesophageal echocardiogram per anesthesia.  Left internal mammary artery harvest.  Right and left endoscopically harvested  greater saphenouse vein.  Cardiopulmonary Bypass.  Coronary Artery Bypass Grafts x4.;  Surgeon: Ulises Desai MD;  Location: [facility] OR     CV CORONARY ANGIOGRAM  2022    Procedure: CV CORONARY ANGIOGRAM;  Surgeon: Huseyin Vogel MD;  Location:  [facility]     CV CORONARY ANGIOGRAM  2022    Procedure: ;  Surgeon: Huseyin Vogel MD;  Location:  [facility]     EP ICD INSERT SINGLE N/A 10/4/2022    Procedure: Implantable Cardioverter Defibrillator Device & Lead Implant Single or Dual;  Surgeon: Too Morrow MD;  Location:  [facility]     NO HISTORY OF SURGERY       PICC DOUBLE LUMEN PLACEMENT Right 2022    Failed PICC attempt right arm basilic vein     PICC INSERTION - TRIPLE LUMEN Left 2022    left basilic 5fr tl,picc44 cm      Allergies   Allergen Reactions     Effexor [Venlafaxine Hydrochloride]      Tachycardia, makes her extremely jittery--cant sit down, has to keep moving constantly     Latex      Reported by patient      Social  History     Tobacco Use     Smoking status: Never     Smokeless tobacco: Never   Substance Use Topics     Alcohol use: Yes     Comment: social      Wt Readings from Last 1 Encounters:   02/09/23 59.1 kg (130 lb 6.4 oz)        Anesthesia Evaluation   Pt has had prior anesthetic. Type: MAC.        ROS/MED HX  ENT/Pulmonary:     (+) SANDIE risk factors, hypertension,     Neurologic:  - neg neurologic ROS     Cardiovascular: Comment: Acute HF with reduced EF (20-30%)    (+) Dyslipidemia hypertension--CAD -CABG-date: 5/24/22. -ICD Previous cardiac testing   Echo: Date: Results:  Left ventricular size is normal.  The visual ejection fraction is 30-35%.  Right ventricular function, chamber size, wall motion, and thickness are  normal.  Stress Test: Date: Results:    ECG Reviewed: Date: Results:    Cath: Date: Results:  3V disease (LAD, second OM, mid RCA) now s/p CABG    METS/Exercise Tolerance:     Hematologic:       Musculoskeletal:       GI/Hepatic:     (+) GERD,     Renal/Genitourinary:  - neg Renal ROS     Endo:     (+) type II DM,     Psychiatric/Substance Use:     (+) psychiatric history anxiety and depression     Infectious Disease:  - neg infectious disease ROS     Malignancy:  - neg malignancy ROS     Other:            Physical Exam    Airway        Mallampati: II   TM distance: > 3 FB   Neck ROM: full   Mouth opening: > 3 cm    Respiratory Devices and Support         Dental       (+) Modest Abnormalities - crowns, retainers, 1 or 2 missing teeth      Cardiovascular   cardiovascular exam normal          Pulmonary   pulmonary exam normal                OUTSIDE LABS:  CBC:   Lab Results   Component Value Date    WBC 7.4 10/04/2022    WBC 8.5 08/31/2022    HGB 11.4 (L) 10/04/2022    HGB 10.6 (L) 08/31/2022    HCT 39.1 10/04/2022    HCT 36.6 08/31/2022     10/04/2022     08/31/2022     BMP:   Lab Results   Component Value Date     02/07/2023     01/12/2023    POTASSIUM 5.0 02/07/2023     POTASSIUM 4.5 01/12/2023    CHLORIDE 108 02/07/2023    CHLORIDE 105 01/12/2023    CO2 29 02/07/2023    CO2 29 01/12/2023    BUN 25 02/07/2023    BUN 31 (H) 01/12/2023    CR 0.90 02/07/2023    CR 0.88 01/12/2023     (H) 02/07/2023     (H) 01/12/2023     COAGS:   Lab Results   Component Value Date    PTT 29 05/25/2022    INR 1.29 (H) 05/25/2022    FIBR 185 05/24/2022     POC: No results found for: BGM, HCG, HCGS  HEPATIC:   Lab Results   Component Value Date    ALBUMIN 3.7 07/06/2022    PROTTOTAL 8.3 07/06/2022    ALT 24 07/06/2022    AST 14 07/06/2022    ALKPHOS 107 07/06/2022    BILITOTAL 0.4 07/06/2022     OTHER:   Lab Results   Component Value Date    PH 7.42 05/27/2022    LACT 1.6 05/25/2022    A1C 7.7 (H) 11/30/2022    POONAM 9.6 02/07/2023    PHOS 4.1 06/04/2022    MAG 2.0 06/04/2022    LIPASE 105 09/09/2020    AMYLASE 45 07/06/2022    TSH 1.41 05/11/2022    SED 18 07/06/2022       Anesthesia Plan    ASA Status:  3   NPO Status:  NPO Appropriate    Anesthesia Type: MAC.     - Reason for MAC: straight local not clinically adequate   Induction: Intravenous, Propofol.   Maintenance: TIVA.        Consents    Anesthesia Plan(s) and associated risks, benefits, and realistic alternatives discussed. Questions answered and patient/representative(s) expressed understanding.     - Discussed: Risks, Benefits and Alternatives for BOTH SEDATION and the PROCEDURE were discussed     - Discussed with:  Patient         Postoperative Care    Pain management: IV analgesics, Multi-modal analgesia.   PONV prophylaxis: Ondansetron (or other 5HT-3), Dexamethasone or Solumedrol, Background Propofol Infusion     Comments:                    Christopher White MD

## 2023-02-16 NOTE — LETTER
February 20, 2023      Jody Monroy  [address]   [facility] [address]        Dear ,    The pathology results returned from the specimens taken at your recent procedures.    The duodenum (small bowel) biopsies show inflammation that may be related to celiac disease.  We will need to do some blood testing to confirm this.    The colon (large bowel) biopsies show microscopic inflammation that would explain your history of diarrhea.      The polyps removed from your colon were pre-cancerous but showed no active evidence of cancer.  Current guidelines recommend that you undergo a follow-up colonoscopy in 7-10 years.    I will pass on these results to your GI team who will be in touch regarding the next steps in your care.    Sincerely,               Nikhil Izquierdo MD   [facility], [place], ENDOSCOPY  [address] [place] [address]  Phone: [phone]

## 2023-02-16 NOTE — ANESTHESIA POSTPROCEDURE EVALUATION
Patient: Marianne Monroy    Procedure: Procedure(s):  ESOPHAGOGASTRODUODENOSCOPY, WITH BIOPSY  COLONOSCOPY, WITH POLYPECTOMY AND BIOPSY       Anesthesia Type:  MAC    Note:  Disposition: Outpatient   Postop Pain Control: Uneventful            Sign Out: Well controlled pain   PONV: No   Neuro/Psych: Uneventful            Sign Out: Acceptable/Baseline neuro status   Airway/Respiratory: Uneventful            Sign Out: Acceptable/Baseline resp. status   CV/Hemodynamics: Uneventful            Sign Out: Acceptable CV status; No obvious hypovolemia; No obvious fluid overload   Other NRE: NONE   DID A NON-ROUTINE EVENT OCCUR? No           Last vitals:  Vitals Value Taken Time   /80 02/16/23 1042   Temp     Pulse 82 02/16/23 1042   Resp     SpO2 95 % 02/16/23 1045   Vitals shown include unvalidated device data.    Electronically Signed By: Christopher White MD  February 16, 2023  11:13 AM

## 2023-02-16 NOTE — OR NURSING
Procedure: Egd with biopsies and colon with polyps x2 and biopsies done  Sedation: monitored anesthesia care  Specimens: x7 jars, sent to lab.   O2: per CRNA  Tolerated procedure: well  Pt to recovery area in stable condition accompanied by RN.   Other:  none    Arabella Pompa RN

## 2023-02-17 ENCOUNTER — MYC MEDICAL ADVICE (OUTPATIENT)
Dept: FAMILY MEDICINE | Facility: CLINIC | Age: 68
End: 2023-02-17
Payer: COMMERCIAL

## 2023-02-17 DIAGNOSIS — E11.40 TYPE 2 DIABETES MELLITUS WITH DIABETIC NEUROPATHY, WITHOUT LONG-TERM CURRENT USE OF INSULIN (H): ICD-10-CM

## 2023-02-17 DIAGNOSIS — E11.59 TYPE 2 DIABETES MELLITUS WITH OTHER CIRCULATORY COMPLICATION, WITHOUT LONG-TERM CURRENT USE OF INSULIN (H): ICD-10-CM

## 2023-02-17 DIAGNOSIS — Z95.1 S/P CABG (CORONARY ARTERY BYPASS GRAFT): ICD-10-CM

## 2023-02-17 RX ORDER — ATORVASTATIN CALCIUM 80 MG/1
TABLET, FILM COATED ORAL
Qty: 90 TABLET | Refills: 0 | Status: SHIPPED | OUTPATIENT
Start: 2023-02-17 | End: 2023-03-01

## 2023-02-17 RX ORDER — METFORMIN HCL 500 MG
1500 TABLET, EXTENDED RELEASE 24 HR ORAL
Qty: 270 TABLET | Refills: 1 | Status: SHIPPED | OUTPATIENT
Start: 2023-02-17 | End: 2023-03-01

## 2023-02-17 NOTE — TELEPHONE ENCOUNTER
Future Office Visit:   Next 5 appointments (look out 90 days)    Mar 01, 2023  2:00 PM  (Arrive by 1:40 PM)  Annual Wellness Visit with Roxanna Otoole MD  [facility] [place] ([facility] ) [address]  [place] [address]  [phone]         Patient taking medication different than the way it has been ordered.     Sylvia Durham RN BSN  [facility]

## 2023-02-20 LAB
PATH REPORT.ADDENDUM SPEC: NORMAL
PATH REPORT.COMMENTS IMP SPEC: NORMAL
PATH REPORT.FINAL DX SPEC: NORMAL
PATH REPORT.GROSS SPEC: NORMAL
PATH REPORT.MICROSCOPIC SPEC OTHER STN: NORMAL
PATH REPORT.PRELIMIN DX SPEC-IMP: NORMAL
PATH REPORT.RELEVANT HX SPEC: NORMAL
PHOTO IMAGE: NORMAL

## 2023-02-21 ENCOUNTER — TELEPHONE (OUTPATIENT)
Dept: GASTROENTEROLOGY | Facility: CLINIC | Age: 68
End: 2023-02-21
Payer: COMMERCIAL

## 2023-02-21 DIAGNOSIS — K52.9 CHRONIC DIARRHEA: Primary | ICD-10-CM

## 2023-02-21 NOTE — CONFIDENTIAL NOTE
February 21, 2023    Attempted to call patient re: EGD/colon results patient did not answer, left  informing patient I will try to call back later this afternoon. Provided clinic number.    ECT

## 2023-02-27 ENCOUNTER — MYC MEDICAL ADVICE (OUTPATIENT)
Dept: FAMILY MEDICINE | Facility: CLINIC | Age: 68
End: 2023-02-27
Payer: COMMERCIAL

## 2023-02-28 ENCOUNTER — HOSPITAL ENCOUNTER (OUTPATIENT)
Dept: CT IMAGING | Facility: CLINIC | Age: 68
Discharge: HOME | End: 2023-02-28
Attending: STUDENT IN AN ORGANIZED HEALTH CARE EDUCATION/TRAINING PROGRAM
Payer: COMMERCIAL

## 2023-02-28 ENCOUNTER — HOSPITAL ENCOUNTER (OUTPATIENT)
Dept: CT IMAGING | Facility: CLINIC | Age: 68
Discharge: HOME | End: 2023-02-28
Attending: FAMILY MEDICINE
Payer: COMMERCIAL

## 2023-02-28 VITALS — SYSTOLIC BLOOD PRESSURE: 114 MMHG | HEART RATE: 65 BPM | DIASTOLIC BLOOD PRESSURE: 63 MMHG

## 2023-02-28 DIAGNOSIS — J18.9 PNEUMONIA DUE TO INFECTIOUS ORGANISM, UNSPECIFIED LATERALITY, UNSPECIFIED PART OF LUNG: ICD-10-CM

## 2023-02-28 DIAGNOSIS — R06.02 SHORTNESS OF BREATH: ICD-10-CM

## 2023-02-28 DIAGNOSIS — Z95.1 S/P CABG (CORONARY ARTERY BYPASS GRAFT): ICD-10-CM

## 2023-02-28 DIAGNOSIS — I25.10 CAD (CORONARY ARTERY DISEASE): ICD-10-CM

## 2023-02-28 PROCEDURE — 71250 CT THORAX DX C-: CPT | Mod: 26 | Performed by: RADIOLOGY

## 2023-02-28 PROCEDURE — 75574 CT ANGIO HRT W/3D IMAGE: CPT | Mod: 26 | Performed by: INTERNAL MEDICINE

## 2023-02-28 PROCEDURE — 250N000013 HC RX MED GY IP 250 OP 250 PS 637: Performed by: INTERNAL MEDICINE

## 2023-02-28 PROCEDURE — 71250 CT THORAX DX C-: CPT

## 2023-02-28 PROCEDURE — 75574 CT ANGIO HRT W/3D IMAGE: CPT

## 2023-02-28 PROCEDURE — 250N000011 HC RX IP 250 OP 636: Performed by: STUDENT IN AN ORGANIZED HEALTH CARE EDUCATION/TRAINING PROGRAM

## 2023-02-28 RX ORDER — ONDANSETRON 2 MG/ML
4 INJECTION INTRAMUSCULAR; INTRAVENOUS
Status: DISCONTINUED | OUTPATIENT
Start: 2023-02-28 | End: 2023-03-01 | Stop reason: HOSPADM

## 2023-02-28 RX ORDER — DIPHENHYDRAMINE HCL 25 MG
25 CAPSULE ORAL
Status: DISCONTINUED | OUTPATIENT
Start: 2023-02-28 | End: 2023-03-01 | Stop reason: HOSPADM

## 2023-02-28 RX ORDER — NITROGLYCERIN 0.4 MG/1
.4-.8 TABLET SUBLINGUAL
Status: DISCONTINUED | OUTPATIENT
Start: 2023-02-28 | End: 2023-03-01 | Stop reason: HOSPADM

## 2023-02-28 RX ORDER — METHYLPREDNISOLONE SODIUM SUCCINATE 125 MG/2ML
125 INJECTION, POWDER, LYOPHILIZED, FOR SOLUTION INTRAMUSCULAR; INTRAVENOUS
Status: DISCONTINUED | OUTPATIENT
Start: 2023-02-28 | End: 2023-03-01 | Stop reason: HOSPADM

## 2023-02-28 RX ORDER — DIPHENHYDRAMINE HYDROCHLORIDE 50 MG/ML
25-50 INJECTION INTRAMUSCULAR; INTRAVENOUS
Status: DISCONTINUED | OUTPATIENT
Start: 2023-02-28 | End: 2023-03-01 | Stop reason: HOSPADM

## 2023-02-28 RX ORDER — METOPROLOL TARTRATE 25 MG/1
25-100 TABLET, FILM COATED ORAL
Status: COMPLETED | OUTPATIENT
Start: 2023-02-28 | End: 2023-02-28

## 2023-02-28 RX ORDER — METOPROLOL TARTRATE 1 MG/ML
5-15 INJECTION, SOLUTION INTRAVENOUS
Status: DISCONTINUED | OUTPATIENT
Start: 2023-02-28 | End: 2023-03-01 | Stop reason: HOSPADM

## 2023-02-28 RX ORDER — IOPAMIDOL 755 MG/ML
120 INJECTION, SOLUTION INTRAVASCULAR ONCE
Status: COMPLETED | OUTPATIENT
Start: 2023-02-28 | End: 2023-02-28

## 2023-02-28 RX ORDER — IVABRADINE 5 MG/1
5-15 TABLET, FILM COATED ORAL
Status: COMPLETED | OUTPATIENT
Start: 2023-02-28 | End: 2023-02-28

## 2023-02-28 RX ORDER — ACYCLOVIR 200 MG/1
0-1 CAPSULE ORAL
Status: DISCONTINUED | OUTPATIENT
Start: 2023-02-28 | End: 2023-03-01 | Stop reason: HOSPADM

## 2023-02-28 RX ADMIN — NITROGLYCERIN 0.8 MG: 0.4 TABLET SUBLINGUAL at 09:24

## 2023-02-28 RX ADMIN — IOPAMIDOL 120 ML: 755 INJECTION, SOLUTION INTRAVENOUS at 09:16

## 2023-02-28 RX ADMIN — METOPROLOL TARTRATE 25 MG: 25 TABLET, FILM COATED ORAL at 08:28

## 2023-02-28 RX ADMIN — IVABRADINE 5 MG: 5 TABLET, FILM COATED ORAL at 08:28

## 2023-02-28 ASSESSMENT — ENCOUNTER SYMPTOMS
FEVER: 0
MYALGIAS: 0
FREQUENCY: 0
PARESTHESIAS: 0
COUGH: 1
HEADACHES: 0
HEARTBURN: 1
SORE THROAT: 0
SHORTNESS OF BREATH: 1
BREAST MASS: 0
DIZZINESS: 1
NAUSEA: 0
WEAKNESS: 1
ARTHRALGIAS: 0
EYE PAIN: 0
DIARRHEA: 1
PALPITATIONS: 0
CONSTIPATION: 0
HEMATOCHEZIA: 0
HEMATURIA: 0
DYSURIA: 0
CHILLS: 0
ABDOMINAL PAIN: 0
NERVOUS/ANXIOUS: 0
JOINT SWELLING: 0

## 2023-02-28 ASSESSMENT — ACTIVITIES OF DAILY LIVING (ADL): CURRENT_FUNCTION: NO ASSISTANCE NEEDED

## 2023-02-28 NOTE — PROGRESS NOTES
Pt arrived for Coronary CT angiogram. Test, meds and side effects reviewed with pt.  Resting HR  64 bpm. Given 25 mg PO Metoprolol + 5 mg PO Ivabradine per verbal order. Administered 0.8 mg SL nitro on CTA table per order. CTA completed.  Patient tolerated procedure well and denies symptoms of allergic reaction.  Post monitoring completed and VSS.  D/C instructions reviewed with pt whom verbalized understanding of need to increase PO fluids today. D/C to gold waiting room accompanied by staff.

## 2023-03-01 ENCOUNTER — OFFICE VISIT (OUTPATIENT)
Dept: FAMILY MEDICINE | Facility: CLINIC | Age: 68
End: 2023-03-01
Payer: COMMERCIAL

## 2023-03-01 VITALS
DIASTOLIC BLOOD PRESSURE: 66 MMHG | HEART RATE: 59 BPM | WEIGHT: 132.6 LBS | TEMPERATURE: 98.6 F | BODY MASS INDEX: 23.5 KG/M2 | HEIGHT: 63 IN | SYSTOLIC BLOOD PRESSURE: 118 MMHG | OXYGEN SATURATION: 96 %

## 2023-03-01 DIAGNOSIS — E11.59 TYPE 2 DIABETES MELLITUS WITH OTHER CIRCULATORY COMPLICATION, WITHOUT LONG-TERM CURRENT USE OF INSULIN (H): ICD-10-CM

## 2023-03-01 DIAGNOSIS — E11.40 TYPE 2 DIABETES MELLITUS WITH DIABETIC NEUROPATHY, WITHOUT LONG-TERM CURRENT USE OF INSULIN (H): ICD-10-CM

## 2023-03-01 DIAGNOSIS — E04.1 THYROID NODULE: ICD-10-CM

## 2023-03-01 DIAGNOSIS — M85.80 OSTEOPENIA, UNSPECIFIED LOCATION: ICD-10-CM

## 2023-03-01 DIAGNOSIS — N95.0 POSTMENOPAUSAL BLEEDING: ICD-10-CM

## 2023-03-01 DIAGNOSIS — E78.5 HYPERLIPIDEMIA LDL GOAL <70: ICD-10-CM

## 2023-03-01 DIAGNOSIS — Z95.1 S/P CABG (CORONARY ARTERY BYPASS GRAFT): ICD-10-CM

## 2023-03-01 DIAGNOSIS — Z00.00 ENCOUNTER FOR MEDICARE ANNUAL WELLNESS EXAM: Primary | ICD-10-CM

## 2023-03-01 DIAGNOSIS — D64.9 ANEMIA, UNSPECIFIED TYPE: ICD-10-CM

## 2023-03-01 DIAGNOSIS — Z12.31 VISIT FOR SCREENING MAMMOGRAM: ICD-10-CM

## 2023-03-01 DIAGNOSIS — I50.20 HFREF (HEART FAILURE WITH REDUCED EJECTION FRACTION) (H): ICD-10-CM

## 2023-03-01 DIAGNOSIS — F33.0 MAJOR DEPRESSIVE DISORDER, RECURRENT EPISODE, MILD (H): ICD-10-CM

## 2023-03-01 LAB
CHOLEST SERPL-MCNC: 104 MG/DL
ERYTHROCYTE [DISTWIDTH] IN BLOOD BY AUTOMATED COUNT: 16 % (ref 10–15)
FASTING STATUS PATIENT QL REPORTED: YES
HBA1C MFR BLD: 8.3 % (ref 0–5.6)
HCT VFR BLD AUTO: 39.6 % (ref 35–47)
HDLC SERPL-MCNC: 52 MG/DL
HGB BLD-MCNC: 12.6 G/DL (ref 11.7–15.7)
LDLC SERPL CALC-MCNC: 30 MG/DL
MCH RBC QN AUTO: 28.3 PG (ref 26.5–33)
MCHC RBC AUTO-ENTMCNC: 31.8 G/DL (ref 31.5–36.5)
MCV RBC AUTO: 89 FL (ref 78–100)
NONHDLC SERPL-MCNC: 52 MG/DL
PLATELET # BLD AUTO: 218 10E3/UL (ref 150–450)
RBC # BLD AUTO: 4.45 10E6/UL (ref 3.8–5.2)
TRIGL SERPL-MCNC: 110 MG/DL
TSH SERPL DL<=0.005 MIU/L-ACNC: 1.12 MU/L (ref 0.4–4)
WBC # BLD AUTO: 5.8 10E3/UL (ref 4–11)

## 2023-03-01 PROCEDURE — 99207 PR FOOT EXAM NO CHARGE: CPT | Mod: 25 | Performed by: FAMILY MEDICINE

## 2023-03-01 PROCEDURE — 84443 ASSAY THYROID STIM HORMONE: CPT | Performed by: FAMILY MEDICINE

## 2023-03-01 PROCEDURE — G0438 PPPS, INITIAL VISIT: HCPCS | Performed by: FAMILY MEDICINE

## 2023-03-01 PROCEDURE — 82607 VITAMIN B-12: CPT | Performed by: FAMILY MEDICINE

## 2023-03-01 PROCEDURE — 80061 LIPID PANEL: CPT | Performed by: FAMILY MEDICINE

## 2023-03-01 PROCEDURE — 36415 COLL VENOUS BLD VENIPUNCTURE: CPT | Performed by: FAMILY MEDICINE

## 2023-03-01 PROCEDURE — 99214 OFFICE O/P EST MOD 30 MIN: CPT | Mod: 25 | Performed by: FAMILY MEDICINE

## 2023-03-01 PROCEDURE — 82043 UR ALBUMIN QUANTITATIVE: CPT | Performed by: FAMILY MEDICINE

## 2023-03-01 PROCEDURE — 85027 COMPLETE CBC AUTOMATED: CPT | Performed by: FAMILY MEDICINE

## 2023-03-01 PROCEDURE — 82570 ASSAY OF URINE CREATININE: CPT | Performed by: FAMILY MEDICINE

## 2023-03-01 PROCEDURE — 83036 HEMOGLOBIN GLYCOSYLATED A1C: CPT | Performed by: FAMILY MEDICINE

## 2023-03-01 RX ORDER — ATORVASTATIN CALCIUM 80 MG/1
TABLET, FILM COATED ORAL
Qty: 90 TABLET | Refills: 0 | Status: SHIPPED | OUTPATIENT
Start: 2023-03-01 | End: 2023-03-01

## 2023-03-01 RX ORDER — METFORMIN HCL 500 MG
2000 TABLET, EXTENDED RELEASE 24 HR ORAL
Qty: 360 TABLET | Refills: 1 | Status: SHIPPED | OUTPATIENT
Start: 2023-03-01 | End: 2023-11-01

## 2023-03-01 RX ORDER — GLIPIZIDE 5 MG/1
TABLET, FILM COATED, EXTENDED RELEASE ORAL
Qty: 180 TABLET | Refills: 1 | Status: SHIPPED | OUTPATIENT
Start: 2023-03-01 | End: 2023-12-22

## 2023-03-01 RX ORDER — ATORVASTATIN CALCIUM 80 MG/1
TABLET, FILM COATED ORAL
Qty: 90 TABLET | Refills: 3 | Status: SHIPPED | OUTPATIENT
Start: 2023-03-01 | End: 2023-12-01

## 2023-03-01 ASSESSMENT — ENCOUNTER SYMPTOMS
CONSTIPATION: 0
FEVER: 0
DIZZINESS: 0
COUGH: 0
EYE PAIN: 0
PALPITATIONS: 0
MYALGIAS: 0
PARESTHESIAS: 0
HEADACHES: 0
HEARTBURN: 1
JOINT SWELLING: 0
ABDOMINAL PAIN: 0
BREAST MASS: 0
DIARRHEA: 0
WEAKNESS: 1
FREQUENCY: 0
HEMATOCHEZIA: 0
DYSURIA: 0
SHORTNESS OF BREATH: 0
HEMATURIA: 0
NERVOUS/ANXIOUS: 0
NAUSEA: 0
ARTHRALGIAS: 0
SORE THROAT: 0
CHILLS: 0

## 2023-03-01 ASSESSMENT — PAIN SCALES - GENERAL: PAINLEVEL: NO PAIN (0)

## 2023-03-01 ASSESSMENT — PATIENT HEALTH QUESTIONNAIRE - PHQ9: SUM OF ALL RESPONSES TO PHQ QUESTIONS 1-9: 4

## 2023-03-01 ASSESSMENT — ACTIVITIES OF DAILY LIVING (ADL): CURRENT_FUNCTION: NO ASSISTANCE NEEDED

## 2023-03-01 NOTE — PROGRESS NOTES
"SUBJECTIVE:   Jody is a 68 year old who presents for Preventive Visit.  Patient has been advised of split billing requirements and indicates understanding: Yes  Are you in the first 12 months of your Medicare coverage?  No    Healthy Habits:     In general, how would you rate your overall health?  Good    Frequency of exercise:  2-3 days/week    Duration of exercise:  15-30 minutes    Do you usually eat at least 4 servings of fruit and vegetables a day, include whole grains    & fiber and avoid regularly eating high fat or \"junk\" foods?  Yes    Taking medications regularly:  Yes    Medication side effects:  Lightheadedness    Ability to successfully perform activities of daily living:  No assistance needed    Home Safety:  No safety concerns identified    Hearing Impairment:  No hearing concerns    In the past 6 months, have you been bothered by leaking of urine?  No    In general, how would you rate your overall mental or emotional health?  Good      PHQ-2 Total Score: 0    Additional concerns today:  Yes      Have you ever done Advance Care Planning? (For example, a Health Directive, POLST, or a discussion with a medical provider or your loved ones about your wishes): Yes, patient states has an Advance Care Planning document and will bring a copy to the clinic.       Fall risk  Fallen 2 or more times in the past year?: Yes  Any fall with injury in the past year?: No    Cognitive Screening   1) Repeat 3 items (Leader, Season, Table)    2) Clock draw: NORMAL  3) 3 item recall: Recalls 3 objects  Results: 3 items recalled: COGNITIVE IMPAIRMENT LESS LIKELY    Mini-CogTM Copyright FLYNN Alba. Licensed by the author for use in [facility]; reprinted with permission (charley@.[email or web address]). All rights reserved.      Do you have sleep apnea, excessive snoring or daytime drowsiness?: no    Reviewed and updated as needed this visit by clinical staff   Tobacco  Allergies  Meds  Problems  Med Hx  Surg Hx  Fam Hx      "     Reviewed and updated as needed this visit by Provider   Tobacco  Allergies  Meds  Problems  Med Hx  Surg Hx  Fam Hx         Social History     Tobacco Use     Smoking status: Never     Smokeless tobacco: Never   Substance Use Topics     Alcohol use: Yes     Comment: 1 drink a week         Alcohol Use 2/28/2023   Prescreen: >3 drinks/day or >7 drinks/week? No           Diabetes Follow-up      How often are you checking your blood sugar? {has been stable     What concerns do you have today about your diabetes? None     Do you have any of these symptoms? (Select all that apply)  No numbness or tingling in feet.  No redness, sores or blisters on feet.  No complaints of excessive thirst.  No reports of blurry vision.  No significant changes to weight.              Hyperlipidemia Follow-Up      Are you regularly taking any medication or supplement to lower your cholesterol?   Yes- statins    Are you having muscle aches or other side effects that you think could be caused by your cholesterol lowering medication? Pt has No side effects from her medicines    Hypertension Follow-up      Do you check your blood pressure regularly outside of the clinic? No     Are you following a low salt diet? Yes    Are your blood pressures ever more than 140 on the top number (systolic) OR more   than 90 on the bottom number (diastolic), for example 140/90? No    BP Readings from Last 2 Encounters:   03/01/23 118/66   02/28/23 114/63     Hemoglobin A1C (%)   Date Value   03/01/2023 8.3 (H)   11/30/2022 7.7 (H)   05/18/2021 7.6 (H)   01/07/2021 7.9 (H)     LDL Cholesterol Calculated (mg/dL)   Date Value   05/18/2022 57   04/14/2022 64   05/18/2021 59   01/03/2020 67       Vascular Disease Follow-up      How often do you take nitroglycerin?doing well    Saw Cardiologist    Notes reviewed         Heart Failure Follow-up    Pt had a follow up wit cardiology    Doing  well    Has Mild JEFFERSON    No chest pain    No other symptoms    She  also had Postmenopausal Bleeding on Monday    No pelvis pain    Small amount of Blood on her underwear    Depression Followup    How are you doing with your depression since your last visit? Stable     Are you having other symptoms that might be associated with depression? No    Have you had a significant life event?  No     Are you feeling anxious or having panic attacks?   No    Do you have any concerns with your use of alcohol or other drugs? No    Social History     Tobacco Use     Smoking status: Never     Smokeless tobacco: Never   Vaping Use     Vaping Use: Never used   Substance Use Topics     Alcohol use: Yes     Comment: 1 drink a week     Drug use: No     PHQ 4/14/2022 9/14/2022 3/1/2023   PHQ-9 Total Score 4 5 4   Q9: Thoughts of better off dead/self-harm past 2 weeks Not at all Not at all Not at all     JUSTUS-7 SCORE 9/24/2019 2/12/2020 5/18/2021   Total Score 4 6 2     Last PHQ-9 3/1/2023   1.  Little interest or pleasure in doing things 0   2.  Feeling down, depressed, or hopeless 0   3.  Trouble falling or staying asleep, or sleeping too much 1   4.  Feeling tired or having little energy 1   5.  Poor appetite or overeating 1   6.  Feeling bad about yourself 0   7.  Trouble concentrating 1   8.  Moving slowly or restless 0   Q9: Thoughts of better off dead/self-harm past 2 weeks 0   PHQ-9 Total Score 4   Difficulty at work, home, or with people Somewhat difficult     JUSTUS-7  5/18/2021   1. Feeling nervous, anxious, or on edge 0   2. Not being able to stop or control worrying 0   3. Worrying too much about different things 0   4. Trouble relaxing 0   5. Being so restless that it is hard to sit still 1   6. Becoming easily annoyed or irritable 1   7. Feeling afraid, as if something awful might happen 0   JUSTUS-7 Total Score 2   If you checked any problems, how difficult have they made it for you to do your work, take care of things at home, or get along with other people? Somewhat difficult       Suicide  Assessment Five-step Evaluation and Treatment (SAFE-T)      Current providers sharing in care for this patient include:   Patient Care Team:  Roxanna Otoole MD as PCP - General (Family Medicine)  Roxanna Otoole MD as Assigned PCP  Regina Sutherland as Diabetes Educator (Dietitian, Registered)  Dasia Amin, RN as Specialty Care Coordinator (Cardiology)  Belinda Colon APRN CNP as Nurse Practitioner (Cardiovascular Disease)  Kris Steve MD as MD (Cardiovascular Disease)  Belinda Colon APRN CNP as Assigned Heart and Vascular Provider  Evette Castro MD as MD (Infectious Diseases)  Sarah Isidro, IKER as Specialty Care Coordinator (Cardiology)  Too Morrow MD as MD (Cardiovascular Disease)  Malu Yap [facility] as Assigned MTM Pharmacist  Zoie Chapman PA-C as Assigned Cancer Care Provider  Evette Castro MD as Assigned Infectious Disease Provider  Cherie Lira APRN CNP as Nurse Practitioner (Cardiovascular Disease)    The following health maintenance items are reviewed in Epic and correct as of today:  Health Maintenance   Topic Date Due     ZOSTER IMMUNIZATION (1 of 2) Never done     MICROALBUMIN  04/14/2023     LIPID  05/18/2023     A1C  06/01/2023     EYE EXAM  08/04/2023     PHQ-9  09/01/2023     MAMMO SCREENING  12/16/2023     BMP  02/07/2024     MEDICARE ANNUAL WELLNESS VISIT  03/01/2024     DIABETIC FOOT EXAM  03/01/2024     ANNUAL REVIEW OF HM ORDERS  03/01/2024     FALL RISK ASSESSMENT  03/01/2024     DEXA  09/07/2025     ADVANCE CARE PLANNING  03/01/2028     DTAP/TDAP/TD IMMUNIZATION (3 - Td or Tdap) 01/21/2029     COLORECTAL CANCER SCREENING  02/16/2033     HEPATITIS C SCREENING  Completed     DEPRESSION ACTION PLAN  Completed     INFLUENZA VACCINE  Completed     Pneumococcal Vaccine: 65+ Years  Completed     COVID-19 Vaccine  Completed     IPV IMMUNIZATION  Aged Out     MENINGITIS IMMUNIZATION  Aged Out     Lab work is in process  Labs  reviewed in EPIC  BP Readings from Last 3 Encounters:   03/01/23 118/66   02/28/23 114/63   02/16/23 101/67    Wt Readings from Last 3 Encounters:   03/01/23 60.1 kg (132 lb 9.6 oz)   02/16/23 58.5 kg (129 lb)   02/09/23 59.1 kg (130 lb 6.4 oz)                  Patient Active Problem List   Diagnosis     Phobia     GERD (gastroesophageal reflux disease)     C. difficile diarrhea     Hyperlipidemia LDL goal <70     Advanced directives, counseling/discussion     Hypertension, goal below 140/90     Major depressive disorder, recurrent episode, mild (H)     Generalized anxiety disorder     Diabetes mellitus, type 2 (H)     Non-sustained ventricular tachycardia     Near syncope     HFrEF (heart failure with reduced ejection fraction) (H)     S/P CABG (coronary artery bypass graft)     Osteopenia     Postmenopausal bleeding     Past Surgical History:   Procedure Laterality Date     BYPASS GRAFT ARTERY CORONARY N/A 05/24/2022    Procedure: Median sternotomy.  Intraoperative transesophageal echocardiogram per anesthesia.  Left internal mammary artery harvest.  Right and left endoscopically harvested  greater saphenouse vein.  Cardiopulmonary Bypass.  Coronary Artery Bypass Grafts x4.;  Surgeon: Ulises Desai MD;  Location: [facility]     COLONOSCOPY N/A 2/16/2023    Procedure: COLONOSCOPY, WITH POLYPECTOMY AND BIOPSY;  Surgeon: Nikhil Lees MD;  Location:  GI     CV CORONARY ANGIOGRAM  05/19/2022    Procedure: CV CORONARY ANGIOGRAM;  Surgeon: Huseyin Vogel MD;  Location: [facility] CARDIAC CATH LAB     CV CORONARY ANGIOGRAM  05/19/2022    Procedure: ;  Surgeon: Huseyin Vogel MD;  Location: [facility] CARDIAC CATH LAB     EP ICD INSERT SINGLE N/A 10/4/2022    Procedure: Implantable Cardioverter Defibrillator Device & Lead Implant Single or Dual;  Surgeon: Too Morrow MD;  Location: [facility] CARDIAC CATH LAB     ESOPHAGOSCOPY, GASTROSCOPY, DUODENOSCOPY (EGD), COMBINED N/A 2/16/2023     Procedure: ESOPHAGOGASTRODUODENOSCOPY, WITH BIOPSY;  Surgeon: Nikhil Lees MD;  Location: [facility] GI     NO HISTORY OF SURGERY       PICC DOUBLE LUMEN PLACEMENT Right 2022    Failed PICC attempt right arm basilic vein     PICC INSERTION - TRIPLE LUMEN Left 2022    left basilic 5fr tl,[id number] cm       Social History     Tobacco Use     Smoking status: Never     Smokeless tobacco: Never   Substance Use Topics     Alcohol use: Yes     Comment: 1 drink a week     Family History   Problem Relation Age of Onset     Diabetes Mother         hypertension, alive at 83     C.A.D. Mother      Coronary Artery Disease Mother      Cancer Father         lung cancer, smoker.   at age 53     Family History Negative Sister      Osteoporosis Sister      Anesthesia Reaction No family hx of      Deep Vein Thrombosis (DVT) No family hx of          Current Outpatient Medications   Medication Sig Dispense Refill     acetaminophen (TYLENOL) 500 MG tablet Take 1,000 mg by mouth 3 times daily as needed for mild pain       aspirin 81 MG EC tablet Take 81 mg by mouth every evening       atorvastatin (LIPITOR) 80 MG tablet TAKE 1 TABLET DAILY (NEED ANNUAL EXAM) Strength: 80 mg 90 tablet 3     blood glucose (ONETOUCH VERIO IQ) test strip Use to test blood sugar 2 times daily or as directed. 200 strip 1     blood glucose monitoring (ONETOUCH VERIO) meter device kit Use to test blood sugar 2 times daily or as directed. 1 kit 0     empagliflozin (JARDIANCE) 25 MG TABS tablet Take 1 tablet (25 mg) by mouth daily 90 tablet 1     FLUoxetine (PROZAC) 20 MG capsule TAKE 2 CAPSULES DAILY 180 capsule 1     glipiZIDE (GLUCOTROL XL) 5 MG 24 hr tablet TAKE 2 TABLETS DAILY (NEED ANNUAL EXAM) 180 tablet 1     metFORMIN (GLUCOPHAGE XR) 500 MG 24 hr tablet Take 4 tablets (2,000 mg) by mouth daily (with dinner) Please see changed dose 360 tablet 1     metoprolol succinate ER (TOPROL XL) 50 MG 24 hr tablet Take 1.5 tablets (75 mg) by mouth daily  (Patient taking differently: Take 75 mg by mouth every morning) 90 tablet 3     MULTIPLE VITAMIN OR TABS Take by mouth every morning       Multiple Vitamins-Minerals (PRESERVISION AREDS PO) Take 2 tablets by mouth daily       omeprazole (PRILOSEC) 40 MG DR capsule Take 1 capsule (40 mg) by mouth 2 times daily (before meals) for 60 days 120 capsule 0     sacubitril-valsartan (ENTRESTO)  MG per tablet Take 1/2 tablet in AM, take 1 tablet in  tablet 3     spironolactone (ALDACTONE) 25 MG tablet Take 0.5 tablets (12.5 mg) by mouth daily (Patient taking differently: Take 12.5 mg by mouth every morning) 30 tablet 1     bisacodyl (DULCOLAX) 5 MG EC tablet Take 2 tablets at 3 pm the day before your procedure. If your procedure is before 11 am, take 2 additional tablets at 11 pm. If your procedure is after 11 am, take 2 additional tablets at 6 am. For additional instructions refer to your colonoscopy prep instructions. (Patient not taking: Reported on 3/1/2023) 4 tablet 0     polyethylene glycol (GOLYTELY) 236 g suspension The night before the exam at 6 pm drink an 8-ounce glass every 15 minutes until the jug is half empty. If you arrive before 11 AM: Drink the other half of the Golytely jug at 11 PM night before procedure. If you arrive after 11 AM: Drink the other half of the Golytely jug at 6 AM day of procedure. For additional instructions refer to your colonoscopy prep instructions. (Patient not taking: Reported on 3/1/2023) 4000 mL 0     sacubitril-valsartan (ENTRESTO) 49-51 MG per tablet Take 1.5 tablets by mouth 2 times daily (Patient not taking: Reported on 3/1/2023) 270 tablet 3     Allergies   Allergen Reactions     Effexor [Venlafaxine Hydrochloride]      Tachycardia, makes her extremely jittery--cant sit down, has to keep moving constantly     Latex      Reported by patient     Recent Labs   Lab Test 03/01/23  1433 02/07/23  1001 01/12/23  1112 12/27/22  1021 11/30/22  1125 07/21/22  1538  07/06/22  1607 07/05/22  1305 05/25/22  0341 05/24/22  2338 05/24/22 2027 05/24/22  1549 05/20/22  0758 05/18/22  1715 05/11/22  1447 04/30/22  1934 04/14/22  1529 12/21/21  1247 05/18/21  1534 01/07/21  1513 09/09/20  1625 01/03/20  1402 09/24/19  1554   A1C 8.3*  --   --   --  7.7*  --   --  6.8*  --   --   --   --   --   --   --   --  7.8*   < > 7.6* 7.9* 8.5*   < > 7.6*   LDL  --   --   --   --   --   --   --   --   --   --   --   --   --  57  --   --  64  --  59  --   --    < >  --    HDL  --   --   --   --   --   --   --   --   --   --   --   --   --  47*  --   --  50  --  50  --   --    < >  --    TRIG  --   --   --   --   --   --   --   --   --   --   --   --   --  214*  --   --  192*  --  173*  --   --    < >  --    ALT  --   --   --   --   --   --  24  --   --  24  --  25  --  27  --    < >  --   --   --   --  33  --   --    CR  --  0.90 0.88   < > 0.68   < > 0.92  --    < > 0.85   < > 0.74   < > 0.90  --    < >  --    < >  --  0.90 0.91   < >  --    GFRESTIMATED  --  69 71   < > >90   < > 67  --    < > 74   < > 88   < > 69  --    < >  --    < >  --  66 66   < >  --    GFRESTBLACK  --   --   --   --   --   --   --   --   --   --   --   --   --   --   --   --   --   --   --  77 76   < >  --    POTASSIUM  --  5.0 4.5   < > 4.6   < > 4.0  --    < > 3.5   < > 4.5   < > 4.1  --    < >  --    < >  --  4.8 4.9   < >  --    TSH  --   --   --   --   --   --   --   --   --   --   --   --   --   --  1.41  --   --   --   --   --   --   --  0.89    < > = values in this interval not displayed.      Pt will get her mammogram annually due to Family history    FHS-7:   Breast CA Risk Assessment (FHS-7) 12/16/2021 7/25/2022 2/28/2023   Did any of your first-degree relatives have breast or ovarian cancer? No No No   Did any of your relatives have bilateral breast cancer? Unknown No Unknown   Did any man in your family have breast cancer? No No No   Did any woman in your family have breast and ovarian cancer? No Yes Yes  "  Did any woman in your family have breast cancer before age 50 y? No No Unknown   Do you have 2 or more relatives with breast and/or ovarian cancer? No No No   Do you have 2 or more relatives with breast and/or bowel cancer? No No No       advised mammogram annually  Pertinent mammograms are reviewed under the imaging tab.    Review of Systems   Constitutional: Negative for chills and fever.   HENT: Negative for congestion, ear pain, hearing loss and sore throat.    Eyes: Negative for pain and visual disturbance.   Respiratory: Negative for cough and shortness of breath.    Cardiovascular: Positive for peripheral edema. Negative for chest pain and palpitations.   Gastrointestinal: Positive for heartburn. Negative for abdominal pain, constipation, diarrhea, hematochezia and nausea.   Breasts:  Negative for tenderness, breast mass and discharge.   Genitourinary: Positive for vaginal discharge. Negative for dysuria, frequency, genital sores, hematuria, pelvic pain, urgency and vaginal bleeding.   Musculoskeletal: Negative for arthralgias, joint swelling and myalgias.   Skin: Negative for rash.   Neurological: Positive for weakness. Negative for dizziness, headaches and paresthesias.   Psychiatric/Behavioral: Negative for mood changes. The patient is not nervous/anxious.          OBJECTIVE:   /66   Pulse 59   Temp 98.6  F (37  C) (Oral)   Ht 1.606 m (5' 3.23\")   Wt 60.1 kg (132 lb 9.6 oz)   SpO2 96%   BMI 23.32 kg/m   Estimated body mass index is 23.32 kg/m  as calculated from the following:    Height as of this encounter: 1.606 m (5' 3.23\").    Weight as of this encounter: 60.1 kg (132 lb 9.6 oz).  Physical Exam  GENERAL APPEARANCE: healthy, alert and no distress  EYES: Eyes grossly normal to inspection, PERRL and conjunctivae and sclerae normal  HENT: ear canals and TM's normal, nose and mouth without ulcers or lesions, oropharynx clear and oral mucous membranes moist  NECK: no adenopathy, no asymmetry, " masses, or scars and thyroid normal to palpation  RESP: lungs clear to auscultation - no rales, rhonchi or wheezes  BREAST: normal without masses, tenderness or nipple discharge and no palpable axillary masses or adenopathy  CV: regular rate and rhythm, normal S1 S2, no S3 or S4, no murmur, click or rub, no peripheral edema and peripheral pulses strong  ABDOMEN: soft, nontender, no hepatosplenomegaly, no masses and bowel sounds normal   (female): normal female external genitalia, normal urethral meatus, vaginal mucosal atrophy noted, normal cervix, adnexae, and uterus without masses. and small amount of blood  seen at the os   MS: no musculoskeletal defects are noted and gait is age appropriate without ataxia  SKIN: no suspicious lesions or rashes  NEURO: Normal strength and tone, sensory exam grossly normal, mentation intact and speech normal  PSYCH: mentation appears normal and affect normal/bright    Diagnostic Test Results:  Labs reviewed in Epic  Results for orders placed or performed in visit on 03/01/23 (from the past 24 hour(s))   CBC with platelets   Result Value Ref Range    WBC Count 5.8 4.0 - 11.0 10e3/uL    RBC Count 4.45 3.80 - 5.20 10e6/uL    Hemoglobin 12.6 11.7 - 15.7 g/dL    Hematocrit 39.6 35.0 - 47.0 %    MCV 89 78 - 100 fL    MCH 28.3 26.5 - 33.0 pg    MCHC 31.8 31.5 - 36.5 g/dL    RDW 16.0 (H) 10.0 - 15.0 %    Platelet Count 218 150 - 450 10e3/uL   Hemoglobin A1c   Result Value Ref Range    Hemoglobin A1C 8.3 (H) 0.0 - 5.6 %     Results for orders placed or performed in visit on 03/01/23   CBC with platelets     Status: Abnormal   Result Value Ref Range    WBC Count 5.8 4.0 - 11.0 10e3/uL    RBC Count 4.45 3.80 - 5.20 10e6/uL    Hemoglobin 12.6 11.7 - 15.7 g/dL    Hematocrit 39.6 35.0 - 47.0 %    MCV 89 78 - 100 fL    MCH 28.3 26.5 - 33.0 pg    MCHC 31.8 31.5 - 36.5 g/dL    RDW 16.0 (H) 10.0 - 15.0 %    Platelet Count 218 150 - 450 10e3/uL   Hemoglobin A1c     Status: Abnormal   Result Value  Ref Range    Hemoglobin A1C 8.3 (H) 0.0 - 5.6 %       ASSESSMENT / PLAN:   (Z00.00) Encounter for Medicare annual wellness exam  (primary encounter diagnosis)  Comment:   Plan:     (Z95.1) S/P CABG (coronary artery bypass graft)   statins .Metoprolol  Plan: atorvastatin (LIPITOR) 80 MG tablet,         DISCONTINUED: atorvastatin (LIPITOR) 80 MG         tablet            (E11.40) Type 2 diabetes mellitus with diabetic neuropathy, without long-term current use of insulin (H)  Comment: will increase dose of Metformin  Plan: Hemoglobin A1c, Albumin Random Urine         Quantitative with Creat Ratio, Lipid panel         reflex to direct LDL Fasting, FOOT EXAM,         empagliflozin (JARDIANCE) 25 MG TABS tablet,         DISCONTINUED: empagliflozin (JARDIANCE) 25 MG         TABS tablet        Follow up 3 months    (F33.0) Major depressive disorder, recurrent episode, mild (H)  Comment: stable   Plan: FLUoxetine (PROZAC) 20 MG capsule            (D64.9) Anemia, unspecified type  Comment: pending labs   Plan:     (E11.59) Type 2 diabetes mellitus with other circulatory complication, without long-term current use of insulin (H)  Comment: will need to increase does  SEE [facility] care orders  The potential side effects of this medication have been discussed with the patient.  Call if any significant problems with these are experienced.    Plan: glipiZIDE (GLUCOTROL XL) 5 MG 24 hr tablet            (N95.0) Postmenopausal bleeding  Comment: discussed can be due to Polyp, uterine ca etc  She will get a pelvic ultrasound and see GYN for further evaluation  Plan: US Pelvic Complete with Transvaginal, Ob/Gyn         Referral            (E78.5) Hyperlipidemia LDL goal <70  Comment: on statins  Plan:     (I50.20) HFrEF (heart failure with reduced ejection fraction) (H)  Comment: Chronic systolic HF    NYHA class 3  Plan: om Entresto,BB  And Jardiance    (M85.80) Osteopenia, unspecified location  Comment: advised Vitamin D 1000 units  daily  Plan:     (E04.1) Thyroid nodule  Comment: advised   Plan: US Thyroid, TSH with free T4 reflex            (Z12.31) Visit for screening mammogram  Comment: advised   Plan: MA Screening Digital Bilateral          Time spent reviewing chart, addressing her medical Problems as above, ordering labs, refilling meds and discussing her medical Problems, , documenting-Labs will be reviewed when back and will make further recommendations based on her labs 53 mins      COUNSELING:  Reviewed preventive health counseling, as reflected in patient instructions       Regular exercise       Healthy diet/nutrition       Vision screening       Hearing screening       Dental care       Bladder control       Fall risk prevention       Osteoporosis prevention/bone health       Colon cancer screening       Advanced Planning         She reports that she has never smoked. She has never used smokeless tobacco.      Appropriate preventive services were discussed with this patient, including applicable screening as appropriate for cardiovascular disease, diabetes, osteopenia/osteoporosis, and glaucoma.  As appropriate for age/gender, discussed screening for colorectal cancer, prostate cancer, breast cancer, and cervical cancer. Checklist reviewing preventive services available has been given to the patient.    Reviewed patients plan of care and provided an AVS. The Complex Care Plan (for patients with higher acuity and needing more deliberate coordination of services) for Marianne meets the Care Plan requirement. This Care Plan has been established and reviewed with the Patient.      Roxanna Otoole MD  [facility]    Identified Health Risks:

## 2023-03-01 NOTE — TELEPHONE ENCOUNTER
Patient was seen by Roxanna Ignacio for this and this issue was addressed at the visit  Plan: US Pelvic Complete with Transvaginal, Ob/Gyn         Referral      Lisa Nath RN  [facility]

## 2023-03-01 NOTE — PATIENT INSTRUCTIONS
For scheduling at [facility] ([facility], [facility], [place]), call [phone] or [phone]     For scheduling in the North ([facility], [facility]) call  [phone] or  [phone]        For scheduling in the South ([facility], [facility]) call [phone]  or   [phone]      Please make appointment with Gynecologist-Dr Vazquez  Schedule Mammogram and Thyroid Ultrasound  Sincerely,  Roxanna Otoole MD      Patient Education   Personalized Prevention Plan  You are due for the preventive services outlined below.  Your care team is available to assist you in scheduling these services.  If you have already completed any of these items, please share that information with your care team to update in your medical record.  Health Maintenance Due   Topic Date Due    Zoster (Shingles) Vaccine (1 of 2) Never done    Annual Wellness Visit  01/21/2020    ANNUAL REVIEW OF HM ORDERS  12/21/2022    A1C Lab  02/28/2023    Depression Assessment  03/14/2023

## 2023-03-02 LAB
CREAT UR-MCNC: 54 MG/DL
MICROALBUMIN UR-MCNC: 13 MG/L
MICROALBUMIN/CREAT UR: 24.07 MG/G CR (ref 0–25)
VIT B12 SERPL-MCNC: 883 PG/ML (ref 232–1245)

## 2023-03-02 NOTE — RESULT ENCOUNTER NOTE
Dear Dr. Otoole,    I obtained a coronary CTA for Ms. Monroy to evaluate her dyspnea on exertion. The CTA was largely unremarkable but the radiology overread yielded a thyroid nodule. Could you please review this and see if it requires follow-up? The Radiologist recommend a thyroid ultrasound.    Thank you.      Kris Steve  Cardiology

## 2023-03-14 ENCOUNTER — OFFICE VISIT (OUTPATIENT)
Dept: OBGYN | Facility: CLINIC | Age: 68
End: 2023-03-14
Payer: COMMERCIAL

## 2023-03-14 VITALS
OXYGEN SATURATION: 95 % | HEART RATE: 63 BPM | BODY MASS INDEX: 22.76 KG/M2 | SYSTOLIC BLOOD PRESSURE: 114 MMHG | DIASTOLIC BLOOD PRESSURE: 64 MMHG | WEIGHT: 129.4 LBS

## 2023-03-14 DIAGNOSIS — N95.0 POSTMENOPAUSAL BLEEDING: Primary | ICD-10-CM

## 2023-03-14 PROCEDURE — 99203 OFFICE O/P NEW LOW 30 MIN: CPT | Mod: 25 | Performed by: OBSTETRICS & GYNECOLOGY

## 2023-03-14 PROCEDURE — 58100 BIOPSY OF UTERUS LINING: CPT | Performed by: OBSTETRICS & GYNECOLOGY

## 2023-03-14 PROCEDURE — 88305 TISSUE EXAM BY PATHOLOGIST: CPT | Performed by: STUDENT IN AN ORGANIZED HEALTH CARE EDUCATION/TRAINING PROGRAM

## 2023-03-14 NOTE — PROGRESS NOTES
Marianne is a 68 year old female .  I have been asked to see patient in consultation by Dr. Otoole regarding bloody vaginal discharge.  She has had little spots over the past year.  About 2 to 3 weeks ago, she had a lot more discharge.  She went through one paper product garment with the last episode.  When she was seen by Dr. Otoole, she was noted to have a small amount of blood at the cervical os.    She states she has not had any associated factors, no sex.    She has not had any aggravating or alleviating factors.    She has the ultrasound scheduled in 2 days.     Past Medical History:   Diagnosis Date     Abnormal Pap smear of cervix ~    repeat pap was normal     Allergic rhinitis, cause unspecified      Anxiety state, unspecified     panic episodes     C. difficile colitis      CAD (coronary artery disease)      Congestive heart failure (H)      Depressive disorder      Diabetes (H)      HFrEF (heart failure with reduced ejection fraction) (H)      ICD (implantable cardioverter-defibrillator) in place      Osteopenia      Unspecified essential hypertension        Past Surgical History:   Procedure Laterality Date     BYPASS GRAFT ARTERY CORONARY N/A 2022    Procedure: Median sternotomy.  Intraoperative transesophageal echocardiogram per anesthesia.  Left internal mammary artery harvest.  Right and left endoscopically harvested  greater saphenouse vein.  Cardiopulmonary Bypass.  Coronary Artery Bypass Grafts x4.;  Surgeon: Ulises Desai MD;  Location:  OR     COLONOSCOPY N/A 2023    Procedure: COLONOSCOPY, WITH POLYPECTOMY AND BIOPSY;  Surgeon: Nikhil Lees MD;  Location:  GI     CV CORONARY ANGIOGRAM  2022    Procedure: CV CORONARY ANGIOGRAM;  Surgeon: Huseyin Vogel MD;  Location:  HEART CARDIAC CATH LAB     CV CORONARY ANGIOGRAM  2022    Procedure: ;  Surgeon: Huseyin Vogel MD;  Location:  HEART CARDIAC CATH LAB     EP ICD  INSERT SINGLE N/A 10/04/2022    Procedure: Implantable Cardioverter Defibrillator Device & Lead Implant Single or Dual;  Surgeon: Too Morrow MD;  Location:  HEART CARDIAC CATH LAB     ESOPHAGOSCOPY, GASTROSCOPY, DUODENOSCOPY (EGD), COMBINED N/A 2023    Procedure: ESOPHAGOGASTRODUODENOSCOPY, WITH BIOPSY;  Surgeon: Nikhil Lees MD;  Location:  GI     PICC DOUBLE LUMEN PLACEMENT Right 2022    Failed PICC attempt right arm basilic vein     PICC INSERTION - TRIPLE LUMEN Left 2022    left basilic 5fr tl,picc44 cm       OB History    Para Term  AB Living   2 2 2 0 0 2   SAB IAB Ectopic Multiple Live Births   0 0 0 0 2      # Outcome Date GA Lbr Jairo/2nd Weight Sex Delivery Anes PTL Lv   2 Term         JOANN   1 Term         JOANN       Gynecological History         No LMP recorded. Patient is postmenopausal.     No STD/No PID/No IUD   No abnormal pap smear      see above HPI        Allergies   Allergen Reactions     Effexor [Venlafaxine Hydrochloride]      Tachycardia, makes her extremely jittery--cant sit down, has to keep moving constantly     Latex      Reported by patient       Current Outpatient Medications   Medication Sig Dispense Refill     acetaminophen (TYLENOL) 500 MG tablet Take 1,000 mg by mouth 3 times daily as needed for mild pain       aspirin 81 MG EC tablet Take 81 mg by mouth every evening       atorvastatin (LIPITOR) 80 MG tablet TAKE 1 TABLET DAILY (NEED ANNUAL EXAM) Strength: 80 mg 90 tablet 3     bisacodyl (DULCOLAX) 5 MG EC tablet Take 2 tablets at 3 pm the day before your procedure. If your procedure is before 11 am, take 2 additional tablets at 11 pm. If your procedure is after 11 am, take 2 additional tablets at 6 am. For additional instructions refer to your colonoscopy prep instructions. (Patient not taking: Reported on 3/1/2023) 4 tablet 0     blood glucose (ONETOUCH VERIO IQ) test strip Use to test blood sugar 2 times daily or as directed.  200 strip 1     blood glucose monitoring (ONETOUCH VERIO) meter device kit Use to test blood sugar 2 times daily or as directed. 1 kit 0     empagliflozin (JARDIANCE) 25 MG TABS tablet Take 1 tablet (25 mg) by mouth daily 90 tablet 1     FLUoxetine (PROZAC) 20 MG capsule TAKE 2 CAPSULES DAILY 180 capsule 1     glipiZIDE (GLUCOTROL XL) 5 MG 24 hr tablet TAKE 2 TABLETS DAILY (NEED ANNUAL EXAM) 180 tablet 1     metFORMIN (GLUCOPHAGE XR) 500 MG 24 hr tablet Take 4 tablets (2,000 mg) by mouth daily (with dinner) Please see changed dose 360 tablet 1     metoprolol succinate ER (TOPROL XL) 50 MG 24 hr tablet Take 1.5 tablets (75 mg) by mouth daily (Patient taking differently: Take 75 mg by mouth every morning) 90 tablet 3     MULTIPLE VITAMIN OR TABS Take by mouth every morning       Multiple Vitamins-Minerals (PRESERVISION AREDS PO) Take 2 tablets by mouth daily       omeprazole (PRILOSEC) 40 MG DR capsule Take 1 capsule (40 mg) by mouth 2 times daily (before meals) for 60 days 120 capsule 0     polyethylene glycol (GOLYTELY) 236 g suspension The night before the exam at 6 pm drink an 8-ounce glass every 15 minutes until the jug is half empty. If you arrive before 11 AM: Drink the other half of the Golytely jug at 11 PM night before procedure. If you arrive after 11 AM: Drink the other half of the Golytely jug at 6 AM day of procedure. For additional instructions refer to your colonoscopy prep instructions. (Patient not taking: Reported on 3/1/2023) 4000 mL 0     sacubitril-valsartan (ENTRESTO) 49-51 MG per tablet Take 1.5 tablets by mouth 2 times daily (Patient not taking: Reported on 3/1/2023) 270 tablet 3     sacubitril-valsartan (ENTRESTO)  MG per tablet Take 1/2 tablet in AM, take 1 tablet in  tablet 3     spironolactone (ALDACTONE) 25 MG tablet Take 0.5 tablets (12.5 mg) by mouth daily (Patient taking differently: Take 12.5 mg by mouth every morning) 30 tablet 1       Social History     Socioeconomic  History     Marital status:      Spouse name: Not on file     Number of children: Not on file     Years of education: Not on file     Highest education level: Not on file   Occupational History     Not on file   Tobacco Use     Smoking status: Never     Smokeless tobacco: Never   Vaping Use     Vaping Use: Never used   Substance and Sexual Activity     Alcohol use: Yes     Comment: 1 drink a week     Drug use: No     Sexual activity: Not Currently     Partners: Male     Birth control/protection: None   Other Topics Concern     Parent/sibling w/ CABG, MI or angioplasty before 65F 55M? No   Social History Narrative     Not on file     Social Determinants of Health     Financial Resource Strain: Not on file   Food Insecurity: Not on file   Transportation Needs: Not on file   Physical Activity: Not on file   Stress: Not on file   Social Connections: Not on file   Intimate Partner Violence: Not on file   Housing Stability: Not on file       Family History   Problem Relation Age of Onset     Diabetes Mother         hypertension, alive at 83     C.A.D. Mother      Coronary Artery Disease Mother      Cancer Father         lung cancer, smoker.   at age 53     Family History Negative Sister      Osteoporosis Sister      Anesthesia Reaction No family hx of      Deep Vein Thrombosis (DVT) No family hx of        Review of Systems:  10 point ROS of systems including Constitutional, Eyes, Respiratory, Cardiovascular, Gastroenterology, Genitourinary, Integumentary, Muscularskeletal, Psychiatric were all negative except for pertinent positives noted in my HPI and in the PMH.        EXAM:  /64 (BP Location: Right arm, Cuff Size: Adult Regular)   Pulse 63   Wt 58.7 kg (129 lb 6.4 oz)   SpO2 95%   BMI 22.76 kg/m    Body mass index is 22.76 kg/m .  General:  WNWD female, NAD  Alert  Oriented x 3  Gait:  Normal  Skin:  Normal skin turgor  HEENT:  NC/AT, EOMI  Neck:  No masses noted, symmetrical  Lungs:  Good  respiratory effort   Abdomen:  Non-tender, non-distended.  Vulva: No external lesions, normal hair distribution, no adenopathy  BUS:  Normal, no masses noted  Urethra:  No hypermobility noted   Urethral meatus:  No masses or lesions seen.  Vagina: Moist, pink, no abnormal discharge, well rugated, no lesions  Cervix: Smooth, pink, no visible lesions  Uterus: Normal size, anteverted, non-tender, mobile  Ovaries: No mass, non-tender, mobile  Perianal: no masses or lesions seen.    Extremities:  No clubbing, cyanosis or edema.         ASSESSMENT:  Post menopausal bleeding (acute, undiagnosed problem)      PLAN:  She has not had the ultrasound yet, so unable to review scans.  The encounter note with Dr. Otoole was reviewed.   I suggest to have additional evaluation with the endometrial biopsy, the SIS ultrasound or the D&C with hysteroscopy. The goals and limitations are reviewed.  She will have the EMB and the ultrasound is in 2 days.  I have asked that she leave a message for me to look at the results or to have the results sent to me also.    Questions seem to be answered.   Total time preparing to see patient with reviewing prior encounter and labs, face to face time,  and coordinating care on the same calendar date:  30 minutes, with additional time for the procedure below.     Joaquin Vazquez MD      PROCEDURE NOTE:  The procedure was reviewed with patient.  After consenting to the procedure she was placed into the dorsal lithotomy position.  The examination was performed.  The speculum was placed into the vagina.  The cervix was prepped with Betadine.  The anterior lip of the cervix was grasped with the Allis tenaculum.  The cervical os finder was used.  The uterus was sounded to 6.5 cm.  The Pipelle was used to sample the endometrium.  3 passes were performed.  Minimal amount of tissue/sample obtained.   Instruments were removed and the specimen was sent to pathology.  Results to the patient in 1-2 weeks when  they are returned.      Joaquin Vazquez MD

## 2023-03-16 LAB
PATH REPORT.COMMENTS IMP SPEC: NORMAL
PATH REPORT.FINAL DX SPEC: NORMAL
PATH REPORT.GROSS SPEC: NORMAL
PATH REPORT.MICROSCOPIC SPEC OTHER STN: NORMAL
PATH REPORT.RELEVANT HX SPEC: NORMAL
PHOTO IMAGE: NORMAL

## 2023-04-11 ENCOUNTER — ANCILLARY PROCEDURE (OUTPATIENT)
Dept: CARDIOLOGY | Facility: CLINIC | Age: 68
End: 2023-04-11
Attending: INTERNAL MEDICINE
Payer: COMMERCIAL

## 2023-04-11 PROCEDURE — 93295 DEV INTERROG REMOTE 1/2/MLT: CPT | Performed by: INTERNAL MEDICINE

## 2023-04-11 PROCEDURE — 93296 REM INTERROG EVL PM/IDS: CPT

## 2023-04-13 ENCOUNTER — MYC MEDICAL ADVICE (OUTPATIENT)
Dept: FAMILY MEDICINE | Facility: CLINIC | Age: 68
End: 2023-04-13
Payer: COMMERCIAL

## 2023-04-13 DIAGNOSIS — E16.2 HYPOGLYCEMIA: ICD-10-CM

## 2023-04-13 DIAGNOSIS — E11.59 TYPE 2 DIABETES MELLITUS WITH OTHER CIRCULATORY COMPLICATION, WITHOUT LONG-TERM CURRENT USE OF INSULIN (H): Primary | ICD-10-CM

## 2023-04-14 ENCOUNTER — TELEPHONE (OUTPATIENT)
Dept: FAMILY MEDICINE | Facility: CLINIC | Age: 68
End: 2023-04-14
Payer: COMMERCIAL

## 2023-04-14 RX ORDER — FLASH GLUCOSE SENSOR
KIT MISCELLANEOUS
Qty: 2 EACH | Refills: 5 | Status: SHIPPED | OUTPATIENT
Start: 2023-04-14 | End: 2023-04-26

## 2023-04-14 RX ORDER — GLIPIZIDE 2.5 MG/1
7.5 TABLET, EXTENDED RELEASE ORAL DAILY
Qty: 90 TABLET | Refills: 3 | Status: SHIPPED | OUTPATIENT
Start: 2023-04-14 | End: 2023-12-22

## 2023-04-14 NOTE — TELEPHONE ENCOUNTER
Prior Authorization Retail Medication Request    Medication/Dose:Continuous Blood Gluc Sensor (FREESTYLE VANESSA 14 DAY SENSOR) MISC  ICD code (if different than what is on RX): E11.59, E16.2      Insurance Name:  [employer]ARE   Insurance ID:  [id number]       Pharmacy Information (if different than what is on RX)  [facility]  Phone:  [phone]

## 2023-04-14 NOTE — TELEPHONE ENCOUNTER
Please see [employer] message- patient looking to get CGM. States that her blood glucose can get low at times (patient is on Metformin and Glipizide). Patient states insurance does not cover and is looking for recommendations to get OTC. Please advise on CGM and blood glucose readings.    Thanks,  GONSALO Prieto RN  [facility]

## 2023-04-14 NOTE — TELEPHONE ENCOUNTER
Please call Pt  Her 3 month average Blood sugar was high  However if she has Hypoglycemia -I recommend that she decrease Glipizide to 7.5 mg with food--it will be the 2.5 mg tablets  Take bed Time snack  Do not miss meals  I can prescribe cgm but Insurance may not cover   If she still has Hypoglycemia  Follow up   You need to eat if you are taking Diabetic medicines  Please send Handout on Hypoglycemia  Always carry sugar cubes or drink orange juice if Hypoglycemic

## 2023-04-19 NOTE — TELEPHONE ENCOUNTER
[employer] message sent to pt with provider's response. Information regarding hypoglycemia was sent.    Janie Carroll RN  [facility]

## 2023-04-19 NOTE — TELEPHONE ENCOUNTER
Central Prior Authorization Team   Phone: [phone]      PA Initiation    Medication: Continuous Blood Gluc Sensor (FREESTYLE VANESSA 14 DAY SENSOR) MISC - [place] INITIATED  Insurance Company: [employer] - Phone [phone] Fax [phone]  Pharmacy Filling the Rx: [id number] [place], [place] - [address]  Filling Pharmacy Phone: [phone]  Filling Pharmacy Fax:    Start Date: 4/19/2023

## 2023-04-19 NOTE — TELEPHONE ENCOUNTER
Prior Authorization Approval    Authorization Effective Date: 3/20/2023  Authorization Expiration Date: 4/18/2026  Medication: Continuous Blood Gluc Sensor (FREESTYLE VANESSA 14 DAY SENSOR) [facility] [place] APPROVED  Insurance Company: [employer] - Phone [phone] Fax [phone]  Which Pharmacy is filling the prescription (Not needed for infusion/clinic administered): [id number] [place], [place] - [address]  Pharmacy Notified: Yes  Patient Notified: Yes (pharmacy will notify patient when ready)

## 2023-04-20 DIAGNOSIS — I50.20 HFREF (HEART FAILURE WITH REDUCED EJECTION FRACTION) (H): ICD-10-CM

## 2023-04-20 NOTE — TELEPHONE ENCOUNTER
Pending Prescriptions:                       Disp   Refills    spironolactone (ALDACTONE) 25 MG tablet   30 tab*1            Sig: Take 0.5 tablets (12.5 mg) by mouth daily    Last visit: 2/9/23    Last refill: 2/17/23    Req received: 4/13/23    Quantity: 30    Received a note from [facility] stating they sent the refill 7 days ago and would like a response florencio JACKSON, CVF

## 2023-04-21 RX ORDER — SPIRONOLACTONE 25 MG/1
12.5 TABLET ORAL DAILY
Qty: 45 TABLET | Refills: 0 | Status: SHIPPED | OUTPATIENT
Start: 2023-04-21 | End: 2023-11-29

## 2023-04-26 DIAGNOSIS — E11.59 TYPE 2 DIABETES MELLITUS WITH OTHER CIRCULATORY COMPLICATION, WITHOUT LONG-TERM CURRENT USE OF INSULIN (H): ICD-10-CM

## 2023-04-26 DIAGNOSIS — E16.2 HYPOGLYCEMIA: ICD-10-CM

## 2023-04-26 RX ORDER — FLASH GLUCOSE SENSOR
KIT MISCELLANEOUS
Qty: 2 EACH | Refills: 5 | Status: SHIPPED | OUTPATIENT
Start: 2023-04-26 | End: 2023-11-15

## 2023-04-26 NOTE — TELEPHONE ENCOUNTER
Pending Prescriptions:                       Disp   Refills    Continuous Blood Gluc Sensor (FREESTYLE L*2 each 5            Sig: Change every 14 days.

## 2023-04-27 NOTE — PATIENT INSTRUCTIONS
PLAN:  Keep a blood glucose record for next visit.  Repeat A1C this month   AVS via [employer] Keyboard   Render Risk Assessment In Note?: no Other (Free Text): No charge cryotherapy Detail Level: Simple Note Text (......Xxx Chief Complaint.): This diagnosis correlates with the

## 2023-05-01 ENCOUNTER — DOCUMENTATION ONLY (OUTPATIENT)
Dept: LAB | Facility: CLINIC | Age: 68
End: 2023-05-01
Payer: COMMERCIAL

## 2023-05-01 DIAGNOSIS — I50.20 HFREF (HEART FAILURE WITH REDUCED EJECTION FRACTION) (H): Primary | ICD-10-CM

## 2023-05-01 NOTE — PROGRESS NOTES
Marianne GAMA Newsomeon has an upcoming lab appointment     Future Appointments   Date Time Provider Department Center   5/9/2023  8:00 AM FK LAB [facility]   5/11/2023  8:00 AM Belinda Colon APRN CNP [facility]

## 2023-05-02 ENCOUNTER — PATIENT OUTREACH (OUTPATIENT)
Dept: CARE COORDINATION | Facility: CLINIC | Age: 68
End: 2023-05-02
Payer: COMMERCIAL

## 2023-05-02 NOTE — TELEPHONE ENCOUNTER
Clinical Product Navigator RN reviewed chart; patient on payer product coverage.  Review results:     Patient identified by health plan as high utilization. Per chart review, patient was hospitalized at [facility] 5/18/22-6/4/22 for paroxysmal ventricular tachycardia and CABG. Since that time, patient has been following with CORE clinic and had several Cardiology follow up appointments. Patient subsequently had an ICD implanted last fall and has been following up for that device. 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 Patient seen by MTM November 2022. Patient completed AWV in March 2023. CPN RN CC attempted outreach call to patient to identify any areas of need.    Clinical Data: Care Coordinator Outreach  Outreach attempted x 1.  Left message on patient's voicemail with call back information and requested return call.  Plan: CPN Care Coordinator will try to reach patient again in 1-2 business days.    Cherie Saavedra RN CC  Casual Clinical Product Navigator Coverage

## 2023-05-09 ENCOUNTER — PATIENT OUTREACH (OUTPATIENT)
Dept: CARE COORDINATION | Facility: CLINIC | Age: 68
End: 2023-05-09

## 2023-05-09 ENCOUNTER — LAB (OUTPATIENT)
Dept: LAB | Facility: CLINIC | Age: 68
End: 2023-05-09
Payer: COMMERCIAL

## 2023-05-09 DIAGNOSIS — E11.59 TYPE 2 DIABETES MELLITUS WITH OTHER CIRCULATORY COMPLICATION, WITHOUT LONG-TERM CURRENT USE OF INSULIN (H): ICD-10-CM

## 2023-05-09 DIAGNOSIS — E16.2 HYPOGLYCEMIA: ICD-10-CM

## 2023-05-09 DIAGNOSIS — I50.20 HFREF (HEART FAILURE WITH REDUCED EJECTION FRACTION) (H): ICD-10-CM

## 2023-05-09 LAB
ANION GAP SERPL CALCULATED.3IONS-SCNC: 10 MMOL/L (ref 7–15)
BUN SERPL-MCNC: 18.7 MG/DL (ref 8–23)
CALCIUM SERPL-MCNC: 9.5 MG/DL (ref 8.8–10.2)
CHLORIDE SERPL-SCNC: 103 MMOL/L (ref 98–107)
CREAT SERPL-MCNC: 0.89 MG/DL (ref 0.51–0.95)
DEPRECATED HCO3 PLAS-SCNC: 27 MMOL/L (ref 22–29)
GFR SERPL CREATININE-BSD FRML MDRD: 70 ML/MIN/1.73M2
GLUCOSE SERPL-MCNC: 152 MG/DL (ref 70–99)
POTASSIUM SERPL-SCNC: 4.8 MMOL/L (ref 3.4–5.3)
SODIUM SERPL-SCNC: 140 MMOL/L (ref 136–145)

## 2023-05-09 PROCEDURE — 80048 BASIC METABOLIC PNL TOTAL CA: CPT

## 2023-05-09 PROCEDURE — 36415 COLL VENOUS BLD VENIPUNCTURE: CPT

## 2023-05-09 RX ORDER — FLASH GLUCOSE SENSOR
KIT MISCELLANEOUS
Qty: 2 EACH | Refills: 5 | OUTPATIENT
Start: 2023-05-09

## 2023-05-09 NOTE — PROGRESS NOTES
Clinical Product Navigator RN reviewed chart; patient on payer product coverage.  Review results:   CPN Initial Information Gathering  Referral Source: Health Plan    Patient identified by health plan as high utilization. Per chart review, patient was hospitalized at [facility] 5/18/22-6/4/22 for paroxysmal ventricular tachycardia and CABG. Since that time, patient has been following with CORE clinic and had several Cardiology follow up appointments. Patient subsequently had an ICD implanted last fall and has been following up for that device. 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 Patient seen by MTM November 2022. Patient completed AWV in March 2023.    Patient reported she was working at time of outreach.  Patient states she has no needs from RN Clinical Product Navigator.  Patient states her providers are helpful if she needs assistance and she feels she is doing well with her health.  Patient receptive to RN Clinical Product Navigator letter for future reference if needs arise.    Melissa Behl BSN, RN, PHN, CCM  RN Clinical Product Navigator  Ph: [phone]

## 2023-05-10 NOTE — TELEPHONE ENCOUNTER
-Restrict your Sodium intake to less than 2000 mg/day  For information on how to check BP and how to monitor Sodium/Salt intake you can check out  [email or web address]    -If you feel more fatigued, have shortness of breath, chest pain, get puffy, appetite goes down, have dry heaves, nausea, vomiting, confusion, sleep more during the day, start itching, seek Medical care. These are the symptoms for severe kidney problem.     - F/up with PCP and Urology. If you change your mind and want to do dialysis, let us know asap as arrangements will need to be made for access placement.     -Patients who choose not to do dialysis, usually choose comfort measures to continue living comfortably.   Reviewed and agree. Thanks.

## 2023-05-11 ENCOUNTER — OFFICE VISIT (OUTPATIENT)
Dept: CARDIOLOGY | Facility: CLINIC | Age: 68
End: 2023-05-11
Payer: COMMERCIAL

## 2023-05-11 VITALS
BODY MASS INDEX: 22.02 KG/M2 | DIASTOLIC BLOOD PRESSURE: 56 MMHG | WEIGHT: 125.2 LBS | HEART RATE: 69 BPM | SYSTOLIC BLOOD PRESSURE: 101 MMHG | OXYGEN SATURATION: 95 %

## 2023-05-11 DIAGNOSIS — Z95.1 S/P CABG (CORONARY ARTERY BYPASS GRAFT): ICD-10-CM

## 2023-05-11 DIAGNOSIS — E11.59 TYPE 2 DIABETES MELLITUS WITH OTHER CIRCULATORY COMPLICATION, WITHOUT LONG-TERM CURRENT USE OF INSULIN (H): ICD-10-CM

## 2023-05-11 DIAGNOSIS — I25.5 ISCHEMIC CARDIOMYOPATHY: ICD-10-CM

## 2023-05-11 DIAGNOSIS — I50.20 HFREF (HEART FAILURE WITH REDUCED EJECTION FRACTION) (H): Primary | ICD-10-CM

## 2023-05-11 DIAGNOSIS — I10 HYPERTENSION, UNSPECIFIED TYPE: ICD-10-CM

## 2023-05-11 PROCEDURE — 99214 OFFICE O/P EST MOD 30 MIN: CPT | Performed by: NURSE PRACTITIONER

## 2023-05-11 NOTE — PATIENT INSTRUCTIONS
Take your medicines every day, as directed    Changes made today:  none     Monitor Your Weight and Symptoms    Contact us if you:    Gain 2 pounds in one day or 5 pounds in one week  Feel more short of breath  Notice more leg swelling  Feel lightheadeded   Change your lifestyle    Limit Salt or Sodium:  2000 mg  Limit Fluids:  2000 mL or approximately 64 ounces  Eat a Heart Healthy Diet  Low in saturated fats  Stay Active:  Aim to move at least 150 minutes every  week         To Contact us    During Business Hours:  [phone], option # 1      After hours, weekends or holidays:   [phone], Option #4  Ask to speak to the On-Call Cardiologist. Inform them you are a CORE/heart failure patient at the [facility].     Use [employer] allows you to communicate directly with your heart team through secure messaging.  [employer] can be accessed any time on your phone, computer, or tablet.  If you need assistance, we'd be happy to help!         Keep your Heart Appointments:      CORE in 4      Please consider attending our virtual support group which is held monthly. Please reach out to Lloyd at [phone] for more information if you are interested in attending.       2023 dates:    Monday, May 1st, 1-2pm (Topic: CORE clinic)  Monday, June 5th, 1-2pm (Topic: Exercise and Cardiac Rehab: An Essential Tool in Managing Heart Failure)  Monday, July 3rd, 1-2pm  Monday, August 7th, 1-2pm  Monday, September 11th, 1-2pm  Monday, October 2nd, 1-2pm  Monday, November 6th, 1-2pm  Monday, December 4th, 1-2pm

## 2023-05-11 NOTE — LETTER
5/11/2023      RE: Marianne Monroy  [address]  [facility] [address]       Dear Colleague,    Thank you for the opportunity to participate in the care of your patient, Marianne Monroy, at the [facility] at [facility]. Please see a copy of my visit note below.        HPI: Marianne is a 68 year old White female with a past medical history of T2DM, HTN, GERD, JUSTUS, MDD and recent episode of lightheadedness and dizziness on 4/30    Admission 5/18-6/4 - 5/24/2022.  Surgeon: Dr. Ulises Desai  1.  Coronary artery bypass grafting x 4 (left internal mammary artery to left anterior descending artery, saphenous vein graft to distal right coronary artery, saphenous vein graft to ramus intermedius artery, saphenous vein graft to obtuse marginal artery).  2.  Endoscopic vein harvest.    Patient has no SOB at rest.  She has some JEFFERSON    She is now working up her strength. No swelling in the legs today. She has been taking the 97/103 mg BID of Entresto for a few weeks.  101/56 HR 69- . She has some lightheadedness which has been ongoing.  She has experienced some breathlessness when walking up an incline.  Weight at home 123 lbs ( stable). She is working on eating more.  She joined [employer] 3-4 times a week. She also walks in the park if she can't make it to the club.    Denies SOB,PND, orthopnea, edema, weight gain, chest pain, palpitations, lightheadedness, dizziness, near syncopal/syncopal episodes. Marianne has been following salt and fluid restrictions.      PAST MEDICAL HISTORY:  Past Medical History:   Diagnosis Date    Abnormal Pap smear of cervix ~2000    repeat pap was normal    Allergic rhinitis, cause unspecified     Anxiety state, unspecified     panic episodes    C. difficile colitis     CAD (coronary artery disease)     Congestive heart failure (H)     Depressive disorder     Diabetes (H)     HFrEF (heart failure with reduced  ejection fraction) (H)     ICD (implantable cardioverter-defibrillator) in place     Osteopenia     Unspecified essential hypertension        FAMILY HISTORY:  Family History   Problem Relation Age of Onset    Diabetes Mother         hypertension, alive at 83    C.A.D. Mother     Coronary Artery Disease Mother     Cancer Father         lung cancer, smoker.   at age 53    Family History Negative Sister     Osteoporosis Sister     Anesthesia Reaction No family hx of     Deep Vein Thrombosis (DVT) No family hx of        SOCIAL HISTORY:  Social History     Tobacco Use    Smoking status: Never    Smokeless tobacco: Never   Vaping Use    Vaping status: Never Used     Passive vaping exposure: Yes   Substance Use Topics    Alcohol use: Yes     Comment: 1 drink a week    Drug use: No           CURRENT MEDICATIONS:  Current Outpatient Medications   Medication Sig Dispense Refill    acetaminophen (TYLENOL) 500 MG tablet Take 1,000 mg by mouth 3 times daily as needed for mild pain      aspirin 81 MG EC tablet Take 81 mg by mouth every evening      atorvastatin (LIPITOR) 80 MG tablet TAKE 1 TABLET DAILY (NEED ANNUAL EXAM) Strength: 80 mg 90 tablet 3    bisacodyl (DULCOLAX) 5 MG EC tablet Take 2 tablets at 3 pm the day before your procedure. If your procedure is before 11 am, take 2 additional tablets at 11 pm. If your procedure is after 11 am, take 2 additional tablets at 6 am. For additional instructions refer to your colonoscopy prep instructions. (Patient not taking: Reported on 3/1/2023) 4 tablet 0    blood glucose (ONETOUCH VERIO IQ) test strip Use to test blood sugar 2 times daily or as directed. 200 strip 1    blood glucose monitoring (ONETOUCH VERIO) meter device kit Use to test blood sugar 2 times daily or as directed. 1 kit 0    Continuous Blood Gluc Sensor (FREESTYLE VANESSA 14 DAY SENSOR) MISC Change every 14 days. 2 each 5    empagliflozin (JARDIANCE) 25 MG TABS tablet Take 1 tablet (25 mg) by mouth daily 90 tablet  1    FLUoxetine (PROZAC) 20 MG capsule TAKE 2 CAPSULES DAILY 180 capsule 1    glipiZIDE (GLUCOTROL XL) 2.5 MG 24 hr tablet Take 3 tablets (7.5 mg) by mouth daily Stop 5 mg tablet 90 tablet 3    glipiZIDE (GLUCOTROL XL) 5 MG 24 hr tablet TAKE 2 TABLETS DAILY (NEED ANNUAL EXAM) 180 tablet 1    metFORMIN (GLUCOPHAGE XR) 500 MG 24 hr tablet Take 4 tablets (2,000 mg) by mouth daily (with dinner) Please see changed dose 360 tablet 1    metoprolol succinate ER (TOPROL XL) 50 MG 24 hr tablet Take 1.5 tablets (75 mg) by mouth daily (Patient taking differently: Take 75 mg by mouth every morning) 90 tablet 3    MULTIPLE VITAMIN OR TABS Take by mouth every morning      Multiple Vitamins-Minerals (PRESERVISION AREDS PO) Take 2 tablets by mouth daily      polyethylene glycol (GOLYTELY) 236 g suspension The night before the exam at 6 pm drink an 8-ounce glass every 15 minutes until the jug is half empty. If you arrive before 11 AM: Drink the other half of the Golytely jug at 11 PM night before procedure. If you arrive after 11 AM: Drink the other half of the Golytely jug at 6 AM day of procedure. For additional instructions refer to your colonoscopy prep instructions. (Patient not taking: Reported on 3/1/2023) 4000 mL 0    sacubitril-valsartan (ENTRESTO) 49-51 MG per tablet Take 1.5 tablets by mouth 2 times daily (Patient not taking: Reported on 3/1/2023) 270 tablet 3    sacubitril-valsartan (ENTRESTO)  MG per tablet Take 1/2 tablet in AM, take 1 tablet in  tablet 3    spironolactone (ALDACTONE) 25 MG tablet Take 0.5 tablets (12.5 mg) by mouth daily 45 tablet 0           ROS:   Constitutional: No fever, chills, or sweats.  ENT: No visual disturbance, ear ache, epistaxis, sore throat.   Allergies/Immunologic: Negative  Respiratory: No cough, hemoptysis.   Cardiovascular: As per HPI.   GI: As per HPI.   : No urinary frequency, dysuria, or hematuria.   Integument: Negative.   Psychiatric: Negative.   Neuro: Negative.    Endocrinology: negative   Musculoskeletal: negative    EXAM:   General: alert, articulate, and in no acute distress.  HEENT: normocephalic, atraumatic, anicteric sclera, EOMI, mucosa moist, no cyanosis.   Neck: neck supple.  No adenopathy, masses, or carotid bruits.  JVP flat at 90 degrees  Heart: regular rhythm, normal S1/S2, no murmur, gallop, rub.  Precordium quiet with normal PMI.     Lungs: clear, no rales, ronchi, or wheezing.  No accessory muscle use, respirations unlabored.   Abdomen: soft, non-tender, bowel sounds present, no hepatomegaly  Extremities: no pitting edema.   No cyanosis.   Neurological: alert and oriented x 3.  normal speech and affect, no gross motor deficits  Skin:  No ecchymoses, rashes, or clubbing.    Labs:  CBC RESULTS:  Lab Results   Component Value Date    WBC 5.8 03/01/2023    WBC 7.1 09/09/2020    RBC 4.45 03/01/2023    RBC 4.57 09/09/2020    HGB 12.6 03/01/2023    HGB 13.3 09/09/2020    HCT 39.6 03/01/2023    HCT 41.1 09/09/2020    MCV 89 03/01/2023    MCV 90 09/09/2020    MCH 28.3 03/01/2023    MCH 29.1 09/09/2020    MCHC 31.8 03/01/2023    MCHC 32.4 09/09/2020    RDW 16.0 (H) 03/01/2023    RDW 12.5 09/09/2020     03/01/2023     09/09/2020       CMP RESULTS:  Lab Results   Component Value Date     05/09/2023     01/07/2021    POTASSIUM 4.8 05/09/2023    POTASSIUM 5.0 02/07/2023    POTASSIUM 4.8 01/07/2021    CHLORIDE 103 05/09/2023    CHLORIDE 108 02/07/2023    CHLORIDE 106 01/07/2021    CO2 27 05/09/2023    CO2 29 02/07/2023    CO2 28 01/07/2021    ANIONGAP 10 05/09/2023    ANIONGAP 3 02/07/2023    ANIONGAP 7 01/07/2021     (H) 05/09/2023     (H) 02/16/2023     (H) 02/07/2023     (H) 01/07/2021    BUN 18.7 05/09/2023    BUN 25 02/07/2023    BUN 19 01/07/2021    CR 0.89 05/09/2023    CR 0.90 01/07/2021    GFRESTIMATED 70 05/09/2023    GFRESTIMATED 66 01/07/2021    GFRESTBLACK 77 01/07/2021    POONAM 9.5 05/09/2023    POONAM 9.5  01/07/2021    BILITOTAL 0.4 07/06/2022    BILITOTAL 0.4 09/09/2020    ALBUMIN 3.7 07/06/2022    ALBUMIN 3.7 09/09/2020    ALKPHOS 107 07/06/2022    ALKPHOS 90 09/09/2020    ALT 24 07/06/2022    ALT 33 09/09/2020    AST 14 07/06/2022    AST 14 09/09/2020        INR RESULTS:  Lab Results   Component Value Date    INR 1.29 (H) 05/25/2022       No components found for: CK  Lab Results   Component Value Date    MAG 2.0 06/04/2022     Lab Results   Component Value Date    NTBNP 2,714 (H) 06/09/2022     @BRIEFLABR (dig)@    Most recent echocardiogram:  No results found for this or any previous visit (from the past 8760 hour(s)).      Assessment and Plan:    In summary,Marianne  is a 68 year old here for a CORE follow up.   1.  Chronic systolic heart failure secondary to ICM.  Stage C  NYHA Class III  ACEi/ARB: -Entresto decrease to 97/103  mg take one in the pm and 1/2 tab in am  BB: yes-  Metoprolol 75 mg   Aldosterone antagonist: 12.5 mg   SCD prophylaxis: does not meet criteria for implant  Fluid status: euvolemic  Anticoagulation:   Antiplatelet:  ASA dose   Sleep apnea:  NSAID use:  Contraindicated.  Avoid use.  Remote Monitoring:none  SGLT 2 - Jardiance 25 mg    2.  Other comordbid conditions:      #T2DM - PCP to manage - glipizide , Jardiance, metformin    #HTN- Entresto and Metoprolol     #GERD- followed by PCP     3.  Follow-up     CORE in 4 months         Belinda MOORE, CNP  [facility]

## 2023-05-11 NOTE — PROGRESS NOTES
HPI: Marianne is a 68 year old White female with a past medical history of T2DM, HTN, GERD, JUSTUS, MDD and recent episode of lightheadedness and dizziness on 4/30    Admission 5/18-6/4 - 5/24/2022.  Surgeon: Dr. Ulises Desai  1.  Coronary artery bypass grafting x 4 (left internal mammary artery to left anterior descending artery, saphenous vein graft to distal right coronary artery, saphenous vein graft to ramus intermedius artery, saphenous vein graft to obtuse marginal artery).  2.  Endoscopic vein harvest.    Patient has no SOB at rest.  She has some JEFFERSON    She is now working up her strength. No swelling in the legs today. She has been taking the 97/103 mg BID of Entresto for a few weeks.  101/56 HR 69- . She has some lightheadedness which has been ongoing.  She has experienced some breathlessness when walking up an incline.  Weight at home 123 lbs ( stable). She is working on eating more.  She joined [employer] 3-4 times a week. She also walks in the park if she can't make it to the club.    Denies SOB,PND, orthopnea, edema, weight gain, chest pain, palpitations, lightheadedness, dizziness, near syncopal/syncopal episodes. Marianne has been following salt and fluid restrictions.      PAST MEDICAL HISTORY:  Past Medical History:   Diagnosis Date     Abnormal Pap smear of cervix ~2000    repeat pap was normal     Allergic rhinitis, cause unspecified      Anxiety state, unspecified     panic episodes     C. difficile colitis      CAD (coronary artery disease)      Congestive heart failure (H)      Depressive disorder      Diabetes (H)      HFrEF (heart failure with reduced ejection fraction) (H)      ICD (implantable cardioverter-defibrillator) in place      Osteopenia      Unspecified essential hypertension        FAMILY HISTORY:  Family History   Problem Relation Age of Onset     Diabetes Mother         hypertension, alive at 83     C.A.D. Mother      Coronary Artery Disease Mother      Cancer Father          lung cancer, smoker.   at age 53     Family History Negative Sister      Osteoporosis Sister      Anesthesia Reaction No family hx of      Deep Vein Thrombosis (DVT) No family hx of        SOCIAL HISTORY:  Social History     Tobacco Use     Smoking status: Never     Smokeless tobacco: Never   Vaping Use     Vaping status: Never Used     Passive vaping exposure: Yes   Substance Use Topics     Alcohol use: Yes     Comment: 1 drink a week     Drug use: No           CURRENT MEDICATIONS:  Current Outpatient Medications   Medication Sig Dispense Refill     acetaminophen (TYLENOL) 500 MG tablet Take 1,000 mg by mouth 3 times daily as needed for mild pain       aspirin 81 MG EC tablet Take 81 mg by mouth every evening       atorvastatin (LIPITOR) 80 MG tablet TAKE 1 TABLET DAILY (NEED ANNUAL EXAM) Strength: 80 mg 90 tablet 3     bisacodyl (DULCOLAX) 5 MG EC tablet Take 2 tablets at 3 pm the day before your procedure. If your procedure is before 11 am, take 2 additional tablets at 11 pm. If your procedure is after 11 am, take 2 additional tablets at 6 am. For additional instructions refer to your colonoscopy prep instructions. (Patient not taking: Reported on 3/1/2023) 4 tablet 0     blood glucose (ONETOUCH VERIO IQ) test strip Use to test blood sugar 2 times daily or as directed. 200 strip 1     blood glucose monitoring (ONETOUCH VERIO) meter device kit Use to test blood sugar 2 times daily or as directed. 1 kit 0     Continuous Blood Gluc Sensor (FREESTYLE VANESSA 14 DAY SENSOR) MISC Change every 14 days. 2 each 5     empagliflozin (JARDIANCE) 25 MG TABS tablet Take 1 tablet (25 mg) by mouth daily 90 tablet 1     FLUoxetine (PROZAC) 20 MG capsule TAKE 2 CAPSULES DAILY 180 capsule 1     glipiZIDE (GLUCOTROL XL) 2.5 MG 24 hr tablet Take 3 tablets (7.5 mg) by mouth daily Stop 5 mg tablet 90 tablet 3     glipiZIDE (GLUCOTROL XL) 5 MG 24 hr tablet TAKE 2 TABLETS DAILY (NEED ANNUAL EXAM) 180 tablet 1     metFORMIN (GLUCOPHAGE  XR) 500 MG 24 hr tablet Take 4 tablets (2,000 mg) by mouth daily (with dinner) Please see changed dose 360 tablet 1     metoprolol succinate ER (TOPROL XL) 50 MG 24 hr tablet Take 1.5 tablets (75 mg) by mouth daily (Patient taking differently: Take 75 mg by mouth every morning) 90 tablet 3     MULTIPLE VITAMIN OR TABS Take by mouth every morning       Multiple Vitamins-Minerals (PRESERVISION AREDS PO) Take 2 tablets by mouth daily       polyethylene glycol (GOLYTELY) 236 g suspension The night before the exam at 6 pm drink an 8-ounce glass every 15 minutes until the jug is half empty. If you arrive before 11 AM: Drink the other half of the Golytely jug at 11 PM night before procedure. If you arrive after 11 AM: Drink the other half of the Golytely jug at 6 AM day of procedure. For additional instructions refer to your colonoscopy prep instructions. (Patient not taking: Reported on 3/1/2023) 4000 mL 0     sacubitril-valsartan (ENTRESTO) 49-51 MG per tablet Take 1.5 tablets by mouth 2 times daily (Patient not taking: Reported on 3/1/2023) 270 tablet 3     sacubitril-valsartan (ENTRESTO)  MG per tablet Take 1/2 tablet in AM, take 1 tablet in  tablet 3     spironolactone (ALDACTONE) 25 MG tablet Take 0.5 tablets (12.5 mg) by mouth daily 45 tablet 0           ROS:   Constitutional: No fever, chills, or sweats.  ENT: No visual disturbance, ear ache, epistaxis, sore throat.   Allergies/Immunologic: Negative  Respiratory: No cough, hemoptysis.   Cardiovascular: As per HPI.   GI: As per HPI.   : No urinary frequency, dysuria, or hematuria.   Integument: Negative.   Psychiatric: Negative.   Neuro: Negative.   Endocrinology: negative   Musculoskeletal: negative    EXAM:   General: alert, articulate, and in no acute distress.  HEENT: normocephalic, atraumatic, anicteric sclera, EOMI, mucosa moist, no cyanosis.   Neck: neck supple.  No adenopathy, masses, or carotid bruits.  JVP flat at 90 degrees  Heart: regular  rhythm, normal S1/S2, no murmur, gallop, rub.  Precordium quiet with normal PMI.     Lungs: clear, no rales, ronchi, or wheezing.  No accessory muscle use, respirations unlabored.   Abdomen: soft, non-tender, bowel sounds present, no hepatomegaly  Extremities: no pitting edema.   No cyanosis.   Neurological: alert and oriented x 3.  normal speech and affect, no gross motor deficits  Skin:  No ecchymoses, rashes, or clubbing.    Labs:  CBC RESULTS:  Lab Results   Component Value Date    WBC 5.8 03/01/2023    WBC 7.1 09/09/2020    RBC 4.45 03/01/2023    RBC 4.57 09/09/2020    HGB 12.6 03/01/2023    HGB 13.3 09/09/2020    HCT 39.6 03/01/2023    HCT 41.1 09/09/2020    MCV 89 03/01/2023    MCV 90 09/09/2020    MCH 28.3 03/01/2023    MCH 29.1 09/09/2020    MCHC 31.8 03/01/2023    MCHC 32.4 09/09/2020    RDW 16.0 (H) 03/01/2023    RDW 12.5 09/09/2020     03/01/2023     09/09/2020       CMP RESULTS:  Lab Results   Component Value Date     05/09/2023     01/07/2021    POTASSIUM 4.8 05/09/2023    POTASSIUM 5.0 02/07/2023    POTASSIUM 4.8 01/07/2021    CHLORIDE 103 05/09/2023    CHLORIDE 108 02/07/2023    CHLORIDE 106 01/07/2021    CO2 27 05/09/2023    CO2 29 02/07/2023    CO2 28 01/07/2021    ANIONGAP 10 05/09/2023    ANIONGAP 3 02/07/2023    ANIONGAP 7 01/07/2021     (H) 05/09/2023     (H) 02/16/2023     (H) 02/07/2023     (H) 01/07/2021    BUN 18.7 05/09/2023    BUN 25 02/07/2023    BUN 19 01/07/2021    CR 0.89 05/09/2023    CR 0.90 01/07/2021    GFRESTIMATED 70 05/09/2023    GFRESTIMATED 66 01/07/2021    GFRESTBLACK 77 01/07/2021    POONAM 9.5 05/09/2023    POONAM 9.5 01/07/2021    BILITOTAL 0.4 07/06/2022    BILITOTAL 0.4 09/09/2020    ALBUMIN 3.7 07/06/2022    ALBUMIN 3.7 09/09/2020    ALKPHOS 107 07/06/2022    ALKPHOS 90 09/09/2020    ALT 24 07/06/2022    ALT 33 09/09/2020    AST 14 07/06/2022    AST 14 09/09/2020        INR RESULTS:  Lab Results   Component Value Date     INR 1.29 (H) 05/25/2022       No components found for: CK  Lab Results   Component Value Date    MAG 2.0 06/04/2022     Lab Results   Component Value Date    NTBNP 2,714 (H) 06/09/2022     @BRIEFLABR (dig)@    Most recent echocardiogram:  No results found for this or any previous visit (from the past 8760 hour(s)).      Assessment and Plan:    In summary,Marianne  is a 68 year old here for a CORE follow up.   1.  Chronic systolic heart failure secondary to ICM.  Stage C  NYHA Class III  ACEi/ARB: -Entresto decrease to 97/103  mg take one in the pm and 1/2 tab in am  BB: yes-  Metoprolol 75 mg   Aldosterone antagonist: 12.5 mg   SCD prophylaxis: does not meet criteria for implant  Fluid status: euvolemic  Anticoagulation:   Antiplatelet:  ASA dose   Sleep apnea:  NSAID use:  Contraindicated.  Avoid use.  Remote Monitoring:none  SGLT 2 - Jardiance 25 mg    2.  Other comordbid conditions:      #T2DM - PCP to manage - glipizide , Jardiance, metformin    #HTN- Entresto and Metoprolol     #GERD- followed by PCP     3.  Follow-up     [facility] in 4 months         Belinda MOORE, CNP  [facility]

## 2023-05-11 NOTE — NURSING NOTE
Labs: Patient was given results of the laboratory testing obtained today. Patient demonstrated understanding of this information and agreed to call with further questions or concerns.     Med Reconcile: Reviewed and verified all current medications with the patient. The updated medication list was printed and given to the patient.    Return Appointment: Patient given instructions regarding scheduling next clinic visit. Patient demonstrated understanding of this information and agreed to call with further questions or concerns. CORE in 4 months.    Patient stated she understood all health information given and agreed to call with further questions or concerns.    Dasia Amin RN

## 2023-05-11 NOTE — NURSING NOTE
"Chief Complaint   Patient presents with     Follow Up     May 11, 2023 - Reason for visit: CORE Return, 67 yo female with systolic heart failure presents for follow up with labs prior.       Initial /56 (BP Location: Left arm, Patient Position: Sitting, Cuff Size: Adult Regular)   Pulse 69   Wt 56.8 kg (125 lb 3.2 oz)   SpO2 95%   BMI 22.02 kg/m   Estimated body mass index is 22.02 kg/m  as calculated from the following:    Height as of 3/1/23: 1.606 m (5' 3.23\").    Weight as of this encounter: 56.8 kg (125 lb 3.2 oz)..  BP completed using cuff size: regular    EDWIN Joseph    "

## 2023-05-12 ENCOUNTER — TELEPHONE (OUTPATIENT)
Dept: PHARMACY | Facility: CLINIC | Age: 68
End: 2023-05-12
Payer: COMMERCIAL

## 2023-05-12 NOTE — TELEPHONE ENCOUNTER
Called to schedule MTM appt (insurance referred and I have seen her in the past), no answer, M    Zeynep Yap, PharmD  Medication Therapy Management Pharmacist  [phone]  [facility]: [phone]

## 2023-06-16 ENCOUNTER — TELEPHONE (OUTPATIENT)
Dept: PHARMACY | Facility: CLINIC | Age: 68
End: 2023-06-16
Payer: COMMERCIAL

## 2023-06-16 NOTE — TELEPHONE ENCOUNTER
Called to schedule MTM appt, no answer, LVM and sent myc.    Zeynep Yap, PharmD  Medication Therapy Management Pharmacist  [phone]  [facility]: [phone]

## 2023-06-21 ENCOUNTER — TELEPHONE (OUTPATIENT)
Dept: PHARMACY | Facility: CLINIC | Age: 68
End: 2023-06-21

## 2023-06-21 NOTE — TELEPHONE ENCOUNTER
MTM referral from: Patient's insurance      MTM referral outreach attempt #3 on June 21, 2023      Outcome: left message    Libia Santoro PharmD  Medication Therapy Management Pharmacist  [facility]

## 2023-07-09 ENCOUNTER — HEALTH MAINTENANCE LETTER (OUTPATIENT)
Age: 68
End: 2023-07-09

## 2023-07-17 ENCOUNTER — MYC MEDICAL ADVICE (OUTPATIENT)
Dept: CARDIOLOGY | Facility: CLINIC | Age: 68
End: 2023-07-17
Payer: COMMERCIAL

## 2023-07-17 DIAGNOSIS — I50.20 HFREF (HEART FAILURE WITH REDUCED EJECTION FRACTION) (H): Primary | ICD-10-CM

## 2023-07-18 ENCOUNTER — TELEPHONE (OUTPATIENT)
Dept: CARDIOLOGY | Facility: CLINIC | Age: 68
End: 2023-07-18
Payer: COMMERCIAL

## 2023-07-18 NOTE — TELEPHONE ENCOUNTER
I called Jody regarding her MyChart message - recommendation to go to ER. LVM asking for a call back to discuss.    Dasia Amin RN

## 2023-07-19 ENCOUNTER — ANCILLARY PROCEDURE (OUTPATIENT)
Dept: CARDIOLOGY | Facility: CLINIC | Age: 68
End: 2023-07-19
Attending: INTERNAL MEDICINE
Payer: COMMERCIAL

## 2023-07-19 ENCOUNTER — CARE COORDINATION (OUTPATIENT)
Dept: CARDIOLOGY | Facility: CLINIC | Age: 68
End: 2023-07-19
Payer: COMMERCIAL

## 2023-07-19 ENCOUNTER — TELEPHONE (OUTPATIENT)
Dept: CARDIOLOGY | Facility: CLINIC | Age: 68
End: 2023-07-19
Payer: COMMERCIAL

## 2023-07-19 DIAGNOSIS — Z95.810 ICD (IMPLANTABLE CARDIOVERTER-DEFIBRILLATOR), SINGLE, IN SITU: ICD-10-CM

## 2023-07-19 PROCEDURE — 93296 REM INTERROG EVL PM/IDS: CPT

## 2023-07-19 PROCEDURE — 93295 DEV INTERROG REMOTE 1/2/MLT: CPT | Performed by: INTERNAL MEDICINE

## 2023-07-19 NOTE — TELEPHONE ENCOUNTER
Spoke with patient appointment date/time confirmed for up-coming echocardiogram, on August 10/2023 at 10:30 AM in [place]. RN reiterated to seek ER for recurrent symptoms and if feeling faint.  Patient agreeable with plan and has no further question at the moment.

## 2023-07-19 NOTE — PROGRESS NOTES
Patient sent a [employer] message inquiring about the May 2023 Med[employer] advisory that she saw on the news last evening. Patient's Medtronic Duson ICD is part of the Medtronic May 2023 Advisory - Increased Potential for Reduced Energy or No Energy Delivered During HV Therapy When Programmed AX>B. All HV therapy pathways programmed B>AX in all therapy zones to minimize the risk. The recommended programming was done at implant on 10/4/22. Patient called at home. Advisory discussed. Patient notified that her ICD is already programmed to the recommended settings.

## 2023-07-22 LAB
MDC_IDC_EPISODE_DTM: NORMAL
MDC_IDC_EPISODE_DURATION: 1 S
MDC_IDC_EPISODE_ID: 1
MDC_IDC_EPISODE_TYPE: NORMAL
MDC_IDC_LEAD_IMPLANT_DT: NORMAL
MDC_IDC_LEAD_LOCATION: NORMAL
MDC_IDC_LEAD_LOCATION_DETAIL_1: NORMAL
MDC_IDC_LEAD_MFG: NORMAL
MDC_IDC_LEAD_MODEL: NORMAL
MDC_IDC_LEAD_POLARITY_TYPE: NORMAL
MDC_IDC_LEAD_SERIAL: NORMAL
MDC_IDC_LEAD_SPECIAL_FUNCTION: NORMAL
MDC_IDC_MSMT_BATTERY_DTM: NORMAL
MDC_IDC_MSMT_BATTERY_REMAINING_LONGEVITY: 162 MO
MDC_IDC_MSMT_BATTERY_RRT_TRIGGER: NORMAL
MDC_IDC_MSMT_BATTERY_VOLTAGE: 3.05 V
MDC_IDC_MSMT_CAP_CHARGE_DTM: NORMAL
MDC_IDC_MSMT_CAP_CHARGE_ENERGY: 18 J
MDC_IDC_MSMT_CAP_CHARGE_TIME: 3.9 S
MDC_IDC_MSMT_CAP_CHARGE_TYPE: NORMAL
MDC_IDC_MSMT_LEADCHNL_RV_IMPEDANCE_VALUE: 285 OHM
MDC_IDC_MSMT_LEADCHNL_RV_IMPEDANCE_VALUE: 380 OHM
MDC_IDC_MSMT_LEADCHNL_RV_PACING_THRESHOLD_AMPLITUDE: 0.5 V
MDC_IDC_MSMT_LEADCHNL_RV_PACING_THRESHOLD_PULSEWIDTH: 0.4 MS
MDC_IDC_MSMT_LEADCHNL_RV_SENSING_INTR_AMPL: 8.3 MV
MDC_IDC_PG_IMPLANT_DTM: NORMAL
MDC_IDC_PG_MFG: NORMAL
MDC_IDC_PG_MODEL: NORMAL
MDC_IDC_PG_SERIAL: NORMAL
MDC_IDC_PG_TYPE: NORMAL
MDC_IDC_SESS_CLINIC_NAME: NORMAL
MDC_IDC_SESS_DTM: NORMAL
MDC_IDC_SESS_TYPE: NORMAL
MDC_IDC_SET_BRADY_HYSTRATE: NORMAL
MDC_IDC_SET_BRADY_LOWRATE: 40 {BEATS}/MIN
MDC_IDC_SET_BRADY_MODE: NORMAL
MDC_IDC_SET_LEADCHNL_RV_PACING_AMPLITUDE: 2 V
MDC_IDC_SET_LEADCHNL_RV_PACING_ANODE_ELECTRODE_1: NORMAL
MDC_IDC_SET_LEADCHNL_RV_PACING_ANODE_LOCATION_1: NORMAL
MDC_IDC_SET_LEADCHNL_RV_PACING_CAPTURE_MODE: NORMAL
MDC_IDC_SET_LEADCHNL_RV_PACING_CATHODE_ELECTRODE_1: NORMAL
MDC_IDC_SET_LEADCHNL_RV_PACING_CATHODE_LOCATION_1: NORMAL
MDC_IDC_SET_LEADCHNL_RV_PACING_POLARITY: NORMAL
MDC_IDC_SET_LEADCHNL_RV_PACING_PULSEWIDTH: 0.4 MS
MDC_IDC_SET_LEADCHNL_RV_SENSING_ANODE_ELECTRODE_1: NORMAL
MDC_IDC_SET_LEADCHNL_RV_SENSING_ANODE_LOCATION_1: NORMAL
MDC_IDC_SET_LEADCHNL_RV_SENSING_CATHODE_ELECTRODE_1: NORMAL
MDC_IDC_SET_LEADCHNL_RV_SENSING_CATHODE_LOCATION_1: NORMAL
MDC_IDC_SET_LEADCHNL_RV_SENSING_POLARITY: NORMAL
MDC_IDC_SET_LEADCHNL_RV_SENSING_SENSITIVITY: 0.3 MV
MDC_IDC_SET_ZONE_DETECTION_BEATS_DENOMINATOR: 40 {BEATS}
MDC_IDC_SET_ZONE_DETECTION_BEATS_NUMERATOR: 30 {BEATS}
MDC_IDC_SET_ZONE_DETECTION_INTERVAL: 270 MS
MDC_IDC_SET_ZONE_DETECTION_INTERVAL: 320 MS
MDC_IDC_SET_ZONE_DETECTION_INTERVAL: 360 MS
MDC_IDC_SET_ZONE_DETECTION_INTERVAL: 360 MS
MDC_IDC_SET_ZONE_TYPE: NORMAL
MDC_IDC_STAT_AT_BURDEN_PERCENT: 0 %
MDC_IDC_STAT_AT_DTM_END: NORMAL
MDC_IDC_STAT_AT_DTM_START: NORMAL
MDC_IDC_STAT_BRADY_DTM_END: NORMAL
MDC_IDC_STAT_BRADY_DTM_START: NORMAL
MDC_IDC_STAT_BRADY_RV_PERCENT_PACED: 0.01 %
MDC_IDC_STAT_CRT_DTM_END: NORMAL
MDC_IDC_STAT_CRT_DTM_START: NORMAL
MDC_IDC_STAT_EPISODE_RECENT_COUNT: 0
MDC_IDC_STAT_EPISODE_RECENT_COUNT: 1
MDC_IDC_STAT_EPISODE_RECENT_COUNT_DTM_END: NORMAL
MDC_IDC_STAT_EPISODE_RECENT_COUNT_DTM_START: NORMAL
MDC_IDC_STAT_EPISODE_TOTAL_COUNT: 0
MDC_IDC_STAT_EPISODE_TOTAL_COUNT: 1
MDC_IDC_STAT_EPISODE_TOTAL_COUNT_DTM_END: NORMAL
MDC_IDC_STAT_EPISODE_TOTAL_COUNT_DTM_START: NORMAL
MDC_IDC_STAT_EPISODE_TYPE: NORMAL
MDC_IDC_STAT_TACHYTHERAPY_ATP_DELIVERED_RECENT: 0
MDC_IDC_STAT_TACHYTHERAPY_ATP_DELIVERED_TOTAL: 0
MDC_IDC_STAT_TACHYTHERAPY_RECENT_DTM_END: NORMAL
MDC_IDC_STAT_TACHYTHERAPY_RECENT_DTM_START: NORMAL
MDC_IDC_STAT_TACHYTHERAPY_SHOCKS_ABORTED_RECENT: 0
MDC_IDC_STAT_TACHYTHERAPY_SHOCKS_ABORTED_TOTAL: 0
MDC_IDC_STAT_TACHYTHERAPY_SHOCKS_DELIVERED_RECENT: 0
MDC_IDC_STAT_TACHYTHERAPY_SHOCKS_DELIVERED_TOTAL: 0
MDC_IDC_STAT_TACHYTHERAPY_TOTAL_DTM_END: NORMAL
MDC_IDC_STAT_TACHYTHERAPY_TOTAL_DTM_START: NORMAL

## 2023-08-10 ENCOUNTER — ANCILLARY PROCEDURE (OUTPATIENT)
Dept: CARDIOLOGY | Facility: CLINIC | Age: 68
End: 2023-08-10
Attending: STUDENT IN AN ORGANIZED HEALTH CARE EDUCATION/TRAINING PROGRAM
Payer: COMMERCIAL

## 2023-08-10 DIAGNOSIS — I50.20 HFREF (HEART FAILURE WITH REDUCED EJECTION FRACTION) (H): ICD-10-CM

## 2023-08-10 LAB
BI-PLANE LVEF ECHO: NORMAL
LVEF ECHO: NORMAL

## 2023-08-10 PROCEDURE — 93306 TTE W/DOPPLER COMPLETE: CPT | Performed by: INTERNAL MEDICINE

## 2023-08-23 ENCOUNTER — OFFICE VISIT (OUTPATIENT)
Dept: CARDIOLOGY | Facility: CLINIC | Age: 68
End: 2023-08-23
Payer: COMMERCIAL

## 2023-08-23 VITALS
WEIGHT: 124 LBS | OXYGEN SATURATION: 97 % | DIASTOLIC BLOOD PRESSURE: 58 MMHG | SYSTOLIC BLOOD PRESSURE: 95 MMHG | BODY MASS INDEX: 21.81 KG/M2 | HEART RATE: 60 BPM

## 2023-08-23 DIAGNOSIS — I50.20 HFREF (HEART FAILURE WITH REDUCED EJECTION FRACTION) (H): Primary | ICD-10-CM

## 2023-08-23 PROCEDURE — 99214 OFFICE O/P EST MOD 30 MIN: CPT | Performed by: STUDENT IN AN ORGANIZED HEALTH CARE EDUCATION/TRAINING PROGRAM

## 2023-08-23 NOTE — NURSING NOTE
"Chief Complaint   Patient presents with    RECHECK     Return general cardiology for 6 month follow up       Initial BP 95/58 (BP Location: Right arm, Patient Position: Chair, Cuff Size: Adult Regular)   Pulse 60   Wt 56.2 kg (124 lb)   SpO2 97%   BMI 21.81 kg/m   Estimated body mass index is 21.81 kg/m  as calculated from the following:    Height as of 3/1/23: 1.606 m (5' 3.23\").    Weight as of this encounter: 56.2 kg (124 lb)..  BP completed using cuff size: regular    DANO Nunez    "

## 2023-08-23 NOTE — PATIENT INSTRUCTIONS
Take your medicines every day, as directed    Changes made today:  No changes to meds  Keep up the great work with the lifestyle measures  and meds     Cardiology Care Coordinators:      Keyla Saul, RN  Krissy Silverman, RN     Kajal Koehler, IKER    Cardiology Rooming staff:  REFUGIO Murphy    Phone  [phone]      Fax [phone]    To Contact us    During Business Hours:  [phone]     If you are needing refills please contact your pharmacy.     For urgent after hour care please call the [facility] at [phone] or the [facility] at [phone] and ask to speak to the cardiologist on call.         HOW TO CHECK YOUR BLOOD PRESSURE AT HOME:    Avoid eating, smoking, and exercising for at least 30 minutes before taking a reading.    Be sure you have taken your BP medication at least 2-3 hours before you check it.     Sit quietly for 10 minutes before a reading.     Sit in a chair with your feet flat on the floor. Rest your  arm on a table so that the arm cuff is at the same level as your heart.    Remain still during the reading.  Record your blood pressure and pulse in a log and bring to your next appointment.     Use [employer] allows you to communicate directly with your heart team through secure messaging.  [employer] can be accessed any time on your phone, computer, or tablet.  If you need assistance, we'd be happy to help!         Keep your Heart Appointments:    Follow up with me in 6 months

## 2023-08-23 NOTE — PROGRESS NOTES
Chief Complaint: Establish general cardiovascular care    [facility] (07/20/2022): Marianne Monroy is a 67 year old female with a past medical history of coronary artery disease (s/p CABG), ischemic cardiomyopathy, and hypertension who was referred to me to establish general cardiovascular care.  She presented today with her niece and brother.    Briefly, Ms. Monroy first came to my attention in the inpatient setting. There, she was admitted to my service in May 2022 with a tachyarrhythmia. She had a TTE and was found to have severe LV dysfunction (LVEF 20-30%) with normal RV function. I subsequently referred her for a coronary angiogram, which revealed severe CAD. This prompted CABG, which was done on 05/24/22. Her post-operative course was notable for PVCs without ventricular tachycardia.  Her discharge LVEF (done on June 1) was 35-40%.  She also had C. Difficile infection shortly after leaving the hospital. Ms. Monroy has been followed in the core clinic, where she has had progressive upward titration of her neurohormonal blockade.  Given her reduced EF and ventricular tachycardia preoperatively, the EP team recommended that she wear her LifeVest until her 3-month follow-up with them.    Today, Ms. Monroy notes that she feel that she is slowly improving after surgery.  The C. difficile infection was back in she is not feel like she has fully recovered in spite of taking 12 out of 14 days of the medication course.  From a cardiovascular standpoint, her chief complaint is lightheadedness, which is primarily postural.  She also notes that she has been having some imbalance, but this is improved by using a cane.  She can walk at least 1/2 mile level ground. Ms. Monroy also notes some hesitation about being fully physically active given her recent major surgery.  At the time of the consultation, she notes an absence of chest pain at rest, dyspnea at rest or with exertion, PND, orthopnea, peripheral edema,  "palpitations, or syncope.  She continues to wear her LifeVest during the day but takes it off to sleep.    A comprehensive ROS was done and the details are included above in the HPI.    Interval History 9/28/22:  Since Ms. Monroy's last visit, she has seen EP and they have decided to proceed with ICD implantation. She has also followed with the CORE clinic, and she has been transitioned to higher doses of metoprolol and sacubutril-valsartan.    From a symptomatic standpoint, Ms. Monroy feels extremely well.  She has really enjoyed cardiac rehab and has signed up to a membership in a regular gym to supplement her cardiac rehab sessions.  She is able to do all the cardiac rehab exercises without any limitation from shortness of breath, angina, lightheadedness, dizziness, or presyncope.  This amounts to approximately 60 minutes of aerobic and resistance activity with brief breaks in between.  She has occasionally been experiencing lightheadedness when she stands up quickly.  She presented today with her niece.    A comprehensive ROS was done and the details are included above in the HPI.    Interval History 1/18/23:  Since Ms. Monroy's last visit, she had her ICD placed. Her medical therapy has been titrated to the point that she is on maximal doses of ARB/ARNI. She presented with Cori today.     From a functional standpoint, Ms. Monroy continues to feel well, with the exception of frequent dizziness.  The dizziness does occur with postural change and if she turns quickly.  However, she is going to the gym multiple times weekly and has not had any presyncope/syncope.  She is also planning multiple trips in the spring, including to [place]. Ms. Monroy does have occasional \"pulling\"at the site of the ICD when she rolls over in bed.    A comprehensive ROS was done and the details are included above in the HPI.    Interval History 8/23/23:    Since Ms. Monroy's last visit, I obtained a CTA to evaluate her dyspnea on " exertion. This showed patent grafts, but her LIMA-LAD was read as being atretic. I reviewed the CTA and did not feel that the LIMA-LAD was atretic.     Ms. Monroy also reached out to me last month to say that she had dizziness and chest pressure. An ICD remote transmission did not yield any arrhythmias and a TTE showed an improvement in her LVEF without any new valvular dysfunction. She did see my colleague, Ms. Colon, afterwards. Her sacubutril-valsartan dose was decreased slightly.     From a symptomatic standpoint, Ms. Chapman notes that her symptoms improved.  She feels that her prior symptoms may have been attributed to extreme humidity that we have been experiencing.  She does not have any ongoing angina or signs of decompensated heart failure.    A comprehensive ROS was done and the details are included above in the HPI.    Past Medical History:  Ischemic cardiomyopathy  Coronary artery disease status post CABG  Hypertension  Type 2 diabetes mellitus, non-insulin-dependent  - No prior history of  dyslipidemia, TIA/stroke, vascular aneurysms, PAD  Past Medical History:   Diagnosis Date    Abnormal Pap smear of cervix ~2000    repeat pap was normal    Allergic rhinitis, cause unspecified     Anxiety state, unspecified     panic episodes    C. difficile colitis     CAD (coronary artery disease)     Congestive heart failure (H)     Depressive disorder     Diabetes (H)     HFrEF (heart failure with reduced ejection fraction) (H)     ICD (implantable cardioverter-defibrillator) in place     Osteopenia     Unspecified essential hypertension        Past Surgical History:  CABG 05/24/22 by Ulises Desai at [facility]: LIMA-LAD, SVG-dRCA, SVG-RI, SVG-OM  Past Surgical History:   Procedure Laterality Date    BYPASS GRAFT ARTERY CORONARY N/A 05/24/2022    Procedure: Median sternotomy.  Intraoperative transesophageal echocardiogram per anesthesia.  Left internal mammary artery harvest.  Right and left endoscopically harvested   greater saphenouse vein.  Cardiopulmonary Bypass.  Coronary Artery Bypass Grafts x4.;  Surgeon: Ulises Desai MD;  Location: [facility] OR    COLONOSCOPY N/A 02/16/2023    Procedure: COLONOSCOPY, WITH POLYPECTOMY AND BIOPSY;  Surgeon: Nikhil Lees MD;  Location:  GI    CV CORONARY ANGIOGRAM  05/19/2022    Procedure: CV CORONARY ANGIOGRAM;  Surgeon: Huseyin Vogel MD;  Location:  HEART CARDIAC CATH LAB    CV CORONARY ANGIOGRAM  05/19/2022    Procedure: ;  Surgeon: Huseyin Vogel MD;  Location: [facility] CARDIAC CATH LAB    EP ICD INSERT SINGLE N/A 10/04/2022    Procedure: Implantable Cardioverter Defibrillator Device & Lead Implant Single or Dual;  Surgeon: Too Morrow MD;  Location: [facility] CARDIAC CATH LAB    ESOPHAGOSCOPY, GASTROSCOPY, DUODENOSCOPY (EGD), COMBINED N/A 02/16/2023    Procedure: ESOPHAGOGASTRODUODENOSCOPY, WITH BIOPSY;  Surgeon: Nikhil Lees MD;  Location:  GI    PICC DOUBLE LUMEN PLACEMENT Right 05/27/2022    Failed PICC attempt right arm basilic vein    PICC INSERTION - TRIPLE LUMEN Left 05/28/2022    left basilic 5fr tl,[id number] cm       Drug History:  Home cardiac meds: Aspirin 81 mg once daily, atorvastatin 80 mg once daily, empagliflozin 25 mg daily,  metoprolol succinate 75 mg daily, sacubitril-valsartan  mg 0.5/1 tablet daily , spironolactone 12.5 mg daily.  Current Outpatient Medications   Medication Sig Dispense Refill    acetaminophen (TYLENOL) 500 MG tablet Take 1,000 mg by mouth 3 times daily as needed for mild pain      aspirin 81 MG EC tablet Take 81 mg by mouth every evening      atorvastatin (LIPITOR) 80 MG tablet TAKE 1 TABLET DAILY (NEED ANNUAL EXAM) Strength: 80 mg 90 tablet 3    empagliflozin (JARDIANCE) 25 MG TABS tablet Take 1 tablet (25 mg) by mouth daily 90 tablet 1    FLUoxetine (PROZAC) 20 MG capsule TAKE 2 CAPSULES DAILY 180 capsule 1    glipiZIDE (GLUCOTROL XL) 2.5 MG 24 hr tablet Take 3 tablets (7.5 mg) by mouth  daily Stop 5 mg tablet 90 tablet 3    glipiZIDE (GLUCOTROL XL) 5 MG 24 hr tablet TAKE 2 TABLETS DAILY (NEED ANNUAL EXAM) 180 tablet 1    metFORMIN (GLUCOPHAGE XR) 500 MG 24 hr tablet Take 4 tablets (2,000 mg) by mouth daily (with dinner) Please see changed dose 360 tablet 1    metoprolol succinate ER (TOPROL XL) 50 MG 24 hr tablet Take 1.5 tablets (75 mg) by mouth daily (Patient taking differently: Take 75 mg by mouth every morning) 90 tablet 3    MULTIPLE VITAMIN OR TABS Take by mouth every morning      Multiple Vitamins-Minerals (PRESERVISION AREDS PO) Take 2 tablets by mouth daily      sacubitril-valsartan (ENTRESTO)  MG per tablet Take 1/2 tablet in AM, take 1 tablet in  tablet 3    spironolactone (ALDACTONE) 25 MG tablet Take 0.5 tablets (12.5 mg) by mouth daily 45 tablet 0    bisacodyl (DULCOLAX) 5 MG EC tablet Take 2 tablets at 3 pm the day before your procedure. If your procedure is before 11 am, take 2 additional tablets at 11 pm. If your procedure is after 11 am, take 2 additional tablets at 6 am. For additional instructions refer to your colonoscopy prep instructions. (Patient not taking: Reported on 3/1/2023) 4 tablet 0    blood glucose (ONETOUCH VERIO IQ) test strip Use to test blood sugar 2 times daily or as directed. 200 strip 1    blood glucose monitoring (ONETOUCH VERIO) meter device kit Use to test blood sugar 2 times daily or as directed. 1 kit 0    Continuous Blood Gluc Sensor (FREESTYLE VANESSA 14 DAY SENSOR) MISC Change every 14 days. 2 each 5    polyethylene glycol (GOLYTELY) 236 g suspension The night before the exam at 6 pm drink an 8-ounce glass every 15 minutes until the jug is half empty. If you arrive before 11 AM: Drink the other half of the Golytely jug at 11 PM night before procedure. If you arrive after 11 AM: Drink the other half of the Golytely jug at 6 AM day of procedure. For additional instructions refer to your colonoscopy prep instructions. (Patient not taking:  Reported on 2023) 4000 mL 0         Family History:    Family History   Problem Relation Age of Onset    Diabetes Mother         hypertension, alive at 83    C.A.D. Mother     Coronary Artery Disease Mother     Cancer Father         lung cancer, smoker.   at age 53    Family History Negative Sister     Osteoporosis Sister     Anesthesia Reaction No family hx of     Deep Vein Thrombosis (DVT) No family hx of        Social History:    Social History     Tobacco Use    Smoking status: Never    Smokeless tobacco: Never   Substance Use Topics    Alcohol use: Yes     Comment: 1 drink a week       Allergies   Allergen Reactions    Effexor [Venlafaxine Hydrochloride]      Tachycardia, makes her extremely jittery--cant sit down, has to keep moving constantly    Latex      Reported by patient         Physical Examination:  Vitals: BP 95/58 (BP Location: Right arm, Patient Position: Chair, Cuff Size: Adult Regular)   Pulse 60   Wt 56.2 kg (124 lb)   SpO2 97%   BMI 21.81 kg/m    BMI= Body mass index is 21.81 kg/m .    GENERAL: Healthy, alert and no distress  Eyes: No xanthelasmas.  NECK: No palpable neck masses/goitre and no evidence of retrosternal goitre.   ENT: Moist mucosal membranes.  RESPIRATORY: No signs of resp distress. Lungs CTAB.  CARDIOVASCULAR: ICD site appears healthy.  Sternotomy scar appears to be healing well without any drainage or open areas in the wound.  No JVD, regular, normal S1+S2 without added sounds or murmurs.  EXTREMITIES: Warm, well-perfused, no edema.   NEUROLOGY: GCS 15/15, no focal deficits.  SKIN: No ecchymoses, no rashes.  PSYCH: Cooperative, pleasant affect.       Investigations:  I personally viewed and interpreted the following investigations:    Labs:    CBC RESULTS:  Lab Results   Component Value Date    WBC 5.8 2023    WBC 7.1 2020    RBC 4.45 2023    RBC 4.57 2020    HGB 12.6 2023    HGB 13.3 2020    HCT 39.6 2023    HCT 41.1  09/09/2020    MCV 89 03/01/2023    MCV 90 09/09/2020    MCH 28.3 03/01/2023    MCH 29.1 09/09/2020    MCHC 31.8 03/01/2023    MCHC 32.4 09/09/2020    RDW 16.0 (H) 03/01/2023    RDW 12.5 09/09/2020     03/01/2023     09/09/2020       CMP RESULTS:  Lab Results   Component Value Date     05/09/2023     01/07/2021    POTASSIUM 4.8 05/09/2023    POTASSIUM 5.0 02/07/2023    POTASSIUM 4.8 01/07/2021    CHLORIDE 103 05/09/2023    CHLORIDE 108 02/07/2023    CHLORIDE 106 01/07/2021    CO2 27 05/09/2023    CO2 29 02/07/2023    CO2 28 01/07/2021    ANIONGAP 10 05/09/2023    ANIONGAP 3 02/07/2023    ANIONGAP 7 01/07/2021     (H) 05/09/2023     (H) 02/16/2023     (H) 02/07/2023     (H) 01/07/2021    BUN 18.7 05/09/2023    BUN 25 02/07/2023    BUN 19 01/07/2021    CR 0.89 05/09/2023    CR 0.90 01/07/2021    GFRESTIMATED 70 05/09/2023    GFRESTIMATED 66 01/07/2021    GFRESTBLACK 77 01/07/2021    POONAM 9.5 05/09/2023    POONAM 9.5 01/07/2021    BILITOTAL 0.4 07/06/2022    BILITOTAL 0.4 09/09/2020    ALBUMIN 3.7 07/06/2022    ALBUMIN 3.7 09/09/2020    ALKPHOS 107 07/06/2022    ALKPHOS 90 09/09/2020    ALT 24 07/06/2022    ALT 33 09/09/2020    AST 14 07/06/2022    AST 14 09/09/2020        INR RESULTS:  Lab Results   Component Value Date    INR 1.29 (H) 05/25/2022       BNP RESULTS:  Lab Results   Component Value Date    NTBNPI 664 05/18/2022         LIPIDS:  Lab Results   Component Value Date    CHOL 104 03/01/2023    CHOL 144 05/18/2021     Lab Results   Component Value Date    HDL 52 03/01/2023    HDL 50 05/18/2021     Lab Results   Component Value Date    LDL 30 03/01/2023    LDL 59 05/18/2021     Lab Results   Component Value Date    TRIG 110 03/01/2023    TRIG 173 05/18/2021     Lab Results   Component Value Date    CHOLHDLRATIO 6.0 04/26/2012         Recent Tests:    Electrocardiogram (07/06/2022):  Normal sinus rhythm, ventricular 90 bpm, LVH, inferior Q waves.  QRS  94.    Echocardiogram (08/10/2023):   Left ventricular function is decreased. The ejection fraction is 40-45% (mildly reduced).  Global right ventricular function is normal.  Mild mitral insufficiency is present.  Mild to moderate tricuspid insufficiency is present.  IVC diameter <2.1 cm collapsing >50% with sniff suggests a normal RA pressure  of 3 mmHg.  No pericardial effusion is present.  Compared to prior, LV fxn is unchanged to slightly improved.      Assessment and Plan:   Marianne Monroy is a 67 year old female with a past medical history of coronary artery disease (status post CABG), ischemic cardiomyopathy, and patient recently presented to me to establish general cardiovascular July 2022.    Ms. Monroy's functional status remains excellent.  She is on a stable medical regimen and her latest TTE seems to suggest that her LV function has actually improved.  I encouraged her to continue her excellent lifestyle.  We did talk about the importance of diabetes control in preventing further cardiac complications.    Problems:  Ischemic cardiomyopathy (ACC/AHA stage C, NYHA III)  Coronary artery disease s/p CABG in 05/2022 (LIMA-LAD, SVG-dRCA, SVG-RI, SVG-OM)  Hypertension  Type 2 diabetes mellitus, non-insulin-dependent (last A1c 8.3% in March 2023)  Depression  Generalized anxiety disorder    Plan:  - Continue aspirin 81 mg once daily and atorvastatin 80 mg once daily lifelong for CAD  - Continue metoprolol succinate 50 mg daily, sacubitril-valsartan  0.5/1 tablet daily, and spironolactone 12.5 mg daily for neurohormonal blockade  - Continue empagliflozin 25 mg daily for type 2 diabetes mellitus and CAD/ischemic cardiomyopathy  - RTC 6 months      A total of 30 minutes were spent on the day of the visit for chart review, care coordination, face-to-face consultation with the patient, and documentation.     Kris Steve, [facility], [place]    Cardiovascular Division  Pager [phone]    CC  Patient Care  Team:  Roxanna Otoole MD as PCP - General (Family Medicine)  Roxanna Otooel MD as Assigned PCP  Regina Sutherland as Diabetes Educator (Dietitian, Registered)  Dasia Amin, RN as Specialty Care Coordinator (Cardiology)  Belinda Colon APRN CNP as Nurse Practitioner (Cardiovascular Disease)  Kris Steve MD as MD (Cardiovascular Disease)  Belinda Colon APRN CNP as Assigned Heart and Vascular Provider  Evette Castro MD as MD (Infectious Diseases)  Sarah Isidro, IKER as Specialty Care Coordinator (Cardiology)  Too Morrow MD as MD (Cardiovascular Disease)  Malu Yap [facility] as Assigned MTM Pharmacist  Zoie Chapman PA-C as Assigned Cancer Care Provider  Evette Castro MD as Assigned Infectious Disease Provider  Cherie Lira, OSCAR CNP as Nurse Practitioner (Cardiovascular Disease)  Joaquin Vazquez MD as Assigned OBGYN Provider  No Ref-Primary, Physician  Libia Santoro, PharmD as Pharmacist (Pharmacist)

## 2023-09-05 DIAGNOSIS — I50.20 HFREF (HEART FAILURE WITH REDUCED EJECTION FRACTION) (H): Primary | ICD-10-CM

## 2023-09-12 ENCOUNTER — LAB (OUTPATIENT)
Dept: LAB | Facility: CLINIC | Age: 68
End: 2023-09-12
Payer: COMMERCIAL

## 2023-09-12 DIAGNOSIS — I50.20 HFREF (HEART FAILURE WITH REDUCED EJECTION FRACTION) (H): ICD-10-CM

## 2023-09-12 DIAGNOSIS — E11.9 DIABETES MELLITUS, TYPE 2 (H): Primary | ICD-10-CM

## 2023-09-12 DIAGNOSIS — E78.5 HYPERLIPIDEMIA LDL GOAL <70: ICD-10-CM

## 2023-09-12 LAB — HBA1C MFR BLD: 7.3 % (ref 0–5.6)

## 2023-09-12 PROCEDURE — 83036 HEMOGLOBIN GLYCOSYLATED A1C: CPT

## 2023-09-12 PROCEDURE — 36415 COLL VENOUS BLD VENIPUNCTURE: CPT

## 2023-09-12 PROCEDURE — 84460 ALANINE AMINO (ALT) (SGPT): CPT

## 2023-09-12 PROCEDURE — 80048 BASIC METABOLIC PNL TOTAL CA: CPT

## 2023-09-13 LAB
ALT SERPL W P-5'-P-CCNC: 21 U/L (ref 0–50)
ANION GAP SERPL CALCULATED.3IONS-SCNC: 11 MMOL/L (ref 7–15)
BUN SERPL-MCNC: 24.6 MG/DL (ref 8–23)
CALCIUM SERPL-MCNC: 9 MG/DL (ref 8.8–10.2)
CHLORIDE SERPL-SCNC: 106 MMOL/L (ref 98–107)
CREAT SERPL-MCNC: 1 MG/DL (ref 0.51–0.95)
DEPRECATED HCO3 PLAS-SCNC: 24 MMOL/L (ref 22–29)
EGFRCR SERPLBLD CKD-EPI 2021: 61 ML/MIN/1.73M2
GLUCOSE SERPL-MCNC: 149 MG/DL (ref 70–99)
POTASSIUM SERPL-SCNC: 4.4 MMOL/L (ref 3.4–5.3)
SODIUM SERPL-SCNC: 141 MMOL/L (ref 136–145)

## 2023-09-14 ENCOUNTER — OFFICE VISIT (OUTPATIENT)
Dept: CARDIOLOGY | Facility: CLINIC | Age: 68
End: 2023-09-14
Payer: COMMERCIAL

## 2023-09-14 VITALS
DIASTOLIC BLOOD PRESSURE: 73 MMHG | BODY MASS INDEX: 21.67 KG/M2 | OXYGEN SATURATION: 99 % | HEART RATE: 60 BPM | WEIGHT: 123.2 LBS | SYSTOLIC BLOOD PRESSURE: 116 MMHG

## 2023-09-14 DIAGNOSIS — E11.59 TYPE 2 DIABETES MELLITUS WITH OTHER CIRCULATORY COMPLICATION, WITHOUT LONG-TERM CURRENT USE OF INSULIN (H): ICD-10-CM

## 2023-09-14 DIAGNOSIS — Z95.1 S/P CABG (CORONARY ARTERY BYPASS GRAFT): ICD-10-CM

## 2023-09-14 DIAGNOSIS — I25.5 ISCHEMIC CARDIOMYOPATHY: ICD-10-CM

## 2023-09-14 DIAGNOSIS — I10 HYPERTENSION, UNSPECIFIED TYPE: ICD-10-CM

## 2023-09-14 DIAGNOSIS — I50.20 HFREF (HEART FAILURE WITH REDUCED EJECTION FRACTION) (H): Primary | ICD-10-CM

## 2023-09-14 PROCEDURE — 99214 OFFICE O/P EST MOD 30 MIN: CPT | Performed by: NURSE PRACTITIONER

## 2023-09-14 ASSESSMENT — PATIENT HEALTH QUESTIONNAIRE - PHQ9: SUM OF ALL RESPONSES TO PHQ QUESTIONS 1-9: 1

## 2023-09-14 NOTE — LETTER
9/14/2023      RE: Marianne Monroy  [address]  [facility] [address]       Dear Colleague,    Thank you for the opportunity to participate in the care of your patient, Marianne Monroy, at the [facility] at [facility]. Please see a copy of my visit note below.        HPI: Marianne is a 68 year old White female with a past medical history of T2DM, HTN, GERD, JUSTUS, MDD and recent episode of lightheadedness and dizziness on 4/30    Admission 5/18-6/4 - 5/24/2022.  Surgeon: Dr. Ulises Desai  1.  Coronary artery bypass grafting x 4 (left internal mammary artery to left anterior descending artery, saphenous vein graft to distal right coronary artery, saphenous vein graft to ramus intermedius artery, saphenous vein graft to obtuse marginal artery).  2.  Endoscopic vein harvest.    Patient has no SOB at rest.  She has some JEFFERSON    She is now working up her strength. No swelling in the legs today. She has been taking the 97/103 mg BID of Entresto for a few weeks.  116/73 HR 60- . She has some lightheadedness which has been ongoing.  Weight at home 123 lbs ( stable). She is working on eating more.  She joined [employer] 2-3 times a week. She also walks in the park if she can't make it to the club.    Denies SOB,PND, orthopnea, edema, weight gain, chest pain, palpitations, lightheadedness, dizziness, near syncopal/syncopal episodes. Marianne has been following salt and fluid restrictions.      PAST MEDICAL HISTORY:  Past Medical History:   Diagnosis Date    Abnormal Pap smear of cervix ~2000    repeat pap was normal    Allergic rhinitis, cause unspecified     Anxiety state, unspecified     panic episodes    C. difficile colitis     CAD (coronary artery disease)     Congestive heart failure (H)     Depressive disorder     Diabetes (H)     HFrEF (heart failure with reduced ejection fraction) (H)     ICD (implantable cardioverter-defibrillator) in  place     Osteopenia     Unspecified essential hypertension        FAMILY HISTORY:  Family History   Problem Relation Age of Onset    Diabetes Mother         hypertension, alive at 83    C.A.D. Mother     Coronary Artery Disease Mother     Cancer Father         lung cancer, smoker.   at age 53    Family History Negative Sister     Osteoporosis Sister     Anesthesia Reaction No family hx of     Deep Vein Thrombosis (DVT) No family hx of        SOCIAL HISTORY:  Social History     Tobacco Use    Smoking status: Never    Smokeless tobacco: Never   Vaping Use    Vaping Use: Never used   Substance Use Topics    Alcohol use: Yes     Comment: 1 drink a week    Drug use: No           CURRENT MEDICATIONS:  Current Outpatient Medications   Medication Sig Dispense Refill    acetaminophen (TYLENOL) 500 MG tablet Take 1,000 mg by mouth 3 times daily as needed for mild pain      aspirin 81 MG EC tablet Take 81 mg by mouth every evening      atorvastatin (LIPITOR) 80 MG tablet TAKE 1 TABLET DAILY (NEED ANNUAL EXAM) Strength: 80 mg 90 tablet 3    blood glucose (ONETOUCH VERIO IQ) test strip Use to test blood sugar 2 times daily or as directed. 200 strip 1    blood glucose monitoring (ONETOUCH VERIO) meter device kit Use to test blood sugar 2 times daily or as directed. 1 kit 0    Continuous Blood Gluc Sensor (FREESTYLE VANESSA 14 DAY SENSOR) MISC Change every 14 days. 2 each 5    empagliflozin (JARDIANCE) 25 MG TABS tablet Take 1 tablet (25 mg) by mouth daily 90 tablet 1    FLUoxetine (PROZAC) 20 MG capsule TAKE 2 CAPSULES DAILY 180 capsule 1    glipiZIDE (GLUCOTROL XL) 2.5 MG 24 hr tablet Take 3 tablets (7.5 mg) by mouth daily Stop 5 mg tablet 90 tablet 3    glipiZIDE (GLUCOTROL XL) 5 MG 24 hr tablet TAKE 2 TABLETS DAILY (NEED ANNUAL EXAM) 180 tablet 1    metFORMIN (GLUCOPHAGE XR) 500 MG 24 hr tablet Take 4 tablets (2,000 mg) by mouth daily (with dinner) Please see changed dose 360 tablet 1    metoprolol succinate ER (TOPROL XL)  50 MG 24 hr tablet Take 1.5 tablets (75 mg) by mouth daily (Patient taking differently: Take 75 mg by mouth every morning) 90 tablet 3    MULTIPLE VITAMIN OR TABS Take by mouth every morning      Multiple Vitamins-Minerals (PRESERVISION AREDS PO) Take 2 tablets by mouth daily      sacubitril-valsartan (ENTRESTO)  MG per tablet Take 1/2 tablet in AM, take 1 tablet in  tablet 3    spironolactone (ALDACTONE) 25 MG tablet Take 0.5 tablets (12.5 mg) by mouth daily 45 tablet 0    bisacodyl (DULCOLAX) 5 MG EC tablet Take 2 tablets at 3 pm the day before your procedure. If your procedure is before 11 am, take 2 additional tablets at 11 pm. If your procedure is after 11 am, take 2 additional tablets at 6 am. For additional instructions refer to your colonoscopy prep instructions. (Patient not taking: Reported on 3/1/2023) 4 tablet 0    polyethylene glycol (GOLYTELY) 236 g suspension The night before the exam at 6 pm drink an 8-ounce glass every 15 minutes until the jug is half empty. If you arrive before 11 AM: Drink the other half of the Golytely jug at 11 PM night before procedure. If you arrive after 11 AM: Drink the other half of the Golytely jug at 6 AM day of procedure. For additional instructions refer to your colonoscopy prep instructions. (Patient not taking: Reported on 8/23/2023) 4000 mL 0           ROS:   Constitutional: No fever, chills, or sweats.  ENT: No visual disturbance, ear ache, epistaxis, sore throat.   Allergies/Immunologic: Negative  Respiratory: No cough, hemoptysis.   Cardiovascular: As per HPI.   GI: As per HPI.   : No urinary frequency, dysuria, or hematuria.   Integument: Negative.   Psychiatric: Negative.   Neuro: Negative.   Endocrinology: negative   Musculoskeletal: negative    EXAM:   General: alert, articulate, and in no acute distress.  HEENT: normocephalic, atraumatic, anicteric sclera, EOMI, mucosa moist, no cyanosis.   Neck: neck supple.  No adenopathy, masses, or carotid  bruits.  JVP flat at 90 degrees  Heart: regular rhythm, normal S1/S2, no murmur, gallop, rub.  Precordium quiet with normal PMI.     Lungs: clear, no rales, ronchi, or wheezing.  No accessory muscle use, respirations unlabored.   Abdomen: soft, non-tender, bowel sounds present, no hepatomegaly  Extremities: no pitting edema.   No cyanosis.   Neurological: alert and oriented x 3.  normal speech and affect, no gross motor deficits  Skin:  No ecchymoses, rashes, or clubbing.    Labs:  CBC RESULTS:  Lab Results   Component Value Date    WBC 5.8 03/01/2023    WBC 7.1 09/09/2020    RBC 4.45 03/01/2023    RBC 4.57 09/09/2020    HGB 12.6 03/01/2023    HGB 13.3 09/09/2020    HCT 39.6 03/01/2023    HCT 41.1 09/09/2020    MCV 89 03/01/2023    MCV 90 09/09/2020    MCH 28.3 03/01/2023    MCH 29.1 09/09/2020    MCHC 31.8 03/01/2023    MCHC 32.4 09/09/2020    RDW 16.0 (H) 03/01/2023    RDW 12.5 09/09/2020     03/01/2023     09/09/2020       CMP RESULTS:  Lab Results   Component Value Date     09/12/2023     01/07/2021    POTASSIUM 4.4 09/12/2023    POTASSIUM 5.0 02/07/2023    POTASSIUM 4.8 01/07/2021    CHLORIDE 106 09/12/2023    CHLORIDE 108 02/07/2023    CHLORIDE 106 01/07/2021    CO2 24 09/12/2023    CO2 29 02/07/2023    CO2 28 01/07/2021    ANIONGAP 11 09/12/2023    ANIONGAP 3 02/07/2023    ANIONGAP 7 01/07/2021     (H) 09/12/2023     (H) 02/16/2023     (H) 02/07/2023     (H) 01/07/2021    BUN 24.6 (H) 09/12/2023    BUN 25 02/07/2023    BUN 19 01/07/2021    CR 1.00 (H) 09/12/2023    CR 0.90 01/07/2021    GFRESTIMATED 61 09/12/2023    GFRESTIMATED 66 01/07/2021    GFRESTBLACK 77 01/07/2021    POONAM 9.0 09/12/2023    POONAM 9.5 01/07/2021    BILITOTAL 0.4 07/06/2022    BILITOTAL 0.4 09/09/2020    ALBUMIN 3.7 07/06/2022    ALBUMIN 3.7 09/09/2020    ALKPHOS 107 07/06/2022    ALKPHOS 90 09/09/2020    ALT 21 09/12/2023    ALT 33 09/09/2020    AST 14 07/06/2022    AST 14 09/09/2020         INR RESULTS:  Lab Results   Component Value Date    INR 1.29 (H) 05/25/2022       No components found for: CK  Lab Results   Component Value Date    MAG 2.0 06/04/2022     Lab Results   Component Value Date    NTBNP 2,714 (H) 06/09/2022     @BRIEFLABR (dig)@    Most recent echocardiogram:  No results found for this or any previous visit (from the past 8760 hour(s)).      Assessment and Plan:    In summary,Marianne  is a 68 year old here for a CORE follow up. She is doing well. She wasn't well hydrated prior to her last set of labs.     1.  Chronic systolic heart failure secondary to ICM.  Stage C  NYHA Class III  ACEi/ARB: -Entresto decrease to 97/103  mg take one in the pm and 1/2 tab in am  BB: yes-  Metoprolol 75 mg   Aldosterone antagonist: 12.5 mg   SCD prophylaxis: does not meet criteria for implant  Fluid status: euvolemic  Anticoagulation:   Antiplatelet:  ASA dose   Sleep apnea:  NSAID use:  Contraindicated.  Avoid use.  Remote Monitoring:none  SGLT 2 - Jardiance 25 mg    2.  Other comordbid conditions:      #T2DM - PCP to manage - glipizide , Jardiance, metformin    #HTN- Entresto and Metoprolol     #GERD- followed by PCP     3.  Follow-up     CORE in 6 months         Belinda MOORE CNP  [facility]

## 2023-09-14 NOTE — PROGRESS NOTES
HPI: Marianne is a 68 year old White female with a past medical history of T2DM, HTN, GERD, JUSTUS, MDD and recent episode of lightheadedness and dizziness on     Admission - - 2022.  Surgeon: Dr. Ulises Desai  1.  Coronary artery bypass grafting x 4 (left internal mammary artery to left anterior descending artery, saphenous vein graft to distal right coronary artery, saphenous vein graft to ramus intermedius artery, saphenous vein graft to obtuse marginal artery).  2.  Endoscopic vein harvest.    Patient has no SOB at rest.  She has some JEFFERSON    She is now working up her strength. No swelling in the legs today. She has been taking the 97/103 mg BID of Entresto for a few weeks.  116/73 HR 60- . She has some lightheadedness which has been ongoing.  Weight at home 123 lbs ( stable). She is working on eating more.  She joined [employer] 2-3 times a week. She also walks in the park if she can't make it to the club.    Denies SOB,PND, orthopnea, edema, weight gain, chest pain, palpitations, lightheadedness, dizziness, near syncopal/syncopal episodes. Marianne has been following salt and fluid restrictions.      PAST MEDICAL HISTORY:  Past Medical History:   Diagnosis Date    Abnormal Pap smear of cervix ~    repeat pap was normal    Allergic rhinitis, cause unspecified     Anxiety state, unspecified     panic episodes    C. difficile colitis     CAD (coronary artery disease)     Congestive heart failure (H)     Depressive disorder     Diabetes (H)     HFrEF (heart failure with reduced ejection fraction) (H)     ICD (implantable cardioverter-defibrillator) in place     Osteopenia     Unspecified essential hypertension        FAMILY HISTORY:  Family History   Problem Relation Age of Onset    Diabetes Mother         hypertension, alive at 83    C.A.D. Mother     Coronary Artery Disease Mother     Cancer Father         lung cancer, smoker.   at age 53    Family History Negative Sister      Osteoporosis Sister     Anesthesia Reaction No family hx of     Deep Vein Thrombosis (DVT) No family hx of        SOCIAL HISTORY:  Social History     Tobacco Use    Smoking status: Never    Smokeless tobacco: Never   Vaping Use    Vaping Use: Never used   Substance Use Topics    Alcohol use: Yes     Comment: 1 drink a week    Drug use: No           CURRENT MEDICATIONS:  Current Outpatient Medications   Medication Sig Dispense Refill    acetaminophen (TYLENOL) 500 MG tablet Take 1,000 mg by mouth 3 times daily as needed for mild pain      aspirin 81 MG EC tablet Take 81 mg by mouth every evening      atorvastatin (LIPITOR) 80 MG tablet TAKE 1 TABLET DAILY (NEED ANNUAL EXAM) Strength: 80 mg 90 tablet 3    blood glucose (ONETOUCH VERIO IQ) test strip Use to test blood sugar 2 times daily or as directed. 200 strip 1    blood glucose monitoring (ONETOUCH VERIO) meter device kit Use to test blood sugar 2 times daily or as directed. 1 kit 0    Continuous Blood Gluc Sensor (AnvatoYLE VANESSA 14 DAY SENSOR) MISC Change every 14 days. 2 each 5    empagliflozin (JARDIANCE) 25 MG TABS tablet Take 1 tablet (25 mg) by mouth daily 90 tablet 1    FLUoxetine (PROZAC) 20 MG capsule TAKE 2 CAPSULES DAILY 180 capsule 1    glipiZIDE (GLUCOTROL XL) 2.5 MG 24 hr tablet Take 3 tablets (7.5 mg) by mouth daily Stop 5 mg tablet 90 tablet 3    glipiZIDE (GLUCOTROL XL) 5 MG 24 hr tablet TAKE 2 TABLETS DAILY (NEED ANNUAL EXAM) 180 tablet 1    metFORMIN (GLUCOPHAGE XR) 500 MG 24 hr tablet Take 4 tablets (2,000 mg) by mouth daily (with dinner) Please see changed dose 360 tablet 1    metoprolol succinate ER (TOPROL XL) 50 MG 24 hr tablet Take 1.5 tablets (75 mg) by mouth daily (Patient taking differently: Take 75 mg by mouth every morning) 90 tablet 3    MULTIPLE VITAMIN OR TABS Take by mouth every morning      Multiple Vitamins-Minerals (PRESERVISION AREDS PO) Take 2 tablets by mouth daily      sacubitril-valsartan (ENTRESTO)  MG per tablet  Take 1/2 tablet in AM, take 1 tablet in  tablet 3    spironolactone (ALDACTONE) 25 MG tablet Take 0.5 tablets (12.5 mg) by mouth daily 45 tablet 0    bisacodyl (DULCOLAX) 5 MG EC tablet Take 2 tablets at 3 pm the day before your procedure. If your procedure is before 11 am, take 2 additional tablets at 11 pm. If your procedure is after 11 am, take 2 additional tablets at 6 am. For additional instructions refer to your colonoscopy prep instructions. (Patient not taking: Reported on 3/1/2023) 4 tablet 0    polyethylene glycol (GOLYTELY) 236 g suspension The night before the exam at 6 pm drink an 8-ounce glass every 15 minutes until the jug is half empty. If you arrive before 11 AM: Drink the other half of the Golytely jug at 11 PM night before procedure. If you arrive after 11 AM: Drink the other half of the Golytely jug at 6 AM day of procedure. For additional instructions refer to your colonoscopy prep instructions. (Patient not taking: Reported on 8/23/2023) 4000 mL 0           ROS:   Constitutional: No fever, chills, or sweats.  ENT: No visual disturbance, ear ache, epistaxis, sore throat.   Allergies/Immunologic: Negative  Respiratory: No cough, hemoptysis.   Cardiovascular: As per HPI.   GI: As per HPI.   : No urinary frequency, dysuria, or hematuria.   Integument: Negative.   Psychiatric: Negative.   Neuro: Negative.   Endocrinology: negative   Musculoskeletal: negative    EXAM:   General: alert, articulate, and in no acute distress.  HEENT: normocephalic, atraumatic, anicteric sclera, EOMI, mucosa moist, no cyanosis.   Neck: neck supple.  No adenopathy, masses, or carotid bruits.  JVP flat at 90 degrees  Heart: regular rhythm, normal S1/S2, no murmur, gallop, rub.  Precordium quiet with normal PMI.     Lungs: clear, no rales, ronchi, or wheezing.  No accessory muscle use, respirations unlabored.   Abdomen: soft, non-tender, bowel sounds present, no hepatomegaly  Extremities: no pitting edema.   No  cyanosis.   Neurological: alert and oriented x 3.  normal speech and affect, no gross motor deficits  Skin:  No ecchymoses, rashes, or clubbing.    Labs:  CBC RESULTS:  Lab Results   Component Value Date    WBC 5.8 03/01/2023    WBC 7.1 09/09/2020    RBC 4.45 03/01/2023    RBC 4.57 09/09/2020    HGB 12.6 03/01/2023    HGB 13.3 09/09/2020    HCT 39.6 03/01/2023    HCT 41.1 09/09/2020    MCV 89 03/01/2023    MCV 90 09/09/2020    MCH 28.3 03/01/2023    MCH 29.1 09/09/2020    MCHC 31.8 03/01/2023    MCHC 32.4 09/09/2020    RDW 16.0 (H) 03/01/2023    RDW 12.5 09/09/2020     03/01/2023     09/09/2020       CMP RESULTS:  Lab Results   Component Value Date     09/12/2023     01/07/2021    POTASSIUM 4.4 09/12/2023    POTASSIUM 5.0 02/07/2023    POTASSIUM 4.8 01/07/2021    CHLORIDE 106 09/12/2023    CHLORIDE 108 02/07/2023    CHLORIDE 106 01/07/2021    CO2 24 09/12/2023    CO2 29 02/07/2023    CO2 28 01/07/2021    ANIONGAP 11 09/12/2023    ANIONGAP 3 02/07/2023    ANIONGAP 7 01/07/2021     (H) 09/12/2023     (H) 02/16/2023     (H) 02/07/2023     (H) 01/07/2021    BUN 24.6 (H) 09/12/2023    BUN 25 02/07/2023    BUN 19 01/07/2021    CR 1.00 (H) 09/12/2023    CR 0.90 01/07/2021    GFRESTIMATED 61 09/12/2023    GFRESTIMATED 66 01/07/2021    GFRESTBLACK 77 01/07/2021    POONAM 9.0 09/12/2023    POONAM 9.5 01/07/2021    BILITOTAL 0.4 07/06/2022    BILITOTAL 0.4 09/09/2020    ALBUMIN 3.7 07/06/2022    ALBUMIN 3.7 09/09/2020    ALKPHOS 107 07/06/2022    ALKPHOS 90 09/09/2020    ALT 21 09/12/2023    ALT 33 09/09/2020    AST 14 07/06/2022    AST 14 09/09/2020        INR RESULTS:  Lab Results   Component Value Date    INR 1.29 (H) 05/25/2022       No components found for: CK  Lab Results   Component Value Date    MAG 2.0 06/04/2022     Lab Results   Component Value Date    NTBNP 2,714 (H) 06/09/2022     @BRIEFLABR (dig)@    Most recent echocardiogram:  No results found for this or any  previous visit (from the past 8760 hour(s)).      Assessment and Plan:    In summary,Marianne  is a 68 year old here for a CORE follow up. She is doing well. She wasn't well hydrated prior to her last set of labs.     1.  Chronic systolic heart failure secondary to ICM.  Stage C  NYHA Class III  ACEi/ARB: -Entresto decrease to 97/103  mg take one in the pm and 1/2 tab in am  BB: yes-  Metoprolol 75 mg   Aldosterone antagonist: 12.5 mg   SCD prophylaxis: does not meet criteria for implant  Fluid status: euvolemic  Anticoagulation:   Antiplatelet:  ASA dose   Sleep apnea:  NSAID use:  Contraindicated.  Avoid use.  Remote Monitoring:none  SGLT 2 - Jardiance 25 mg    2.  Other comordbid conditions:      #T2DM - PCP to manage - glipizide , Jardiance, metformin    #HTN- Entresto and Metoprolol     #GERD- followed by PCP     3.  Follow-up     [facility] in 6 months         Belinda MOORE, CNP  [facility]

## 2023-09-14 NOTE — NURSING NOTE
"Chief Complaint   Patient presents with    Follow Up     Reason for visit: Return CORE, 67 yo female with systolic heart failure presents for follow up with labs prior.         Initial /73 (BP Location: Left arm, Patient Position: Sitting, Cuff Size: Adult Regular)   Pulse 51   Wt 55.9 kg (123 lb 3.2 oz)   SpO2 99%   BMI 21.67 kg/m   Estimated body mass index is 21.67 kg/m  as calculated from the following:    Height as of 3/1/23: 1.606 m (5' 3.23\").    Weight as of this encounter: 55.9 kg (123 lb 3.2 oz)..  BP completed using cuff size: regular    EDWIN Joseph    "

## 2023-09-14 NOTE — PATIENT INSTRUCTIONS
Take your medicines every day, as directed    Changes made today:  none     Monitor Your Weight and Symptoms    Contact us if you:    Gain 2 pounds in one day or 5 pounds in one week  Feel more short of breath  Notice more leg swelling  Feel lightheadeded   Change your lifestyle    Limit Salt or Sodium:  2000 mg  Limit Fluids:  2000 mL or approximately 64 ounces  Eat a Heart Healthy Diet  Low in saturated fats  Stay Active:  Aim to move at least 150 minutes every  week         To Contact us    During Business Hours:  [phone], option # 1      After hours, weekends or holidays:   [phone], Option #4  Ask to speak to the On-Call Cardiologist. Inform them you are a CORE/heart failure patient at the [facility] [place].     Use [employer] allows you to communicate directly with your heart team through secure messaging.  [employer] can be accessed any time on your phone, computer, or tablet.  If you need assistance, we'd be happy to help!         Keep your Heart Appointments:    CORE in 6 months      Please consider attending our virtual support group which is held monthly. Please reach out to Lloyd at [phone] for more information if you are interested in attending.       2023 dates:    Monday, October 2nd, 1-2pm  Monday, November 6th, 1-2pm  Monday, December 4th, 1-2pm

## 2023-09-14 NOTE — NURSING NOTE
Labs: Patient was given results of the laboratory testing obtained today. Patient demonstrated understanding of this information and agreed to call with further questions or concerns.     Med Reconcile: Reviewed and verified all current medications with the patient. The updated medication list was printed and given to the patient.    Return Appointment: Patient given instructions regarding scheduling next clinic visit. Patient demonstrated understanding of this information and agreed to call with further questions or concerns. CORE in 6 months.    Medication Change: Patient was educated regarding prescribed medication change, including discussion of the indication, administration, side effects, and when to report to MD or RN. Patient demonstrated understanding of this information and agreed to call with further questions or concerns.    Dasia Amin, RN

## 2023-09-21 DIAGNOSIS — Z95.1 S/P CABG (CORONARY ARTERY BYPASS GRAFT): ICD-10-CM

## 2023-09-22 RX ORDER — METOPROLOL SUCCINATE 50 MG/1
75 TABLET, EXTENDED RELEASE ORAL DAILY
Qty: 145 TABLET | Refills: 3 | Status: SHIPPED | OUTPATIENT
Start: 2023-09-22 | End: 2023-12-22

## 2023-09-22 NOTE — TELEPHONE ENCOUNTER
metoprolol succinate ER (TOPROL XL) 50 MG 24 hr tablet 90 tablet 3 12/1/2022     Last Office Visit: 9/14/23  Future Office visit:   3/7/24    Refill protocol passed  Keyla Saul RN

## 2023-10-26 ENCOUNTER — ANCILLARY PROCEDURE (OUTPATIENT)
Dept: CARDIOLOGY | Facility: CLINIC | Age: 68
End: 2023-10-26
Attending: INTERNAL MEDICINE
Payer: COMMERCIAL

## 2023-10-26 DIAGNOSIS — Z95.810 ICD (IMPLANTABLE CARDIOVERTER-DEFIBRILLATOR), SINGLE, IN SITU: ICD-10-CM

## 2023-10-26 PROCEDURE — 93296 REM INTERROG EVL PM/IDS: CPT

## 2023-10-26 PROCEDURE — 93295 DEV INTERROG REMOTE 1/2/MLT: CPT | Performed by: INTERNAL MEDICINE

## 2023-10-30 LAB
MDC_IDC_EPISODE_DTM: NORMAL
MDC_IDC_EPISODE_DTM: NORMAL
MDC_IDC_EPISODE_DURATION: 1 S
MDC_IDC_EPISODE_DURATION: 2 S
MDC_IDC_EPISODE_ID: 2
MDC_IDC_EPISODE_ID: 3
MDC_IDC_EPISODE_TYPE: NORMAL
MDC_IDC_EPISODE_TYPE: NORMAL
MDC_IDC_LEAD_CONNECTION_STATUS: NORMAL
MDC_IDC_LEAD_IMPLANT_DT: NORMAL
MDC_IDC_LEAD_LOCATION: NORMAL
MDC_IDC_LEAD_LOCATION_DETAIL_1: NORMAL
MDC_IDC_LEAD_MFG: NORMAL
MDC_IDC_LEAD_MODEL: NORMAL
MDC_IDC_LEAD_POLARITY_TYPE: NORMAL
MDC_IDC_LEAD_SERIAL: NORMAL
MDC_IDC_LEAD_SPECIAL_FUNCTION: NORMAL
MDC_IDC_MSMT_BATTERY_DTM: NORMAL
MDC_IDC_MSMT_BATTERY_REMAINING_LONGEVITY: 159 MO
MDC_IDC_MSMT_BATTERY_RRT_TRIGGER: NORMAL
MDC_IDC_MSMT_BATTERY_VOLTAGE: 3.04 V
MDC_IDC_MSMT_CAP_CHARGE_DTM: NORMAL
MDC_IDC_MSMT_CAP_CHARGE_ENERGY: 18 J
MDC_IDC_MSMT_CAP_CHARGE_TIME: 3.8 S
MDC_IDC_MSMT_CAP_CHARGE_TYPE: NORMAL
MDC_IDC_MSMT_LEADCHNL_RV_IMPEDANCE_VALUE: 266 OHM
MDC_IDC_MSMT_LEADCHNL_RV_IMPEDANCE_VALUE: 361 OHM
MDC_IDC_MSMT_LEADCHNL_RV_PACING_THRESHOLD_AMPLITUDE: 0.88 V
MDC_IDC_MSMT_LEADCHNL_RV_PACING_THRESHOLD_PULSEWIDTH: 0.4 MS
MDC_IDC_MSMT_LEADCHNL_RV_SENSING_INTR_AMPL: 7.8 MV
MDC_IDC_PG_IMPLANT_DTM: NORMAL
MDC_IDC_PG_MFG: NORMAL
MDC_IDC_PG_MODEL: NORMAL
MDC_IDC_PG_SERIAL: NORMAL
MDC_IDC_PG_TYPE: NORMAL
MDC_IDC_SESS_CLINIC_NAME: NORMAL
MDC_IDC_SESS_DTM: NORMAL
MDC_IDC_SESS_TYPE: NORMAL
MDC_IDC_SET_BRADY_HYSTRATE: NORMAL
MDC_IDC_SET_BRADY_LOWRATE: 40 {BEATS}/MIN
MDC_IDC_SET_BRADY_MODE: NORMAL
MDC_IDC_SET_LEADCHNL_RV_PACING_AMPLITUDE: 2 V
MDC_IDC_SET_LEADCHNL_RV_PACING_ANODE_ELECTRODE_1: NORMAL
MDC_IDC_SET_LEADCHNL_RV_PACING_ANODE_LOCATION_1: NORMAL
MDC_IDC_SET_LEADCHNL_RV_PACING_CAPTURE_MODE: NORMAL
MDC_IDC_SET_LEADCHNL_RV_PACING_CATHODE_ELECTRODE_1: NORMAL
MDC_IDC_SET_LEADCHNL_RV_PACING_CATHODE_LOCATION_1: NORMAL
MDC_IDC_SET_LEADCHNL_RV_PACING_POLARITY: NORMAL
MDC_IDC_SET_LEADCHNL_RV_PACING_PULSEWIDTH: 0.4 MS
MDC_IDC_SET_LEADCHNL_RV_SENSING_ANODE_ELECTRODE_1: NORMAL
MDC_IDC_SET_LEADCHNL_RV_SENSING_ANODE_LOCATION_1: NORMAL
MDC_IDC_SET_LEADCHNL_RV_SENSING_CATHODE_ELECTRODE_1: NORMAL
MDC_IDC_SET_LEADCHNL_RV_SENSING_CATHODE_LOCATION_1: NORMAL
MDC_IDC_SET_LEADCHNL_RV_SENSING_POLARITY: NORMAL
MDC_IDC_SET_LEADCHNL_RV_SENSING_SENSITIVITY: 0.3 MV
MDC_IDC_SET_ZONE_DETECTION_BEATS_DENOMINATOR: 16 {BEATS}
MDC_IDC_SET_ZONE_DETECTION_BEATS_DENOMINATOR: 32 {BEATS}
MDC_IDC_SET_ZONE_DETECTION_BEATS_DENOMINATOR: 40 {BEATS}
MDC_IDC_SET_ZONE_DETECTION_BEATS_NUMERATOR: 16 {BEATS}
MDC_IDC_SET_ZONE_DETECTION_BEATS_NUMERATOR: 30 {BEATS}
MDC_IDC_SET_ZONE_DETECTION_BEATS_NUMERATOR: 32 {BEATS}
MDC_IDC_SET_ZONE_DETECTION_INTERVAL: 270 MS
MDC_IDC_SET_ZONE_DETECTION_INTERVAL: 320 MS
MDC_IDC_SET_ZONE_DETECTION_INTERVAL: 360 MS
MDC_IDC_SET_ZONE_DETECTION_INTERVAL: 360 MS
MDC_IDC_SET_ZONE_STATUS: NORMAL
MDC_IDC_SET_ZONE_TYPE: NORMAL
MDC_IDC_SET_ZONE_VENDOR_TYPE: NORMAL
MDC_IDC_STAT_AT_BURDEN_PERCENT: 0 %
MDC_IDC_STAT_AT_DTM_END: NORMAL
MDC_IDC_STAT_AT_DTM_START: NORMAL
MDC_IDC_STAT_BRADY_DTM_END: NORMAL
MDC_IDC_STAT_BRADY_DTM_START: NORMAL
MDC_IDC_STAT_BRADY_RV_PERCENT_PACED: 0.01 %
MDC_IDC_STAT_CRT_DTM_END: NORMAL
MDC_IDC_STAT_CRT_DTM_START: NORMAL
MDC_IDC_STAT_EPISODE_RECENT_COUNT: 0
MDC_IDC_STAT_EPISODE_RECENT_COUNT: 2
MDC_IDC_STAT_EPISODE_RECENT_COUNT_DTM_END: NORMAL
MDC_IDC_STAT_EPISODE_RECENT_COUNT_DTM_START: NORMAL
MDC_IDC_STAT_EPISODE_TOTAL_COUNT: 0
MDC_IDC_STAT_EPISODE_TOTAL_COUNT: 3
MDC_IDC_STAT_EPISODE_TOTAL_COUNT_DTM_END: NORMAL
MDC_IDC_STAT_EPISODE_TOTAL_COUNT_DTM_START: NORMAL
MDC_IDC_STAT_EPISODE_TYPE: NORMAL
MDC_IDC_STAT_TACHYTHERAPY_ATP_DELIVERED_RECENT: 0
MDC_IDC_STAT_TACHYTHERAPY_ATP_DELIVERED_TOTAL: 0
MDC_IDC_STAT_TACHYTHERAPY_RECENT_DTM_END: NORMAL
MDC_IDC_STAT_TACHYTHERAPY_RECENT_DTM_START: NORMAL
MDC_IDC_STAT_TACHYTHERAPY_SHOCKS_ABORTED_RECENT: 0
MDC_IDC_STAT_TACHYTHERAPY_SHOCKS_ABORTED_TOTAL: 0
MDC_IDC_STAT_TACHYTHERAPY_SHOCKS_DELIVERED_RECENT: 0
MDC_IDC_STAT_TACHYTHERAPY_SHOCKS_DELIVERED_TOTAL: 0
MDC_IDC_STAT_TACHYTHERAPY_TOTAL_DTM_END: NORMAL
MDC_IDC_STAT_TACHYTHERAPY_TOTAL_DTM_START: NORMAL

## 2023-11-01 DIAGNOSIS — F33.0 MAJOR DEPRESSIVE DISORDER, RECURRENT EPISODE, MILD (H): ICD-10-CM

## 2023-11-01 DIAGNOSIS — E11.59 TYPE 2 DIABETES MELLITUS WITH OTHER CIRCULATORY COMPLICATION, WITHOUT LONG-TERM CURRENT USE OF INSULIN (H): ICD-10-CM

## 2023-11-01 RX ORDER — METFORMIN HCL 500 MG
2000 TABLET, EXTENDED RELEASE 24 HR ORAL
Qty: 360 TABLET | Refills: 0 | Status: SHIPPED | OUTPATIENT
Start: 2023-11-01 | End: 2024-01-22

## 2023-11-14 DIAGNOSIS — E11.59 TYPE 2 DIABETES MELLITUS WITH OTHER CIRCULATORY COMPLICATION, WITHOUT LONG-TERM CURRENT USE OF INSULIN (H): ICD-10-CM

## 2023-11-14 DIAGNOSIS — E16.2 HYPOGLYCEMIA: ICD-10-CM

## 2023-11-15 RX ORDER — FLASH GLUCOSE SENSOR
KIT MISCELLANEOUS
Qty: 2 EACH | Refills: 0 | Status: SHIPPED | OUTPATIENT
Start: 2023-11-15 | End: 2023-12-04

## 2023-11-16 ENCOUNTER — PATIENT OUTREACH (OUTPATIENT)
Dept: CARE COORDINATION | Facility: CLINIC | Age: 68
End: 2023-11-16
Payer: COMMERCIAL

## 2023-11-28 ENCOUNTER — MYC MEDICAL ADVICE (OUTPATIENT)
Dept: FAMILY MEDICINE | Facility: CLINIC | Age: 68
End: 2023-11-28
Payer: COMMERCIAL

## 2023-11-29 ENCOUNTER — TELEPHONE (OUTPATIENT)
Dept: PHARMACY | Facility: CLINIC | Age: 68
End: 2023-11-29
Payer: COMMERCIAL

## 2023-11-29 DIAGNOSIS — I50.20 HFREF (HEART FAILURE WITH REDUCED EJECTION FRACTION) (H): ICD-10-CM

## 2023-11-29 RX ORDER — SPIRONOLACTONE 25 MG/1
12.5 TABLET ORAL DAILY
Qty: 45 TABLET | Refills: 3 | Status: SHIPPED | OUTPATIENT
Start: 2023-11-29 | End: 2023-12-01

## 2023-11-29 NOTE — TELEPHONE ENCOUNTER
We have been unable to reach this patient for MTM follow-up after several attempts. We will stop reaching out to the patient at this time. Please let us know if we can assist in this patient's care in the future.       Zeynep Yap, PharmD  Medication Therapy Management Pharmacist  [phone]

## 2023-11-29 NOTE — TELEPHONE ENCOUNTER
spironolactone (ALDACTONE) 25 MG tablet       Last Written Prescription Date:  4-21-23  Last Fill Quantity: 45,   # refills: 0  Last Office Visit : 9-14-23  Future Office visit:  3-7-24    Creatinine   Date Value Ref Range Status   09/12/2023 1.00 (H) 0.51 - 0.95 mg/dL Final   01/07/2021 0.90 0.52 - 1.04 mg/dL Final    Reviewed in last clinic note    Routing refill request to provider for review/approval because:  Gap in RF

## 2023-12-01 DIAGNOSIS — I50.20 HFREF (HEART FAILURE WITH REDUCED EJECTION FRACTION) (H): ICD-10-CM

## 2023-12-01 DIAGNOSIS — E11.40 TYPE 2 DIABETES MELLITUS WITH DIABETIC NEUROPATHY, WITHOUT LONG-TERM CURRENT USE OF INSULIN (H): ICD-10-CM

## 2023-12-01 DIAGNOSIS — Z95.1 S/P CABG (CORONARY ARTERY BYPASS GRAFT): ICD-10-CM

## 2023-12-01 RX ORDER — ATORVASTATIN CALCIUM 80 MG/1
TABLET, FILM COATED ORAL
Qty: 90 TABLET | Refills: 3 | Status: SHIPPED | OUTPATIENT
Start: 2023-12-01

## 2023-12-01 RX ORDER — SPIRONOLACTONE 25 MG/1
12.5 TABLET ORAL DAILY
Qty: 45 TABLET | Refills: 3 | Status: SHIPPED | OUTPATIENT
Start: 2023-12-01

## 2023-12-01 NOTE — TELEPHONE ENCOUNTER
Patient calling requesting refill of cued medications; sending as high priority as patient is out of medications     Please route back to team to update patient at: [phone] - ok for detailed message    Angelica Stephens RN  [facility]

## 2023-12-03 ENCOUNTER — MYC MEDICAL ADVICE (OUTPATIENT)
Dept: FAMILY MEDICINE | Facility: CLINIC | Age: 68
End: 2023-12-03
Payer: COMMERCIAL

## 2023-12-03 DIAGNOSIS — E11.59 TYPE 2 DIABETES MELLITUS WITH OTHER CIRCULATORY COMPLICATION, WITHOUT LONG-TERM CURRENT USE OF INSULIN (H): ICD-10-CM

## 2023-12-03 DIAGNOSIS — E16.2 HYPOGLYCEMIA: ICD-10-CM

## 2023-12-04 RX ORDER — FLASH GLUCOSE SENSOR
KIT MISCELLANEOUS
Qty: 2 EACH | Refills: 0 | Status: SHIPPED | OUTPATIENT
Start: 2023-12-04 | End: 2024-01-04

## 2023-12-14 ENCOUNTER — MYC MEDICAL ADVICE (OUTPATIENT)
Dept: CARDIOLOGY | Facility: CLINIC | Age: 68
End: 2023-12-14
Payer: COMMERCIAL

## 2023-12-14 ENCOUNTER — PATIENT OUTREACH (OUTPATIENT)
Dept: CARE COORDINATION | Facility: CLINIC | Age: 68
End: 2023-12-14
Payer: COMMERCIAL

## 2023-12-21 ENCOUNTER — MYC MEDICAL ADVICE (OUTPATIENT)
Dept: CARDIOLOGY | Facility: CLINIC | Age: 68
End: 2023-12-21
Payer: COMMERCIAL

## 2023-12-21 ENCOUNTER — MYC MEDICAL ADVICE (OUTPATIENT)
Dept: FAMILY MEDICINE | Facility: CLINIC | Age: 68
End: 2023-12-21
Payer: COMMERCIAL

## 2023-12-21 DIAGNOSIS — E11.59 TYPE 2 DIABETES MELLITUS WITH OTHER CIRCULATORY COMPLICATION, WITHOUT LONG-TERM CURRENT USE OF INSULIN (H): ICD-10-CM

## 2023-12-21 DIAGNOSIS — Z95.1 S/P CABG (CORONARY ARTERY BYPASS GRAFT): ICD-10-CM

## 2023-12-21 DIAGNOSIS — E16.2 HYPOGLYCEMIA: ICD-10-CM

## 2023-12-22 RX ORDER — METOPROLOL SUCCINATE 50 MG/1
75 TABLET, EXTENDED RELEASE ORAL DAILY
Qty: 145 TABLET | Refills: 3 | Status: SHIPPED | OUTPATIENT
Start: 2023-12-22

## 2023-12-22 RX ORDER — GLIPIZIDE 2.5 MG/1
7.5 TABLET, EXTENDED RELEASE ORAL DAILY
Qty: 90 TABLET | Refills: 3 | Status: SHIPPED | OUTPATIENT
Start: 2023-12-22 | End: 2024-01-22

## 2023-12-22 RX ORDER — GLIPIZIDE 5 MG/1
TABLET, FILM COATED, EXTENDED RELEASE ORAL
Qty: 180 TABLET | Refills: 1 | Status: SHIPPED | OUTPATIENT
Start: 2023-12-22 | End: 2024-01-22

## 2024-01-04 ENCOUNTER — MYC MEDICAL ADVICE (OUTPATIENT)
Dept: CARDIOLOGY | Facility: CLINIC | Age: 69
End: 2024-01-04
Payer: COMMERCIAL

## 2024-01-04 DIAGNOSIS — E16.2 HYPOGLYCEMIA: ICD-10-CM

## 2024-01-04 DIAGNOSIS — E11.59 TYPE 2 DIABETES MELLITUS WITH OTHER CIRCULATORY COMPLICATION, WITHOUT LONG-TERM CURRENT USE OF INSULIN (H): ICD-10-CM

## 2024-01-04 DIAGNOSIS — I50.20 HFREF (HEART FAILURE WITH REDUCED EJECTION FRACTION) (H): ICD-10-CM

## 2024-01-04 RX ORDER — FLASH GLUCOSE SENSOR
KIT MISCELLANEOUS
Qty: 2 EACH | Refills: 11 | Status: SHIPPED | OUTPATIENT
Start: 2024-01-04 | End: 2024-01-30

## 2024-01-05 RX ORDER — SACUBITRIL AND VALSARTAN 97; 103 MG/1; MG/1
TABLET, FILM COATED ORAL
Qty: 180 TABLET | Refills: 3 | Status: SHIPPED | OUTPATIENT
Start: 2024-01-05

## 2024-01-22 ENCOUNTER — MYC MEDICAL ADVICE (OUTPATIENT)
Dept: FAMILY MEDICINE | Facility: CLINIC | Age: 69
End: 2024-01-22

## 2024-01-22 ENCOUNTER — OFFICE VISIT (OUTPATIENT)
Dept: FAMILY MEDICINE | Facility: CLINIC | Age: 69
End: 2024-01-22
Payer: COMMERCIAL

## 2024-01-22 VITALS
SYSTOLIC BLOOD PRESSURE: 111 MMHG | HEIGHT: 63 IN | TEMPERATURE: 97.2 F | OXYGEN SATURATION: 100 % | BODY MASS INDEX: 22.15 KG/M2 | HEART RATE: 68 BPM | WEIGHT: 125 LBS | DIASTOLIC BLOOD PRESSURE: 67 MMHG

## 2024-01-22 DIAGNOSIS — I50.20 HFREF (HEART FAILURE WITH REDUCED EJECTION FRACTION) (H): ICD-10-CM

## 2024-01-22 DIAGNOSIS — Z95.1 S/P CABG (CORONARY ARTERY BYPASS GRAFT): ICD-10-CM

## 2024-01-22 DIAGNOSIS — I25.810 CORONARY ARTERY DISEASE INVOLVING CORONARY BYPASS GRAFT OF NATIVE HEART WITHOUT ANGINA PECTORIS: ICD-10-CM

## 2024-01-22 DIAGNOSIS — E16.2 HYPOGLYCEMIA: ICD-10-CM

## 2024-01-22 DIAGNOSIS — Z86.79 HISTORY OF VENTRICULAR TACHYCARDIA: ICD-10-CM

## 2024-01-22 DIAGNOSIS — F33.0 MAJOR DEPRESSIVE DISORDER, RECURRENT EPISODE, MILD (H): ICD-10-CM

## 2024-01-22 DIAGNOSIS — I10 HYPERTENSION, GOAL BELOW 140/90: ICD-10-CM

## 2024-01-22 DIAGNOSIS — E11.40 TYPE 2 DIABETES MELLITUS WITH DIABETIC NEUROPATHY, WITHOUT LONG-TERM CURRENT USE OF INSULIN (H): ICD-10-CM

## 2024-01-22 DIAGNOSIS — Z23 NEED FOR SHINGLES VACCINE: ICD-10-CM

## 2024-01-22 DIAGNOSIS — E78.5 HYPERLIPIDEMIA LDL GOAL <70: ICD-10-CM

## 2024-01-22 DIAGNOSIS — E11.59 TYPE 2 DIABETES MELLITUS WITH OTHER CIRCULATORY COMPLICATION, WITHOUT LONG-TERM CURRENT USE OF INSULIN (H): Primary | ICD-10-CM

## 2024-01-22 DIAGNOSIS — Z12.31 VISIT FOR SCREENING MAMMOGRAM: ICD-10-CM

## 2024-01-22 PROBLEM — I25.119 CORONARY ARTERY DISEASE INVOLVING NATIVE HEART WITH ANGINA PECTORIS, UNSPECIFIED VESSEL OR LESION TYPE (H): Status: ACTIVE | Noted: 2024-01-22

## 2024-01-22 PROBLEM — I25.119 CORONARY ARTERY DISEASE INVOLVING NATIVE HEART WITH ANGINA PECTORIS, UNSPECIFIED VESSEL OR LESION TYPE (H): Status: RESOLVED | Noted: 2024-01-22 | Resolved: 2024-01-22

## 2024-01-22 PROBLEM — I47.29 NON-SUSTAINED VENTRICULAR TACHYCARDIA (H): Status: RESOLVED | Noted: 2022-05-18 | Resolved: 2024-01-22

## 2024-01-22 LAB
ERYTHROCYTE [DISTWIDTH] IN BLOOD BY AUTOMATED COUNT: 14.7 % (ref 10–15)
HBA1C MFR BLD: 7.2 % (ref 0–5.6)
HCT VFR BLD AUTO: 40.4 % (ref 35–47)
HGB BLD-MCNC: 12.8 G/DL (ref 11.7–15.7)
MCH RBC QN AUTO: 28.5 PG (ref 26.5–33)
MCHC RBC AUTO-ENTMCNC: 31.7 G/DL (ref 31.5–36.5)
MCV RBC AUTO: 90 FL (ref 78–100)
PLATELET # BLD AUTO: 226 10E3/UL (ref 150–450)
RBC # BLD AUTO: 4.49 10E6/UL (ref 3.8–5.2)
WBC # BLD AUTO: 7.4 10E3/UL (ref 4–11)

## 2024-01-22 PROCEDURE — 99214 OFFICE O/P EST MOD 30 MIN: CPT | Performed by: FAMILY MEDICINE

## 2024-01-22 PROCEDURE — 36415 COLL VENOUS BLD VENIPUNCTURE: CPT | Performed by: FAMILY MEDICINE

## 2024-01-22 PROCEDURE — 85027 COMPLETE CBC AUTOMATED: CPT | Performed by: FAMILY MEDICINE

## 2024-01-22 PROCEDURE — 83036 HEMOGLOBIN GLYCOSYLATED A1C: CPT | Performed by: FAMILY MEDICINE

## 2024-01-22 PROCEDURE — 80061 LIPID PANEL: CPT | Performed by: FAMILY MEDICINE

## 2024-01-22 PROCEDURE — 95251 CONT GLUC MNTR ANALYSIS I&R: CPT | Performed by: FAMILY MEDICINE

## 2024-01-22 RX ORDER — METFORMIN HCL 500 MG
2000 TABLET, EXTENDED RELEASE 24 HR ORAL
Qty: 360 TABLET | Refills: 1 | Status: SHIPPED | OUTPATIENT
Start: 2024-01-22 | End: 2024-04-16

## 2024-01-22 RX ORDER — GLIPIZIDE 2.5 MG/1
5 TABLET, EXTENDED RELEASE ORAL DAILY
Qty: 180 TABLET | Refills: 1 | Status: SHIPPED | OUTPATIENT
Start: 2024-01-22 | End: 2024-04-16

## 2024-01-22 ASSESSMENT — PAIN SCALES - GENERAL: PAINLEVEL: NO PAIN (0)

## 2024-01-22 NOTE — LETTER
My Heart Failure Action Plan  Name: Marianne Monroy   YOB: 1955  Date: 1/22/2024   My doctor:   Roxanna Otoole [address]  [place] [id number] [phone] My Diagnosis:   My Ejection Fraction:   Lab Results   Component Value Date    LVEF 40-45% (mildly reduced) 08/10/2023       My Exercise Goal: Start exercise slowly - to begin, do a few minutes of exercise, several times a day. Increase your time and speed [id number] to build tolerance, with a goal of 30 minutes of exercise daily. Steady, slow, and consistent exercise is both safe and healthy. Stop and rest when you feel tired or become short of breath. Do not push yourself on days when you don t feel well.       My Weight Plan:   Wt Readings from Last 2 Encounters:   01/22/24 56.7 kg (125 lb)   09/14/23 55.9 kg (123 lb 3.2 oz)     Weigh yourself daily using the same scale. If you gain more than 2 pounds in 24 hours or 5 pounds in 7 days. call the clinic    My Diet Goal: No added salt    Emergency Room Visits:    Our goal is to improve your quality of life and help you avoid a visit to the emergency room or hospital.  If we work together, we can achieve this goal. But, if you feel you need to call 911 or go to the emergency room, please do so.  If you go to the emergency room, please bring your list of medicines and your daily weight chart with you.    Each day ask yourself:  Is my weight up?  Do I have swelling?  Do I have trouble breathing?  How did I sleep?  Other problems?       GREEN ZONE     Weight gained is no more than 2 pounds a day or 5 pounds a in 7 days.  No swelling in feet, ankles, legs or stomach.  No more swelling than usual.  No more trouble breathing than usual.  No change in my sleep.  No other problems. What should I do?  I am doing fine. I will take my medicine, follow my diet, see my doctor, exercise, and watch for symptoms.           YELLOW ZONE         Weight gain of  more than 2 pounds in one day or 5 pounds in 7 days.  New swelling in ankle, leg, knee or thigh.  Bloating in belly, pants feel tighter.  Swelling in hands or face.  Coughing or trouble breathing while walking or talking.  Harder to breathe last night.  Have trouble sleeping, wake up short of breath.  Unusually tired.  Not eating.  Nausea, vomiting, or diarrhea  Pain in my chest or bad  leg cramps.  Feel weak or dizzy. What should I do?  I need to take action and call my doctor or nurse today.                 RED ZONE         Weight gain of 5 pounds overnight.  Chest pain or pressure that does not go away.  Feel less alert.  Wheezing or have trouble breathing when at rest.  Cannot sleep lying down.  Cannot take my medicines.  Pass out or faint. What should I do?  I need to call my doctor or nurse now!  Call 911 if I have chest pain or cannot breathe.

## 2024-01-22 NOTE — PROGRESS NOTES
Assessment & Plan     Type 2 diabetes mellitus with other circulatory complication, without long-term current use of insulin (H)  Controlled  Advised she decrease Glipzide to 5 mg daily with food  Take a bedtime snack  If any further Hypoglycemia let me know  - HEMOGLOBIN A1C; Future  - Lipid panel reflex to direct LDL Fasting; Future  - Albumin Random Urine Quantitative with Creat Ratio; Future  - Adult Eye  Referral; Future  - glipiZIDE (GLUCOTROL XL) 2.5 MG 24 hr tablet; Take 2 tablets (5 mg) by mouth daily  - metFORMIN (GLUCOPHAGE XR) 500 MG 24 hr tablet; Take 4 tablets (2,000 mg) by mouth daily (with dinner)  - HEMOGLOBIN A1C  - Lipid panel reflex to direct LDL Fasting    Hypoglycemia  As above      HFrEF (heart failure with reduced ejection fraction) (H)  Stable   Sees Cardiology  Notes reviewed     S/P CABG (coronary artery bypass graft)  Stable   - CBC with platelets; Future  - CBC with platelets    Major depressive disorder, recurrent episode, mild (H24)  Meds refilled   Stable   Type 2 diabetes mellitus with diabetic neuropathy, without long-term current use of insulin (H)  Meds refilled   - empagliflozin (JARDIANCE) 25 MG TABS tablet; Take 1 tablet (25 mg) by mouth daily    Hypertension, goal below 140/90  Stable   - Lipid panel reflex to direct LDL Fasting; Future  - Lipid panel reflex to direct LDL Fasting    Hyperlipidemia LDL goal <70  Stable     Coronary artery disease involving coronary bypass graft of native heart without angina pectoris  Doing well     History of ventricular tachycardia  Stable     Visit for screening mammogram  Advised   - MA Screening Digital Bilateral; Future    Need for shingles vaccine  Advised   - zoster vaccine recombinant adjuvanted (SHINGRIX) injection; Inject 0.5 mLs into the muscle once for 1 dose Pharmacist administered    Blood sugar testing frequency justification:  Risk of hypoglycemia with medication(s)  Pt will call if any further Hypoglycemia  She  Knows what to do  Also has CGM which was reviewed Today    Sil Vera is a 68 year old, presenting for the following health issues:  Diabetes        1/22/2024    11:17 AM   Additional Questions   Roomed by Linda     Via the Health Maintenance questionnaire, the patient has reported the following services have been completed -Eye Exam, this information has been sent to the abstraction team.  History of Present Illness       Diabetes:   She presents for follow up of diabetes.  She is checking home blood glucose four or more times daily.   She checks blood glucose before meals and before and after meals.  Blood glucose is sometimes over 200 and sometimes under 70. She is aware of hypoglycemia symptoms including dizziness, weakness and blurred vision.   She is concerned about other.   She is having burning in feet, excessive thirst and blurry vision.  The patient has had a diabetic eye exam in the last 12 months. Eye exam performed on not sure. Location of last eye exam not sure.        She eats 2-3 servings of fruits and vegetables daily.She consumes 0 sweetened beverage(s) daily.She exercises with enough effort to increase her heart rate 10 to 19 minutes per day.  She exercises with enough effort to increase her heart rate 3 or less days per week. She is missing 1 dose(s) of medications per week.  She is not taking prescribed medications regularly due to remembering to take.     Continuous Glucose Monitoring Data Download and Interpretation    AGP Screenshot                Target A1c: Less than 8    Most Recent Hemoglobin A1c:  Recent Labs   Lab Test 09/12/23  1520   A1C 7.3*     Glucose Patterns & Trends:  pt has ahd some Hypoglycemia    Plan:   CGM reviewed   She is able to tell and Knows what to do   Notices it when she does not take a bed Time snack  .  Coronary artery bypass grafting x 4 (left internal mammary artery to left anterior descending artery, saphenous vein graft to distal right coronary artery,  saphenous vein graft to ramus intermedius artery, saphenous vein graft to obtuse marginal artery).  2.  Endoscopic vein harvest.    Hyperlipidemia Follow-Up    Are you regularly taking any medication or supplement to lower your cholesterol?   Yes- statins  Are you having muscle aches or other side effects that you think could be caused by your cholesterol lowering medication?  No    Hypertension Follow-up    Do you check your blood pressure regularly outside of the clinic? Yes   Are you following a low salt diet? Yes  Are your blood pressures ever more than 140 on the top number (systolic) OR more   than 90 on the bottom number (diastolic), for example 140/90? No    Vascular Disease Follow-up    How often do you take nitroglycerin? Never  Do you take an aspirin every day? Yes    Heart Failure Follow-up   Are you experiencing any shortness of breath? occasional  Are you experiencing any swelling in your legs or feet?  No  Are you using more pillows than usual? No  Do you cough at night?  No  Do you check your weight daily?  Yes  Have you had a weight change recently?  No  Are you having any of the following side effects from your medications? (Select all that apply)  The patient does not report symptoms of dizziness, fatigue, cough, swelling, or slow heart beat.  Since your last visit, how many times have you gone to the cardiologist, urgent care, emergency room, or hospital because of your heart failure?   2 times  Last Echo: Echo result w/o MOPS: Interpretation Summary Left ventricular function is decreased. The ejection fraction is 40-45%(mildly reduced).Global right ventricular function is normal.Mild mitral insufficiency is present.Mild to moderate tricuspid insufficiency is present.IVC diameter <2.1 cm collapsing >50% with sniff suggests a normal RA pressureof 3 mmHg.No pericardial effusion is present.Compared to prior, LV fxn is unchanged to slightly improved.    CM.  Stage C  NYHA Class III  ACEi/ARB:  -Entresto decrease to 97/103  mg take one in the pm and 1/2 tab in am  BB: yes-  Metoprolol 75 mg   Aldosterone antagonist: 12.5 mg   SCD prophylaxis: does not meet criteria for implant  Fluid status: euvolemic  Anticoagulation:   Antiplatelet:  ASA dose   Sleep apnea:  NSAID use:  Contraindicated.  Avoid use.  Remote Monitoring:none  SGLT 2 - Jardiance 25 mg        Depression Followup  How are you doing with your depression since your last visit? Stable   Are you having other symptoms that might be associated with depression? No  Have you had a significant life event?  No   Are you feeling anxious or having panic attacks?   No  Do you have any concerns with your use of alcohol or other drugs? No    Social History     Tobacco Use    Smoking status: Never    Smokeless tobacco: Never   Vaping Use    Vaping Use: Never used   Substance Use Topics    Alcohol use: Yes     Comment: 1 drink a week    Drug use: No         9/14/2022     9:27 AM 3/1/2023     5:34 PM 9/14/2023     3:25 PM   PHQ   PHQ-9 Total Score 5 4 1   Q9: Thoughts of better off dead/self-harm past 2 weeks Not at all Not at all Not at all         9/24/2019     4:36 PM 2/12/2020    12:02 PM 5/18/2021     4:18 PM   JUSTUS-7 SCORE   Total Score 4 6 2         9/14/2023     3:25 PM   Last PHQ-9   1.  Little interest or pleasure in doing things 0   2.  Feeling down, depressed, or hopeless 0   3.  Trouble falling or staying asleep, or sleeping too much 0   4.  Feeling tired or having little energy 1   5.  Poor appetite or overeating 0   6.  Feeling bad about yourself 0   7.  Trouble concentrating 0   8.  Moving slowly or restless 0   Q9: Thoughts of better off dead/self-harm past 2 weeks 0   PHQ-9 Total Score 1         5/18/2021     4:18 PM   JUSTUS-7    1. Feeling nervous, anxious, or on edge 0   2. Not being able to stop or control worrying 0   3. Worrying too much about different things 0   4. Trouble relaxing 0   5. Being so restless that it is hard to sit still 1  "  6. Becoming easily annoyed or irritable 1   7. Feeling afraid, as if something awful might happen 0   JUSTUS-7 Total Score 2   If you checked any problems, how difficult have they made it for you to do your work, take care of things at home, or get along with other people? Somewhat difficult       Suicide Assessment Five-step Evaluation and Treatment (SAFE-T)        Review of Systems  CONSTITUTIONAL: NEGATIVE for fever, chills, change in weight  ENT/MOUTH: NEGATIVE for ear, mouth and throat problems  RESP: NEGATIVE for significant cough or SOB  CV: NEGATIVE for chest pain, palpitations or peripheral edema  GI: NEGATIVE for nausea, abdominal pain, heartburn, or change in bowel habits  PSYCHIATRIC: NEGATIVE for changes in mood or affect  ROS otherwise negative      Objective    /67   Pulse 68   Temp 97.2  F (36.2  C) (Temporal)   Ht 1.606 m (5' 3.23\")   Wt 56.7 kg (125 lb)   SpO2 100%   BMI 21.98 kg/m    Body mass index is 21.98 kg/m .  Physical Exam   GENERAL: alert and no distress  EYES: Eyes grossly normal to inspection  NECK: no adenopathy, no asymmetry, masses, or scars  RESP: lungs clear to auscultation - no rales, rhonchi or wheezes  CV: regular rate and rhythm, normal S1 S2, no S3 or S4, no murmur, click or rub, no peripheral edema  ABDOMEN: soft, nontender, no hepatosplenomegaly, no masses and bowel sounds normal  MS: no gross musculoskeletal defects noted, no edema  PSYCH: mentation appears normal, affect normal/bright  Diabetic foot exam: normal DP and PT pulses, no trophic changes or ulcerative lesions, and mild decreased sensation Left Foot-Forefoot    Pending         Signed Electronically by: Roxanna Otoole MD    "

## 2024-01-23 LAB
CHOLEST SERPL-MCNC: 126 MG/DL
FASTING STATUS PATIENT QL REPORTED: NO
HDLC SERPL-MCNC: 45 MG/DL
LDLC SERPL CALC-MCNC: 53 MG/DL
NONHDLC SERPL-MCNC: 81 MG/DL
TRIGL SERPL-MCNC: 142 MG/DL

## 2024-01-26 ENCOUNTER — ANCILLARY PROCEDURE (OUTPATIENT)
Dept: CARDIOLOGY | Facility: CLINIC | Age: 69
End: 2024-01-26
Attending: INTERNAL MEDICINE
Payer: COMMERCIAL

## 2024-01-26 DIAGNOSIS — Z95.810 ICD (IMPLANTABLE CARDIOVERTER-DEFIBRILLATOR), SINGLE, IN SITU: ICD-10-CM

## 2024-01-26 PROCEDURE — 93296 REM INTERROG EVL PM/IDS: CPT

## 2024-01-26 PROCEDURE — 93295 DEV INTERROG REMOTE 1/2/MLT: CPT | Performed by: INTERNAL MEDICINE

## 2024-01-28 DIAGNOSIS — F33.0 MAJOR DEPRESSIVE DISORDER, RECURRENT EPISODE, MILD (H): ICD-10-CM

## 2024-01-29 DIAGNOSIS — E16.2 HYPOGLYCEMIA: ICD-10-CM

## 2024-01-29 DIAGNOSIS — E11.59 TYPE 2 DIABETES MELLITUS WITH OTHER CIRCULATORY COMPLICATION, WITHOUT LONG-TERM CURRENT USE OF INSULIN (H): ICD-10-CM

## 2024-01-30 RX ORDER — FLASH GLUCOSE SENSOR
KIT MISCELLANEOUS
Qty: 2 EACH | Refills: 0 | Status: SHIPPED | OUTPATIENT
Start: 2024-01-30

## 2024-01-31 ENCOUNTER — PATIENT OUTREACH (OUTPATIENT)
Dept: CARE COORDINATION | Facility: CLINIC | Age: 69
End: 2024-01-31
Payer: COMMERCIAL

## 2024-02-01 ENCOUNTER — MYC MEDICAL ADVICE (OUTPATIENT)
Dept: FAMILY MEDICINE | Facility: CLINIC | Age: 69
End: 2024-02-01
Payer: COMMERCIAL

## 2024-02-01 DIAGNOSIS — F33.0 MAJOR DEPRESSIVE DISORDER, RECURRENT EPISODE, MILD (H): ICD-10-CM

## 2024-02-08 LAB
MDC_IDC_LEAD_CONNECTION_STATUS: NORMAL
MDC_IDC_LEAD_IMPLANT_DT: NORMAL
MDC_IDC_LEAD_LOCATION: NORMAL
MDC_IDC_LEAD_LOCATION_DETAIL_1: NORMAL
MDC_IDC_LEAD_MFG: NORMAL
MDC_IDC_LEAD_MODEL: NORMAL
MDC_IDC_LEAD_POLARITY_TYPE: NORMAL
MDC_IDC_LEAD_SERIAL: NORMAL
MDC_IDC_LEAD_SPECIAL_FUNCTION: NORMAL
MDC_IDC_MSMT_BATTERY_DTM: NORMAL
MDC_IDC_MSMT_BATTERY_REMAINING_LONGEVITY: 156 MO
MDC_IDC_MSMT_BATTERY_RRT_TRIGGER: NORMAL
MDC_IDC_MSMT_BATTERY_VOLTAGE: 3.04 V
MDC_IDC_MSMT_CAP_CHARGE_DTM: NORMAL
MDC_IDC_MSMT_CAP_CHARGE_ENERGY: 18 J
MDC_IDC_MSMT_CAP_CHARGE_TIME: 3.8 S
MDC_IDC_MSMT_CAP_CHARGE_TYPE: NORMAL
MDC_IDC_MSMT_LEADCHNL_RV_IMPEDANCE_VALUE: 266 OHM
MDC_IDC_MSMT_LEADCHNL_RV_IMPEDANCE_VALUE: 380 OHM
MDC_IDC_MSMT_LEADCHNL_RV_PACING_THRESHOLD_AMPLITUDE: 0.62 V
MDC_IDC_MSMT_LEADCHNL_RV_PACING_THRESHOLD_PULSEWIDTH: 0.4 MS
MDC_IDC_MSMT_LEADCHNL_RV_SENSING_INTR_AMPL: 8.1 MV
MDC_IDC_PG_IMPLANT_DTM: NORMAL
MDC_IDC_PG_MFG: NORMAL
MDC_IDC_PG_MODEL: NORMAL
MDC_IDC_PG_SERIAL: NORMAL
MDC_IDC_PG_TYPE: NORMAL
MDC_IDC_SESS_CLINIC_NAME: NORMAL
MDC_IDC_SESS_DTM: NORMAL
MDC_IDC_SESS_TYPE: NORMAL
MDC_IDC_SET_BRADY_HYSTRATE: NORMAL
MDC_IDC_SET_BRADY_LOWRATE: 40 {BEATS}/MIN
MDC_IDC_SET_BRADY_MODE: NORMAL
MDC_IDC_SET_LEADCHNL_RV_PACING_AMPLITUDE: 2 V
MDC_IDC_SET_LEADCHNL_RV_PACING_ANODE_ELECTRODE_1: NORMAL
MDC_IDC_SET_LEADCHNL_RV_PACING_ANODE_LOCATION_1: NORMAL
MDC_IDC_SET_LEADCHNL_RV_PACING_CAPTURE_MODE: NORMAL
MDC_IDC_SET_LEADCHNL_RV_PACING_CATHODE_ELECTRODE_1: NORMAL
MDC_IDC_SET_LEADCHNL_RV_PACING_CATHODE_LOCATION_1: NORMAL
MDC_IDC_SET_LEADCHNL_RV_PACING_POLARITY: NORMAL
MDC_IDC_SET_LEADCHNL_RV_PACING_PULSEWIDTH: 0.4 MS
MDC_IDC_SET_LEADCHNL_RV_SENSING_ANODE_ELECTRODE_1: NORMAL
MDC_IDC_SET_LEADCHNL_RV_SENSING_ANODE_LOCATION_1: NORMAL
MDC_IDC_SET_LEADCHNL_RV_SENSING_CATHODE_ELECTRODE_1: NORMAL
MDC_IDC_SET_LEADCHNL_RV_SENSING_CATHODE_LOCATION_1: NORMAL
MDC_IDC_SET_LEADCHNL_RV_SENSING_POLARITY: NORMAL
MDC_IDC_SET_LEADCHNL_RV_SENSING_SENSITIVITY: 0.3 MV
MDC_IDC_SET_ZONE_DETECTION_BEATS_DENOMINATOR: 16 {BEATS}
MDC_IDC_SET_ZONE_DETECTION_BEATS_DENOMINATOR: 32 {BEATS}
MDC_IDC_SET_ZONE_DETECTION_BEATS_DENOMINATOR: 40 {BEATS}
MDC_IDC_SET_ZONE_DETECTION_BEATS_NUMERATOR: 16 {BEATS}
MDC_IDC_SET_ZONE_DETECTION_BEATS_NUMERATOR: 30 {BEATS}
MDC_IDC_SET_ZONE_DETECTION_BEATS_NUMERATOR: 32 {BEATS}
MDC_IDC_SET_ZONE_DETECTION_INTERVAL: 270 MS
MDC_IDC_SET_ZONE_DETECTION_INTERVAL: 320 MS
MDC_IDC_SET_ZONE_DETECTION_INTERVAL: 360 MS
MDC_IDC_SET_ZONE_DETECTION_INTERVAL: 360 MS
MDC_IDC_SET_ZONE_STATUS: NORMAL
MDC_IDC_SET_ZONE_TYPE: NORMAL
MDC_IDC_SET_ZONE_VENDOR_TYPE: NORMAL
MDC_IDC_STAT_AT_BURDEN_PERCENT: 0 %
MDC_IDC_STAT_AT_DTM_END: NORMAL
MDC_IDC_STAT_AT_DTM_START: NORMAL
MDC_IDC_STAT_BRADY_DTM_END: NORMAL
MDC_IDC_STAT_BRADY_DTM_START: NORMAL
MDC_IDC_STAT_BRADY_RV_PERCENT_PACED: 0.01 %
MDC_IDC_STAT_CRT_DTM_END: NORMAL
MDC_IDC_STAT_CRT_DTM_START: NORMAL
MDC_IDC_STAT_EPISODE_RECENT_COUNT: 0
MDC_IDC_STAT_EPISODE_RECENT_COUNT_DTM_END: NORMAL
MDC_IDC_STAT_EPISODE_RECENT_COUNT_DTM_START: NORMAL
MDC_IDC_STAT_EPISODE_TOTAL_COUNT: 0
MDC_IDC_STAT_EPISODE_TOTAL_COUNT: 3
MDC_IDC_STAT_EPISODE_TOTAL_COUNT_DTM_END: NORMAL
MDC_IDC_STAT_EPISODE_TOTAL_COUNT_DTM_START: NORMAL
MDC_IDC_STAT_EPISODE_TYPE: NORMAL
MDC_IDC_STAT_TACHYTHERAPY_ATP_DELIVERED_RECENT: 0
MDC_IDC_STAT_TACHYTHERAPY_ATP_DELIVERED_TOTAL: 0
MDC_IDC_STAT_TACHYTHERAPY_RECENT_DTM_END: NORMAL
MDC_IDC_STAT_TACHYTHERAPY_RECENT_DTM_START: NORMAL
MDC_IDC_STAT_TACHYTHERAPY_SHOCKS_ABORTED_RECENT: 0
MDC_IDC_STAT_TACHYTHERAPY_SHOCKS_ABORTED_TOTAL: 0
MDC_IDC_STAT_TACHYTHERAPY_SHOCKS_DELIVERED_RECENT: 0
MDC_IDC_STAT_TACHYTHERAPY_SHOCKS_DELIVERED_TOTAL: 0
MDC_IDC_STAT_TACHYTHERAPY_TOTAL_DTM_END: NORMAL
MDC_IDC_STAT_TACHYTHERAPY_TOTAL_DTM_START: NORMAL

## 2024-02-14 ENCOUNTER — PATIENT OUTREACH (OUTPATIENT)
Dept: CARE COORDINATION | Facility: CLINIC | Age: 69
End: 2024-02-14
Payer: COMMERCIAL

## 2024-02-14 ENCOUNTER — TELEPHONE (OUTPATIENT)
Dept: FAMILY MEDICINE | Facility: CLINIC | Age: 69
End: 2024-02-14
Payer: COMMERCIAL

## 2024-02-14 NOTE — TELEPHONE ENCOUNTER
Prior Authorization Retail Medication Request    Medication/Dose: Continuous Blood Gluc Sensor (FREESTYLE VANESSA 14 DAY SENSOR) MISC   Diagnosis and ICD code (if different than what is on RX):    New/renewal/insurance change PA/secondary ins. PA:  Previously Tried and Failed:    Rationale:      Insurance   Primary: [place]  Insurance ID:  [id number]    Pharmacy Information (if different than what is on RX)  Name:  [facility]  Phone:  [phone]   Fax: [phone]

## 2024-02-15 NOTE — TELEPHONE ENCOUNTER
Retail Pharmacy Prior Authorization Team   Phone: [phone]         Note: Due to record-high volumes, our turn-around is time taking longer than normal . We are currently 10 days behind in the pools. We work according to time/date the requests are sent to our [facility] pool.  We are currently processing request on 02/01/24   We are working diligently to submit all requests in a timely manner and in the order they are received. Please only flag TRUE URGENT requests as high priority to the pool at this time.   If you have questions - please send a note/message in the active PA encounter and send back to the RPPA (Retail Pharmacy Prior Authorization) team [[id number]].    If you have more specific questions about our process please reach out to our supervisor Jannet Alcaraz.   Thank you!      A few reminders when submitting a PA request to the [facility] PA MED pool ([id number]) for a retail medication:        If you have a question about the status of a PA request, please send a follow-up message to the pool; do not send a duplicate request.     Urgent requests are defined as one or more of the following situations:     The patient is at risk for severe side effects if they do not get the medication as soon as possible.     The patient is at risk for admission to the hospital if they do not get the medication as soon as possible.     The patient poses a danger to himself or herself without this medication.        When submitting a PA request, please include the insurance name, phone number, ID, and pharmacy that is requesting the PA whenever possible.

## 2024-02-26 ENCOUNTER — DOCUMENTATION ONLY (OUTPATIENT)
Dept: CARDIOLOGY | Facility: CLINIC | Age: 69
End: 2024-02-26
Payer: COMMERCIAL

## 2024-02-26 NOTE — PROGRESS NOTES
Marianne Monroy has an upcoming lab appointment:    Please place lab orders in epic    Thank you    Future Appointments   Date Time Provider Department Center   3/5/2024  3:00 PM FK [facility] [place] [facility]   3/7/2024  3:30 PM [facility]

## 2024-02-27 NOTE — TELEPHONE ENCOUNTER
Central Prior Authorization Team - Phone: [phone]     PA Initiation    Medication: FREESTYLE VANESSA 14 DAY SENSOR [facility]  Insurance Company: [employer] - Phone [phone] Fax [phone]  Pharmacy Filling the Rx: [id number] [address]  Filling Pharmacy Phone: [phone]  Filling Pharmacy Fax:    Start Date: 2/27/2024

## 2024-02-29 DIAGNOSIS — I50.20 HFREF (HEART FAILURE WITH REDUCED EJECTION FRACTION) (H): Primary | ICD-10-CM

## 2024-03-01 NOTE — TELEPHONE ENCOUNTER
Prior Authorization Approval    Authorization Effective Date: 3/1/2024  Authorization Expiration Date: 3/1/2027  Medication: FreeStyle Herbert 14 Day Sensor- PA INITIATED  Reference #:     Insurance Company: [employer] [place] - Phone [phone] Fax [phone]  Which Pharmacy is filling the prescription (Not needed for infusion/clinic administered): [id number] [place], [place] - [address]  Pharmacy Notified: Yes  Patient Notified: Instructed pharmacy to notify patient when script is ready to /ship.

## 2024-03-05 ENCOUNTER — LAB (OUTPATIENT)
Dept: LAB | Facility: CLINIC | Age: 69
End: 2024-03-05
Payer: COMMERCIAL

## 2024-03-05 DIAGNOSIS — I50.20 HFREF (HEART FAILURE WITH REDUCED EJECTION FRACTION) (H): ICD-10-CM

## 2024-03-05 PROCEDURE — 36415 COLL VENOUS BLD VENIPUNCTURE: CPT

## 2024-03-05 PROCEDURE — 80048 BASIC METABOLIC PNL TOTAL CA: CPT

## 2024-03-06 LAB
ANION GAP SERPL CALCULATED.3IONS-SCNC: 13 MMOL/L (ref 7–15)
BUN SERPL-MCNC: 30.7 MG/DL (ref 8–23)
CALCIUM SERPL-MCNC: 9.5 MG/DL (ref 8.8–10.2)
CHLORIDE SERPL-SCNC: 100 MMOL/L (ref 98–107)
CREAT SERPL-MCNC: 1.18 MG/DL (ref 0.51–0.95)
DEPRECATED HCO3 PLAS-SCNC: 25 MMOL/L (ref 22–29)
EGFRCR SERPLBLD CKD-EPI 2021: 49 ML/MIN/1.73M2
GLUCOSE SERPL-MCNC: 224 MG/DL (ref 70–99)
POTASSIUM SERPL-SCNC: 5.7 MMOL/L (ref 3.4–5.3)
SODIUM SERPL-SCNC: 138 MMOL/L (ref 135–145)

## 2024-03-07 ENCOUNTER — OFFICE VISIT (OUTPATIENT)
Dept: CARDIOLOGY | Facility: CLINIC | Age: 69
End: 2024-03-07
Payer: COMMERCIAL

## 2024-03-07 VITALS
BODY MASS INDEX: 22.23 KG/M2 | SYSTOLIC BLOOD PRESSURE: 92 MMHG | DIASTOLIC BLOOD PRESSURE: 57 MMHG | HEART RATE: 61 BPM | WEIGHT: 126.4 LBS | OXYGEN SATURATION: 97 %

## 2024-03-07 DIAGNOSIS — E11.9 DIABETES MELLITUS TYPE 2, NONINSULIN DEPENDENT (H): ICD-10-CM

## 2024-03-07 DIAGNOSIS — I10 HYPERTENSION, UNSPECIFIED TYPE: ICD-10-CM

## 2024-03-07 DIAGNOSIS — I50.20 HFREF (HEART FAILURE WITH REDUCED EJECTION FRACTION) (H): Primary | ICD-10-CM

## 2024-03-07 DIAGNOSIS — I25.5 ISCHEMIC CARDIOMYOPATHY: ICD-10-CM

## 2024-03-07 DIAGNOSIS — Z95.810 S/P ICD (INTERNAL CARDIAC DEFIBRILLATOR) PROCEDURE: ICD-10-CM

## 2024-03-07 PROCEDURE — 99214 OFFICE O/P EST MOD 30 MIN: CPT | Performed by: NURSE PRACTITIONER

## 2024-03-07 NOTE — LETTER
3/7/2024      RE: Marianne Monroy  [address]  [facility] [address]       Dear Colleague,    Thank you for the opportunity to participate in the care of your patient, Marianne Monroy, at the [facility]. Please see a copy of my visit note below.    Optimal Vascular Metrics    Blood Pressure   BP < 140/90 Yes    On Aspirin  Yes    On Statin  Yes    Tobacco use  No          HPI: Marianne is a 69 year old White female with a past medical history of T2DM, HTN, GERD, JUSTUS, MDD and recent episode of lightheadedness and dizziness on 4/30    Admission 5/18-6/4 - 5/24/2022.  Surgeon: Dr. Ulises Desai  1.  Coronary artery bypass grafting x 4 (left internal mammary artery to left anterior descending artery, saphenous vein graft to distal right coronary artery, saphenous vein graft to ramus intermedius artery, saphenous vein graft to obtuse marginal artery).  2.  Endoscopic vein harvest.    Patient has no SOB at rest.  She has some JEFFERSON with stairs.   She has been consuming more bananas. She also has not been hydrating well.  No swelling in the legs today. She has been taking the 97/103 mg BID of Entresto for a few weeks.  116/73 HR 60- . She has some lightheadedness which has been ongoing.  Weight at home 123 lbs ( stable).  She joined [employer] 2-3 times a week.    Denies SOB,PND, orthopnea, edema, weight gain, chest pain, palpitations, lightheadedness, dizziness, near syncopal/syncopal episodes. Marianne has been following salt and fluid restrictions.      PAST MEDICAL HISTORY:  Past Medical History:   Diagnosis Date    Abnormal Pap smear of cervix ~2000    repeat pap was normal    Allergic rhinitis, cause unspecified     Anxiety state, unspecified     panic episodes    C. difficile colitis     CAD (coronary artery disease)     Congestive heart failure (H)     Depressive disorder     Diabetes (H)     HFrEF (heart failure with  reduced ejection fraction) (H)     ICD (implantable cardioverter-defibrillator) in place     Osteopenia     Unspecified essential hypertension        FAMILY HISTORY:  Family History   Problem Relation Age of Onset    Diabetes Mother         hypertension, alive at 83    C.A.D. Mother     Coronary Artery Disease Mother     Cancer Father         lung cancer, smoker.   at age 53    Family History Negative Sister     Osteoporosis Sister     Anesthesia Reaction No family hx of     Deep Vein Thrombosis (DVT) No family hx of        SOCIAL HISTORY:  Social History     Tobacco Use    Smoking status: Never    Smokeless tobacco: Never   Vaping Use    Vaping Use: Never used   Substance Use Topics    Alcohol use: Yes     Comment: 1 drink a week    Drug use: No           CURRENT MEDICATIONS:  Current Outpatient Medications   Medication Sig Dispense Refill    acetaminophen (TYLENOL) 500 MG tablet Take 1,000 mg by mouth 3 times daily as needed for mild pain      aspirin 81 MG EC tablet Take 81 mg by mouth every evening      atorvastatin (LIPITOR) 80 MG tablet TAKE 1 TABLET DAILY (NEED ANNUAL EXAM) Strength: 80 mg 90 tablet 3    blood glucose (ONETOUCH VERIO IQ) test strip Use to test blood sugar 2 times daily or as directed. 200 strip 1    blood glucose monitoring (ONETOUCH VERIO) meter device kit Use to test blood sugar 2 times daily or as directed. 1 kit 0    Continuous Blood Gluc Sensor (FREESTYLE VANESSA 14 DAY SENSOR) MISC Change every 14 days. 2 each 0    empagliflozin (JARDIANCE) 25 MG TABS tablet Take 1 tablet (25 mg) by mouth daily 90 tablet 1    FLUoxetine (PROZAC) 20 MG capsule Take 2 capsules (40 mg) by mouth daily 180 capsule 0    glipiZIDE (GLUCOTROL XL) 2.5 MG 24 hr tablet Take 2 tablets (5 mg) by mouth daily 180 tablet 1    metFORMIN (GLUCOPHAGE XR) 500 MG 24 hr tablet Take 4 tablets (2,000 mg) by mouth daily (with dinner) 360 tablet 1    metoprolol succinate ER (TOPROL XL) 50 MG 24 hr tablet Take 1.5 tablets (75  mg) by mouth daily 145 tablet 3    MULTIPLE VITAMIN OR TABS Take by mouth every morning      Multiple Vitamins-Minerals (PRESERVISION AREDS PO) Take 2 tablets by mouth daily      sacubitril-valsartan (ENTRESTO)  MG per tablet Take 1/2 tablet in AM, take 1 tablet in  tablet 3    spironolactone (ALDACTONE) 25 MG tablet Take 0.5 tablets (12.5 mg) by mouth daily 45 tablet 3           ROS:   Constitutional: No fever, chills, or sweats.  ENT: No visual disturbance, ear ache, epistaxis, sore throat.   Allergies/Immunologic: Negative  Respiratory: No cough, hemoptysis.   Cardiovascular: As per HPI.   GI: As per HPI.   : No urinary frequency, dysuria, or hematuria.   Integument: Negative.   Psychiatric: Negative.   Neuro: Negative.   Endocrinology: negative   Musculoskeletal: negative    EXAM:   General: alert, articulate, and in no acute distress.  HEENT: normocephalic, atraumatic, anicteric sclera, EOMI, mucosa moist, no cyanosis.   Neck: neck supple.  No adenopathy, masses, or carotid bruits.  JVP flat at 90 degrees  Heart: regular rhythm, normal S1/S2, no murmur, gallop, rub.  Precordium quiet with normal PMI.     Lungs: clear, no rales, ronchi, or wheezing.  No accessory muscle use, respirations unlabored.   Abdomen: soft, non-tender, bowel sounds present, no hepatomegaly  Extremities: no pitting edema.   No cyanosis.   Neurological: alert and oriented x 3.  normal speech and affect, no gross motor deficits  Skin:  No ecchymoses, rashes, or clubbing.    Labs:  CBC RESULTS:  Lab Results   Component Value Date    WBC 7.4 01/22/2024    WBC 7.1 09/09/2020    RBC 4.49 01/22/2024    RBC 4.57 09/09/2020    HGB 12.8 01/22/2024    HGB 13.3 09/09/2020    HCT 40.4 01/22/2024    HCT 41.1 09/09/2020    MCV 90 01/22/2024    MCV 90 09/09/2020    MCH 28.5 01/22/2024    MCH 29.1 09/09/2020    MCHC 31.7 01/22/2024    MCHC 32.4 09/09/2020    RDW 14.7 01/22/2024    RDW 12.5 09/09/2020     01/22/2024      "09/09/2020       CMP RESULTS:  Lab Results   Component Value Date     03/05/2024     01/07/2021    POTASSIUM 5.7 (H) 03/05/2024    POTASSIUM 5.0 02/07/2023    POTASSIUM 4.8 01/07/2021    CHLORIDE 100 03/05/2024    CHLORIDE 108 02/07/2023    CHLORIDE 106 01/07/2021    CO2 25 03/05/2024    CO2 29 02/07/2023    CO2 28 01/07/2021    ANIONGAP 13 03/05/2024    ANIONGAP 3 02/07/2023    ANIONGAP 7 01/07/2021     (H) 03/05/2024     (H) 02/16/2023     (H) 02/07/2023     (H) 01/07/2021    BUN 30.7 (H) 03/05/2024    BUN 25 02/07/2023    BUN 19 01/07/2021    CR 1.18 (H) 03/05/2024    CR 0.90 01/07/2021    GFRESTIMATED 49 (L) 03/05/2024    GFRESTIMATED 66 01/07/2021    GFRESTBLACK 77 01/07/2021    POONAM 9.5 03/05/2024    POONAM 9.5 01/07/2021    BILITOTAL 0.4 07/06/2022    BILITOTAL 0.4 09/09/2020    ALBUMIN 3.7 07/06/2022    ALBUMIN 3.7 09/09/2020    ALKPHOS 107 07/06/2022    ALKPHOS 90 09/09/2020    ALT 21 09/12/2023    ALT 33 09/09/2020    AST 14 07/06/2022    AST 14 09/09/2020        INR RESULTS:  Lab Results   Component Value Date    INR 1.29 (H) 05/25/2022       No components found for: \"CK\"  Lab Results   Component Value Date    MAG 2.0 06/04/2022     Lab Results   Component Value Date    NTBNP 2,714 (H) 06/09/2022     @BRIEFLABR (dig)@    Most recent echocardiogram:  No results found for this or any previous visit (from the past 8760 hour(s)).      Assessment and Plan:    In summary,Marianne  is a 69 year old here for a CORE follow up. She is doing well. She wasn't well hydrated prior to her last set of labs. Advised her to hydrate better and also to decrease the oral potassium consumption.     1.  Chronic systolic heart failure secondary to ICM.  Stage C  NYHA Class III  ACEi/ARB: -Entresto decrease to 97/103  mg take one in the pm and 1/2 tab in am  BB: yes-  Metoprolol 75 mg   Aldosterone antagonist: 12.5 mg   SCD prophylaxis: does not meet criteria for implant  Fluid status: " euvolemic  Anticoagulation:   Antiplatelet:  ASA dose   Sleep apnea:  NSAID use:  Contraindicated.  Avoid use.  Remote Monitoring:none  SGLT 2 - Jardiance 25 mg    2.  Other comordbid conditions:      #T2DM - PCP to manage - glipizide , Jardiance, metformin    #HTN- Entresto and Metoprolol     #GERD- followed by PCP     3.  Follow-up   MG - next week - labs prior to visit with Dr. Power Underwood better  Hold Aldactone for 2 days then resume   CORE in 6 months           Belinda MOORE, CNP  [facility]                 Please do not hesitate to contact me if you have any questions/concerns.     Sincerely,     OSCAR Smith CNP

## 2024-03-07 NOTE — PATIENT INSTRUCTIONS
Take your medicines every day, as directed    Changes made today:  Hold aldactone for 2 days then resume  Labs next week when you are in MG - make sure you are well hydrated   Monitor Your Weight and Symptoms    Contact us if you:    Gain 2 pounds in one day or 5 pounds in one week  Feel more short of breath  Notice more leg swelling  Feel lightheadeded   Change your lifestyle    Limit Salt or Sodium:  2000 mg  Limit Fluids:  2000 mL or approximately 64 ounces  Eat a Heart Healthy Diet  Low in saturated fats  Stay Active:  Aim to move at least 150 minutes every  week         To Contact us    During Business Hours:  [phone], option # 1      After hours, weekends or holidays:   [phone], Option #4  Ask to speak to the On-Call Cardiologist. Inform them you are a CORE/heart failure patient at the [facility].     Use [employer] allows you to communicate directly with your heart team through secure messaging.  [employer] can be accessed any time on your phone, computer, or tablet.  If you need assistance, we'd be happy to help!         Keep your Heart Appointments:    CORE in 6 months     Please consider attending our virtual support group which is held monthly. Please reach out to Lloyd at [phone] for more information if you are interested in attending.     2024 dates:    Monday, February 5th , 1-2pm     Monday, March 4th , 1-2pm     Monday, April 1st, 1-2pm     Monday, May 6th, 1-2pm     Monday, June 3rd, 1-2pm     Monday, July 1st, 1-2pm     Monday, August 5th, 1-2pm     Monday, September 9th, 1-2pm     Monday, October 7th, 1-2pm     Monday, November 4th, 1-2pm     Monday, December 2nd, 1-2pm

## 2024-03-07 NOTE — NURSING NOTE
"Chief Complaint   Patient presents with    Follow Up     Reason for visit: Return CORE, 70 yo female with systolic heart failure presents for follow up with labs prior.       Initial BP 92/57 (BP Location: Left arm, Patient Position: Sitting, Cuff Size: Adult Regular)   Pulse 61   Wt 57.3 kg (126 lb 6.4 oz)   SpO2 97%   BMI 22.23 kg/m   Estimated body mass index is 22.23 kg/m  as calculated from the following:    Height as of 1/22/24: 1.606 m (5' 3.23\").    Weight as of this encounter: 57.3 kg (126 lb 6.4 oz)..  BP completed using cuff size: regular    ANDREA Thakkar    "

## 2024-03-07 NOTE — PROGRESS NOTES
HPI: Marianne is a 69 year old White female with a past medical history of T2DM, HTN, GERD, JUSTUS, MDD and recent episode of lightheadedness and dizziness on     Admission - - 2022.  Surgeon: Dr. Ulises Desai  1.  Coronary artery bypass grafting x 4 (left internal mammary artery to left anterior descending artery, saphenous vein graft to distal right coronary artery, saphenous vein graft to ramus intermedius artery, saphenous vein graft to obtuse marginal artery).  2.  Endoscopic vein harvest.    Patient has no SOB at rest.  She has some JEFFERSON with stairs.   She has been consuming more bananas. She also has not been hydrating well.  No swelling in the legs today. She has been taking the 97/103 mg BID of Entresto for a few weeks.  116/73 HR 60- . She has some lightheadedness which has been ongoing.  Weight at home 123 lbs ( stable).  She joined [employer] 2-3 times a week.    Denies SOB,PND, orthopnea, edema, weight gain, chest pain, palpitations, lightheadedness, dizziness, near syncopal/syncopal episodes. Marianne has been following salt and fluid restrictions.      PAST MEDICAL HISTORY:  Past Medical History:   Diagnosis Date    Abnormal Pap smear of cervix ~    repeat pap was normal    Allergic rhinitis, cause unspecified     Anxiety state, unspecified     panic episodes    C. difficile colitis     CAD (coronary artery disease)     Congestive heart failure (H)     Depressive disorder     Diabetes (H)     HFrEF (heart failure with reduced ejection fraction) (H)     ICD (implantable cardioverter-defibrillator) in place     Osteopenia     Unspecified essential hypertension        FAMILY HISTORY:  Family History   Problem Relation Age of Onset    Diabetes Mother         hypertension, alive at 83    C.A.D. Mother     Coronary Artery Disease Mother     Cancer Father         lung cancer, smoker.   at age 53    Family History Negative Sister     Osteoporosis Sister     Anesthesia Reaction No  family hx of     Deep Vein Thrombosis (DVT) No family hx of        SOCIAL HISTORY:  Social History     Tobacco Use    Smoking status: Never    Smokeless tobacco: Never   Vaping Use    Vaping Use: Never used   Substance Use Topics    Alcohol use: Yes     Comment: 1 drink a week    Drug use: No           CURRENT MEDICATIONS:  Current Outpatient Medications   Medication Sig Dispense Refill    acetaminophen (TYLENOL) 500 MG tablet Take 1,000 mg by mouth 3 times daily as needed for mild pain      aspirin 81 MG EC tablet Take 81 mg by mouth every evening      atorvastatin (LIPITOR) 80 MG tablet TAKE 1 TABLET DAILY (NEED ANNUAL EXAM) Strength: 80 mg 90 tablet 3    blood glucose (ONETOUCH VERIO IQ) test strip Use to test blood sugar 2 times daily or as directed. 200 strip 1    blood glucose monitoring (ONETOUCH VERIO) meter device kit Use to test blood sugar 2 times daily or as directed. 1 kit 0    Continuous Blood Gluc Sensor (Zenda TechnologiesSTYLE VANESSA 14 DAY SENSOR) MISC Change every 14 days. 2 each 0    empagliflozin (JARDIANCE) 25 MG TABS tablet Take 1 tablet (25 mg) by mouth daily 90 tablet 1    FLUoxetine (PROZAC) 20 MG capsule Take 2 capsules (40 mg) by mouth daily 180 capsule 0    glipiZIDE (GLUCOTROL XL) 2.5 MG 24 hr tablet Take 2 tablets (5 mg) by mouth daily 180 tablet 1    metFORMIN (GLUCOPHAGE XR) 500 MG 24 hr tablet Take 4 tablets (2,000 mg) by mouth daily (with dinner) 360 tablet 1    metoprolol succinate ER (TOPROL XL) 50 MG 24 hr tablet Take 1.5 tablets (75 mg) by mouth daily 145 tablet 3    MULTIPLE VITAMIN OR TABS Take by mouth every morning      Multiple Vitamins-Minerals (PRESERVISION AREDS PO) Take 2 tablets by mouth daily      sacubitril-valsartan (ENTRESTO)  MG per tablet Take 1/2 tablet in AM, take 1 tablet in  tablet 3    spironolactone (ALDACTONE) 25 MG tablet Take 0.5 tablets (12.5 mg) by mouth daily 45 tablet 3           ROS:   Constitutional: No fever, chills, or sweats.  ENT: No visual  disturbance, ear ache, epistaxis, sore throat.   Allergies/Immunologic: Negative  Respiratory: No cough, hemoptysis.   Cardiovascular: As per HPI.   GI: As per HPI.   : No urinary frequency, dysuria, or hematuria.   Integument: Negative.   Psychiatric: Negative.   Neuro: Negative.   Endocrinology: negative   Musculoskeletal: negative    EXAM:   General: alert, articulate, and in no acute distress.  HEENT: normocephalic, atraumatic, anicteric sclera, EOMI, mucosa moist, no cyanosis.   Neck: neck supple.  No adenopathy, masses, or carotid bruits.  JVP flat at 90 degrees  Heart: regular rhythm, normal S1/S2, no murmur, gallop, rub.  Precordium quiet with normal PMI.     Lungs: clear, no rales, ronchi, or wheezing.  No accessory muscle use, respirations unlabored.   Abdomen: soft, non-tender, bowel sounds present, no hepatomegaly  Extremities: no pitting edema.   No cyanosis.   Neurological: alert and oriented x 3.  normal speech and affect, no gross motor deficits  Skin:  No ecchymoses, rashes, or clubbing.    Labs:  CBC RESULTS:  Lab Results   Component Value Date    WBC 7.4 01/22/2024    WBC 7.1 09/09/2020    RBC 4.49 01/22/2024    RBC 4.57 09/09/2020    HGB 12.8 01/22/2024    HGB 13.3 09/09/2020    HCT 40.4 01/22/2024    HCT 41.1 09/09/2020    MCV 90 01/22/2024    MCV 90 09/09/2020    MCH 28.5 01/22/2024    MCH 29.1 09/09/2020    MCHC 31.7 01/22/2024    MCHC 32.4 09/09/2020    RDW 14.7 01/22/2024    RDW 12.5 09/09/2020     01/22/2024     09/09/2020       CMP RESULTS:  Lab Results   Component Value Date     03/05/2024     01/07/2021    POTASSIUM 5.7 (H) 03/05/2024    POTASSIUM 5.0 02/07/2023    POTASSIUM 4.8 01/07/2021    CHLORIDE 100 03/05/2024    CHLORIDE 108 02/07/2023    CHLORIDE 106 01/07/2021    CO2 25 03/05/2024    CO2 29 02/07/2023    CO2 28 01/07/2021    ANIONGAP 13 03/05/2024    ANIONGAP 3 02/07/2023    ANIONGAP 7 01/07/2021     (H) 03/05/2024     (H) 02/16/2023     " (H) 02/07/2023     (H) 01/07/2021    BUN 30.7 (H) 03/05/2024    BUN 25 02/07/2023    BUN 19 01/07/2021    CR 1.18 (H) 03/05/2024    CR 0.90 01/07/2021    GFRESTIMATED 49 (L) 03/05/2024    GFRESTIMATED 66 01/07/2021    GFRESTBLACK 77 01/07/2021    POONAM 9.5 03/05/2024    POONAM 9.5 01/07/2021    BILITOTAL 0.4 07/06/2022    BILITOTAL 0.4 09/09/2020    ALBUMIN 3.7 07/06/2022    ALBUMIN 3.7 09/09/2020    ALKPHOS 107 07/06/2022    ALKPHOS 90 09/09/2020    ALT 21 09/12/2023    ALT 33 09/09/2020    AST 14 07/06/2022    AST 14 09/09/2020        INR RESULTS:  Lab Results   Component Value Date    INR 1.29 (H) 05/25/2022       No components found for: \"CK\"  Lab Results   Component Value Date    MAG 2.0 06/04/2022     Lab Results   Component Value Date    NTBNP 2,714 (H) 06/09/2022     @BRIEFLABR (dig)@    Most recent echocardiogram:  No results found for this or any previous visit (from the past 8760 hour(s)).      Assessment and Plan:    In summary,Marianne  is a 69 year old here for a CORE follow up. She is doing well. She wasn't well hydrated prior to her last set of labs. Advised her to hydrate better and also to decrease the oral potassium consumption.     1.  Chronic systolic heart failure secondary to ICM.  Stage C  NYHA Class III  ACEi/ARB: -Entresto decrease to 97/103  mg take one in the pm and 1/2 tab in am  BB: yes-  Metoprolol 75 mg   Aldosterone antagonist: 12.5 mg   SCD prophylaxis: does not meet criteria for implant  Fluid status: euvolemic  Anticoagulation:   Antiplatelet:  ASA dose   Sleep apnea:  NSAID use:  Contraindicated.  Avoid use.  Remote Monitoring:none  SGLT 2 - Jardiance 25 mg    2.  Other comordbid conditions:      #T2DM - PCP to manage - glipizide , Jardiance, metformin    #HTN- Entresto and Metoprolol     #GERD- followed by PCP     3.  Follow-up   MG - next week - labs prior to visit with Dr. Power Underwood better  Hold Aldactone for 2 days then resume   CORE in 6 months           [place] " Isaiah MOORE, [facility]  [facility]

## 2024-03-07 NOTE — NURSING NOTE
Labs: Patient was given results of the laboratory testing obtained today. Patient was instructed to return for the next laboratory testing next week. Patient demonstrated understanding of this information and agreed to call with further questions or concerns.     Med Reconcile: Reviewed and verified all current medications with the patient. The updated medication list was printed and given to the patient.    Return Appointment: Patient given instructions regarding scheduling next clinic visit. Patient demonstrated understanding of this information and agreed to call with further questions or concerns. CORE in 6 months.    Medication Change: Patient was educated regarding prescribed medication change, including discussion of the indication, administration, side effects, and when to report to MD or RN. Patient demonstrated understanding of this information and agreed to call with further questions or concerns. Hold spironolactone for 2 days, then restart.    Patient stated she understood all health information given and agreed to call with further questions or concerns.    Dasia Amin, IKER

## 2024-03-07 NOTE — PROGRESS NOTES
Optimal Vascular Metrics    Blood Pressure   BP < 140/90 Yes    On Aspirin  Yes    On Statin  Yes    Tobacco use  No

## 2024-03-12 NOTE — PROGRESS NOTES
Chief Complaint: Establish general cardiovascular care    [facility] (07/20/2022): Marianne Monroy is a 67 year old female with a past medical history of coronary artery disease (s/p CABG), ischemic cardiomyopathy, and hypertension who was referred to me to establish general cardiovascular care.  She presented today with her niece and brother.    Briefly, Ms. Monroy first came to my attention in the inpatient setting. There, she was admitted to my service in May 2022 with a tachyarrhythmia. She had a TTE and was found to have severe LV dysfunction (LVEF 20-30%) with normal RV function. I subsequently referred her for a coronary angiogram, which revealed severe CAD. This prompted CABG, which was done on 05/24/22. Her post-operative course was notable for PVCs without ventricular tachycardia.  Her discharge LVEF (done on June 1) was 35-40%.  She also had C. Difficile infection shortly after leaving the hospital. Ms. Monroy has been followed in the core clinic, where she has had progressive upward titration of her neurohormonal blockade.  Given her reduced EF and ventricular tachycardia preoperatively, the EP team recommended that she wear her LifeVest until her 3-month follow-up with them.    Today, Ms. Monroy notes that she feel that she is slowly improving after surgery.  The C. difficile infection was back in she is not feel like she has fully recovered in spite of taking 12 out of 14 days of the medication course.  From a cardiovascular standpoint, her chief complaint is lightheadedness, which is primarily postural.  She also notes that she has been having some imbalance, but this is improved by using a cane.  She can walk at least 1/2 mile level ground. Ms. Monroy also notes some hesitation about being fully physically active given her recent major surgery.  At the time of the consultation, she notes an absence of chest pain at rest, dyspnea at rest or with exertion, PND, orthopnea, peripheral edema,  "palpitations, or syncope.  She continues to wear her LifeVest during the day but takes it off to sleep.    A comprehensive ROS was done and the details are included above in the HPI.    Interval History 9/28/22:  Since Ms. Monroy's last visit, she has seen EP and they have decided to proceed with ICD implantation. She has also followed with the CORE clinic, and she has been transitioned to higher doses of metoprolol and sacubutril-valsartan.    From a symptomatic standpoint, Ms. Monroy feels extremely well.  She has really enjoyed cardiac rehab and has signed up to a membership in a regular gym to supplement her cardiac rehab sessions.  She is able to do all the cardiac rehab exercises without any limitation from shortness of breath, angina, lightheadedness, dizziness, or presyncope.  This amounts to approximately 60 minutes of aerobic and resistance activity with brief breaks in between.  She has occasionally been experiencing lightheadedness when she stands up quickly.  She presented today with her niece.    A comprehensive ROS was done and the details are included above in the HPI.    Interval History 1/18/23:  Since Ms. Monroy's last visit, she had her ICD placed. Her medical therapy has been titrated to the point that she is on maximal doses of ARB/ARNI. She presented with Cori today.     From a functional standpoint, Ms. Monroy continues to feel well, with the exception of frequent dizziness.  The dizziness does occur with postural change and if she turns quickly.  However, she is going to the gym multiple times weekly and has not had any presyncope/syncope.  She is also planning multiple trips in the spring, including to [place]. Ms. Monroy does have occasional \"pulling\"at the site of the ICD when she rolls over in bed.    A comprehensive ROS was done and the details are included above in the HPI.    Interval History 8/23/23:    Since Ms. Monroy's last visit, I obtained a CTA to evaluate her dyspnea on " exertion. This showed patent grafts, but her LIMA-LAD was read as being atretic. I reviewed the CTA and did not feel that the LIMA-LAD was atretic.     Ms. Monroy also reached out to me last month to say that she had dizziness and chest pressure. An ICD remote transmission did not yield any arrhythmias and a TTE showed an improvement in her LVEF without any new valvular dysfunction. She did see my colleague, Ms. Colon, afterwards. Her sacubutril-valsartan dose was decreased slightly.     From a symptomatic standpoint, Ms. Chapman notes that her symptoms improved.  She feels that her prior symptoms may have been attributed to extreme humidity that we have been experiencing.  She does not have any ongoing angina or signs of decompensated heart failure.    A comprehensive ROS was done and the details are included above in the HPI.    Interval History 3/13/24:    Since Ms. Monroy's last visit, she has been followed up in the CORE clinic, where she reported having lightheadedness and some dyspnea on exertion.  However, she notes that her weight has been stable.  Due to her lightheadedness symptoms, her sacubitril-valsartan was decreased to half tablet in the morning as of last week.    Today, she notes that she does feels occasional fatigue.  Some of this is related to fluctuations in her blood sugar.    A comprehensive ROS was done and the details are included above in the HPI.    Past Medical History:  Ischemic cardiomyopathy  Coronary artery disease status post CABG  Hypertension  Type 2 diabetes mellitus, non-insulin-dependent  - No prior history of  dyslipidemia, TIA/stroke, vascular aneurysms, PAD  Past Medical History:   Diagnosis Date    Abnormal Pap smear of cervix ~2000    repeat pap was normal    Allergic rhinitis, cause unspecified     Anxiety state, unspecified     panic episodes    C. difficile colitis     CAD (coronary artery disease)     Congestive heart failure (H)     Depressive disorder     Diabetes (H)      HFrEF (heart failure with reduced ejection fraction) (H)     ICD (implantable cardioverter-defibrillator) in place     Osteopenia     Unspecified essential hypertension        Past Surgical History:  CABG 05/24/22 by Ulises Desai at [facility]: LIMA-LAD, SVG-dRCA, SVG-RI, SVG-OM  Past Surgical History:   Procedure Laterality Date    BYPASS GRAFT ARTERY CORONARY N/A 05/24/2022    Procedure: Median sternotomy.  Intraoperative transesophageal echocardiogram per anesthesia.  Left internal mammary artery harvest.  Right and left endoscopically harvested  greater saphenouse vein.  Cardiopulmonary Bypass.  Coronary Artery Bypass Grafts x4.;  Surgeon: Ulises Desai MD;  Location: [facility] OR    COLONOSCOPY N/A 02/16/2023    Procedure: COLONOSCOPY, WITH POLYPECTOMY AND BIOPSY;  Surgeon: Nikhil Lees MD;  Location:  GI    CV CORONARY ANGIOGRAM  05/19/2022    Procedure: CV CORONARY ANGIOGRAM;  Surgeon: Huseyin Vogel MD;  Location:  HEART CARDIAC CATH LAB    CV CORONARY ANGIOGRAM  05/19/2022    Procedure: ;  Surgeon: Huseyin Vogel MD;  Location: [facility] CARDIAC CATH LAB    EP ICD INSERT SINGLE N/A 10/04/2022    Procedure: Implantable Cardioverter Defibrillator Device & Lead Implant Single or Dual;  Surgeon: Too Morrow MD;  Location: [facility] CARDIAC CATH LAB    ESOPHAGOSCOPY, GASTROSCOPY, DUODENOSCOPY (EGD), COMBINED N/A 02/16/2023    Procedure: ESOPHAGOGASTRODUODENOSCOPY, WITH BIOPSY;  Surgeon: Nikhil Lees MD;  Location:  GI    PICC DOUBLE LUMEN PLACEMENT Right 05/27/2022    Failed PICC attempt right arm basilic vein    PICC INSERTION - TRIPLE LUMEN Left 05/28/2022    left basilic 5fr tl,picc44 cm       Drug History:  Home cardiac meds: Aspirin 81 mg once daily, atorvastatin 80 mg once daily, empagliflozin 25 mg daily,  metoprolol succinate 75 mg daily, sacubitril-valsartan  mg 0.5/1 tablet daily , spironolactone 12.5 mg daily.  Current Outpatient Medications    Medication Sig Dispense Refill    aspirin 81 MG EC tablet Take 81 mg by mouth every evening      atorvastatin (LIPITOR) 80 MG tablet TAKE 1 TABLET DAILY (NEED ANNUAL EXAM) Strength: 80 mg 90 tablet 3    blood glucose (ONETOUCH VERIO IQ) test strip Use to test blood sugar 2 times daily or as directed. 200 strip 1    blood glucose monitoring (ONETOUCH VERIO) meter device kit Use to test blood sugar 2 times daily or as directed. 1 kit 0    Continuous Blood Gluc Sensor (FREESTYLE VANESSA 14 DAY SENSOR) MISC Change every 14 days. 2 each 0    empagliflozin (JARDIANCE) 25 MG TABS tablet Take 1 tablet (25 mg) by mouth daily 90 tablet 1    FLUoxetine (PROZAC) 20 MG capsule Take 2 capsules (40 mg) by mouth daily 180 capsule 0    glipiZIDE (GLUCOTROL XL) 2.5 MG 24 hr tablet Take 2 tablets (5 mg) by mouth daily 180 tablet 1    metFORMIN (GLUCOPHAGE XR) 500 MG 24 hr tablet Take 4 tablets (2,000 mg) by mouth daily (with dinner) 360 tablet 1    metoprolol succinate ER (TOPROL XL) 50 MG 24 hr tablet Take 1.5 tablets (75 mg) by mouth daily 145 tablet 3    MULTIPLE VITAMIN OR TABS Take by mouth every morning      Multiple Vitamins-Minerals (PRESERVISION AREDS PO) Take 2 tablets by mouth daily      sacubitril-valsartan (ENTRESTO)  MG per tablet Take 1/2 tablet in AM, take 1 tablet in  tablet 3    spironolactone (ALDACTONE) 25 MG tablet Take 0.5 tablets (12.5 mg) by mouth daily 45 tablet 3    acetaminophen (TYLENOL) 500 MG tablet Take 1,000 mg by mouth 3 times daily as needed for mild pain (Patient not taking: Reported on 3/13/2024)           Family History:    Family History   Problem Relation Age of Onset    Diabetes Mother         hypertension, alive at 83    C.A.D. Mother     Coronary Artery Disease Mother     Cancer Father         lung cancer, smoker.   at age 53    Family History Negative Sister     Osteoporosis Sister     Anesthesia Reaction No family hx of     Deep Vein Thrombosis (DVT) No family hx of   "      Social History:    Social History     Tobacco Use    Smoking status: Never    Smokeless tobacco: Never   Substance Use Topics    Alcohol use: Yes     Comment: 1 drink a week       Allergies   Allergen Reactions    Effexor [Venlafaxine Hydrochloride]      Tachycardia, makes her extremely jittery--cant sit down, has to keep moving constantly    Latex      Reported by patient         Physical Examination:  Vitals: /65 (BP Location: Right arm, Patient Position: Chair, Cuff Size: Adult Regular)   Pulse 59   Ht 1.6 m (5' 3\")   Wt 55.2 kg (121 lb 11.2 oz)   SpO2 98%   BMI 21.56 kg/m    BMI= Body mass index is 21.56 kg/m .    GENERAL: Healthy, alert and no distress  Eyes: No xanthelasmas.  NECK: No palpable neck masses/goitre and no evidence of retrosternal goitre.   ENT: Moist mucosal membranes.  RESPIRATORY: No signs of resp distress. Lungs CTAB.  CARDIOVASCULAR: ICD site appears healthy.  Sternotomy scar appears to be healing well without any drainage or open areas in the wound.  No JVD, regular, normal S1+S2 without added sounds or murmurs.  EXTREMITIES: Warm, well-perfused, no edema.   NEUROLOGY: GCS 15/15, no focal deficits.  SKIN: No ecchymoses, no rashes.  PSYCH: Cooperative, pleasant affect.       Investigations:  I personally viewed and interpreted the following investigations:    Labs:    CBC RESULTS:  Lab Results   Component Value Date    WBC 7.4 01/22/2024    WBC 7.1 09/09/2020    RBC 4.49 01/22/2024    RBC 4.57 09/09/2020    HGB 12.8 01/22/2024    HGB 13.3 09/09/2020    HCT 40.4 01/22/2024    HCT 41.1 09/09/2020    MCV 90 01/22/2024    MCV 90 09/09/2020    MCH 28.5 01/22/2024    MCH 29.1 09/09/2020    MCHC 31.7 01/22/2024    MCHC 32.4 09/09/2020    RDW 14.7 01/22/2024    RDW 12.5 09/09/2020     01/22/2024     09/09/2020       CMP RESULTS:  Lab Results   Component Value Date     03/13/2024     01/07/2021    POTASSIUM 4.7 03/13/2024    POTASSIUM 5.0 02/07/2023    " POTASSIUM 4.8 01/07/2021    CHLORIDE 99 03/13/2024    CHLORIDE 108 02/07/2023    CHLORIDE 106 01/07/2021    CO2 27 03/13/2024    CO2 29 02/07/2023    CO2 28 01/07/2021    ANIONGAP 12 03/13/2024    ANIONGAP 3 02/07/2023    ANIONGAP 7 01/07/2021     (H) 03/13/2024     (H) 02/16/2023     (H) 02/07/2023     (H) 01/07/2021    BUN 24.0 (H) 03/13/2024    BUN 25 02/07/2023    BUN 19 01/07/2021    CR 0.97 (H) 03/13/2024    CR 0.90 01/07/2021    GFRESTIMATED 63 03/13/2024    GFRESTIMATED 66 01/07/2021    GFRESTBLACK 77 01/07/2021    POONAM 9.6 03/13/2024    POONAM 9.5 01/07/2021    BILITOTAL 0.4 07/06/2022    BILITOTAL 0.4 09/09/2020    ALBUMIN 3.7 07/06/2022    ALBUMIN 3.7 09/09/2020    ALKPHOS 107 07/06/2022    ALKPHOS 90 09/09/2020    ALT 21 09/12/2023    ALT 33 09/09/2020    AST 14 07/06/2022    AST 14 09/09/2020        INR RESULTS:  Lab Results   Component Value Date    INR 1.29 (H) 05/25/2022       BNP RESULTS:  Lab Results   Component Value Date    NTBNPI 664 05/18/2022         LIPIDS:  Lab Results   Component Value Date    CHOL 104 03/01/2023    CHOL 144 05/18/2021     Lab Results   Component Value Date    HDL 52 03/01/2023    HDL 50 05/18/2021     Lab Results   Component Value Date    LDL 30 03/01/2023    LDL 59 05/18/2021     Lab Results   Component Value Date    TRIG 110 03/01/2023    TRIG 173 05/18/2021     Lab Results   Component Value Date    CHOLHDLRATIO 6.0 04/26/2012         Recent Tests:    Electrocardiogram (07/06/2022):  Normal sinus rhythm, ventricular 90 bpm, LVH, inferior Q waves.  QRS 94.    Echocardiogram (08/10/2023):   Left ventricular function is decreased. The ejection fraction is 40-45% (mildly reduced).  Global right ventricular function is normal.  Mild mitral insufficiency is present.  Mild to moderate tricuspid insufficiency is present.  IVC diameter <2.1 cm collapsing >50% with sniff suggests a normal RA pressure  of 3 mmHg.  No pericardial effusion is  present.  Compared to prior, LV fxn is unchanged to slightly improved.      Assessment and Plan:   Marianne Monroy is a 69 year old female with a past medical history of coronary artery disease (status post CABG), ischemic cardiomyopathy, and patient recently presented to me to establish general cardiovascular July 2022.    Ms. Monroy's functional status remains excellent and her risk factors are generally under good control.  I did note that her blood glucose levels are still quite elevated on her BMPs.  In view of this, I talked her about dietary strategies to help normalize her blood glucose and the importance of this in relation to her general cardiovascular health.  She was very receptive to this.    Problems:  Ischemic cardiomyopathy (ACC/AHA stage C, NYHA III)  Coronary artery disease s/p CABG in 05/2022 (LIMA-LAD, SVG-dRCA, SVG-RI, SVG-OM)  Hypertension  Dyslipidemia, LDL target less than 70  Type 2 diabetes mellitus, non-insulin-dependent (last A1c 8.3% in March 2023)  Depression  Generalized anxiety disorder    Plan:  - Continue aspirin 81 mg once daily and atorvastatin 80 mg once daily lifelong for CAD  - Continue metoprolol succinate 75 mg daily, sacubitril-valsartan  0.5/1 tablet daily, and spironolactone 12.5 mg daily for neurohormonal blockade  - Continue empagliflozin 25 mg daily for type 2 diabetes mellitus and CAD/ischemic cardiomyopathy  - RTC 12 months      A total of 40 minutes were spent on the day of the visit for chart review, care coordination, face-to-face consultation with the patient, and documentation.       Kris Steve [facility], [place]    Cardiovascular Division  Pager [phone]    CC  Patient Care Team:  Roxanna Otoole MD as PCP - General (Family Medicine)  Roxanna Otoole MD as Assigned PCP  Regina Sutherland as Diabetes Educator (Dietitian, Registered)  Dasia Amin, RN as Specialty Care Coordinator (Cardiology)  Belinda Colon APRN CNP as Nurse Practitioner  (Cardiovascular Disease)  Kris Steve MD as MD (Cardiovascular Disease)  Belinda Colon, APRN CNP as Assigned Heart and Vascular Provider  Evette Castro MD as MD (Infectious Diseases)  Sarah Isidro, RN as Specialty Care Coordinator (Cardiology)  Too Morrow MD as MD (Cardiovascular Disease)  Malu Yap, [facility] as Assigned MTM Pharmacist  Zoie Chapman PA-C as Assigned Cancer Care Provider  Evette Castro MD as Assigned Infectious Disease Provider  Cherie Lira, APRN CNP as Nurse Practitioner (Cardiovascular Disease)  Joaquin Vazquez MD as Assigned OBGYN Provider  No Ref-Primary, Physician  Libia Santoro, PharmD as Pharmacist (Pharmacist)

## 2024-03-13 ENCOUNTER — LAB (OUTPATIENT)
Dept: LAB | Facility: CLINIC | Age: 69
End: 2024-03-13
Payer: COMMERCIAL

## 2024-03-13 ENCOUNTER — OFFICE VISIT (OUTPATIENT)
Dept: CARDIOLOGY | Facility: CLINIC | Age: 69
End: 2024-03-13
Payer: COMMERCIAL

## 2024-03-13 VITALS
BODY MASS INDEX: 21.56 KG/M2 | DIASTOLIC BLOOD PRESSURE: 65 MMHG | SYSTOLIC BLOOD PRESSURE: 105 MMHG | HEIGHT: 63 IN | WEIGHT: 121.7 LBS | OXYGEN SATURATION: 98 % | HEART RATE: 59 BPM

## 2024-03-13 DIAGNOSIS — I50.22 CHRONIC SYSTOLIC HEART FAILURE (H): Primary | ICD-10-CM

## 2024-03-13 DIAGNOSIS — I50.20 HFREF (HEART FAILURE WITH REDUCED EJECTION FRACTION) (H): ICD-10-CM

## 2024-03-13 LAB
ANION GAP SERPL CALCULATED.3IONS-SCNC: 12 MMOL/L (ref 7–15)
BUN SERPL-MCNC: 24 MG/DL (ref 8–23)
CALCIUM SERPL-MCNC: 9.6 MG/DL (ref 8.8–10.2)
CHLORIDE SERPL-SCNC: 99 MMOL/L (ref 98–107)
CREAT SERPL-MCNC: 0.97 MG/DL (ref 0.51–0.95)
DEPRECATED HCO3 PLAS-SCNC: 27 MMOL/L (ref 22–29)
EGFRCR SERPLBLD CKD-EPI 2021: 63 ML/MIN/1.73M2
GLUCOSE SERPL-MCNC: 217 MG/DL (ref 70–99)
POTASSIUM SERPL-SCNC: 4.7 MMOL/L (ref 3.4–5.3)
SODIUM SERPL-SCNC: 138 MMOL/L (ref 135–145)

## 2024-03-13 PROCEDURE — 36415 COLL VENOUS BLD VENIPUNCTURE: CPT

## 2024-03-13 PROCEDURE — 99215 OFFICE O/P EST HI 40 MIN: CPT | Performed by: STUDENT IN AN ORGANIZED HEALTH CARE EDUCATION/TRAINING PROGRAM

## 2024-03-13 PROCEDURE — 80048 BASIC METABOLIC PNL TOTAL CA: CPT

## 2024-03-13 NOTE — PATIENT INSTRUCTIONS
Take your medicines every day, as directed     Changes made today:  Keep your medications the same  Food diary to log carbs       Cardiology Care Coordinators:      Gabby RIZO RN       Cardiology Rooming Staff:  Luiza COOK    Phone  [phone]      Fax [phone]    To Contact us     During Business Hours:  [phone]     If you are needing refills please contact your pharmacy.     For urgent after hour care please call the [facility] at [phone] or the [facility] at [phone] and ask to speak to the cardiologist on call.    If you are having a medical emergency, please call 911.            HOW TO CHECK YOUR BLOOD PRESSURE AT HOME:     Avoid eating, smoking, and exercising for at least 30 minutes before taking a reading.     Be sure you have taken your BP medication at least 2-3 hours before you check it.      Sit quietly for 10 minutes before a reading.      Sit in a chair with your feet flat on the floor. Rest your  arm on a table so that the arm cuff is at the same level as your heart.     Remain still during the reading.  Record your blood pressure and pulse in a log and bring to your next appointment.       Use [employer] allows you to communicate directly with your heart team through secure messaging.  [employer] can be accessed any time on your phone, computer, or tablet.  If you need assistance, we'd be happy to help!             Keep your Heart Appointments:     Follow-up with me in 12 months

## 2024-03-13 NOTE — NURSING NOTE
"Chief Complaint   Patient presents with    Follow Up     6 month follow up       Initial /65 (BP Location: Right arm, Patient Position: Chair, Cuff Size: Adult Regular)   Pulse 59   Ht 1.6 m (5' 3\")   Wt 55.2 kg (121 lb 11.2 oz)   SpO2 98%   BMI 21.56 kg/m   Estimated body mass index is 21.56 kg/m  as calculated from the following:    Height as of this encounter: 1.6 m (5' 3\").    Weight as of this encounter: 55.2 kg (121 lb 11.2 oz)..  BP completed using cuff size: regular    Trinidad P., VF     "

## 2024-04-14 ENCOUNTER — HEALTH MAINTENANCE LETTER (OUTPATIENT)
Age: 69
End: 2024-04-14

## 2024-04-16 ENCOUNTER — OFFICE VISIT (OUTPATIENT)
Dept: FAMILY MEDICINE | Facility: CLINIC | Age: 69
End: 2024-04-16
Payer: COMMERCIAL

## 2024-04-16 VITALS
SYSTOLIC BLOOD PRESSURE: 122 MMHG | OXYGEN SATURATION: 98 % | HEART RATE: 58 BPM | DIASTOLIC BLOOD PRESSURE: 71 MMHG | RESPIRATION RATE: 16 BRPM | BODY MASS INDEX: 22.32 KG/M2 | WEIGHT: 126 LBS | TEMPERATURE: 97.3 F

## 2024-04-16 DIAGNOSIS — E78.5 HYPERLIPIDEMIA LDL GOAL <70: ICD-10-CM

## 2024-04-16 DIAGNOSIS — F41.9 ANXIETY: ICD-10-CM

## 2024-04-16 DIAGNOSIS — I25.5 ISCHEMIC CARDIOMYOPATHY: ICD-10-CM

## 2024-04-16 DIAGNOSIS — M19.042 ARTHRITIS OF BOTH HANDS: ICD-10-CM

## 2024-04-16 DIAGNOSIS — M19.041 ARTHRITIS OF BOTH HANDS: ICD-10-CM

## 2024-04-16 DIAGNOSIS — E11.59 TYPE 2 DIABETES MELLITUS WITH OTHER CIRCULATORY COMPLICATION, WITHOUT LONG-TERM CURRENT USE OF INSULIN (H): Primary | ICD-10-CM

## 2024-04-16 DIAGNOSIS — F51.01 PRIMARY INSOMNIA: ICD-10-CM

## 2024-04-16 DIAGNOSIS — I50.20 HFREF (HEART FAILURE WITH REDUCED EJECTION FRACTION) (H): ICD-10-CM

## 2024-04-16 DIAGNOSIS — I10 HYPERTENSION, GOAL BELOW 140/90: ICD-10-CM

## 2024-04-16 DIAGNOSIS — Z95.1 S/P CABG (CORONARY ARTERY BYPASS GRAFT): ICD-10-CM

## 2024-04-16 DIAGNOSIS — E11.40 TYPE 2 DIABETES MELLITUS WITH DIABETIC NEUROPATHY, WITHOUT LONG-TERM CURRENT USE OF INSULIN (H): ICD-10-CM

## 2024-04-16 DIAGNOSIS — F33.0 MAJOR DEPRESSIVE DISORDER, RECURRENT EPISODE, MILD (H): ICD-10-CM

## 2024-04-16 DIAGNOSIS — F41.1 GENERALIZED ANXIETY DISORDER: ICD-10-CM

## 2024-04-16 PROCEDURE — 82570 ASSAY OF URINE CREATININE: CPT | Performed by: FAMILY MEDICINE

## 2024-04-16 PROCEDURE — G2211 COMPLEX E/M VISIT ADD ON: HCPCS | Performed by: FAMILY MEDICINE

## 2024-04-16 PROCEDURE — 99214 OFFICE O/P EST MOD 30 MIN: CPT | Performed by: FAMILY MEDICINE

## 2024-04-16 PROCEDURE — 96127 BRIEF EMOTIONAL/BEHAV ASSMT: CPT | Performed by: FAMILY MEDICINE

## 2024-04-16 PROCEDURE — 95251 CONT GLUC MNTR ANALYSIS I&R: CPT | Performed by: FAMILY MEDICINE

## 2024-04-16 PROCEDURE — 82043 UR ALBUMIN QUANTITATIVE: CPT | Performed by: FAMILY MEDICINE

## 2024-04-16 RX ORDER — METFORMIN HCL 500 MG
1500 TABLET, EXTENDED RELEASE 24 HR ORAL
Qty: 270 TABLET | Refills: 0 | Status: SHIPPED | OUTPATIENT
Start: 2024-04-16 | End: 2024-07-23

## 2024-04-16 RX ORDER — ALPRAZOLAM 0.25 MG
0.25 TABLET ORAL 3 TIMES DAILY PRN
Qty: 10 TABLET | Refills: 0 | Status: SHIPPED | OUTPATIENT
Start: 2024-04-16

## 2024-04-16 RX ORDER — TRAZODONE HYDROCHLORIDE 50 MG/1
25 TABLET, FILM COATED ORAL AT BEDTIME
Qty: 15 TABLET | Refills: 3 | Status: SHIPPED | OUTPATIENT
Start: 2024-04-16

## 2024-04-16 RX ORDER — GLIPIZIDE 2.5 MG/1
5 TABLET, EXTENDED RELEASE ORAL DAILY
Qty: 180 TABLET | Refills: 1 | Status: SHIPPED | OUTPATIENT
Start: 2024-04-16

## 2024-04-16 ASSESSMENT — ANXIETY QUESTIONNAIRES
3. WORRYING TOO MUCH ABOUT DIFFERENT THINGS: SEVERAL DAYS
7. FEELING AFRAID AS IF SOMETHING AWFUL MIGHT HAPPEN: SEVERAL DAYS
GAD7 TOTAL SCORE: 9
6. BECOMING EASILY ANNOYED OR IRRITABLE: MORE THAN HALF THE DAYS
5. BEING SO RESTLESS THAT IT IS HARD TO SIT STILL: SEVERAL DAYS
7. FEELING AFRAID AS IF SOMETHING AWFUL MIGHT HAPPEN: SEVERAL DAYS
1. FEELING NERVOUS, ANXIOUS, OR ON EDGE: NEARLY EVERY DAY
IF YOU CHECKED OFF ANY PROBLEMS ON THIS QUESTIONNAIRE, HOW DIFFICULT HAVE THESE PROBLEMS MADE IT FOR YOU TO DO YOUR WORK, TAKE CARE OF THINGS AT HOME, OR GET ALONG WITH OTHER PEOPLE: SOMEWHAT DIFFICULT
GAD7 TOTAL SCORE: 9
7. FEELING AFRAID AS IF SOMETHING AWFUL MIGHT HAPPEN: NOT AT ALL
1. FEELING NERVOUS, ANXIOUS, OR ON EDGE: MORE THAN HALF THE DAYS
3. WORRYING TOO MUCH ABOUT DIFFERENT THINGS: MORE THAN HALF THE DAYS
GAD7 TOTAL SCORE: 10
6. BECOMING EASILY ANNOYED OR IRRITABLE: SEVERAL DAYS
8. IF YOU CHECKED OFF ANY PROBLEMS, HOW DIFFICULT HAVE THESE MADE IT FOR YOU TO DO YOUR WORK, TAKE CARE OF THINGS AT HOME, OR GET ALONG WITH OTHER PEOPLE?: SOMEWHAT DIFFICULT
5. BEING SO RESTLESS THAT IT IS HARD TO SIT STILL: SEVERAL DAYS
4. TROUBLE RELAXING: MORE THAN HALF THE DAYS
2. NOT BEING ABLE TO STOP OR CONTROL WORRYING: SEVERAL DAYS
GAD7 TOTAL SCORE: 9
2. NOT BEING ABLE TO STOP OR CONTROL WORRYING: SEVERAL DAYS

## 2024-04-16 ASSESSMENT — PATIENT HEALTH QUESTIONNAIRE - PHQ9
10. IF YOU CHECKED OFF ANY PROBLEMS, HOW DIFFICULT HAVE THESE PROBLEMS MADE IT FOR YOU TO DO YOUR WORK, TAKE CARE OF THINGS AT HOME, OR GET ALONG WITH OTHER PEOPLE: SOMEWHAT DIFFICULT
5. POOR APPETITE OR OVEREATING: SEVERAL DAYS
SUM OF ALL RESPONSES TO PHQ QUESTIONS 1-9: 10

## 2024-04-16 ASSESSMENT — PAIN SCALES - GENERAL: PAINLEVEL: NO PAIN (0)

## 2024-04-16 NOTE — PROGRESS NOTES
Assessment & Plan     Type 2 diabetes mellitus with other circulatory complication, without long-term current use of insulin (H)  Pts accuchecks are Higher than before  She has been takin 1000 mg metformin daily  Advised increase to 1500 mg daily  SEE EPIC care orders  The potential side effects of this medication have been discussed with the patient.  Call if any significant problems with these are experienced.  Follow up 2 months   - metFORMIN (GLUCOPHAGE XR) 500 MG 24 hr tablet; Take 3 tablets (1,500 mg) by mouth daily (with dinner)  - glipiZIDE (GLUCOTROL XL) 2.5 MG 24 hr tablet; Take 2 tablets (5 mg) by mouth daily  - Albumin Random Urine Quantitative with Creat Ratio; Future    Generalized anxiety disorder  On Prozac  Advised follow up with her Psychiatrist     HFrEF (heart failure with reduced ejection fraction) (H) SNyHA class III  Stable   On BEta blocker,asa   And SHLT2  She is euvolumic  Also entresto dose was decreased and she Feels better  On aldactone     Hyperlipidemia LDL goal <70  Stable     Hypertension, goal below 140/90  Controlled     Major depressive disorder, recurrent episode, mild (H24)  On Prozac    S/P CABG (coronary artery bypass graft)  Stable   On asa and statin    Ischemic cardiomyopathy  Stable     Type 2 diabetes mellitus with diabetic neuropathy, without long-term current use of insulin (H)  Refilled meds   As above   - empagliflozin (JARDIANCE) 25 MG TABS tablet; Take 1 tablet (25 mg) by mouth daily    Anxiety  Refilled  Follow up with Psych if not better  No alcohol with this medicines   - ALPRAZolam (XANAX) 0.25 MG tablet; Take 1 tablet (0.25 mg) by mouth 3 times daily as needed for anxiety    Primary insomnia    - traZODone (DESYREL) 50 MG tablet; Take 0.5 tablets (25 mg) by mouth at bedtime  SEE EPIC care orders  The potential side effects of this medication have been discussed with the patient.  Call if any significant problems with these are experienced.    Arthritis of  both hands  Referral done  - Occupational Therapy  Referral; Future    The longitudinal plan of care for the diagnosis(es)/condition(s) as documented were addressed during this visit. Due to the added complexity in care, I will continue to support Jody in the subsequent management and with ongoing continuity of care.    Blood sugar testing frequency justification:  Uncontrolled diabetes    Regular exercise    Subjective   Jody is a 69 year old, presenting for the following health issues:  Recheck Medication   Marianne Monroy is a 67 year old female with a past medical history of coronary artery disease (s/p CABG), ischemic cardiomyopathy, and hypertension , Diabetes 2, Depression comes in for recheck meds       4/16/2024     5:04 PM   Additional Questions   Roomed by Linda     Via the [employer] Maintenance questionnaire, the patient has reported the following services have been completed -Mammogram, this information has been sent to the abstraction team.  History of Present Illness       Mental Health Follow-up:  Patient presents to follow-up on Depression & Anxiety.Patient's depression since last visit has been:  Medium  The patient is not having other symptoms associated with depression.  Patient's anxiety since last visit has been:  Medium  The patient is not having other symptoms associated with anxiety.  Any significant life events: job concerns, financial concerns and health concerns  Patient is feeling anxious or having panic attacks.  Patient has no concerns about alcohol or drug use.    Diabetes:   She presents for follow up of diabetes.  She is checking home blood glucose four or more times daily.   She checks blood glucose before meals, after meals, before and after meals and at bedtime.  Blood glucose is sometimes over 200 and never under 70. She is aware of hypoglycemia symptoms including shakiness, dizziness, lethargy and blurred vision.   She is concerned about blood sugar frequently over 200.    She is having burning in feet and blurry vision.  The patient has not had a diabetic eye exam in the last 12 months.          Hypertension: She presents for follow up of hypertension.  She does not check blood pressure  regularly outside of the clinic. Outside blood pressures have been over 140/90. She follows a low salt diet.     She eats 2-3 servings of fruits and vegetables daily.She consumes 0 sweetened beverage(s) daily.She exercises with enough effort to increase her heart rate 10 to 19 minutes per day.  She exercises with enough effort to increase her heart rate 3 or less days per week.   She is taking medications regularly.     Continuous Glucose Monitoring Data Download and Interpretation    AGP Screenshot                Target A1c: less than 8     Most Recent Hemoglobin A1c:  Recent Labs   Lab Test 01/22/24  1219   A1C 7.2*     Glucose Patterns & Trends:  reviewed CGM    Plan:   See above        Heart Failure Follow-up   Are you experiencing any shortness of breath? No  Are you experiencing any swelling in your legs or feet?  No  Are you using more pillows than usual? No  Do you cough at night?  No  Do you check your weight daily?  Yes-sometimes   Have you had a weight change recently?  Weight increase  Are you having any of the following side effects from your medications? (Select all that apply)  The patient does not report symptoms of dizziness, fatigue, cough, swelling, or slow heart beat.  Since your last visit, how many times have you gone to the cardiologist, urgent care, emergency room, or hospital because of your heart failure?   1 time  She feels better since Entresto dose was decreased  Last Echo:   Echo result w/o MOPS: Interpretation Summary Left ventricular function is decreased. The ejection fraction is 40-45%(mildly reduced).Global right ventricular function is normal.Mild mitral insufficiency is present.Mild to moderate tricuspid insufficiency is present.IVC diameter <2.1 cm collapsing  >50% with sniff suggests a normal RA pressureof 3 mmHg.No pericardial effusion is present.Compared to prior, LV fxn is unchanged to slightly improved.      Depression and Anxiety   How are you doing with your depression since your last visit? Same -she Feels more anxious than Depression  How are you doing with your anxiety since your last visit?  Worsened   Are you having other symptoms that might be associated with depression or anxiety? Yes:  anxiety  Have you had a significant life event? No   Do you have any concerns with your use of alcohol or other drugs? No  Has Financial Issues  Worries about her health  She sits and worries about this  She is anxious and wants some xanax  Very rarely drinks alcohol  Has Insomnia at Times  Gets up to go to void  but falls asleep  Social History     Tobacco Use    Smoking status: Never    Smokeless tobacco: Never   Vaping Use    Vaping status: Never Used   Substance Use Topics    Alcohol use: Yes     Comment: 1 drink a week    Drug use: No         3/1/2023     5:34 PM 9/14/2023     3:25 PM 4/16/2024     4:50 PM   PHQ   PHQ-9 Total Score 4 1 5   Q9: Thoughts of better off dead/self-harm past 2 weeks Not at all Not at all Not at all         5/18/2021     4:18 PM 4/16/2024     4:50 PM 4/16/2024     4:56 PM   JUSTUS-7 SCORE   Total Score   9 (mild anxiety)   Total Score 2 10 9         4/16/2024     4:50 PM   Last PHQ-9   1.  Little interest or pleasure in doing things 0   2.  Feeling down, depressed, or hopeless 0   3.  Trouble falling or staying asleep, or sleeping too much 1   4.  Feeling tired or having little energy 3   5.  Poor appetite or overeating 0   6.  Feeling bad about yourself 0   7.  Trouble concentrating 1   8.  Moving slowly or restless 0   Q9: Thoughts of better off dead/self-harm past 2 weeks 0   PHQ-9 Total Score 5         4/16/2024     4:56 PM   JUSTUS-7    1. Feeling nervous, anxious, or on edge 2   2. Not being able to stop or control worrying 1   3. Worrying too  much about different things 1   4. Trouble relaxing 2   5. Being so restless that it is hard to sit still 1   6. Becoming easily annoyed or irritable 1   7. Feeling afraid, as if something awful might happen 1   JUSTUS-7 Total Score 9   If you checked any problems, how difficult have they made it for you to do your work, take care of things at home, or get along with other people? Somewhat difficult       Suicide Assessment Five-step Evaluation and Treatment (SAFE-T)            Review of Systems  CONSTITUTIONAL: NEGATIVE for fever, chills, change in weight  ENT/MOUTH: NEGATIVE for ear, mouth and throat problems  RESP: NEGATIVE for significant cough or SOB  CV: NEGATIVE for chest pain, palpitations or peripheral edema  GI: NEGATIVE for nausea, abdominal pain, heartburn, or change in bowel habits  PSYCHIATRIC: anxious      Objective    /71   Pulse 58   Temp 97.3  F (36.3  C) (Temporal)   Resp 16   Wt 57.2 kg (126 lb)   SpO2 98%   BMI 22.32 kg/m    Body mass index is 22.32 kg/m .  Physical Exam   GENERAL: alert and no distress  EYES: Eyes grossly normal to inspection  NECK: no adenopathy, no asymmetry, masses, or scars  RESP: lungs clear to auscultation - no rales, rhonchi or wheezes  CV: regular rate and rhythm, normal S1 S2, no S3 or S4, no murmur, click or rub, no peripheral edema  ABDOMEN: soft, nontender, no hepatosplenomegaly, no masses and bowel sounds normal  MS: no gross musculoskeletal defects noted, no edema  PSYCH: anxious  Diabetic foot exam: normal PT pulses    Previous labs reviewed         Signed Electronically by: Roxanna Otoole MD

## 2024-04-18 LAB
CREAT UR-MCNC: 78.8 MG/DL
MICROALBUMIN UR-MCNC: <12 MG/L
MICROALBUMIN/CREAT UR: NORMAL MG/G{CREAT}

## 2024-04-24 ENCOUNTER — TELEPHONE (OUTPATIENT)
Dept: PHARMACY | Facility: OTHER | Age: 69
End: 2024-04-24
Payer: COMMERCIAL

## 2024-04-24 NOTE — TELEPHONE ENCOUNTER
PharmD called patient for MTM recruitment attempt. Unable to reach patient - left voicemail.    MTM Recruitment: [facility] insurance     Referral outreach attempt #2 on April 24, 2024      Outcome: left voicemail- Call back number [phone]

## 2024-05-01 ENCOUNTER — ANCILLARY PROCEDURE (OUTPATIENT)
Dept: CARDIOLOGY | Facility: CLINIC | Age: 69
End: 2024-05-01
Attending: INTERNAL MEDICINE
Payer: COMMERCIAL

## 2024-05-01 DIAGNOSIS — Z95.810 ICD (IMPLANTABLE CARDIOVERTER-DEFIBRILLATOR), SINGLE, IN SITU: ICD-10-CM

## 2024-05-01 PROCEDURE — 93295 DEV INTERROG REMOTE 1/2/MLT: CPT | Performed by: INTERNAL MEDICINE

## 2024-05-01 PROCEDURE — 93296 REM INTERROG EVL PM/IDS: CPT

## 2024-05-05 LAB
MDC_IDC_LEAD_CONNECTION_STATUS: NORMAL
MDC_IDC_LEAD_IMPLANT_DT: NORMAL
MDC_IDC_LEAD_LOCATION: NORMAL
MDC_IDC_LEAD_LOCATION_DETAIL_1: NORMAL
MDC_IDC_LEAD_MFG: NORMAL
MDC_IDC_LEAD_MODEL: NORMAL
MDC_IDC_LEAD_POLARITY_TYPE: NORMAL
MDC_IDC_LEAD_SERIAL: NORMAL
MDC_IDC_LEAD_SPECIAL_FUNCTION: NORMAL
MDC_IDC_MSMT_BATTERY_DTM: NORMAL
MDC_IDC_MSMT_BATTERY_REMAINING_LONGEVITY: 153 MO
MDC_IDC_MSMT_BATTERY_RRT_TRIGGER: NORMAL
MDC_IDC_MSMT_BATTERY_VOLTAGE: 3.04 V
MDC_IDC_MSMT_CAP_CHARGE_DTM: NORMAL
MDC_IDC_MSMT_CAP_CHARGE_ENERGY: 18 J
MDC_IDC_MSMT_CAP_CHARGE_TIME: 3.8 S
MDC_IDC_MSMT_CAP_CHARGE_TYPE: NORMAL
MDC_IDC_MSMT_LEADCHNL_RV_IMPEDANCE_VALUE: 304 OHM
MDC_IDC_MSMT_LEADCHNL_RV_IMPEDANCE_VALUE: 418 OHM
MDC_IDC_MSMT_LEADCHNL_RV_PACING_THRESHOLD_AMPLITUDE: 0.75 V
MDC_IDC_MSMT_LEADCHNL_RV_PACING_THRESHOLD_PULSEWIDTH: 0.4 MS
MDC_IDC_MSMT_LEADCHNL_RV_SENSING_INTR_AMPL: 9.8 MV
MDC_IDC_PG_IMPLANT_DTM: NORMAL
MDC_IDC_PG_MFG: NORMAL
MDC_IDC_PG_MODEL: NORMAL
MDC_IDC_PG_SERIAL: NORMAL
MDC_IDC_PG_TYPE: NORMAL
MDC_IDC_SESS_CLINIC_NAME: NORMAL
MDC_IDC_SESS_DTM: NORMAL
MDC_IDC_SESS_TYPE: NORMAL
MDC_IDC_SET_BRADY_HYSTRATE: NORMAL
MDC_IDC_SET_BRADY_LOWRATE: 40 {BEATS}/MIN
MDC_IDC_SET_BRADY_MODE: NORMAL
MDC_IDC_SET_LEADCHNL_RV_PACING_AMPLITUDE: 2 V
MDC_IDC_SET_LEADCHNL_RV_PACING_ANODE_ELECTRODE_1: NORMAL
MDC_IDC_SET_LEADCHNL_RV_PACING_ANODE_LOCATION_1: NORMAL
MDC_IDC_SET_LEADCHNL_RV_PACING_CAPTURE_MODE: NORMAL
MDC_IDC_SET_LEADCHNL_RV_PACING_CATHODE_ELECTRODE_1: NORMAL
MDC_IDC_SET_LEADCHNL_RV_PACING_CATHODE_LOCATION_1: NORMAL
MDC_IDC_SET_LEADCHNL_RV_PACING_POLARITY: NORMAL
MDC_IDC_SET_LEADCHNL_RV_PACING_PULSEWIDTH: 0.4 MS
MDC_IDC_SET_LEADCHNL_RV_SENSING_ANODE_ELECTRODE_1: NORMAL
MDC_IDC_SET_LEADCHNL_RV_SENSING_ANODE_LOCATION_1: NORMAL
MDC_IDC_SET_LEADCHNL_RV_SENSING_CATHODE_ELECTRODE_1: NORMAL
MDC_IDC_SET_LEADCHNL_RV_SENSING_CATHODE_LOCATION_1: NORMAL
MDC_IDC_SET_LEADCHNL_RV_SENSING_POLARITY: NORMAL
MDC_IDC_SET_LEADCHNL_RV_SENSING_SENSITIVITY: 0.3 MV
MDC_IDC_SET_ZONE_DETECTION_BEATS_DENOMINATOR: 16 {BEATS}
MDC_IDC_SET_ZONE_DETECTION_BEATS_DENOMINATOR: 32 {BEATS}
MDC_IDC_SET_ZONE_DETECTION_BEATS_DENOMINATOR: 40 {BEATS}
MDC_IDC_SET_ZONE_DETECTION_BEATS_NUMERATOR: 16 {BEATS}
MDC_IDC_SET_ZONE_DETECTION_BEATS_NUMERATOR: 30 {BEATS}
MDC_IDC_SET_ZONE_DETECTION_BEATS_NUMERATOR: 32 {BEATS}
MDC_IDC_SET_ZONE_DETECTION_INTERVAL: 270 MS
MDC_IDC_SET_ZONE_DETECTION_INTERVAL: 320 MS
MDC_IDC_SET_ZONE_DETECTION_INTERVAL: 360 MS
MDC_IDC_SET_ZONE_DETECTION_INTERVAL: 360 MS
MDC_IDC_SET_ZONE_STATUS: NORMAL
MDC_IDC_SET_ZONE_TYPE: NORMAL
MDC_IDC_SET_ZONE_VENDOR_TYPE: NORMAL
MDC_IDC_STAT_AT_BURDEN_PERCENT: 0 %
MDC_IDC_STAT_AT_DTM_END: NORMAL
MDC_IDC_STAT_AT_DTM_START: NORMAL
MDC_IDC_STAT_BRADY_DTM_END: NORMAL
MDC_IDC_STAT_BRADY_DTM_START: NORMAL
MDC_IDC_STAT_BRADY_RV_PERCENT_PACED: 0.01 %
MDC_IDC_STAT_CRT_DTM_END: NORMAL
MDC_IDC_STAT_CRT_DTM_START: NORMAL
MDC_IDC_STAT_EPISODE_RECENT_COUNT: 0
MDC_IDC_STAT_EPISODE_RECENT_COUNT_DTM_END: NORMAL
MDC_IDC_STAT_EPISODE_RECENT_COUNT_DTM_START: NORMAL
MDC_IDC_STAT_EPISODE_TOTAL_COUNT: 0
MDC_IDC_STAT_EPISODE_TOTAL_COUNT: 3
MDC_IDC_STAT_EPISODE_TOTAL_COUNT_DTM_END: NORMAL
MDC_IDC_STAT_EPISODE_TOTAL_COUNT_DTM_START: NORMAL
MDC_IDC_STAT_EPISODE_TYPE: NORMAL
MDC_IDC_STAT_TACHYTHERAPY_ATP_DELIVERED_RECENT: 0
MDC_IDC_STAT_TACHYTHERAPY_ATP_DELIVERED_TOTAL: 0
MDC_IDC_STAT_TACHYTHERAPY_RECENT_DTM_END: NORMAL
MDC_IDC_STAT_TACHYTHERAPY_RECENT_DTM_START: NORMAL
MDC_IDC_STAT_TACHYTHERAPY_SHOCKS_ABORTED_RECENT: 0
MDC_IDC_STAT_TACHYTHERAPY_SHOCKS_ABORTED_TOTAL: 0
MDC_IDC_STAT_TACHYTHERAPY_SHOCKS_DELIVERED_RECENT: 0
MDC_IDC_STAT_TACHYTHERAPY_SHOCKS_DELIVERED_TOTAL: 0
MDC_IDC_STAT_TACHYTHERAPY_TOTAL_DTM_END: NORMAL
MDC_IDC_STAT_TACHYTHERAPY_TOTAL_DTM_START: NORMAL

## 2024-06-13 ENCOUNTER — PATIENT OUTREACH (OUTPATIENT)
Dept: CARE COORDINATION | Facility: CLINIC | Age: 69
End: 2024-06-13
Payer: COMMERCIAL

## 2024-06-23 ENCOUNTER — HEALTH MAINTENANCE LETTER (OUTPATIENT)
Age: 69
End: 2024-06-23

## 2024-06-26 ENCOUNTER — HOSPITAL ENCOUNTER (EMERGENCY)
Facility: CLINIC | Age: 69
Discharge: HOME | End: 2024-06-26
Attending: EMERGENCY MEDICINE | Admitting: EMERGENCY MEDICINE
Payer: COMMERCIAL

## 2024-06-26 ENCOUNTER — MYC MEDICAL ADVICE (OUTPATIENT)
Dept: CARDIOLOGY | Facility: CLINIC | Age: 69
End: 2024-06-26

## 2024-06-26 ENCOUNTER — NURSE TRIAGE (OUTPATIENT)
Dept: NURSING | Facility: CLINIC | Age: 69
End: 2024-06-26
Payer: COMMERCIAL

## 2024-06-26 VITALS
TEMPERATURE: 98.2 F | HEART RATE: 82 BPM | SYSTOLIC BLOOD PRESSURE: 127 MMHG | RESPIRATION RATE: 16 BRPM | DIASTOLIC BLOOD PRESSURE: 66 MMHG | OXYGEN SATURATION: 99 %

## 2024-06-26 DIAGNOSIS — I95.89 OTHER SPECIFIED HYPOTENSION: ICD-10-CM

## 2024-06-26 LAB
ALBUMIN SERPL BCG-MCNC: 4.2 G/DL (ref 3.5–5.2)
ALBUMIN UR-MCNC: NEGATIVE MG/DL
ALP SERPL-CCNC: 71 U/L (ref 40–150)
ALT SERPL W P-5'-P-CCNC: 26 U/L (ref 0–50)
ANION GAP SERPL CALCULATED.3IONS-SCNC: 13 MMOL/L (ref 7–15)
APPEARANCE UR: CLEAR
AST SERPL W P-5'-P-CCNC: 22 U/L (ref 0–45)
BASOPHILS # BLD AUTO: 0.1 10E3/UL (ref 0–0.2)
BASOPHILS NFR BLD AUTO: 1 %
BILIRUB SERPL-MCNC: 0.3 MG/DL
BILIRUB UR QL STRIP: NEGATIVE
BUN SERPL-MCNC: 32 MG/DL (ref 8–23)
CALCIUM SERPL-MCNC: 9.7 MG/DL (ref 8.8–10.2)
CHLORIDE SERPL-SCNC: 103 MMOL/L (ref 98–107)
COLOR UR AUTO: ABNORMAL
CREAT SERPL-MCNC: 1.28 MG/DL (ref 0.51–0.95)
CRP SERPL-MCNC: <3 MG/L
DEPRECATED HCO3 PLAS-SCNC: 26 MMOL/L (ref 22–29)
EGFRCR SERPLBLD CKD-EPI 2021: 45 ML/MIN/1.73M2
EOSINOPHIL # BLD AUTO: 0.6 10E3/UL (ref 0–0.7)
EOSINOPHIL NFR BLD AUTO: 8 %
ERYTHROCYTE [DISTWIDTH] IN BLOOD BY AUTOMATED COUNT: 14.8 % (ref 10–15)
ERYTHROCYTE [SEDIMENTATION RATE] IN BLOOD BY WESTERGREN METHOD: 4 MM/HR (ref 0–30)
GLUCOSE SERPL-MCNC: 146 MG/DL (ref 70–99)
GLUCOSE UR STRIP-MCNC: >=1000 MG/DL
HCT VFR BLD AUTO: 40.6 % (ref 35–47)
HGB BLD-MCNC: 12.3 G/DL (ref 11.7–15.7)
HGB UR QL STRIP: NEGATIVE
IMM GRANULOCYTES # BLD: 0 10E3/UL
IMM GRANULOCYTES NFR BLD: 0 %
KETONES UR STRIP-MCNC: NEGATIVE MG/DL
LACTATE SERPL-SCNC: 1.7 MMOL/L (ref 0.7–2)
LEUKOCYTE ESTERASE UR QL STRIP: NEGATIVE
LYMPHOCYTES # BLD AUTO: 2.1 10E3/UL (ref 0.8–5.3)
LYMPHOCYTES NFR BLD AUTO: 30 %
MAGNESIUM SERPL-MCNC: 1.8 MG/DL (ref 1.7–2.3)
MCH RBC QN AUTO: 28.3 PG (ref 26.5–33)
MCHC RBC AUTO-ENTMCNC: 30.3 G/DL (ref 31.5–36.5)
MCV RBC AUTO: 93 FL (ref 78–100)
MONOCYTES # BLD AUTO: 0.8 10E3/UL (ref 0–1.3)
MONOCYTES NFR BLD AUTO: 11 %
NEUTROPHILS # BLD AUTO: 3.5 10E3/UL (ref 1.6–8.3)
NEUTROPHILS NFR BLD AUTO: 50 %
NITRATE UR QL: NEGATIVE
NRBC # BLD AUTO: 0 10E3/UL
NRBC BLD AUTO-RTO: 0 /100
PH UR STRIP: 5 [PH] (ref 5–7)
PLATELET # BLD AUTO: 207 10E3/UL (ref 150–450)
POTASSIUM SERPL-SCNC: 4.6 MMOL/L (ref 3.4–5.3)
PROT SERPL-MCNC: 6.9 G/DL (ref 6.4–8.3)
RBC # BLD AUTO: 4.35 10E6/UL (ref 3.8–5.2)
RBC URINE: 0 /HPF
SODIUM SERPL-SCNC: 142 MMOL/L (ref 135–145)
SP GR UR STRIP: 1.03 (ref 1–1.03)
TROPONIN T SERPL HS-MCNC: 12 NG/L
UROBILINOGEN UR STRIP-MCNC: NORMAL MG/DL
WBC # BLD AUTO: 7 10E3/UL (ref 4–11)
WBC URINE: 1 /HPF

## 2024-06-26 PROCEDURE — 80053 COMPREHEN METABOLIC PANEL: CPT | Performed by: EMERGENCY MEDICINE

## 2024-06-26 PROCEDURE — 83735 ASSAY OF MAGNESIUM: CPT | Performed by: EMERGENCY MEDICINE

## 2024-06-26 PROCEDURE — 96360 HYDRATION IV INFUSION INIT: CPT | Performed by: EMERGENCY MEDICINE

## 2024-06-26 PROCEDURE — 99284 EMERGENCY DEPT VISIT MOD MDM: CPT | Mod: 25 | Performed by: EMERGENCY MEDICINE

## 2024-06-26 PROCEDURE — 96361 HYDRATE IV INFUSION ADD-ON: CPT | Performed by: EMERGENCY MEDICINE

## 2024-06-26 PROCEDURE — 258N000003 HC RX IP 258 OP 636: Performed by: EMERGENCY MEDICINE

## 2024-06-26 PROCEDURE — 86140 C-REACTIVE PROTEIN: CPT | Performed by: EMERGENCY MEDICINE

## 2024-06-26 PROCEDURE — 93005 ELECTROCARDIOGRAM TRACING: CPT | Mod: RTG

## 2024-06-26 PROCEDURE — 85652 RBC SED RATE AUTOMATED: CPT | Performed by: EMERGENCY MEDICINE

## 2024-06-26 PROCEDURE — 99284 EMERGENCY DEPT VISIT MOD MDM: CPT | Performed by: EMERGENCY MEDICINE

## 2024-06-26 PROCEDURE — 81003 URINALYSIS AUTO W/O SCOPE: CPT | Performed by: EMERGENCY MEDICINE

## 2024-06-26 PROCEDURE — 36415 COLL VENOUS BLD VENIPUNCTURE: CPT | Performed by: EMERGENCY MEDICINE

## 2024-06-26 PROCEDURE — 84484 ASSAY OF TROPONIN QUANT: CPT | Performed by: EMERGENCY MEDICINE

## 2024-06-26 PROCEDURE — 85025 COMPLETE CBC W/AUTO DIFF WBC: CPT | Performed by: EMERGENCY MEDICINE

## 2024-06-26 PROCEDURE — 83605 ASSAY OF LACTIC ACID: CPT | Performed by: EMERGENCY MEDICINE

## 2024-06-26 RX ADMIN — SODIUM CHLORIDE 500 ML: 9 INJECTION, SOLUTION INTRAVENOUS at 21:38

## 2024-06-26 ASSESSMENT — COLUMBIA-SUICIDE SEVERITY RATING SCALE - C-SSRS
6. HAVE YOU EVER DONE ANYTHING, STARTED TO DO ANYTHING, OR PREPARED TO DO ANYTHING TO END YOUR LIFE?: NO
2. HAVE YOU ACTUALLY HAD ANY THOUGHTS OF KILLING YOURSELF IN THE PAST MONTH?: NO
1. IN THE PAST MONTH, HAVE YOU WISHED YOU WERE DEAD OR WISHED YOU COULD GO TO SLEEP AND NOT WAKE UP?: NO

## 2024-06-26 ASSESSMENT — ACTIVITIES OF DAILY LIVING (ADL)
ADLS_ACUITY_SCORE: 37
ADLS_ACUITY_SCORE: 37

## 2024-06-27 ENCOUNTER — TELEPHONE (OUTPATIENT)
Dept: CARDIOLOGY | Facility: CLINIC | Age: 69
End: 2024-06-27

## 2024-06-27 LAB
ATRIAL RATE - MUSE: 59 BPM
DIASTOLIC BLOOD PRESSURE - MUSE: NORMAL MMHG
INTERPRETATION ECG - MUSE: NORMAL
P AXIS - MUSE: 52 DEGREES
PR INTERVAL - MUSE: 126 MS
QRS DURATION - MUSE: 74 MS
QT - MUSE: 456 MS
QTC - MUSE: 451 MS
R AXIS - MUSE: 36 DEGREES
SYSTOLIC BLOOD PRESSURE - MUSE: NORMAL MMHG
T AXIS - MUSE: 106 DEGREES
VENTRICULAR RATE- MUSE: 59 BPM

## 2024-06-27 NOTE — ED TRIAGE NOTES
Arrives ambulatory with dizziness and hypotension. Per patient it has been going on since this AM. Patient takes BP medications.     Triage Assessment (Adult)       Row Name 06/26/24 2023          Triage Assessment    Airway WDL WDL        Respiratory WDL    Respiratory WDL WDL        Skin Circulation/Temperature WDL    Skin Circulation/Temperature WDL WDL        Cardiac WDL    Cardiac WDL WDL        Peripheral/Neurovascular WDL    Peripheral Neurovascular WDL WDL        Cognitive/Neuro/Behavioral WDL    Cognitive/Neuro/Behavioral WDL WDL

## 2024-06-27 NOTE — TELEPHONE ENCOUNTER
Patient confirmed scheduled appointment:  Date: 8/13  Time: 12:15  Visit type: return cardiology  Provider: German  Location: [facility] – [place], [place]  Testing/imaging:   Additional notes:

## 2024-06-27 NOTE — TELEPHONE ENCOUNTER
[facility] Call Center    Phone Message    May a detailed message be left on voicemail: no     Reason for Call: Appointment Intake    Referring Provider Name:     Miki Llanes MD     Diagnosis and/or Symptoms:     Other specified hypotension [I95.89]     Please call pt to coordinate.  Emergent Referral to Dr. Steve    Action Taken: Message routed to:  Other: cardio    Travel Screening: Not Applicable     Date of Service:

## 2024-06-27 NOTE — ED PROVIDER NOTES
[place] EMERGENCY DEPARTMENT ([facility])    6/26/24       ED PROVIDER NOTE    History     Chief Complaint   Patient presents with    Hypotension     HPI  Marianne Monroy is a 69 year old female with PMH notable for severe HFrEF (EF 40-45% per ECHO 08/10/23), s/p ICD (10/2022), CAD s/p CABG (05/2022), DM2, HTN, HLD, who presents to the Emergency Department with hypotension and dizziness.     Patient reports developing lightheaded this morning when she took her home BP check, which was ~80/72. Her BP prior to arrival at 115/47. She called nurse triage and was instructed to present to the ED for evaluation. She describes her lightheadedness as fainting and room-spinning sensation. She also notes some exertional dyspnea for a couple of months.     She denies diarrhea, vomiting, dysuria. However, she notes darker urine color today which she attributes to dehydration (thinking she did not drink enough water). She denies recent changes in her medications.     Past Medical History  Past Medical History:   Diagnosis Date    Abnormal Pap smear of cervix ~2000    repeat pap was normal    Allergic rhinitis, cause unspecified     Anxiety state, unspecified     panic episodes    C. difficile colitis     CAD (coronary artery disease)     Congestive heart failure (H)     Depressive disorder     Diabetes (H)     HFrEF (heart failure with reduced ejection fraction) (H)     ICD (implantable cardioverter-defibrillator) in place     Osteopenia     Unspecified essential hypertension      Past Surgical History:   Procedure Laterality Date    BYPASS GRAFT ARTERY CORONARY N/A 05/24/2022    Procedure: Median sternotomy.  Intraoperative transesophageal echocardiogram per anesthesia.  Left internal mammary artery harvest.  Right and left endoscopically harvested  greater saphenouse vein.  Cardiopulmonary Bypass.  Coronary Artery Bypass Grafts x4.;  Surgeon: Ulises Desai MD;  Location: [facility] OR    COLONOSCOPY N/A 02/16/2023     Procedure: COLONOSCOPY, WITH POLYPECTOMY AND BIOPSY;  Surgeon: Nikhil Lees MD;  Location: [facility] GI    CV CORONARY ANGIOGRAM  05/19/2022    Procedure: CV CORONARY ANGIOGRAM;  Surgeon: Huseyin Vogel MD;  Location: [facility] CARDIAC CATH LAB    CV CORONARY ANGIOGRAM  05/19/2022    Procedure: ;  Surgeon: Huseyin Vogel MD;  Location: [facility] CARDIAC CATH LAB    EP ICD INSERT SINGLE N/A 10/04/2022    Procedure: Implantable Cardioverter Defibrillator Device & Lead Implant Single or Dual;  Surgeon: Too Morrow MD;  Location: [facility] CARDIAC CATH LAB    ESOPHAGOSCOPY, GASTROSCOPY, DUODENOSCOPY (EGD), COMBINED N/A 02/16/2023    Procedure: ESOPHAGOGASTRODUODENOSCOPY, WITH BIOPSY;  Surgeon: Nikhil Lees MD;  Location:  GI    PICC DOUBLE LUMEN PLACEMENT Right 05/27/2022    Failed PICC attempt right arm basilic vein    PICC INSERTION - TRIPLE LUMEN Left 05/28/2022    left basilic 5fr tl,picc44 cm     ALPRAZolam (XANAX) 0.25 MG tablet  aspirin 81 MG EC tablet  atorvastatin (LIPITOR) 80 MG tablet  blood glucose (ONETOUCH VERIO IQ) test strip  blood glucose monitoring (ONETOUCH VERIO) meter device kit  Continuous Blood Gluc Sensor (FREESTYLE VANESSA 14 DAY SENSOR) MISC  empagliflozin (JARDIANCE) 25 MG TABS tablet  FLUoxetine (PROZAC) 20 MG capsule  glipiZIDE (GLUCOTROL XL) 2.5 MG 24 hr tablet  metFORMIN (GLUCOPHAGE XR) 500 MG 24 hr tablet  metoprolol succinate ER (TOPROL XL) 50 MG 24 hr tablet  MULTIPLE VITAMIN OR TABS  Multiple Vitamins-Minerals (PRESERVISION AREDS PO)  sacubitril-valsartan (ENTRESTO)  MG per tablet  spironolactone (ALDACTONE) 25 MG tablet  traZODone (DESYREL) 50 MG tablet      Allergies   Allergen Reactions    Effexor [Venlafaxine Hydrochloride]      Tachycardia, makes her extremely jittery--cant sit down, has to keep moving constantly    Latex      Reported by patient     Family History  Family History   Problem Relation Age of Onset    Diabetes  Mother         hypertension, alive at 83    C.A.D. Mother     Coronary Artery Disease Mother     Cancer Father         lung cancer, smoker.   at age 53    Family History Negative Sister     Osteoporosis Sister     Anesthesia Reaction No family hx of     Deep Vein Thrombosis (DVT) No family hx of      Social History   Social History     Tobacco Use    Smoking status: Never    Smokeless tobacco: Never   Vaping Use    Vaping status: Never Used   Substance Use Topics    Alcohol use: Yes     Comment: 1 drink a week    Drug use: No      A medically appropriate review of systems was performed with pertinent positives and negatives noted in the HPI, and all other systems negative.    Physical Exam   BP: 92/55  Pulse: 76  Temp: 98.2  F (36.8  C)  Resp: 18  SpO2: 99 %  Physical Exam  Vitals and nursing note reviewed.   Constitutional:       General: She is not in acute distress.     Appearance: Normal appearance. She is well-developed.   HENT:      Head: Normocephalic and atraumatic.   Eyes:      General: No scleral icterus.     Conjunctiva/sclera: Conjunctivae normal.   Cardiovascular:      Rate and Rhythm: Normal rate.   Pulmonary:      Effort: Pulmonary effort is normal. No respiratory distress.   Abdominal:      General: Abdomen is flat.   Musculoskeletal:      Cervical back: Normal range of motion and neck supple.   Skin:     General: Skin is warm and dry.      Findings: No rash.   Neurological:      General: No focal deficit present.      Mental Status: She is alert and oriented to person, place, and time.       ED Course, Procedures, & Data      Procedures              EKG Interpretation:      Interpreted by Miki Llanes                        Scheduling Type..: Person, MD  Time reviewed: per Epic  Symptoms at time of EKG: none   Rhythm: normal sinus   Rate: normal  Axis: normal  Ectopy: none  Conduction: normal  ST Segments/ T Waves: No ST-T wave changes  Q Waves: none  Comparison to prior: Unchanged    Clinical  Impression: normal EKG        No results found for any visits on 06/26/24.  Medications - No data to display  Labs Ordered and Resulted from Time of ED Arrival to Time of ED Departure - No data to display  No orders to display          Critical care was not performed.     Medical Decision Making  The patient's presentation was of high complexity (an acute health issue posing potential threat to life or bodily function).    The patient's evaluation involved:  ordering and/or review of 3+ test(s) in this encounter (see separate area of note for details)    The patient's management necessitated only low risk treatment.    Assessment & Plan      69 year old female with PMH notable for severe HFrEF (EF 40-45% per ECHO 08/10/23), s/p ICD (10/2022), CAD s/p CABG (05/2022), DM2, HTN, HLD, who presents to the Emergency Department with hypotension and dizziness.  Vital signs stable and afebrile including normal pulse ox at 99% on room air.  EKG obtained which revealed normal sinus rhythm without acute ischemic changes.  IV established, labs sent which revealed normal CBC and electrolytes, normal inflammatory markers, normal troponin and normal lactic acid 1.7.  She was given 500 cc IV fluid bolus with improvement in her blood pressures to 127/66.  Patient subsequently discharged home and expressed verbal standing and anticipatory guidance return precautions to the emergency department, plan to follow-up with cardiology clinic ASAP.    I have reviewed the nursing notes. I have reviewed the findings, diagnosis, plan and need for follow up with the patient.    New Prescriptions    No medications on file       Final diagnoses:   Other specified hypotension       Miki Llanes MD  [facility] EMERGENCY DEPARTMENT  6/26/2024        Miki Llanes MD  07/19/24 1480

## 2024-06-27 NOTE — TELEPHONE ENCOUNTER
Patient presented to the ER for evaluation of hypotension. Will watch her chart for discharge and to arrange follow-up.    Dasia Amin RN

## 2024-06-27 NOTE — TELEPHONE ENCOUNTER
"Problems with blood pressure today. Today it has been really low since about 3pm=80/27. 6pm 94/26 P63  6:30pm 97/32, Now @ 7:30pm =115/47. She was very lightheaded & felt like she was going to pass out when it was 80/27. She is not lightheaded now. She last urinated this morning ~12 hrs ago. She has not been drinking much today, is now drinking more water. Mouth is very dry.    Triaged to a disposition of Go to ED now. She agrees with this plan. Her daughter intends to drive her to [facility] now.    Syl Gaona RN Triage Nurse Advisor 7:54 PM 6/26/2024   Reason for Disposition   [1] Drinking very little AND [2] dehydration suspected (e.g., no urine > 12 hours, very dry mouth, very lightheaded)    Additional Information   Negative: Started suddenly after an allergic medicine, an allergic food, or bee sting   Negative: Shock suspected (e.g., cold/pale/clammy skin, too weak to stand, low BP, rapid pulse)   Negative: Difficult to awaken or acting confused (e.g., disoriented, slurred speech)   Negative: Fainted   Negative: [1] Systolic BP < 90 AND [2] dizzy, lightheaded, or weak   Negative: Chest pain   Negative: Bleeding (e.g., vomiting blood, rectal bleeding or tarry stools, severe vaginal bleeding)(Exception: fainted from sight of small amount of blood; small cut or abrasion)   Negative: Extra heart beats or heart is beating fast  (i.e., \"palpitations\")   Negative: Sounds like a life-threatening emergency to the triager   Negative: [1] Systolic BP < 80 AND [2] NOT dizzy, lightheaded or weak   Negative: Abdominal pain   Negative: Fever > 100.4 F (38.0 C)   Negative: Major surgery in the past month    Protocols used: Low Blood Pressure-A-AH    "

## 2024-07-15 ENCOUNTER — LAB (OUTPATIENT)
Dept: LAB | Facility: CLINIC | Age: 69
End: 2024-07-15
Payer: COMMERCIAL

## 2024-07-15 DIAGNOSIS — E11.9 DIABETES MELLITUS, TYPE 2 (H): ICD-10-CM

## 2024-07-15 LAB — HBA1C MFR BLD: 7.9 % (ref 0–5.6)

## 2024-07-15 PROCEDURE — 36415 COLL VENOUS BLD VENIPUNCTURE: CPT

## 2024-07-15 PROCEDURE — 83036 HEMOGLOBIN GLYCOSYLATED A1C: CPT

## 2024-07-31 ENCOUNTER — ANCILLARY PROCEDURE (OUTPATIENT)
Dept: CARDIOLOGY | Facility: CLINIC | Age: 69
End: 2024-07-31
Attending: INTERNAL MEDICINE
Payer: COMMERCIAL

## 2024-07-31 DIAGNOSIS — Z95.810 ICD (IMPLANTABLE CARDIOVERTER-DEFIBRILLATOR), SINGLE, IN SITU: ICD-10-CM

## 2024-07-31 PROCEDURE — 93296 REM INTERROG EVL PM/IDS: CPT

## 2024-07-31 PROCEDURE — 93295 DEV INTERROG REMOTE 1/2/MLT: CPT | Performed by: INTERNAL MEDICINE

## 2024-08-01 LAB
MDC_IDC_EPISODE_DTM: NORMAL
MDC_IDC_EPISODE_DURATION: 2 S
MDC_IDC_EPISODE_ID: 4
MDC_IDC_EPISODE_TYPE: NORMAL
MDC_IDC_LEAD_CONNECTION_STATUS: NORMAL
MDC_IDC_LEAD_IMPLANT_DT: NORMAL
MDC_IDC_LEAD_LOCATION: NORMAL
MDC_IDC_LEAD_LOCATION_DETAIL_1: NORMAL
MDC_IDC_LEAD_MFG: NORMAL
MDC_IDC_LEAD_MODEL: NORMAL
MDC_IDC_LEAD_POLARITY_TYPE: NORMAL
MDC_IDC_LEAD_SERIAL: NORMAL
MDC_IDC_LEAD_SPECIAL_FUNCTION: NORMAL
MDC_IDC_MSMT_BATTERY_DTM: NORMAL
MDC_IDC_MSMT_BATTERY_REMAINING_LONGEVITY: 150 MO
MDC_IDC_MSMT_BATTERY_RRT_TRIGGER: NORMAL
MDC_IDC_MSMT_BATTERY_VOLTAGE: 3.03 V
MDC_IDC_MSMT_CAP_CHARGE_DTM: NORMAL
MDC_IDC_MSMT_CAP_CHARGE_ENERGY: 18 J
MDC_IDC_MSMT_CAP_CHARGE_TIME: 3.7 S
MDC_IDC_MSMT_CAP_CHARGE_TYPE: NORMAL
MDC_IDC_MSMT_LEADCHNL_RV_IMPEDANCE_VALUE: 342 OHM
MDC_IDC_MSMT_LEADCHNL_RV_IMPEDANCE_VALUE: 399 OHM
MDC_IDC_MSMT_LEADCHNL_RV_PACING_THRESHOLD_AMPLITUDE: 0.5 V
MDC_IDC_MSMT_LEADCHNL_RV_PACING_THRESHOLD_PULSEWIDTH: 0.4 MS
MDC_IDC_MSMT_LEADCHNL_RV_SENSING_INTR_AMPL: 9.1 MV
MDC_IDC_PG_IMPLANT_DTM: NORMAL
MDC_IDC_PG_MFG: NORMAL
MDC_IDC_PG_MODEL: NORMAL
MDC_IDC_PG_SERIAL: NORMAL
MDC_IDC_PG_TYPE: NORMAL
MDC_IDC_SESS_CLINIC_NAME: NORMAL
MDC_IDC_SESS_DTM: NORMAL
MDC_IDC_SESS_TYPE: NORMAL
MDC_IDC_SET_BRADY_HYSTRATE: NORMAL
MDC_IDC_SET_BRADY_LOWRATE: 40 {BEATS}/MIN
MDC_IDC_SET_BRADY_MODE: NORMAL
MDC_IDC_SET_LEADCHNL_RV_PACING_AMPLITUDE: 2 V
MDC_IDC_SET_LEADCHNL_RV_PACING_ANODE_ELECTRODE_1: NORMAL
MDC_IDC_SET_LEADCHNL_RV_PACING_ANODE_LOCATION_1: NORMAL
MDC_IDC_SET_LEADCHNL_RV_PACING_CAPTURE_MODE: NORMAL
MDC_IDC_SET_LEADCHNL_RV_PACING_CATHODE_ELECTRODE_1: NORMAL
MDC_IDC_SET_LEADCHNL_RV_PACING_CATHODE_LOCATION_1: NORMAL
MDC_IDC_SET_LEADCHNL_RV_PACING_POLARITY: NORMAL
MDC_IDC_SET_LEADCHNL_RV_PACING_PULSEWIDTH: 0.4 MS
MDC_IDC_SET_LEADCHNL_RV_SENSING_ANODE_ELECTRODE_1: NORMAL
MDC_IDC_SET_LEADCHNL_RV_SENSING_ANODE_LOCATION_1: NORMAL
MDC_IDC_SET_LEADCHNL_RV_SENSING_CATHODE_ELECTRODE_1: NORMAL
MDC_IDC_SET_LEADCHNL_RV_SENSING_CATHODE_LOCATION_1: NORMAL
MDC_IDC_SET_LEADCHNL_RV_SENSING_POLARITY: NORMAL
MDC_IDC_SET_LEADCHNL_RV_SENSING_SENSITIVITY: 0.3 MV
MDC_IDC_SET_ZONE_DETECTION_BEATS_DENOMINATOR: 16 {BEATS}
MDC_IDC_SET_ZONE_DETECTION_BEATS_DENOMINATOR: 32 {BEATS}
MDC_IDC_SET_ZONE_DETECTION_BEATS_DENOMINATOR: 40 {BEATS}
MDC_IDC_SET_ZONE_DETECTION_BEATS_NUMERATOR: 16 {BEATS}
MDC_IDC_SET_ZONE_DETECTION_BEATS_NUMERATOR: 30 {BEATS}
MDC_IDC_SET_ZONE_DETECTION_BEATS_NUMERATOR: 32 {BEATS}
MDC_IDC_SET_ZONE_DETECTION_INTERVAL: 270 MS
MDC_IDC_SET_ZONE_DETECTION_INTERVAL: 320 MS
MDC_IDC_SET_ZONE_DETECTION_INTERVAL: 360 MS
MDC_IDC_SET_ZONE_DETECTION_INTERVAL: 360 MS
MDC_IDC_SET_ZONE_STATUS: NORMAL
MDC_IDC_SET_ZONE_TYPE: NORMAL
MDC_IDC_SET_ZONE_VENDOR_TYPE: NORMAL
MDC_IDC_STAT_AT_BURDEN_PERCENT: 0 %
MDC_IDC_STAT_AT_DTM_END: NORMAL
MDC_IDC_STAT_AT_DTM_START: NORMAL
MDC_IDC_STAT_BRADY_DTM_END: NORMAL
MDC_IDC_STAT_BRADY_DTM_START: NORMAL
MDC_IDC_STAT_BRADY_RV_PERCENT_PACED: 0.02 %
MDC_IDC_STAT_CRT_DTM_END: NORMAL
MDC_IDC_STAT_CRT_DTM_START: NORMAL
MDC_IDC_STAT_EPISODE_RECENT_COUNT: 0
MDC_IDC_STAT_EPISODE_RECENT_COUNT: 1
MDC_IDC_STAT_EPISODE_RECENT_COUNT_DTM_END: NORMAL
MDC_IDC_STAT_EPISODE_RECENT_COUNT_DTM_START: NORMAL
MDC_IDC_STAT_EPISODE_TOTAL_COUNT: 0
MDC_IDC_STAT_EPISODE_TOTAL_COUNT: 4
MDC_IDC_STAT_EPISODE_TOTAL_COUNT_DTM_END: NORMAL
MDC_IDC_STAT_EPISODE_TOTAL_COUNT_DTM_START: NORMAL
MDC_IDC_STAT_EPISODE_TYPE: NORMAL
MDC_IDC_STAT_TACHYTHERAPY_ATP_DELIVERED_RECENT: 0
MDC_IDC_STAT_TACHYTHERAPY_ATP_DELIVERED_TOTAL: 0
MDC_IDC_STAT_TACHYTHERAPY_RECENT_DTM_END: NORMAL
MDC_IDC_STAT_TACHYTHERAPY_RECENT_DTM_START: NORMAL
MDC_IDC_STAT_TACHYTHERAPY_SHOCKS_ABORTED_RECENT: 0
MDC_IDC_STAT_TACHYTHERAPY_SHOCKS_ABORTED_TOTAL: 0
MDC_IDC_STAT_TACHYTHERAPY_SHOCKS_DELIVERED_RECENT: 0
MDC_IDC_STAT_TACHYTHERAPY_SHOCKS_DELIVERED_TOTAL: 0
MDC_IDC_STAT_TACHYTHERAPY_TOTAL_DTM_END: NORMAL
MDC_IDC_STAT_TACHYTHERAPY_TOTAL_DTM_START: NORMAL

## 2024-08-08 ENCOUNTER — MYC MEDICAL ADVICE (OUTPATIENT)
Dept: FAMILY MEDICINE | Facility: CLINIC | Age: 69
End: 2024-08-08
Payer: COMMERCIAL

## 2024-08-13 ENCOUNTER — ANCILLARY PROCEDURE (OUTPATIENT)
Dept: CARDIOLOGY | Facility: CLINIC | Age: 69
End: 2024-08-13
Attending: STUDENT IN AN ORGANIZED HEALTH CARE EDUCATION/TRAINING PROGRAM
Payer: COMMERCIAL

## 2024-08-13 ENCOUNTER — OFFICE VISIT (OUTPATIENT)
Dept: CARDIOLOGY | Facility: CLINIC | Age: 69
End: 2024-08-13
Attending: INTERNAL MEDICINE
Payer: COMMERCIAL

## 2024-08-13 VITALS
BODY MASS INDEX: 22.59 KG/M2 | DIASTOLIC BLOOD PRESSURE: 60 MMHG | HEART RATE: 58 BPM | SYSTOLIC BLOOD PRESSURE: 128 MMHG | WEIGHT: 127.5 LBS | OXYGEN SATURATION: 98 %

## 2024-08-13 DIAGNOSIS — Z95.1 STATUS POST AORTO-CORONARY ARTERY BYPASS GRAFT: ICD-10-CM

## 2024-08-13 DIAGNOSIS — I50.22 CHRONIC SYSTOLIC HEART FAILURE (H): ICD-10-CM

## 2024-08-13 DIAGNOSIS — Z95.810 ICD (IMPLANTABLE CARDIOVERTER-DEFIBRILLATOR), DUAL, IN SITU: ICD-10-CM

## 2024-08-13 DIAGNOSIS — I25.5 ISCHEMIC CARDIOMYOPATHY: Primary | ICD-10-CM

## 2024-08-13 DIAGNOSIS — I47.29 PAROXYSMAL VENTRICULAR TACHYCARDIA (H): ICD-10-CM

## 2024-08-13 LAB — LVEF ECHO: NORMAL

## 2024-08-13 PROCEDURE — G0463 HOSPITAL OUTPT CLINIC VISIT: HCPCS | Performed by: INTERNAL MEDICINE

## 2024-08-13 PROCEDURE — 99215 OFFICE O/P EST HI 40 MIN: CPT | Mod: 25 | Performed by: INTERNAL MEDICINE

## 2024-08-13 PROCEDURE — 93306 TTE W/DOPPLER COMPLETE: CPT | Performed by: INTERNAL MEDICINE

## 2024-08-13 RX ADMIN — Medication 5 ML: at 11:32

## 2024-08-13 ASSESSMENT — PAIN SCALES - GENERAL: PAINLEVEL: NO PAIN (0)

## 2024-08-13 NOTE — NURSING NOTE
Chief Complaint   Patient presents with    Follow Up      8/13/24  follow up         Vitals were taken, medications reconciled.    Johann Matos, Clinic Assistant   12:10 PM

## 2024-08-13 NOTE — PATIENT INSTRUCTIONS
In one year : repeat echocardiogram then follow up with Dr. Porter afterwards.  You will get a scheduling reminder in advance.

## 2024-08-13 NOTE — PROGRESS NOTES
SUBJECTIVE:  Marianne Monroy is a 69 year old female who presents for yearly follow-up.  Ischemic cardiomyopathy.  Patient presented with wide-complex tachycardia and heart failure symptoms in May 2022.  Evaluation led to severe three-vessel disease and had bypass surgery in May 2022, Coronary artery bypass grafting x 4 (left internal mammary artery to left anterior descending artery, saphenous vein graft to distal right coronary artery, saphenous vein graft to ramus intermedius artery, saphenous vein graft to obtuse marginal artery).  As patient also had sustained VT post bypass surgery patient had an ICD placement.  Patient's EF was 20-25 prior to bypass surgery.  This improved to 35-40 after bypass and ICD placement.  Patient also known to have hypertension.  Currently patient do have some shortness of breath on exertion.  No chest pain.  No palpitations.  Overall she is doing well.    Patient Active Problem List    Diagnosis Date Noted    Postmenopausal bleeding 03/01/2023     Priority: Medium    Osteopenia 09/13/2022     Priority: Medium    S/P CABG (coronary artery bypass graft) 07/06/2022     Priority: Medium     5/2022      HFrEF (heart failure with reduced ejection fraction) (H) 07/01/2022     Priority: Medium    Near syncope 05/18/2022     Priority: Medium    Diabetes mellitus, type 2 (H) 02/12/2020     Priority: Medium    Generalized anxiety disorder 04/03/2018     Priority: Medium    Major depressive disorder, recurrent episode, mild (H24) 11/21/2017     Priority: Medium     Added per visit on 11/17/17.      Hypertension, goal below 140/90 09/02/2014     Priority: Medium     Diagnosis abstracted from previous encounter      Hyperlipidemia LDL goal <70 01/27/2013     Priority: Medium     January 27, 2013 above goal. , will change to atorvastatin 40 mg, recheck lipids in 1-2 months.       C. difficile diarrhea 05/25/2012     Priority: Medium     May 25, 2012 culture positive, will treat with  flagyl.   January 27, 2013 clinically resolved.       GERD (gastroesophageal reflux disease) 04/07/2010     Priority: Medium     December 23, 2015: Omeprazole 20 mg prn, helps symptoms    April 21, 2011 stable on chronic PPI therapy. One year refill.       Phobia 02/11/2008     Priority: Medium     February 9, 2008  Intolerant of Effexor. Will give small rx of ativan. Max: 10 to 20 tablets per year.   September 17, 2009 improved. Still has 12 of 20 tablets of ativan.   April 21, 2011 limited usage, refill given.   Problem list name updated by automated process. Provider to review      .  Current Outpatient Medications   Medication Sig Dispense Refill    ALPRAZolam (XANAX) 0.25 MG tablet Take 1 tablet (0.25 mg) by mouth 3 times daily as needed for anxiety 10 tablet 0    aspirin 81 MG EC tablet Take 81 mg by mouth every evening      atorvastatin (LIPITOR) 80 MG tablet TAKE 1 TABLET DAILY (NEED ANNUAL EXAM) Strength: 80 mg 90 tablet 3    blood glucose (ONETOUCH VERIO IQ) test strip Use to test blood sugar 2 times daily or as directed. 200 strip 1    blood glucose monitoring (ONETOUCH VERIO) meter device kit Use to test blood sugar 2 times daily or as directed. 1 kit 0    Continuous Blood Gluc Sensor (FREESTYLE VANESSA 14 DAY SENSOR) MISC Change every 14 days. 2 each 0    empagliflozin (JARDIANCE) 25 MG TABS tablet Take 1 tablet (25 mg) by mouth daily 90 tablet 1    FLUoxetine (PROZAC) 20 MG capsule TAKE 2 CAPSULES(40 MG) BY MOUTH DAILY 180 capsule 0    glipiZIDE (GLUCOTROL XL) 2.5 MG 24 hr tablet Take 2 tablets (5 mg) by mouth daily 180 tablet 1    metFORMIN (GLUCOPHAGE XR) 500 MG 24 hr tablet TAKE 3 TABLETS(1500 MG) BY MOUTH DAILY WITH DINNER 270 tablet 0    metoprolol succinate ER (TOPROL XL) 50 MG 24 hr tablet Take 1.5 tablets (75 mg) by mouth daily 145 tablet 3    MULTIPLE VITAMIN OR TABS Take by mouth every morning      Multiple Vitamins-Minerals (PRESERVISION AREDS PO) Take 2 tablets by mouth daily       sacubitril-valsartan (ENTRESTO)  MG per tablet Take 1/2 tablet in AM, take 1 tablet in  tablet 3    spironolactone (ALDACTONE) 25 MG tablet Take 0.5 tablets (12.5 mg) by mouth daily 45 tablet 3    traZODone (DESYREL) 50 MG tablet Take 0.5 tablets (25 mg) by mouth at bedtime 15 tablet 3     No current facility-administered medications for this visit.     Past Medical History:   Diagnosis Date    Abnormal Pap smear of cervix ~2000    repeat pap was normal    Allergic rhinitis, cause unspecified     Anxiety state, unspecified     panic episodes    C. difficile colitis     CAD (coronary artery disease)     Congestive heart failure (H)     Depressive disorder     Diabetes (H)     HFrEF (heart failure with reduced ejection fraction) (H)     ICD (implantable cardioverter-defibrillator) in place     Osteopenia     Unspecified essential hypertension      Past Surgical History:   Procedure Laterality Date    BYPASS GRAFT ARTERY CORONARY N/A 05/24/2022    Procedure: Median sternotomy.  Intraoperative transesophageal echocardiogram per anesthesia.  Left internal mammary artery harvest.  Right and left endoscopically harvested  greater saphenouse vein.  Cardiopulmonary Bypass.  Coronary Artery Bypass Grafts x4.;  Surgeon: Ulises Desai MD;  Location:  OR    COLONOSCOPY N/A 02/16/2023    Procedure: COLONOSCOPY, WITH POLYPECTOMY AND BIOPSY;  Surgeon: Nikhil Lees MD;  Location:  GI    CV CORONARY ANGIOGRAM  05/19/2022    Procedure: CV CORONARY ANGIOGRAM;  Surgeon: Huseyin Vogel MD;  Location: [facility] CARDIAC CATH LAB    CV CORONARY ANGIOGRAM  05/19/2022    Procedure: ;  Surgeon: Huseyin Vogel MD;  Location: [facility] CARDIAC CATH LAB    EP ICD INSERT SINGLE N/A 10/04/2022    Procedure: Implantable Cardioverter Defibrillator Device & Lead Implant Single or Dual;  Surgeon: Too Morrow MD;  Location: [facility] CARDIAC CATH LAB    ESOPHAGOSCOPY, GASTROSCOPY, DUODENOSCOPY  (EGD), COMBINED N/A 02/16/2023    Procedure: ESOPHAGOGASTRODUODENOSCOPY, WITH BIOPSY;  Surgeon: Nikhil Lees MD;  Location: [facility] GI    PICC DOUBLE LUMEN PLACEMENT Right 05/27/2022    Failed PICC attempt right arm basilic vein    PICC INSERTION - TRIPLE LUMEN Left 05/28/2022    left basilic 5fr tl,picc44 cm     Allergies   Allergen Reactions    Effexor [Venlafaxine Hydrochloride]      Tachycardia, makes her extremely jittery--cant sit down, has to keep moving constantly    Latex      Reported by patient     Social History     Socioeconomic History    Marital status:      Spouse name: Not on file    Number of children: Not on file    Years of education: Not on file    Highest education level: Not on file   Occupational History    Not on file   Tobacco Use    Smoking status: Never    Smokeless tobacco: Never   Vaping Use    Vaping status: Never Used   Substance and Sexual Activity    Alcohol use: Yes     Comment: 1 drink a week    Drug use: No    Sexual activity: Not Currently     Partners: Male     Birth control/protection: None   Other Topics Concern    Parent/sibling w/ CABG, MI or angioplasty before 65F 55M? No   Social History Narrative    Not on file     Social Determinants of Health     Financial Resource Strain: Low Risk  (1/22/2024)    Financial Resource Strain     Within the past 12 months, have you or your family members you live with been unable to get utilities (heat, electricity) when it was really needed?: No   Food Insecurity: Low Risk  (1/22/2024)    Food Insecurity     Within the past 12 months, did you worry that your food would run out before you got money to buy more?: No     Within the past 12 months, did the food you bought just not last and you didn t have money to get more?: No   Transportation Needs: Low Risk  (1/22/2024)    Transportation Needs     Within the past 12 months, has lack of transportation kept you from medical appointments, getting your medicines, non-medical  meetings or appointments, work, or from getting things that you need?: No   Physical Activity: Not on file   Stress: Not on file   Social Connections: Not on file   Interpersonal Safety: Low Risk  (2024)    Interpersonal Safety     Do you feel physically and emotionally safe where you currently live?: Yes     Within the past 12 months, have you been hit, slapped, kicked or otherwise physically hurt by someone?: Not on file     Within the past 12 months, have you been humiliated or emotionally abused in other ways by your partner or ex-partner?: No   Housing Stability: Low Risk  (2024)    Housing Stability     Do you have housing? : Yes     Are you worried about losing your housing?: No     Family History   Problem Relation Age of Onset    Diabetes Mother         hypertension, alive at 83    C.A.D. Mother     Coronary Artery Disease Mother     Cancer Father         lung cancer, smoker.   at age 53    Family History Negative Sister     Osteoporosis Sister     Anesthesia Reaction No family hx of     Deep Vein Thrombosis (DVT) No family hx of           REVIEW OF SYSTEMS:  General: negative, fever, chills, night sweats  Skin: negative, acne, rash, and scaling  Eyes: negative, double vision, eye pain, and photophobia  Ears/Nose/Throat: negative, nasal congestion, and purulent rhinorrhea  Respiratory: No cough, No hemoptysis, and negative  Cardiovascular: negative, palpitations, tachycardia, irregular heart beat, chest pain, exertional chest pain or pressure, paroxysmal nocturnal dyspnea, and orthopnea         OBJECTIVE:  Blood pressure 128/60, pulse 58, weight 57.8 kg (127 lb 8 oz), SpO2 98%, not currently breastfeeding.  General Appearance: healthy, alert, active, and no distress  Head: Normocephalic. No masses, lesions, tenderness or abnormalities  Eyes: conjuctiva clear, PERRL, EOM intact  Ears: External ears normal. Canals clear. TM's normal.  Nose: Nares normal  Mouth: normal  Lungs: clear to  auscultation  Cardiac: regular rate and rhythm, normal S1 and S2, no murmur       ASSESSMENT/PLAN:  Patient here for follow-up.  Ischemic cardiomyopathy.  Presented with low EF of 20 to 25% with wide-complex tachycardia in May 2022.  Evaluation led to coronary angiogram and CABG x 4.  Coronary angiogram is shown below.               Due to VT patient also had ICD placement post bypass surgery.  Subsequent evaluation her EF improved to around 35 to 40%.  Overall patient is doing well but do have some shortness of breath on exertion.  Patient had 1 episode of severe hypotension with blood pressure of 50 systolic and diastolic of 20.  Was evaluated in the emergency room.  Today's echocardiogram reviewed.  Trace to EF is 40%.  RCA territory akinesis present.  Result was discussed with patient.  Patient wanted to review the images herself and this was done.  As she is doing well she is advised to continue her current medications.  This apart from aspirin includes Entresto 97/103/2 tablet in the morning and 1 tablet in the evening.  Toprol-XL 50 mg tablet half tablet once a day atorvastatin 80 mg daily and spironolactone 25 mg daily.  Patient is followed up by the core clinic.  No medication adjustment was done as patient had an episode of hypotension and she will be followed up closely at the core clinic.  She is advised to return to clinic in 1 year for a follow-up with a repeat echocardiogram.  Total visit duration 40 minutes.  This included face-to-face interview, physical exam, chart review, review of prior echocardiograms, today's echocardiogram, EKGs coronary angiogram and documentation.

## 2024-08-13 NOTE — LETTER
8/13/2024      RE: Marianne Monroy  [address]  [facility] [address]       Dear Colleague,    Thank you for the opportunity to participate in the care of your patient, Marianne Mornoy, at the [facility] [place] [facility]. Please see a copy of my visit note below.       SUBJECTIVE:  Marianne Monroy is a 69 year old female who presents for yearly follow-up.  Ischemic cardiomyopathy.  Patient presented with wide-complex tachycardia and heart failure symptoms in May 2022.  Evaluation led to severe three-vessel disease and had bypass surgery in May 2022, Coronary artery bypass grafting x 4 (left internal mammary artery to left anterior descending artery, saphenous vein graft to distal right coronary artery, saphenous vein graft to ramus intermedius artery, saphenous vein graft to obtuse marginal artery).  As patient also had sustained VT post bypass surgery patient had an ICD placement.  Patient's EF was 20-25 prior to bypass surgery.  This improved to 35-40 after bypass and ICD placement.  Patient also known to have hypertension.  Currently patient do have some shortness of breath on exertion.  No chest pain.  No palpitations.  Overall she is doing well.    Patient Active Problem List    Diagnosis Date Noted     Postmenopausal bleeding 03/01/2023     Priority: Medium     Osteopenia 09/13/2022     Priority: Medium     S/P CABG (coronary artery bypass graft) 07/06/2022     Priority: Medium     5/2022       HFrEF (heart failure with reduced ejection fraction) (H) 07/01/2022     Priority: Medium     Near syncope 05/18/2022     Priority: Medium     Diabetes mellitus, type 2 (H) 02/12/2020     Priority: Medium     Generalized anxiety disorder 04/03/2018     Priority: Medium     Major depressive disorder, recurrent episode, mild (H24) 11/21/2017     Priority: Medium     Added per visit on 11/17/17.       Hypertension, goal below 140/90 09/02/2014      Priority: Medium     Diagnosis abstracted from previous encounter       Hyperlipidemia LDL goal <70 01/27/2013     Priority: Medium     January 27, 2013 above goal. , will change to atorvastatin 40 mg, recheck lipids in 1-2 months.        C. difficile diarrhea 05/25/2012     Priority: Medium     May 25, 2012 culture positive, will treat with flagyl.   January 27, 2013 clinically resolved.        GERD (gastroesophageal reflux disease) 04/07/2010     Priority: Medium     December 23, 2015: Omeprazole 20 mg prn, helps symptoms    April 21, 2011 stable on chronic PPI therapy. One year refill.        Phobia 02/11/2008     Priority: Medium     February 9, 2008  Intolerant of Effexor. Will give small rx of ativan. Max: 10 to 20 tablets per year.   September 17, 2009 improved. Still has 12 of 20 tablets of ativan.   April 21, 2011 limited usage, refill given.   Problem list name updated by automated process. Provider to review      .  Current Outpatient Medications   Medication Sig Dispense Refill     ALPRAZolam (XANAX) 0.25 MG tablet Take 1 tablet (0.25 mg) by mouth 3 times daily as needed for anxiety 10 tablet 0     aspirin 81 MG EC tablet Take 81 mg by mouth every evening       atorvastatin (LIPITOR) 80 MG tablet TAKE 1 TABLET DAILY (NEED ANNUAL EXAM) Strength: 80 mg 90 tablet 3     blood glucose (ONETOUCH VERIO IQ) test strip Use to test blood sugar 2 times daily or as directed. 200 strip 1     blood glucose monitoring (ONETOUCH VERIO) meter device kit Use to test blood sugar 2 times daily or as directed. 1 kit 0     Continuous Blood Gluc Sensor (FREESTYLE VANESSA 14 DAY SENSOR) MISC Change every 14 days. 2 each 0     empagliflozin (JARDIANCE) 25 MG TABS tablet Take 1 tablet (25 mg) by mouth daily 90 tablet 1     FLUoxetine (PROZAC) 20 MG capsule TAKE 2 CAPSULES(40 MG) BY MOUTH DAILY 180 capsule 0     glipiZIDE (GLUCOTROL XL) 2.5 MG 24 hr tablet Take 2 tablets (5 mg) by mouth daily 180 tablet 1     metFORMIN  (GLUCOPHAGE XR) 500 MG 24 hr tablet TAKE 3 TABLETS(1500 MG) BY MOUTH DAILY WITH DINNER 270 tablet 0     metoprolol succinate ER (TOPROL XL) 50 MG 24 hr tablet Take 1.5 tablets (75 mg) by mouth daily 145 tablet 3     MULTIPLE VITAMIN OR TABS Take by mouth every morning       Multiple Vitamins-Minerals (PRESERVISION AREDS PO) Take 2 tablets by mouth daily       sacubitril-valsartan (ENTRESTO)  MG per tablet Take 1/2 tablet in AM, take 1 tablet in  tablet 3     spironolactone (ALDACTONE) 25 MG tablet Take 0.5 tablets (12.5 mg) by mouth daily 45 tablet 3     traZODone (DESYREL) 50 MG tablet Take 0.5 tablets (25 mg) by mouth at bedtime 15 tablet 3     No current facility-administered medications for this visit.     Past Medical History:   Diagnosis Date     Abnormal Pap smear of cervix ~2000    repeat pap was normal     Allergic rhinitis, cause unspecified      Anxiety state, unspecified     panic episodes     C. difficile colitis      CAD (coronary artery disease)      Congestive heart failure (H)      Depressive disorder      Diabetes (H)      HFrEF (heart failure with reduced ejection fraction) (H)      ICD (implantable cardioverter-defibrillator) in place      Osteopenia      Unspecified essential hypertension      Past Surgical History:   Procedure Laterality Date     BYPASS GRAFT ARTERY CORONARY N/A 05/24/2022    Procedure: Median sternotomy.  Intraoperative transesophageal echocardiogram per anesthesia.  Left internal mammary artery harvest.  Right and left endoscopically harvested  greater saphenouse vein.  Cardiopulmonary Bypass.  Coronary Artery Bypass Grafts x4.;  Surgeon: Ulises Desai MD;  Location:  OR     COLONOSCOPY N/A 02/16/2023    Procedure: COLONOSCOPY, WITH POLYPECTOMY AND BIOPSY;  Surgeon: Nikhil Lees MD;  Location:  GI     CV CORONARY ANGIOGRAM  05/19/2022    Procedure: CV CORONARY ANGIOGRAM;  Surgeon: Huseyin Vogel MD;  Location:  HEART CARDIAC CATH  LAB     CV CORONARY ANGIOGRAM  05/19/2022    Procedure: ;  Surgeon: Huseyin Vogel MD;  Location:  HEART CARDIAC CATH LAB     EP ICD INSERT SINGLE N/A 10/04/2022    Procedure: Implantable Cardioverter Defibrillator Device & Lead Implant Single or Dual;  Surgeon: Too Morrow MD;  Location:  HEART CARDIAC CATH LAB     ESOPHAGOSCOPY, GASTROSCOPY, DUODENOSCOPY (EGD), COMBINED N/A 02/16/2023    Procedure: ESOPHAGOGASTRODUODENOSCOPY, WITH BIOPSY;  Surgeon: Nikhil Lees MD;  Location:  GI     PICC DOUBLE LUMEN PLACEMENT Right 05/27/2022    Failed PICC attempt right arm basilic vein     PICC INSERTION - TRIPLE LUMEN Left 05/28/2022    left basilic 5fr tl,picc44 cm     Allergies   Allergen Reactions     Effexor [Venlafaxine Hydrochloride]      Tachycardia, makes her extremely jittery--cant sit down, has to keep moving constantly     Latex      Reported by patient     Social History     Socioeconomic History     Marital status:      Spouse name: Not on file     Number of children: Not on file     Years of education: Not on file     Highest education level: Not on file   Occupational History     Not on file   Tobacco Use     Smoking status: Never     Smokeless tobacco: Never   Vaping Use     Vaping status: Never Used   Substance and Sexual Activity     Alcohol use: Yes     Comment: 1 drink a week     Drug use: No     Sexual activity: Not Currently     Partners: Male     Birth control/protection: None   Other Topics Concern     Parent/sibling w/ CABG, MI or angioplasty before 65F 55M? No   Social History Narrative     Not on file     Social Determinants of Health     Financial Resource Strain: Low Risk  (1/22/2024)    Financial Resource Strain      Within the past 12 months, have you or your family members you live with been unable to get utilities (heat, electricity) when it was really needed?: No   Food Insecurity: Low Risk  (1/22/2024)    Food Insecurity      Within the past 12  months, did you worry that your food would run out before you got money to buy more?: No      Within the past 12 months, did the food you bought just not last and you didn t have money to get more?: No   Transportation Needs: Low Risk  (2024)    Transportation Needs      Within the past 12 months, has lack of transportation kept you from medical appointments, getting your medicines, non-medical meetings or appointments, work, or from getting things that you need?: No   Physical Activity: Not on file   Stress: Not on file   Social Connections: Not on file   Interpersonal Safety: Low Risk  (2024)    Interpersonal Safety      Do you feel physically and emotionally safe where you currently live?: Yes      Within the past 12 months, have you been hit, slapped, kicked or otherwise physically hurt by someone?: Not on file      Within the past 12 months, have you been humiliated or emotionally abused in other ways by your partner or ex-partner?: No   Housing Stability: Low Risk  (2024)    Housing Stability      Do you have housing? : Yes      Are you worried about losing your housing?: No     Family History   Problem Relation Age of Onset     Diabetes Mother         hypertension, alive at 83     C.A.D. Mother      Coronary Artery Disease Mother      Cancer Father         lung cancer, smoker.   at age 53     Family History Negative Sister      Osteoporosis Sister      Anesthesia Reaction No family hx of      Deep Vein Thrombosis (DVT) No family hx of           REVIEW OF SYSTEMS:  General: negative, fever, chills, night sweats  Skin: negative, acne, rash, and scaling  Eyes: negative, double vision, eye pain, and photophobia  Ears/Nose/Throat: negative, nasal congestion, and purulent rhinorrhea  Respiratory: No cough, No hemoptysis, and negative  Cardiovascular: negative, palpitations, tachycardia, irregular heart beat, chest pain, exertional chest pain or pressure, paroxysmal nocturnal dyspnea, and  orthopnea         OBJECTIVE:  Blood pressure 128/60, pulse 58, weight 57.8 kg (127 lb 8 oz), SpO2 98%, not currently breastfeeding.  General Appearance: healthy, alert, active, and no distress  Head: Normocephalic. No masses, lesions, tenderness or abnormalities  Eyes: conjuctiva clear, PERRL, EOM intact  Ears: External ears normal. Canals clear. TM's normal.  Nose: Nares normal  Mouth: normal  Lungs: clear to auscultation  Cardiac: regular rate and rhythm, normal S1 and S2, no murmur       ASSESSMENT/PLAN:  Patient here for follow-up.  Ischemic cardiomyopathy.  Presented with low EF of 20 to 25% with wide-complex tachycardia in May 2022.  Evaluation led to coronary angiogram and CABG x 4.  Coronary angiogram is shown below.               Due to VT patient also had ICD placement post bypass surgery.  Subsequent evaluation her EF improved to around 35 to 40%.  Overall patient is doing well but do have some shortness of breath on exertion.  Patient had 1 episode of severe hypotension with blood pressure of 50 systolic and diastolic of 20.  Was evaluated in the emergency room.  Today's echocardiogram reviewed.  Trace to EF is 40%.  RCA territory akinesis present.  Result was discussed with patient.  Patient wanted to review the images herself and this was done.  As she is doing well she is advised to continue her current medications.  This apart from aspirin includes Entresto 97/103/2 tablet in the morning and 1 tablet in the evening.  Toprol-XL 50 mg tablet half tablet once a day atorvastatin 80 mg daily and spironolactone 25 mg daily.  Patient is followed up by the core clinic.  No medication adjustment was done as patient had an episode of hypotension and she will be followed up closely at the core clinic.  She is advised to return to clinic in 1 year for a follow-up with a repeat echocardiogram.  Total visit duration 40 minutes.  This included face-to-face interview, physical exam, chart review, review of prior  echocardiograms, today's echocardiogram, EKGs coronary angiogram and documentation.      Please do not hesitate to contact me if you have any questions/concerns.     Sincerely,     MIGUE White MD

## 2024-08-15 ENCOUNTER — OFFICE VISIT (OUTPATIENT)
Dept: FAMILY MEDICINE | Facility: CLINIC | Age: 69
End: 2024-08-15
Payer: COMMERCIAL

## 2024-08-15 VITALS
DIASTOLIC BLOOD PRESSURE: 55 MMHG | TEMPERATURE: 97.5 F | HEART RATE: 62 BPM | SYSTOLIC BLOOD PRESSURE: 109 MMHG | OXYGEN SATURATION: 100 % | BODY MASS INDEX: 22.67 KG/M2 | RESPIRATION RATE: 16 BRPM | WEIGHT: 128 LBS

## 2024-08-15 DIAGNOSIS — N18.31 TYPE 2 DIABETES MELLITUS WITH STAGE 3A CHRONIC KIDNEY DISEASE, WITHOUT LONG-TERM CURRENT USE OF INSULIN (H): ICD-10-CM

## 2024-08-15 DIAGNOSIS — F41.1 GENERALIZED ANXIETY DISORDER: ICD-10-CM

## 2024-08-15 DIAGNOSIS — Z12.31 VISIT FOR SCREENING MAMMOGRAM: ICD-10-CM

## 2024-08-15 DIAGNOSIS — F33.0 MAJOR DEPRESSIVE DISORDER, RECURRENT EPISODE, MILD (H): ICD-10-CM

## 2024-08-15 DIAGNOSIS — E78.5 HYPERLIPIDEMIA LDL GOAL <70: ICD-10-CM

## 2024-08-15 DIAGNOSIS — E11.40 TYPE 2 DIABETES MELLITUS WITH DIABETIC NEUROPATHY, WITHOUT LONG-TERM CURRENT USE OF INSULIN (H): ICD-10-CM

## 2024-08-15 DIAGNOSIS — K21.9 GASTROESOPHAGEAL REFLUX DISEASE, UNSPECIFIED WHETHER ESOPHAGITIS PRESENT: ICD-10-CM

## 2024-08-15 DIAGNOSIS — Z95.810 ICD (IMPLANTABLE CARDIOVERTER-DEFIBRILLATOR) IN PLACE: ICD-10-CM

## 2024-08-15 DIAGNOSIS — Z00.00 ENCOUNTER FOR MEDICARE ANNUAL WELLNESS EXAM: ICD-10-CM

## 2024-08-15 DIAGNOSIS — E11.22 TYPE 2 DIABETES MELLITUS WITH STAGE 3A CHRONIC KIDNEY DISEASE, WITHOUT LONG-TERM CURRENT USE OF INSULIN (H): ICD-10-CM

## 2024-08-15 DIAGNOSIS — I10 HYPERTENSION, GOAL BELOW 140/90: ICD-10-CM

## 2024-08-15 DIAGNOSIS — Z95.1 S/P CABG (CORONARY ARTERY BYPASS GRAFT): ICD-10-CM

## 2024-08-15 DIAGNOSIS — Z87.81 HISTORY OF COMPRESSION FRACTURE OF SPINE: ICD-10-CM

## 2024-08-15 DIAGNOSIS — M80.00XD AGE-RELATED OSTEOPOROSIS WITH CURRENT PATHOLOGICAL FRACTURE WITH ROUTINE HEALING, SUBSEQUENT ENCOUNTER: ICD-10-CM

## 2024-08-15 DIAGNOSIS — H91.90 HEARING LOSS, UNSPECIFIED HEARING LOSS TYPE, UNSPECIFIED LATERALITY: ICD-10-CM

## 2024-08-15 DIAGNOSIS — I50.20 HFREF (HEART FAILURE WITH REDUCED EJECTION FRACTION) (H): ICD-10-CM

## 2024-08-15 PROBLEM — N95.0 POSTMENOPAUSAL BLEEDING: Status: RESOLVED | Noted: 2023-03-01 | Resolved: 2024-08-15

## 2024-08-15 PROCEDURE — 99214 OFFICE O/P EST MOD 30 MIN: CPT | Mod: 25 | Performed by: FAMILY MEDICINE

## 2024-08-15 PROCEDURE — G0439 PPPS, SUBSEQ VISIT: HCPCS | Performed by: FAMILY MEDICINE

## 2024-08-15 RX ORDER — BLOOD-GLUCOSE SENSOR
1 EACH MISCELLANEOUS DAILY
Qty: 2 EACH | Refills: 11 | Status: SHIPPED | OUTPATIENT
Start: 2024-08-15 | End: 2024-08-29

## 2024-08-15 RX ORDER — METFORMIN HCL 500 MG
1500 TABLET, EXTENDED RELEASE 24 HR ORAL
Qty: 270 TABLET | Refills: 1 | Status: SHIPPED | OUTPATIENT
Start: 2024-08-15

## 2024-08-15 SDOH — HEALTH STABILITY: PHYSICAL HEALTH: ON AVERAGE, HOW MANY DAYS PER WEEK DO YOU ENGAGE IN MODERATE TO STRENUOUS EXERCISE (LIKE A BRISK WALK)?: 7 DAYS

## 2024-08-15 SDOH — HEALTH STABILITY: PHYSICAL HEALTH: ON AVERAGE, HOW MANY MINUTES DO YOU ENGAGE IN EXERCISE AT THIS LEVEL?: 20 MIN

## 2024-08-15 ASSESSMENT — SOCIAL DETERMINANTS OF HEALTH (SDOH): HOW OFTEN DO YOU GET TOGETHER WITH FRIENDS OR RELATIVES?: MORE THAN THREE TIMES A WEEK

## 2024-08-15 ASSESSMENT — PAIN SCALES - GENERAL: PAINLEVEL: NO PAIN (0)

## 2024-08-15 ASSESSMENT — PATIENT HEALTH QUESTIONNAIRE - PHQ9: SUM OF ALL RESPONSES TO PHQ QUESTIONS 1-9: 1

## 2024-08-15 NOTE — PROGRESS NOTES
Continue same medicines  Freestyle [place] 3  Discussed what to do if she has Hypoglycemia  Preventive Care Visit  [facility] NARDA Otoole MD, Family Medicine  Aug 15, 2024      Assessment & Plan     Encounter for Medicare annual wellness exam      Type 2 diabetes mellitus with stage 3a chronic kidney disease, without long-term current use of insulin (H)  Borderline  CGM reviewed   Prevent Hypoglycemia  Change to Freestyle [place] 3  Follow up 3 month  - metFORMIN (GLUCOPHAGE XR) 500 MG 24 hr tablet; Take 3 tablets (1,500 mg) by mouth daily (with dinner)  - empagliflozin (JARDIANCE) 25 MG TABS tablet; Take 1 tablet (25 mg) by mouth daily  - Adult Eye  Referral; Future  - Continuous Glucose Sensor (FREESTYLE HERBERT 3 PLUS SENSOR) MISC; 1 each daily for 14 days  - Adult Diabetes Education  Referral; Future    HFrEF (heart failure with reduced ejection fraction) (H)  Cardiology Notes and echo reviewed  On Entresto, Jardiance and stable   Has a ICD  BP is stable now     Major depressive disorder, recurrent episode, mild (H24)  Stable   - FLUoxetine (PROZAC) 20 MG capsule; Take 2 capsules (40 mg) by mouth daily    Generalized anxiety disorder  Doing well    Gastroesophageal reflux disease, unspecified whether esophagitis present  Stable     Hyperlipidemia LDL goal <70  Stable     Hypertension, goal below 140/90  With CKD  Doing well     Age-related osteoporosis with current pathological fracture with routine healing, subsequent encounter  Discussed meds  History of compression Fracture in the past   . We can continue to follow this by repeating the DEXA test in 3 to  4 years. Please take several servings of dairy daily (or calcium 1000 - 1200 mg daily  eg Caltrate , take vitamin D 1000 units daily over-the-counter, exercise regularly, and avoid falls.  We can continue to follow this by repeating the DEXA test in 3 to  4 years.  Please do weight bearing Exercises /walking  daily .  Pt  wants to Think about this recommend Prolia due to CKD and GERD      S/P CABG (coronary artery bypass graft)  Stable     Type 2 diabetes mellitus with diabetic neuropathy, without long-term current use of insulin (H)  Stable   - empagliflozin (JARDIANCE) 25 MG TABS tablet; Take 1 tablet (25 mg) by mouth daily    ICD (implantable cardioverter-defibrillator) in place      Hearing loss, unspecified hearing loss type, unspecified laterality  Referral done   - Adult Audiology  Referral; Future    History of compression fracture of spine  As above  Doing well now  Prevent falls  Use cane     Visit for screening mammogram  Advised   - MA Screen Bilateral w/Charles; Future  Advised eye Exam           Blood sugar testing frequency justification:  Adjustment of medication(s)  Counseling  Appropriate preventive services were addressed with this patient via screening, questionnaire, or discussion as appropriate for fall prevention, nutrition, physical activity, Tobacco-use cessation, social engagement, weight loss and cognition.  Checklist reviewing preventive services available has been given to the patient.  Reviewed patient's diet, addressing concerns and/or questions.   The patient was instructed to see the dentist every 6 months.   She is at risk for psychosocial distress and has been provided with information to reduce risk.   The patient was provided with written information regarding signs of hearing loss.   Information on urinary incontinence and treatment options given to patient.       Regular exercise    Subjective   Jody is a 69 year old, presenting for the following:  Recheck Medication and Physical        8/15/2024    10:28 AM   Additional Questions   Roomed by Linda         8/15/2024   Forms   Any forms needing to be completed Yes            Health Care Directive  Patient does not have a Health Care Directive or Living Will: Patient states has Advance Directive and will bring in a copy to clinic.    History  of Present Illness       Diabetes:   She presents for follow up of diabetes.  She is checking home blood glucose four or more times daily.   She checks blood glucose before and after meals and at bedtime.  Blood glucose is sometimes over 200 and sometimes under 70. She is aware of hypoglycemia symptoms including shakiness, dizziness, weakness, lethargy and blurred vision.   She is concerned about blood sugar frequently over 200 and low blood sugar, several less than 70 in the past few weeks.   She is having burning in feet and blurry vision.  The patient has not had a diabetic eye exam in the last 12 months.          Heart Failure:  She presents for follow up of heart failure. She is experiencing shortness of breath with rest and activity, which is slightly worse. She is experiencing lower extremity edema which is same as usual.   She denies orthopenea and is not coughing at night. Patient is checking weight daily. She has recently had a None.  She has side effects from medications including dizziness and other.  She has has a medical visit for heart failure 1 time since the last visit.    She eats 2-3 servings of fruits and vegetables daily.She consumes 0 sweetened beverage(s) daily.She exercises with enough effort to increase her heart rate 20 to 29 minutes per day.  She exercises with enough effort to increase her heart rate 3 or less days per week.   She is taking medications regularly.    Continuous Glucose Monitoring Data Download and Interpretation    [facility] Screenshot                Target A1c: pola at 8    Most Recent Hemoglobin A1c:  Recent Labs   Lab Test 07/15/24  1333   A1C 7.9*     Glucose Patterns & Trends:  reviewed     Plan:           Hyperlipidemia Follow-Up    Are you regularly taking any medication or supplement to lower your cholesterol?   Yes- statins  Are you having muscle aches or other side effects that you think could be caused by your cholesterol lowering medication?  No    Hypertension  Follow-up    Do you check your blood pressure regularly outside of the clinic? Yes   Are you following a low salt diet? Yes  Are your blood pressures ever more than 140 on the top number (systolic) OR more   than 90 on the bottom number (diastolic), for example 140/90? No    Vascular Disease Follow-up    How often do you take nitroglycerin? Never  Do you take an aspirin every day? Yes    Depression and Anxiety   How are you doing with your depression since your last visit? Improved   How are you doing with your anxiety since your last visit?  Improved   Are you having other symptoms that might be associated with depression or anxiety? No  Have you had a significant life event? No   Do you have any concerns with your use of alcohol or other drugs? No    Social History     Tobacco Use    Smoking status: Never    Smokeless tobacco: Never   Vaping Use    Vaping status: Never Used   Substance Use Topics    Alcohol use: Yes     Comment: 1 drink a week    Drug use: No         9/14/2023     3:25 PM 4/16/2024     4:50 PM 8/15/2024    11:15 AM   PHQ   PHQ-9 Total Score 1 5 1   Q9: Thoughts of better off dead/self-harm past 2 weeks Not at all Not at all Not at all         5/18/2021     4:18 PM 4/16/2024     4:50 PM 4/16/2024     4:56 PM   JUSTUS-7 SCORE   Total Score   9 (mild anxiety)   Total Score 2 10 9         8/15/2024    11:15 AM   Last PHQ-9   1.  Little interest or pleasure in doing things 0   2.  Feeling down, depressed, or hopeless 0   3.  Trouble falling or staying asleep, or sleeping too much 0   4.  Feeling tired or having little energy 0   5.  Poor appetite or overeating 0   6.  Feeling bad about yourself 0   7.  Trouble concentrating 0   8.  Moving slowly or restless 1   Q9: Thoughts of better off dead/self-harm past 2 weeks 0   PHQ-9 Total Score 1   Difficulty at work, home, or with people Not difficult at all         4/16/2024     4:56 PM   JUSTUS-7    1. Feeling nervous, anxious, or on edge 2   2. Not being able to  stop or control worrying 1   3. Worrying too much about different things 1   4. Trouble relaxing 2   5. Being so restless that it is hard to sit still 1   6. Becoming easily annoyed or irritable 1   7. Feeling afraid, as if something awful might happen 1   JUSTUS-7 Total Score 9   If you checked any problems, how difficult have they made it for you to do your work, take care of things at home, or get along with other people? Somewhat difficult       Suicide Assessment Five-step Evaluation and Treatment (SAFE-T)                8/15/2024   General Health   How would you rate your overall physical health? Good   Feel stress (tense, anxious, or unable to sleep) Only a little      (!) STRESS CONCERN      8/15/2024   Nutrition   Diet: Regular (no restrictions)    Low salt    Diabetic       Multiple values from one day are sorted in reverse-chronological order         8/15/2024   Exercise   Days per week of moderate/strenous exercise 7 days   Average minutes spent exercising at this level 20 min            8/15/2024   Social Factors   Frequency of gathering with friends or relatives More than three times a week   Worry food won't last until get money to buy more No   Food not last or not have enough money for food? No   Do you have housing? (Housing is defined as stable permanent housing and does not include staying ouside in a car, in a tent, in an abandoned building, in an overnight shelter, or couch-surfing.) Yes   Are you worried about losing your housing? No   Lack of transportation? No   Unable to get utilities (heat,electricity)? No            8/15/2024   Fall Risk   Fallen 2 or more times in the past year? No   Trouble with walking or balance? Yes   Gait Speed Test (Document in seconds) 3.7             8/15/2024   Activities of Daily Living- Home Safety   Needs help with the following daily activites None of the above   Safety concerns in the home None of the above            8/15/2024   Dental   Dentist two times  every year? (!) NO            8/15/2024   Hearing Screening   Hearing concerns? (!) I NEED TO ASK PEOPLE TO SPEAK UP OR REPEAT THEMSELVES.    (!) IT'S HARD TO FOLLOW A CONVERSATION IN A NOISY RESTAURANT OR CROWDED ROOM.    (!) TROUBLE UNDERSTANDING SOFT OR WHISPERED SPEECH.       Multiple values from one day are sorted in reverse-chronological order         8/15/2024   Driving Risk Screening   Patient/family members have concerns about driving No            8/15/2024   General Alertness/Fatigue Screening   Have you been more tired than usual lately? No            8/15/2024   Urinary Incontinence Screening   Bothered by leaking urine in past 6 months Yes            8/15/2024   TB Screening   Were you born outside of the [place]? No            Today's PHQ-9 Score:       8/15/2024    11:15 AM   PHQ-9 SCORE   PHQ-9 Total Score 1           8/15/2024   Substance Use   Alcohol more than 3/day or more than 7/wk No   Do you have a current opioid prescription? No   How severe/bad is pain from 1 to 10? 0/10 (No Pain)   Do you use any other substances recreationally? No        Social History     Tobacco Use    Smoking status: Never    Smokeless tobacco: Never   Vaping Use    Vaping status: Never Used   Substance Use Topics    Alcohol use: Yes     Comment: 1 drink a week    Drug use: No           2/28/2023   LAST FHS-7 RESULTS   1st degree relative breast or ovarian cancer No   Any relative bilateral breast cancer Unknown   Any male have breast cancer No   Any ONE woman have BOTH breast AND ovarian cancer Yes   Any woman with breast cancer before 50yrs Unknown   2 or more relatives with breast AND/OR ovarian cancer No   2 or more relatives with breast AND/OR bowel cancer No           Advised mammogram      History of abnormal Pap smear: No - age 65 or older with pap indicated due to inadequate prior screening (3 consecutive negative cytology results, 2 consecutive negative cotesting results, or 2 consecutive negative HrHPV test  results within 10 years, with the most recent test occurring within the recommended screening interval for the test used)        Latest Ref Rng & Units 2019     9:28 AM 2019     9:05 AM 2/15/2012     4:24 PM   PAP / HPV   PAP (Historical)   NIL  NIL    HPV 16 DNA NEG^Negative Negative      HPV 18 DNA NEG^Negative Negative      Other HR HPV NEG^Negative Negative        ASCVD Risk   The ASCVD Risk score (Kanchan FAULKNER, et al., 2019) failed to calculate for the following reasons:    The valid total cholesterol range is 130 to 320 mg/dL    Fracture Risk Assessment Tool  Link to Frax Calculator  Use the information below to complete the Frax calculator  : 1955  Sex: female  Weight (kg): 58.1 kg (actual weight)  Height (cm): 0 cm  Previous Fragility Fracture:  No  History of parent with fractured hip:  No  Current Smoking:  No  Patient has been on glucocorticoids for more than 3 months (5mg/day or more): No  Rheumatoid Arthritis on Problem List:  No  Secondary Osteoporosis on Problem List:  No  Consumes 3 or more units of alcohol per day: No  Femoral Neck BMD (g/cm2)            Reviewed and updated as needed this visit by Provider   Tobacco  Allergies  Meds  Problems  Med Hx  Surg Hx  Fam Hx            Past Medical History:   Diagnosis Date    Abnormal Pap smear of cervix ~    repeat pap was normal    Allergic rhinitis, cause unspecified     Anxiety state, unspecified     panic episodes    C. difficile colitis     CAD (coronary artery disease)     Congestive heart failure (H)     Depressive disorder     Diabetes (H)     HFrEF (heart failure with reduced ejection fraction) (H)     ICD (implantable cardioverter-defibrillator) in place     Osteopenia     Unspecified essential hypertension      Past Surgical History:   Procedure Laterality Date    BYPASS GRAFT ARTERY CORONARY N/A 2022    Procedure: Median sternotomy.  Intraoperative transesophageal echocardiogram per anesthesia.  Left  internal mammary artery harvest.  Right and left endoscopically harvested  greater saphenouse vein.  Cardiopulmonary Bypass.  Coronary Artery Bypass Grafts x4.;  Surgeon: Ulises Desai MD;  Location: [facility] OR    COLONOSCOPY N/A 2023    Procedure: COLONOSCOPY, WITH POLYPECTOMY AND BIOPSY;  Surgeon: Nikhil Lees MD;  [facility]    CV CORONARY ANGIOGRAM  2022    Procedure: CV CORONARY ANGIOGRAM;  Surgeon: Huseyin Vogel MD;  Location:  HEART CARDIAC CATH LAB    CV CORONARY ANGIOGRAM  2022    Procedure: ;  Surgeon: Huseyin Vogel MD;  Location:  HEART CARDIAC CATH LAB    EP ICD INSERT SINGLE N/A 10/04/2022    Procedure: Implantable Cardioverter Defibrillator Device & Lead Implant Single or Dual;  Surgeon: Too Morrow MD;  Location: [facility] CARDIAC CATH LAB    ESOPHAGOSCOPY, GASTROSCOPY, DUODENOSCOPY (EGD), COMBINED N/A 2023    Procedure: ESOPHAGOGASTRODUODENOSCOPY, WITH BIOPSY;  Surgeon: Nikhil Lees MD;  Location:  GI    PICC DOUBLE LUMEN PLACEMENT Right 2022    Failed PICC attempt right arm basilic vein    PICC INSERTION - TRIPLE LUMEN Left 2022    left basilic 5fr tl,picc44 cm     OB History    Para Term  AB Living   2 2 2 0 0 2   SAB IAB Ectopic Multiple Live Births   0 0 0 0 2      # Outcome Date GA Lbr Jairo/2nd Weight Sex Type Anes PTL Lv   2 Term         JOANN   1 Term         JOANN     Lab work is in process  Labs reviewed in EPIC  BP Readings from Last 3 Encounters:   08/15/24 109/55   24 128/60   24 127/66    Wt Readings from Last 3 Encounters:   08/15/24 58.1 kg (128 lb)   24 57.8 kg (127 lb 8 oz)   24 57.2 kg (126 lb)                  Patient Active Problem List   Diagnosis    Phobia    GERD (gastroesophageal reflux disease)    C. difficile diarrhea    Hyperlipidemia LDL goal <70    Hypertension, goal below 140/90    Major depressive disorder, recurrent episode, mild  (H24)    Generalized anxiety disorder    Type 2 diabetes mellitus with stage 3a chronic kidney disease, without long-term current use of insulin (H)    Near syncope    HFrEF (heart failure with reduced ejection fraction) (H)    S/P CABG (coronary artery bypass graft)    Osteopenia    ICD (implantable cardioverter-defibrillator) in place    History of compression fracture of spine     Past Surgical History:   Procedure Laterality Date    BYPASS GRAFT ARTERY CORONARY N/A 05/24/2022    Procedure: Median sternotomy.  Intraoperative transesophageal echocardiogram per anesthesia.  Left internal mammary artery harvest.  Right and left endoscopically harvested  greater saphenouse vein.  Cardiopulmonary Bypass.  Coronary Artery Bypass Grafts x4.;  Surgeon: Ulises Desai MD;  Location: [facility] OR    COLONOSCOPY N/A 02/16/2023    Procedure: COLONOSCOPY, WITH POLYPECTOMY AND BIOPSY;  Surgeon: Nikhil Lees MD;  Location:  GI    CV CORONARY ANGIOGRAM  05/19/2022    Procedure: CV CORONARY ANGIOGRAM;  Surgeon: Huseyin Vogel MD;  Location:  HEART CARDIAC CATH LAB    CV CORONARY ANGIOGRAM  05/19/2022    Procedure: ;  Surgeon: Huseyin Vogel MD;  Location: [facility] CARDIAC CATH LAB    EP ICD INSERT SINGLE N/A 10/04/2022    Procedure: Implantable Cardioverter Defibrillator Device & Lead Implant Single or Dual;  Surgeon: Too Morrow MD;  Location: [facility] CARDIAC CATH LAB    ESOPHAGOSCOPY, GASTROSCOPY, DUODENOSCOPY (EGD), COMBINED N/A 02/16/2023    Procedure: ESOPHAGOGASTRODUODENOSCOPY, WITH BIOPSY;  Surgeon: Nikhil Lees MD;  Location:  GI    PICC DOUBLE LUMEN PLACEMENT Right 05/27/2022    Failed PICC attempt right arm basilic vein    PICC INSERTION - TRIPLE LUMEN Left 05/28/2022    left basilic 5fr tl,picc44 cm       Social History     Tobacco Use    Smoking status: Never    Smokeless tobacco: Never   Substance Use Topics    Alcohol use: Yes     Comment: 1 drink a week      Family History   Problem Relation Age of Onset    Diabetes Mother         hypertension, alive at 83    C.A.D. Mother     Coronary Artery Disease Mother     Cancer Father         lung cancer, smoker.   at age 53    Family History Negative Sister     Osteoporosis Sister     Anesthesia Reaction No family hx of     Deep Vein Thrombosis (DVT) No family hx of          Current Outpatient Medications   Medication Sig Dispense Refill    ALPRAZolam (XANAX) 0.25 MG tablet Take 1 tablet (0.25 mg) by mouth 3 times daily as needed for anxiety 10 tablet 0    aspirin 81 MG EC tablet Take 81 mg by mouth every evening      atorvastatin (LIPITOR) 80 MG tablet TAKE 1 TABLET DAILY (NEED ANNUAL EXAM) Strength: 80 mg 90 tablet 3    blood glucose (ONETOUCH VERIO IQ) test strip Use to test blood sugar 2 times daily or as directed. 200 strip 1    blood glucose monitoring (ONETOUCH VERIO) meter device kit Use to test blood sugar 2 times daily or as directed. 1 kit 0    Continuous Blood Gluc Sensor (FREESTYLE VANESSA 14 DAY SENSOR) MISC Change every 14 days. 2 each 0    Continuous Glucose Sensor (FREESTYLE VANESSA 3 PLUS SENSOR) MISC 1 each daily for 14 days 2 each 11    empagliflozin (JARDIANCE) 25 MG TABS tablet Take 1 tablet (25 mg) by mouth daily 90 tablet 1    FLUoxetine (PROZAC) 20 MG capsule Take 2 capsules (40 mg) by mouth daily 180 capsule 1    glipiZIDE (GLUCOTROL XL) 2.5 MG 24 hr tablet Take 2 tablets (5 mg) by mouth daily 180 tablet 1    metFORMIN (GLUCOPHAGE XR) 500 MG 24 hr tablet Take 3 tablets (1,500 mg) by mouth daily (with dinner) 270 tablet 1    metoprolol succinate ER (TOPROL XL) 50 MG 24 hr tablet Take 1.5 tablets (75 mg) by mouth daily 145 tablet 3    MULTIPLE VITAMIN OR TABS Take by mouth every morning      Multiple Vitamins-Minerals (PRESERVISION AREDS PO) Take 2 tablets by mouth daily      sacubitril-valsartan (ENTRESTO)  MG per tablet Take 1/2 tablet in AM, take 1 tablet in  tablet 3    spironolactone  (ALDACTONE) 25 MG tablet Take 0.5 tablets (12.5 mg) by mouth daily 45 tablet 3    traZODone (DESYREL) 50 MG tablet Take 0.5 tablets (25 mg) by mouth at bedtime 15 tablet 3     Allergies   Allergen Reactions    Effexor [Venlafaxine Hydrochloride]      Tachycardia, makes her extremely jittery--cant sit down, has to keep moving constantly    Latex      Reported by patient     Recent Labs   Lab Test 07/15/24  1333 06/26/24  2130 03/13/24  1215 03/05/24  1407 01/22/24  1219 09/12/23  1520 05/09/23  0805 03/01/23  1433 07/21/22  1536 07/06/22  1607 05/20/22  0758 05/18/22  1715 05/11/22  1447 05/18/21  1534 01/07/21  1513 09/09/20  1625   A1C 7.9*  --   --   --  7.2* 7.3*  --  8.3*   < >  --    < >  --   --    < > 7.9* 8.5*   LDL  --   --   --   --  53  --   --  30  --   --   --  57  --    < >  --   --    HDL  --   --   --   --  45*  --   --  52  --   --   --  47*  --    < >  --   --    TRIG  --   --   --   --  142  --   --  110  --   --   --  214*  --    < >  --   --    ALT  --  26  --   --   --  21  --   --   --  24   < > 27  --   --   --  33   CR  --  1.28* 0.97*   < >  --  1.00*   < >  --    < > 0.92   < > 0.90  --    < > 0.90 0.91   GFRESTIMATED  --  45* 63   < >  --  61   < >  --    < > 67   < > 69  --    < > 66 66   GFRESTBLACK  --   --   --   --   --   --   --   --   --   --   --   --   --   --  77 76   POTASSIUM  --  4.6 4.7   < >  --  4.4   < >  --    < > 4.0   < > 4.1  --    < > 4.8 4.9   TSH  --   --   --   --   --   --   --  1.12  --   --   --   --  1.41  --   --   --     < > = values in this interval not displayed.      Current providers sharing in care for this patient include:  Patient Care Team:  Roxanna Otoole MD as PCP - General (Family Medicine)  Roxanna Otoole MD as Assigned PCP  Regina Sutherland as Diabetes Educator (Dietitian, Registered)  Dasia Amin RN as Specialty Care Coordinator (Cardiology)  Belinda Colon APRN CNP as Nurse Practitioner (Cardiovascular Disease)  Kris Steve MD as MD  (Cardiovascular Disease)  Belinda Colon, OSCAR CNP as Assigned Heart and Vascular Provider  Evette Castro MD as MD (Infectious Diseases)  Sarah Isidro, RN as Specialty Care Coordinator (Cardiology)  Too Morrow MD as MD (Cardiovascular Disease)  Cherie Lira, OSCAR CNP as Nurse Practitioner (Cardiovascular Disease)  Joaquin Vazquez MD as Assigned OBGYN Provider  No Ref-Primary, Physician  Libia Santoro, PharmD as Pharmacist (Pharmacist)  Latrell Valentino, [facility] as Pharmacist (Pharmacist Clinician- Clinical Pharmacy Specialist)  Migdalia Swenson [facility] as Pharmacist (Pharmacist)  Hernando León [facility] as Pharmacist (Pharmacist)    The following health maintenance items are reviewed in Epic and correct as of today:  Health Maintenance   Topic Date Due    MAMMO SCREENING  12/16/2023    Medicare Annual MTM Pharmacist Visit (once per calendar year)  01/01/2024    EYE EXAM  02/15/2024    COVID-19 Vaccine (7 - 2023-24 season) 04/08/2024    INFLUENZA VACCINE (1) 09/01/2024    A1C  10/15/2024    BMP  12/26/2024    LIPID  01/22/2025    PHQ-9  02/15/2025    MICROALBUMIN  04/16/2025    DIABETIC FOOT EXAM  04/16/2025    ANNUAL REVIEW OF HM ORDERS  04/16/2025    ALT  06/26/2025    CBC  06/26/2025    HEMOGLOBIN  06/26/2025    MEDICARE ANNUAL WELLNESS VISIT  08/15/2025    FALL RISK ASSESSMENT  08/15/2025    DEXA  09/07/2025    HF ACTION PLAN  01/22/2027    DTAP/TDAP/TD IMMUNIZATION (3 - Td or Tdap) 01/21/2029    ADVANCE CARE PLANNING  08/15/2029    COLORECTAL CANCER SCREENING  02/16/2030    TSH W/FREE T4 REFLEX  Completed    HEPATITIS C SCREENING  Completed    DEPRESSION ACTION PLAN  Completed    Pneumococcal Vaccine: 65+ Years  Completed    URINALYSIS  Completed    ZOSTER IMMUNIZATION  Completed    RSV VACCINE (Pregnancy & 60+)  Completed    IPV IMMUNIZATION  Aged Out    HPV IMMUNIZATION  Aged Out    MENINGITIS IMMUNIZATION  Aged Out    RSV MONOCLONAL ANTIBODY  Aged Out         Review  "of Systems  CONSTITUTIONAL: NEGATIVE for fever, chills, change in weight  INTEGUMENTARY/SKIN: NEGATIVE for worrisome rashes, moles or lesions  EYES: NEGATIVE for vision changes or irritation  ENT/MOUTH: NEGATIVE for ear, mouth and throat problems  ENT/MOUTH: has Hearing issues   RESP: NEGATIVE for significant cough or SOB  BREAST: NEGATIVE for masses, tenderness or discharge  CV: NEGATIVE for chest pain, palpitations or peripheral edema  GI: NEGATIVE for nausea, abdominal pain, heartburn, or change in bowel habits  : NEGATIVE for frequency, dysuria, or hematuria  MUSCULOSKELETAL:walks slowly  NEURO: NEGATIVE for weakness, dizziness or paresthesias  ENDOCRINE: NEGATIVE for temperature intolerance, skin/hair changes  HEME: NEGATIVE for bleeding problems  PSYCHIATRIC: NEGATIVE for changes in mood or affect     Objective    Exam  /55   Pulse 62   Temp 97.5  F (36.4  C) (Temporal)   Resp 16   Wt 58.1 kg (128 lb)   SpO2 100%   BMI 22.67 kg/m     Estimated body mass index is 22.67 kg/m  as calculated from the following:    Height as of 3/13/24: 1.6 m (5' 3\").    Weight as of this encounter: 58.1 kg (128 lb).    Physical Exam  GENERAL: alert and no distress  EYES: Eyes grossly normal to inspection, PERRL and conjunctivae and sclerae normal  HENT: ear canals and TM's normal, nose and mouth without ulcers or lesions  NECK: no adenopathy, no asymmetry, masses, or scars  RESP: lungs clear to auscultation - no rales, rhonchi or wheezes  BREAST: normal without masses, tenderness or nipple discharge and no palpable axillary masses or adenopathy  CV: regular rate and rhythm, normal S1 S2, no S3 or S4, no murmur, click or rub, no peripheral edema  ABDOMEN: soft, nontender, no hepatosplenomegaly, no masses and bowel sounds normal  MS: no gross musculoskeletal defects noted, no edema  SKIN: no suspicious lesions or rashes  NEURO: Normal strength and tone, mentation intact and speech normal  PSYCH: mentation appears " normal, affect normal/bright        8/15/2024   Mini Cog   Clock Draw Score 2 Normal   3 Item Recall 3 objects recalled   Mini Cog Total Score 5             Time spent reviewing chart, addressing her medical Problems as above, ordering labs, refilling meds and discussing her medical Problems, , documenting-Labs will be reviewed when back and will make further recommendations based on her labs  43 minutes    Signed Electronically by: Roxanna Otoole MD

## 2024-08-15 NOTE — PATIENT INSTRUCTIONS
Patient Education   Preventive Care Advice   This is general advice given by our system to help you stay healthy. However, your care team may have specific advice just for you. Please talk to your care team about your preventive care needs.  Nutrition  Eat 5 or more servings of fruits and vegetables each day.  Try wheat bread, brown rice and whole grain pasta (instead of white bread, rice, and pasta).  Get enough calcium and vitamin D. Check the label on foods and aim for 100% of the RDA (recommended daily allowance).  Lifestyle  Exercise at least 150 minutes each week  (30 minutes a day, 5 days a week).  Do muscle strengthening activities 2 days a week. These help control your weight and prevent disease.  No smoking.  Wear sunscreen to prevent skin cancer.  Have a dental exam and cleaning every 6 months.  Yearly exams  See your health care team every year to talk about:  Any changes in your health.  Any medicines your care team has prescribed.  Preventive care, family planning, and ways to prevent chronic diseases.  Shots (vaccines)   HPV shots (up to age 26), if you've never had them before.  Hepatitis B shots (up to age 59), if you've never had them before.  COVID-19 shot: Get this shot when it's due.  Flu shot: Get a flu shot every year.  Tetanus shot: Get a tetanus shot every 10 years.  Pneumococcal, hepatitis A, and RSV shots: Ask your care team if you need these based on your risk.  Shingles shot (for age 50 and up)  General health tests  Diabetes screening:  Starting at age 35, Get screened for diabetes at least every 3 years.  If you are younger than age 35, ask your care team if you should be screened for diabetes.  Cholesterol test: At age 39, start having a cholesterol test every 5 years, or more often if advised.  Bone density scan (DEXA): At age 50, ask your care team if you should have this scan for osteoporosis (brittle bones).  Hepatitis C: Get tested at least once in your life.  STIs (sexually  transmitted infections)  Before age 24: Ask your care team if you should be screened for STIs.  After age 24: Get screened for STIs if you're at risk. You are at risk for STIs (including HIV) if:  You are sexually active with more than one person.  You don't use condoms every time.  You or a partner was diagnosed with a sexually transmitted infection.  If you are at risk for HIV, ask about PrEP medicine to prevent HIV.  Get tested for HIV at least once in your life, whether you are at risk for HIV or not.  Cancer screening tests  Cervical cancer screening: If you have a cervix, begin getting regular cervical cancer screening tests starting at age 21.  Breast cancer scan (mammogram): If you've ever had breasts, begin having regular mammograms starting at age 40. This is a scan to check for breast cancer.  Colon cancer screening: It is important to start screening for colon cancer at age 45.  Have a colonoscopy test every 10 years (or more often if you're at risk) Or, ask your provider about stool tests like a FIT test every year or Cologuard test every 3 years.  To learn more about your testing options, visit:   .  For help making a decision, visit:   https://bit.ly/qh77745.  Prostate cancer screening test: If you have a prostate, ask your care team if a prostate cancer screening test (PSA) at age 55 is right for you.  Lung cancer screening: If you are a current or former smoker ages 50 to 80, ask your care team if ongoing lung cancer screenings are right for you.  For informational purposes only. Not to replace the advice of your health care provider. Copyright   2023 [facility]. All rights reserved. Clinically reviewed by the [facility] Transitions [facility]. [employer] [id number] - REV 01/24.  Preventing Falls: Care Instructions  Injuries and health problems such as trouble walking or poor eyesight can increase your risk of falling. So can some medicines. But there are things you can do to help  "prevent falls. You can exercise to get stronger. You can also arrange your home to make it safer.    Talk to your doctor about the medicines you take. Ask if any of them increase the risk of falls and whether they can be changed or stopped.   Try to exercise regularly. It can help improve your strength and balance. This can help lower your risk of falling.     Practice fall safety and prevention.    Wear low-heeled shoes that fit well and give your feet good support. Talk to your doctor if you have foot problems that make this hard.  Carry a cellphone or wear a medical alert device that you can use to call for help.  Use stepladders instead of chairs to reach high objects. Don't climb if you're at risk for falls. Ask for help, if needed.  Wear the correct eyeglasses, if you need them.    Make your home safer.    Remove rugs, cords, clutter, and furniture from walkways.  Keep your house well lit. Use night-lights in hallways and bathrooms.  Install and use sturdy handrails on stairways.  Wear nonskid footwear, even inside. Don't walk barefoot or in socks without shoes.    Be safe outside.    Use handrails, curb cuts, and ramps whenever possible.  Keep your hands free by using a shoulder bag or backpack.  Try to walk in well-lit areas. Watch out for uneven ground, changes in pavement, and debris.  Be careful in the winter. Walk on the grass or gravel when sidewalks are slippery. Use de-icer on steps and walkways. Add non-slip devices to shoes.    Put grab bars and nonskid mats in your shower or tub and near the toilet. Try to use a shower chair or bath bench when bathing.   Get into a tub or shower by putting in your weaker leg first. Get out with your strong side first. Have a phone or medical alert device in the bathroom with you.   Where can you learn more?  Go to https://www.Invoice2go.net/patiented  Enter G117 in the search box to learn more about \"Preventing Falls: Care Instructions.\"  Current as of: July 17, " 2023               Content Version: 14.0    5689-0967 [employer].   Care instructions adapted under license by your healthcare professional. If you have questions about a medical condition or this instruction, always ask your healthcare professional. [employer] disclaims any warranty or liability for your use of this information.      Hearing Loss: Care Instructions  Overview     Hearing loss is a sudden or slow decrease in how well you hear. It can range from slight to profound. Permanent hearing loss can occur with aging. It also can happen when you are exposed long-term to loud noise. Examples include listening to loud music, riding motorcycles, or being around other loud machines.  Hearing loss can affect your work and home life. It can make you feel lonely or depressed. You may feel that you have lost your independence. But hearing aids and other devices can help you hear better and feel connected to others.  Follow-up care is a key part of your treatment and safety. Be sure to make and go to all appointments, and call your doctor if you are having problems. It's also a good idea to know your test results and keep a list of the medicines you take.  How can you care for yourself at home?  Avoid loud noises whenever possible. This helps keep your hearing from getting worse.  Always wear hearing protection around loud noises.  Wear a hearing aid as directed.  A professional can help you pick a hearing aid that will work best for you.  You can also get hearing aids over the counter for mild to moderate hearing loss.  Have hearing tests as your doctor suggests. They can show whether your hearing has changed. Your hearing aid may need to be adjusted.  Use other devices as needed. These may include:  Telephone amplifiers and hearing aids that can connect to a television, stereo, radio, or microphone.  Devices that use lights or vibrations. These alert you to the doorbell, a ringing  "telephone, or a baby monitor.  Television closed-captioning. This shows the words at the bottom of the screen. Most new TVs can do this.  TTY (text telephone). This lets you type messages back and forth on the telephone instead of talking or listening. These devices are also called TDD. When messages are typed on the keyboard, they are sent over the phone line to a receiving TTY. The message is shown on a monitor.  Use text messaging, social media, and email if it is hard for you to communicate by telephone.  Try to learn a listening technique called speechreading. It is not lipreading. You pay attention to people's gestures, expressions, posture, and tone of voice. These clues can help you understand what a person is saying. Face the person you are talking to, and have them face you. Make sure the lighting is good. You need to see the other person's face clearly.  Think about counseling if you need help to adjust to your hearing loss.  When should you call for help?  Watch closely for changes in your health, and be sure to contact your doctor if:    You think your hearing is getting worse.     You have new symptoms, such as dizziness or nausea.   Where can you learn more?  Go to https://www.HiringBoss.net/patiented  Enter R798 in the search box to learn more about \"Hearing Loss: Care Instructions.\"  Current as of: September 27, 2023               Content Version: 14.0    3734-8101 [employer].   Care instructions adapted under license by your healthcare professional. If you have questions about a medical condition or this instruction, always ask your healthcare professional. [employer] disclaims any warranty or liability for your use of this information.      Bladder Training: Care Instructions  Your Care Instructions     Bladder training is used to treat urge incontinence and stress incontinence. Urge incontinence means that the need to urinate comes on so fast that you can't get to a " toilet in time. Stress incontinence means that you leak urine because of pressure on your bladder. For example, it may happen when you laugh, cough, or lift something heavy.  Bladder training can increase how long you can wait before you have to urinate. It can also help your bladder hold more urine. And it can give you better control over the urge to urinate.  It is important to remember that bladder training takes a few weeks to a few months to make a difference. You may not see results right away, but don't give up.  Follow-up care is a key part of your treatment and safety. Be sure to make and go to all appointments, and call your doctor if you are having problems. It's also a good idea to know your test results and keep a list of the medicines you take.  How can you care for yourself at home?  Work with your doctor to come up with a bladder training program that is right for you. You may use one or more of the following methods.  Delayed urination  In the beginning, try to keep from urinating for 5 minutes after you first feel the need to go.  While you wait, take deep, slow breaths to relax. Kegel exercises can also help you delay the need to go to the bathroom.  After some practice, when you can easily wait 5 minutes to urinate, try to wait 10 minutes before you urinate.  Slowly increase the waiting period until you are able to control when you have to urinate.  Scheduled urination  Empty your bladder when you first wake up in the morning.  Schedule times throughout the day when you will urinate.  Start by going to the bathroom every hour, even if you don't need to go.  Slowly increase the time between trips to the bathroom.  When you have found a schedule that works well for you, keep doing it.  If you wake up during the night and have to urinate, do it. Apply your schedule to waking hours only.  Kegel exercises  These tighten and strengthen pelvic muscles, which can help you control the flow of urine. (If  "doing these exercises causes pain, stop doing them and talk with your doctor.) To do Kegel exercises:  Squeeze your muscles as if you were trying not to pass gas. Or squeeze your muscles as if you were stopping the flow of urine. Your belly, legs, and buttocks shouldn't move.  Hold the squeeze for 3 seconds, then relax for 5 to 10 seconds.  Start with 3 seconds, then add 1 second each week until you are able to squeeze for 10 seconds.  Repeat the exercise 10 times a session. Do 3 to 8 sessions a day.  When should you call for help?  Watch closely for changes in your health, and be sure to contact your doctor if:    Your incontinence is getting worse.     You do not get better as expected.   Where can you learn more?  Go to https://www.[facility].net/patiented  Enter V684 in the search box to learn more about \"Bladder Training: Care Instructions.\"  Current as of: November 15, 2023               Content Version: 14.0    1750-7628 [employer].   Care instructions adapted under license by your healthcare professional. If you have questions about a medical condition or this instruction, always ask your healthcare professional. Healthwise, [employer] disclaims any warranty or liability for your use of this information.         "

## 2024-09-12 DIAGNOSIS — I50.22 CHRONIC SYSTOLIC HEART FAILURE (H): Primary | ICD-10-CM

## 2024-09-15 ENCOUNTER — TRANSFERRED RECORDS (OUTPATIENT)
Dept: MULTI SPECIALTY CLINIC | Facility: CLINIC | Age: 69
End: 2024-09-15

## 2024-09-15 LAB — RETINOPATHY: NORMAL

## 2024-09-17 ENCOUNTER — MYC MEDICAL ADVICE (OUTPATIENT)
Dept: FAMILY MEDICINE | Facility: CLINIC | Age: 69
End: 2024-09-17
Payer: COMMERCIAL

## 2024-09-18 ENCOUNTER — ANCILLARY PROCEDURE (OUTPATIENT)
Dept: MAMMOGRAPHY | Facility: CLINIC | Age: 69
End: 2024-09-18
Attending: FAMILY MEDICINE
Payer: COMMERCIAL

## 2024-09-18 DIAGNOSIS — Z12.31 VISIT FOR SCREENING MAMMOGRAM: ICD-10-CM

## 2024-09-18 PROCEDURE — 77067 SCR MAMMO BI INCL CAD: CPT | Mod: TC | Performed by: RADIOLOGY

## 2024-09-18 PROCEDURE — 77063 BREAST TOMOSYNTHESIS BI: CPT | Mod: TC | Performed by: RADIOLOGY

## 2024-09-24 ENCOUNTER — LAB (OUTPATIENT)
Dept: LAB | Facility: CLINIC | Age: 69
End: 2024-09-24
Payer: COMMERCIAL

## 2024-09-24 DIAGNOSIS — I50.22 CHRONIC SYSTOLIC HEART FAILURE (H): ICD-10-CM

## 2024-09-24 LAB
ANION GAP SERPL CALCULATED.3IONS-SCNC: 16 MMOL/L (ref 7–15)
BUN SERPL-MCNC: 30.1 MG/DL (ref 8–23)
CALCIUM SERPL-MCNC: 9.8 MG/DL (ref 8.8–10.4)
CHLORIDE SERPL-SCNC: 102 MMOL/L (ref 98–107)
CREAT SERPL-MCNC: 1.1 MG/DL (ref 0.51–0.95)
EGFRCR SERPLBLD CKD-EPI 2021: 54 ML/MIN/1.73M2
GLUCOSE SERPL-MCNC: 94 MG/DL (ref 70–99)
HCO3 SERPL-SCNC: 23 MMOL/L (ref 22–29)
POTASSIUM SERPL-SCNC: 5.2 MMOL/L (ref 3.4–5.3)
SODIUM SERPL-SCNC: 141 MMOL/L (ref 135–145)

## 2024-09-24 PROCEDURE — 36415 COLL VENOUS BLD VENIPUNCTURE: CPT

## 2024-09-24 PROCEDURE — 80048 BASIC METABOLIC PNL TOTAL CA: CPT

## 2024-09-26 ENCOUNTER — DOCUMENTATION ONLY (OUTPATIENT)
Dept: CARDIOLOGY | Facility: CLINIC | Age: 69
End: 2024-09-26

## 2024-09-26 ENCOUNTER — OFFICE VISIT (OUTPATIENT)
Dept: CARDIOLOGY | Facility: CLINIC | Age: 69
End: 2024-09-26
Payer: COMMERCIAL

## 2024-09-26 VITALS
BODY MASS INDEX: 22.04 KG/M2 | SYSTOLIC BLOOD PRESSURE: 127 MMHG | OXYGEN SATURATION: 100 % | DIASTOLIC BLOOD PRESSURE: 67 MMHG | HEART RATE: 59 BPM | WEIGHT: 124.4 LBS

## 2024-09-26 DIAGNOSIS — Z95.1 STATUS POST AORTO-CORONARY ARTERY BYPASS GRAFT: ICD-10-CM

## 2024-09-26 DIAGNOSIS — I50.22 CHRONIC SYSTOLIC HEART FAILURE (H): Primary | ICD-10-CM

## 2024-09-26 DIAGNOSIS — I10 HYPERTENSION, UNSPECIFIED TYPE: ICD-10-CM

## 2024-09-26 DIAGNOSIS — Z95.1 S/P CABG (CORONARY ARTERY BYPASS GRAFT): ICD-10-CM

## 2024-09-26 DIAGNOSIS — I25.5 ISCHEMIC CARDIOMYOPATHY: ICD-10-CM

## 2024-09-26 DIAGNOSIS — E11.40 TYPE 2 DIABETES MELLITUS WITH DIABETIC NEUROPATHY, WITHOUT LONG-TERM CURRENT USE OF INSULIN (H): ICD-10-CM

## 2024-09-26 NOTE — LETTER
9/26/2024      RE: Marianne Monroy  [address] [place] [id number]       Dear Colleague,    Thank you for the opportunity to participate in the care of your patient, Marianne Monroy, at the [facility]. Please see a copy of my visit note below.        HPI: Marianne is a 69 year old White female with a past medical history of T2DM, HTN, GERD, JUSTUS, MDD and recent episode of lightheadedness and dizziness on 4/30    Admission 5/18-6/4 - 5/24/2022.  Surgeon: Dr. Ulises Desai  1.  Coronary artery bypass grafting x 4 (left internal mammary artery to left anterior descending artery, saphenous vein graft to distal right coronary artery, saphenous vein graft to ramus intermedius artery, saphenous vein graft to obtuse marginal artery).  2.  Endoscopic vein harvest.    Patient has no SOB at rest.  She has some JEFFERSON with stairs. She also has not been hydrating well.  No swelling in the legs today. She has some lightheadedness with changes and positioning.  Weight at home 123 lbs ( stable).  She is has been working out at her building.     Denies SOB,PND, orthopnea, edema, weight gain, chest pain, palpitations, lightheadedness, dizziness, near syncopal/syncopal episodes. Marianne has been following salt and fluid restrictions.      PAST MEDICAL HISTORY:  Past Medical History:   Diagnosis Date     Abnormal Pap smear of cervix ~2000    repeat pap was normal     Allergic rhinitis, cause unspecified      Anxiety state, unspecified     panic episodes     C. difficile colitis      CAD (coronary artery disease)      Congestive heart failure (H)      Depressive disorder      Diabetes (H)      HFrEF (heart failure with reduced ejection fraction) (H)      ICD (implantable cardioverter-defibrillator) in place      Osteopenia      Unspecified essential hypertension        FAMILY HISTORY:  Family History   Problem Relation Age of Onset      Diabetes Mother         hypertension, alive at 83     C.A.D. Mother      Coronary Artery Disease Mother      Cancer Father         lung cancer, smoker.   at age 53     Family History Negative Sister      Osteoporosis Sister      Anesthesia Reaction No family hx of      Deep Vein Thrombosis (DVT) No family hx of        SOCIAL HISTORY:  Social History     Tobacco Use     Smoking status: Never     Smokeless tobacco: Never   Vaping Use     Vaping status: Never Used   Substance Use Topics     Alcohol use: Yes     Comment: 1 drink a week     Drug use: No           CURRENT MEDICATIONS:  Current Outpatient Medications   Medication Sig Dispense Refill     ALPRAZolam (XANAX) 0.25 MG tablet Take 1 tablet (0.25 mg) by mouth 3 times daily as needed for anxiety 10 tablet 0     aspirin 81 MG EC tablet Take 81 mg by mouth every evening       atorvastatin (LIPITOR) 80 MG tablet TAKE 1 TABLET DAILY (NEED ANNUAL EXAM) Strength: 80 mg 90 tablet 3     blood glucose (ONETOUCH VERIO IQ) test strip Use to test blood sugar 2 times daily or as directed. 200 strip 1     blood glucose monitoring (ONETOUCH VERIO) meter device kit Use to test blood sugar 2 times daily or as directed. 1 kit 0     Continuous Blood Gluc Sensor (PropelSTYLE VANESSA 14 DAY SENSOR) MISC Change every 14 days. 2 each 0     empagliflozin (JARDIANCE) 25 MG TABS tablet Take 1 tablet (25 mg) by mouth daily 90 tablet 1     FLUoxetine (PROZAC) 20 MG capsule Take 2 capsules (40 mg) by mouth daily 180 capsule 1     glipiZIDE (GLUCOTROL XL) 2.5 MG 24 hr tablet Take 2 tablets (5 mg) by mouth daily 180 tablet 1     metFORMIN (GLUCOPHAGE XR) 500 MG 24 hr tablet Take 3 tablets (1,500 mg) by mouth daily (with dinner) 270 tablet 1     metoprolol succinate ER (TOPROL XL) 50 MG 24 hr tablet Take 1.5 tablets (75 mg) by mouth daily 145 tablet 3     MULTIPLE VITAMIN OR TABS Take by mouth every morning       Multiple Vitamins-Minerals (PRESERVISION AREDS PO) Take 2 tablets by mouth daily        sacubitril-valsartan (ENTRESTO)  MG per tablet Take 1/2 tablet in AM, take 1 tablet in  tablet 3     spironolactone (ALDACTONE) 25 MG tablet Take 0.5 tablets (12.5 mg) by mouth daily 45 tablet 3     traZODone (DESYREL) 50 MG tablet Take 0.5 tablets (25 mg) by mouth at bedtime 15 tablet 3     VITAMIN D PO Take by mouth.             ROS:   Constitutional: No fever, chills, or sweats.  ENT: No visual disturbance, ear ache, epistaxis, sore throat.   Allergies/Immunologic: Negative  Respiratory: No cough, hemoptysis.   Cardiovascular: As per HPI.   GI: As per HPI.   : No urinary frequency, dysuria, or hematuria.   Integument: Negative.   Psychiatric: Negative.   Neuro: Negative.   Endocrinology: negative   Musculoskeletal: negative    EXAM:   General: alert, articulate, and in no acute distress.  HEENT: normocephalic, atraumatic, anicteric sclera, EOMI, mucosa moist, no cyanosis.   Neck: neck supple.  No adenopathy, masses, or carotid bruits.  JVP flat at 90 degrees  Heart: regular rhythm, normal S1/S2, no murmur, gallop, rub.  Precordium quiet with normal PMI.     Lungs: clear, no rales, ronchi, or wheezing.  No accessory muscle use, respirations unlabored.   Abdomen: soft, non-tender, bowel sounds present, no hepatomegaly  Extremities: no pitting edema.   No cyanosis.   Neurological: alert and oriented x 3.  normal speech and affect, no gross motor deficits  Skin:  No ecchymoses, rashes, or clubbing.    Labs:  CBC RESULTS:  Lab Results   Component Value Date    WBC 7.0 06/26/2024    WBC 7.1 09/09/2020    RBC 4.35 06/26/2024    RBC 4.57 09/09/2020    HGB 12.3 06/26/2024    HGB 13.3 09/09/2020    HCT 40.6 06/26/2024    HCT 41.1 09/09/2020    MCV 93 06/26/2024    MCV 90 09/09/2020    MCH 28.3 06/26/2024    MCH 29.1 09/09/2020    MCHC 30.3 (L) 06/26/2024    MCHC 32.4 09/09/2020    RDW 14.8 06/26/2024    RDW 12.5 09/09/2020     06/26/2024     09/09/2020       CMP RESULTS:  Lab Results  "  Component Value Date     09/24/2024     01/07/2021    POTASSIUM 5.2 09/24/2024    POTASSIUM 5.0 02/07/2023    POTASSIUM 4.8 01/07/2021    CHLORIDE 102 09/24/2024    CHLORIDE 108 02/07/2023    CHLORIDE 106 01/07/2021    CO2 23 09/24/2024    CO2 29 02/07/2023    CO2 28 01/07/2021    ANIONGAP 16 (H) 09/24/2024    ANIONGAP 3 02/07/2023    ANIONGAP 7 01/07/2021    GLC 94 09/24/2024     (H) 02/16/2023     (H) 02/07/2023     (H) 01/07/2021    BUN 30.1 (H) 09/24/2024    BUN 25 02/07/2023    BUN 19 01/07/2021    CR 1.10 (H) 09/24/2024    CR 0.90 01/07/2021    GFRESTIMATED 54 (L) 09/24/2024    GFRESTIMATED 66 01/07/2021    GFRESTBLACK 77 01/07/2021    POONAM 9.8 09/24/2024    POONAM 9.5 01/07/2021    BILITOTAL 0.3 06/26/2024    BILITOTAL 0.4 09/09/2020    ALBUMIN 4.2 06/26/2024    ALBUMIN 3.7 07/06/2022    ALBUMIN 3.7 09/09/2020    ALKPHOS 71 06/26/2024    ALKPHOS 90 09/09/2020    ALT 26 06/26/2024    ALT 33 09/09/2020    AST 22 06/26/2024    AST 14 09/09/2020        INR RESULTS:  Lab Results   Component Value Date    INR 1.29 (H) 05/25/2022       No components found for: \"CK\"  Lab Results   Component Value Date    MAG 1.8 06/26/2024     Lab Results   Component Value Date    NTBNP 2,714 (H) 06/09/2022     @BRIEFLABR (dig)@    Most recent echocardiogram:  No results found for this or any previous visit (from the past 8760 hour(s)).      Assessment and Plan:    In summary,Marianne  is a 69 year old here for a [facility] follow up. She is doing well. Labs are stable. Stable end organ function. Follow up in 9 months . Has a follow up with Dr. Steve 6 months from now.       1.  Chronic systolic heart failure secondary to ICM.  Stage C  NYHA Class III  ACEi/ARB: -Entresto 97/103  mg take one in the pm and 1/2 tab in am  BB: yes-  Metoprolol 75 mg   Aldosterone antagonist: 12.5 mg   SCD prophylaxis: does not meet criteria for implant  Fluid status: euvolemic  Anticoagulation:   Antiplatelet:  ASA dose   Sleep " apnea:  NSAID use:  Contraindicated.  Avoid use.  Remote Monitoring:none  SGLT 2 - Jardiance 25 mg    2.  Other comordbid conditions:      #T2DM - PCP to manage - glipizide , Jardiance, metformin    #HTN- Entresto and Metoprolol     #GERD- followed by PCP     3.  Follow-up     CORE in 9 months           Belinda MOORE, CNP  [facility]                 Please do not hesitate to contact me if you have any questions/concerns.     Sincerely,     OSCAR Smith CNP

## 2024-09-26 NOTE — PATIENT INSTRUCTIONS
Take your medicines every day, as directed    Changes made today:  None      Monitor Your Weight and Symptoms    Contact us if you:    Gain 2 pounds in one day or 5 pounds in one week  Feel more short of breath  Notice more leg swelling  Feel lightheadeded   Change your lifestyle    Limit Salt or Sodium:  2000 mg  Limit Fluids:  2000 mL or approximately 64 ounces  Eat a Heart Healthy Diet  Low in saturated fats  Stay Active:  Aim to move at least 150 minutes every  week         To Contact us    During Business Hours:  [phone], option # 1      After hours, weekends or holidays:   [phone], Option #4  Ask to speak to the On-Call Cardiologist. Inform them you are a CORE/heart failure patient at the [facility].     Use [employer] allows you to communicate directly with your heart team through secure messaging.  [employer] can be accessed any time on your phone, computer, or tablet.  If you need assistance, we'd be happy to help!         Keep your Heart Appointments:    CORE in 9 months    Dr. Mandi Lindquist Endocrinology option with [facility] system         Heart Failure Support Group  Virtual meetings will continue in 2024 Please reach out if you would like to attend and we can get you the information you need to log in.     2024 dates:    Monday, August 5th, 1-2pm     Monday, September 9th, 1-2pm     Monday, October 7th, 1-2pm     Monday, November 4th, 1-2pm     Monday, December 2nd, 1-2pm     Follow the American Heart Association Diet and Lifestyle recommendations:  Limit saturated fat, trans fat, sodium, red meat, sweets and sugar-sweetened beverages. If you choose to eat red meat, compare labels and select the leanest cuts available.  Aim for at least 150 minutes of moderate physical activity or 75 minutes of vigorous physical activity - or an equal combination of both - each week.     During business hours: [phone], press option # 1 to schedule an appointment or send a message to your care team      After hours, weekends or holidays: On Call Cardiologist- [phone]   option #4 and ask to speak to the on-call Cardiologist. Inform them you are a CORE/heart failure patient at the [place].

## 2024-09-26 NOTE — NURSING NOTE
"Chief Complaint   Patient presents with    Follow Up     Reason for visit: Return CORE, 70 yo female with systolic heart failure presents for follow up with labs prior.       Initial /67 (BP Location: Left arm, Patient Position: Sitting, Cuff Size: Adult Regular)   Pulse 59   Wt 56.4 kg (124 lb 6.4 oz)   SpO2 100%   BMI 22.04 kg/m   Estimated body mass index is 22.04 kg/m  as calculated from the following:    Height as of 3/13/24: 1.6 m (5' 3\").    Weight as of this encounter: 56.4 kg (124 lb 6.4 oz)..  BP completed using cuff size: regular    ANDREA Thakkar    "

## 2024-09-26 NOTE — PROGRESS NOTES
HPI: Marianne is a 69 year old White female with a past medical history of T2DM, HTN, GERD, JUSTUS, MDD and recent episode of lightheadedness and dizziness on     Admission - - 2022.  Surgeon: Dr. Ulises Desai  1.  Coronary artery bypass grafting x 4 (left internal mammary artery to left anterior descending artery, saphenous vein graft to distal right coronary artery, saphenous vein graft to ramus intermedius artery, saphenous vein graft to obtuse marginal artery).  2.  Endoscopic vein harvest.    Patient has no SOB at rest.  She has some JEFFERSON with stairs. She also has not been hydrating well.  No swelling in the legs today. She has some lightheadedness with changes and positioning.  Weight at home 123 lbs ( stable).  She is has been working out at her building.     Denies SOB,PND, orthopnea, edema, weight gain, chest pain, palpitations, lightheadedness, dizziness, near syncopal/syncopal episodes. Marianne has been following salt and fluid restrictions.      PAST MEDICAL HISTORY:  Past Medical History:   Diagnosis Date    Abnormal Pap smear of cervix ~    repeat pap was normal    Allergic rhinitis, cause unspecified     Anxiety state, unspecified     panic episodes    C. difficile colitis     CAD (coronary artery disease)     Congestive heart failure (H)     Depressive disorder     Diabetes (H)     HFrEF (heart failure with reduced ejection fraction) (H)     ICD (implantable cardioverter-defibrillator) in place     Osteopenia     Unspecified essential hypertension        FAMILY HISTORY:  Family History   Problem Relation Age of Onset    Diabetes Mother         hypertension, alive at 83    C.A.D. Mother     Coronary Artery Disease Mother     Cancer Father         lung cancer, smoker.   at age 53    Family History Negative Sister     Osteoporosis Sister     Anesthesia Reaction No family hx of     Deep Vein Thrombosis (DVT) No family hx of        SOCIAL HISTORY:  Social History     Tobacco Use     Smoking status: Never    Smokeless tobacco: Never   Vaping Use    Vaping status: Never Used   Substance Use Topics    Alcohol use: Yes     Comment: 1 drink a week    Drug use: No           CURRENT MEDICATIONS:  Current Outpatient Medications   Medication Sig Dispense Refill    ALPRAZolam (XANAX) 0.25 MG tablet Take 1 tablet (0.25 mg) by mouth 3 times daily as needed for anxiety 10 tablet 0    aspirin 81 MG EC tablet Take 81 mg by mouth every evening      atorvastatin (LIPITOR) 80 MG tablet TAKE 1 TABLET DAILY (NEED ANNUAL EXAM) Strength: 80 mg 90 tablet 3    blood glucose (ONETOUCH VERIO IQ) test strip Use to test blood sugar 2 times daily or as directed. 200 strip 1    blood glucose monitoring (ONETOUCH VERIO) meter device kit Use to test blood sugar 2 times daily or as directed. 1 kit 0    Continuous Blood Gluc Sensor ([employer] SystemsYLE VANESSA 14 DAY SENSOR) MISC Change every 14 days. 2 each 0    empagliflozin (JARDIANCE) 25 MG TABS tablet Take 1 tablet (25 mg) by mouth daily 90 tablet 1    FLUoxetine (PROZAC) 20 MG capsule Take 2 capsules (40 mg) by mouth daily 180 capsule 1    glipiZIDE (GLUCOTROL XL) 2.5 MG 24 hr tablet Take 2 tablets (5 mg) by mouth daily 180 tablet 1    metFORMIN (GLUCOPHAGE XR) 500 MG 24 hr tablet Take 3 tablets (1,500 mg) by mouth daily (with dinner) 270 tablet 1    metoprolol succinate ER (TOPROL XL) 50 MG 24 hr tablet Take 1.5 tablets (75 mg) by mouth daily 145 tablet 3    MULTIPLE VITAMIN OR TABS Take by mouth every morning      Multiple Vitamins-Minerals (PRESERVISION AREDS PO) Take 2 tablets by mouth daily      sacubitril-valsartan (ENTRESTO)  MG per tablet Take 1/2 tablet in AM, take 1 tablet in  tablet 3    spironolactone (ALDACTONE) 25 MG tablet Take 0.5 tablets (12.5 mg) by mouth daily 45 tablet 3    traZODone (DESYREL) 50 MG tablet Take 0.5 tablets (25 mg) by mouth at bedtime 15 tablet 3    VITAMIN D PO Take by mouth.             ROS:   Constitutional: No fever, chills, or  sweats.  ENT: No visual disturbance, ear ache, epistaxis, sore throat.   Allergies/Immunologic: Negative  Respiratory: No cough, hemoptysis.   Cardiovascular: As per HPI.   GI: As per HPI.   : No urinary frequency, dysuria, or hematuria.   Integument: Negative.   Psychiatric: Negative.   Neuro: Negative.   Endocrinology: negative   Musculoskeletal: negative    EXAM:   General: alert, articulate, and in no acute distress.  HEENT: normocephalic, atraumatic, anicteric sclera, EOMI, mucosa moist, no cyanosis.   Neck: neck supple.  No adenopathy, masses, or carotid bruits.  JVP flat at 90 degrees  Heart: regular rhythm, normal S1/S2, no murmur, gallop, rub.  Precordium quiet with normal PMI.     Lungs: clear, no rales, ronchi, or wheezing.  No accessory muscle use, respirations unlabored.   Abdomen: soft, non-tender, bowel sounds present, no hepatomegaly  Extremities: no pitting edema.   No cyanosis.   Neurological: alert and oriented x 3.  normal speech and affect, no gross motor deficits  Skin:  No ecchymoses, rashes, or clubbing.    Labs:  CBC RESULTS:  Lab Results   Component Value Date    WBC 7.0 06/26/2024    WBC 7.1 09/09/2020    RBC 4.35 06/26/2024    RBC 4.57 09/09/2020    HGB 12.3 06/26/2024    HGB 13.3 09/09/2020    HCT 40.6 06/26/2024    HCT 41.1 09/09/2020    MCV 93 06/26/2024    MCV 90 09/09/2020    MCH 28.3 06/26/2024    MCH 29.1 09/09/2020    MCHC 30.3 (L) 06/26/2024    MCHC 32.4 09/09/2020    RDW 14.8 06/26/2024    RDW 12.5 09/09/2020     06/26/2024     09/09/2020       CMP RESULTS:  Lab Results   Component Value Date     09/24/2024     01/07/2021    POTASSIUM 5.2 09/24/2024    POTASSIUM 5.0 02/07/2023    POTASSIUM 4.8 01/07/2021    CHLORIDE 102 09/24/2024    CHLORIDE 108 02/07/2023    CHLORIDE 106 01/07/2021    CO2 23 09/24/2024    CO2 29 02/07/2023    CO2 28 01/07/2021    ANIONGAP 16 (H) 09/24/2024    ANIONGAP 3 02/07/2023    ANIONGAP 7 01/07/2021    GLC 94 09/24/2024    GLC  "161 (H) 02/16/2023     (H) 02/07/2023     (H) 01/07/2021    BUN 30.1 (H) 09/24/2024    BUN 25 02/07/2023    BUN 19 01/07/2021    CR 1.10 (H) 09/24/2024    CR 0.90 01/07/2021    GFRESTIMATED 54 (L) 09/24/2024    GFRESTIMATED 66 01/07/2021    GFRESTBLACK 77 01/07/2021    POONAM 9.8 09/24/2024    POONAM 9.5 01/07/2021    BILITOTAL 0.3 06/26/2024    BILITOTAL 0.4 09/09/2020    ALBUMIN 4.2 06/26/2024    ALBUMIN 3.7 07/06/2022    ALBUMIN 3.7 09/09/2020    ALKPHOS 71 06/26/2024    ALKPHOS 90 09/09/2020    ALT 26 06/26/2024    ALT 33 09/09/2020    AST 22 06/26/2024    AST 14 09/09/2020        INR RESULTS:  Lab Results   Component Value Date    INR 1.29 (H) 05/25/2022       No components found for: \"CK\"  Lab Results   Component Value Date    MAG 1.8 06/26/2024     Lab Results   Component Value Date    NTBNP 2,714 (H) 06/09/2022     @BRIEFLABR (dig)@    Most recent echocardiogram:  No results found for this or any previous visit (from the past 8760 hour(s)).      Assessment and Plan:    In summary,Marianne  is a 69 year old here for a CORE follow up. She is doing well. Labs are stable. Stable end organ function. Follow up in 9 months . Has a follow up with Dr. Steve 6 months from now.       1.  Chronic systolic heart failure secondary to ICM.  Stage C  NYHA Class III  ACEi/ARB: -Entresto 97/103  mg take one in the pm and 1/2 tab in am  BB: yes-  Metoprolol 75 mg   Aldosterone antagonist: 12.5 mg   SCD prophylaxis: does not meet criteria for implant  Fluid status: euvolemic  Anticoagulation:   Antiplatelet:  ASA dose   Sleep apnea:  NSAID use:  Contraindicated.  Avoid use.  Remote Monitoring:none  SGLT 2 - Jardiance 25 mg    2.  Other comordbid conditions:      #T2DM - PCP to manage - glipizide , Jardiance, metformin    #HTN- Entresto and Metoprolol     #GERD- followed by PCP     3.  Follow-up     CORE in 9 months           Belinda MOORE CNP  [facility]               "

## 2024-10-10 ASSESSMENT — ANXIETY QUESTIONNAIRES: GAD7 TOTAL SCORE: 10

## 2024-10-10 ASSESSMENT — PATIENT HEALTH QUESTIONNAIRE - PHQ9: SUM OF ALL RESPONSES TO PHQ QUESTIONS 1-9: 10

## 2024-10-15 ENCOUNTER — TELEPHONE (OUTPATIENT)
Dept: CARDIOLOGY | Facility: CLINIC | Age: 69
End: 2024-10-15
Payer: COMMERCIAL

## 2024-10-15 NOTE — TELEPHONE ENCOUNTER
[facility] Call Center    Phone Message    May a detailed message be left on voicemail: no     Reason for Call: Other: Pharmacy called and had some questions about a medication. Please call [phone] option 3 to speak to the pharmacist.      Action Taken: Other: Cardiology     Travel Screening: Not Applicable     Thank you!  [facility]

## 2024-10-15 NOTE — TELEPHONE ENCOUNTER
Returned phone call to [facility]. They are going to adjust the Entresto prescription to the 49-51 mg strength, 1 tablet in AM and 1 tablet in PM.    Dasia Amin RN

## 2024-10-19 ENCOUNTER — MYC MEDICAL ADVICE (OUTPATIENT)
Dept: CARDIOLOGY | Facility: CLINIC | Age: 69
End: 2024-10-19
Payer: COMMERCIAL

## 2024-10-29 DIAGNOSIS — E11.59 TYPE 2 DIABETES MELLITUS WITH OTHER CIRCULATORY COMPLICATION, WITHOUT LONG-TERM CURRENT USE OF INSULIN (H): ICD-10-CM

## 2024-10-29 RX ORDER — GLIPIZIDE 2.5 MG/1
5 TABLET, EXTENDED RELEASE ORAL DAILY
Qty: 180 TABLET | Refills: 1 | Status: SHIPPED | OUTPATIENT
Start: 2024-10-29

## 2024-11-01 ENCOUNTER — ANCILLARY PROCEDURE (OUTPATIENT)
Dept: CARDIOLOGY | Facility: CLINIC | Age: 69
End: 2024-11-01
Attending: INTERNAL MEDICINE
Payer: COMMERCIAL

## 2024-11-01 DIAGNOSIS — Z95.810 ICD (IMPLANTABLE CARDIOVERTER-DEFIBRILLATOR), SINGLE, IN SITU: ICD-10-CM

## 2024-11-01 PROCEDURE — 93295 DEV INTERROG REMOTE 1/2/MLT: CPT | Performed by: INTERNAL MEDICINE

## 2024-11-01 PROCEDURE — 93296 REM INTERROG EVL PM/IDS: CPT

## 2024-11-05 ASSESSMENT — PATIENT HEALTH QUESTIONNAIRE - PHQ9: SUM OF ALL RESPONSES TO PHQ QUESTIONS 1-9: 4

## 2024-11-07 LAB
MDC_IDC_EPISODE_DTM: NORMAL
MDC_IDC_EPISODE_DURATION: 1 S
MDC_IDC_EPISODE_ID: 5
MDC_IDC_EPISODE_TYPE: NORMAL
MDC_IDC_LEAD_CONNECTION_STATUS: NORMAL
MDC_IDC_LEAD_IMPLANT_DT: NORMAL
MDC_IDC_LEAD_LOCATION: NORMAL
MDC_IDC_LEAD_LOCATION_DETAIL_1: NORMAL
MDC_IDC_LEAD_MFG: NORMAL
MDC_IDC_LEAD_MODEL: NORMAL
MDC_IDC_LEAD_POLARITY_TYPE: NORMAL
MDC_IDC_LEAD_SERIAL: NORMAL
MDC_IDC_LEAD_SPECIAL_FUNCTION: NORMAL
MDC_IDC_MSMT_BATTERY_DTM: NORMAL
MDC_IDC_MSMT_BATTERY_REMAINING_LONGEVITY: 147 MO
MDC_IDC_MSMT_BATTERY_RRT_TRIGGER: NORMAL
MDC_IDC_MSMT_BATTERY_VOLTAGE: 3.04 V
MDC_IDC_MSMT_CAP_CHARGE_DTM: NORMAL
MDC_IDC_MSMT_CAP_CHARGE_ENERGY: 18 J
MDC_IDC_MSMT_CAP_CHARGE_TIME: 3.7 S
MDC_IDC_MSMT_CAP_CHARGE_TYPE: NORMAL
MDC_IDC_MSMT_LEADCHNL_RV_IMPEDANCE_VALUE: 304 OHM
MDC_IDC_MSMT_LEADCHNL_RV_IMPEDANCE_VALUE: 380 OHM
MDC_IDC_MSMT_LEADCHNL_RV_PACING_THRESHOLD_AMPLITUDE: 0.62 V
MDC_IDC_MSMT_LEADCHNL_RV_PACING_THRESHOLD_PULSEWIDTH: 0.4 MS
MDC_IDC_MSMT_LEADCHNL_RV_SENSING_INTR_AMPL: 8.5 MV
MDC_IDC_PG_IMPLANT_DTM: NORMAL
MDC_IDC_PG_MFG: NORMAL
MDC_IDC_PG_MODEL: NORMAL
MDC_IDC_PG_SERIAL: NORMAL
MDC_IDC_PG_TYPE: NORMAL
MDC_IDC_SESS_CLINIC_NAME: NORMAL
MDC_IDC_SESS_DTM: NORMAL
MDC_IDC_SESS_TYPE: NORMAL
MDC_IDC_SET_BRADY_HYSTRATE: NORMAL
MDC_IDC_SET_BRADY_LOWRATE: 40 {BEATS}/MIN
MDC_IDC_SET_BRADY_MODE: NORMAL
MDC_IDC_SET_LEADCHNL_RV_PACING_AMPLITUDE: 2 V
MDC_IDC_SET_LEADCHNL_RV_PACING_ANODE_ELECTRODE_1: NORMAL
MDC_IDC_SET_LEADCHNL_RV_PACING_ANODE_LOCATION_1: NORMAL
MDC_IDC_SET_LEADCHNL_RV_PACING_CAPTURE_MODE: NORMAL
MDC_IDC_SET_LEADCHNL_RV_PACING_CATHODE_ELECTRODE_1: NORMAL
MDC_IDC_SET_LEADCHNL_RV_PACING_CATHODE_LOCATION_1: NORMAL
MDC_IDC_SET_LEADCHNL_RV_PACING_POLARITY: NORMAL
MDC_IDC_SET_LEADCHNL_RV_PACING_PULSEWIDTH: 0.4 MS
MDC_IDC_SET_LEADCHNL_RV_SENSING_ANODE_ELECTRODE_1: NORMAL
MDC_IDC_SET_LEADCHNL_RV_SENSING_ANODE_LOCATION_1: NORMAL
MDC_IDC_SET_LEADCHNL_RV_SENSING_CATHODE_ELECTRODE_1: NORMAL
MDC_IDC_SET_LEADCHNL_RV_SENSING_CATHODE_LOCATION_1: NORMAL
MDC_IDC_SET_LEADCHNL_RV_SENSING_POLARITY: NORMAL
MDC_IDC_SET_LEADCHNL_RV_SENSING_SENSITIVITY: 0.3 MV
MDC_IDC_SET_ZONE_DETECTION_BEATS_DENOMINATOR: 16 {BEATS}
MDC_IDC_SET_ZONE_DETECTION_BEATS_DENOMINATOR: 32 {BEATS}
MDC_IDC_SET_ZONE_DETECTION_BEATS_DENOMINATOR: 40 {BEATS}
MDC_IDC_SET_ZONE_DETECTION_BEATS_NUMERATOR: 16 {BEATS}
MDC_IDC_SET_ZONE_DETECTION_BEATS_NUMERATOR: 30 {BEATS}
MDC_IDC_SET_ZONE_DETECTION_BEATS_NUMERATOR: 32 {BEATS}
MDC_IDC_SET_ZONE_DETECTION_INTERVAL: 270 MS
MDC_IDC_SET_ZONE_DETECTION_INTERVAL: 320 MS
MDC_IDC_SET_ZONE_DETECTION_INTERVAL: 360 MS
MDC_IDC_SET_ZONE_DETECTION_INTERVAL: 360 MS
MDC_IDC_SET_ZONE_STATUS: NORMAL
MDC_IDC_SET_ZONE_TYPE: NORMAL
MDC_IDC_SET_ZONE_VENDOR_TYPE: NORMAL
MDC_IDC_STAT_AT_BURDEN_PERCENT: 0 %
MDC_IDC_STAT_AT_DTM_END: NORMAL
MDC_IDC_STAT_AT_DTM_START: NORMAL
MDC_IDC_STAT_BRADY_DTM_END: NORMAL
MDC_IDC_STAT_BRADY_DTM_START: NORMAL
MDC_IDC_STAT_BRADY_RV_PERCENT_PACED: 0.02 %
MDC_IDC_STAT_CRT_DTM_END: NORMAL
MDC_IDC_STAT_CRT_DTM_START: NORMAL
MDC_IDC_STAT_EPISODE_RECENT_COUNT: 0
MDC_IDC_STAT_EPISODE_RECENT_COUNT: 1
MDC_IDC_STAT_EPISODE_RECENT_COUNT_DTM_END: NORMAL
MDC_IDC_STAT_EPISODE_RECENT_COUNT_DTM_START: NORMAL
MDC_IDC_STAT_EPISODE_TOTAL_COUNT: 0
MDC_IDC_STAT_EPISODE_TOTAL_COUNT: 5
MDC_IDC_STAT_EPISODE_TOTAL_COUNT_DTM_END: NORMAL
MDC_IDC_STAT_EPISODE_TOTAL_COUNT_DTM_START: NORMAL
MDC_IDC_STAT_EPISODE_TYPE: NORMAL
MDC_IDC_STAT_TACHYTHERAPY_ATP_DELIVERED_RECENT: 0
MDC_IDC_STAT_TACHYTHERAPY_ATP_DELIVERED_TOTAL: 0
MDC_IDC_STAT_TACHYTHERAPY_RECENT_DTM_END: NORMAL
MDC_IDC_STAT_TACHYTHERAPY_RECENT_DTM_START: NORMAL
MDC_IDC_STAT_TACHYTHERAPY_SHOCKS_ABORTED_RECENT: 0
MDC_IDC_STAT_TACHYTHERAPY_SHOCKS_ABORTED_TOTAL: 0
MDC_IDC_STAT_TACHYTHERAPY_SHOCKS_DELIVERED_RECENT: 0
MDC_IDC_STAT_TACHYTHERAPY_SHOCKS_DELIVERED_TOTAL: 0
MDC_IDC_STAT_TACHYTHERAPY_TOTAL_DTM_END: NORMAL
MDC_IDC_STAT_TACHYTHERAPY_TOTAL_DTM_START: NORMAL

## 2024-11-10 ENCOUNTER — HEALTH MAINTENANCE LETTER (OUTPATIENT)
Age: 69
End: 2024-11-10

## 2024-11-18 ENCOUNTER — ANCILLARY PROCEDURE (OUTPATIENT)
Dept: CARDIOLOGY | Facility: CLINIC | Age: 69
End: 2024-11-18
Attending: INTERNAL MEDICINE
Payer: COMMERCIAL

## 2024-11-18 DIAGNOSIS — Z95.810 ICD (IMPLANTABLE CARDIOVERTER-DEFIBRILLATOR), SINGLE, IN SITU: ICD-10-CM

## 2024-11-18 PROCEDURE — 99207 CARDIAC DEVICE CHECK - REMOTE: CPT | Performed by: INTERNAL MEDICINE

## 2024-11-21 ENCOUNTER — MYC MEDICAL ADVICE (OUTPATIENT)
Dept: CARDIOLOGY | Facility: CLINIC | Age: 69
End: 2024-11-21
Payer: COMMERCIAL

## 2024-11-22 LAB
MDC_IDC_LEAD_CONNECTION_STATUS: NORMAL
MDC_IDC_LEAD_IMPLANT_DT: NORMAL
MDC_IDC_LEAD_LOCATION: NORMAL
MDC_IDC_LEAD_LOCATION_DETAIL_1: NORMAL
MDC_IDC_LEAD_MFG: NORMAL
MDC_IDC_LEAD_MODEL: NORMAL
MDC_IDC_LEAD_POLARITY_TYPE: NORMAL
MDC_IDC_LEAD_SERIAL: NORMAL
MDC_IDC_LEAD_SPECIAL_FUNCTION: NORMAL
MDC_IDC_MSMT_BATTERY_DTM: NORMAL
MDC_IDC_MSMT_BATTERY_REMAINING_LONGEVITY: 147 MO
MDC_IDC_MSMT_BATTERY_RRT_TRIGGER: NORMAL
MDC_IDC_MSMT_BATTERY_STATUS: NORMAL
MDC_IDC_MSMT_BATTERY_VOLTAGE: 3.03 V
MDC_IDC_MSMT_CAP_CHARGE_DTM: NORMAL
MDC_IDC_MSMT_CAP_CHARGE_ENERGY: 18 J
MDC_IDC_MSMT_CAP_CHARGE_TIME: 3.7 S
MDC_IDC_MSMT_CAP_CHARGE_TYPE: NORMAL
MDC_IDC_MSMT_LEADCHNL_RV_IMPEDANCE_VALUE: 304 OHM
MDC_IDC_MSMT_LEADCHNL_RV_IMPEDANCE_VALUE: 399 OHM
MDC_IDC_MSMT_LEADCHNL_RV_PACING_THRESHOLD_AMPLITUDE: 0.5 V
MDC_IDC_MSMT_LEADCHNL_RV_PACING_THRESHOLD_PULSEWIDTH: 0.4 MS
MDC_IDC_MSMT_LEADCHNL_RV_SENSING_INTR_AMPL: 9 MV
MDC_IDC_PG_IMPLANT_DTM: NORMAL
MDC_IDC_PG_MFG: NORMAL
MDC_IDC_PG_MODEL: NORMAL
MDC_IDC_PG_SERIAL: NORMAL
MDC_IDC_PG_TYPE: NORMAL
MDC_IDC_SESS_CLINIC_NAME: NORMAL
MDC_IDC_SESS_DTM: NORMAL
MDC_IDC_SESS_TYPE: NORMAL
MDC_IDC_SET_BRADY_HYSTRATE: NORMAL
MDC_IDC_SET_BRADY_LOWRATE: 40 {BEATS}/MIN
MDC_IDC_SET_BRADY_MODE: NORMAL
MDC_IDC_SET_LEADCHNL_RV_PACING_AMPLITUDE: 2 V
MDC_IDC_SET_LEADCHNL_RV_PACING_ANODE_ELECTRODE_1: NORMAL
MDC_IDC_SET_LEADCHNL_RV_PACING_ANODE_LOCATION_1: NORMAL
MDC_IDC_SET_LEADCHNL_RV_PACING_CAPTURE_MODE: NORMAL
MDC_IDC_SET_LEADCHNL_RV_PACING_CATHODE_ELECTRODE_1: NORMAL
MDC_IDC_SET_LEADCHNL_RV_PACING_CATHODE_LOCATION_1: NORMAL
MDC_IDC_SET_LEADCHNL_RV_PACING_POLARITY: NORMAL
MDC_IDC_SET_LEADCHNL_RV_PACING_PULSEWIDTH: 0.4 MS
MDC_IDC_SET_LEADCHNL_RV_SENSING_ANODE_ELECTRODE_1: NORMAL
MDC_IDC_SET_LEADCHNL_RV_SENSING_ANODE_LOCATION_1: NORMAL
MDC_IDC_SET_LEADCHNL_RV_SENSING_CATHODE_ELECTRODE_1: NORMAL
MDC_IDC_SET_LEADCHNL_RV_SENSING_CATHODE_LOCATION_1: NORMAL
MDC_IDC_SET_LEADCHNL_RV_SENSING_POLARITY: NORMAL
MDC_IDC_SET_LEADCHNL_RV_SENSING_SENSITIVITY: 0.3 MV
MDC_IDC_SET_ZONE_DETECTION_BEATS_DENOMINATOR: 16 {BEATS}
MDC_IDC_SET_ZONE_DETECTION_BEATS_DENOMINATOR: 32 {BEATS}
MDC_IDC_SET_ZONE_DETECTION_BEATS_DENOMINATOR: 40 {BEATS}
MDC_IDC_SET_ZONE_DETECTION_BEATS_NUMERATOR: 16 {BEATS}
MDC_IDC_SET_ZONE_DETECTION_BEATS_NUMERATOR: 30 {BEATS}
MDC_IDC_SET_ZONE_DETECTION_BEATS_NUMERATOR: 32 {BEATS}
MDC_IDC_SET_ZONE_DETECTION_INTERVAL: 270 MS
MDC_IDC_SET_ZONE_DETECTION_INTERVAL: 320 MS
MDC_IDC_SET_ZONE_DETECTION_INTERVAL: 360 MS
MDC_IDC_SET_ZONE_DETECTION_INTERVAL: 360 MS
MDC_IDC_SET_ZONE_STATUS: NORMAL
MDC_IDC_SET_ZONE_TYPE: NORMAL
MDC_IDC_SET_ZONE_VENDOR_TYPE: NORMAL
MDC_IDC_STAT_AT_BURDEN_PERCENT: 0 %
MDC_IDC_STAT_AT_DTM_END: NORMAL
MDC_IDC_STAT_AT_DTM_START: NORMAL
MDC_IDC_STAT_BRADY_DTM_END: NORMAL
MDC_IDC_STAT_BRADY_DTM_START: NORMAL
MDC_IDC_STAT_BRADY_RV_PERCENT_PACED: 0.01 %
MDC_IDC_STAT_CRT_DTM_END: NORMAL
MDC_IDC_STAT_CRT_DTM_START: NORMAL
MDC_IDC_STAT_EPISODE_RECENT_COUNT: 0
MDC_IDC_STAT_EPISODE_RECENT_COUNT_DTM_END: NORMAL
MDC_IDC_STAT_EPISODE_RECENT_COUNT_DTM_START: NORMAL
MDC_IDC_STAT_EPISODE_TOTAL_COUNT: 0
MDC_IDC_STAT_EPISODE_TOTAL_COUNT: 5
MDC_IDC_STAT_EPISODE_TOTAL_COUNT_DTM_END: NORMAL
MDC_IDC_STAT_EPISODE_TOTAL_COUNT_DTM_START: NORMAL
MDC_IDC_STAT_EPISODE_TYPE: NORMAL
MDC_IDC_STAT_TACHYTHERAPY_ATP_DELIVERED_RECENT: 0
MDC_IDC_STAT_TACHYTHERAPY_ATP_DELIVERED_TOTAL: 0
MDC_IDC_STAT_TACHYTHERAPY_RECENT_DTM_END: NORMAL
MDC_IDC_STAT_TACHYTHERAPY_RECENT_DTM_START: NORMAL
MDC_IDC_STAT_TACHYTHERAPY_SHOCKS_ABORTED_RECENT: 0
MDC_IDC_STAT_TACHYTHERAPY_SHOCKS_ABORTED_TOTAL: 0
MDC_IDC_STAT_TACHYTHERAPY_SHOCKS_DELIVERED_RECENT: 0
MDC_IDC_STAT_TACHYTHERAPY_SHOCKS_DELIVERED_TOTAL: 0
MDC_IDC_STAT_TACHYTHERAPY_TOTAL_DTM_END: NORMAL
MDC_IDC_STAT_TACHYTHERAPY_TOTAL_DTM_START: NORMAL

## 2024-11-25 DIAGNOSIS — Z95.1 S/P CABG (CORONARY ARTERY BYPASS GRAFT): ICD-10-CM

## 2024-11-26 ENCOUNTER — NURSE TRIAGE (OUTPATIENT)
Dept: FAMILY MEDICINE | Facility: CLINIC | Age: 69
End: 2024-11-26

## 2024-11-26 RX ORDER — ATORVASTATIN CALCIUM 80 MG/1
TABLET, FILM COATED ORAL
Qty: 90 TABLET | Refills: 0 | Status: SHIPPED | OUTPATIENT
Start: 2024-11-26

## 2024-11-27 ENCOUNTER — TELEPHONE (OUTPATIENT)
Dept: FAMILY MEDICINE | Facility: CLINIC | Age: 69
End: 2024-11-27
Payer: COMMERCIAL

## 2024-11-27 ENCOUNTER — MYC MEDICAL ADVICE (OUTPATIENT)
Dept: FAMILY MEDICINE | Facility: CLINIC | Age: 69
End: 2024-11-27
Payer: COMMERCIAL

## 2024-11-27 NOTE — TELEPHONE ENCOUNTER
Spoke with patient over the phone; triaged for shortness of breath and advised to be seen in ER (see alternate MyChart encounter)    Patient agreeable and will proceed    GONSALO Ha RN  [facility]

## 2024-11-27 NOTE — TELEPHONE ENCOUNTER
Patient notified of provider message as written. Patient verbalized good understanding.     Desire PAYANN, RN  [facility]

## 2024-11-27 NOTE — TELEPHONE ENCOUNTER
"Reached out to patient regarding symptoms of shortness of breath reported via MyChart.     She states that currently she is asymptomatic. However, she notes for the past couple weeks intermittent shortness of breath. This will occur even at rest, at times it is hard to finish a sentence. \"Chest heaviness\" comes and goes as well. She has had an ongoing dry cough and at times feels dizzy. Writer did advise ER for evaluation of her symptoms, especially if ongoing and worsening. She adamantly declines this, states she only wants blood work to see what's wrong with her.     Patient also reports a bruise on R arm, not sure how it occurred. First noticed it about 2 days ago. It is tender to the touch. No swelling, no numbness/tingling. Able to move the arm and hand normally. No other symptoms. She states that it is quite large, maybe 5 cm.     Routing as 2nd Level Triage to please review and advise.    Additional Information   Negative: SEVERE difficulty breathing (e.g., struggling for each breath, speaks in single words, pulse > 120)   Negative: Breathing stopped and hasn't returned   Negative: Choking on something   Negative: Bluish (or gray) lips or face   Negative: Difficult to awaken or acting confused (e.g., disoriented, slurred speech)   Negative: Passed out (e.g., fainted, lost consciousness, blacked out and was not responding)   Negative: Wheezing started suddenly after medicine, an allergic food, or bee sting   Negative: Stridor (harsh sound while breathing in)   Negative: Slow, shallow and weak breathing   Negative: Sounds like a life-threatening emergency to the triager   Negative: Chest pain   Negative: Wheezing (high pitched whistling sound) and previous asthma attacks or use of asthma medicines   Negative: Breathing difficulty and within 14 days of COVID-19 EXPOSURE (close contact) with someone diagnosed with COVID-19 (e.g., COVID test positive)   Negative: Breathing difficulty and COVID-19 is widespread in " "the community   Negative: Breathing diffculty and only present when coughing   Negative: Breathing difficulty and only from stuffy nose   Negative: Breathing diffculty and only from stuffy nose or runny nose from common cold    Answer Assessment - Initial Assessment Questions  1. RESPIRATORY STATUS: \"Describe your breathing?\" (e.g., wheezing, shortness of breath, unable to speak, severe coughing)       Shortness of breath  2. ONSET: \"When did this breathing problem begin?\"       Ongoing for a couple weeks   3. PATTERN \"Does the difficult breathing come and go, or has it been constant since it started?\"       It comes and goes   4. SEVERITY: \"How bad is your breathing?\" (e.g., mild, moderate, severe)       Even with talking, sometimes unable to catch breath   5. RECURRENT SYMPTOM: \"Have you had difficulty breathing before?\" If Yes, ask: \"When was the last time?\" and \"What happened that time?\"       This is fairly new   6. CARDIAC HISTORY: \"Do you have any history of heart disease?\" (e.g., heart attack, angina, bypass surgery, angioplasty)       CHF   7. LUNG HISTORY: \"Do you have any history of lung disease?\"  (e.g., pulmonary embolus, asthma, emphysema)      No   8. CAUSE: \"What do you think is causing the breathing problem?\"       Unsure   9. OTHER SYMPTOMS: \"Do you have any other symptoms?\" (e.g., chest pain, cough, dizziness, fever, runny nose)      Chest feels \"heavy\" at times, dry cough and dizziness   10. O2 SATURATION MONITOR:  \"Do you use an oxygen saturation monitor (pulse oximeter) at home?\" If Yes, ask: \"What is your reading (oxygen level) today?\" \"What is your usual oxygen saturation reading?\" (e.g., 95%)        No   11. PREGNANCY: \"Is there any chance you are pregnant?\" \"When was your last menstrual period?\"        No   12. TRAVEL: \"Have you traveled out of the country in the last month?\" (e.g., travel history, exposures)        No    Protocols used: Breathing Difficulty-A-OH    GONSALO Ha RN  M " [facility] [address], [place]  [facility]

## 2024-11-27 NOTE — TELEPHONE ENCOUNTER
Patient was already referred to ER (see nurse triage encounter 11/26/24) and has been notified of disposition. She had previously been agreeable. Notified patient again of ER disposition per Dr. Dawkins, and she verbalized understanding.     GOSNALO Ha RN  [facility]

## 2024-11-27 NOTE — TELEPHONE ENCOUNTER
S-(situation): Patient called to discuss her sporadic symptoms.      B-(background): Patient stated it has happened for the last weak and a half.      A-(assessment): Patient stated that she is not having any symptoms at time of call. So unable to triage.      Patient reports that she will have some chest tightness, chest pain, difficulty breathing and dizziness. The symptoms come and go and will sometimes feel like she can not catch her breath and will need to take breathes between words.      When patient is having episodes she will sometimes have all the symptoms and sometimes only some. Last episode was yesterday and only felt like she couldn't catch her breath.      Episodes only feel like they last a few min then they resolve.      Patient is urinating like normal but does feel like she may be dehydrated, such as dizziness, fatigue, headaches and dry mouth.     Patient is taking her metoprolol and spironolactone and all other meds as ordered and has not missed any doses.       R-(recommendations): patient is scheduled with her PCP on 12/4/24 and wants to know if there is anything she should be doing in the meantime or if she is ok to wait for that visit.      Nvaeen Ravi RN  [facility]

## 2024-12-04 ENCOUNTER — ANCILLARY PROCEDURE (OUTPATIENT)
Dept: GENERAL RADIOLOGY | Facility: CLINIC | Age: 69
End: 2024-12-04
Attending: FAMILY MEDICINE
Payer: COMMERCIAL

## 2024-12-04 ENCOUNTER — OFFICE VISIT (OUTPATIENT)
Dept: FAMILY MEDICINE | Facility: CLINIC | Age: 69
End: 2024-12-04
Payer: COMMERCIAL

## 2024-12-04 VITALS
HEIGHT: 63 IN | HEART RATE: 55 BPM | WEIGHT: 130 LBS | TEMPERATURE: 96.3 F | BODY MASS INDEX: 23.04 KG/M2 | DIASTOLIC BLOOD PRESSURE: 52 MMHG | OXYGEN SATURATION: 100 % | SYSTOLIC BLOOD PRESSURE: 110 MMHG

## 2024-12-04 DIAGNOSIS — R00.2 PALPITATIONS: ICD-10-CM

## 2024-12-04 DIAGNOSIS — E11.22 TYPE 2 DIABETES MELLITUS WITH STAGE 3A CHRONIC KIDNEY DISEASE, WITHOUT LONG-TERM CURRENT USE OF INSULIN (H): ICD-10-CM

## 2024-12-04 DIAGNOSIS — N18.31 TYPE 2 DIABETES MELLITUS WITH STAGE 3A CHRONIC KIDNEY DISEASE, WITHOUT LONG-TERM CURRENT USE OF INSULIN (H): ICD-10-CM

## 2024-12-04 DIAGNOSIS — R06.09 DOE (DYSPNEA ON EXERTION): ICD-10-CM

## 2024-12-04 DIAGNOSIS — I10 HYPERTENSION, GOAL BELOW 140/90: ICD-10-CM

## 2024-12-04 DIAGNOSIS — E78.5 HYPERLIPIDEMIA LDL GOAL <70: ICD-10-CM

## 2024-12-04 DIAGNOSIS — Z95.1 S/P CABG (CORONARY ARTERY BYPASS GRAFT): ICD-10-CM

## 2024-12-04 DIAGNOSIS — I50.20 HFREF (HEART FAILURE WITH REDUCED EJECTION FRACTION) (H): ICD-10-CM

## 2024-12-04 DIAGNOSIS — Z95.810 ICD (IMPLANTABLE CARDIOVERTER-DEFIBRILLATOR) IN PLACE: ICD-10-CM

## 2024-12-04 LAB
ERYTHROCYTE [DISTWIDTH] IN BLOOD BY AUTOMATED COUNT: 14 % (ref 10–15)
EST. AVERAGE GLUCOSE BLD GHB EST-MCNC: 200 MG/DL
HBA1C MFR BLD: 8.6 % (ref 0–5.6)
HCT VFR BLD AUTO: 39.3 % (ref 35–47)
HGB BLD-MCNC: 12.2 G/DL (ref 11.7–15.7)
MCH RBC QN AUTO: 27.4 PG (ref 26.5–33)
MCHC RBC AUTO-ENTMCNC: 31 G/DL (ref 31.5–36.5)
MCV RBC AUTO: 88 FL (ref 78–100)
PLATELET # BLD AUTO: 246 10E3/UL (ref 150–450)
RBC # BLD AUTO: 4.46 10E6/UL (ref 3.8–5.2)
WBC # BLD AUTO: 8.1 10E3/UL (ref 4–11)

## 2024-12-04 PROCEDURE — 80048 BASIC METABOLIC PNL TOTAL CA: CPT | Performed by: FAMILY MEDICINE

## 2024-12-04 PROCEDURE — 83880 ASSAY OF NATRIURETIC PEPTIDE: CPT | Performed by: FAMILY MEDICINE

## 2024-12-04 PROCEDURE — 84443 ASSAY THYROID STIM HORMONE: CPT | Performed by: FAMILY MEDICINE

## 2024-12-04 PROCEDURE — 83036 HEMOGLOBIN GLYCOSYLATED A1C: CPT | Performed by: FAMILY MEDICINE

## 2024-12-04 PROCEDURE — 85379 FIBRIN DEGRADATION QUANT: CPT | Performed by: FAMILY MEDICINE

## 2024-12-04 PROCEDURE — 71046 X-RAY EXAM CHEST 2 VIEWS: CPT | Mod: TC | Performed by: RADIOLOGY

## 2024-12-04 PROCEDURE — 85027 COMPLETE CBC AUTOMATED: CPT | Performed by: FAMILY MEDICINE

## 2024-12-04 PROCEDURE — 36415 COLL VENOUS BLD VENIPUNCTURE: CPT | Performed by: FAMILY MEDICINE

## 2024-12-04 ASSESSMENT — ENCOUNTER SYMPTOMS: SHORTNESS OF BREATH: 1

## 2024-12-04 ASSESSMENT — PAIN SCALES - GENERAL: PAINLEVEL_OUTOF10: NO PAIN (0)

## 2024-12-04 NOTE — PROGRESS NOTES
Assessment & Plan     Palpitations   With activity   - EKG 12-lead complete w/read - Clinics  - Basic metabolic panel  (Ca, Cl, CO2, Creat, Gluc, K, Na, BUN); Future  - TSH with free T4 reflex; Future  - Adult Cardiology Eval  Referral; Future  - Basic metabolic panel  (Ca, Cl, CO2, Creat, Gluc, K, Na, BUN)  - TSH with free T4 reflex    JEFFERSON (dyspnea on exertion)  Pt gets visibly sob with activity  ? CHF vs check for anemia vs Other   Advised she make appointment with her Cardiologist  - EKG 12-lead complete w/read - Clinics  - XR Chest 2 Views; Future  - Adult Cardiology Eval  Referral; Future  - D dimer, quantitative; Future  - D dimer, quantitative  - CBC with platelets; Future  - CT Chest Pulmonary Embolism w Contrast; Future  - CBC with platelets    Type 2 diabetes mellitus with stage 3a chronic kidney disease, without long-term current use of insulin (H)  Pending   - HEMOGLOBIN A1C; Future  - HEMOGLOBIN A1C    HFrEF (heart failure with reduced ejection fraction) (H)  Last echo reviewed     S/P CABG (coronary artery bypass graft)      ICD (implantable cardioverter-defibrillator) in place  Gets pacer checks with Cardiology     Hyperlipidemia LDL goal <70  Stable     Hypertension, goal below 140/90  Stable         Advised go to Er if worse         Subjective   Jody is a 69 year old, presenting for the following health issues:  Shortness of Breath (Off and on past few month)        12/4/2024    11:20 AM   Additional Questions   Roomed by Linda     Via the Health Maintenance questionnaire, the patient has reported the following services have been completed -Eye Exam: maribel 2024-09-15, this information has been sent to the abstraction team.  History of Present Illness       Reason for visit:  Shortness of breath  Symptom onset:  More than a month  Symptom progression:  Worsening    She eats 2-3 servings of fruits and vegetables daily.She consumes 0 sweetened beverage(s) daily.She exercises with  "enough effort to increase her heart rate 9 or less minutes per day.  She exercises with enough effort to increase her heart rate 3 or less days per week.   She is taking medications regularly.       Pt has palpitations and JEFFERSON 2 month  No PND  No palpitations  Symptoms are better when she rests  No chest pain  It comes on when she is walking  She has a pacemaker and HFref  Last echo reviewed   Has gained some weight  No pedal edema             Review of Systems  CONSTITUTIONAL: NEGATIVE for fever, chills, change in weight  INTEGUMENTARY/SKIN: NEGATIVE for worrisome rashes, moles or lesions  ENT/MOUTH: NEGATIVE for ear, mouth and throat problems  RESP:as above, no cough  CV: as above   MUSCULOSKELETAL: NEGATIVE for significant arthralgias or myalgia  PSYCHIATRIC: NEGATIVE for changes in mood or affect      Objective    /52   Pulse 55   Temp (!) 96.3  F (35.7  C) (Temporal)   Ht 1.6 m (5' 2.99\")   Wt 59 kg (130 lb)   SpO2 100%   BMI 23.03 kg/m    Body mass index is 23.03 kg/m .  Physical Exam   GENERAL: alert and no distress  EYES: Eyes grossly normal to inspection  NECK: no adenopathy, no asymmetry, masses, or scars  RESP: lungs clear to auscultation - no rales, rhonchi or wheezes  CV: regular rate and rhythm, normal S1 S2, no S3 or S4, no murmur, click or rub, no peripheral edema  CV: regular rates and rhythm, normal S1 S2, no S3 or S4, no murmur, click or rub, peripheral pulses strong, no peripheral edema, and paced rhythm  ABDOMEN: soft, nontender, no hepatosplenomegaly, no masses and bowel sounds normal  MS: no gross musculoskeletal defects noted, no edema  SKIN: no suspicious lesions or rashes  PSYCH: mentation appears normal, affect normal/bright    Pending         Signed Electronically by: Roxanna Otoole MD  {Email feedback regarding this note to [email or web address]   :[id number]}  "

## 2024-12-04 NOTE — TELEPHONE ENCOUNTER
Date: 12/4/2024    Time of Call: 1:46 PM     Diagnosis:  HF      [ TORB ] Ordering provider: Belinda Colon CNP   Order:   -No HF concerns with SOB; pt should Follow-up with PCP.      Order received by: Ashley Ennis RN       Follow-up/additional notes: [employer] message sent to pt.

## 2024-12-04 NOTE — Clinical Note
Hello, I saw this pt today Has JEFFERSON and palpitations HGB is fine I have ordered other labs O2 sats were  good d at rest but with activity down to 88 % I am doing a CT scan  D dimer is pending  No pedal edema Should we get a stress test ? Another echo-last one was in August Still waiting for the labs  Can you see her  Thanks Roxanna Otoole MD   Routing refill request to provider for review/approval because:            Shannan NYN, RN, CPN

## 2024-12-05 ENCOUNTER — ANCILLARY PROCEDURE (OUTPATIENT)
Dept: CT IMAGING | Facility: CLINIC | Age: 69
End: 2024-12-05
Attending: FAMILY MEDICINE
Payer: COMMERCIAL

## 2024-12-05 DIAGNOSIS — R06.09 DOE (DYSPNEA ON EXERTION): ICD-10-CM

## 2024-12-05 LAB
ANION GAP SERPL CALCULATED.3IONS-SCNC: 12 MMOL/L (ref 7–15)
BUN SERPL-MCNC: 34.5 MG/DL (ref 8–23)
CALCIUM SERPL-MCNC: 9.3 MG/DL (ref 8.8–10.4)
CHLORIDE SERPL-SCNC: 103 MMOL/L (ref 98–107)
CREAT SERPL-MCNC: 1.02 MG/DL (ref 0.51–0.95)
D DIMER PPP FEU-MCNC: <0.27 UG/ML FEU (ref 0–0.5)
EGFRCR SERPLBLD CKD-EPI 2021: 59 ML/MIN/1.73M2
GLUCOSE SERPL-MCNC: 119 MG/DL (ref 70–99)
HCO3 SERPL-SCNC: 24 MMOL/L (ref 22–29)
NT-PROBNP SERPL-MCNC: 1994 PG/ML (ref 0–900)
POTASSIUM SERPL-SCNC: 4.6 MMOL/L (ref 3.4–5.3)
SODIUM SERPL-SCNC: 139 MMOL/L (ref 135–145)
TSH SERPL DL<=0.005 MIU/L-ACNC: 2.25 UIU/ML (ref 0.3–4.2)

## 2024-12-05 PROCEDURE — 71275 CT ANGIOGRAPHY CHEST: CPT | Mod: TC | Performed by: STUDENT IN AN ORGANIZED HEALTH CARE EDUCATION/TRAINING PROGRAM

## 2024-12-05 RX ORDER — IOPAMIDOL 755 MG/ML
57 INJECTION, SOLUTION INTRAVASCULAR ONCE
Status: COMPLETED | OUTPATIENT
Start: 2024-12-05 | End: 2024-12-05

## 2024-12-05 RX ORDER — FUROSEMIDE 40 MG/1
TABLET ORAL
Qty: 30 TABLET | Refills: 0 | Status: SHIPPED | OUTPATIENT
Start: 2024-12-05

## 2024-12-05 RX ADMIN — IOPAMIDOL 57 ML: 755 INJECTION, SOLUTION INTRAVASCULAR at 12:27

## 2024-12-06 LAB
CREAT BLD-MCNC: 1 MG/DL (ref 0.5–1)
EGFRCR SERPLBLD CKD-EPI 2021: 60 ML/MIN/1.73M2

## 2024-12-06 PROCEDURE — 82565 ASSAY OF CREATININE: CPT

## 2024-12-09 ENCOUNTER — APPOINTMENT (OUTPATIENT)
Dept: GENERAL RADIOLOGY | Facility: CLINIC | Age: 69
DRG: 292 | End: 2024-12-09
Attending: STUDENT IN AN ORGANIZED HEALTH CARE EDUCATION/TRAINING PROGRAM
Payer: COMMERCIAL

## 2024-12-09 ENCOUNTER — HOSPITAL ENCOUNTER (INPATIENT)
Facility: CLINIC | Age: 69
LOS: 1 days | Discharge: HOME | DRG: 292 | End: 2024-12-10
Attending: STUDENT IN AN ORGANIZED HEALTH CARE EDUCATION/TRAINING PROGRAM | Admitting: INTERNAL MEDICINE
Payer: COMMERCIAL

## 2024-12-09 ENCOUNTER — OFFICE VISIT (OUTPATIENT)
Dept: CARDIOLOGY | Facility: CLINIC | Age: 69
DRG: 292 | End: 2024-12-09
Attending: NURSE PRACTITIONER
Payer: COMMERCIAL

## 2024-12-09 ENCOUNTER — DOCUMENTATION ONLY (OUTPATIENT)
Dept: CARDIOLOGY | Facility: CLINIC | Age: 69
End: 2024-12-09
Payer: COMMERCIAL

## 2024-12-09 VITALS
DIASTOLIC BLOOD PRESSURE: 33 MMHG | BODY MASS INDEX: 22.1 KG/M2 | SYSTOLIC BLOOD PRESSURE: 75 MMHG | HEART RATE: 56 BPM | OXYGEN SATURATION: 97 % | WEIGHT: 124.7 LBS

## 2024-12-09 DIAGNOSIS — Z95.1 S/P CABG (CORONARY ARTERY BYPASS GRAFT): ICD-10-CM

## 2024-12-09 DIAGNOSIS — I25.5 ISCHEMIC CARDIOMYOPATHY: ICD-10-CM

## 2024-12-09 DIAGNOSIS — Z95.810 PRESENCE OF AUTOMATIC CARDIOVERTER/DEFIBRILLATOR (AICD): ICD-10-CM

## 2024-12-09 DIAGNOSIS — I25.10 ATHEROSCLEROSIS OF NATIVE CORONARY ARTERY OF NATIVE HEART WITHOUT ANGINA PECTORIS: ICD-10-CM

## 2024-12-09 DIAGNOSIS — R00.2 PALPITATIONS: ICD-10-CM

## 2024-12-09 DIAGNOSIS — I95.9 HYPOTENSION, UNSPECIFIED HYPOTENSION TYPE: ICD-10-CM

## 2024-12-09 DIAGNOSIS — R06.02 SOB (SHORTNESS OF BREATH): ICD-10-CM

## 2024-12-09 DIAGNOSIS — R07.9 CHEST PAIN, UNSPECIFIED TYPE: ICD-10-CM

## 2024-12-09 DIAGNOSIS — I50.22 CHRONIC SYSTOLIC HEART FAILURE (H): Primary | ICD-10-CM

## 2024-12-09 DIAGNOSIS — R06.09 DOE (DYSPNEA ON EXERTION): Primary | ICD-10-CM

## 2024-12-09 LAB
ALBUMIN SERPL BCG-MCNC: 3.9 G/DL (ref 3.5–5.2)
ALBUMIN SERPL BCG-MCNC: 4.1 G/DL (ref 3.5–5.2)
ALP SERPL-CCNC: 101 U/L (ref 40–150)
ALP SERPL-CCNC: 93 U/L (ref 40–150)
ALT SERPL W P-5'-P-CCNC: 35 U/L (ref 0–50)
ALT SERPL W P-5'-P-CCNC: 38 U/L (ref 0–50)
ANION GAP SERPL CALCULATED.3IONS-SCNC: 13 MMOL/L (ref 7–15)
ANION GAP SERPL CALCULATED.3IONS-SCNC: 15 MMOL/L (ref 7–15)
AST SERPL W P-5'-P-CCNC: 16 U/L (ref 0–45)
AST SERPL W P-5'-P-CCNC: 26 U/L (ref 0–45)
ATRIAL RATE - MUSE: 58 BPM
ATRIAL RATE - MUSE: 58 BPM
BASE EXCESS BLDV CALC-SCNC: -4.6 MMOL/L (ref -3–3)
BASOPHILS # BLD AUTO: 0.1 10E3/UL (ref 0–0.2)
BASOPHILS # BLD AUTO: NORMAL 10*3/UL
BASOPHILS NFR BLD AUTO: 1 %
BASOPHILS NFR BLD AUTO: NORMAL %
BILIRUB SERPL-MCNC: 0.7 MG/DL
BILIRUB SERPL-MCNC: 0.7 MG/DL
BUN SERPL-MCNC: 55.3 MG/DL (ref 8–23)
BUN SERPL-MCNC: 60.1 MG/DL (ref 8–23)
CALCIUM SERPL-MCNC: 9.2 MG/DL (ref 8.8–10.4)
CALCIUM SERPL-MCNC: 9.6 MG/DL (ref 8.8–10.4)
CHLORIDE SERPL-SCNC: 101 MMOL/L (ref 98–107)
CHLORIDE SERPL-SCNC: 98 MMOL/L (ref 98–107)
CREAT SERPL-MCNC: 1.56 MG/DL (ref 0.51–0.95)
CREAT SERPL-MCNC: 1.81 MG/DL (ref 0.51–0.95)
DIASTOLIC BLOOD PRESSURE - MUSE: NORMAL MMHG
DIASTOLIC BLOOD PRESSURE - MUSE: NORMAL MMHG
EGFRCR SERPLBLD CKD-EPI 2021: 30 ML/MIN/1.73M2
EGFRCR SERPLBLD CKD-EPI 2021: 35 ML/MIN/1.73M2
EOSINOPHIL # BLD AUTO: 0.4 10E3/UL (ref 0–0.7)
EOSINOPHIL # BLD AUTO: NORMAL 10*3/UL
EOSINOPHIL NFR BLD AUTO: 5 %
EOSINOPHIL NFR BLD AUTO: NORMAL %
ERYTHROCYTE [DISTWIDTH] IN BLOOD BY AUTOMATED COUNT: 13.7 % (ref 10–15)
ERYTHROCYTE [DISTWIDTH] IN BLOOD BY AUTOMATED COUNT: NORMAL %
GLUCOSE SERPL-MCNC: 110 MG/DL (ref 70–99)
GLUCOSE SERPL-MCNC: 204 MG/DL (ref 70–99)
HCO3 BLDV-SCNC: 23 MMOL/L (ref 21–28)
HCO3 SERPL-SCNC: 20 MMOL/L (ref 22–29)
HCO3 SERPL-SCNC: 21 MMOL/L (ref 22–29)
HCT VFR BLD AUTO: 43.4 % (ref 35–47)
HCT VFR BLD AUTO: NORMAL %
HGB BLD-MCNC: 13.8 G/DL (ref 11.7–15.7)
HGB BLD-MCNC: NORMAL G/DL
HOLD SPECIMEN: NORMAL
IMM GRANULOCYTES # BLD: 0 10E3/UL
IMM GRANULOCYTES # BLD: NORMAL 10*3/UL
IMM GRANULOCYTES NFR BLD: 0 %
IMM GRANULOCYTES NFR BLD: NORMAL %
INR PPP: 1.03 (ref 0.85–1.15)
INTERPRETATION ECG - MUSE: NORMAL
INTERPRETATION ECG - MUSE: NORMAL
LACTATE SERPL-SCNC: 1.2 MMOL/L (ref 0.7–2)
LYMPHOCYTES # BLD AUTO: 1.5 10E3/UL (ref 0.8–5.3)
LYMPHOCYTES # BLD AUTO: NORMAL 10*3/UL
LYMPHOCYTES NFR BLD AUTO: 16 %
LYMPHOCYTES NFR BLD AUTO: NORMAL %
MAGNESIUM SERPL-MCNC: 1.8 MG/DL (ref 1.7–2.3)
MCH RBC QN AUTO: 27.5 PG (ref 26.5–33)
MCH RBC QN AUTO: NORMAL PG
MCHC RBC AUTO-ENTMCNC: 31.8 G/DL (ref 31.5–36.5)
MCHC RBC AUTO-ENTMCNC: NORMAL G/DL
MCV RBC AUTO: 87 FL (ref 78–100)
MCV RBC AUTO: NORMAL FL
MONOCYTES # BLD AUTO: 1 10E3/UL (ref 0–1.3)
MONOCYTES # BLD AUTO: NORMAL 10*3/UL
MONOCYTES NFR BLD AUTO: 10 %
MONOCYTES NFR BLD AUTO: NORMAL %
NEUTROPHILS # BLD AUTO: 6.4 10E3/UL (ref 1.6–8.3)
NEUTROPHILS # BLD AUTO: NORMAL 10*3/UL
NEUTROPHILS NFR BLD AUTO: 68 %
NEUTROPHILS NFR BLD AUTO: NORMAL %
NRBC # BLD AUTO: 0 10E3/UL
NRBC # BLD AUTO: NORMAL 10*3/UL
NRBC BLD AUTO-RTO: 0 /100
NRBC BLD AUTO-RTO: NORMAL %
NT-PROBNP SERPL-MCNC: 506 PG/ML (ref 0–900)
O2/TOTAL GAS SETTING VFR VENT: 21 %
OXYHGB MFR BLDV: 18 % (ref 70–75)
P AXIS - MUSE: 58 DEGREES
P AXIS - MUSE: 59 DEGREES
PCO2 BLDV: 53 MM HG (ref 40–50)
PH BLDV: 7.25 [PH] (ref 7.32–7.43)
PLATELET # BLD AUTO: 259 10E3/UL (ref 150–450)
PLATELET # BLD AUTO: NORMAL 10*3/UL
PO2 BLDV: 17 MM HG (ref 25–47)
POTASSIUM SERPL-SCNC: 3.7 MMOL/L (ref 3.4–5.3)
POTASSIUM SERPL-SCNC: 4.7 MMOL/L (ref 3.4–5.3)
PR INTERVAL - MUSE: 122 MS
PR INTERVAL - MUSE: 126 MS
PROT SERPL-MCNC: 6.6 G/DL (ref 6.4–8.3)
PROT SERPL-MCNC: 7.2 G/DL (ref 6.4–8.3)
QRS DURATION - MUSE: 100 MS
QRS DURATION - MUSE: 90 MS
QT - MUSE: 474 MS
QT - MUSE: 494 MS
QTC - MUSE: 465 MS
QTC - MUSE: 484 MS
R AXIS - MUSE: 50 DEGREES
R AXIS - MUSE: 54 DEGREES
RBC # BLD AUTO: 5.01 10E6/UL (ref 3.8–5.2)
RBC # BLD AUTO: NORMAL 10*6/UL
SAO2 % BLDV: 18 % (ref 70–75)
SODIUM SERPL-SCNC: 133 MMOL/L (ref 135–145)
SODIUM SERPL-SCNC: 135 MMOL/L (ref 135–145)
SYSTOLIC BLOOD PRESSURE - MUSE: NORMAL MMHG
SYSTOLIC BLOOD PRESSURE - MUSE: NORMAL MMHG
T AXIS - MUSE: 58 DEGREES
T AXIS - MUSE: 78 DEGREES
TROPONIN T SERPL HS-MCNC: 15 NG/L
TROPONIN T SERPL HS-MCNC: 17 NG/L
TSH SERPL DL<=0.005 MIU/L-ACNC: 1.4 UIU/ML (ref 0.3–4.2)
TSH SERPL DL<=0.005 MIU/L-ACNC: 1.85 UIU/ML (ref 0.3–4.2)
VENTRICULAR RATE- MUSE: 58 BPM
VENTRICULAR RATE- MUSE: 58 BPM
WBC # BLD AUTO: 9.4 10E3/UL (ref 4–11)
WBC # BLD AUTO: NORMAL 10*3/UL

## 2024-12-09 PROCEDURE — 99223 1ST HOSP IP/OBS HIGH 75: CPT | Mod: AI | Performed by: INTERNAL MEDICINE

## 2024-12-09 PROCEDURE — G0463 HOSPITAL OUTPT CLINIC VISIT: HCPCS | Performed by: NURSE PRACTITIONER

## 2024-12-09 PROCEDURE — 250N000013 HC RX MED GY IP 250 OP 250 PS 637

## 2024-12-09 PROCEDURE — 93308 TTE F-UP OR LMTD: CPT | Mod: 26 | Performed by: STUDENT IN AN ORGANIZED HEALTH CARE EDUCATION/TRAINING PROGRAM

## 2024-12-09 PROCEDURE — 84450 TRANSFERASE (AST) (SGOT): CPT | Performed by: STUDENT IN AN ORGANIZED HEALTH CARE EDUCATION/TRAINING PROGRAM

## 2024-12-09 PROCEDURE — 85004 AUTOMATED DIFF WBC COUNT: CPT | Performed by: STUDENT IN AN ORGANIZED HEALTH CARE EDUCATION/TRAINING PROGRAM

## 2024-12-09 PROCEDURE — 71046 X-RAY EXAM CHEST 2 VIEWS: CPT

## 2024-12-09 PROCEDURE — 83880 ASSAY OF NATRIURETIC PEPTIDE: CPT

## 2024-12-09 PROCEDURE — 84155 ASSAY OF PROTEIN SERUM: CPT | Performed by: STUDENT IN AN ORGANIZED HEALTH CARE EDUCATION/TRAINING PROGRAM

## 2024-12-09 PROCEDURE — 99215 OFFICE O/P EST HI 40 MIN: CPT | Performed by: NURSE PRACTITIONER

## 2024-12-09 PROCEDURE — 84443 ASSAY THYROID STIM HORMONE: CPT | Performed by: STUDENT IN AN ORGANIZED HEALTH CARE EDUCATION/TRAINING PROGRAM

## 2024-12-09 PROCEDURE — 36415 COLL VENOUS BLD VENIPUNCTURE: CPT | Performed by: STUDENT IN AN ORGANIZED HEALTH CARE EDUCATION/TRAINING PROGRAM

## 2024-12-09 PROCEDURE — 93005 ELECTROCARDIOGRAM TRACING: CPT

## 2024-12-09 PROCEDURE — 87040 BLOOD CULTURE FOR BACTERIA: CPT | Performed by: STUDENT IN AN ORGANIZED HEALTH CARE EDUCATION/TRAINING PROGRAM

## 2024-12-09 PROCEDURE — 84484 ASSAY OF TROPONIN QUANT: CPT | Performed by: STUDENT IN AN ORGANIZED HEALTH CARE EDUCATION/TRAINING PROGRAM

## 2024-12-09 PROCEDURE — 93005 ELECTROCARDIOGRAM TRACING: CPT | Performed by: STUDENT IN AN ORGANIZED HEALTH CARE EDUCATION/TRAINING PROGRAM

## 2024-12-09 PROCEDURE — 258N000003 HC RX IP 258 OP 636

## 2024-12-09 PROCEDURE — 96361 HYDRATE IV INFUSION ADD-ON: CPT | Performed by: STUDENT IN AN ORGANIZED HEALTH CARE EDUCATION/TRAINING PROGRAM

## 2024-12-09 PROCEDURE — 96360 HYDRATION IV INFUSION INIT: CPT | Performed by: STUDENT IN AN ORGANIZED HEALTH CARE EDUCATION/TRAINING PROGRAM

## 2024-12-09 PROCEDURE — 83605 ASSAY OF LACTIC ACID: CPT | Performed by: STUDENT IN AN ORGANIZED HEALTH CARE EDUCATION/TRAINING PROGRAM

## 2024-12-09 PROCEDURE — 93005 ELECTROCARDIOGRAM TRACING: CPT | Mod: 76 | Performed by: STUDENT IN AN ORGANIZED HEALTH CARE EDUCATION/TRAINING PROGRAM

## 2024-12-09 PROCEDURE — 71046 X-RAY EXAM CHEST 2 VIEWS: CPT | Mod: 26 | Performed by: RADIOLOGY

## 2024-12-09 PROCEDURE — 120N000002 HC R&B MED SURG/OB UMMC

## 2024-12-09 PROCEDURE — 250N000011 HC RX IP 250 OP 636: Performed by: STUDENT IN AN ORGANIZED HEALTH CARE EDUCATION/TRAINING PROGRAM

## 2024-12-09 PROCEDURE — 93010 ELECTROCARDIOGRAM REPORT: CPT | Mod: 59 | Performed by: STUDENT IN AN ORGANIZED HEALTH CARE EDUCATION/TRAINING PROGRAM

## 2024-12-09 PROCEDURE — 85610 PROTHROMBIN TIME: CPT | Performed by: STUDENT IN AN ORGANIZED HEALTH CARE EDUCATION/TRAINING PROGRAM

## 2024-12-09 PROCEDURE — 99285 EMERGENCY DEPT VISIT HI MDM: CPT | Mod: 25 | Performed by: STUDENT IN AN ORGANIZED HEALTH CARE EDUCATION/TRAINING PROGRAM

## 2024-12-09 PROCEDURE — 82805 BLOOD GASES W/O2 SATURATION: CPT | Performed by: STUDENT IN AN ORGANIZED HEALTH CARE EDUCATION/TRAINING PROGRAM

## 2024-12-09 PROCEDURE — 85041 AUTOMATED RBC COUNT: CPT | Performed by: STUDENT IN AN ORGANIZED HEALTH CARE EDUCATION/TRAINING PROGRAM

## 2024-12-09 PROCEDURE — 83735 ASSAY OF MAGNESIUM: CPT

## 2024-12-09 PROCEDURE — 250N000013 HC RX MED GY IP 250 OP 250 PS 637: Performed by: STUDENT IN AN ORGANIZED HEALTH CARE EDUCATION/TRAINING PROGRAM

## 2024-12-09 PROCEDURE — 258N000003 HC RX IP 258 OP 636: Performed by: STUDENT IN AN ORGANIZED HEALTH CARE EDUCATION/TRAINING PROGRAM

## 2024-12-09 PROCEDURE — 87635 SARS-COV-2 COVID-19 AMP PRB: CPT

## 2024-12-09 PROCEDURE — 93308 TTE F-UP OR LMTD: CPT | Performed by: STUDENT IN AN ORGANIZED HEALTH CARE EDUCATION/TRAINING PROGRAM

## 2024-12-09 PROCEDURE — 84460 ALANINE AMINO (ALT) (SGPT): CPT | Performed by: STUDENT IN AN ORGANIZED HEALTH CARE EDUCATION/TRAINING PROGRAM

## 2024-12-09 RX ORDER — SPIRONOLACTONE 25 MG
12.5 TABLET ORAL DAILY
Status: DISCONTINUED | OUTPATIENT
Start: 2024-12-10 | End: 2024-12-10 | Stop reason: HOSPADM

## 2024-12-09 RX ORDER — METOPROLOL SUCCINATE 25 MG/1
75 TABLET, EXTENDED RELEASE ORAL DAILY
Status: DISCONTINUED | OUTPATIENT
Start: 2024-12-10 | End: 2024-12-10 | Stop reason: HOSPADM

## 2024-12-09 RX ORDER — MAGNESIUM OXIDE 400 MG/1
400 TABLET ORAL EVERY 4 HOURS
Status: COMPLETED | OUTPATIENT
Start: 2024-12-09 | End: 2024-12-10

## 2024-12-09 RX ORDER — METFORMIN HYDROCHLORIDE 500 MG/1
500 TABLET, EXTENDED RELEASE ORAL EVERY MORNING
COMMUNITY

## 2024-12-09 RX ORDER — AMOXICILLIN 250 MG
2 CAPSULE ORAL 2 TIMES DAILY PRN
Status: DISCONTINUED | OUTPATIENT
Start: 2024-12-09 | End: 2024-12-10 | Stop reason: HOSPADM

## 2024-12-09 RX ORDER — CALCIUM CARBONATE 500 MG/1
1000 TABLET, CHEWABLE ORAL 4 TIMES DAILY PRN
Status: DISCONTINUED | OUTPATIENT
Start: 2024-12-09 | End: 2024-12-10 | Stop reason: HOSPADM

## 2024-12-09 RX ORDER — NICOTINE POLACRILEX 4 MG
15-30 LOZENGE BUCCAL
Status: DISCONTINUED | OUTPATIENT
Start: 2024-12-09 | End: 2024-12-10 | Stop reason: HOSPADM

## 2024-12-09 RX ORDER — SPIRONOLACTONE 25 MG
12.5 TABLET ORAL DAILY
Status: DISCONTINUED | OUTPATIENT
Start: 2024-12-09 | End: 2024-12-09

## 2024-12-09 RX ORDER — ATORVASTATIN CALCIUM 40 MG/1
80 TABLET, FILM COATED ORAL DAILY
Status: DISCONTINUED | OUTPATIENT
Start: 2024-12-09 | End: 2024-12-09

## 2024-12-09 RX ORDER — DEXTROSE MONOHYDRATE 25 G/50ML
25-50 INJECTION, SOLUTION INTRAVENOUS
Status: DISCONTINUED | OUTPATIENT
Start: 2024-12-09 | End: 2024-12-10 | Stop reason: HOSPADM

## 2024-12-09 RX ORDER — POLYETHYLENE GLYCOL 3350 17 G/17G
17 POWDER, FOR SOLUTION ORAL DAILY PRN
Status: DISCONTINUED | OUTPATIENT
Start: 2024-12-09 | End: 2024-12-10 | Stop reason: HOSPADM

## 2024-12-09 RX ORDER — POTASSIUM CHLORIDE 1.5 G/1.58G
20 POWDER, FOR SOLUTION ORAL ONCE
Status: COMPLETED | OUTPATIENT
Start: 2024-12-09 | End: 2024-12-09

## 2024-12-09 RX ORDER — ACETAMINOPHEN 325 MG/1
650 TABLET ORAL EVERY 6 HOURS PRN
Status: DISCONTINUED | OUTPATIENT
Start: 2024-12-09 | End: 2024-12-10 | Stop reason: HOSPADM

## 2024-12-09 RX ORDER — HEPARIN SODIUM 5000 [USP'U]/.5ML
5000 INJECTION, SOLUTION INTRAVENOUS; SUBCUTANEOUS EVERY 8 HOURS
Status: DISCONTINUED | OUTPATIENT
Start: 2024-12-09 | End: 2024-12-10 | Stop reason: HOSPADM

## 2024-12-09 RX ORDER — ATORVASTATIN CALCIUM 40 MG/1
80 TABLET, FILM COATED ORAL DAILY
Status: DISCONTINUED | OUTPATIENT
Start: 2024-12-10 | End: 2024-12-10 | Stop reason: HOSPADM

## 2024-12-09 RX ORDER — FLUOXETINE 40 MG/1
40 CAPSULE ORAL DAILY
Status: DISCONTINUED | OUTPATIENT
Start: 2024-12-09 | End: 2024-12-09

## 2024-12-09 RX ORDER — AMOXICILLIN 250 MG
1 CAPSULE ORAL 2 TIMES DAILY PRN
Status: DISCONTINUED | OUTPATIENT
Start: 2024-12-09 | End: 2024-12-10 | Stop reason: HOSPADM

## 2024-12-09 RX ORDER — METOPROLOL SUCCINATE 25 MG/1
75 TABLET, EXTENDED RELEASE ORAL DAILY
Status: DISCONTINUED | OUTPATIENT
Start: 2024-12-09 | End: 2024-12-09

## 2024-12-09 RX ORDER — METFORMIN HYDROCHLORIDE 500 MG/1
500 TABLET, EXTENDED RELEASE ORAL
COMMUNITY

## 2024-12-09 RX ORDER — ASPIRIN 81 MG/1
81 TABLET ORAL EVERY EVENING
Status: DISCONTINUED | OUTPATIENT
Start: 2024-12-09 | End: 2024-12-10 | Stop reason: HOSPADM

## 2024-12-09 RX ORDER — FLUOXETINE 40 MG/1
40 CAPSULE ORAL DAILY
Status: DISCONTINUED | OUTPATIENT
Start: 2024-12-10 | End: 2024-12-10 | Stop reason: HOSPADM

## 2024-12-09 RX ORDER — LIDOCAINE 40 MG/G
CREAM TOPICAL
Status: DISCONTINUED | OUTPATIENT
Start: 2024-12-09 | End: 2024-12-10 | Stop reason: HOSPADM

## 2024-12-09 RX ADMIN — HEPARIN SODIUM 5000 UNITS: 5000 INJECTION, SOLUTION INTRAVENOUS; SUBCUTANEOUS at 19:45

## 2024-12-09 RX ADMIN — SODIUM CHLORIDE 1000 ML: 9 INJECTION, SOLUTION INTRAVENOUS at 22:18

## 2024-12-09 RX ADMIN — SODIUM CHLORIDE 1000 ML: 9 INJECTION, SOLUTION INTRAVENOUS at 15:15

## 2024-12-09 RX ADMIN — ASPIRIN 81 MG: 81 TABLET ORAL at 19:45

## 2024-12-09 RX ADMIN — MAGNESIUM OXIDE TAB 400 MG (241.3 MG ELEMENTAL MG) 400 MG: 400 (241.3 MG) TAB at 23:48

## 2024-12-09 RX ADMIN — POTASSIUM CHLORIDE 20 MEQ: 1.5 POWDER, FOR SOLUTION ORAL at 23:48

## 2024-12-09 ASSESSMENT — ACTIVITIES OF DAILY LIVING (ADL)
ADLS_ACUITY_SCORE: 52

## 2024-12-09 ASSESSMENT — PAIN SCALES - GENERAL: PAINLEVEL_OUTOF10: NO PAIN (0)

## 2024-12-09 NOTE — LETTER
12/9/2024      RE: Marianne Monroy  [address]  [address] [place] [id number]       Dear Colleague,    Thank you for the opportunity to participate in the care of your patient, Marianne Monroy, at the [facility] [place] at [facility]. Please see a copy of my visit note below.            Chief Complaint: dizziness and SOB      HPI:  Marianne Monroy is a 69 year old female with a history of ischemic cardiomyopathy, CAD s/p 4V CABG, HTN, HLD, VT s/p ICD. Follows closely in [facility] for GDMT management.     In summary, patient presented with wide-complex tachycardia and heart failure symptoms in May 2022. Coronary angiogram showed severe three-vessel disease and had 4V bypass surgery in May 2022 with left internal mammary artery to left anterior descending artery, saphenous vein graft to distal right coronary artery, saphenous vein graft to ramus intermedius artery, saphenous vein graft to obtuse marginal artery.  As patient also had sustained VT post bypass surgery patient had an ICD placement.  Patient's EF was 20-25 prior to bypass surgery.  This improved to 35-40 after bypass and ICD placement. She has continued to have exertional dyspnea and postural lightheadedness requiring reduction in GDMT.    Patient presents with her two children. She is feeling poorly today. She reports a two month history of exertional shortness of breath. In the beginning she was experiencing episodic SOB with overexertion, now she is frequently SOB, with minimal exertion. Also dizzy and very fatigued. Has to hold onto the wall when walking to the bathroom. No syncopal Episodes. Her children say the last three days she has really declined, sometimes SOB mid conversation. She was seen by her PCP last week who increased lasix with no improvement in symptoms. CT PE also obtained showing no PE, CHF or PNA. She has had a dry cough. No fevers or chills. She denies any  chest pain or pressure. Did not have typical symptoms with her MI - she had emesis, and was feeling poorly.     Past Medical History:  Ischemic cardiomyopathy  Coronary artery disease status post CABG  Hypertension  Type 2 diabetes mellitus, non-insulin-dependent  - No prior history of  dyslipidemia, TIA/stroke, vascular aneurysms, PAD  Past Medical History:   Diagnosis Date     Abnormal Pap smear of cervix ~2000    repeat pap was normal     Allergic rhinitis, cause unspecified      Anxiety state, unspecified     panic episodes     C. difficile colitis      CAD (coronary artery disease)      Congestive heart failure (H)      Depressive disorder      Diabetes (H)      HFrEF (heart failure with reduced ejection fraction) (H)      ICD (implantable cardioverter-defibrillator) in place      Osteopenia      Unspecified essential hypertension        Past Surgical History:  CABG 05/24/22 by Ulises Desai at [facility]: LIMA-LAD, SVG-dRCA, SVG-RI, SVG-OM  Past Surgical History:   Procedure Laterality Date     BYPASS GRAFT ARTERY CORONARY N/A 05/24/2022    Procedure: Median sternotomy.  Intraoperative transesophageal echocardiogram per anesthesia.  Left internal mammary artery harvest.  Right and left endoscopically harvested  greater saphenouse vein.  Cardiopulmonary Bypass.  Coronary Artery Bypass Grafts x4.;  Surgeon: Ulises Desai MD;  Location: [facility] OR     COLONOSCOPY N/A 02/16/2023    Procedure: COLONOSCOPY, WITH POLYPECTOMY AND BIOPSY;  Surgeon: Nikhil Lees MD;  Location:  GI     CV CORONARY ANGIOGRAM  05/19/2022    Procedure: CV CORONARY ANGIOGRAM;  Surgeon: Huseyin Vogel MD;  Location:  HEART CARDIAC CATH LAB     CV CORONARY ANGIOGRAM  05/19/2022    Procedure: ;  Surgeon: Husyein Vogel MD;  Location:  HEART CARDIAC CATH LAB     EP ICD INSERT SINGLE N/A 10/04/2022    Procedure: Implantable Cardioverter Defibrillator Device & Lead Implant Single or Dual;  Surgeon: Cristhian  MD Too;  Location:  HEART CARDIAC CATH LAB     ESOPHAGOSCOPY, GASTROSCOPY, DUODENOSCOPY (EGD), COMBINED N/A 02/16/2023    Procedure: ESOPHAGOGASTRODUODENOSCOPY, WITH BIOPSY;  Surgeon: Nikhil Lees MD;  Location:  GI     PICC DOUBLE LUMEN PLACEMENT Right 05/27/2022    Failed PICC attempt right arm basilic vein     PICC INSERTION - TRIPLE LUMEN Left 05/28/2022    left basilic 5fr tl,[id number] cm       Drug History:  Home cardiac meds: Aspirin 81 mg once daily, atorvastatin 80 mg once daily, empagliflozin 25 mg daily,  metoprolol succinate 75 mg daily, sacubitril-valsartan  mg 0.5/1 tablet daily , spironolactone 12.5 mg daily.  Current Outpatient Medications   Medication Sig Dispense Refill     ALPRAZolam (XANAX) 0.25 MG tablet Take 1 tablet (0.25 mg) by mouth 3 times daily as needed for anxiety 10 tablet 0     aspirin 81 MG EC tablet Take 81 mg by mouth every evening       atorvastatin (LIPITOR) 80 MG tablet TAKE 1 TABLET BY MOUTH DAILY 90 tablet 0     blood glucose (ONETOUCH VERIO IQ) test strip Use to test blood sugar 2 times daily or as directed. 200 strip 1     blood glucose monitoring (ONETOUCH VERIO) meter device kit Use to test blood sugar 2 times daily or as directed. 1 kit 0     Continuous Blood Gluc Sensor (FREESTYLE VANESSA 14 DAY SENSOR) MISC Change every 14 days. 2 each 0     empagliflozin (JARDIANCE) 25 MG TABS tablet Take 1 tablet (25 mg) by mouth daily 90 tablet 1     FLUoxetine (PROZAC) 20 MG capsule Take 2 capsules (40 mg) by mouth daily 180 capsule 1     furosemide (LASIX) 40 MG tablet 1 tablet BID x 3 days and the 1 daily 30 tablet 0     glipiZIDE (GLUCOTROL XL) 2.5 MG 24 hr tablet 3 tablets daily with Food 270 tablet 0     metFORMIN (GLUCOPHAGE XR) 500 MG 24 hr tablet Take 3 tablets (1,500 mg) by mouth daily (with dinner) 270 tablet 1     metoprolol succinate ER (TOPROL XL) 50 MG 24 hr tablet Take 1.5 tablets (75 mg) by mouth daily 145 tablet 3     MULTIPLE VITAMIN OR TABS Take  by mouth every morning       Multiple Vitamins-Minerals (PRESERVISION AREDS PO) Take 2 tablets by mouth daily       sacubitril-valsartan (ENTRESTO)  MG per tablet Take 1/2 tablet in AM, take 1 tablet in  tablet 3     spironolactone (ALDACTONE) 25 MG tablet Take 0.5 tablets (12.5 mg) by mouth daily 45 tablet 3     traZODone (DESYREL) 50 MG tablet Take 0.5 tablets (25 mg) by mouth at bedtime 15 tablet 3     VITAMIN D PO Take by mouth.           Family History:    Family History   Problem Relation Age of Onset     Diabetes Mother         hypertension, alive at 83     C.A.D. Mother      Coronary Artery Disease Mother      Cancer Father         lung cancer, smoker.   at age 53     Family History Negative Sister      Osteoporosis Sister      Anesthesia Reaction No family hx of      Deep Vein Thrombosis (DVT) No family hx of        Social History:    Social History     Tobacco Use     Smoking status: Never     Smokeless tobacco: Never   Substance Use Topics     Alcohol use: Yes     Comment: 1 drink a week       Allergies   Allergen Reactions     Effexor [Venlafaxine Hydrochloride]      Tachycardia, makes her extremely jittery--cant sit down, has to keep moving constantly     Latex      Reported by patient         Physical Examination:  Vitals: There were no vitals taken for this visit.  BMI= There is no height or weight on file to calculate BMI.    GENERAL: Healthy, alert and no distress  Eyes: No xanthelasmas.  NECK: No palpable neck masses/goitre and no evidence of retrosternal goitre.   ENT: Moist mucosal membranes.  RESPIRATORY: No signs of resp distress. Lungs CTAB.  CARDIOVASCULAR: ICD site appears healthy.  Sternotomy scar appears to be healing well without any drainage or open areas in the wound.  No JVD, regular, normal S1+S2 without added sounds or murmurs.  EXTREMITIES: Warm, well-perfused, no edema.   NEUROLOGY: GCS 15/15, no focal deficits.  SKIN: No ecchymoses, no rashes.  PSYCH: Cooperative,  pleasant affect.       Investigations:  I personally viewed and interpreted the following investigations:    Labs:    CBC RESULTS:  Lab Results   Component Value Date    WBC 8.1 12/04/2024    WBC 7.1 09/09/2020    RBC 4.46 12/04/2024    RBC 4.57 09/09/2020    HGB 12.2 12/04/2024    HGB 13.3 09/09/2020    HCT 39.3 12/04/2024    HCT 41.1 09/09/2020    MCV 88 12/04/2024    MCV 90 09/09/2020    MCH 27.4 12/04/2024    MCH 29.1 09/09/2020    MCHC 31.0 (L) 12/04/2024    MCHC 32.4 09/09/2020    RDW 14.0 12/04/2024    RDW 12.5 09/09/2020     12/04/2024     09/09/2020       CMP RESULTS:  Lab Results   Component Value Date     12/04/2024     01/07/2021    POTASSIUM 4.6 12/04/2024    POTASSIUM 5.0 02/07/2023    POTASSIUM 4.8 01/07/2021    CHLORIDE 103 12/04/2024    CHLORIDE 108 02/07/2023    CHLORIDE 106 01/07/2021    CO2 24 12/04/2024    CO2 29 02/07/2023    CO2 28 01/07/2021    ANIONGAP 12 12/04/2024    ANIONGAP 3 02/07/2023    ANIONGAP 7 01/07/2021     (H) 12/04/2024     (H) 02/16/2023     (H) 02/07/2023     (H) 01/07/2021    BUN 34.5 (H) 12/04/2024    BUN 25 02/07/2023    BUN 19 01/07/2021    CR 1.0 12/06/2024    CR 1.02 (H) 12/04/2024    CR 0.90 01/07/2021    GFRESTIMATED 60 (L) 12/06/2024    GFRESTIMATED 66 01/07/2021    GFRESTBLACK 77 01/07/2021    POONAM 9.3 12/04/2024    POONAM 9.5 01/07/2021    BILITOTAL 0.3 06/26/2024    BILITOTAL 0.4 09/09/2020    ALBUMIN 4.2 06/26/2024    ALBUMIN 3.7 07/06/2022    ALBUMIN 3.7 09/09/2020    ALKPHOS 71 06/26/2024    ALKPHOS 90 09/09/2020    ALT 26 06/26/2024    ALT 33 09/09/2020    AST 22 06/26/2024    AST 14 09/09/2020        INR RESULTS:  Lab Results   Component Value Date    INR 1.29 (H) 05/25/2022       BNP RESULTS:  Lab Results   Component Value Date    NTBNPI 664 05/18/2022         LIPIDS:  Lab Results   Component Value Date    CHOL 104 03/01/2023    CHOL 144 05/18/2021     Lab Results   Component Value Date    HDL 52 03/01/2023     HDL 50 05/18/2021     Lab Results   Component Value Date    LDL 30 03/01/2023    LDL 59 05/18/2021     Lab Results   Component Value Date    TRIG 110 03/01/2023    TRIG 173 05/18/2021     Lab Results   Component Value Date    CHOLHDLRATIO 6.0 04/26/2012       Recent Tests:    EKG today:   Possible STEMI with ST elevations in inferior and anterior leads - unchanged when compared to prior     Electrocardiogram (07/06/2022):  Normal sinus rhythm, ventricular 90 bpm, LVH, inferior Q waves.  QRS 94.    Echocardiogram (08/10/2023):   Left ventricular function is decreased. The ejection fraction is 40-45% (mildly reduced).  Global right ventricular function is normal.  Mild mitral insufficiency is present.  Mild to moderate tricuspid insufficiency is present.  IVC diameter <2.1 cm collapsing >50% with sniff suggests a normal RA pressure  of 3 mmHg.  No pericardial effusion is present.  Compared to prior, LV fxn is unchanged to slightly improved.      Assessment and Plan:   Marianne Monroy is a 69 year old female with a history of ischemic cardiomyopathy, CAD s/p 4V CABG, HTN, HLD, VT s/p ICD. Follows closely in [facility] for GDMT management.     Patient with two months of progressive dyspnea, now SOB with walking short distances, even with conversation, also extreme fatigue and dizziness. She is hypotensive in clinic today 70s/40s. EKG shows possible inferior STEMI, but unchanged from prior so unlikely. Kids are with her and very concerned about her rapid decline. Recent CT PE study negative. Does not appear volume overloaded. Has dry cough but no fever, chills or crackles.     Differential includes ACS, heart failure, PH, PNA? Sending to ER for evaluate of hypotension, shortness of breath and EKG changes. Recommend STAT ECHO, troponin, comprehensive labs, infectious work-up.  Unlikely true STEMI but may need CORS and RHC during hospitalization.     RTC: post hospitalization     OSCAR Suggs, CNP  Structural  Heart Nurse Practitioner  [facility]  Clinic: [phone]  Pager: [phone]      Patient Care Team:  Roxanna Otoole MD as PCP - General (Family Medicine)  Roxanna Otoole MD as Assigned PCP  Regina Sutherland as Diabetes Educator (Dietitian, Registered)  Dasia Amin, RN as Specialty Care Coordinator (Cardiology)  Belinda Colon APRN CNP as Nurse Practitioner (Cardiovascular Disease)  Kris Steve MD as MD (Cardiovascular Disease)  Belinda Colon APRN CNP as Assigned Heart and Vascular Provider  Evette Castro MD as MD (Infectious Diseases)  Sarah Isidro, IKER as Specialty Care Coordinator (Cardiology)  Too Morrow MD as MD (Cardiovascular Disease)  Cherie Lira, OSCAR CNP as Nurse Practitioner (Cardiovascular Disease)  No Ref-Primary, Physician  Libia Santoro, Matt as Pharmacist (Pharmacist)  Latrell Valentino [facility] as Pharmacist (Pharmacist Clinician- Clinical Pharmacy Specialist)  Migdalia Swenson [facility] as Pharmacist (Pharmacist)  Hernando León [facility] as Pharmacist (Pharmacist)      Please do not hesitate to contact me if you have any questions/concerns.     Sincerely,     Kanwal De Dios NP

## 2024-12-09 NOTE — NURSING NOTE
Chief Complaint   Patient presents with    Follow Up     RETURN CARDIOLOGY     Vitals were taken, medications reconciled, and EKG was performed.    Shahram Hernandez, EMT  1:48 PM

## 2024-12-09 NOTE — ED PROVIDER NOTES
ED Provider Note  [facility]      History     Chief Complaint   Patient presents with    Shortness of Breath    Hypotension     HPI  Marianne Monroy is a 69 year old female with a history of ischemic cardiomyopathy, CAD s/p 4V CABG, HTN, HLD, VT s/p ICD who presents to the emergency department via EMS from [place] for shortness of breath..  Patient has not been able to catch her breath, and it is worse when she goes up steps or moves too quickly.  Patient states she has been tired and has been sleeping all day long.  Patient denies chest pain.          Physical Exam   BP: 107/50  Pulse: 54  Temp: 98.4  F (36.9  C)  Resp: 16  SpO2: 98 %  Physical Exam  Constitutional:       General: She is not in acute distress.     Appearance: Normal appearance. She is not diaphoretic.   HENT:      Head: Atraumatic.      Mouth/Throat:      Mouth: Mucous membranes are moist.   Eyes:      General: No scleral icterus.     Conjunctiva/sclera: Conjunctivae normal.   Cardiovascular:      Rate and Rhythm: Normal rate.      Heart sounds: Normal heart sounds. No murmur heard.  Pulmonary:      Effort: No respiratory distress.      Breath sounds: Normal breath sounds. No stridor. No wheezing, rhonchi or rales.   Chest:      Chest wall: No tenderness.   Abdominal:      General: Abdomen is flat.   Musculoskeletal:      Cervical back: Neck supple.   Skin:     General: Skin is warm.      Findings: No rash.   Neurological:      Mental Status: She is alert.           ED Course, Procedures, & Data      Procedures  Results for orders placed during the hospital encounter of 12/09/24    POC US ECHO LIMITED    Impression  Limited Bedside Cardiac Ultrasound, performed and interpreted by me.  Indication: Shortness of Breath.  Parasternal long axis, parasternal short axis, and apical 4 chamber views were acquired.  Image quality was satisfactory.    Findings:  Decreased ejection fraction  Chambers do not appear dilated.  There is  no evidence of free fluid within the pericardium.      IMPRESSION: Abnormal limited cardiac ultrasound showing decreased ejection fraction, no B-lines.  No pericardial effusion.            EKG Interpretation:      Interpreted by Jaswinder Alvarez MD  Time reviewed: 1515  Symptoms at time of EKG: Shortness of breath   Rhythm: sinus bradycardia  Rate: 58 bpm  Axis: normal  Ectopy: none  Conduction: normal  ST Segments/ T Waves: No ST-T wave changes  Q Waves: none  Comparison to prior: New R prime in II, III, and avF    Clinical Impression: sinus bradycardia with new R prime in II, III, and avF with no acute ischemia         Results for orders placed or performed during the hospital encounter of 12/09/24   POC US ECHO LIMITED     Status: None    Impression    Limited Bedside Cardiac Ultrasound, performed and interpreted by me.   Indication: Shortness of Breath.  Parasternal long axis, parasternal short axis, and apical 4 chamber views were acquired.   Image quality was satisfactory.    Findings:    Decreased ejection fraction  Chambers do not appear dilated.  There is no evidence of free fluid within the pericardium.      IMPRESSION: Abnormal limited cardiac ultrasound showing decreased ejection fraction, no B-lines.  No pericardial effusion.      INR     Status: Normal   Result Value Ref Range    INR 1.03 0.85 - 1.15   Comprehensive metabolic panel     Status: Abnormal   Result Value Ref Range    Sodium 133 (L) 135 - 145 mmol/L    Potassium 4.7 3.4 - 5.3 mmol/L    Carbon Dioxide (CO2) 20 (L) 22 - 29 mmol/L    Anion Gap 15 7 - 15 mmol/L    Urea Nitrogen 60.1 (H) 8.0 - 23.0 mg/dL    Creatinine 1.81 (H) 0.51 - 0.95 mg/dL    GFR Estimate 30 (L) >60 mL/min/1.73m2    Calcium 9.6 8.8 - 10.4 mg/dL    Chloride 98 98 - 107 mmol/L    Glucose 204 (H) 70 - 99 mg/dL    Alkaline Phosphatase 101 40 - 150 U/L    AST 26 0 - 45 U/L    ALT 38 0 - 50 U/L    Protein Total 7.2 6.4 - 8.3 g/dL    Albumin 4.1 3.5 - 5.2 g/dL    Bilirubin Total  0.7 <=1.2 mg/dL   Lactic acid whole blood with 1x repeat in 2 hr when >2     Status: Normal   Result Value Ref Range    Lactic Acid, Initial 1.2 0.7 - 2.0 mmol/L   Troponin T, High Sensitivity     Status: Abnormal   Result Value Ref Range    Troponin T, High Sensitivity 17 (H) <=14 ng/L   TSH with free T4 reflex     Status: Normal   Result Value Ref Range    TSH 1.85 0.30 - 4.20 uIU/mL   Blood gas venous     Status: Abnormal   Result Value Ref Range    pH Venous 7.25 (L) 7.32 - 7.43    pCO2 Venous 53 (H) 40 - 50 mm Hg    pO2 Venous 17 (L) 25 - 47 mm Hg    Bicarbonate Venous 23 21 - 28 mmol/L    Base Excess/Deficit Venous -4.6 (L) -3.0 - 3.0 mmol/L    FIO2 21     Oxyhemoglobin Venous 18 (L) 70 - 75 %    O2 Sat, Venous 18.0 (L) 70.0 - 75.0 %    Narrative    In healthy individuals, oxyhemoglobin (O2Hb) and oxygen saturation (SO2) are approximately equal. In the presence of dyshemoglobins, oxyhemoglobin can be considerably lower than oxygen saturation.   CBC with platelets and differential     Status: None   Result Value Ref Range    WBC Count 9.4 4.0 - 11.0 10e3/uL    RBC Count 5.01 3.80 - 5.20 10e6/uL    Hemoglobin 13.8 11.7 - 15.7 g/dL    Hematocrit 43.4 35.0 - 47.0 %    MCV 87 78 - 100 fL    MCH 27.5 26.5 - 33.0 pg    MCHC 31.8 31.5 - 36.5 g/dL    RDW 13.7 10.0 - 15.0 %    Platelet Count 259 150 - 450 10e3/uL    % Neutrophils 68 %    % Lymphocytes 16 %    % Monocytes 10 %    % Eosinophils 5 %    % Basophils 1 %    % Immature Granulocytes 0 %    NRBCs per 100 WBC 0 <1 /100    Absolute Neutrophils 6.4 1.6 - 8.3 10e3/uL    Absolute Lymphocytes 1.5 0.8 - 5.3 10e3/uL    Absolute Monocytes 1.0 0.0 - 1.3 10e3/uL    Absolute Eosinophils 0.4 0.0 - 0.7 10e3/uL    Absolute Basophils 0.1 0.0 - 0.2 10e3/uL    Absolute Immature Granulocytes 0.0 <=0.4 10e3/uL    Absolute NRBCs 0.0 10e3/uL   EKG 12 lead     Status: None (Preliminary result)   Result Value Ref Range    Systolic Blood Pressure  mmHg    Diastolic Blood Pressure  mmHg     Ventricular Rate 58 BPM    Atrial Rate 58 BPM    CT Interval 126 ms    QRS Duration 90 ms     ms    QTc 465 ms    P Axis 58 degrees    R AXIS 54 degrees    T Axis 78 degrees    Interpretation ECG       Sinus bradycardia  Possible Left atrial enlargement  Anterior infarct , age undetermined  Abnormal ECG     EKG 12-lead, complete     Status: None (Preliminary result)   Result Value Ref Range    Systolic Blood Pressure  mmHg    Diastolic Blood Pressure  mmHg    Ventricular Rate 58 BPM    Atrial Rate 58 BPM    CT Interval 122 ms    QRS Duration 100 ms     ms    QTc 484 ms    P Axis 59 degrees    R AXIS 50 degrees    T Axis 58 degrees    Interpretation ECG       Sinus bradycardia  Possible Left atrial enlargement  Anterior infarct , age undetermined  QTcB >= 480 msec  Abnormal ECG     CBC with platelets differential     Status: None    Narrative    The following orders were created for panel order CBC with platelets differential.  Procedure                               Abnormality         Status                     ---------                               -----------         ------                     CBC with platelets and d...[[id number]]                      Final result                 Please view results for these tests on the individual orders.   Results for orders placed or performed in visit on 12/09/24   EKG 12-lead, tracing only (Same Day)     Status: None (Preliminary result)   Result Value Ref Range    Systolic Blood Pressure  mmHg    Diastolic Blood Pressure  mmHg    Ventricular Rate 55 BPM    Atrial Rate 55 BPM    CT Interval 118 ms    QRS Duration 88 ms     ms    QTc 463 ms    P Axis 53 degrees    R AXIS 11 degrees    T Axis 46 degrees    Interpretation ECG       Sinus bradycardia  Left atrial enlargement  Inferior infarct , possibly acute  Anterior infarct (cited on or before 18-May-2022)  ** ** ACUTE MI / STEMI ** **  Consider right ventricular involvement in acute inferior  infarct  Abnormal ECG  When compared with ECG of 26-Jun-2024 21:45,  Inferior infarct is now Present  T wave inversion less evident in Lateral leads       Medications   sodium chloride 0.9% BOLUS 1,000 mL (1,000 mLs Intravenous $New Bag 12/9/24 [id number])     Labs Ordered and Resulted from Time of ED Arrival to Time of ED Departure   COMPREHENSIVE METABOLIC PANEL - Abnormal       Result Value    Sodium 133 (*)     Potassium 4.7      Carbon Dioxide (CO2) 20 (*)     Anion Gap 15      Urea Nitrogen 60.1 (*)     Creatinine 1.81 (*)     GFR Estimate 30 (*)     Calcium 9.6      Chloride 98      Glucose 204 (*)     Alkaline Phosphatase 101      AST 26      ALT 38      Protein Total 7.2      Albumin 4.1      Bilirubin Total 0.7     TROPONIN T, HIGH SENSITIVITY - Abnormal    Troponin T, High Sensitivity 17 (*)    BLOOD GAS VENOUS - Abnormal    pH Venous 7.25 (*)     pCO2 Venous 53 (*)     pO2 Venous 17 (*)     Bicarbonate Venous 23      Base Excess/Deficit Venous -4.6 (*)     FIO2 21      Oxyhemoglobin Venous 18 (*)     O2 Sat, Venous 18.0 (*)    INR - Normal    INR 1.03     LACTIC ACID WHOLE BLOOD WITH 1X REPEAT IN 2 HR WHEN >2 - Normal    Lactic Acid, Initial 1.2     TSH WITH FREE T4 REFLEX - Normal    TSH 1.85     CBC WITH PLATELETS AND DIFFERENTIAL    WBC Count 9.4      RBC Count 5.01      Hemoglobin 13.8      Hematocrit 43.4      MCV 87      MCH 27.5      MCHC 31.8      RDW 13.7      Platelet Count 259      % Neutrophils 68      % Lymphocytes 16      % Monocytes 10      % Eosinophils 5      % Basophils 1      % Immature Granulocytes 0      NRBCs per 100 WBC 0      Absolute Neutrophils 6.4      Absolute Lymphocytes 1.5      Absolute Monocytes 1.0      Absolute Eosinophils 0.4      Absolute Basophils 0.1      Absolute Immature Granulocytes 0.0      Absolute NRBCs 0.0     ROUTINE UA WITH MICROSCOPIC REFLEX TO CULTURE   TROPONIN T, HIGH SENSITIVITY   TROPONIN T, HIGH SENSITIVITY   COMPREHENSIVE METABOLIC PANEL   TSH WITH FREE T4  REFLEX   BLOOD CULTURE     POC US ECHO LIMITED   Final Result   Limited Bedside Cardiac Ultrasound, performed and interpreted by me.    Indication: Shortness of Breath.   Parasternal long axis, parasternal short axis, and apical 4 chamber views were acquired.    Image quality was satisfactory.      Findings:     Decreased ejection fraction   Chambers do not appear dilated.   There is no evidence of free fluid within the pericardium.         IMPRESSION: Abnormal limited cardiac ultrasound showing decreased ejection fraction, no B-lines.  No pericardial effusion.          Echo Complete    (Results Pending)   XR Chest Port 1 View    (Results Pending)          Critical care was not performed.     Medical Decision Making  The patient's presentation was of high complexity (an acute health issue posing potential threat to life or bodily function).    The patient's evaluation involved:  review of external note(s) from 2 sources (see separate area of note for details)  review of 3+ test result(s) ordered prior to this encounter (see separate area of note for details)  ordering and/or review of 3+ test(s) in this encounter (see separate area of note for details)  discussion of management or test interpretation with another health professional (cardiology)    The patient's management necessitated high risk (a decision regarding hospitalization).    Assessment & Plan    Patient's labs reviewed and interpreted by me are significant for new THOMAS with a creatinine of 1.8, mild hypokalemia natremia to 133, slightly elevated high-sensitivity troponin from baseline at 17 up from 12, and an uncompensated/mixed respiratory acidosis  Bedside echocardiogram showed decreased ejection fraction, no pericardial effusion and no B-lines  Discussed case with cardiology who recommended admission to their service  Patient admitted and signed out to cardiology, attending Dr. Urbina    I have reviewed the nursing notes. I have reviewed the  findings, diagnosis, plan and need for follow up with the patient.    New Prescriptions    No medications on file       Final diagnoses:   Hypotension, unspecified hypotension type   SOB (shortness of breath)       Jaswinder Alvarez MD on 12/9/2024 at 4:56 PM   [facility] EMERGENCY DEPARTMENT  12/9/2024     Jaswinder Alvarez MD  12/09/24 [id number]

## 2024-12-09 NOTE — PROGRESS NOTES
Chief Complaint: dizziness and SOB      HPI:  Marianne Monroy is a 69 year old female with a history of ischemic cardiomyopathy, CAD s/p 4V CABG, HTN, HLD, VT s/p ICD. Follows closely in [facility] clinic for GDMT management.     In summary, patient presented with wide-complex tachycardia and heart failure symptoms in May 2022. Coronary angiogram showed severe three-vessel disease and had 4V bypass surgery in May 2022 with left internal mammary artery to left anterior descending artery, saphenous vein graft to distal right coronary artery, saphenous vein graft to ramus intermedius artery, saphenous vein graft to obtuse marginal artery.  As patient also had sustained VT post bypass surgery patient had an ICD placement.  Patient's EF was 20-25 prior to bypass surgery.  This improved to 35-40 after bypass and ICD placement. She has continued to have exertional dyspnea and postural lightheadedness requiring reduction in GDMT.    Patient presents with her two children. She is feeling poorly today. She reports a two month history of exertional shortness of breath. In the beginning she was experiencing episodic SOB with overexertion, now she is frequently SOB, with minimal exertion. Also dizzy and very fatigued. Has to hold onto the wall when walking to the bathroom. No syncopal Episodes. Her children say the last three days she has really declined, sometimes SOB mid conversation. She was seen by her PCP last week who increased lasix with no improvement in symptoms. CT PE also obtained showing no PE, CHF or PNA. She has had a dry cough. No fevers or chills. She denies any chest pain or pressure. Did not have typical symptoms with her MI - she had emesis, and was feeling poorly.     Past Medical History:  Ischemic cardiomyopathy  Coronary artery disease status post CABG  Hypertension  Type 2 diabetes mellitus, non-insulin-dependent  - No prior history of  dyslipidemia, TIA/stroke, vascular aneurysms, PAD  Past Medical  History:   Diagnosis Date    Abnormal Pap smear of cervix ~2000    repeat pap was normal    Allergic rhinitis, cause unspecified     Anxiety state, unspecified     panic episodes    C. difficile colitis     CAD (coronary artery disease)     Congestive heart failure (H)     Depressive disorder     Diabetes (H)     HFrEF (heart failure with reduced ejection fraction) (H)     ICD (implantable cardioverter-defibrillator) in place     Osteopenia     Unspecified essential hypertension        Past Surgical History:  CABG 05/24/22 by Ulises Desai at [facility]: LIMA-LAD, SVG-dRCA, SVG-RI, SVG-OM  Past Surgical History:   Procedure Laterality Date    BYPASS GRAFT ARTERY CORONARY N/A 05/24/2022    Procedure: Median sternotomy.  Intraoperative transesophageal echocardiogram per anesthesia.  Left internal mammary artery harvest.  Right and left endoscopically harvested  greater saphenouse vein.  Cardiopulmonary Bypass.  Coronary Artery Bypass Grafts x4.;  Surgeon: Ulises Desai MD;  Location: [facility] OR    COLONOSCOPY N/A 02/16/2023    Procedure: COLONOSCOPY, WITH POLYPECTOMY AND BIOPSY;  Surgeon: Nikhil Lees MD;  Location:  GI    CV CORONARY ANGIOGRAM  05/19/2022    Procedure: CV CORONARY ANGIOGRAM;  Surgeon: Huseyin Vogel MD;  Location:  HEART CARDIAC CATH LAB    CV CORONARY ANGIOGRAM  05/19/2022    Procedure: ;  Surgeon: Huseyin Vogel MD;  Location: [facility] CARDIAC CATH LAB    EP ICD INSERT SINGLE N/A 10/04/2022    Procedure: Implantable Cardioverter Defibrillator Device & Lead Implant Single or Dual;  Surgeon: Too Morrow MD;  Location: [facility] CARDIAC CATH LAB    ESOPHAGOSCOPY, GASTROSCOPY, DUODENOSCOPY (EGD), COMBINED N/A 02/16/2023    Procedure: ESOPHAGOGASTRODUODENOSCOPY, WITH BIOPSY;  Surgeon: Nikhil Lees MD;  Location:  GI    PICC DOUBLE LUMEN PLACEMENT Right 05/27/2022    Failed PICC attempt right arm basilic vein    PICC INSERTION - TRIPLE LUMEN Left  05/28/2022    left basilic 5fr tl,[id number] cm       Drug History:  Home cardiac meds: Aspirin 81 mg once daily, atorvastatin 80 mg once daily, empagliflozin 25 mg daily,  metoprolol succinate 75 mg daily, sacubitril-valsartan  mg 0.5/1 tablet daily , spironolactone 12.5 mg daily.  Current Outpatient Medications   Medication Sig Dispense Refill    ALPRAZolam (XANAX) 0.25 MG tablet Take 1 tablet (0.25 mg) by mouth 3 times daily as needed for anxiety 10 tablet 0    aspirin 81 MG EC tablet Take 81 mg by mouth every evening      atorvastatin (LIPITOR) 80 MG tablet TAKE 1 TABLET BY MOUTH DAILY 90 tablet 0    blood glucose (ONETOUCH VERIO IQ) test strip Use to test blood sugar 2 times daily or as directed. 200 strip 1    blood glucose monitoring (ONETOUCH VERIO) meter device kit Use to test blood sugar 2 times daily or as directed. 1 kit 0    Continuous Blood Gluc Sensor (Eggs OvernightYLE VANESSA 14 DAY SENSOR) MISC Change every 14 days. 2 each 0    empagliflozin (JARDIANCE) 25 MG TABS tablet Take 1 tablet (25 mg) by mouth daily 90 tablet 1    FLUoxetine (PROZAC) 20 MG capsule Take 2 capsules (40 mg) by mouth daily 180 capsule 1    furosemide (LASIX) 40 MG tablet 1 tablet BID x 3 days and the 1 daily 30 tablet 0    glipiZIDE (GLUCOTROL XL) 2.5 MG 24 hr tablet 3 tablets daily with Food 270 tablet 0    metFORMIN (GLUCOPHAGE XR) 500 MG 24 hr tablet Take 3 tablets (1,500 mg) by mouth daily (with dinner) 270 tablet 1    metoprolol succinate ER (TOPROL XL) 50 MG 24 hr tablet Take 1.5 tablets (75 mg) by mouth daily 145 tablet 3    MULTIPLE VITAMIN OR TABS Take by mouth every morning      Multiple Vitamins-Minerals (PRESERVISION AREDS PO) Take 2 tablets by mouth daily      sacubitril-valsartan (ENTRESTO)  MG per tablet Take 1/2 tablet in AM, take 1 tablet in  tablet 3    spironolactone (ALDACTONE) 25 MG tablet Take 0.5 tablets (12.5 mg) by mouth daily 45 tablet 3    traZODone (DESYREL) 50 MG tablet Take 0.5 tablets (25 mg)  by mouth at bedtime 15 tablet 3    VITAMIN D PO Take by mouth.           Family History:    Family History   Problem Relation Age of Onset    Diabetes Mother         hypertension, alive at 83    C.A.D. Mother     Coronary Artery Disease Mother     Cancer Father         lung cancer, smoker.   at age 53    Family History Negative Sister     Osteoporosis Sister     Anesthesia Reaction No family hx of     Deep Vein Thrombosis (DVT) No family hx of        Social History:    Social History     Tobacco Use    Smoking status: Never    Smokeless tobacco: Never   Substance Use Topics    Alcohol use: Yes     Comment: 1 drink a week       Allergies   Allergen Reactions    Effexor [Venlafaxine Hydrochloride]      Tachycardia, makes her extremely jittery--cant sit down, has to keep moving constantly    Latex      Reported by patient         Physical Examination:  Vitals: There were no vitals taken for this visit.  BMI= There is no height or weight on file to calculate BMI.    GENERAL: Healthy, alert and no distress  Eyes: No xanthelasmas.  NECK: No palpable neck masses/goitre and no evidence of retrosternal goitre.   ENT: Moist mucosal membranes.  RESPIRATORY: No signs of resp distress. Lungs CTAB.  CARDIOVASCULAR: ICD site appears healthy.  Sternotomy scar appears to be healing well without any drainage or open areas in the wound.  No JVD, regular, normal S1+S2 without added sounds or murmurs.  EXTREMITIES: Warm, well-perfused, no edema.   NEUROLOGY: GCS 15/15, no focal deficits.  SKIN: No ecchymoses, no rashes.  PSYCH: Cooperative, pleasant affect.       Investigations:  I personally viewed and interpreted the following investigations:    Labs:    CBC RESULTS:  Lab Results   Component Value Date    WBC 8.1 2024    WBC 7.1 2020    RBC 4.46 2024    RBC 4.57 2020    HGB 12.2 2024    HGB 13.3 2020    HCT 39.3 2024    HCT 41.1 2020    MCV 88 2024    MCV 90 2020    MCH  27.4 12/04/2024    MCH 29.1 09/09/2020    MCHC 31.0 (L) 12/04/2024    MCHC 32.4 09/09/2020    RDW 14.0 12/04/2024    RDW 12.5 09/09/2020     12/04/2024     09/09/2020       CMP RESULTS:  Lab Results   Component Value Date     12/04/2024     01/07/2021    POTASSIUM 4.6 12/04/2024    POTASSIUM 5.0 02/07/2023    POTASSIUM 4.8 01/07/2021    CHLORIDE 103 12/04/2024    CHLORIDE 108 02/07/2023    CHLORIDE 106 01/07/2021    CO2 24 12/04/2024    CO2 29 02/07/2023    CO2 28 01/07/2021    ANIONGAP 12 12/04/2024    ANIONGAP 3 02/07/2023    ANIONGAP 7 01/07/2021     (H) 12/04/2024     (H) 02/16/2023     (H) 02/07/2023     (H) 01/07/2021    BUN 34.5 (H) 12/04/2024    BUN 25 02/07/2023    BUN 19 01/07/2021    CR 1.0 12/06/2024    CR 1.02 (H) 12/04/2024    CR 0.90 01/07/2021    GFRESTIMATED 60 (L) 12/06/2024    GFRESTIMATED 66 01/07/2021    GFRESTBLACK 77 01/07/2021    POONAM 9.3 12/04/2024    POONAM 9.5 01/07/2021    BILITOTAL 0.3 06/26/2024    BILITOTAL 0.4 09/09/2020    ALBUMIN 4.2 06/26/2024    ALBUMIN 3.7 07/06/2022    ALBUMIN 3.7 09/09/2020    ALKPHOS 71 06/26/2024    ALKPHOS 90 09/09/2020    ALT 26 06/26/2024    ALT 33 09/09/2020    AST 22 06/26/2024    AST 14 09/09/2020        INR RESULTS:  Lab Results   Component Value Date    INR 1.29 (H) 05/25/2022       BNP RESULTS:  Lab Results   Component Value Date    NTBNPI 664 05/18/2022         LIPIDS:  Lab Results   Component Value Date    CHOL 104 03/01/2023    CHOL 144 05/18/2021     Lab Results   Component Value Date    HDL 52 03/01/2023    HDL 50 05/18/2021     Lab Results   Component Value Date    LDL 30 03/01/2023    LDL 59 05/18/2021     Lab Results   Component Value Date    TRIG 110 03/01/2023    TRIG 173 05/18/2021     Lab Results   Component Value Date    CHOLHDLRATIO 6.0 04/26/2012       Recent Tests:    EKG today:   Possible STEMI with ST elevations in inferior and anterior leads - unchanged when compared to prior      Electrocardiogram (07/06/2022):  Normal sinus rhythm, ventricular 90 bpm, LVH, inferior Q waves.  QRS 94.    Echocardiogram (08/10/2023):   Left ventricular function is decreased. The ejection fraction is 40-45% (mildly reduced).  Global right ventricular function is normal.  Mild mitral insufficiency is present.  Mild to moderate tricuspid insufficiency is present.  IVC diameter <2.1 cm collapsing >50% with sniff suggests a normal RA pressure  of 3 mmHg.  No pericardial effusion is present.  Compared to prior, LV fxn is unchanged to slightly improved.      Assessment and Plan:   Marianne Monroy is a 69 year old female with a history of ischemic cardiomyopathy, CAD s/p 4V CABG, HTN, HLD, VT s/p ICD. Follows closely in [facility] clinic for GDMT management.     Patient with two months of progressive dyspnea, now SOB with walking short distances, even with conversation, also extreme fatigue and dizziness. She is hypotensive in clinic today 70s/40s. EKG shows possible inferior STEMI, but unchanged from prior so unlikely. Kids are with her and very concerned about her rapid decline. Recent CT PE study negative. Does not appear volume overloaded. Has dry cough but no fever, chills or crackles.     Differential includes ACS, heart failure, PH, PNA? Sending to ER for evaluate of hypotension, shortness of breath and EKG changes. Recommend STAT ECHO, troponin, comprehensive labs, infectious work-up.  Unlikely true STEMI but may need CORS and RHC during hospitalization.     RTC: post hospitalization     OSCAR Suggs, CNP  Structural Heart Nurse Practitioner  [facility]  Clinic: [phone]  Pager: [phone]    CC  Patient Care Team:  Roxanna Otoole MD as PCP - General (Family Medicine)  Roxanna Otoole MD as Assigned PCP  Regina Sutherland as Diabetes Educator (Dietitian, Registered)  Dasia Amin, RN as Specialty Care Coordinator (Cardiology)  Belinda Colon APRN CNP as Nurse  Practitioner (Cardiovascular Disease)  Kris Steve MD as MD (Cardiovascular Disease)  Belinda Colon, APRN CNP as Assigned Heart and Vascular Provider  StephenEvette Marroquin MD as MD (Infectious Diseases)  Sarah Isidro, RN as Specialty Care Coordinator (Cardiology)  Too Morrow MD as MD (Cardiovascular Disease)  Cherie Lira, APRN CNP as Nurse Practitioner (Cardiovascular Disease)  No Ref-Primary, Physician  Libia Santoro, PharmD as Pharmacist (Pharmacist)  Latrell Valentino [facility] as Pharmacist (Pharmacist Clinician- Clinical Pharmacy Specialist)  Migdalia Swenson [facility] as Pharmacist (Pharmacist)  Hernando León RP as Pharmacist (Pharmacist)

## 2024-12-09 NOTE — ED TRIAGE NOTES
BIBNORM from [place] SOB over past 2 months, hypotensive 75-79 SBP, no orthostatics. Claimed she felt fine when she transferred from chair to stretcher. SBP for EMS 84/71, 97/61. Denies chest pain. Lightheaded, but not fainted. Clinic concerned with EKG.

## 2024-12-09 NOTE — PROGRESS NOTES
Rapid Response Epic Documentation     Situation:        Objective:        Assessment:            BP:  83/52    Pulse: 56  Respiration: 16  SPO2: 98 %  Glucose: na mg/dl  Mental Status: Alert  CMS: Intact  Stroke Scale: Not Applicable  EKG: Performed, by cardiology staff      Treatment:    IV: Size 20 gauge,  Location left  ac      Location:          Disposition:      Transfer to Emergency Room Via: 911    Protocol Used:     Other Cardiac

## 2024-12-10 ENCOUNTER — ANCILLARY PROCEDURE (OUTPATIENT)
Dept: CARDIOLOGY | Facility: CLINIC | Age: 69
DRG: 292 | End: 2024-12-10
Attending: NURSE PRACTITIONER
Payer: COMMERCIAL

## 2024-12-10 ENCOUNTER — APPOINTMENT (OUTPATIENT)
Dept: CARDIOLOGY | Facility: CLINIC | Age: 69
DRG: 292 | End: 2024-12-10
Attending: STUDENT IN AN ORGANIZED HEALTH CARE EDUCATION/TRAINING PROGRAM
Payer: COMMERCIAL

## 2024-12-10 VITALS
BODY MASS INDEX: 22.56 KG/M2 | OXYGEN SATURATION: 100 % | WEIGHT: 127.3 LBS | HEART RATE: 58 BPM | SYSTOLIC BLOOD PRESSURE: 120 MMHG | RESPIRATION RATE: 16 BRPM | DIASTOLIC BLOOD PRESSURE: 64 MMHG | TEMPERATURE: 98 F

## 2024-12-10 LAB
ALBUMIN SERPL BCG-MCNC: 4.2 G/DL (ref 3.5–5.2)
ALP SERPL-CCNC: 102 U/L (ref 40–150)
ALT SERPL W P-5'-P-CCNC: 36 U/L (ref 0–50)
ANION GAP SERPL CALCULATED.3IONS-SCNC: 11 MMOL/L (ref 7–15)
AST SERPL W P-5'-P-CCNC: 18 U/L (ref 0–45)
ATRIAL RATE - MUSE: 55 BPM
BASE EXCESS BLDV CALC-SCNC: -2.1 MMOL/L (ref -3–3)
BASOPHILS # BLD AUTO: 0 10E3/UL (ref 0–0.2)
BASOPHILS NFR BLD AUTO: 1 %
BILIRUB SERPL-MCNC: 0.5 MG/DL
BUN SERPL-MCNC: 47.8 MG/DL (ref 8–23)
CALCIUM SERPL-MCNC: 9.6 MG/DL (ref 8.8–10.4)
CHLORIDE SERPL-SCNC: 106 MMOL/L (ref 98–107)
CREAT SERPL-MCNC: 1.23 MG/DL (ref 0.51–0.95)
DIASTOLIC BLOOD PRESSURE - MUSE: NORMAL MMHG
EGFRCR SERPLBLD CKD-EPI 2021: 47 ML/MIN/1.73M2
EOSINOPHIL # BLD AUTO: 0.4 10E3/UL (ref 0–0.7)
EOSINOPHIL NFR BLD AUTO: 8 %
ERYTHROCYTE [DISTWIDTH] IN BLOOD BY AUTOMATED COUNT: 13.5 % (ref 10–15)
GLUCOSE SERPL-MCNC: 212 MG/DL (ref 70–99)
HCO3 BLDV-SCNC: 25 MMOL/L (ref 21–28)
HCO3 SERPL-SCNC: 23 MMOL/L (ref 22–29)
HCT VFR BLD AUTO: 40.9 % (ref 35–47)
HGB BLD-MCNC: 12.8 G/DL (ref 11.7–15.7)
IMM GRANULOCYTES # BLD: 0 10E3/UL
IMM GRANULOCYTES NFR BLD: 0 %
INTERPRETATION ECG - MUSE: NORMAL
LVEF ECHO: NORMAL
LYMPHOCYTES # BLD AUTO: 1.1 10E3/UL (ref 0.8–5.3)
LYMPHOCYTES NFR BLD AUTO: 22 %
MAGNESIUM SERPL-MCNC: 2 MG/DL (ref 1.7–2.3)
MAGNESIUM SERPL-MCNC: 2 MG/DL (ref 1.7–2.3)
MCH RBC QN AUTO: 27.2 PG (ref 26.5–33)
MCHC RBC AUTO-ENTMCNC: 31.3 G/DL (ref 31.5–36.5)
MCV RBC AUTO: 87 FL (ref 78–100)
MDC_IDC_LEAD_CONNECTION_STATUS: NORMAL
MDC_IDC_LEAD_IMPLANT_DT: NORMAL
MDC_IDC_LEAD_LOCATION: NORMAL
MDC_IDC_LEAD_LOCATION_DETAIL_1: NORMAL
MDC_IDC_LEAD_MFG: NORMAL
MDC_IDC_LEAD_MODEL: NORMAL
MDC_IDC_LEAD_POLARITY_TYPE: NORMAL
MDC_IDC_LEAD_SERIAL: NORMAL
MDC_IDC_LEAD_SPECIAL_FUNCTION: NORMAL
MDC_IDC_MSMT_BATTERY_DTM: NORMAL
MDC_IDC_MSMT_BATTERY_REMAINING_LONGEVITY: 147 MO
MDC_IDC_MSMT_BATTERY_RRT_TRIGGER: NORMAL
MDC_IDC_MSMT_BATTERY_STATUS: NORMAL
MDC_IDC_MSMT_BATTERY_VOLTAGE: 3.03 V
MDC_IDC_MSMT_CAP_CHARGE_DTM: NORMAL
MDC_IDC_MSMT_CAP_CHARGE_ENERGY: 18 J
MDC_IDC_MSMT_CAP_CHARGE_TIME: 3.7 S
MDC_IDC_MSMT_CAP_CHARGE_TYPE: NORMAL
MDC_IDC_MSMT_LEADCHNL_RV_IMPEDANCE_VALUE: 323 OHM
MDC_IDC_MSMT_LEADCHNL_RV_IMPEDANCE_VALUE: 437 OHM
MDC_IDC_MSMT_LEADCHNL_RV_PACING_THRESHOLD_AMPLITUDE: 0.75 V
MDC_IDC_MSMT_LEADCHNL_RV_PACING_THRESHOLD_PULSEWIDTH: 0.4 MS
MDC_IDC_MSMT_LEADCHNL_RV_SENSING_INTR_AMPL: 8.6 MV
MDC_IDC_PG_IMPLANT_DTM: NORMAL
MDC_IDC_PG_MFG: NORMAL
MDC_IDC_PG_MODEL: NORMAL
MDC_IDC_PG_SERIAL: NORMAL
MDC_IDC_PG_TYPE: NORMAL
MDC_IDC_SESS_CLINIC_NAME: NORMAL
MDC_IDC_SESS_DTM: NORMAL
MDC_IDC_SESS_TYPE: NORMAL
MDC_IDC_SET_BRADY_HYSTRATE: NORMAL
MDC_IDC_SET_BRADY_LOWRATE: 40 {BEATS}/MIN
MDC_IDC_SET_BRADY_MODE: NORMAL
MDC_IDC_SET_LEADCHNL_RA_SENSING_SENSITIVITY: NORMAL
MDC_IDC_SET_LEADCHNL_RV_PACING_AMPLITUDE: 2 V
MDC_IDC_SET_LEADCHNL_RV_PACING_ANODE_ELECTRODE_1: NORMAL
MDC_IDC_SET_LEADCHNL_RV_PACING_ANODE_LOCATION_1: NORMAL
MDC_IDC_SET_LEADCHNL_RV_PACING_CAPTURE_MODE: NORMAL
MDC_IDC_SET_LEADCHNL_RV_PACING_CATHODE_ELECTRODE_1: NORMAL
MDC_IDC_SET_LEADCHNL_RV_PACING_CATHODE_LOCATION_1: NORMAL
MDC_IDC_SET_LEADCHNL_RV_PACING_POLARITY: NORMAL
MDC_IDC_SET_LEADCHNL_RV_PACING_PULSEWIDTH: 0.4 MS
MDC_IDC_SET_LEADCHNL_RV_SENSING_ANODE_ELECTRODE_1: NORMAL
MDC_IDC_SET_LEADCHNL_RV_SENSING_ANODE_LOCATION_1: NORMAL
MDC_IDC_SET_LEADCHNL_RV_SENSING_CATHODE_ELECTRODE_1: NORMAL
MDC_IDC_SET_LEADCHNL_RV_SENSING_CATHODE_LOCATION_1: NORMAL
MDC_IDC_SET_LEADCHNL_RV_SENSING_POLARITY: NORMAL
MDC_IDC_SET_LEADCHNL_RV_SENSING_SENSITIVITY: 0.3 MV
MDC_IDC_SET_ZONE_DETECTION_BEATS_DENOMINATOR: 16 {BEATS}
MDC_IDC_SET_ZONE_DETECTION_BEATS_DENOMINATOR: 32 {BEATS}
MDC_IDC_SET_ZONE_DETECTION_BEATS_DENOMINATOR: 40 {BEATS}
MDC_IDC_SET_ZONE_DETECTION_BEATS_NUMERATOR: 16 {BEATS}
MDC_IDC_SET_ZONE_DETECTION_BEATS_NUMERATOR: 30 {BEATS}
MDC_IDC_SET_ZONE_DETECTION_BEATS_NUMERATOR: 32 {BEATS}
MDC_IDC_SET_ZONE_DETECTION_INTERVAL: 270 MS
MDC_IDC_SET_ZONE_DETECTION_INTERVAL: 320 MS
MDC_IDC_SET_ZONE_DETECTION_INTERVAL: 360 MS
MDC_IDC_SET_ZONE_DETECTION_INTERVAL: 360 MS
MDC_IDC_SET_ZONE_STATUS: NORMAL
MDC_IDC_SET_ZONE_TYPE: NORMAL
MDC_IDC_SET_ZONE_VENDOR_TYPE: NORMAL
MDC_IDC_STAT_AT_BURDEN_PERCENT: 0 %
MDC_IDC_STAT_AT_DTM_END: NORMAL
MDC_IDC_STAT_AT_DTM_START: NORMAL
MDC_IDC_STAT_BRADY_DTM_END: NORMAL
MDC_IDC_STAT_BRADY_DTM_START: NORMAL
MDC_IDC_STAT_BRADY_RA_PERCENT_PACED: NORMAL
MDC_IDC_STAT_BRADY_RV_PERCENT_PACED: 0.01 %
MDC_IDC_STAT_EPISODE_RECENT_COUNT: 0
MDC_IDC_STAT_EPISODE_RECENT_COUNT: 5
MDC_IDC_STAT_EPISODE_RECENT_COUNT_DTM_END: NORMAL
MDC_IDC_STAT_EPISODE_RECENT_COUNT_DTM_START: NORMAL
MDC_IDC_STAT_EPISODE_TOTAL_COUNT: 0
MDC_IDC_STAT_EPISODE_TOTAL_COUNT: 5
MDC_IDC_STAT_EPISODE_TOTAL_COUNT_DTM_END: NORMAL
MDC_IDC_STAT_EPISODE_TOTAL_COUNT_DTM_START: NORMAL
MDC_IDC_STAT_EPISODE_TYPE: NORMAL
MDC_IDC_STAT_TACHYTHERAPY_ATP_DELIVERED_RECENT: 0
MDC_IDC_STAT_TACHYTHERAPY_ATP_DELIVERED_TOTAL: 0
MDC_IDC_STAT_TACHYTHERAPY_RECENT_DTM_END: NORMAL
MDC_IDC_STAT_TACHYTHERAPY_RECENT_DTM_START: NORMAL
MDC_IDC_STAT_TACHYTHERAPY_SHOCKS_ABORTED_RECENT: 0
MDC_IDC_STAT_TACHYTHERAPY_SHOCKS_ABORTED_TOTAL: 0
MDC_IDC_STAT_TACHYTHERAPY_SHOCKS_DELIVERED_RECENT: 0
MDC_IDC_STAT_TACHYTHERAPY_SHOCKS_DELIVERED_TOTAL: 0
MDC_IDC_STAT_TACHYTHERAPY_TOTAL_DTM_END: NORMAL
MDC_IDC_STAT_TACHYTHERAPY_TOTAL_DTM_START: NORMAL
MONOCYTES # BLD AUTO: 0.7 10E3/UL (ref 0–1.3)
MONOCYTES NFR BLD AUTO: 15 %
NEUTROPHILS # BLD AUTO: 2.7 10E3/UL (ref 1.6–8.3)
NEUTROPHILS NFR BLD AUTO: 54 %
NRBC # BLD AUTO: 0 10E3/UL
NRBC BLD AUTO-RTO: 0 /100
O2/TOTAL GAS SETTING VFR VENT: 0 %
OXYHGB MFR BLDV: 15 % (ref 70–75)
P AXIS - MUSE: 53 DEGREES
PCO2 BLDV: 52 MM HG (ref 40–50)
PH BLDV: 7.3 [PH] (ref 7.32–7.43)
PLATELET # BLD AUTO: 241 10E3/UL (ref 150–450)
PO2 BLDV: 15 MM HG (ref 25–47)
POTASSIUM SERPL-SCNC: 4.8 MMOL/L (ref 3.4–5.3)
PR INTERVAL - MUSE: 118 MS
PROT SERPL-MCNC: 7.2 G/DL (ref 6.4–8.3)
QRS DURATION - MUSE: 88 MS
QT - MUSE: 484 MS
QTC - MUSE: 463 MS
R AXIS - MUSE: 11 DEGREES
RBC # BLD AUTO: 4.71 10E6/UL (ref 3.8–5.2)
SAO2 % BLDV: 15.1 % (ref 70–75)
SARS-COV-2 RNA RESP QL NAA+PROBE: NEGATIVE
SODIUM SERPL-SCNC: 140 MMOL/L (ref 135–145)
SYSTOLIC BLOOD PRESSURE - MUSE: NORMAL MMHG
T AXIS - MUSE: 46 DEGREES
VENTRICULAR RATE- MUSE: 55 BPM
WBC # BLD AUTO: 4.9 10E3/UL (ref 4–11)

## 2024-12-10 PROCEDURE — 93306 TTE W/DOPPLER COMPLETE: CPT | Mod: 26 | Performed by: INTERNAL MEDICINE

## 2024-12-10 PROCEDURE — 82805 BLOOD GASES W/O2 SATURATION: CPT

## 2024-12-10 PROCEDURE — 250N000013 HC RX MED GY IP 250 OP 250 PS 637: Performed by: STUDENT IN AN ORGANIZED HEALTH CARE EDUCATION/TRAINING PROGRAM

## 2024-12-10 PROCEDURE — 255N000002 HC RX 255 OP 636: Performed by: STUDENT IN AN ORGANIZED HEALTH CARE EDUCATION/TRAINING PROGRAM

## 2024-12-10 PROCEDURE — 250N000013 HC RX MED GY IP 250 OP 250 PS 637

## 2024-12-10 PROCEDURE — 250N000011 HC RX IP 250 OP 636: Performed by: STUDENT IN AN ORGANIZED HEALTH CARE EDUCATION/TRAINING PROGRAM

## 2024-12-10 PROCEDURE — 999N000208 ECHOCARDIOGRAM COMPLETE

## 2024-12-10 PROCEDURE — 93282 PRGRMG EVAL IMPLANTABLE DFB: CPT | Mod: 26 | Performed by: INTERNAL MEDICINE

## 2024-12-10 PROCEDURE — 83735 ASSAY OF MAGNESIUM: CPT

## 2024-12-10 PROCEDURE — 93282 PRGRMG EVAL IMPLANTABLE DFB: CPT

## 2024-12-10 PROCEDURE — 85004 AUTOMATED DIFF WBC COUNT: CPT | Performed by: STUDENT IN AN ORGANIZED HEALTH CARE EDUCATION/TRAINING PROGRAM

## 2024-12-10 PROCEDURE — 80053 COMPREHEN METABOLIC PANEL: CPT | Performed by: STUDENT IN AN ORGANIZED HEALTH CARE EDUCATION/TRAINING PROGRAM

## 2024-12-10 PROCEDURE — 36415 COLL VENOUS BLD VENIPUNCTURE: CPT

## 2024-12-10 PROCEDURE — 83735 ASSAY OF MAGNESIUM: CPT | Performed by: STUDENT IN AN ORGANIZED HEALTH CARE EDUCATION/TRAINING PROGRAM

## 2024-12-10 PROCEDURE — 99239 HOSP IP/OBS DSCHRG MGMT >30: CPT | Mod: 25 | Performed by: NURSE PRACTITIONER

## 2024-12-10 RX ORDER — METOPROLOL SUCCINATE 50 MG/1
25 TABLET, EXTENDED RELEASE ORAL DAILY
COMMUNITY
Start: 2024-12-10

## 2024-12-10 RX ORDER — HYDROXYZINE HYDROCHLORIDE 50 MG/1
50 TABLET, FILM COATED ORAL EVERY 6 HOURS PRN
Status: DISCONTINUED | OUTPATIENT
Start: 2024-12-10 | End: 2024-12-10 | Stop reason: HOSPADM

## 2024-12-10 RX ORDER — MAGNESIUM OXIDE 400 MG/1
400 TABLET ORAL EVERY 4 HOURS
Status: DISCONTINUED | OUTPATIENT
Start: 2024-12-10 | End: 2024-12-10 | Stop reason: HOSPADM

## 2024-12-10 RX ORDER — HYDROXYZINE HYDROCHLORIDE 25 MG/1
25 TABLET, FILM COATED ORAL EVERY 6 HOURS PRN
Status: DISCONTINUED | OUTPATIENT
Start: 2024-12-10 | End: 2024-12-10 | Stop reason: HOSPADM

## 2024-12-10 RX ADMIN — MAGNESIUM OXIDE TAB 400 MG (241.3 MG ELEMENTAL MG) 400 MG: 400 (241.3 MG) TAB at 03:40

## 2024-12-10 RX ADMIN — HEPARIN SODIUM 5000 UNITS: 5000 INJECTION, SOLUTION INTRAVENOUS; SUBCUTANEOUS at 03:40

## 2024-12-10 RX ADMIN — FLUOXETINE HYDROCHLORIDE 40 MG: 40 CAPSULE ORAL at 10:00

## 2024-12-10 RX ADMIN — HEPARIN SODIUM 5000 UNITS: 5000 INJECTION, SOLUTION INTRAVENOUS; SUBCUTANEOUS at 09:25

## 2024-12-10 RX ADMIN — MAGNESIUM OXIDE TAB 400 MG (241.3 MG ELEMENTAL MG) 400 MG: 400 (241.3 MG) TAB at 11:20

## 2024-12-10 RX ADMIN — HUMAN ALBUMIN MICROSPHERES AND PERFLUTREN 6 ML: 10; .22 INJECTION, SOLUTION INTRAVENOUS at 09:41

## 2024-12-10 RX ADMIN — ATORVASTATIN CALCIUM 80 MG: 40 TABLET, FILM COATED ORAL at 09:24

## 2024-12-10 ASSESSMENT — ACTIVITIES OF DAILY LIVING (ADL)
ADLS_ACUITY_SCORE: 52
ADLS_ACUITY_SCORE: 54
ADLS_ACUITY_SCORE: 54
ADLS_ACUITY_SCORE: 52
ADLS_ACUITY_SCORE: 54
ADLS_ACUITY_SCORE: 54

## 2024-12-10 NOTE — DISCHARGE INSTRUCTIONS
"   Take your medicines every day, as directed     Changes made today:     HOLD Spironolactone & Entresto until follow up     DECREASE Metoprolol from 75 mg daily until 25 mg daily     Please come to the ER if you are having nausea that prevents you from taking your medications, vomiting, chest pain that is different than your normal, inability to stand up or complete your daily tasks because of dizziness, passing out, shortness of breath that is worsening       Monitor Your Weight and Symptoms     Contact us if you:     Gain 2 pounds in one day or 5 pounds in one week  Feel more short of breath  Notice more leg swelling  Feel lightheadeded    Change your lifestyle     Limit Salt or Sodium:  2000 mg  Limit Fluids:  2000 mL or approximately 64 ounces  Eat a Heart Healthy Diet  Low in saturated fats  Stay Active:  Aim to move at least 150 minutes every  week            To Contact us     During Business Hours:  Questions and [phone]  First press #1 for the [employer] and then press #3 for \"Medical Advise\" to reach Cardiology Nurses.     After hours, weekends or holidays:   [phone], Option #4  Ask to speak to the On-Call Cardiologist. Inform them you are a CORE/heart failure patient at the [facility].       Use [employer] allows you to communicate directly with your heart team through secure messaging.  nvite can be accessed any time on your phone, computer, or tablet.  If you need assistance, we'd be happy to help!             Keep your Heart Appointments:     - OSCAR Ortiz, 24     - Dr. Kris Steve 3/2025      "

## 2024-12-10 NOTE — MEDICATION SCRIBE - ADMISSION MEDICATION HISTORY
Medication Scribe Admission Medication History    Admission medication history is complete. The information provided in this note is only as accurate as the sources available at the time of the update.    Information Source(s): Patient via in-person    Pertinent Information: Spoke with patient and completed medication history.     Patient has Metformin 500 mg tab listed on PTA to be taken as 3 tablets (1500 mg) po daily with food. However patient reports she takes 1 tab of 500 mg PO every morning THEN 2 tabs of 500 mg tabs (1000 mg) every evening to get the total dose of 1500 mg.    Changes made to PTA medication list:    Added: Metformin 500 mg tab (1 tab every morning)                    Metformin 500 mg tab (2 tabs daily with supper total 1000 mg)    Deleted: Blood glucose test strip                      Blood glucose monitoring meter device kit                      Furosemide 40 mg tab                      Multivitamins-minerals tabs                     Trazodone 50 mg tab                        Metformin 500 mg 24 hr tablet  3 tablets (1500 mg )                                         Changed: metformin 500 mg 24 hr tab: 3 tabs daily with food >>> 1 tab of 500 mg PO every morning THEN 2 tabs of 500 mg (total 1000 mg) every evening with supper.     Allergies reviewed with patient and updates made in EHR: yes    Medication History Completed By: Latonia Alba 12/9/2024 10:54 PM    PTA Med List   Medication Sig Last Dose/Taking    ALPRAZolam (XANAX) 0.25 MG tablet Take 1 tablet (0.25 mg) by mouth 3 times daily as needed for anxiety 12/9/2024 Morning    aspirin 81 MG EC tablet Take 81 mg by mouth every evening 12/8/2024 Evening    atorvastatin (LIPITOR) 80 MG tablet TAKE 1 TABLET BY MOUTH DAILY 12/9/2024 Morning    Continuous Blood Gluc Sensor (FREESTYLE VANESSA 14 DAY SENSOR) MISC Change every 14 days. Unknown    empagliflozin (JARDIANCE) 25 MG TABS tablet Take 1 tablet (25 mg) by mouth daily 12/9/2024 Morning     FLUoxetine (PROZAC) 20 MG capsule Take 2 capsules (40 mg) by mouth daily 12/9/2024 Morning    glipiZIDE (GLUCOTROL XL) 2.5 MG 24 hr tablet 3 tablets daily with Food 12/9/2024 Morning    metFORMIN (GLUCOPHAGE XR) 500 MG 24 hr tablet Take 500 mg by mouth every morning. Take 1 tab of 500 mg every morning 12/9/2024 Morning    metFORMIN (GLUCOPHAGE XR) 500 MG 24 hr tablet Take 500 mg by mouth daily (with dinner). Take 2 tabs of 500 mg (total 1000 mg) daily with supper. 12/9/2024 Morning    metoprolol succinate ER (TOPROL XL) 50 MG 24 hr tablet Take 1.5 tablets (75 mg) by mouth daily 12/9/2024 Morning    MULTIPLE VITAMIN OR TABS Take by mouth every morning 12/9/2024 Morning    sacubitril-valsartan (ENTRESTO)  MG per tablet Take 1/2 tablet in AM, take 1 tablet in PM 12/9/2024 Morning    spironolactone (ALDACTONE) 25 MG tablet Take 0.5 tablets (12.5 mg) by mouth daily 12/9/2024 Morning    VITAMIN D PO Take by mouth. 12/9/2024 Morning

## 2024-12-10 NOTE — PLAN OF CARE
Neuro: A&Ox4.   Cardiac: SR. VSS.   Respiratory: Sating above 92% on RA.  GI/: Adequate urine output. Last BM 12/9 per patient  Diet/appetite: Tolerating low saturated fat Na <2400 mg diet. Eating well.  Activity:  Up independently   Pain: At acceptable level on current regimen.   Skin: No new deficits noted.  LDA's: PIV, saline locked  Plan: Continue with POC. Notify primary team with changes.  Goal Outcome Evaluation:      Plan of Care Reviewed With: patient

## 2024-12-10 NOTE — PROGRESS NOTES
Patient discharged to home at 3:52 PM via ambulation. Accompanied by son and daughter and staff. Discharge instructions reviewed with patient and daughter, opportunity offered to ask questions. Prescriptions - None ordered for discharge. All belongings sent with patient.

## 2024-12-10 NOTE — ED NOTES
Pt becoming hypotensive, BP 81/54 map 64, asymptomatic. 2L IV fluids have been completed. Provider MD Ugarte paged. MD reply, If she is asymptomatic, nothing else to do for now, no more fluids for now. Writer asked for parameter would like to repage for. Reply, <60 for MAP.

## 2024-12-10 NOTE — H&P
[facility]    Cardiology History and Physical - Cardiology 1         Date of Admission:  12/9/2024    Assessment & Plan: HVSL    Marianne Monroy is a 69 year old female admitted on 12/9/2024. 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     Hypotension  HFmrEF (EF 40%) 2/2 ICM  CAD s/p 4V CABG  Pt was sent from cardiology clinic for hypotension and shortness of breath with concerns for STEMI, ACS, HF, PNA. BP 75/33 on arrival in the ED. Pt received 1 L NS with improvement of SBP to 90s-100s. BNP 500s, lactate 1.2, TSH WNL, trop peaked at 17. ECG reveals sinus bradycardia similar to her previous ECGs. CXR with clear lungs. Notably, patient shares that she has had decreased PO intake for the past week due to decreased taste, which is likely contributing to her hypotension. Will also evaluate for possible infection and worsening cardiac function/heart failure. Pt's HF was diagnosed in 05/2022. Patient's EF was 20-25% prior to bypass surgery, which improved to 35-40% after bypass and ICD placement.   - S/p 1 L NS in the ED  - Ordered additional 1 L NS  - TTE  - Follow up blood culture  - Baseline weight ~120 lbs, admission weight 127.3 lbs (after 2 L NS)  - Volume status: hypovolemic  - GDMT  - ACE/ARB/ARNI: Holding PTA Entresto  - BB: Holding PTA metoprolol   - MRA: Holding PTA spironolactone   - SGLT2i: Holding PTA empagliflozin   - Anticoagulation: heparin subq    THOMAS on CKD3  Cr 1.8 on admission, increased from 1.02 four days ago. Baseline ~1. Suspect hypovolemia from decreased PO intake x 1 week iso decreased taste. Cr improved to 1.56 s/p 1 L NS in the ED.  - CTM    Lack of taste  Patient reporting decreased taste for the past week.  However, tonight, she notes that her [employer] salad was very flavorful.  Ddx includes normal aging, DM, and B12 and zinc  deficiencies.  - COVID test negative  - CTM    DM type II  A1c 8.6 on 12/4/24.   - Holding PTA metformin and glipizide  - Hypoglycemia protocol    VT s/p ICD  Patient presented with wide-complex tachycardia and heart failure symptoms in 05/2022.  Evaluation led to severe three-vessel disease s/p bypass surgery with course complicated by sustained VT for which she underwent ICD placement.        Diet: Low Saturated Fat Na <2400 mg  Fluid restriction 2000 ML FLUID  DVT Prophylaxis: Heparin SQ  Farnsworth Catheter: Not present  Cardiac Monitoring: None  Code Status: Full Code          Clinically Significant Risk Factors Present on Admission         # Hyponatremia: Lowest Na = 133 mmol/L in last 2 days, will monitor as appropriate         # Drug Induced Platelet Defect: home medication list includes an antiplatelet medication     # Hypertension: Noted on problem list  # Heart failure, NOS: home medication list includes sacubitril/valsartan (Entresto) and last echo with EF 40-50%         # DMII: A1C = 8.6 % (Ref range: 0.0 - 5.6 %) within past 6 months         # ICD device present  # History of CABG: noted on surgical history       Disposition Plan   Expected discharge: 2 - 3 days, recommended to prior living arrangement once  hypotension workup completed, SBP > 90/MAP > 65 .    Entered: Mandie Ugarte MD 12/10/2024, 1:27 AM   The patient will be staffed in the AM.      Mandie Ugarte MD  Internal Medicine PGY-2    ______________________________________________________________________    Chief Complaint   Dyspnea    History is obtained from the patient    History of Present Illness   Marianne Monroy is a 69 year old female with HFmrEF (EF 40%) 2/2 ischemic cardiomyopathy, CAD s/p 4V CABG, VT s/p ICD, HTN, DM type II, CKD3 who presents from cardiology clinic for SOB and hypotension.     Patient reports worsening intermittent dyspnea for the past 2 months.  She notes that initially her dyspnea would occur with exertion for  which she took time to recover and then would go away.  However, she has gradually experienced increased frequency of dyspnea with exertion.  She is also taking longer to recover from being short of breath.  She notes that she does not particularly feel short of breath while at rest.  However, she notes that she does feel heavy at rest.  Associated symptoms include a dry cough that has been stable throughout these 2 months, dizziness with quick position changes, and generalized fatigue.  She reports sleeping through the night but still feeling tired in the morning when she wakes up. Denies chest pain, palpitations, syncope, nausea, congestion, sore throat, fevers.      Past Medical History    Past Medical History:   Diagnosis Date    Abnormal Pap smear of cervix ~2000    repeat pap was normal    Allergic rhinitis, cause unspecified     Anxiety state, unspecified     panic episodes    C. difficile colitis     CAD (coronary artery disease)     Congestive heart failure (H)     Depressive disorder     Diabetes (H)     HFrEF (heart failure with reduced ejection fraction) (H)     ICD (implantable cardioverter-defibrillator) in place     Osteopenia     Unspecified essential hypertension        Past Surgical History   Past Surgical History:   Procedure Laterality Date    BYPASS GRAFT ARTERY CORONARY N/A 05/24/2022    Procedure: Median sternotomy.  Intraoperative transesophageal echocardiogram per anesthesia.  Left internal mammary artery harvest.  Right and left endoscopically harvested  greater saphenouse vein.  Cardiopulmonary Bypass.  Coronary Artery Bypass Grafts x4.;  Surgeon: Ulises Desai MD;  Location:  OR    COLONOSCOPY N/A 02/16/2023    Procedure: COLONOSCOPY, WITH POLYPECTOMY AND BIOPSY;  Surgeon: Nikhil Lees MD;  Location:  GI    CV CORONARY ANGIOGRAM  05/19/2022    Procedure: CV CORONARY ANGIOGRAM;  Surgeon: Huseyin Vogel MD;  Location:  HEART CARDIAC CATH LAB    CV CORONARY  ANGIOGRAM  05/19/2022    Procedure: ;  Surgeon: Huseyin Vogel MD;  Location:  HEART CARDIAC CATH LAB    EP ICD INSERT SINGLE N/A 10/04/2022    Procedure: Implantable Cardioverter Defibrillator Device & Lead Implant Single or Dual;  Surgeon: Too Morrow MD;  Location:  HEART CARDIAC CATH LAB    ESOPHAGOSCOPY, GASTROSCOPY, DUODENOSCOPY (EGD), COMBINED N/A 02/16/2023    Procedure: ESOPHAGOGASTRODUODENOSCOPY, WITH BIOPSY;  Surgeon: Nikhil Lees MD;  Location:  GI    PICC DOUBLE LUMEN PLACEMENT Right 05/27/2022    Failed PICC attempt right arm basilic vein    PICC INSERTION - TRIPLE LUMEN Left 05/28/2022    left basilic 5fr tl,[id number] cm       Prior to Admission Medications   Prior to Admission Medications   Prescriptions Last Dose Informant Patient Reported? Taking?   ALPRAZolam (XANAX) 0.25 MG tablet 12/9/2024 Morning  No Yes   Sig: Take 1 tablet (0.25 mg) by mouth 3 times daily as needed for anxiety   Continuous Blood Gluc Sensor (RavgenSTYLE VANESSA 14 DAY SENSOR) MISC Unknown  No Yes   Sig: Change every 14 days.   FLUoxetine (PROZAC) 20 MG capsule 12/9/2024 Morning  No Yes   Sig: Take 2 capsules (40 mg) by mouth daily   MULTIPLE VITAMIN OR TABS 12/9/2024 Morning  Yes Yes   Sig: Take by mouth every morning   VITAMIN D PO 12/9/2024 Morning  Yes Yes   Sig: Take by mouth.   aspirin 81 MG EC tablet 12/8/2024 Evening  Yes Yes   Sig: Take 81 mg by mouth every evening   atorvastatin (LIPITOR) 80 MG tablet 12/9/2024 Morning  No Yes   Sig: TAKE 1 TABLET BY MOUTH DAILY   empagliflozin (JARDIANCE) 25 MG TABS tablet 12/9/2024 Morning  No Yes   Sig: Take 1 tablet (25 mg) by mouth daily   glipiZIDE (GLUCOTROL XL) 2.5 MG 24 hr tablet 12/9/2024 Morning  No Yes   Sig: 3 tablets daily with Food   metFORMIN (GLUCOPHAGE XR) 500 MG 24 hr tablet 12/9/2024 Morning  Yes Yes   Sig: Take 500 mg by mouth every morning. Take 1 tab of 500 mg every morning   metFORMIN (GLUCOPHAGE XR) 500 MG 24 hr tablet 12/9/2024  Morning  Yes Yes   Sig: Take 500 mg by mouth daily (with dinner). Take 2 tabs of 500 mg (total 1000 mg) daily with supper.   metoprolol succinate ER (TOPROL XL) 50 MG 24 hr tablet 12/9/2024 Morning  No Yes   Sig: Take 1.5 tablets (75 mg) by mouth daily   sacubitril-valsartan (ENTRESTO)  MG per tablet 12/9/2024 Morning  No Yes   Sig: Take 1/2 tablet in AM, take 1 tablet in PM   spironolactone (ALDACTONE) 25 MG tablet 12/9/2024 Morning  No Yes   Sig: Take 0.5 tablets (12.5 mg) by mouth daily      Facility-Administered Medications: None           Physical Exam   Vital Signs: Temp: 97.8  F (36.6  C)   BP: 119/48 Pulse: 67   Resp: 18 SpO2: 100 %      Weight: 127 lbs 4.8 oz    General Appearance: NAD, lying comfortably in bed  Respiratory: CTA bilaterally without crackles or wheezes   Cardiovascular: RRR without murmurs, JVP not appreciated   GI: Soft, nontender, nondistended, normal active bowel sounds  Ext: No BLE   Neuro: A&Ox3  Skin: No rashes or lesions      Medical Decision Making       Please see A&P for additional details of medical decision making.      Data     I have personally reviewed the following data over the past 24 hrs:    9.4  \   13.8   / 259     135 101 55.3 (H) /  110 (H)   3.7 21 (L) 1.56 (H) \     ALT: 35 AST: 16 AP: 93 TBILI: 0.7   ALB: 3.9 TOT PROTEIN: 6.6 LIPASE: N/A     Trop: 15 (H) BNP: 506     TSH: 1.40 T4: N/A A1C: N/A     Procal: N/A CRP: N/A Lactic Acid: 1.2       INR:  1.03 PTT:  N/A   D-dimer:  N/A Fibrinogen:  N/A       Imaging results reviewed over the past 24 hrs:   Recent Results (from the past 24 hours)   POC US ECHO LIMITED    Impression    Limited Bedside Cardiac Ultrasound, performed and interpreted by me.   Indication: Shortness of Breath.  Parasternal long axis, parasternal short axis, and apical 4 chamber views were acquired.   Image quality was satisfactory.    Findings:    Decreased ejection fraction  Chambers do not appear dilated.  There is no evidence of free fluid  within the pericardium.      IMPRESSION: Abnormal limited cardiac ultrasound showing decreased ejection fraction, no B-lines.  No pericardial effusion.      XR Chest 2 Views    Narrative    EXAM: XR CHEST 2 VIEWS  LOCATION: [facility]  DATE: 12/9/2024    INDICATION: SOB  COMPARISON: 12/4/2024      Impression    IMPRESSION: Prior sternotomy. Normal heart size. Stable single lead left chest wall defibrillator. No pleural effusion. No pneumothorax. Lungs are clear.

## 2024-12-10 NOTE — DISCHARGE SUMMARY
[address] [address]  p: [phone]    Discharge Summary: Cardiology Service    Marianne Monroy MRN# [id number]   YOB: 1955 Age: 69 year old       Admission Date: 12/9/2024  Discharge Date: 12/10/2024    Discharge Diagnoses:  # Hypotension  # Chronic heart failure with moderately reduced ejection fraction (EF 45%) 2/2 ICM  # CAD s/p 4V CABG  # HLD  # Hx VT s/p ICD  # THOMAS on CKD3, improving  # Lack of taste  # DM type II    Brief HPI:  Marianne Monroy is a 69 year old female with a history of HFmrEF (EF 40%) 2/2 ischemic cardiomyopathy, CAD s/p 4V CABG, VT s/p ICD, HTN, DM type II, CKD3 who presents for hypotension. Patient received IV fluids with rapidly improving symptoms. Exercise stress test with suboptimal response, will decrease BB and have her follow up with [facility] clinic for reintroduction/uptitration of GDMT.     Hospital Course by Diagnosis:  # Hypotension  # Chronic heart failure with moderately reduced ejection fraction (EF 45%) 2/2 ICM  # CAD s/p 4V CABG  # Hx VT s/p ICD  # HLD  Pt was sent from cardiology clinic for hypotension and shortness of breath with concerns for STEMI, ACS, HF, PNA. BP 75/33 on arrival in the ED. Pt received 1 L NS with improvement of SBP to 90s-100s. BNP 500s, lactate 1.2, TSH WNL, trop peaked at 17. ECG reveals sinus bradycardia similar to her previous ECGs. CXR with clear lungs. Notably, patient shares that she has had decreased PO intake for the past week due to decreased taste, which is likely contributing to her hypotension. Will also evaluate for possible infection and worsening cardiac function/heart failure. Pt's HF was diagnosed in 05/2022. Patient's EF was 20-25% prior to bypass surgery, which improved to 35-40% after bypass and ICD placement.   - S/p 2 L NS in the ED  - TTE 12/10: EF improved to 45%, no new rWMA or valvular abnormalities  - Blood culture with NGTD, UA ordered  -  Baseline weight ~120 lbs, admission weight 127.3 lbs (after 2 L NS)  - Volume status: euvolemic  - GDMT  - ACE/ARB/ARNI: Holding PTA Entresto due to THOMAS/HoTN  - BB: Resume PTA metoprolol at decreased dose of 25 mg daily  - MRA: Holding PTA spironolactone due to THOMAS/HoTN  - SGLT2i: Resume  PTA empagliflozin   - Patient requested treadmill stress test for increasing JEFFERSON when going uphill   12/9: 75% max predicted HR, normal HR and BP response to exercise, no reports of angina, no ECG evidence of ischemia, occasional PVCs  - [facility] follow up 12/18 with labs. If labs and blood pressure improved, would recommend resuming GDMT     # THOMAS on CKD3, improving  Cr 1.8 on admission, increased from 1.02 four days ago. Baseline ~1. Suspect hypovolemia from decreased PO intake x 1 week iso decreased taste. Cr improved to 1.23 s/p 2 L NS in the ED.  - BMP 1 week     # Lack of taste  Patient reporting decreased taste for the past week.  However, tonight, she notes that her [employer] salad was very flavorful.  Ddx includes normal aging, DM, and B12 and zinc deficiencies.  - COVID test negative  - patient states symptoms improved with rehydration     # DM type II  A1c 8.6 on 12/4/24.   - Resume PTA metformin and glipizide  - Continue PCP follow up     Pertinent Procedures:  1. Exercise stress test    Consults:  None    Medication Changes:  See below     Discharge medications:   Current Discharge Medication List        CONTINUE these medications which have CHANGED    Details   metoprolol succinate ER (TOPROL XL) 50 MG 24 hr tablet Take 0.5 tablets (25 mg) by mouth daily.    Associated Diagnoses: S/P CABG (coronary artery bypass graft)           CONTINUE these medications which have NOT CHANGED    Details   ALPRAZolam (XANAX) 0.25 MG tablet Take 1 tablet (0.25 mg) by mouth 3 times daily as needed for anxiety  Qty: 10 tablet, Refills: 0    Associated Diagnoses: Anxiety      aspirin 81 MG EC tablet Take 81 mg by mouth every evening       atorvastatin (LIPITOR) 80 MG tablet TAKE 1 TABLET BY MOUTH DAILY  Qty: 90 tablet, Refills: 0    Associated Diagnoses: S/P CABG (coronary artery bypass graft)      Continuous Blood Gluc Sensor (FREESTYLE VANESSA 14 DAY SENSOR) MISC Change every 14 days.  Qty: 2 each, Refills: 0    Associated Diagnoses: Type 2 diabetes mellitus with other circulatory complication, without long-term current use of insulin (H); Hypoglycemia      empagliflozin (JARDIANCE) 25 MG TABS tablet Take 1 tablet (25 mg) by mouth daily  Qty: 90 tablet, Refills: 1    Associated Diagnoses: Type 2 diabetes mellitus with stage 3a chronic kidney disease, without long-term current use of insulin (H); Type 2 diabetes mellitus with diabetic neuropathy, without long-term current use of insulin (H)      FLUoxetine (PROZAC) 20 MG capsule Take 2 capsules (40 mg) by mouth daily  Qty: 180 capsule, Refills: 1    Associated Diagnoses: Major depressive disorder, recurrent episode, mild (H)      glipiZIDE (GLUCOTROL XL) 2.5 MG 24 hr tablet 3 tablets daily with Food  Qty: 270 tablet, Refills: 0    Associated Diagnoses: Type 2 diabetes mellitus with other circulatory complication, without long-term current use of insulin (H)      !! metFORMIN (GLUCOPHAGE XR) 500 MG 24 hr tablet Take 500 mg by mouth every morning. Take 1 tab of 500 mg every morning      !! metFORMIN (GLUCOPHAGE XR) 500 MG 24 hr tablet Take 500 mg by mouth daily (with dinner). Take 2 tabs of 500 mg (total 1000 mg) daily with supper.      MULTIPLE VITAMIN OR TABS Take by mouth every morning      VITAMIN D PO Take by mouth.       !! - Potential duplicate medications found. Please discuss with provider.        STOP taking these medications       sacubitril-valsartan (ENTRESTO)  MG per tablet Comments:   Reason for Stopping:         spironolactone (ALDACTONE) 25 MG tablet Comments:   Reason for Stopping:               Follow-up:  [facility] (Mena Marie NP) 12/18/24    Labs or imaging requiring follow-up  after discharge:  [facility]    Code status:  FULL    Condition on discharge  Temp:  [97.8  F (36.6  C)-98.4  F (36.9  C)] 98  F (36.7  C)  Pulse:  [54-75] 58  Resp:  [16-18] 16  BP: ()/(41-82) 120/64  SpO2:  [95 %-100 %] 100 %  General: Alert, interactive, no acute distress  HEENT: Normocephalic, atraumatic. Sclera anicteric.   Neck: JVP not elevated  Cardiovascular: Regular rate and rhythm, normal S1 and S2, no murmurs, gallops, or rubs. Radial and pedal pulses palpable bilaterally.   Resp: Regular work of breathing on room air. Clear to auscultation bilaterally, no rales, wheezes, or rhonchi  GI: Soft, nontender, nondistended.   Extremities: no edema, no cyanosis or clubbing, warm and well perfused  Skin: Warm and dry, no jaundice or rash  Neuro: Alert and oriented x 3. CN 2-12 intact, moves all extremities equally, normal speech  Psych: Mood and affect are appropriate    Imaging with results:  EKG 12/10/24:      Echocardiogram 12/10/24:  Interpretation Summary  Left ventricular function is decreased. The ejection fraction is 45% (mildly  reduced). The basal-mid inferior and inferoseptal segments are akinetic  Global right ventricular function is normal.  No significant valvular abnormalities abnormalities present.  Estimated mean right atrial pressure is normal.  No pericardial effusion is present.    Exercise Stress Test 12/10/24:  Interpretation Summary     Submaximal ECG stress test     75% maximal predicted HR achieved. The test was terminated due to general  fatigue.     Normal heart rate response to exercise. Normal blood pressure response to  exercise.     The patient did not report angina during exercise.     No ECG evidence of ischemia. Non specific ST-T changes at baseline remained  unchanged with stress. Occasional premature ventricular complexes noted during  stress test.     Good exercise tolerance.      Recent Labs   Lab 12/10/24  0747 12/10/24  0633 12/09/24  1912 12/09/24  1505 12/06/24  1057  12/04/24  1248     --  135 133*  --  139   POTASSIUM 4.8  --  3.7 4.7  --  4.6   CHLORIDE 106  --  101 98  --  103   CO2 23  --  21* 20*  --  24   ANIONGAP 11  --  13 15  --  12   *  --  110* 204*  --  119*   BUN 47.8*  --  55.3* 60.1*  --  34.5*   CR 1.23*  --  1.56* 1.81* 1.0 1.02*   GFRESTIMATED 47*  --  35* 30* 60* 59*   POONAM 9.6  --  9.2 9.6  --  9.3   MAG 2.0 2.0 1.8  --   --   --          Patient Care Team:  Roxanna Otoole MD as PCP - General (Family Medicine)  Roxanna Otoole MD as Assigned PCP  Regina Sutherland as Diabetes Educator (Dietitian, Registered)  Dasia Amin, RN as Specialty Care Coordinator (Cardiology)  Belinda Colon APRN CNP as Nurse Practitioner (Cardiovascular Disease)  Kris Steve MD as MD (Cardiovascular Disease)  Belinda Colon APRN CNP as Assigned Heart and Vascular Provider  Evette Castro MD as MD (Infectious Diseases)  Sarah Isidro, IKER as Specialty Care Coordinator (Cardiology)  Too Morrow MD as MD (Cardiovascular Disease)  Cherie Lira APRN CNP as Nurse Practitioner (Cardiovascular Disease)  No Ref-Primary, Physician  Libia Santoro, PharmD as Pharmacist (Pharmacist)  Latrell Valentino [facility] as Pharmacist (Pharmacist Clinician- Clinical Pharmacy Specialist)  Migdalia Sewnson [facility] as Pharmacist (Pharmacist)  Hernando León [facility] as Pharmacist (Pharmacist)    Time Spent on this Encounter   I, OSCAR Betts CNP, personally saw the patient today and spent greater than 30 minutes discharging this patient.    >30 minutes spent in discharge, including >50% in counseling and coordination of care, medication review and plan of care recommended on follow up. Questions were answered.   It was our pleasure to care for Marianne Monroy during this hospitalization. Please do not hesitate to contact me should there be questions regarding the hospital course or discharge plan.    Patient discussed with staff cardiologist,   Lianet, who agrees with the above documentation and plan. Documentation represents joint decision making.     OSCAR Pham, CNP  [facility]

## 2024-12-11 ENCOUNTER — TELEPHONE (OUTPATIENT)
Dept: FAMILY MEDICINE | Facility: CLINIC | Age: 69
End: 2024-12-11
Payer: COMMERCIAL

## 2024-12-11 NOTE — TELEPHONE ENCOUNTER
Roxanna Otoole MD  12/9/2024  4:39 PM CST Back to Top      Pt has Not been compliant with follow up  Called pt last week and advised go to ERif any worse  She still has not made appointment with Cardiology  I send a Note to Cardiology to see pt  Pt has been advised to go to ER if any worse    Roxanna Otoole MD  12/6/2024  1:54 PM CST       Call pt to increase Glipizide to 3 tablets daily  Check accuchecks  If still over 180 increase to 10 mg daily  Strict Diabetic diet  Take with food    Ashley Ennis RN  12/6/2024 11:02 AM CST       Pt has [facility] New Card on 12/9/24. Also got pt scheduled with Mena Marie on 12/18/24.     Ashely Otoole MD  12/5/2024  6:01 PM CST       Please see labs  Can you see her  Roxanna Otoole MD          Patient Communication     Append Comments   Seen Back to Top    Pt advised to take Lasix 40 mg BID x 3 days and then 40 mg daily after  See Cardiology as recommended   Written by Roxanna Otoole MD on 12/5/2024  6:01 PM CST  Seen by patient Marianne Monroy on 12/9/2024  6:37 PM

## 2024-12-11 NOTE — TELEPHONE ENCOUNTER
Spoke with pt. She is scheduled with cardiology.     Notified of glipizide. Per patient this morning 10-11 am after eating BG was 350, but right now she checked at BG at 137.     Over last seven days fasting BG has been over 180. She will start taking the glipizide 3 tabs daily and will notify if fasting BG still over 180 over next couple of weeks.     Thanks,  IKER Mario  [facility]

## 2024-12-12 ENCOUNTER — PATIENT OUTREACH (OUTPATIENT)
Dept: CARE COORDINATION | Facility: CLINIC | Age: 69
End: 2024-12-12
Payer: COMMERCIAL

## 2024-12-12 DIAGNOSIS — R06.09 DOE (DYSPNEA ON EXERTION): Primary | ICD-10-CM

## 2024-12-12 LAB — BACTERIA BLD CULT: NORMAL

## 2024-12-12 NOTE — PROGRESS NOTES
[facility]    Background: Transitional Care Management program identified per system criteria and reviewed by [facility] team for possible outreach.    Assessment: Upon chart review, CCR Team member will not proceed with patient outreach related to this episode of Transitional Care Management program due to reason below:    Patient has active communication with a nurse, provider or care team for reason of post-hospital follow up plan.  Outreach call by CCRC team not indicated to minimize duplicative efforts.     Plan: Transitional Care Management episode addressed appropriately per reason noted above.      KUSUM Babb  [facility]    *Connected Care Resource Team does NOT follow patient ongoing. Referrals are identified based on internal discharge reports and the outreach is to ensure patient has an understanding of their discharge instructions.

## 2024-12-14 LAB — BACTERIA BLD CULT: NO GROWTH

## 2024-12-18 ENCOUNTER — MYC MEDICAL ADVICE (OUTPATIENT)
Dept: CARDIOLOGY | Facility: CLINIC | Age: 69
End: 2024-12-18

## 2024-12-18 DIAGNOSIS — I50.22 CHRONIC SYSTOLIC HEART FAILURE (H): Primary | ICD-10-CM

## 2024-12-18 NOTE — TELEPHONE ENCOUNTER
Called pt and got her scheduled to see Belinda at the [facility] on Friday 12/20 with labs prior for pt preference to stay with Belinda.  Cancelled Mena SIMPSON appt for today.     Ashley Ennis RN

## 2024-12-20 ENCOUNTER — LAB (OUTPATIENT)
Dept: LAB | Facility: CLINIC | Age: 69
End: 2024-12-20
Payer: COMMERCIAL

## 2024-12-20 DIAGNOSIS — R06.02 SHORTNESS OF BREATH: ICD-10-CM

## 2024-12-20 DIAGNOSIS — E11.9 TYPE 2 DIABETES MELLITUS WITHOUT COMPLICATION, WITHOUT LONG-TERM CURRENT USE OF INSULIN (H): ICD-10-CM

## 2024-12-20 DIAGNOSIS — I50.22 CHRONIC SYSTOLIC HEART FAILURE (H): ICD-10-CM

## 2024-12-20 LAB
ANION GAP SERPL CALCULATED.3IONS-SCNC: 11 MMOL/L (ref 7–15)
BUN SERPL-MCNC: 29 MG/DL (ref 8–23)
CALCIUM SERPL-MCNC: 9.2 MG/DL (ref 8.8–10.4)
CHLORIDE SERPL-SCNC: 103 MMOL/L (ref 98–107)
CREAT SERPL-MCNC: 0.87 MG/DL (ref 0.51–0.95)
EGFRCR SERPLBLD CKD-EPI 2021: 71 ML/MIN/1.73M2
GLUCOSE SERPL-MCNC: 223 MG/DL (ref 70–99)
HCO3 SERPL-SCNC: 26 MMOL/L (ref 22–29)
IRON BINDING CAPACITY (ROCHE): 351 UG/DL (ref 240–430)
IRON SATN MFR SERPL: 6 % (ref 15–46)
IRON SERPL-MCNC: 20 UG/DL (ref 37–145)
POTASSIUM SERPL-SCNC: 5 MMOL/L (ref 3.4–5.3)
SODIUM SERPL-SCNC: 140 MMOL/L (ref 135–145)

## 2024-12-20 PROCEDURE — 83550 IRON BINDING TEST: CPT | Performed by: PATHOLOGY

## 2024-12-20 PROCEDURE — 83540 ASSAY OF IRON: CPT | Performed by: PATHOLOGY

## 2024-12-20 PROCEDURE — 36415 COLL VENOUS BLD VENIPUNCTURE: CPT | Performed by: PATHOLOGY

## 2024-12-20 PROCEDURE — 80048 BASIC METABOLIC PNL TOTAL CA: CPT | Performed by: PATHOLOGY

## 2025-01-02 ENCOUNTER — MYC MEDICAL ADVICE (OUTPATIENT)
Dept: FAMILY MEDICINE | Facility: CLINIC | Age: 70
End: 2025-01-02
Payer: COMMERCIAL

## 2025-01-08 NOTE — TELEPHONE ENCOUNTER
I called Jody to let her know that the lab last week was for recheck after restarting Entresto. LVM asking her to reschedule lab appt ASAP.    Dasia Amin RN

## 2025-01-11 DIAGNOSIS — I50.20 HFREF (HEART FAILURE WITH REDUCED EJECTION FRACTION) (H): ICD-10-CM

## 2025-01-16 RX ORDER — SPIRONOLACTONE 25 MG/1
12.5 TABLET ORAL DAILY
Qty: 45 TABLET | Refills: 3 | Status: SHIPPED | OUTPATIENT
Start: 2025-01-16

## 2025-01-22 ENCOUNTER — OFFICE VISIT (OUTPATIENT)
Dept: FAMILY MEDICINE | Facility: CLINIC | Age: 70
End: 2025-01-22
Payer: COMMERCIAL

## 2025-01-22 VITALS
SYSTOLIC BLOOD PRESSURE: 100 MMHG | TEMPERATURE: 97.2 F | BODY MASS INDEX: 23.05 KG/M2 | RESPIRATION RATE: 16 BRPM | OXYGEN SATURATION: 99 % | DIASTOLIC BLOOD PRESSURE: 61 MMHG | HEART RATE: 58 BPM | WEIGHT: 135 LBS | HEIGHT: 64 IN

## 2025-01-22 DIAGNOSIS — F41.1 GENERALIZED ANXIETY DISORDER: ICD-10-CM

## 2025-01-22 DIAGNOSIS — I10 HYPERTENSION, GOAL BELOW 140/90: ICD-10-CM

## 2025-01-22 DIAGNOSIS — E11.22 TYPE 2 DIABETES MELLITUS WITH STAGE 3B CHRONIC KIDNEY DISEASE, WITHOUT LONG-TERM CURRENT USE OF INSULIN (H): ICD-10-CM

## 2025-01-22 DIAGNOSIS — Z95.810 ICD (IMPLANTABLE CARDIOVERTER-DEFIBRILLATOR) IN PLACE: ICD-10-CM

## 2025-01-22 DIAGNOSIS — Z78.0 ASYMPTOMATIC POSTMENOPAUSAL STATUS: ICD-10-CM

## 2025-01-22 DIAGNOSIS — Z87.81 HISTORY OF COMPRESSION FRACTURE OF SPINE: ICD-10-CM

## 2025-01-22 DIAGNOSIS — F33.0 MAJOR DEPRESSIVE DISORDER, RECURRENT EPISODE, MILD: ICD-10-CM

## 2025-01-22 DIAGNOSIS — E11.40 TYPE 2 DIABETES MELLITUS WITH DIABETIC NEUROPATHY, WITHOUT LONG-TERM CURRENT USE OF INSULIN (H): ICD-10-CM

## 2025-01-22 DIAGNOSIS — E78.5 HYPERLIPIDEMIA LDL GOAL <70: ICD-10-CM

## 2025-01-22 DIAGNOSIS — Z00.00 ENCOUNTER FOR MEDICARE ANNUAL WELLNESS EXAM: Primary | ICD-10-CM

## 2025-01-22 DIAGNOSIS — N18.32 STAGE 3B CHRONIC KIDNEY DISEASE (H): ICD-10-CM

## 2025-01-22 DIAGNOSIS — E11.59 TYPE 2 DIABETES MELLITUS WITH OTHER CIRCULATORY COMPLICATION, WITHOUT LONG-TERM CURRENT USE OF INSULIN (H): ICD-10-CM

## 2025-01-22 DIAGNOSIS — I47.29 PAROXYSMAL VENTRICULAR TACHYCARDIA (H): ICD-10-CM

## 2025-01-22 DIAGNOSIS — I50.20 HFREF (HEART FAILURE WITH REDUCED EJECTION FRACTION) (H): ICD-10-CM

## 2025-01-22 DIAGNOSIS — Z95.1 S/P CABG (CORONARY ARTERY BYPASS GRAFT): ICD-10-CM

## 2025-01-22 DIAGNOSIS — N18.32 TYPE 2 DIABETES MELLITUS WITH STAGE 3B CHRONIC KIDNEY DISEASE, WITHOUT LONG-TERM CURRENT USE OF INSULIN (H): ICD-10-CM

## 2025-01-22 PROBLEM — D69.1 QUALITATIVE PLATELET DEFECTS (H): Status: RESOLVED | Noted: 2025-01-22 | Resolved: 2025-01-22

## 2025-01-22 PROBLEM — D69.1 QUALITATIVE PLATELET DEFECTS (H): Status: ACTIVE | Noted: 2025-01-22

## 2025-01-22 PROBLEM — I47.20 PAROXYSMAL VENTRICULAR TACHYCARDIA (H): Status: ACTIVE | Noted: 2025-01-22

## 2025-01-22 PROCEDURE — 36415 COLL VENOUS BLD VENIPUNCTURE: CPT | Performed by: FAMILY MEDICINE

## 2025-01-22 PROCEDURE — G2211 COMPLEX E/M VISIT ADD ON: HCPCS | Performed by: FAMILY MEDICINE

## 2025-01-22 PROCEDURE — 99214 OFFICE O/P EST MOD 30 MIN: CPT | Mod: 25 | Performed by: FAMILY MEDICINE

## 2025-01-22 PROCEDURE — G0439 PPPS, SUBSEQ VISIT: HCPCS | Performed by: FAMILY MEDICINE

## 2025-01-22 PROCEDURE — 80061 LIPID PANEL: CPT | Performed by: FAMILY MEDICINE

## 2025-01-22 RX ORDER — ATORVASTATIN CALCIUM 80 MG/1
TABLET, FILM COATED ORAL
Qty: 90 TABLET | Refills: 3 | Status: SHIPPED | OUTPATIENT
Start: 2025-01-22

## 2025-01-22 RX ORDER — GLIPIZIDE 2.5 MG/1
TABLET, EXTENDED RELEASE ORAL
Qty: 270 TABLET | Refills: 0 | Status: SHIPPED | OUTPATIENT
Start: 2025-01-22

## 2025-01-22 RX ORDER — METFORMIN HYDROCHLORIDE 500 MG/1
1500 TABLET, EXTENDED RELEASE ORAL
Qty: 270 TABLET | Refills: 1 | Status: SHIPPED | OUTPATIENT
Start: 2025-01-22

## 2025-01-22 SDOH — HEALTH STABILITY: PHYSICAL HEALTH: ON AVERAGE, HOW MANY DAYS PER WEEK DO YOU ENGAGE IN MODERATE TO STRENUOUS EXERCISE (LIKE A BRISK WALK)?: 7 DAYS

## 2025-01-22 SDOH — HEALTH STABILITY: PHYSICAL HEALTH: ON AVERAGE, HOW MANY MINUTES DO YOU ENGAGE IN EXERCISE AT THIS LEVEL?: 50 MIN

## 2025-01-22 ASSESSMENT — PATIENT HEALTH QUESTIONNAIRE - PHQ9
SUM OF ALL RESPONSES TO PHQ QUESTIONS 1-9: 5
10. IF YOU CHECKED OFF ANY PROBLEMS, HOW DIFFICULT HAVE THESE PROBLEMS MADE IT FOR YOU TO DO YOUR WORK, TAKE CARE OF THINGS AT HOME, OR GET ALONG WITH OTHER PEOPLE: SOMEWHAT DIFFICULT

## 2025-01-22 ASSESSMENT — SOCIAL DETERMINANTS OF HEALTH (SDOH): HOW OFTEN DO YOU GET TOGETHER WITH FRIENDS OR RELATIVES?: ONCE A WEEK

## 2025-01-22 NOTE — PATIENT INSTRUCTIONS
Patient Education   Preventive Care Advice   This is general advice given by our system to help you stay healthy. However, your care team may have specific advice just for you. Please talk to your care team about your preventive care needs.  Nutrition  Eat 5 or more servings of fruits and vegetables each day.  Try wheat bread, brown rice and whole grain pasta (instead of white bread, rice, and pasta).  Get enough calcium and vitamin D. Check the label on foods and aim for 100% of the RDA (recommended daily allowance).  Lifestyle  Exercise at least 150 minutes each week  (30 minutes a day, 5 days a week).  Do muscle strengthening activities 2 days a week. These help control your weight and prevent disease.  No smoking.  Wear sunscreen to prevent skin cancer.  Have a dental exam and cleaning every 6 months.  Yearly exams  See your health care team every year to talk about:  Any changes in your health.  Any medicines your care team has prescribed.  Preventive care, family planning, and ways to prevent chronic diseases.  Shots (vaccines)   HPV shots (up to age 26), if you've never had them before.  Hepatitis B shots (up to age 59), if you've never had them before.  COVID-19 shot: Get this shot when it's due.  Flu shot: Get a flu shot every year.  Tetanus shot: Get a tetanus shot every 10 years.  Pneumococcal, hepatitis A, and RSV shots: Ask your care team if you need these based on your risk.  Shingles shot (for age 50 and up)  General health tests  Diabetes screening:  Starting at age 35, Get screened for diabetes at least every 3 years.  If you are younger than age 35, ask your care team if you should be screened for diabetes.  Cholesterol test: At age 39, start having a cholesterol test every 5 years, or more often if advised.  Bone density scan (DEXA): At age 50, ask your care team if you should have this scan for osteoporosis (brittle bones).  Hepatitis C: Get tested at least once in your life.  STIs (sexually  transmitted infections)  Before age 24: Ask your care team if you should be screened for STIs.  After age 24: Get screened for STIs if you're at risk. You are at risk for STIs (including HIV) if:  You are sexually active with more than one person.  You don't use condoms every time.  You or a partner was diagnosed with a sexually transmitted infection.  If you are at risk for HIV, ask about PrEP medicine to prevent HIV.  Get tested for HIV at least once in your life, whether you are at risk for HIV or not.  Cancer screening tests  Cervical cancer screening: If you have a cervix, begin getting regular cervical cancer screening tests starting at age 21.  Breast cancer scan (mammogram): If you've ever had breasts, begin having regular mammograms starting at age 40. This is a scan to check for breast cancer.  Colon cancer screening: It is important to start screening for colon cancer at age 45.  Have a colonoscopy test every 10 years (or more often if you're at risk) Or, ask your provider about stool tests like a FIT test every year or Cologuard test every 3 years.  To learn more about your testing options, visit:   .  For help making a decision, visit:   https://bit.ly/on81203.  Prostate cancer screening test: If you have a prostate, ask your care team if a prostate cancer screening test (PSA) at age 55 is right for you.  Lung cancer screening: If you are a current or former smoker ages 50 to 80, ask your care team if ongoing lung cancer screenings are right for you.  For informational purposes only. Not to replace the advice of your health care provider. Copyright   2023 [facility]. All rights reserved. Clinically reviewed by the [facility] Transitions Program. [employer] [id number] - REV 01/24.  Preventing Falls: Care Instructions  Injuries and health problems such as trouble walking or poor eyesight can increase your risk of falling. So can some medicines. But there are things you can do to help  "prevent falls. You can exercise to get stronger. You can also arrange your home to make it safer.    Talk to your doctor about the medicines you take. Ask if any of them increase the risk of falls and whether they can be changed or stopped.   Try to exercise regularly. It can help improve your strength and balance. This can help lower your risk of falling.         Practice fall safety and prevention.   Wear low-heeled shoes that fit well and give your feet good support. Talk to your doctor if you have foot problems that make this hard.  Carry a cellphone or wear a medical alert device that you can use to call for help.  Use stepladders instead of chairs to reach high objects. Don't climb if you're at risk for falls. Ask for help, if needed.  Wear the correct eyeglasses, if you need them.        Make your home safer.   Remove rugs, cords, clutter, and furniture from walkways.  Keep your house well lit. Use night-lights in hallways and bathrooms.  Install and use sturdy handrails on stairways.  Wear nonskid footwear, even inside. Don't walk barefoot or in socks without shoes.        Be safe outside.   Use handrails, curb cuts, and ramps whenever possible.  Keep your hands free by using a shoulder bag or backpack.  Try to walk in well-lit areas. Watch out for uneven ground, changes in pavement, and debris.  Be careful in the winter. Walk on the grass or gravel when sidewalks are slippery. Use de-icer on steps and walkways. Add non-slip devices to shoes.    Put grab bars and nonskid mats in your shower or tub and near the toilet. Try to use a shower chair or bath bench when bathing.   Get into a tub or shower by putting in your weaker leg first. Get out with your strong side first. Have a phone or medical alert device in the bathroom with you.   Where can you learn more?  Go to https://www.Proficientwise.net/patiented  Enter G117 in the search box to learn more about \"Preventing Falls: Care Instructions.\"  Current as of: " July 31, 2024  Content Version: 14.3    2024 [employer].   Care instructions adapted under license by your healthcare professional. If you have questions about a medical condition or this instruction, always ask your healthcare professional. [employer] disclaims any warranty or liability for your use of this information.    Hearing Loss: Care Instructions  Overview     Hearing loss is a sudden or slow decrease in how well you hear. It can range from slight to profound. Permanent hearing loss can occur with aging. It also can happen when you are exposed long-term to loud noise. Examples include listening to loud music, riding motorcycles, or being around other loud machines.  Hearing loss can affect your work and home life. It can make you feel lonely or depressed. You may feel that you have lost your independence. But hearing aids and other devices can help you hear better and feel connected to others.  Follow-up care is a key part of your treatment and safety. Be sure to make and go to all appointments, and call your doctor if you are having problems. It's also a good idea to know your test results and keep a list of the medicines you take.  How can you care for yourself at home?  Avoid loud noises whenever possible. This helps keep your hearing from getting worse.  Always wear hearing protection around loud noises.  Wear a hearing aid as directed.  A professional can help you pick a hearing aid that will work best for you.  You can also get hearing aids over the counter for mild to moderate hearing loss.  Have hearing tests as your doctor suggests. They can show whether your hearing has changed. Your hearing aid may need to be adjusted.  Use other devices as needed. These may include:  Telephone amplifiers and hearing aids that can connect to a television, stereo, radio, or microphone.  Devices that use lights or vibrations. These alert you to the doorbell, a ringing telephone, or a baby  "monitor.  Television closed-captioning. This shows the words at the bottom of the screen. Most new TVs can do this.  TTY (text telephone). This lets you type messages back and forth on the telephone instead of talking or listening. These devices are also called TDD. When messages are typed on the keyboard, they are sent over the phone line to a receiving TTY. The message is shown on a monitor.  Use text messaging, social media, and email if it is hard for you to communicate by telephone.  Try to learn a listening technique called speechreading. It is not lipreading. You pay attention to people's gestures, expressions, posture, and tone of voice. These clues can help you understand what a person is saying. Face the person you are talking to, and have them face you. Make sure the lighting is good. You need to see the other person's face clearly.  Think about counseling if you need help to adjust to your hearing loss.  When should you call for help?  Watch closely for changes in your health, and be sure to contact your doctor if:    You think your hearing is getting worse.     You have new symptoms, such as dizziness or nausea.   Where can you learn more?  Go to https://www.[employer].net/patiented  Enter R798 in the search box to learn more about \"Hearing Loss: Care Instructions.\"  Current as of: September 27, 2023  Content Version: 14.3    2024 [employer].   Care instructions adapted under license by your healthcare professional. If you have questions about a medical condition or this instruction, always ask your healthcare professional. [employer] disclaims any warranty or liability for your use of this information.    Learning About Sleeping Well  What does sleeping well mean?     Sleeping well means getting enough sleep to feel good and stay healthy. How much sleep is enough varies among people.  The number of hours you sleep and how you feel when you wake up are both important. If you do " not feel refreshed, you probably need more sleep. Another sign of not getting enough sleep is feeling tired during the day.  Experts recommend that adults get at least 7 or more hours of sleep per day. Children and older adults need more sleep.  Why is getting enough sleep important?  Getting enough quality sleep is a basic part of good health. When your sleep suffers, your physical health, mood, and your thoughts can suffer too. You may find yourself feeling more grumpy or stressed. Not getting enough sleep also can lead to serious problems, including injury, accidents, anxiety, and depression.  What might cause poor sleeping?  Many things can cause sleep problems, including:  Changes to your sleep schedule.  Stress. Stress can be caused by fear about a single event, such as giving a speech. Or you may have ongoing stress, such as worry about work or school.  Depression, anxiety, and other mental or emotional conditions.  Changes in your sleep habits or surroundings. This includes changes that happen where you sleep, such as noise, light, or sleeping in a different bed. It also includes changes in your sleep pattern, such as having jet lag or working a late shift.  Health problems, such as pain, breathing problems, and restless legs syndrome.  Lack of regular exercise.  Using alcohol, nicotine, or caffeine before bed.  How can you help yourself?  Here are some tips that may help you sleep more soundly and wake up feeling more refreshed.  Your sleeping area   Use your bedroom only for sleeping and sex. A bit of light reading may help you fall asleep. But if it doesn't, do your reading elsewhere in the house. Try not to use your TV, computer, smartphone, or tablet while you are in bed.  Be sure your bed is big enough to stretch out comfortably, especially if you have a sleep partner.  Keep your bedroom quiet, dark, and cool. Use curtains, blinds, or a sleep mask to block out light. To block out noise, use earplugs,  "soothing music, or a \"white noise\" machine.  Your evening and bedtime routine   Create a relaxing bedtime routine. You might want to take a warm shower or bath, or listen to soothing music.  Go to bed at the same time every night. And get up at the same time every morning, even if you feel tired.  What to avoid   Limit caffeine (coffee, tea, caffeinated sodas) during the day, and don't have any for at least 6 hours before bedtime.  Avoid drinking alcohol before bedtime. Alcohol can cause you to wake up more often during the night.  Try not to smoke or use tobacco, especially in the evening. Nicotine can keep you awake.  Limit naps during the day, especially close to bedtime.  Avoid lying in bed awake for too long. If you can't fall asleep or if you wake up in the middle of the night and can't get back to sleep within about 20 minutes, get out of bed and go to another room until you feel sleepy.  Avoid taking medicine right before bed that may keep you awake or make you feel hyper or energized. Your doctor can tell you if your medicine may do this and if you can take it earlier in the day.  If you can't sleep   Imagine yourself in a peaceful, pleasant scene. Focus on the details and feelings of being in a place that is relaxing.  Get up and do a quiet or boring activity until you feel sleepy.  Avoid drinking any liquids before going to bed to help prevent waking up often to use the bathroom.  Where can you learn more?  Go to https://www.[employer].net/patiented  Enter J942 in the search box to learn more about \"Learning About Sleeping Well.\"  Current as of: July 31, 2024  Content Version: 14.3    2024 [employer].   Care instructions adapted under license by your healthcare professional. If you have questions about a medical condition or this instruction, always ask your healthcare professional. [employer] disclaims any warranty or liability for your use of this information.    Learning About " Depression Screening  What is depression screening?  Depression screening is a way to see if you have depression symptoms. It may be done by a doctor or counselor. It's often part of a routine checkup. That's because your mental health is just as important as your physical health.  Depression is a mental health condition that affects how you feel, think, and act. You may:  Have less energy.  Lose interest in your daily activities.  Feel sad and grouchy for a long time.  Depression is very common. It affects people of all ages.  Many things can lead to depression. Some people become depressed after they have a stroke or find out they have a major illness like cancer or heart disease. The death of a loved one or a breakup may lead to depression. It can run in families. Most experts believe that a combination of inherited genes and stressful life events can cause it.  What happens during screening?  You may be asked to fill out a form about your depression symptoms. You and the doctor will discuss your answers. The doctor may ask you more questions to learn more about how you think, act, and feel.  What happens after screening?  If you have symptoms of depression, your doctor will talk to you about your options.  Doctors usually treat depression with medicines or counseling. Often, combining the two works best. Many people don't get help because they think that they'll get over the depression on their own. But people with depression may not get better unless they get treatment.  The cause of depression is not well understood. There may be many factors involved. But if you have depression, it's not your fault.  A serious symptom of depression is thinking about death or suicide. If you or someone you care about talks about this or about feeling hopeless, get help right away.  It's important to know that depression can be treated. Medicine, counseling, and self-care may help.  Where can you learn more?  Go to  "https://www.[employer].net/patiented  Enter T185 in the search box to learn more about \"Learning About Depression Screening.\"  Current as of: July 31, 2024  Content Version: 14.3    2024 [employer].   Care instructions adapted under license by your healthcare professional. If you have questions about a medical condition or this instruction, always ask your healthcare professional. [employer] disclaims any warranty or liability for your use of this information.       Patient Education   Preventive Care Advice   This is general advice given by our system to help you stay healthy. However, your care team may have specific advice just for you. Please talk to your care team about your preventive care needs.  Nutrition  Eat 5 or more servings of fruits and vegetables each day.  Try wheat bread, brown rice and whole grain pasta (instead of white bread, rice, and pasta).  Get enough calcium and vitamin D. Check the label on foods and aim for 100% of the RDA (recommended daily allowance).  Lifestyle  Exercise at least 150 minutes each week  (30 minutes a day, 5 days a week).  Do muscle strengthening activities 2 days a week. These help control your weight and prevent disease.  No smoking.  Wear sunscreen to prevent skin cancer.  Have a dental exam and cleaning every 6 months.  Yearly exams  See your health care team every year to talk about:  Any changes in your health.  Any medicines your care team has prescribed.  Preventive care, family planning, and ways to prevent chronic diseases.  Shots (vaccines)   HPV shots (up to age 26), if you've never had them before.  Hepatitis B shots (up to age 59), if you've never had them before.  COVID-19 shot: Get this shot when it's due.  Flu shot: Get a flu shot every year.  Tetanus shot: Get a tetanus shot every 10 years.  Pneumococcal, hepatitis A, and RSV shots: Ask your care team if you need these based on your risk.  Shingles shot (for age 50 and " up)  General health tests  Diabetes screening:  Starting at age 35, Get screened for diabetes at least every 3 years.  If you are younger than age 35, ask your care team if you should be screened for diabetes.  Cholesterol test: At age 39, start having a cholesterol test every 5 years, or more often if advised.  Bone density scan (DEXA): At age 50, ask your care team if you should have this scan for osteoporosis (brittle bones).  Hepatitis C: Get tested at least once in your life.  STIs (sexually transmitted infections)  Before age 24: Ask your care team if you should be screened for STIs.  After age 24: Get screened for STIs if you're at risk. You are at risk for STIs (including HIV) if:  You are sexually active with more than one person.  You don't use condoms every time.  You or a partner was diagnosed with a sexually transmitted infection.  If you are at risk for HIV, ask about PrEP medicine to prevent HIV.  Get tested for HIV at least once in your life, whether you are at risk for HIV or not.  Cancer screening tests  Cervical cancer screening: If you have a cervix, begin getting regular cervical cancer screening tests starting at age 21.  Breast cancer scan (mammogram): If you've ever had breasts, begin having regular mammograms starting at age 40. This is a scan to check for breast cancer.  Colon cancer screening: It is important to start screening for colon cancer at age 45.  Have a colonoscopy test every 10 years (or more often if you're at risk) Or, ask your provider about stool tests like a FIT test every year or Cologuard test every 3 years.  To learn more about your testing options, visit:   .  For help making a decision, visit:   https://bit.ly/bp90915.  Prostate cancer screening test: If you have a prostate, ask your care team if a prostate cancer screening test (PSA) at age 55 is right for you.  Lung cancer screening: If you are a current or former smoker ages 50 to 80, ask your care team if ongoing  lung cancer screenings are right for you.  For informational purposes only. Not to replace the advice of your health care provider. Copyright   2023 [facility]. All rights reserved. Clinically reviewed by the [facility] Transitions Program. [employer] [id number] - REV 01/24.  Preventing Falls: Care Instructions  Injuries and health problems such as trouble walking or poor eyesight can increase your risk of falling. So can some medicines. But there are things you can do to help prevent falls. You can exercise to get stronger. You can also arrange your home to make it safer.    Talk to your doctor about the medicines you take. Ask if any of them increase the risk of falls and whether they can be changed or stopped.   Try to exercise regularly. It can help improve your strength and balance. This can help lower your risk of falling.         Practice fall safety and prevention.   Wear low-heeled shoes that fit well and give your feet good support. Talk to your doctor if you have foot problems that make this hard.  Carry a cellphone or wear a medical alert device that you can use to call for help.  Use stepladders instead of chairs to reach high objects. Don't climb if you're at risk for falls. Ask for help, if needed.  Wear the correct eyeglasses, if you need them.        Make your home safer.   Remove rugs, cords, clutter, and furniture from walkways.  Keep your house well lit. Use night-lights in hallways and bathrooms.  Install and use sturdy handrails on stairways.  Wear nonskid footwear, even inside. Don't walk barefoot or in socks without shoes.        Be safe outside.   Use handrails, curb cuts, and ramps whenever possible.  Keep your hands free by using a shoulder bag or backpack.  Try to walk in well-lit areas. Watch out for uneven ground, changes in pavement, and debris.  Be careful in the winter. Walk on the grass or gravel when sidewalks are slippery. Use de-icer on steps and walkways. Add  "non-slip devices to shoes.    Put grab bars and nonskid mats in your shower or tub and near the toilet. Try to use a shower chair or bath bench when bathing.   Get into a tub or shower by putting in your weaker leg first. Get out with your strong side first. Have a phone or medical alert device in the bathroom with you.   Where can you learn more?  Go to https://www.[employer].net/patiented  Enter G117 in the search box to learn more about \"Preventing Falls: Care Instructions.\"  Current as of: July 31, 2024  Content Version: 14.3    2024 [employer].   Care instructions adapted under license by your healthcare professional. If you have questions about a medical condition or this instruction, always ask your healthcare professional. [employer] disclaims any warranty or liability for your use of this information.    Hearing Loss: Care Instructions  Overview     Hearing loss is a sudden or slow decrease in how well you hear. It can range from slight to profound. Permanent hearing loss can occur with aging. It also can happen when you are exposed long-term to loud noise. Examples include listening to loud music, riding motorcycles, or being around other loud machines.  Hearing loss can affect your work and home life. It can make you feel lonely or depressed. You may feel that you have lost your independence. But hearing aids and other devices can help you hear better and feel connected to others.  Follow-up care is a key part of your treatment and safety. Be sure to make and go to all appointments, and call your doctor if you are having problems. It's also a good idea to know your test results and keep a list of the medicines you take.  How can you care for yourself at home?  Avoid loud noises whenever possible. This helps keep your hearing from getting worse.  Always wear hearing protection around loud noises.  Wear a hearing aid as directed.  A professional can help you pick a hearing aid that " "will work best for you.  You can also get hearing aids over the counter for mild to moderate hearing loss.  Have hearing tests as your doctor suggests. They can show whether your hearing has changed. Your hearing aid may need to be adjusted.  Use other devices as needed. These may include:  Telephone amplifiers and hearing aids that can connect to a television, stereo, radio, or microphone.  Devices that use lights or vibrations. These alert you to the doorbell, a ringing telephone, or a baby monitor.  Television closed-captioning. This shows the words at the bottom of the screen. Most new TVs can do this.  TTY (text telephone). This lets you type messages back and forth on the telephone instead of talking or listening. These devices are also called TDD. When messages are typed on the keyboard, they are sent over the phone line to a receiving TTY. The message is shown on a monitor.  Use text messaging, social media, and email if it is hard for you to communicate by telephone.  Try to learn a listening technique called speechreading. It is not lipreading. You pay attention to people's gestures, expressions, posture, and tone of voice. These clues can help you understand what a person is saying. Face the person you are talking to, and have them face you. Make sure the lighting is good. You need to see the other person's face clearly.  Think about counseling if you need help to adjust to your hearing loss.  When should you call for help?  Watch closely for changes in your health, and be sure to contact your doctor if:    You think your hearing is getting worse.     You have new symptoms, such as dizziness or nausea.   Where can you learn more?  Go to https://www.[employer].net/patiented  Enter R798 in the search box to learn more about \"Hearing Loss: Care Instructions.\"  Current as of: September 27, 2023  Content Version: 14.3    2024 [employer].   Care instructions adapted under license by [facility] " professional. If you have questions about a medical condition or this instruction, always ask your healthcare professional. [employer] disclaims any warranty or liability for your use of this information.    Learning About Sleeping Well  What does sleeping well mean?     Sleeping well means getting enough sleep to feel good and stay healthy. How much sleep is enough varies among people.  The number of hours you sleep and how you feel when you wake up are both important. If you do not feel refreshed, you probably need more sleep. Another sign of not getting enough sleep is feeling tired during the day.  Experts recommend that adults get at least 7 or more hours of sleep per day. Children and older adults need more sleep.  Why is getting enough sleep important?  Getting enough quality sleep is a basic part of good health. When your sleep suffers, your physical health, mood, and your thoughts can suffer too. You may find yourself feeling more grumpy or stressed. Not getting enough sleep also can lead to serious problems, including injury, accidents, anxiety, and depression.  What might cause poor sleeping?  Many things can cause sleep problems, including:  Changes to your sleep schedule.  Stress. Stress can be caused by fear about a single event, such as giving a speech. Or you may have ongoing stress, such as worry about work or school.  Depression, anxiety, and other mental or emotional conditions.  Changes in your sleep habits or surroundings. This includes changes that happen where you sleep, such as noise, light, or sleeping in a different bed. It also includes changes in your sleep pattern, such as having jet lag or working a late shift.  Health problems, such as pain, breathing problems, and restless legs syndrome.  Lack of regular exercise.  Using alcohol, nicotine, or caffeine before bed.  How can you help yourself?  Here are some tips that may help you sleep more soundly and wake up feeling more  "refreshed.  Your sleeping area   Use your bedroom only for sleeping and sex. A bit of light reading may help you fall asleep. But if it doesn't, do your reading elsewhere in the house. Try not to use your TV, computer, smartphone, or tablet while you are in bed.  Be sure your bed is big enough to stretch out comfortably, especially if you have a sleep partner.  Keep your bedroom quiet, dark, and cool. Use curtains, blinds, or a sleep mask to block out light. To block out noise, use earplugs, soothing music, or a \"white noise\" machine.  Your evening and bedtime routine   Create a relaxing bedtime routine. You might want to take a warm shower or bath, or listen to soothing music.  Go to bed at the same time every night. And get up at the same time every morning, even if you feel tired.  What to avoid   Limit caffeine (coffee, tea, caffeinated sodas) during the day, and don't have any for at least 6 hours before bedtime.  Avoid drinking alcohol before bedtime. Alcohol can cause you to wake up more often during the night.  Try not to smoke or use tobacco, especially in the evening. Nicotine can keep you awake.  Limit naps during the day, especially close to bedtime.  Avoid lying in bed awake for too long. If you can't fall asleep or if you wake up in the middle of the night and can't get back to sleep within about 20 minutes, get out of bed and go to another room until you feel sleepy.  Avoid taking medicine right before bed that may keep you awake or make you feel hyper or energized. Your doctor can tell you if your medicine may do this and if you can take it earlier in the day.  If you can't sleep   Imagine yourself in a peaceful, pleasant scene. Focus on the details and feelings of being in a place that is relaxing.  Get up and do a quiet or boring activity until you feel sleepy.  Avoid drinking any liquids before going to bed to help prevent waking up often to use the bathroom.  Where can you learn more?  Go to " "https://www.[employer].net/patiented  Enter J942 in the search box to learn more about \"Learning About Sleeping Well.\"  Current as of: July 31, 2024  Content Version: 14.3    2024 [employer].   Care instructions adapted under license by your healthcare professional. If you have questions about a medical condition or this instruction, always ask your healthcare professional. [employer] disclaims any warranty or liability for your use of this information.    Learning About Depression Screening  What is depression screening?  Depression screening is a way to see if you have depression symptoms. It may be done by a doctor or counselor. It's often part of a routine checkup. That's because your mental health is just as important as your physical health.  Depression is a mental health condition that affects how you feel, think, and act. You may:  Have less energy.  Lose interest in your daily activities.  Feel sad and grouchy for a long time.  Depression is very common. It affects people of all ages.  Many things can lead to depression. Some people become depressed after they have a stroke or find out they have a major illness like cancer or heart disease. The death of a loved one or a breakup may lead to depression. It can run in families. Most experts believe that a combination of inherited genes and stressful life events can cause it.  What happens during screening?  You may be asked to fill out a form about your depression symptoms. You and the doctor will discuss your answers. The doctor may ask you more questions to learn more about how you think, act, and feel.  What happens after screening?  If you have symptoms of depression, your doctor will talk to you about your options.  Doctors usually treat depression with medicines or counseling. Often, combining the two works best. Many people don't get help because they think that they'll get over the depression on their own. But people with " "depression may not get better unless they get treatment.  The cause of depression is not well understood. There may be many factors involved. But if you have depression, it's not your fault.  A serious symptom of depression is thinking about death or suicide. If you or someone you care about talks about this or about feeling hopeless, get help right away.  It's important to know that depression can be treated. Medicine, counseling, and self-care may help.  Where can you learn more?  Go to https://www.[employer].net/patiented  Enter T185 in the search box to learn more about \"Learning About Depression Screening.\"  Current as of: July 31, 2024  Content Version: 14.3    2024 [employer].   Care instructions adapted under license by your healthcare professional. If you have questions about a medical condition or this instruction, always ask your healthcare professional. [employer] disclaims any warranty or liability for your use of this information.       "

## 2025-01-22 NOTE — PROGRESS NOTES
Preventive Care Visit  [facility] NARDA Otoole MD, Family Medicine  Jan 22, 2025      Assessment & Plan     Encounter for Medicare annual wellness exam      Type 2 diabetes mellitus with stage 3a chronic kidney disease, without long-term current use of insulin (H)  Still High  CGM ree  Advised increase Metformin dose as discussed   The potential side effects of this medication have been discussed with the patient.  Call if any significant problems with these are experienced.  Follow up 3 months  - Lipid panel reflex to direct LDL Non-fasting; Future  - Adult Diabetes Education  Referral; Future  - metFORMIN (GLUCOPHAGE XR) 500 MG 24 hr tablet; Take 3 tablets (1,500 mg) by mouth daily (with dinner). Take 2 tabs of 500 mg (total 1000 mg) daily with supper.  - empagliflozin (JARDIANCE) 25 MG TABS tablet; Take 1 tablet (25 mg) by mouth daily.  - Lipid panel reflex to direct LDL Non-fasting    HFrEF (heart failure with reduced ejection fraction) (H)  Stable on ,eds  Reviewed cardiology Notes     Major depressive disorder, recurrent episode, mild  Stable   - FLUoxetine (PROZAC) 20 MG capsule; Take 2 capsules (40 mg) by mouth daily.    ICD (implantable cardioverter-defibrillator) in place  Stable     Hyperlipidemia LDL goal <70  Doing well     Hypertension, goal below 140/90  Stable     Generalized anxiety disorder  On Prozac and doing well     S/P CABG (coronary artery bypass graft)    - atorvastatin (LIPITOR) 80 MG tablet; TAKE 1 TABLET BY MOUTH DAILY    History of compression fracture of spine  Pt has been advised meds for osteoporosis-she hs declined     Type 2 diabetes mellitus with other circulatory complication, without long-term current use of insulin (H)  Stable   - glipiZIDE (GLUCOTROL XL) 2.5 MG 24 hr tablet; 3 tablets daily with Food    Type 2 diabetes mellitus with diabetic neuropathy, without long-term current use of insulin (H)    - empagliflozin (JARDIANCE) 25 MG TABS tablet;  Take 1 tablet (25 mg) by mouth daily.    Asymptomatic postmenopausal status  Advised   - DX Bone Density; Future  Prevent falls  Take Vitamin D 1000 units daily  Consider meds for osteoporosis    Due to VT patient also had ICD placement post bypass surgery.   Counseling  Appropriate preventive services were addressed with this patient via screening, questionnaire, or discussion as appropriate for fall prevention, nutrition, physical activity, Tobacco-use cessation, social engagement, weight loss and cognition.  Checklist reviewing preventive services available has been given to the patient.  Reviewed patient's diet, addressing concerns and/or questions.   Discussed possible causes of fatigue. The patient was provided with written information regarding signs of hearing loss.       Regular exercise    Subjective   Jody is a 69 year old, presenting for the following:  Physical        1/22/2025    12:04 PM   Additional Questions   Roomed by Donna ALVES  Pt here for a physical    No Concerns     Health Care Directive  Patient does not have a Health Care Directive: Advance Directive received and scanned. Click on Code in the patient header to view.      1/22/2025   General Health   How would you rate your overall physical health? Good   Feel stress (tense, anxious, or unable to sleep) Only a little   (!) STRESS CONCERN      1/22/2025   Nutrition   Diet: Low salt    Diabetic       Multiple values from one day are sorted in reverse-chronological order         1/22/2025   Exercise   Days per week of moderate/strenous exercise 7 days   Average minutes spent exercising at this level 50 min         1/22/2025   Social Factors   Frequency of gathering with friends or relatives Once a week   Worry food won't last until get money to buy more No   Food not last or not have enough money for food? No   Do you have housing? (Housing is defined as stable permanent housing and does not include staying ouside in a car, in a  tent, in an abandoned building, in an overnight shelter, or couch-surfing.) Yes   Are you worried about losing your housing? No   Lack of transportation? No   Unable to get utilities (heat,electricity)? No         1/22/2025   Fall Risk   Fallen 2 or more times in the past year? No   Trouble with walking or balance? Yes   Gait Speed Test (Document in seconds) 3.7          1/22/2025   Activities of Daily Living- Home Safety   Needs help with the following daily activites None of the above   Safety concerns in the home None of the above         1/22/2025   Dental   Dentist two times every year? Yes         1/22/2025   Hearing Screening   Hearing concerns? (!) I NEED TO ASK PEOPLE TO SPEAK UP OR REPEAT THEMSELVES.    (!) TROUBLE UNDERSTANDING SOFT OR WHISPERED SPEECH.       Multiple values from one day are sorted in reverse-chronological order         1/22/2025   Driving Risk Screening   Patient/family members have concerns about driving No       Diabetes Follow-up    How often are you checking your blood sugar? Continuous glucose monitor  What time of day are you checking your blood sugars (select all that apply)?   Reviewed   Have you had any blood sugars above 200?  Yes several   Have you had any blood sugars below 70?  No  What symptoms do you notice when your blood sugar is low?  Shaky  What concerns do you have today about your diabetes? None   Do you have any of these symptoms? (Select all that apply)  Numbness in feet and Burning in feet          Hyperlipidemia Follow-Up    Are you regularly taking any medication or supplement to lower your cholesterol?   Yes- statins  Are you having muscle aches or other side effects that you think could be caused by your cholesterol lowering medication?  No    Hypertension Follow-up    Do you check your blood pressure regularly outside of the clinic? No   Are you following a low salt diet? Yes  Are your blood pressures ever more than 140 on the top number (systolic) OR  more   than 90 on the bottom number (diastolic), for example 140/90? No      Depression and Anxiety   How are you doing with your depression since your last visit? Improved   How are you doing with your anxiety since your last visit?  Improved   Are you having other symptoms that might be associated with depression or anxiety? No  Have you had a significant life event? No   Do you have any concerns with your use of alcohol or other drugs? No    Social History     Tobacco Use    Smoking status: Never    Smokeless tobacco: Never   Vaping Use    Vaping status: Never Used   Substance Use Topics    Alcohol use: Yes     Comment: 1 drink a week    Drug use: No         4/16/2024     4:50 PM 8/15/2024    11:15 AM 1/22/2025    11:57 AM   PHQ   PHQ-9 Total Score 5 1 4   Q9: Thoughts of better off dead/self-harm past 2 weeks Not at all Not at all Not at all         5/18/2021     4:18 PM 4/16/2024     4:50 PM 4/16/2024     4:56 PM   JUSTUS-7 SCORE   Total Score   9 (mild anxiety)   Total Score 2 10 9         1/22/2025    11:57 AM   Last PHQ-9   1.  Little interest or pleasure in doing things 0   2.  Feeling down, depressed, or hopeless 0   3.  Trouble falling or staying asleep, or sleeping too much 2   4.  Feeling tired or having little energy 1   5.  Poor appetite or overeating 0   6.  Feeling bad about yourself 0   7.  Trouble concentrating 1   8.  Moving slowly or restless 0   Q9: Thoughts of better off dead/self-harm past 2 weeks 0   PHQ-9 Total Score 4         4/16/2024     4:56 PM   JUSTUS-7    1. Feeling nervous, anxious, or on edge 2   2. Not being able to stop or control worrying 1   3. Worrying too much about different things 1   4. Trouble relaxing 2   5. Being so restless that it is hard to sit still 1   6. Becoming easily annoyed or irritable 1   7. Feeling afraid, as if something awful might happen 1   JUSTUS-7 Total Score 9   If you checked any problems, how difficult have they made it for you to do your work, take care of  things at home, or get along with other people? Somewhat difficult       Suicide Assessment Five-step Evaluation and Treatment (SAFE-T)        BP Readings from Last 2 Encounters:   01/22/25 100/61   12/20/24 126/74     Hemoglobin A1C (%)   Date Value   12/04/2024 8.6 (H)   07/15/2024 7.9 (H)   05/18/2021 7.6 (H)   01/07/2021 7.9 (H)     LDL Cholesterol Calculated (mg/dL)   Date Value   01/22/2024 53   03/01/2023 30   05/18/2021 59   01/03/2020 67         Vascular Disease Follow-up    How often do you take nitroglycerin? Never  Coronary artery bypass grafting x 4 Do you take an aspirin every day? Yes    Heart Failure Follow-up   Are you experiencing any shortness of breath? At baseline  Are you experiencing any swelling in your legs or feet?  No  Are you using more pillows than usual?  no  Do you cough at night?  No  Do you check your weight daily?  Yes  Have you had a weight change recently?  No  Are you having any of the following side effects from your medications? (Select all that apply)  The patient does not report symptoms of dizziness, fatigue, cough, swelling, or slow heart beat.  Since your last visit, how many times have you gone to the cardiologist, urgent care, emergency room, or hospital because of your heart failure?   1 time  Last Echo:   Echo result w/o MOPS: Interpretation SummaryLeft ventricular function is decreased. The ejection fraction is 45% (mildlyreduced). The basal-mid inferior and inferoseptal segments are akineticGlobal right ventricular function is normal.No significant valvular abnormalities abnormalities present.Estimated mean right atrial pressure is normal.No pericardial effusion is present.    Chronic systolic heart failure secondary to ICM.  Stage C  NYHA Class III  ACEi/ARB: -Entresto 49/51 mg BID   BB: yes-  Metoprolol 25 mg   Aldosterone antagonist: 12.5 mg - on hold   SCD prophylaxis: does not meet criteria for implant  Fluid status: euvolemic  Anticoagulation:   Antiplatelet:   ASA dose   Sleep apnea:  NSAID use:  Contraindicated.  Avoid use.  Remote Monitoring:none  SGLT 2 - Jardiance 25 mg  Pt overall is stable   In a new Relationship        1/22/2025   General Alertness/Fatigue Screening   Have you been more tired than usual lately? (!) YES         1/22/2025   Urinary Incontinence Screening   Bothered by leaking urine in past 6 months No         8/15/2024   TB Screening   Were you born outside of the [place]? No       Today's PHQ-9 Score:       1/22/2025    11:57 AM   PHQ-9 SCORE   PHQ-9 Total Score MyChart 5 (Mild depression)   PHQ-9 Total Score 4         1/22/2025   Substance Use   Alcohol more than 3/day or more than 7/wk No   Do you have a current opioid prescription? No   How severe/bad is pain from 1 to 10? 0/10 (No Pain)   Do you use any other substances recreationally? No     Social History     Tobacco Use    Smoking status: Never    Smokeless tobacco: Never   Vaping Use    Vaping status: Never Used   Substance Use Topics    Alcohol use: Yes     Comment: 1 drink a week    Drug use: No           9/18/2024   LAST FHS-7 RESULTS   1st degree relative breast or ovarian cancer No   Any relative bilateral breast cancer No   Any male have breast cancer No   Any ONE woman have BOTH breast AND ovarian cancer No   Any woman with breast cancer before 50yrs No   2 or more relatives with breast AND/OR ovarian cancer No   2 or more relatives with breast AND/OR bowel cancer No        Pt is getting Mammogram      History of abnormal Pap smear: No - age 30- 64 PAP with HPV every 5 years recommended        Latest Ref Rng & Units 1/21/2019     9:28 AM 1/21/2019     9:05 AM 2/15/2012     4:24 PM   PAP / HPV   PAP (Historical)   NIL  NIL    HPV 16 DNA NEG^Negative Negative      HPV 18 DNA NEG^Negative Negative      Other HR HPV NEG^Negative Negative        ASCVD Risk   The ASCVD Risk score (Kanchan FAULKNER, et al., 2019) failed to calculate for the following reasons:    The valid total cholesterol  range is 130 to 320 mg/dL    Fracture Risk Assessment Tool  Link to Frax Calculator  Use the information below to complete the Frax calculator  : 1955  Sex: female  Weight (kg): 61.2 kg (actual weight)  Height (cm): 162.6 cm  Previous Fragility Fracture:  Yes    Current Smoking:  No  Patient has been on glucocorticoids for more than 3 months (5mg/day or more): No  Rheumatoid Arthritis on Problem List:  No  Secondary Osteoporosis on Problem List:  No  Consumes 3 or more units of alcohol per day: No  Femoral Neck BMD (g/cm2)            Reviewed and updated as needed this visit by Provider   Tobacco  Allergies  Meds  Problems  Med Hx  Surg Hx  Fam Hx            Past Medical History:   Diagnosis Date    Abnormal Pap smear of cervix ~    repeat pap was normal    Allergic rhinitis, cause unspecified     Anxiety state, unspecified     panic episodes    C. difficile colitis     CAD (coronary artery disease)     Congestive heart failure (H)     Depressive disorder     Diabetes (H)     HFrEF (heart failure with reduced ejection fraction) (H)     ICD (implantable cardioverter-defibrillator) in place     Osteopenia     Unspecified essential hypertension      Past Surgical History:   Procedure Laterality Date    BYPASS GRAFT ARTERY CORONARY N/A 2022    Procedure: Median sternotomy.  Intraoperative transesophageal echocardiogram per anesthesia.  Left internal mammary artery harvest.  Right and left endoscopically harvested  greater saphenouse vein.  Cardiopulmonary Bypass.  Coronary Artery Bypass Grafts x4.;  Surgeon: Ulises Desai MD;  Location:  OR    COLONOSCOPY N/A 2023    Procedure: COLONOSCOPY, WITH POLYPECTOMY AND BIOPSY;  Surgeon: Nikhil Lees MD;  Location:  GI    CV CORONARY ANGIOGRAM  2022    Procedure: CV CORONARY ANGIOGRAM;  Surgeon: Huseyin Vogel MD;  Location:  HEART CARDIAC CATH LAB    CV CORONARY ANGIOGRAM  2022    Procedure: ;  Surgeon:  Huseyin Vogel MD;  Location:  HEART CARDIAC CATH LAB    EP ICD INSERT SINGLE N/A 10/04/2022    Procedure: Implantable Cardioverter Defibrillator Device & Lead Implant Single or Dual;  Surgeon: Too Morrow MD;  Location:  HEART CARDIAC CATH LAB    ESOPHAGOSCOPY, GASTROSCOPY, DUODENOSCOPY (EGD), COMBINED N/A 2023    Procedure: ESOPHAGOGASTRODUODENOSCOPY, WITH BIOPSY;  Surgeon: Nikhil Lees MD;  Location:  GI    PICC DOUBLE LUMEN PLACEMENT Right 2022    Failed PICC attempt right arm basilic vein    PICC INSERTION - TRIPLE LUMEN Left 2022    left basilic 5fr tl,picc44 cm     OB History    Para Term  AB Living   2 2 2 0 0 2   SAB IAB Ectopic Multiple Live Births   0 0 0 0 2      # Outcome Date GA Lbr Jairo/2nd Weight Sex Type Anes PTL Lv   2 Term         JOANN   1 Term         JOANN     Lab work is in process  Labs reviewed in EPIC  BP Readings from Last 3 Encounters:   25 100/61   24 126/74   12/10/24 120/64    Wt Readings from Last 3 Encounters:   25 61.2 kg (135 lb)   24 60.1 kg (132 lb 6.4 oz)   12/10/24 57.7 kg (127 lb 4.8 oz)                  Patient Active Problem List   Diagnosis    Phobia    GERD (gastroesophageal reflux disease)    C. difficile diarrhea    Hyperlipidemia LDL goal <70    Hypertension, goal below 140/90    Major depressive disorder, recurrent episode, mild    Generalized anxiety disorder    Type 2 diabetes mellitus with stage 3a chronic kidney disease, without long-term current use of insulin (H)    Near syncope    HFrEF (heart failure with reduced ejection fraction) (H)    S/P CABG (coronary artery bypass graft)    Osteopenia    ICD (implantable cardioverter-defibrillator) in place    History of compression fracture of spine    SOB (shortness of breath)    Hypotension, unspecified hypotension type     Past Surgical History:   Procedure Laterality Date    BYPASS GRAFT ARTERY CORONARY N/A 2022     Procedure: Median sternotomy.  Intraoperative transesophageal echocardiogram per anesthesia.  Left internal mammary artery harvest.  Right and left endoscopically harvested  greater saphenouse vein.  Cardiopulmonary Bypass.  Coronary Artery Bypass Grafts x4.;  Surgeon: Ulises Desai MD;  Location: [facility] OR    COLONOSCOPY N/A 2023    Procedure: COLONOSCOPY, WITH POLYPECTOMY AND BIOPSY;  Surgeon: Nikhil Lees MD;  Location:  GI    CV CORONARY ANGIOGRAM  2022    Procedure: CV CORONARY ANGIOGRAM;  Surgeon: Huseyin Vogel MD;  Location:  HEART CARDIAC CATH LAB    CV CORONARY ANGIOGRAM  2022    Procedure: ;  Surgeon: Huseyin Vogel MD;  Location:  HEART CARDIAC CATH LAB    EP ICD INSERT SINGLE N/A 10/04/2022    Procedure: Implantable Cardioverter Defibrillator Device & Lead Implant Single or Dual;  Surgeon: Too Morrow MD;  Location: [facility] CARDIAC CATH LAB    ESOPHAGOSCOPY, GASTROSCOPY, DUODENOSCOPY (EGD), COMBINED N/A 2023    Procedure: ESOPHAGOGASTRODUODENOSCOPY, WITH BIOPSY;  Surgeon: Nikhil Lees MD;  Location:  GI    PICC DOUBLE LUMEN PLACEMENT Right 2022    Failed PICC attempt right arm basilic vein    PICC INSERTION - TRIPLE LUMEN Left 2022    left basilic 5fr tl,picc44 cm       Social History     Tobacco Use    Smoking status: Never    Smokeless tobacco: Never   Substance Use Topics    Alcohol use: Yes     Comment: 1 drink a week     Family History   Problem Relation Age of Onset    Diabetes Mother         hypertension, alive at 83    C.A.D. Mother     Coronary Artery Disease Mother     Cancer Father         lung cancer, smoker.   at age 53    Family History Negative Sister     Osteoporosis Sister     Anesthesia Reaction No family hx of     Deep Vein Thrombosis (DVT) No family hx of          Current Outpatient Medications   Medication Sig Dispense Refill    ALPRAZolam (XANAX) 0.25 MG tablet Take 1 tablet (0.25  mg) by mouth 3 times daily as needed for anxiety 10 tablet 0    aspirin 81 MG EC tablet Take 81 mg by mouth every evening      atorvastatin (LIPITOR) 80 MG tablet TAKE 1 TABLET BY MOUTH DAILY 90 tablet 3    Continuous Blood Gluc Sensor (FREESTYLE VANESSA 14 DAY SENSOR) MISC Change every 14 days. 2 each 0    empagliflozin (JARDIANCE) 25 MG TABS tablet Take 1 tablet (25 mg) by mouth daily. 90 tablet 1    FLUoxetine (PROZAC) 20 MG capsule Take 2 capsules (40 mg) by mouth daily. 180 capsule 1    glipiZIDE (GLUCOTROL XL) 2.5 MG 24 hr tablet 3 tablets daily with Food 270 tablet 0    metFORMIN (GLUCOPHAGE XR) 500 MG 24 hr tablet Take 3 tablets (1,500 mg) by mouth daily (with dinner). Take 2 tabs of 500 mg (total 1000 mg) daily with supper. 270 tablet 1    metFORMIN (GLUCOPHAGE XR) 500 MG 24 hr tablet Take 500 mg by mouth every morning. Take 1 tab of 500 mg every morning      metoprolol succinate ER (TOPROL XL) 50 MG 24 hr tablet Take 0.5 tablets (25 mg) by mouth daily.      MULTIPLE VITAMIN OR TABS Take by mouth every morning      sacubitril-valsartan (ENTRESTO) 49-51 MG per tablet Take 1 tablet by mouth 2 times daily. 180 tablet 3    spironolactone (ALDACTONE) 25 MG tablet TAKE 1/2 TABLET(12.5 MG) BY MOUTH DAILY 45 tablet 3    VITAMIN D PO Take by mouth.       Allergies   Allergen Reactions    Effexor [Venlafaxine Hydrochloride]      Tachycardia, makes her extremely jittery--cant sit down, has to keep moving constantly    Latex      Reported by patient     Recent Labs   Lab Test 01/10/25  1042 12/20/24  0830 12/10/24  0747 12/09/24  1912 12/09/24  1505 12/06/24  1057 12/04/24  1248 09/24/24  1258 07/15/24  1333 03/05/24  1407 01/22/24  1219 05/09/23  0805 03/01/23  1433 05/20/22  0758 05/18/22  1715 05/18/21  1534 01/07/21  1513 09/09/20  1625   A1C  --   --   --   --   --   --  8.6*  --  7.9*  --  7.2*   < > 8.3*   < >  --    < > 7.9* 8.5*   LDL  --   --   --   --   --   --   --   --   --   --  53  --  30  --  57   < >  --    --    HDL  --   --   --   --   --   --   --   --   --   --  45*  --  52  --  47*   < >  --   --    TRIG  --   --   --   --   --   --   --   --   --   --  142  --  110  --  214*   < >  --   --    ALT  --   --  36 35 38  --   --   --   --    < >  --    < >  --    < > 27  --   --  33   CR 0.89 0.87 1.23* 1.56* 1.81*   < > 1.02*   < >  --    < >  --    < >  --    < > 0.90   < > 0.90 0.91   GFRESTIMATED 69 71 47* 35* 30*   < > 59*   < >  --    < >  --    < >  --    < > 69   < > 66 66   GFRESTBLACK  --   --   --   --   --   --   --   --   --   --   --   --   --   --   --   --  77 76   POTASSIUM 4.5 5.0 4.8 3.7 4.7  --  4.6   < >  --    < >  --    < >  --    < > 4.1   < > 4.8 4.9   TSH  --   --   --  1.40 1.85  --  2.25   < >  --   --   --   --  1.12  --   --    < >  --   --     < > = values in this interval not displayed.      Current providers sharing in care for this patient include:  Patient Care Team:  Roxanna Otoole MD as PCP - General (Family Medicine)  Roxanna Otoole MD as Assigned PCP  Regina Sutherland as Diabetes Educator (Dietitian, Registered)  Dasia Amin, RN as Specialty Care Coordinator (Cardiology)  Belinda Colon APRN CNP as Nurse Practitioner (Cardiovascular Disease)  Kris Steve MD as MD (Cardiovascular Disease)  Belinda Colon APRN CNP as Assigned Heart and Vascular Provider  Evetet Castro MD as MD (Infectious Diseases)  Sarah Isidro, RN as Specialty Care Coordinator (Cardiology)  Too Morrow MD as MD (Cardiovascular Disease)  von Erasto, Cherie Gabby, APRN CNP as Nurse Practitioner (Cardiovascular Disease)  No Ref-Primary, Physician  Libia Santoro, PharmD as Pharmacist (Pharmacist)  Latrell Valentino RPH as Pharmacist (Pharmacist Clinician- Clinical Pharmacy Specialist)  Migdalia Swenson RPH as Pharmacist (Pharmacist)  Hernando León RPH as Pharmacist (Pharmacist)    The following health maintenance items are reviewed in Epic and correct as of  today:  Health Maintenance   Topic Date Due    Medicare Annual MTM Pharmacist Visit (once per calendar year)  01/01/2025    LIPID  01/22/2025    A1C  03/04/2025    MICROALBUMIN  04/16/2025    DIABETIC FOOT EXAM  04/16/2025    BMP  07/10/2025    PHQ-9  07/22/2025    DEXA  09/07/2025    EYE EXAM  09/15/2025    ALT  12/10/2025    CBC  12/10/2025    HEMOGLOBIN  12/10/2025    MEDICARE ANNUAL WELLNESS VISIT  01/22/2026    ANNUAL REVIEW OF HM ORDERS  01/22/2026    FALL RISK ASSESSMENT  01/22/2026    MAMMO SCREENING  09/18/2026    HF ACTION PLAN  01/22/2027    DTAP/TDAP/TD IMMUNIZATION (3 - Td or Tdap) 01/21/2029    ADVANCE CARE PLANNING  01/22/2030    COLORECTAL CANCER SCREENING  02/16/2030    TSH W/FREE T4 REFLEX  Completed    HEPATITIS C SCREENING  Completed    DEPRESSION ACTION PLAN  Completed    INFLUENZA VACCINE  Completed    Pneumococcal Vaccine: 50+ Years  Completed    URINALYSIS  Completed    ZOSTER IMMUNIZATION  Completed    RSV VACCINE  Completed    COVID-19 Vaccine  Completed    HPV IMMUNIZATION  Aged Out    MENINGITIS IMMUNIZATION  Aged Out    RSV MONOCLONAL ANTIBODY  Aged Out         Review of Systems  CONSTITUTIONAL: NEGATIVE for fever, chills, change in weight  INTEGUMENTARY/SKIN: NEGATIVE for worrisome rashes, moles or lesions  EYES: NEGATIVE for vision changes or irritation  ENT/MOUTH: NEGATIVE for ear, mouth and throat problems  RESP: NEGATIVE for significant cough or SOB  BREAST: NEGATIVE for masses, tenderness or discharge  CV: NEGATIVE for chest pain, palpitations or peripheral edema  GI: NEGATIVE for nausea, abdominal pain, heartburn, or change in bowel habits  : NEGATIVE for frequency, dysuria, or hematuria  MUSCULOSKELETAL: NEGATIVE for significant arthralgias or myalgia  NEURO: NEGATIVE for weakness, dizziness or paresthesias  ENDOCRINE: NEGATIVE for temperature intolerance, skin/hair changes  HEME: NEGATIVE for bleeding problems  PSYCHIATRIC: NEGATIVE for changes in mood or affect     Objective   "  Exam  /61 (BP Location: Left arm, Patient Position: Sitting, Cuff Size: Adult Regular)   Pulse 58   Temp 97.2  F (36.2  C) (Temporal)   Resp 16   Ht 1.626 m (5' 4\")   Wt 61.2 kg (135 lb)   SpO2 99%   BMI 23.17 kg/m     Estimated body mass index is 23.17 kg/m  as calculated from the following:    Height as of this encounter: 1.626 m (5' 4\").    Weight as of this encounter: 61.2 kg (135 lb).    Physical Exam  GENERAL: alert and no distress  EYES: Eyes grossly normal to inspection, PERRL and conjunctivae and sclerae normal  HENT: ear canals and TM's normal, nose and mouth without ulcers or lesions  NECK: no adenopathy, no asymmetry, masses, or scars  RESP: lungs clear to auscultation - no rales, rhonchi or wheezes  CV: regular rate and rhythm, normal S1 S2, no S3 or S4, no murmur, click or rub, no peripheral edema  ABDOMEN: soft, nontender, no hepatosplenomegaly, no masses and bowel sounds normal  MS: no gross musculoskeletal defects noted, no edema  SKIN: no suspicious lesions or rashes  NEURO: Normal strength and tone, mentation intact and speech normal  PSYCH: mentation appears normal, affect normal/bright         1/22/2025   Mini Cog   Clock Draw Score 2 Normal   3 Item Recall 3 objects recalled   Mini Cog Total Score 5              Signed Electronically by: Roxanna Otoole MD    Answers submitted by the patient for this visit:  Patient Health Questionnaire (Submitted on 1/22/2025)  If you checked off any problems, how difficult have these problems made it for you to do your work, take care of things at home, or get along with other people?: Somewhat difficult  PHQ9 TOTAL SCORE: 5    "

## 2025-01-23 ENCOUNTER — PATIENT OUTREACH (OUTPATIENT)
Dept: CARE COORDINATION | Facility: CLINIC | Age: 70
End: 2025-01-23
Payer: COMMERCIAL

## 2025-01-23 LAB
CHOLEST SERPL-MCNC: 133 MG/DL
FASTING STATUS PATIENT QL REPORTED: NO
HDLC SERPL-MCNC: 57 MG/DL
LDLC SERPL CALC-MCNC: 61 MG/DL
NONHDLC SERPL-MCNC: 76 MG/DL
TRIGL SERPL-MCNC: 77 MG/DL

## 2025-01-23 ASSESSMENT — PATIENT HEALTH QUESTIONNAIRE - PHQ9: SUM OF ALL RESPONSES TO PHQ QUESTIONS 1-9: 4

## 2025-01-24 ENCOUNTER — TELEPHONE (OUTPATIENT)
Dept: FAMILY MEDICINE | Facility: CLINIC | Age: 70
End: 2025-01-24
Payer: COMMERCIAL

## 2025-01-24 DIAGNOSIS — E11.22 TYPE 2 DIABETES MELLITUS WITH STAGE 3B CHRONIC KIDNEY DISEASE, WITHOUT LONG-TERM CURRENT USE OF INSULIN (H): ICD-10-CM

## 2025-01-24 DIAGNOSIS — N18.32 TYPE 2 DIABETES MELLITUS WITH STAGE 3B CHRONIC KIDNEY DISEASE, WITHOUT LONG-TERM CURRENT USE OF INSULIN (H): ICD-10-CM

## 2025-01-24 NOTE — TELEPHONE ENCOUNTER
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 She is requesting a new prescription for metFORMIN (GLUCOPHAGE XR) 500 MG 24 hr tablet, as the current prescription has two sets of directions.    Directions state: Take 3 tablets (1,500 mg) by mouth daily (with dinner). Take 2 tabs of 500 mg (total 1000 mg) daily with supper.    Please advise.    GONSALO Contreras RN  [facility]

## 2025-01-27 RX ORDER — METFORMIN HYDROCHLORIDE 500 MG/1
1500 TABLET, EXTENDED RELEASE ORAL
Qty: 270 TABLET | Refills: 1 | Status: SHIPPED | OUTPATIENT
Start: 2025-01-27

## 2025-02-05 ENCOUNTER — TELEPHONE (OUTPATIENT)
Dept: PHARMACY | Facility: OTHER | Age: 70
End: 2025-02-05
Payer: COMMERCIAL

## 2025-02-05 NOTE — TELEPHONE ENCOUNTER
KRISTINE Recruitment: [facility] insurance     Referral outreach attempt #1 on February 5, 2025      Outcome: left voicemail- Call back number [phone] and [employer] message sent    KRISTINE Villatoro

## 2025-02-24 ENCOUNTER — ALLIED HEALTH/NURSE VISIT (OUTPATIENT)
Dept: EDUCATION SERVICES | Facility: CLINIC | Age: 70
End: 2025-02-24
Attending: FAMILY MEDICINE
Payer: COMMERCIAL

## 2025-02-24 VITALS — BODY MASS INDEX: 23.21 KG/M2 | WEIGHT: 135.2 LBS

## 2025-02-24 DIAGNOSIS — N18.32 TYPE 2 DIABETES MELLITUS WITH STAGE 3B CHRONIC KIDNEY DISEASE, WITHOUT LONG-TERM CURRENT USE OF INSULIN (H): ICD-10-CM

## 2025-02-24 DIAGNOSIS — E11.22 TYPE 2 DIABETES MELLITUS WITH STAGE 3B CHRONIC KIDNEY DISEASE, WITHOUT LONG-TERM CURRENT USE OF INSULIN (H): ICD-10-CM

## 2025-02-24 PROCEDURE — G0108 DIAB MANAGE TRN  PER INDIV: HCPCS | Mod: AE

## 2025-02-24 NOTE — PATIENT INSTRUCTIONS
1. Eat 3 balanced meals each day - Monitor carb intake and limit to 45-60 grams per meal  This would be equal to 3-4 choices ~  1 choice = 15 grams    Do not wait longer than 4-5 hours to eat something  Snacks limit to no more than 30 grams of carbohydrates or 2 choices  Make sure you include protein source with each meal and at bedtime - this has been shown to help with blood glucose elevations    2. Check blood sugars several  times each day  When you first wake up before eating, 2 hours after meals, before bed   Fasting and before meal target is 80 - 130   2 hours after a meal target is < 180  remember to bring meter and log book to all appointments    3. Incorporate 30 minutes activity into each day - does not need to be all at one time & walking counts    4. Take diabetes medications as prescribed Continue 1500 mg of Metformin XR daily, 25 mg Jardiance each day and 3 tablets of 2.5 mg Glipizide XL each morning      Thank you for coming in to see me today !      I value your experience and would be very thankful for your time in providing feedback in our clinic survey.    You may receive an e-mail, text message or even something in the mail from [employer].  This is a survey to let us know how we are doing - the survey will be related to your diabetes education and me.

## 2025-02-24 NOTE — PROGRESS NOTES
Diabetes Self-Management Education & Support    Presents for: Individual review    Type of Service: In Person Visit      Assessment  Jody is a very pleasant 70-year-old who comes to clinic today for consult regarding her current diabetes self-management skills with an A1c not meeting ADA goal.  She arrived today unaccompanied.  Diabetes medications were reviewed and she confirms she is currently taking 2000 mg of metformin XR p.o. daily, 25 mg of Jardiance p.o. daily and 7.5 mg of glipizide XL p.o. daily.  She reports having no missed or skipped doses of any of these medications.  There was some question about her metformin dose, her chart review it indicates 1500 mg daily, Jody stated to me that she believed at her last visit with PCP she was instructed to increase dose to 2000 mg daily.  After review of her most lab results (BMP) done on 1/10/2025, GFR = 69 and creatinine = 0.89, hemoglobin result from 12/10/2024 = 12.8, all indicate that the maximum dose of metformin, 2000 mg, would be safe and appropriate so I instructed her to continue at that dose.  Jody is currently using a personal CGM (eufemia 3) to monitor her blood glucose which we do have linked for remote monitoring.  Glucose data was reviewed and discussed during our visit today see note below.  During our initial conversation today it became somewhat apparent that Jody's knowledge in regards to diabetes was somewhat limited so I asked her if she would like to treat today more as a review and provide her the education that would be given to a newly diagnosed patients of DMT2.  She said she would appreciate that very much, that was done.  This included pathophysiology of diabetes, differences between DM T1 and DMT2, how diabetes is diagnosed and significance for an A1c, blood glucose targets, and is not actions of each of the medications she is currently taking, relation of exercise to glucose management, and differences between hypo and hyperglycemia.   Nutritional counseling included: Identification of foods that will/will not affect blood glucose, portions, recommended intake per ADA for carbohydrates for both meals and snacks, label reading, and importance of balance and timing with meals.  Opted to make no changes to any of her medications today, rather will have her work on diet and activity level after receiving the new education provided today.  Scheduled follow-up with me in clinic in approximately 1 month and I instructed her to call with any questions or concerns that come up before that time.    Patient's most recent   Lab Results   Component Value Date    A1C 8.6 12/04/2024    A1C 7.6 05/18/2021     is not meeting goal of  7.5-8.0%    Diabetes knowledge and skills assessment:   Patient is knowledgeable in diabetes management concepts related to: Being Active, Monitoring, Taking Medication, and Reducing Risks    Based on learning assessment above, most appropriate setting for further diabetes education would be: Individual setting.    PLAN  1. Eat 3 balanced meals each day - Monitor carb intake and limit to 45-60 grams per meal  This would be equal to 3-4 choices ~  1 choice = 15 grams    Do not wait longer than 4-5 hours to eat something  Snacks limit to no more than 30 grams of carbohydrates or 2 choices  Make sure you include protein source with each meal and at bedtime - this has been shown to help with blood glucose elevations    2. Check blood sugars several  times each day  When you first wake up before eating, 2 hours after meals, before bed   Fasting and before meal target is 80 - 130   2 hours after a meal target is < 180  remember to bring meter and log book to all appointments    3. Incorporate 30 minutes activity into each day - does not need to be all at one time & walking counts    4. Take diabetes medications as prescribed Continue 2000 mg of Metformin XR daily, 25 mg Jardiance each day and 3 tablets of 2.5 mg Glipizide XL each  "morning        Topics to cover at upcoming visits: Healthy Eating, Being Active, Taking Medication, Problem Solving, Reducing Risks, and Healthy Coping    Follow-up: 3/24/25    See Care Plan for co-developed, patient-state behavior change goals.  AVS provided for patient today.    Education Materials Provided:  - [employer] Healthy Living with Diabetes Book  - My Plate Planner   - Goals for Your Diabetes Care       SUBJECTIVE/OBJECTIVE:  Presents for: Individual review  Accompanied by: Self  Diabetes education in the past 24 mo: No  Focus of Visit: Monitoring, Reducing Risks, Taking Medication, Healthy Coping, Healthy Eating, Problem Solving, Diabetes Pathophysiology, Assistance w/ making life changes, Self-care behavioral goal setting  Diabetes type: Type 2  Date of diagnosis: > 10 yrs  Disease course: Getting harder to manage  Diabetes management related comments/concerns: glucose fluctuations  Other concerns:: None  Cultural Influences/Ethnic Background:  Choose not to answer      Diabetes Symptoms & Complications:  Diabetes Related Symptoms: Neuropathy  Weight trend: Stable  Disease course: Getting harder to manage  Complications assessed today?: Yes  Heart disease: Yes  Nephropathy: Yes  Peripheral neuropathy: Yes    Patient Problem List and Family Medical History reviewed for relevant medical history, current medical status, and diabetes risk factors.    Vitals:  Wt 61.3 kg (135 lb 3.2 oz)   BMI 23.21 kg/m    Estimated body mass index is 23.21 kg/m  as calculated from the following:    Height as of 1/22/25: 1.626 m (5' 4\").    Weight as of this encounter: 61.3 kg (135 lb 3.2 oz).   Last 3 BP:   BP Readings from Last 3 Encounters:   01/22/25 100/61   12/20/24 126/74   12/10/24 120/64       History   Smoking Status    Never   Smokeless Tobacco    Never       Labs:  Lab Results   Component Value Date    A1C 8.6 12/04/2024    A1C 7.6 05/18/2021     Lab Results   Component Value Date     01/10/2025 " "    02/16/2023     02/07/2023     01/07/2021     Lab Results   Component Value Date    LDL 61 01/22/2025    LDL 59 05/18/2021     HDL Cholesterol   Date Value Ref Range Status   05/18/2021 50 >49 mg/dL Final     Direct Measure HDL   Date Value Ref Range Status   01/22/2025 57 >=50 mg/dL Final   ]  GFR Estimate   Date Value Ref Range Status   01/10/2025 69 >60 mL/min/1.73m2 Final     Comment:     eGFR calculated using 2021 CKD-EPI equation.   01/07/2021 66 >60 mL/min/[1.73_m2] Final     Comment:     Non  GFR Calc  Starting 12/18/2018, serum creatinine based estimated GFR (eGFR) will be   calculated using the Chronic Kidney Disease Epidemiology Collaboration   (CKD-EPI) equation.       GFR, ESTIMATED POCT   Date Value Ref Range Status   12/06/2024 60 (L) >60 mL/min/1.73m2 Final     GFR Estimate If Black   Date Value Ref Range Status   01/07/2021 77 >60 mL/min/[1.73_m2] Final     Comment:      GFR Calc  Starting 12/18/2018, serum creatinine based estimated GFR (eGFR) will be   calculated using the Chronic Kidney Disease Epidemiology Collaboration   (CKD-EPI) equation.       Lab Results   Component Value Date    CR 0.89 01/10/2025    CR 0.90 01/07/2021     No results found for: \"MICROALBUMIN\"    Healthy Eating:  Healthy Eating Assessed Today: Yes  Cultural/Islam diet restrictions?: No  Meal planning/habits: Low carb, Smaller portions, Avoiding sweets  Who cooks/prepares meals for you?: Self  Who purchases food in  your home?: Self  Meals include: Breakfast, Afternoon Snack, Dinner, Evening Snack  Breakfast: 5:30 - 7:30 1 cake donut and 2 cups 6 oz coffee with SF vanilla creamer  Dinner: + protein + veg   - avoids starches  Snacks: 1 pm apple with PM   evenings popcorn  Other: Avoiding: Breads, cereal, any type of sugary beverage, and potatoes  Beverages: Water, Tea, Coffee, Other (see Comments)  Please elaborate:: Body Armor  0 CHO and Larry aid made with " Darwin  Has patient met with a dietitian in the past?: Yes    Being Active:  Being Active Assessed Today: Yes  Exercise:: Yes  Days per week of moderate to strenuous exercise (like a brisk walk): 7  On average, minutes per day of exercise at this level: 30  How intense was your typical exercise? : Moderate (like brisk walking)  Exercise Minutes per Week: 210  Barrier to exercise: None    Monitoring:  Monitoring Assessed Today: Yes  Blood Glucose Meter: CGM    Glucose data was reviewed and discussed with Jody during our visit today.*I informed her that although she was having elevations, the good news was her blood glucose the majority of time returned back to baseline after 4 to 5 hours.  I do highly suspect that many of the elevations were the result of her lack of education with food choices and portions.  Moving forward we will have her work on that and if this does not show a change after 1 month may want to consider adding a GLP-1 agonist.    Taking Medications:  Diabetes Medication(s)       Biguanides       metFORMIN (GLUCOPHAGE XR) 500 MG 24 hr tablet Take 3 tablets (1,500 mg) by mouth daily (with dinner).       Sodium-Glucose Co-Transporter 2 (SGLT2) Inhibitors       empagliflozin (JARDIANCE) 25 MG TABS tablet Take 1 tablet (25 mg) by mouth daily.       Sulfonylureas       glipiZIDE (GLUCOTROL XL) 2.5 MG 24 hr tablet 3 tablets daily with Food            Taking Medication Assessed Today: Yes  Current Treatments: Oral Medication (taken by mouth)  Problems taking diabetes medications regularly?: No  Diabetes medication side effects?: No    Problem Solving:  Problem Solving Assessed Today: Yes              Reducing Risks:  Reducing Risks Assessed Today: Yes  Diabetes Risks: Age over 45 years, Hyperlipidemia  CAD Risks: Diabetes Mellitus, Dyslipidemia, Post-menopausal, Stress, Family history, Hypertension  Has dilated eye exam at least once a year?: Yes  Sees dentist every 6 months?: Yes  Feet checked by  healthcare provider in the last year?: Yes    Healthy Coping:  Healthy Coping Assessed Today: Yes  Emotional response to diabetes: Ready to learn  Informal Support system:: Family  Stage of change: PREPARATION (Decided to change - considering how)  Support resources: In-person Offerings  Patient Activation Measure Survey Score:      4/20/2011     2:00 PM   BRY Score (Last Two)   BRY Raw Score 42   Activation Score 66   BRY Level 3         Care Plan and Education Provided:  Healthy Eating: Balanced meals, Carbohydrate Counting, Consistency in amount and timing of carbohydrate intake, Eating out, Label reading, and Portion control, Being Active: Amount recommended (150 minutes moderate or 75 minutes vigorous activity and 2-3 days strength training per week), Finding a physical activity routine that works for you, and Relationship of activity to glucose, Monitoring: Frequency of monitoring, Individual glucose targets, and Purpose, Taking Medication: Action of prescribed medication(s), Side effects of prescribed medication(s), and When to take medication(s), Problem Solving: High glucose - causes, signs/symptoms, treatment and prevention and Low glucose - causes, signs/symptoms, treatment and prevention, Reducing Risks: Complications of diabetes, Goal for A1c, how it relates to glucose and how often to check, Preventing cardiovascular disease, including blood pressure goals, lipid goals, recommendations for cardioprotective medications, statins, and aspirin, Prevention, early diagnostic measures and treatment of complications, and A1C - goals, relating to blood glucose levels, how often to check, and Healthy Coping: Benefits of making appropriate lifestyle changes, Identifying helpful resources, and Utilize support systems    Thank you,  Dona Griffin RN [facility]  Certified Diabetes Care and   Visit type : DSMT      Time Spent: 60 minutes  Encounter Type: Individual    Any diabetes medication dose  changes were made via the CDE Protocol per the patient's referring provider and primary care provider. A copy of this encounter was shared with the provider.   Much or all of the text in this note was generated through the use of the Dragon Dictate voice-to-text software.Errors in spelling or words which seem out of context are unintentional. Sound alike errors, in particular, may have escaped editing.

## 2025-02-24 NOTE — LETTER
2/24/2025         RE: Marianne Monroy  [address] [address]        Dear Colleague,    Thank you for referring your patient, Marianne Monroy, to the [facility]. Please see a copy of my visit note below.    Diabetes Self-Management Education & Support    Presents for: Individual review    Type of Service: In Person Visit      Assessment  Jody is a very pleasant 70-year-old who comes to clinic today for consult regarding her current diabetes self-management skills with an A1c not meeting ADA goal.  She arrived today unaccompanied.  Diabetes medications were reviewed and she confirms she is currently taking 2000 mg of metformin XR p.o. daily, 25 mg of Jardiance p.o. daily and 7.5 mg of glipizide XL p.o. daily.  She reports having no missed or skipped doses of any of these medications.  There was some question about her metformin dose, her chart review it indicates 1500 mg daily, Jody stated to me that she believed at her last visit with PCP she was instructed to increase dose to 2000 mg daily.  After review of her most lab results (BMP) done on 1/10/2025, GFR = 69 and creatinine = 0.89, hemoglobin result from 12/10/2024 = 12.8, all indicate that the maximum dose of metformin, 2000 mg, would be safe and appropriate so I instructed her to continue at that dose.  Jody is currently using a personal CGM (eufemia 3) to monitor her blood glucose which we do have linked for remote monitoring.  Glucose data was reviewed and discussed during our visit today see note below.  During our initial conversation today it became somewhat apparent that Jody's knowledge in regards to diabetes was somewhat limited so I asked her if she would like to treat today more as a review and provide her the education that would be given to a newly diagnosed patients of DMT2.  She said she would appreciate that very much, that was done.  This included pathophysiology of diabetes, differences  between DM T1 and DMT2, how diabetes is diagnosed and significance for an A1c, blood glucose targets, and is not actions of each of the medications she is currently taking, relation of exercise to glucose management, and differences between hypo and hyperglycemia.  Nutritional counseling included: Identification of foods that will/will not affect blood glucose, portions, recommended intake per ADA for carbohydrates for both meals and snacks, label reading, and importance of balance and timing with meals.  Opted to make no changes to any of her medications today, rather will have her work on diet and activity level after receiving the new education provided today.  Scheduled follow-up with me in clinic in approximately 1 month and I instructed her to call with any questions or concerns that come up before that time.    Patient's most recent   Lab Results   Component Value Date    A1C 8.6 12/04/2024    A1C 7.6 05/18/2021     is not meeting goal of  7.5-8.0%    Diabetes knowledge and skills assessment:   Patient is knowledgeable in diabetes management concepts related to: Being Active, Monitoring, Taking Medication, and Reducing Risks    Based on learning assessment above, most appropriate setting for further diabetes education would be: Individual setting.    PLAN  1. Eat 3 balanced meals each day - Monitor carb intake and limit to 45-60 grams per meal  This would be equal to 3-4 choices ~  1 choice = 15 grams    Do not wait longer than 4-5 hours to eat something  Snacks limit to no more than 30 grams of carbohydrates or 2 choices  Make sure you include protein source with each meal and at bedtime - this has been shown to help with blood glucose elevations    2. Check blood sugars several  times each day  When you first wake up before eating, 2 hours after meals, before bed   Fasting and before meal target is 80 - 130   2 hours after a meal target is < 180  remember to bring meter and log book to all  "appointments    3. Incorporate 30 minutes activity into each day - does not need to be all at one time & walking counts    4. Take diabetes medications as prescribed Continue 2000 mg of Metformin XR daily, 25 mg Jardiance each day and 3 tablets of 2.5 mg Glipizide XL each morning        Topics to cover at upcoming visits: Healthy Eating, Being Active, Taking Medication, Problem Solving, Reducing Risks, and Healthy Coping    Follow-up: 3/24/25    See Care Plan for co-developed, patient-state behavior change goals.  AVS provided for patient today.    Education Materials Provided:  - M latakoo Healthy Living with Diabetes Book  - My Plate Planner   - Goals for Your Diabetes Care       SUBJECTIVE/OBJECTIVE:  Presents for: Individual review  Accompanied by: Self  Diabetes education in the past 24 mo: No  Focus of Visit: Monitoring, Reducing Risks, Taking Medication, Healthy Coping, Healthy Eating, Problem Solving, Diabetes Pathophysiology, Assistance w/ making life changes, Self-care behavioral goal setting  Diabetes type: Type 2  Date of diagnosis: > 10 yrs  Disease course: Getting harder to manage  Diabetes management related comments/concerns: glucose fluctuations  Other concerns:: None  Cultural Influences/Ethnic Background:  Choose not to answer      Diabetes Symptoms & Complications:  Diabetes Related Symptoms: Neuropathy  Weight trend: Stable  Disease course: Getting harder to manage  Complications assessed today?: Yes  Heart disease: Yes  Nephropathy: Yes  Peripheral neuropathy: Yes    Patient Problem List and Family Medical History reviewed for relevant medical history, current medical status, and diabetes risk factors.    Vitals:  Wt 61.3 kg (135 lb 3.2 oz)   BMI 23.21 kg/m    Estimated body mass index is 23.21 kg/m  as calculated from the following:    Height as of 1/22/25: 1.626 m (5' 4\").    Weight as of this encounter: 61.3 kg (135 lb 3.2 oz).   Last 3 BP:   BP Readings from Last 3 Encounters: " "  01/22/25 100/61   12/20/24 126/74   12/10/24 120/64       History   Smoking Status     Never   Smokeless Tobacco     Never       Labs:  Lab Results   Component Value Date    A1C 8.6 12/04/2024    A1C 7.6 05/18/2021     Lab Results   Component Value Date     01/10/2025     02/16/2023     02/07/2023     01/07/2021     Lab Results   Component Value Date    LDL 61 01/22/2025    LDL 59 05/18/2021     HDL Cholesterol   Date Value Ref Range Status   05/18/2021 50 >49 mg/dL Final     Direct Measure HDL   Date Value Ref Range Status   01/22/2025 57 >=50 mg/dL Final   ]  GFR Estimate   Date Value Ref Range Status   01/10/2025 69 >60 mL/min/1.73m2 Final     Comment:     eGFR calculated using 2021 CKD-EPI equation.   01/07/2021 66 >60 mL/min/[1.73_m2] Final     Comment:     Non  GFR Calc  Starting 12/18/2018, serum creatinine based estimated GFR (eGFR) will be   calculated using the Chronic Kidney Disease Epidemiology Collaboration   (CKD-EPI) equation.       GFR, ESTIMATED POCT   Date Value Ref Range Status   12/06/2024 60 (L) >60 mL/min/1.73m2 Final     GFR Estimate If Black   Date Value Ref Range Status   01/07/2021 77 >60 mL/min/[1.73_m2] Final     Comment:      GFR Calc  Starting 12/18/2018, serum creatinine based estimated GFR (eGFR) will be   calculated using the Chronic Kidney Disease Epidemiology Collaboration   (CKD-EPI) equation.       Lab Results   Component Value Date    CR 0.89 01/10/2025    CR 0.90 01/07/2021     No results found for: \"MICROALBUMIN\"    Healthy Eating:  Healthy Eating Assessed Today: Yes  Cultural/Orthodoxy diet restrictions?: No  Meal planning/habits: Low carb, Smaller portions, Avoiding sweets  Who cooks/prepares meals for you?: Self  Who purchases food in  your home?: Self  Meals include: Breakfast, Afternoon Snack, Dinner, Evening Snack  Breakfast: 5:30 - 7:30 1 cake donut and 2 cups 6 oz coffee with SF vanilla creamer  Dinner: + " protein + veg   - avoids starches  Snacks: 1 pm apple with PM   evenings popcorn  Other: Avoiding: Breads, cereal, any type of sugary beverage, and potatoes  Beverages: Water, Tea, Coffee, Other (see Comments)  Please elaborate:: Body Armor  0 CHO and Larry aid made with Teeia  Has patient met with a dietitian in the past?: Yes    Being Active:  Being Active Assessed Today: Yes  Exercise:: Yes  Days per week of moderate to strenuous exercise (like a brisk walk): 7  On average, minutes per day of exercise at this level: 30  How intense was your typical exercise? : Moderate (like brisk walking)  Exercise Minutes per Week: 210  Barrier to exercise: None    Monitoring:  Monitoring Assessed Today: Yes  Blood Glucose Meter: CGM    Glucose data was reviewed and discussed with Jody during our visit today.*I informed her that although she was having elevations, the good news was her blood glucose the majority of time returned back to baseline after 4 to 5 hours.  I do highly suspect that many of the elevations were the result of her lack of education with food choices and portions.  Moving forward we will have her work on that and if this does not show a change after 1 month may want to consider adding a GLP-1 agonist.    Taking Medications:  Diabetes Medication(s)       Biguanides       metFORMIN (GLUCOPHAGE XR) 500 MG 24 hr tablet Take 3 tablets (1,500 mg) by mouth daily (with dinner).       Sodium-Glucose Co-Transporter 2 (SGLT2) Inhibitors       empagliflozin (JARDIANCE) 25 MG TABS tablet Take 1 tablet (25 mg) by mouth daily.       Sulfonylureas       glipiZIDE (GLUCOTROL XL) 2.5 MG 24 hr tablet 3 tablets daily with Food            Taking Medication Assessed Today: Yes  Current Treatments: Oral Medication (taken by mouth)  Problems taking diabetes medications regularly?: No  Diabetes medication side effects?: No    Problem Solving:  Problem Solving Assessed Today: Yes              Reducing Risks:  Reducing Risks Assessed  Today: Yes  Diabetes Risks: Age over 45 years, Hyperlipidemia  CAD Risks: Diabetes Mellitus, Dyslipidemia, Post-menopausal, Stress, Family history, Hypertension  Has dilated eye exam at least once a year?: Yes  Sees dentist every 6 months?: Yes  Feet checked by healthcare provider in the last year?: Yes    Healthy Coping:  Healthy Coping Assessed Today: Yes  Emotional response to diabetes: Ready to learn  Informal Support system:: Family  Stage of change: PREPARATION (Decided to change - considering how)  Support resources: In-person Offerings  Patient Activation Measure Survey Score:      4/20/2011     2:00 PM   BRY Score (Last Two)   BRY Raw Score 42   Activation Score 66   BRY Level 3         Care Plan and Education Provided:  Healthy Eating: Balanced meals, Carbohydrate Counting, Consistency in amount and timing of carbohydrate intake, Eating out, Label reading, and Portion control, Being Active: Amount recommended (150 minutes moderate or 75 minutes vigorous activity and 2-3 days strength training per week), Finding a physical activity routine that works for you, and Relationship of activity to glucose, Monitoring: Frequency of monitoring, Individual glucose targets, and Purpose, Taking Medication: Action of prescribed medication(s), Side effects of prescribed medication(s), and When to take medication(s), Problem Solving: High glucose - causes, signs/symptoms, treatment and prevention and Low glucose - causes, signs/symptoms, treatment and prevention, Reducing Risks: Complications of diabetes, Goal for A1c, how it relates to glucose and how often to check, Preventing cardiovascular disease, including blood pressure goals, lipid goals, recommendations for cardioprotective medications, statins, and aspirin, Prevention, early diagnostic measures and treatment of complications, and A1C - goals, relating to blood glucose levels, how often to check, and Healthy Coping: Benefits of making appropriate lifestyle  changes, Identifying helpful resources, and Utilize support systems    Thank you,  Dona Griffin RN [facility]  Certified Diabetes Care and   Visit type : DSMT      Time Spent: 60 minutes  Encounter Type: Individual    Any diabetes medication dose changes were made via the CDE Protocol per the patient's referring provider and primary care provider. A copy of this encounter was shared with the provider.   Much or all of the text in this note was generated through the use of the Dragon Dictate voice-to-text software.Errors in spelling or words which seem out of context are unintentional. Sound alike errors, in particular, may have escaped editing.

## 2025-03-24 ENCOUNTER — TELEPHONE (OUTPATIENT)
Dept: PHARMACY | Facility: OTHER | Age: 70
End: 2025-03-24

## 2025-03-24 ENCOUNTER — ANCILLARY PROCEDURE (OUTPATIENT)
Dept: CARDIOLOGY | Facility: CLINIC | Age: 70
End: 2025-03-24
Attending: INTERNAL MEDICINE
Payer: COMMERCIAL

## 2025-03-24 DIAGNOSIS — Z95.810 ICD (IMPLANTABLE CARDIOVERTER-DEFIBRILLATOR), SINGLE, IN SITU: ICD-10-CM

## 2025-03-24 PROCEDURE — 93296 REM INTERROG EVL PM/IDS: CPT

## 2025-03-24 NOTE — TELEPHONE ENCOUNTER
KRISTINE Recruitment: [facility] insurance     Referral outreach attempt #2 on March 24, 2025      Outcome: left voicemail- Call back number [phone]    KRISTINE Villatoro

## 2025-03-25 NOTE — PROGRESS NOTES
Chief Complaint: Establish general cardiovascular care    [facility] (07/20/2022): Marianne Monroy is a 67 year old female with a past medical history of coronary artery disease (s/p CABG), ischemic cardiomyopathy, and hypertension who was referred to me to establish general cardiovascular care.  She presented today with her niece and brother.    Briefly, Ms. Monroy first came to my attention in the inpatient setting. There, she was admitted to my service in May 2022 with a tachyarrhythmia. She had a TTE and was found to have severe LV dysfunction (LVEF 20-30%) with normal RV function. I subsequently referred her for a coronary angiogram, which revealed severe CAD. This prompted CABG, which was done on 05/24/22. Her post-operative course was notable for PVCs without ventricular tachycardia.  Her discharge LVEF (done on June 1) was 35-40%.  She also had C. Difficile infection shortly after leaving the hospital. Ms. Monroy has been followed in the core clinic, where she has had progressive upward titration of her neurohormonal blockade.  Given her reduced EF and ventricular tachycardia preoperatively, the EP team recommended that she wear her LifeVest until her 3-month follow-up with them.    Today, Ms. Monroy notes that she feel that she is slowly improving after surgery.  The C. difficile infection was back in she is not feel like she has fully recovered in spite of taking 12 out of 14 days of the medication course.  From a cardiovascular standpoint, her chief complaint is lightheadedness, which is primarily postural.  She also notes that she has been having some imbalance, but this is improved by using a cane.  She can walk at least 1/2 mile level ground. Ms. Monroy also notes some hesitation about being fully physically active given her recent major surgery.  At the time of the consultation, she notes an absence of chest pain at rest, dyspnea at rest or with exertion, PND, orthopnea, peripheral edema,  "palpitations, or syncope.  She continues to wear her LifeVest during the day but takes it off to sleep.    A comprehensive ROS was done and the details are included above in the HPI.    Interval History 9/28/22:  Since Ms. Monroy's last visit, she has seen EP and they have decided to proceed with ICD implantation. She has also followed with the [facility], and she has been transitioned to higher doses of metoprolol and sacubutril-valsartan.    From a symptomatic standpoint, Ms. Monroy feels extremely well.  She has really enjoyed cardiac rehab and has signed up to a membership in a regular gym to supplement her cardiac rehab sessions.  She is able to do all the cardiac rehab exercises without any limitation from shortness of breath, angina, lightheadedness, dizziness, or presyncope.  This amounts to approximately 60 minutes of aerobic and resistance activity with brief breaks in between.  She has occasionally been experiencing lightheadedness when she stands up quickly.  She presented today with her niece.    A comprehensive ROS was done and the details are included above in the HPI.    Interval History 1/18/23:  Since Ms. Monroy's last visit, she had her ICD placed. Her medical therapy has been titrated to the point that she is on maximal doses of ARB/ARNI. She presented with Cori today.     From a functional standpoint, Ms. Monroy continues to feel well, with the exception of frequent dizziness.  The dizziness does occur with postural change and if she turns quickly.  However, she is going to the gym multiple times weekly and has not had any presyncope/syncope.  She is also planning multiple trips in the spring, including to [place]. Ms. Monroy does have occasional \"pulling\"at the site of the ICD when she rolls over in bed.    A comprehensive ROS was done and the details are included above in the HPI.    Interval History 8/23/23:    Since Ms. Monroy's last visit, I obtained a CTA to evaluate her dyspnea on " exertion. This showed patent grafts, but her LIMA-LAD was read as being atretic. I reviewed the CTA and did not feel that the LIMA-LAD was atretic.     Ms. Monroy also reached out to me last month to say that she had dizziness and chest pressure. An ICD remote transmission did not yield any arrhythmias and a TTE showed an improvement in her LVEF without any new valvular dysfunction. She did see my colleague, Ms. Colon, afterwards. Her sacubutril-valsartan dose was decreased slightly.     From a symptomatic standpoint, Ms. Chapman notes that her symptoms improved.  She feels that her prior symptoms may have been attributed to extreme humidity that we have been experiencing.  She does not have any ongoing angina or signs of decompensated heart failure.    A comprehensive ROS was done and the details are included above in the HPI.    Interval History 3/13/24:    Since Ms. Monroy's last visit, she has been [facility] up [facility], where she reported having lightheadedness and some dyspnea on exertion.  However, she notes that her weight has been stable.  Due to her lightheadedness symptoms, her sacubitril-valsartan was decreased to half tablet in the morning as of last week.    Today, she notes that she does feels occasional fatigue.  Some of this is related to fluctuations in her blood sugar.    A comprehensive ROS was done and the details are included above in the HPI.    Interval History 3/26/25:    Since Ms. Monroy's last visit, she was hospitalized in December 2024.  This was for fatigue.  She had an exercise stress test which showed that she was unable to achieve a heart rate greater than 120 while on a beta-blocker.  Hence, her beta-blocker was decreased.  She was then seen in [facility] approximately 10 days after discharge, where she noted that her dyspnea on exertion was somewhat better and most noticeable when she climbs stairs or was pushing heavy objects.  She has very infrequent episodes of angina, none of  which have required any intervention.  Ms. Monroy exercises 30 minutes daily on a treadmill.    A comprehensive ROS was done and the details are included above in the HPI.      Past Medical History:  Ischemic cardiomyopathy  Coronary artery disease status post CABG  Hypertension  Type 2 diabetes mellitus, non-insulin-dependent  Past Medical History:   Diagnosis Date    Abnormal Pap smear of cervix ~2000    repeat pap was normal    Allergic rhinitis, cause unspecified     Anxiety state, unspecified     panic episodes    C. difficile colitis     CAD (coronary artery disease)     Congestive heart failure (H)     Depressive disorder     Diabetes (H)     HFrEF (heart failure with reduced ejection fraction) (H)     ICD (implantable cardioverter-defibrillator) in place     Osteopenia     Unspecified essential hypertension        Past Surgical History:  CABG 05/24/22 by Ulises Desai at [facility]: LIMA-LAD, SVG-dRCA, SVG-RI, SVG-OM  Past Surgical History:   Procedure Laterality Date    BYPASS GRAFT ARTERY CORONARY N/A 05/24/2022    Procedure: Median sternotomy.  Intraoperative transesophageal echocardiogram per anesthesia.  Left internal mammary artery harvest.  Right and left endoscopically harvested  greater saphenouse vein.  Cardiopulmonary Bypass.  Coronary Artery Bypass Grafts x4.;  Surgeon: Ulises Desai MD;  Location:  OR    COLONOSCOPY N/A 02/16/2023    Procedure: COLONOSCOPY, WITH POLYPECTOMY AND BIOPSY;  Surgeon: Nikhil Lees MD;  Location:  GI    CV CORONARY ANGIOGRAM  05/19/2022    Procedure: CV CORONARY ANGIOGRAM;  Surgeon: Huseyin Vogel MD;  Location: [facility] CARDIAC CATH LAB    CV CORONARY ANGIOGRAM  05/19/2022    Procedure: ;  Surgeon: Huseyin Vogel MD;  Location: [facility] CARDIAC CATH LAB    EP ICD INSERT SINGLE N/A 10/04/2022    Procedure: Implantable Cardioverter Defibrillator Device & Lead Implant Single or Dual;  Surgeon: Too Morrow MD;  Location: [facility]  CARDIAC CATH LAB    ESOPHAGOSCOPY, GASTROSCOPY, DUODENOSCOPY (EGD), COMBINED N/A 02/16/2023    Procedure: ESOPHAGOGASTRODUODENOSCOPY, WITH BIOPSY;  Surgeon: Nikhil Lees MD;  Location: [facility] GI    PICC DOUBLE LUMEN PLACEMENT Right 05/27/2022    Failed PICC attempt right arm basilic vein    PICC INSERTION - TRIPLE LUMEN Left 05/28/2022    left basilic 5fr tl,picc44 cm       Drug History:  Home cardiac meds: Aspirin 81 mg once daily, atorvastatin 80 mg once daily, empagliflozin 25 mg daily,  metoprolol succinate 25 mg daily, sacubitril-valsartan 49-51 twice daily , spironolactone 12.5 mg daily.  Current Outpatient Medications   Medication Sig Dispense Refill    ALPRAZolam (XANAX) 0.25 MG tablet Take 1 tablet (0.25 mg) by mouth 3 times daily as needed for anxiety 10 tablet 0    aspirin 81 MG EC tablet Take 81 mg by mouth every evening      atorvastatin (LIPITOR) 80 MG tablet TAKE 1 TABLET BY MOUTH DAILY 90 tablet 3    Continuous Blood Gluc Sensor (FREESTYLE VANESSA 14 DAY SENSOR) MISC Change every 14 days. 2 each 0    empagliflozin (JARDIANCE) 25 MG TABS tablet Take 1 tablet (25 mg) by mouth daily. 90 tablet 1    FLUoxetine (PROZAC) 20 MG capsule Take 2 capsules (40 mg) by mouth daily. 180 capsule 1    glipiZIDE (GLUCOTROL XL) 2.5 MG 24 hr tablet 3 tablets daily with Food 270 tablet 0    metFORMIN (GLUCOPHAGE XR) 500 MG 24 hr tablet Take 3 tablets (1,500 mg) by mouth daily (with dinner). 270 tablet 1    metoprolol succinate ER (TOPROL XL) 50 MG 24 hr tablet Take 0.5 tablets (25 mg) by mouth daily.      MULTIPLE VITAMIN OR TABS Take by mouth every morning      sacubitril-valsartan (ENTRESTO) 49-51 MG per tablet Take 1 tablet by mouth 2 times daily. 180 tablet 3    VITAMIN D PO Take by mouth.      spironolactone (ALDACTONE) 25 MG tablet TAKE 1/2 TABLET(12.5 MG) BY MOUTH DAILY (Patient not taking: Reported on 3/26/2025) 45 tablet 3         Family History:    Family History   Problem Relation Age of Onset    Diabetes  "Mother         hypertension, alive at 83    C.A.D. Mother     Coronary Artery Disease Mother     Cancer Father         lung cancer, smoker.   at age 53    Family History Negative Sister     Osteoporosis Sister     Anesthesia Reaction No family hx of     Deep Vein Thrombosis (DVT) No family hx of        Social History:    Social History     Tobacco Use    Smoking status: Never    Smokeless tobacco: Never   Substance Use Topics    Alcohol use: Yes     Comment: 1 drink a week       Allergies   Allergen Reactions    Effexor [Venlafaxine Hydrochloride]      Tachycardia, makes her extremely jittery--cant sit down, has to keep moving constantly    Latex      Reported by patient         Physical Examination:  Vitals: /65 (BP Location: Right arm, Patient Position: Chair, Cuff Size: Adult Regular)   Pulse 88   Ht 1.6 m (5' 3\")   Wt 60.8 kg (134 lb)   SpO2 99%   BMI 23.74 kg/m    BMI= Body mass index is 23.74 kg/m .    GENERAL: Healthy, alert and no distress  RESPIRATORY: No signs of resp distress. Lungs CTAB.  CARDIOVASCULAR: ICD site appears healthy.  Sternotomy scar appears to be healing well without any drainage or open areas in the wound.  No JVD, regular, normal S1+S2 without added sounds or murmurs.  EXTREMITIES: Warm, well-perfused, no edema.   NEUROLOGY: GCS 15/15, no focal deficits.  PSYCH: Cooperative, pleasant affect.       Investigations:  I personally viewed and interpreted the following investigations:    Labs:    CBC RESULTS:  Lab Results   Component Value Date    WBC 4.9 12/10/2024    WBC 7.1 2020    RBC 4.71 12/10/2024    RBC 4.57 2020    HGB 12.8 12/10/2024    HGB 13.3 2020    HCT 40.9 12/10/2024    HCT 41.1 2020    MCV 87 12/10/2024    MCV 90 2020    MCH 27.2 12/10/2024    MCH 29.1 2020    MCHC 31.3 (L) 12/10/2024    MCHC 32.4 2020    RDW 13.5 12/10/2024    RDW 12.5 2020     12/10/2024     2020       CMP RESULTS:  Lab Results "   Component Value Date     01/10/2025     01/07/2021    POTASSIUM 4.5 01/10/2025    POTASSIUM 5.0 02/07/2023    POTASSIUM 4.8 01/07/2021    CHLORIDE 104 01/10/2025    CHLORIDE 108 02/07/2023    CHLORIDE 106 01/07/2021    CO2 23 01/10/2025    CO2 29 02/07/2023    CO2 28 01/07/2021    ANIONGAP 12 01/10/2025    ANIONGAP 3 02/07/2023    ANIONGAP 7 01/07/2021     (H) 01/10/2025     (H) 02/16/2023     (H) 02/07/2023     (H) 01/07/2021    BUN 27.9 (H) 01/10/2025    BUN 25 02/07/2023    BUN 19 01/07/2021    CR 0.89 01/10/2025    CR 0.90 01/07/2021    GFRESTIMATED 69 01/10/2025    GFRESTIMATED 60 (L) 12/06/2024    GFRESTIMATED 66 01/07/2021    GFRESTBLACK 77 01/07/2021    POONAM 9.3 01/10/2025    POONAM 9.5 01/07/2021    BILITOTAL 0.5 12/10/2024    BILITOTAL 0.4 09/09/2020    ALBUMIN 4.2 12/10/2024    ALBUMIN 3.7 07/06/2022    ALBUMIN 3.7 09/09/2020    ALKPHOS 102 12/10/2024    ALKPHOS 90 09/09/2020    ALT 36 12/10/2024    ALT 33 09/09/2020    AST 18 12/10/2024    AST 14 09/09/2020        INR RESULTS:  Lab Results   Component Value Date    INR 1.03 12/09/2024       BNP RESULTS:  Lab Results   Component Value Date    NTBNPI 506 12/09/2024         LIPIDS:  Lab Results   Component Value Date    CHOL 133 01/22/2025    CHOL 144 05/18/2021     Lab Results   Component Value Date    HDL 57 01/22/2025    HDL 50 05/18/2021     Lab Results   Component Value Date    LDL 61 01/22/2025    LDL 59 05/18/2021     Lab Results   Component Value Date    TRIG 77 01/22/2025    TRIG 173 05/18/2021     Lab Results   Component Value Date    CHOLHDLRATIO 6.0 04/26/2012         Recent Tests:    Electrocardiogram (07/06/2022):  Normal sinus rhythm, ventricular 90 bpm, LVH, inferior Q waves.  QRS 94.    Echocardiogram (08/10/2023):   Left ventricular function is decreased. The ejection fraction is 40-45% (mildly reduced).  Global right ventricular function is normal.  Mild mitral insufficiency is present.  Mild to  moderate tricuspid insufficiency is present.  IVC diameter <2.1 cm collapsing >50% with sniff suggests a normal RA pressure  of 3 mmHg.  No pericardial effusion is present.  Compared to prior, LV fxn is unchanged to slightly improved.      Assessment and Plan:   Marianne Monroy is a 70 year old female with a past medical history of coronary artery disease (status post CABG), ischemic cardiomyopathy, and patient recently presented to me to establish general cardiovascular July 2022.    I was happy to see that Ms. Monroy was doing well from a symptomatic standpoint and her risk factors are under good control.  She is currently working on diabetes control.  From a cardiovascular perspective, I do not wish to make any changes to her regimen.  I have asked her to monitor her heart rate while she is on the treadmill, to evaluate for chronotropic incompetence in view of her exercise ECG findings from December 2024.    Problems:  Ischemic cardiomyopathy (ACC/AHA stage C, NYHA III)  Coronary artery disease s/p CABG in 05/2022 (LIMA-LAD, SVG-dRCA, SVG-RI, SVG-OM)  Hypertension  Dyslipidemia, LDL target less than 70  Type 2 diabetes mellitus, non-insulin-dependent (last A1c 8.3% in March 2023)  Depression  Generalized anxiety disorder    Plan:  - Continue aspirin 81 mg once daily and atorvastatin 80 mg once daily lifelong for CAD  - Continue metoprolol succinate 25 mg daily, sacubitril-valsartan 49-51 twice daily, and spironolactone 12.5 mg daily for neurohormonal blockade  - Continue empagliflozin 25 mg daily for type 2 diabetes mellitus and CAD/ischemic cardiomyopathy  - RTC 12 months    A total of 30 minutes were spent on the day of the visit for chart review, care coordination, face-to-face consultation with the patient, and documentation.       Kris Steve, [facility], [place]    Cardiovascular Division  Pager [phone]    CC  Patient Care Team:  Roxanna Otoole MD as PCP - General (Family Medicine)  Roxanna Otoole MD  as Assigned PCP  Regina Sutherland as Diabetes Educator (Dietitian, Registered)  Dasia Amin, RN as Specialty Care Coordinator (Cardiology)  Belinda Colon APRN CNP as Nurse Practitioner (Cardiovascular Disease)  Kris Steve MD as MD (Cardiovascular Disease)  Belinda Colon APRN CNP as Assigned Heart and Vascular Provider  Evette Castro MD as MD (Infectious Diseases)  Sarah Isidro, IKER as Specialty Care Coordinator (Cardiology)  Too Morrow MD as MD (Cardiovascular Disease)  Cherie Lira APRN CNP as Nurse Practitioner (Cardiovascular Disease)  No Ref-Primary, Physician  Libia Santoro, PharmD as Pharmacist (Pharmacist)  Latrell Valentino [facility] as Pharmacist (Pharmacist Clinician- Clinical Pharmacy Specialist)  Migdalia Swenson [facility] as Pharmacist (Pharmacist)  Hernando León [facility] as Pharmacist (Pharmacist)  Dona Ferraro RN as Diabetes Educator (Diabetes Education)

## 2025-03-26 ENCOUNTER — OFFICE VISIT (OUTPATIENT)
Dept: CARDIOLOGY | Facility: CLINIC | Age: 70
End: 2025-03-26
Payer: COMMERCIAL

## 2025-03-26 VITALS
HEART RATE: 88 BPM | DIASTOLIC BLOOD PRESSURE: 65 MMHG | WEIGHT: 134 LBS | HEIGHT: 63 IN | SYSTOLIC BLOOD PRESSURE: 111 MMHG | BODY MASS INDEX: 23.74 KG/M2 | OXYGEN SATURATION: 99 %

## 2025-03-26 DIAGNOSIS — I25.5 ISCHEMIC CARDIOMYOPATHY: Primary | ICD-10-CM

## 2025-03-26 NOTE — NURSING NOTE
"Chief Complaint   Patient presents with    Follow Up       Initial /65 (BP Location: Right arm, Patient Position: Chair, Cuff Size: Adult Regular)   Pulse 88   Ht 1.6 m (5' 3\")   Wt 60.8 kg (134 lb)   SpO2 99%   BMI 23.74 kg/m   Estimated body mass index is 23.74 kg/m  as calculated from the following:    Height as of this encounter: 1.6 m (5' 3\").    Weight as of this encounter: 60.8 kg (134 lb)..  BP completed using cuff size: efrem MENDEZ  "

## 2025-03-26 NOTE — PATIENT INSTRUCTIONS
Take your medicines every day, as directed     Changes made today:  No changes to meds   Keep up the great work with exercise        Cardiology Care Coordinators:      Gabby RIZO RN       Cardiology Rooming Staff:  Gunjan MENDEZ    Phone  [phone]      Fax [phone]    To Contact us     During Business Hours:  [phone]     If you are needing refills please contact your pharmacy.     For urgent after hour care please call the [facility] at [phone] or the [facility] at [phone] and ask to speak to the cardiologist on call.    If you are having a medical emergency, please call 911.            HOW TO CHECK YOUR BLOOD PRESSURE AT HOME:     Avoid eating, smoking, and exercising for at least 30 minutes before taking a reading.     Be sure you have taken your BP medication at least 2-3 hours before you check it.      Sit quietly for 10 minutes before a reading.      Sit in a chair with your feet flat on the floor. Rest your  arm on a table so that the arm cuff is at the same level as your heart.     Remain still during the reading.  Record your blood pressure and pulse in a log and bring to your next appointment.       Use [employer] allows you to communicate directly with your heart team through secure messaging.  [employer] can be accessed any time on your phone, computer, or tablet.  If you need assistance, we'd be happy to help!             Keep your Heart Appointments:     Follow-up with me in 12 months         - - -

## 2025-03-27 LAB
MDC_IDC_LEAD_CONNECTION_STATUS: NORMAL
MDC_IDC_LEAD_IMPLANT_DT: NORMAL
MDC_IDC_LEAD_LOCATION: NORMAL
MDC_IDC_LEAD_LOCATION_DETAIL_1: NORMAL
MDC_IDC_LEAD_MFG: NORMAL
MDC_IDC_LEAD_MODEL: NORMAL
MDC_IDC_LEAD_POLARITY_TYPE: NORMAL
MDC_IDC_LEAD_SERIAL: NORMAL
MDC_IDC_LEAD_SPECIAL_FUNCTION: NORMAL
MDC_IDC_MSMT_BATTERY_DTM: NORMAL
MDC_IDC_MSMT_BATTERY_REMAINING_LONGEVITY: 142 MO
MDC_IDC_MSMT_BATTERY_RRT_TRIGGER: NORMAL
MDC_IDC_MSMT_BATTERY_VOLTAGE: 3.02 V
MDC_IDC_MSMT_CAP_CHARGE_DTM: NORMAL
MDC_IDC_MSMT_CAP_CHARGE_ENERGY: 18 J
MDC_IDC_MSMT_CAP_CHARGE_TIME: 3.7 S
MDC_IDC_MSMT_CAP_CHARGE_TYPE: NORMAL
MDC_IDC_MSMT_LEADCHNL_RV_IMPEDANCE_VALUE: 266 OHM
MDC_IDC_MSMT_LEADCHNL_RV_IMPEDANCE_VALUE: 380 OHM
MDC_IDC_MSMT_LEADCHNL_RV_PACING_THRESHOLD_AMPLITUDE: 0.62 V
MDC_IDC_MSMT_LEADCHNL_RV_PACING_THRESHOLD_PULSEWIDTH: 0.4 MS
MDC_IDC_MSMT_LEADCHNL_RV_SENSING_INTR_AMPL: 8.9 MV
MDC_IDC_PG_IMPLANT_DTM: NORMAL
MDC_IDC_PG_MFG: NORMAL
MDC_IDC_PG_MODEL: NORMAL
MDC_IDC_PG_SERIAL: NORMAL
MDC_IDC_PG_TYPE: NORMAL
MDC_IDC_SESS_CLINIC_NAME: NORMAL
MDC_IDC_SESS_DTM: NORMAL
MDC_IDC_SESS_TYPE: NORMAL
MDC_IDC_SET_BRADY_HYSTRATE: NORMAL
MDC_IDC_SET_BRADY_LOWRATE: 40 {BEATS}/MIN
MDC_IDC_SET_BRADY_MODE: NORMAL
MDC_IDC_SET_LEADCHNL_RV_PACING_AMPLITUDE: 2 V
MDC_IDC_SET_LEADCHNL_RV_PACING_ANODE_ELECTRODE_1: NORMAL
MDC_IDC_SET_LEADCHNL_RV_PACING_ANODE_LOCATION_1: NORMAL
MDC_IDC_SET_LEADCHNL_RV_PACING_CAPTURE_MODE: NORMAL
MDC_IDC_SET_LEADCHNL_RV_PACING_CATHODE_ELECTRODE_1: NORMAL
MDC_IDC_SET_LEADCHNL_RV_PACING_CATHODE_LOCATION_1: NORMAL
MDC_IDC_SET_LEADCHNL_RV_PACING_POLARITY: NORMAL
MDC_IDC_SET_LEADCHNL_RV_PACING_PULSEWIDTH: 0.4 MS
MDC_IDC_SET_LEADCHNL_RV_SENSING_ANODE_ELECTRODE_1: NORMAL
MDC_IDC_SET_LEADCHNL_RV_SENSING_ANODE_LOCATION_1: NORMAL
MDC_IDC_SET_LEADCHNL_RV_SENSING_CATHODE_ELECTRODE_1: NORMAL
MDC_IDC_SET_LEADCHNL_RV_SENSING_CATHODE_LOCATION_1: NORMAL
MDC_IDC_SET_LEADCHNL_RV_SENSING_POLARITY: NORMAL
MDC_IDC_SET_LEADCHNL_RV_SENSING_SENSITIVITY: 0.3 MV
MDC_IDC_SET_ZONE_DETECTION_BEATS_DENOMINATOR: 16 {BEATS}
MDC_IDC_SET_ZONE_DETECTION_BEATS_DENOMINATOR: 32 {BEATS}
MDC_IDC_SET_ZONE_DETECTION_BEATS_DENOMINATOR: 40 {BEATS}
MDC_IDC_SET_ZONE_DETECTION_BEATS_NUMERATOR: 16 {BEATS}
MDC_IDC_SET_ZONE_DETECTION_BEATS_NUMERATOR: 30 {BEATS}
MDC_IDC_SET_ZONE_DETECTION_BEATS_NUMERATOR: 32 {BEATS}
MDC_IDC_SET_ZONE_DETECTION_INTERVAL: 270 MS
MDC_IDC_SET_ZONE_DETECTION_INTERVAL: 320 MS
MDC_IDC_SET_ZONE_DETECTION_INTERVAL: 360 MS
MDC_IDC_SET_ZONE_DETECTION_INTERVAL: 360 MS
MDC_IDC_SET_ZONE_STATUS: NORMAL
MDC_IDC_SET_ZONE_TYPE: NORMAL
MDC_IDC_SET_ZONE_VENDOR_TYPE: NORMAL
MDC_IDC_STAT_AT_BURDEN_PERCENT: 0 %
MDC_IDC_STAT_AT_DTM_END: NORMAL
MDC_IDC_STAT_AT_DTM_START: NORMAL
MDC_IDC_STAT_BRADY_DTM_END: NORMAL
MDC_IDC_STAT_BRADY_DTM_START: NORMAL
MDC_IDC_STAT_BRADY_RV_PERCENT_PACED: 0.01 %
MDC_IDC_STAT_CRT_DTM_END: NORMAL
MDC_IDC_STAT_CRT_DTM_START: NORMAL
MDC_IDC_STAT_EPISODE_RECENT_COUNT: 0
MDC_IDC_STAT_EPISODE_RECENT_COUNT_DTM_END: NORMAL
MDC_IDC_STAT_EPISODE_RECENT_COUNT_DTM_START: NORMAL
MDC_IDC_STAT_EPISODE_TOTAL_COUNT: 0
MDC_IDC_STAT_EPISODE_TOTAL_COUNT: 5
MDC_IDC_STAT_EPISODE_TOTAL_COUNT_DTM_END: NORMAL
MDC_IDC_STAT_EPISODE_TOTAL_COUNT_DTM_START: NORMAL
MDC_IDC_STAT_EPISODE_TYPE: NORMAL
MDC_IDC_STAT_TACHYTHERAPY_ATP_DELIVERED_RECENT: 0
MDC_IDC_STAT_TACHYTHERAPY_ATP_DELIVERED_TOTAL: 0
MDC_IDC_STAT_TACHYTHERAPY_RECENT_DTM_END: NORMAL
MDC_IDC_STAT_TACHYTHERAPY_RECENT_DTM_START: NORMAL
MDC_IDC_STAT_TACHYTHERAPY_SHOCKS_ABORTED_RECENT: 0
MDC_IDC_STAT_TACHYTHERAPY_SHOCKS_ABORTED_TOTAL: 0
MDC_IDC_STAT_TACHYTHERAPY_SHOCKS_DELIVERED_RECENT: 0
MDC_IDC_STAT_TACHYTHERAPY_SHOCKS_DELIVERED_TOTAL: 0
MDC_IDC_STAT_TACHYTHERAPY_TOTAL_DTM_END: NORMAL
MDC_IDC_STAT_TACHYTHERAPY_TOTAL_DTM_START: NORMAL

## 2025-03-29 ENCOUNTER — HEALTH MAINTENANCE LETTER (OUTPATIENT)
Age: 70
End: 2025-03-29

## 2025-04-17 PROBLEM — R06.02 SOB (SHORTNESS OF BREATH): Status: RESOLVED | Noted: 2024-12-09 | Resolved: 2025-04-17

## 2025-04-22 ENCOUNTER — TELEPHONE (OUTPATIENT)
Dept: FAMILY MEDICINE | Facility: CLINIC | Age: 70
End: 2025-04-22

## 2025-04-22 NOTE — TELEPHONE ENCOUNTER
Patient Quality Outreach    Patient is due for the following:   Diabetes -  A1C, Microalbumin, and Foot Exam    Action(s) Taken:   Schedule a office visit for diabetic check    Type of outreach:    Phone, left message for patient/parent to call back.    Questions for provider review:    None         MARIA ELENA HAMMOND MA  Chart routed to None.

## 2025-05-15 DIAGNOSIS — E11.59 TYPE 2 DIABETES MELLITUS WITH OTHER CIRCULATORY COMPLICATION, WITHOUT LONG-TERM CURRENT USE OF INSULIN (H): ICD-10-CM

## 2025-05-19 ENCOUNTER — MYC MEDICAL ADVICE (OUTPATIENT)
Dept: FAMILY MEDICINE | Facility: CLINIC | Age: 70
End: 2025-05-19
Payer: COMMERCIAL

## 2025-05-19 DIAGNOSIS — N18.31 TYPE 2 DIABETES MELLITUS WITH STAGE 3A CHRONIC KIDNEY DISEASE, WITHOUT LONG-TERM CURRENT USE OF INSULIN (H): Primary | ICD-10-CM

## 2025-05-19 DIAGNOSIS — E11.22 TYPE 2 DIABETES MELLITUS WITH STAGE 3A CHRONIC KIDNEY DISEASE, WITHOUT LONG-TERM CURRENT USE OF INSULIN (H): Primary | ICD-10-CM

## 2025-05-20 RX ORDER — GLIPIZIDE 2.5 MG/1
TABLET, EXTENDED RELEASE ORAL
Qty: 270 TABLET | Refills: 0 | OUTPATIENT
Start: 2025-05-20

## 2025-05-20 RX ORDER — GLIPIZIDE 2.5 MG/1
TABLET, EXTENDED RELEASE ORAL
Qty: 90 TABLET | Refills: 0 | Status: SHIPPED | OUTPATIENT
Start: 2025-05-20

## 2025-05-21 RX ORDER — HYDROCHLOROTHIAZIDE 12.5 MG/1
CAPSULE ORAL
Qty: 6 EACH | Refills: 11 | Status: SHIPPED | OUTPATIENT
Start: 2025-05-21

## 2025-06-03 ENCOUNTER — OFFICE VISIT (OUTPATIENT)
Dept: FAMILY MEDICINE | Facility: CLINIC | Age: 70
End: 2025-06-03
Payer: COMMERCIAL

## 2025-06-03 ENCOUNTER — MYC MEDICAL ADVICE (OUTPATIENT)
Dept: CARDIOLOGY | Facility: CLINIC | Age: 70
End: 2025-06-03

## 2025-06-03 VITALS
DIASTOLIC BLOOD PRESSURE: 73 MMHG | TEMPERATURE: 97.5 F | BODY MASS INDEX: 24.63 KG/M2 | OXYGEN SATURATION: 98 % | WEIGHT: 139 LBS | RESPIRATION RATE: 16 BRPM | SYSTOLIC BLOOD PRESSURE: 114 MMHG | HEART RATE: 80 BPM | HEIGHT: 63 IN

## 2025-06-03 DIAGNOSIS — E78.5 HYPERLIPIDEMIA LDL GOAL <70: ICD-10-CM

## 2025-06-03 DIAGNOSIS — I50.20 HFREF (HEART FAILURE WITH REDUCED EJECTION FRACTION) (H): ICD-10-CM

## 2025-06-03 DIAGNOSIS — N18.32 TYPE 2 DIABETES MELLITUS WITH STAGE 3B CHRONIC KIDNEY DISEASE, WITHOUT LONG-TERM CURRENT USE OF INSULIN (H): ICD-10-CM

## 2025-06-03 DIAGNOSIS — Z79.899 MEDICATION MANAGEMENT: ICD-10-CM

## 2025-06-03 DIAGNOSIS — I25.10 CORONARY ARTERY DISEASE INVOLVING NATIVE HEART WITHOUT ANGINA PECTORIS, UNSPECIFIED VESSEL OR LESION TYPE: Primary | ICD-10-CM

## 2025-06-03 DIAGNOSIS — I10 HYPERTENSION, GOAL BELOW 140/90: ICD-10-CM

## 2025-06-03 DIAGNOSIS — F41.9 ANXIETY: ICD-10-CM

## 2025-06-03 DIAGNOSIS — E11.22 TYPE 2 DIABETES MELLITUS WITH STAGE 3A CHRONIC KIDNEY DISEASE, WITHOUT LONG-TERM CURRENT USE OF INSULIN (H): ICD-10-CM

## 2025-06-03 DIAGNOSIS — F41.1 GENERALIZED ANXIETY DISORDER: ICD-10-CM

## 2025-06-03 DIAGNOSIS — Z86.79 HISTORY OF VENTRICULAR TACHYCARDIA: ICD-10-CM

## 2025-06-03 DIAGNOSIS — N18.31 TYPE 2 DIABETES MELLITUS WITH STAGE 3A CHRONIC KIDNEY DISEASE, WITHOUT LONG-TERM CURRENT USE OF INSULIN (H): ICD-10-CM

## 2025-06-03 DIAGNOSIS — E11.22 TYPE 2 DIABETES MELLITUS WITH STAGE 3B CHRONIC KIDNEY DISEASE, WITHOUT LONG-TERM CURRENT USE OF INSULIN (H): ICD-10-CM

## 2025-06-03 DIAGNOSIS — F33.0 MAJOR DEPRESSIVE DISORDER, RECURRENT EPISODE, MILD: ICD-10-CM

## 2025-06-03 DIAGNOSIS — E11.40 TYPE 2 DIABETES MELLITUS WITH DIABETIC NEUROPATHY, WITHOUT LONG-TERM CURRENT USE OF INSULIN (H): ICD-10-CM

## 2025-06-03 DIAGNOSIS — I25.5 ISCHEMIC CARDIOMYOPATHY: ICD-10-CM

## 2025-06-03 PROBLEM — I47.20 PAROXYSMAL VENTRICULAR TACHYCARDIA (H): Status: RESOLVED | Noted: 2025-01-22 | Resolved: 2025-06-03

## 2025-06-03 LAB
EST. AVERAGE GLUCOSE BLD GHB EST-MCNC: 163 MG/DL
HBA1C MFR BLD: 7.3 % (ref 0–5.6)

## 2025-06-03 PROCEDURE — 3074F SYST BP LT 130 MM HG: CPT | Performed by: FAMILY MEDICINE

## 2025-06-03 PROCEDURE — 82043 UR ALBUMIN QUANTITATIVE: CPT | Performed by: FAMILY MEDICINE

## 2025-06-03 PROCEDURE — 3051F HG A1C>EQUAL 7.0%<8.0%: CPT | Performed by: FAMILY MEDICINE

## 2025-06-03 PROCEDURE — 80048 BASIC METABOLIC PNL TOTAL CA: CPT | Performed by: FAMILY MEDICINE

## 2025-06-03 PROCEDURE — 83036 HEMOGLOBIN GLYCOSYLATED A1C: CPT | Performed by: FAMILY MEDICINE

## 2025-06-03 PROCEDURE — 99214 OFFICE O/P EST MOD 30 MIN: CPT | Performed by: FAMILY MEDICINE

## 2025-06-03 PROCEDURE — 96127 BRIEF EMOTIONAL/BEHAV ASSMT: CPT | Performed by: FAMILY MEDICINE

## 2025-06-03 PROCEDURE — G2211 COMPLEX E/M VISIT ADD ON: HCPCS | Performed by: FAMILY MEDICINE

## 2025-06-03 PROCEDURE — 1126F AMNT PAIN NOTED NONE PRSNT: CPT | Performed by: FAMILY MEDICINE

## 2025-06-03 PROCEDURE — 36415 COLL VENOUS BLD VENIPUNCTURE: CPT | Performed by: FAMILY MEDICINE

## 2025-06-03 PROCEDURE — 82570 ASSAY OF URINE CREATININE: CPT | Performed by: FAMILY MEDICINE

## 2025-06-03 PROCEDURE — 3078F DIAST BP <80 MM HG: CPT | Performed by: FAMILY MEDICINE

## 2025-06-03 PROCEDURE — 95251 CONT GLUC MNTR ANALYSIS I&R: CPT | Mod: AE | Performed by: FAMILY MEDICINE

## 2025-06-03 RX ORDER — METFORMIN HYDROCHLORIDE 500 MG/1
1500 TABLET, EXTENDED RELEASE ORAL
Qty: 270 TABLET | Refills: 1 | Status: SHIPPED | OUTPATIENT
Start: 2025-06-03

## 2025-06-03 RX ORDER — GLIPIZIDE 2.5 MG/1
TABLET, EXTENDED RELEASE ORAL
Qty: 180 TABLET | Refills: 1 | Status: SHIPPED | OUTPATIENT
Start: 2025-06-03

## 2025-06-03 RX ORDER — ALPRAZOLAM 0.25 MG
0.25 TABLET ORAL 3 TIMES DAILY PRN
Qty: 10 TABLET | Refills: 0 | Status: SHIPPED | OUTPATIENT
Start: 2025-06-03

## 2025-06-03 RX ORDER — SPIRONOLACTONE 25 MG/1
12.5 TABLET ORAL DAILY
COMMUNITY
Start: 2025-06-03 | End: 2025-06-04

## 2025-06-03 ASSESSMENT — ANXIETY QUESTIONNAIRES
3. WORRYING TOO MUCH ABOUT DIFFERENT THINGS: SEVERAL DAYS
GAD7 TOTAL SCORE: 3
2. NOT BEING ABLE TO STOP OR CONTROL WORRYING: NOT AT ALL
GAD7 TOTAL SCORE: 3
7. FEELING AFRAID AS IF SOMETHING AWFUL MIGHT HAPPEN: NOT AT ALL
1. FEELING NERVOUS, ANXIOUS, OR ON EDGE: NOT AT ALL
5. BEING SO RESTLESS THAT IT IS HARD TO SIT STILL: SEVERAL DAYS
6. BECOMING EASILY ANNOYED OR IRRITABLE: NOT AT ALL

## 2025-06-03 ASSESSMENT — PATIENT HEALTH QUESTIONNAIRE - PHQ9
SUM OF ALL RESPONSES TO PHQ QUESTIONS 1-9: 2
5. POOR APPETITE OR OVEREATING: SEVERAL DAYS

## 2025-06-03 ASSESSMENT — PAIN SCALES - GENERAL: PAINLEVEL_OUTOF10: NO PAIN (0)

## 2025-06-03 NOTE — PROGRESS NOTES
Assessment & Plan   Marianne Monroy is a 67 year old female with a past medical history of coronary artery disease (s/p CABG), ischemic cardiomyopathy, and hypertension   Type 2 diabetes mellitus with stage 3b chronic kidney disease, without long-term current use of insulin (H)  Strict Diabetic diet  Continue same medicines    - Med Therapy Management Referral  - empagliflozin (JARDIANCE) 25 MG TABS tablet; Take 1 tablet (25 mg) by mouth daily.  - glipiZIDE (GLUCOTROL XL) 2.5 MG 24 hr tablet; 2 tablets daily with Food  - metFORMIN (GLUCOPHAGE XR) 500 MG 24 hr tablet; Take 3 tablets (1,500 mg) by mouth daily (with dinner).  - Albumin Random Urine Quantitative with Creat Ratio; Future  - Basic metabolic panel  (Ca, Cl, CO2, Creat, Gluc, K, Na, BUN); Future  - FOOT EXAM  - Basic metabolic panel  (Ca, Cl, CO2, Creat, Gluc, K, Na, BUN)    Type 2 diabetes mellitus with diabetic neuropathy, without long-term current use of insulin (H)  Stable   - Med Therapy Management Referral  - empagliflozin (JARDIANCE) 25 MG TABS tablet; Take 1 tablet (25 mg) by mouth daily.    Major depressive disorder, recurrent episode, mild  Doing well   - Med Therapy Management Referral  - FLUoxetine (PROZAC) 20 MG capsule; Take 2 capsules (40 mg) by mouth daily.    Medication management      Type 2 diabetes mellitus with stage 3a chronic kidney disease, without long-term current use of insulin (H)  Stable   - Med Therapy Management Referral  - HEMOGLOBIN A1C; Future  - Albumin Random Urine Quantitative with Creat Ratio; Future  - HEMOGLOBIN A1C  - Albumin Random Urine Quantitative with Creat Ratio    Hyperlipidemia LDL goal <70  Pending labs   - Med Therapy Management Referral    Anxiety  Pt is Flying and gets anxious   - Med Therapy Management Referral  - ALPRAZolam (XANAX) 0.25 MG tablet; Take 1 tablet (0.25 mg) by mouth 3 times daily as needed for anxiety.    Hypertension, goal below 140/90  Stable   - Med Therapy Management  Referral    Generalized anxiety disorder  Stable   - Med Therapy Management Referral    Paroxysmal ventricular tachycardia (H)-History  Has seen cardiology and doing well     HFrEF (heart failure with reduced ejection fraction) (H)  Meds reviewed   Continue same medicines    Not sure why she is Not taking her aldactone  She says she was advised to stop By Cardiology  She will check with them   - spironolactone (ALDACTONE) 25 MG tablet; Take 0.5 tablets (12.5 mg) by mouth daily.  Ischemic cardiomyopathy / CAD  Doing well  On asa and statins      Blood sugar testing frequency justification:  Uncontrolled diabetes and Adjustment of medication(s)    Follow-up    Follow-up Visit   Expected date:  Sep 03, 2025 (Approximate)      Follow Up Appointment Details:     Follow-up with whom?: Me    Follow-Up for what?: Medicare Wellness    Any Additional Chronic Condition Management?: Diabetes    Welcome or Annual?: Annual Wellness    How?: In Person               The longitudinal plan of care for the diagnosis(es)/condition(s) as documented were addressed during this visit. Due to the added complexity in care, I will continue to support Jody in the subsequent management and with ongoing continuity of care.  Subjective   Jody is a 70 year old, presenting for the following health issues:  Diabetes      6/3/2025    11:58 AM   Additional Questions   Roomed by Linda     History of Present Illness       Diabetes:   She presents for follow up of diabetes.  She is checking home blood glucose four or more times daily.   She checks blood glucose before meals, after meals, before and after meals and at bedtime.  Blood glucose is sometimes over 200 and sometimes under 70. She is aware of hypoglycemia symptoms including shakiness, weakness and blurred vision.   She is concerned about blood sugar frequently over 200.   She is having blurry vision and weight gain.            Heart Failure:  She presents for follow up of heart failure. She is  experiencing shortness of breath with activity only, which is same as usual. She is experiencing lower extremity edema which is same as usual.   She denies orthopenea and is not coughing at night. Patient is not checking weight daily. She has recently had a weight increase.  She has no side effects from medications.  She has had no other medical visits for heart failure since the last visit.    She eats 2-3 servings of fruits and vegetables daily.She consumes 0 sweetened beverage(s) daily.She exercises with enough effort to increase her heart rate 20 to 29 minutes per day.  She exercises with enough effort to increase her heart rate 6 days per week.   She is taking medications regularly.      Last Echo:   2023  Global right ventricular function is normal.  Mild mitral insufficiency is present.  Mild to moderate tricuspid insufficiency is present.  IVC diameter <2.1 cm collapsing >50% with sniff suggests a normal RA pressure  of 3 mmHg.  No pericardial effusion is present.  Compared to prior, LV fxn is unchanged to slightly improved.  Continuous Glucose Monitoring Data Download and Interpretation    AGP Screenshot                Target A1c: less than 8    Most Recent Hemoglobin A1c:  Recent Labs   Lab Test 12/04/24  1248   A1C 8.6*     Glucose Patterns & Trends:    Plan:   Occasional Hypoglycemia        Hyperlipidemia Follow-Up    Are you regularly taking any medication or supplement to lower your cholesterol?   Yes- statin  Are you having muscle aches or other side effects that you think could be caused by your cholesterol lowering medication?  No    Hypertension Follow-up    Do you check your blood pressure regularly outside of the clinic? No   Are you following a low salt diet? Yes  Are your blood pressures ever more than 140 on the top number (systolic) OR more   than 90 on the bottom number (diastolic), for example 140/90? No    BP Readings from Last 2 Encounters:   06/03/25 114/73   03/26/25 111/65     Anxiety  /Depression  How are you doing with your anxiety since your last visit? Improved   Are you having other symptoms that might be associated with anxiety? No  Have you had a significant life event? No   Are you feeling depressed? No  Do you have any concerns with your use of alcohol or other drugs? No  roblems:  Ischemic cardiomyopathy (ACC/AHA stage C, NYHA III)  Coronary artery disease s/p CABG in 05/2022 (LIMA-LAD, SVG-dRCA, SVG-RI, SVG-OM)  Hypertension  Dyslipidemia, LDL target less than 70  Type 2 diabetes mellitus, non-insulin-dependent (last A1c 8.3% in March 2023)  Depression  Generalized anxiety disorder     Social History     Tobacco Use    Smoking status: Never    Smokeless tobacco: Never   Vaping Use    Vaping status: Never Used   Substance Use Topics    Alcohol use: Yes     Comment: 1 drink a week    Drug use: No         4/16/2024     4:50 PM 4/16/2024     4:56 PM 6/3/2025    12:31 PM   JUSTUS-7 SCORE   Total Score  9 (mild anxiety)    Total Score 10 9 3         8/15/2024    11:15 AM 1/22/2025    11:57 AM 6/3/2025    12:31 PM   PHQ   PHQ-9 Total Score 1 4 2   Q9: Thoughts of better off dead/self-harm past 2 weeks Not at all Not at all Not at all         6/3/2025    12:31 PM   Last PHQ-9   1.  Little interest or pleasure in doing things 0   2.  Feeling down, depressed, or hopeless 0   3.  Trouble falling or staying asleep, or sleeping too much 1   4.  Feeling tired or having little energy 0   5.  Poor appetite or overeating 0   6.  Feeling bad about yourself 0   7.  Trouble concentrating 0   8.  Moving slowly or restless 1   Q9: Thoughts of better off dead/self-harm past 2 weeks 0   PHQ-9 Total Score 2         6/3/2025    12:31 PM   JUSTUS-7    1. Feeling nervous, anxious, or on edge 0   2. Not being able to stop or control worrying 0   3. Worrying too much about different things 1   4. Trouble relaxing 1   5. Being so restless that it is hard to sit still 1   6. Becoming easily annoyed or irritable 0   7.  "Feeling afraid, as if something awful might happen 0   JUSTUS-7 Total Score 3         Review of Systems  CONSTITUTIONAL: NEGATIVE for fever, chills, change in weight  ENT/MOUTH: NEGATIVE for ear, mouth and throat problems  RESP: NEGATIVE for significant cough or SOB  CV: NEGATIVE for chest pain, palpitations or peripheral edema  GI: NEGATIVE for nausea, abdominal pain, heartburn, or change in bowel habits  MUSCULOSKELETAL: NEGATIVE for significant arthralgias or myalgia  ROS otherwise negative      Objective    /73   Pulse 80   Temp 97.5  F (36.4  C) (Temporal)   Resp 16   Ht 1.6 m (5' 2.99\")   Wt 63 kg (139 lb)   SpO2 98%   BMI 24.63 kg/m    Body mass index is 24.63 kg/m .  Physical Exam   GENERAL: alert and no distress  EYES: Eyes grossly normal to inspection  NECK: no adenopathy, no asymmetry, masses, or scars  RESP: lungs clear to auscultation - no rales, rhonchi or wheezes  CV: regular rate and rhythm, normal S1 S2, no S3 or S4, no murmur, click or rub, no peripheral edema  ABDOMEN: soft, nontender, no hepatosplenomegaly, no masses and bowel sounds normal  MS: no gross musculoskeletal defects noted, no edema  PSYCH: mentation appears normal, affect normal/bright  Diabetic foot exam: normal DP and PT pulses, no trophic changes or ulcerative lesions, and normal sensory exam    Ancillary Procedure on 03/24/2025   Component Date Value Ref Range Status    Date Time Interrogation Session 03/27/2025 20,250,323,203,306   Final    Implantable Pulse Generator Manufa* 03/27/2025 Medtronic   Final    Implantable Pulse Generator Model 03/27/2025 [id number] Cobalt XT VR MRI   Final    Implantable Pulse Generator Serial* 03/27/2025 [id number]   Final    Type Interrogation Session 03/27/2025 Remote Scheduled   Final    Clinic Name 03/27/2025 [facility]   Final    Implantable Pulse Generator Type 03/27/2025 Defibrillator   Final    Implantable Pulse Generator Implan* 03/27/2025 20,221,004   Final "    Implantable Lead  03/27/2025 Medtronic   Final    Implantable Lead Model 03/27/2025 6935M Sprint Quattro Secure S MRI SureScan   Final    Implantable Lead Serial Number 03/27/2025 [id number]   Final    Implantable Lead Implant Date 03/27/2025 20,221,004   Final    Implantable Lead Polarity Type 03/27/2025 Tripolar Lead   Final    Implantable Lead Location Detail 1 03/27/2025 APEX   Final    Implantable Lead Special Function 03/27/2025 55 cm length   Final    Implantable Lead Location 03/27/2025 Right Ventricle   Final    Implantable Lead Connection Status 03/27/2025 Connected   Final    Contreras Setting Mode (NBG Code) 03/27/2025 VVI   Final    Contreras Setting Lower Rate Limit 03/27/2025 40  [beats]/min Final    Contreras Setting Hysterisis Rate 03/27/2025 Off   Final    Lead Channel Setting Sensing Polar* 03/27/2025 Bipolar   Final    Lead Channel Setting Sensing Anode* 03/27/2025 Right Ventricle   Final    Lead Channel Setting Sensing Anode* 03/27/2025 Ring   Final    Lead Channel Setting Sensing Catho* 03/27/2025 Right Ventricle   Final    Lead Channel Setting Sensing Catho* 03/27/2025 Tip   Final    Lead Channel Setting Sensing Sensi* 03/27/2025 0.3  mV Final    Lead Channel Setting Pacing Polari* 03/27/2025 Bipolar   Final    Lead Channel Setting Pacing Anode * 03/27/2025 Right Ventricle   Final    Lead Channel Setting Pacing Anode * 03/27/2025 Ring   Final    Lead Channel Setting Sensing Catho* 03/27/2025 Right Ventricle   Final    Lead Channel Setting Sensing Catho* 03/27/2025 Tip   Final    Lead Channel Setting Pacing Pulse * 03/27/2025 0.4  ms Final    Lead Channel Setting Pacing Amplit* 03/27/2025 2.0  V Final    Lead Channel Setting Pacing Captur* 03/27/2025 Adaptive   Final    Zone Setting Type Category 03/27/2025 VF   Final    Zone Setting Vendor Type Category 03/27/2025 VF   Final    Zone Setting Status 03/27/2025 Active   Final    Zone Setting Detection Interval 03/27/2025 320  ms Final    Zone  Setting Detection Beats Numer* 03/27/2025 30  [beats] Final    Zone Setting Detection Beats Denom* 03/27/2025 40  [beats] Final    Zone Setting Type Category 03/27/2025 VT   Final    Zone Setting Vendor Type Category 03/27/2025 FastVT   Final    Zone Setting Status 03/27/2025 Active   Final    Zone Setting Detection Interval 03/27/2025 270  ms Final    Zone Setting Type Category 03/27/2025 VT   Final    Zone Setting Vendor Type Category 03/27/2025 VT   Final    Zone Setting Status 03/27/2025 Inactive   Final    Zone Setting Detection Interval 03/27/2025 360  ms Final    Zone Setting Detection Beats Numer* 03/27/2025 16  [beats] Final    Zone Setting Detection Beats Denom* 03/27/2025 16  [beats] Final    Zone Setting Type Category 03/27/2025 VT   Final    Zone Setting Vendor Type Category 03/27/2025 MonVT   Final    Zone Setting Status 03/27/2025 Monitor   Final    Zone Setting Detection Interval 03/27/2025 360  ms Final    Zone Setting Detection Beats Numer* 03/27/2025 32  [beats] Final    Zone Setting Detection Beats Denom* 03/27/2025 32  [beats] Final    Lead Channel Impedance Value 03/27/2025 266  ohm Final    Lead Channel Impedance Value 03/27/2025 380  ohm Final    Lead Channel Sensing Intrinsic Amp* 03/27/2025 8.9  mV Final    Lead Channel Pacing Threshold Ampl* 03/27/2025 0.625  V Final    Lead Channel Pacing Threshold Puls* 03/27/2025 0.4  ms Final    Battery Date Time of Measurements 03/27/2025 20,250,323,201,505   Final    Battery RRT Trigger 03/27/2025 2.8 V   Final    Battery Remaining Longevity 03/27/2025 142  mo Final    Battery Voltage 03/27/2025 3.02  V Final    Capacitor Charge Type 03/27/2025 Shock   Final    Capacitor Last Charge Date Time 03/27/2025 20,250,313,091,521   Final    Capacitor Charge Time 03/27/2025 3.7  s Final    Capacitor Charge Energy 03/27/2025 18.0  J Final    Contreras Statistic Date Time Start 03/27/2025 20,241,210,084,901   Final    Contreras Statistic Date Time End 03/27/2025  20,250,323,203,306   Final    Contreras Statistic RV Percent Paced 03/27/2025 0.01  % Final    CRT Statistic Date Time Start 03/27/2025 20,241,210,084,901   Final    CRT Statistic Date Time End 03/27/2025 20,250,323,203,306   Final    Atrial Tachy Statistic Date Time S* 03/27/2025 20,241,210,084,901   Final    Atrial Tachy Statistic Date Time E* 03/27/2025 20,250,323,203,306   Final    Atrial Tachy Statistic AT/AF Burde* 03/27/2025 0  % Final    Therapy Statistic Recent Shocks De* 03/27/2025 0   Final    Therapy Statistic Recent Shocks Ab* 03/27/2025 0   Final    Therapy Statistic Recent ATP Deliv* 03/27/2025 0   Final    Therapy Statistic Recent Date Time* 03/27/2025 20,241,210,084,901   Final    Therapy Statistic Recent Date Time* 03/27/2025 20,250,323,203,306   Final    Therapy Statistic Total Shocks Del* 03/27/2025 0   Final    Therapy Statistic Total Shocks Abo* 03/27/2025 0   Final    Therapy Statistic Total ATP Delive* 03/27/2025 0   Final    Therapy Statistic Total  Date Time* 03/27/2025 20,221,004,190,225   Final    Therapy Statistic Total  Date Time* 03/27/2025 20,250,323,203,306   Final    Episode Statistic Recent Count 03/27/2025 0   Final    Episode Statistic Type Category 03/27/2025 AT/AF   Final    Episode Statistic Recent Count 03/27/2025 0   Final    Episode Statistic Type Category 03/27/2025 Patient Activated   Final    Episode Statistic Recent Count 03/27/2025 0   Final    Episode Statistic Type Category 03/27/2025 SVT   Final    Episode Statistic Recent Count 03/27/2025 0   Final    Episode Statistic Type Category 03/27/2025 VT   Final    Episode Statistic Recent Count 03/27/2025 0   Final    Episode Statistic Type Category 03/27/2025 VF   Final    Episode Statistic Recent Count 03/27/2025 0   Final    Episode Statistic Type Category 03/27/2025 VT   Final    Episode Statistic Recent Count 03/27/2025 0   Final    Episode Statistic Type Category 03/27/2025 VT   Final    Episode Statistic Recent Count  03/27/2025 0   Final    Episode Statistic Type Category 03/27/2025 VT   Final    Episode Statistic Recent Date Time* 03/27/2025 20,241,210,084,901   Final    Episode Statistic Recent Date Time* 03/27/2025 20,250,323,203,306   Final    Episode Statistic Recent Date Time* 03/27/2025 10110438049183   Final    Episode Statistic Recent Date Time* 03/27/2025 18379718175728   Final    Episode Statistic Recent Date Time* 03/27/2025 02889690778603   Final    Episode Statistic Recent Date Time* 03/27/2025 72773432987779   Final    Episode Statistic Recent Date Time* 03/27/2025 85851039562838   Final    Episode Statistic Recent Date Time* 03/27/2025 63888043618784   Final    Episode Statistic Recent Date Time* 03/27/2025 21919204194482   Final    Episode Statistic Recent Date Time* 03/27/2025 17176943386306   Final    Episode Statistic Recent Date Time* 03/27/2025 02002753530795   Final    Episode Statistic Recent Date Time* 03/27/2025 81111463359530   Final    Episode Statistic Recent Date Time* 03/27/2025 01490346283740   Final    Episode Statistic Recent Date Time* 03/27/2025 21767930806089   Final    Episode Statistic Recent Date Time* 03/27/2025 65445269256423   Final    Episode Statistic Recent Date Time* 03/27/2025 35719797022766   Final    Episode Statistic Total Count 03/27/2025 0   Final    Episode Statistic Type Category 03/27/2025 Patient Activated   Final    Episode Statistic Total Count 03/27/2025 0   Final    Episode Statistic Type Category 03/27/2025 SVT   Final    Episode Statistic Total Count 03/27/2025 5   Final    Episode Statistic Type Category 03/27/2025 VT   Final    Episode Statistic Total Count 03/27/2025 0   Final    Episode Statistic Type Category 03/27/2025 VF   Final    Episode Statistic Total Count 03/27/2025 0   Final    Episode Statistic Type Category 03/27/2025 VT   Final    Episode Statistic Total Count 03/27/2025 0   Final    Episode Statistic Type Category 03/27/2025 VT   Final    Episode  Statistic Total Count 03/27/2025 0   Final    Episode Statistic Type Category 03/27/2025 VT   Final    Episode Statistic Total Date Time * 03/27/2025 20,221,004,190,225   Final    Episode Statistic Total Date Time * 03/27/2025 20,250,323,203,306   Final    Episode Statistic Total Date Time * 03/27/2025 20221004190225   Final    Episode Statistic Total Date Time * 03/27/2025 20250323203306   Final    Episode Statistic Total Date Time * 03/27/2025 20221004190225   Final    Episode Statistic Total Date Time * 03/27/2025 20250323203306   Final    Episode Statistic Total Date Time * 03/27/2025 20221004190225   Final    Episode Statistic Total Date Time * 03/27/2025 20250323203306   Final    Episode Statistic Total Date Time * 03/27/2025 20221004190225   Final    Episode Statistic Total Date Time * 03/27/2025 20250323203306   Final    Episode Statistic Total Date Time * 03/27/2025 20221004190225   Final    Episode Statistic Total Date Time * 03/27/2025 20250323203306   Final    Episode Statistic Total Date Time * 03/27/2025 20221004190225   Final    Episode Statistic Total Date Time * 03/27/2025 20250323203306   Final     Results for orders placed or performed in visit on 06/03/25   HEMOGLOBIN A1C     Status: Abnormal   Result Value Ref Range    Estimated Average Glucose 163 (H) <117 mg/dL    Hemoglobin A1C 7.3 (H) 0.0 - 5.6 %   Albumin Random Urine Quantitative with Creat Ratio     Status: None   Result Value Ref Range    Creatinine Urine mg/dL 66.5 mg/dL    Albumin Urine mg/L <12.0 mg/L    Albumin Urine mg/g Cr     Basic metabolic panel  (Ca, Cl, CO2, Creat, Gluc, K, Na, BUN)     Status: Abnormal   Result Value Ref Range    Sodium 140 135 - 145 mmol/L    Potassium 5.0 3.4 - 5.3 mmol/L    Chloride 103 98 - 107 mmol/L    Carbon Dioxide (CO2) 27 22 - 29 mmol/L    Anion Gap 10 7 - 15 mmol/L    Urea Nitrogen 23.5 (H) 8.0 - 23.0 mg/dL    Creatinine 0.90 0.51 - 0.95 mg/dL    GFR Estimate 68 >60 mL/min/1.73m2    Calcium 9.8 8.8  - 10.4 mg/dL    Glucose 143 (H) 70 - 99 mg/dL           Signed Electronically by: Roxanna Otoole MD

## 2025-06-04 ENCOUNTER — RESULTS FOLLOW-UP (OUTPATIENT)
Dept: FAMILY MEDICINE | Facility: CLINIC | Age: 70
End: 2025-06-04
Payer: COMMERCIAL

## 2025-06-04 DIAGNOSIS — E11.40 TYPE 2 DIABETES MELLITUS WITH DIABETIC NEUROPATHY, WITHOUT LONG-TERM CURRENT USE OF INSULIN (H): ICD-10-CM

## 2025-06-04 DIAGNOSIS — E11.22 TYPE 2 DIABETES MELLITUS WITH STAGE 3B CHRONIC KIDNEY DISEASE, WITHOUT LONG-TERM CURRENT USE OF INSULIN (H): ICD-10-CM

## 2025-06-04 DIAGNOSIS — N18.32 TYPE 2 DIABETES MELLITUS WITH STAGE 3B CHRONIC KIDNEY DISEASE, WITHOUT LONG-TERM CURRENT USE OF INSULIN (H): ICD-10-CM

## 2025-06-04 PROBLEM — I25.5 ISCHEMIC CARDIOMYOPATHY: Status: ACTIVE | Noted: 2025-06-04

## 2025-06-04 PROBLEM — I25.10 CORONARY ARTERY DISEASE INVOLVING NATIVE HEART WITHOUT ANGINA PECTORIS, UNSPECIFIED VESSEL OR LESION TYPE: Status: ACTIVE | Noted: 2025-06-04

## 2025-06-04 LAB
ANION GAP SERPL CALCULATED.3IONS-SCNC: 10 MMOL/L (ref 7–15)
BUN SERPL-MCNC: 23.5 MG/DL (ref 8–23)
CALCIUM SERPL-MCNC: 9.8 MG/DL (ref 8.8–10.4)
CHLORIDE SERPL-SCNC: 103 MMOL/L (ref 98–107)
CREAT SERPL-MCNC: 0.9 MG/DL (ref 0.51–0.95)
CREAT UR-MCNC: 66.5 MG/DL
EGFRCR SERPLBLD CKD-EPI 2021: 68 ML/MIN/1.73M2
GLUCOSE SERPL-MCNC: 143 MG/DL (ref 70–99)
HCO3 SERPL-SCNC: 27 MMOL/L (ref 22–29)
MICROALBUMIN UR-MCNC: <12 MG/L
MICROALBUMIN/CREAT UR: NORMAL MG/G{CREAT}
POTASSIUM SERPL-SCNC: 5 MMOL/L (ref 3.4–5.3)
SODIUM SERPL-SCNC: 140 MMOL/L (ref 135–145)

## 2025-06-04 RX ORDER — SPIRONOLACTONE 25 MG/1
12.5 TABLET ORAL DAILY
Qty: 45 TABLET | Refills: 3 | Status: SHIPPED | OUTPATIENT
Start: 2025-06-04

## 2025-06-04 NOTE — TELEPHONE ENCOUNTER
Date: 6/4/2025    Time of Call: 11:30 AM     Diagnosis:  heart failure     [ TORB ] Ordering provider: Belinda Colon NP  Order: restart spironolactone 12.5 mg daily, BMP 2 weeks after starting     Order received by: Dasia Amin RN    Follow-up/additional notes: MyChart sent to patient

## 2025-06-05 NOTE — TELEPHONE ENCOUNTER
Writer called [facility] pharmacy and spoke with Pharmacist. Pharmacist stated that in her system it looks like the medication had been filled on 6/2. Pharmacist asked if the provider had sent it somewhere else. Writer stated that as far as we can see we do not see that this medication was sent anywhere else. Pharmacist states that it looks like actually it had been canceled and so that was why it was showing as filled.     They could now fill this medication. Pharmacist then states that the second problem is now that they do not have this medication in stock.     Writer called pt and explained the situation. Writer explained that the fastest way that we would solve this issue would be for pt to call the [facility] pharmacy and have them transfer over the prescription from [facility]. Pt verbalized understanding.     Deborah Hightower RN

## 2025-06-07 PROBLEM — I95.9 HYPOTENSION, UNSPECIFIED HYPOTENSION TYPE: Status: RESOLVED | Noted: 2024-12-09 | Resolved: 2025-06-07

## 2025-06-15 ENCOUNTER — MYC MEDICAL ADVICE (OUTPATIENT)
Dept: FAMILY MEDICINE | Facility: CLINIC | Age: 70
End: 2025-06-15
Payer: COMMERCIAL

## 2025-06-16 NOTE — TELEPHONE ENCOUNTER
Routing payasUgymt message to provider.       Patient asking if she should go back to 3 tabs of glipizide daily as her sugars are higher since decreasing to 2 tabs daily.      Please advise.    Christine M Klisch, RN     4 = No assist / stand by assistance

## 2025-06-16 NOTE — TELEPHONE ENCOUNTER
Could you please call the patient and ask for her fasting blood glucose level? After meals, the glucose is ca be  high, but I want her to check her fasting glucose before we increase the medication.

## 2025-06-17 NOTE — TELEPHONE ENCOUNTER
Patient contacted.    Fasting BS in the morning is 100.  Patient is questioning if she should go back to glipizide 3 tabs per day.    BS after breakfast was 250.      Skipped lunch, didn't feel hungry.    1.5 hour ago had some peanuts.  BS now is 206.    Patient is following low carb diet.  Has chronic low appetite.      BS readings are higher with decreased glipizide.    Mindi Mckeon RN, BSN  [facility]

## 2025-06-17 NOTE — TELEPHONE ENCOUNTER
Dennis Vera,   Fasting Blood sugar should be between   2 Hours after eating -It should be less than 180  At this point continue your same meds  You do not want Hypoglycemia  Your 3 month average Blood sugar was good for your age  Follow up 3 months  Sincerely,  Roxanna Otoole MD

## 2025-06-18 ENCOUNTER — TELEPHONE (OUTPATIENT)
Dept: CARDIOLOGY | Facility: CLINIC | Age: 70
End: 2025-06-18
Payer: COMMERCIAL

## 2025-06-18 NOTE — TELEPHONE ENCOUNTER
Shireen joshi.    Gilberto Khan, RN  Triage Nurse  [facility]

## 2025-06-19 ENCOUNTER — LAB (OUTPATIENT)
Dept: LAB | Facility: CLINIC | Age: 70
End: 2025-06-19
Payer: COMMERCIAL

## 2025-06-19 DIAGNOSIS — I50.20 HFREF (HEART FAILURE WITH REDUCED EJECTION FRACTION) (H): ICD-10-CM

## 2025-06-24 ENCOUNTER — ANCILLARY PROCEDURE (OUTPATIENT)
Dept: CARDIOLOGY | Facility: CLINIC | Age: 70
End: 2025-06-24
Attending: INTERNAL MEDICINE
Payer: COMMERCIAL

## 2025-06-24 DIAGNOSIS — Z95.810 ICD (IMPLANTABLE CARDIOVERTER-DEFIBRILLATOR), SINGLE, IN SITU: ICD-10-CM

## 2025-06-24 LAB
MDC_IDC_EPISODE_DTM: NORMAL
MDC_IDC_EPISODE_DURATION: 1 S
MDC_IDC_EPISODE_DURATION: 1 S
MDC_IDC_EPISODE_DURATION: 2 S
MDC_IDC_EPISODE_ID: 6
MDC_IDC_EPISODE_ID: 7
MDC_IDC_EPISODE_ID: 8
MDC_IDC_EPISODE_TYPE: NORMAL
MDC_IDC_LEAD_CONNECTION_STATUS: NORMAL
MDC_IDC_LEAD_IMPLANT_DT: NORMAL
MDC_IDC_LEAD_LOCATION: NORMAL
MDC_IDC_LEAD_LOCATION_DETAIL_1: NORMAL
MDC_IDC_LEAD_MFG: NORMAL
MDC_IDC_LEAD_MODEL: NORMAL
MDC_IDC_LEAD_POLARITY_TYPE: NORMAL
MDC_IDC_LEAD_SERIAL: NORMAL
MDC_IDC_LEAD_SPECIAL_FUNCTION: NORMAL
MDC_IDC_MSMT_BATTERY_DTM: NORMAL
MDC_IDC_MSMT_BATTERY_REMAINING_LONGEVITY: 140 MO
MDC_IDC_MSMT_BATTERY_RRT_TRIGGER: NORMAL
MDC_IDC_MSMT_BATTERY_VOLTAGE: 3.02 V
MDC_IDC_MSMT_CAP_CHARGE_DTM: NORMAL
MDC_IDC_MSMT_CAP_CHARGE_ENERGY: 18 J
MDC_IDC_MSMT_CAP_CHARGE_TIME: 3.7 S
MDC_IDC_MSMT_CAP_CHARGE_TYPE: NORMAL
MDC_IDC_MSMT_LEADCHNL_RV_IMPEDANCE_VALUE: 266 OHM
MDC_IDC_MSMT_LEADCHNL_RV_IMPEDANCE_VALUE: 361 OHM
MDC_IDC_MSMT_LEADCHNL_RV_PACING_THRESHOLD_AMPLITUDE: 0.62 V
MDC_IDC_MSMT_LEADCHNL_RV_PACING_THRESHOLD_PULSEWIDTH: 0.4 MS
MDC_IDC_MSMT_LEADCHNL_RV_SENSING_INTR_AMPL: 9.8 MV
MDC_IDC_PG_IMPLANT_DTM: NORMAL
MDC_IDC_PG_MFG: NORMAL
MDC_IDC_PG_MODEL: NORMAL
MDC_IDC_PG_SERIAL: NORMAL
MDC_IDC_PG_TYPE: NORMAL
MDC_IDC_SESS_CLINIC_NAME: NORMAL
MDC_IDC_SESS_DTM: NORMAL
MDC_IDC_SESS_TYPE: NORMAL
MDC_IDC_SET_BRADY_HYSTRATE: NORMAL
MDC_IDC_SET_BRADY_LOWRATE: 40 {BEATS}/MIN
MDC_IDC_SET_BRADY_MODE: NORMAL
MDC_IDC_SET_LEADCHNL_RV_PACING_AMPLITUDE: 2 V
MDC_IDC_SET_LEADCHNL_RV_PACING_ANODE_ELECTRODE_1: NORMAL
MDC_IDC_SET_LEADCHNL_RV_PACING_ANODE_LOCATION_1: NORMAL
MDC_IDC_SET_LEADCHNL_RV_PACING_CAPTURE_MODE: NORMAL
MDC_IDC_SET_LEADCHNL_RV_PACING_CATHODE_ELECTRODE_1: NORMAL
MDC_IDC_SET_LEADCHNL_RV_PACING_CATHODE_LOCATION_1: NORMAL
MDC_IDC_SET_LEADCHNL_RV_PACING_POLARITY: NORMAL
MDC_IDC_SET_LEADCHNL_RV_PACING_PULSEWIDTH: 0.4 MS
MDC_IDC_SET_LEADCHNL_RV_SENSING_ANODE_ELECTRODE_1: NORMAL
MDC_IDC_SET_LEADCHNL_RV_SENSING_ANODE_LOCATION_1: NORMAL
MDC_IDC_SET_LEADCHNL_RV_SENSING_CATHODE_ELECTRODE_1: NORMAL
MDC_IDC_SET_LEADCHNL_RV_SENSING_CATHODE_LOCATION_1: NORMAL
MDC_IDC_SET_LEADCHNL_RV_SENSING_POLARITY: NORMAL
MDC_IDC_SET_LEADCHNL_RV_SENSING_SENSITIVITY: 0.3 MV
MDC_IDC_SET_ZONE_DETECTION_BEATS_DENOMINATOR: 16 {BEATS}
MDC_IDC_SET_ZONE_DETECTION_BEATS_DENOMINATOR: 32 {BEATS}
MDC_IDC_SET_ZONE_DETECTION_BEATS_DENOMINATOR: 40 {BEATS}
MDC_IDC_SET_ZONE_DETECTION_BEATS_NUMERATOR: 16 {BEATS}
MDC_IDC_SET_ZONE_DETECTION_BEATS_NUMERATOR: 30 {BEATS}
MDC_IDC_SET_ZONE_DETECTION_BEATS_NUMERATOR: 32 {BEATS}
MDC_IDC_SET_ZONE_DETECTION_INTERVAL: 270 MS
MDC_IDC_SET_ZONE_DETECTION_INTERVAL: 320 MS
MDC_IDC_SET_ZONE_DETECTION_INTERVAL: 360 MS
MDC_IDC_SET_ZONE_DETECTION_INTERVAL: 360 MS
MDC_IDC_SET_ZONE_STATUS: NORMAL
MDC_IDC_SET_ZONE_TYPE: NORMAL
MDC_IDC_SET_ZONE_VENDOR_TYPE: NORMAL
MDC_IDC_STAT_AT_BURDEN_PERCENT: 0 %
MDC_IDC_STAT_AT_DTM_END: NORMAL
MDC_IDC_STAT_AT_DTM_START: NORMAL
MDC_IDC_STAT_BRADY_DTM_END: NORMAL
MDC_IDC_STAT_BRADY_DTM_START: NORMAL
MDC_IDC_STAT_BRADY_RV_PERCENT_PACED: 0.01 %
MDC_IDC_STAT_CRT_DTM_END: NORMAL
MDC_IDC_STAT_CRT_DTM_START: NORMAL
MDC_IDC_STAT_EPISODE_RECENT_COUNT: 0
MDC_IDC_STAT_EPISODE_RECENT_COUNT: 3
MDC_IDC_STAT_EPISODE_RECENT_COUNT_DTM_END: NORMAL
MDC_IDC_STAT_EPISODE_RECENT_COUNT_DTM_START: NORMAL
MDC_IDC_STAT_EPISODE_TOTAL_COUNT: 0
MDC_IDC_STAT_EPISODE_TOTAL_COUNT: 8
MDC_IDC_STAT_EPISODE_TOTAL_COUNT_DTM_END: NORMAL
MDC_IDC_STAT_EPISODE_TOTAL_COUNT_DTM_START: NORMAL
MDC_IDC_STAT_EPISODE_TYPE: NORMAL
MDC_IDC_STAT_TACHYTHERAPY_ATP_DELIVERED_RECENT: 0
MDC_IDC_STAT_TACHYTHERAPY_ATP_DELIVERED_TOTAL: 0
MDC_IDC_STAT_TACHYTHERAPY_RECENT_DTM_END: NORMAL
MDC_IDC_STAT_TACHYTHERAPY_RECENT_DTM_START: NORMAL
MDC_IDC_STAT_TACHYTHERAPY_SHOCKS_ABORTED_RECENT: 0
MDC_IDC_STAT_TACHYTHERAPY_SHOCKS_ABORTED_TOTAL: 0
MDC_IDC_STAT_TACHYTHERAPY_SHOCKS_DELIVERED_RECENT: 0
MDC_IDC_STAT_TACHYTHERAPY_SHOCKS_DELIVERED_TOTAL: 0
MDC_IDC_STAT_TACHYTHERAPY_TOTAL_DTM_END: NORMAL
MDC_IDC_STAT_TACHYTHERAPY_TOTAL_DTM_START: NORMAL

## 2025-06-24 PROCEDURE — 93296 REM INTERROG EVL PM/IDS: CPT

## 2025-06-26 ENCOUNTER — OFFICE VISIT (OUTPATIENT)
Dept: CARDIOLOGY | Facility: CLINIC | Age: 70
End: 2025-06-26
Payer: COMMERCIAL

## 2025-06-26 VITALS
HEART RATE: 75 BPM | BODY MASS INDEX: 25.16 KG/M2 | DIASTOLIC BLOOD PRESSURE: 63 MMHG | WEIGHT: 142 LBS | SYSTOLIC BLOOD PRESSURE: 107 MMHG | OXYGEN SATURATION: 98 %

## 2025-06-26 DIAGNOSIS — I25.5 ISCHEMIC CARDIOMYOPATHY: ICD-10-CM

## 2025-06-26 DIAGNOSIS — Z95.1 S/P CABG (CORONARY ARTERY BYPASS GRAFT): ICD-10-CM

## 2025-06-26 DIAGNOSIS — E11.40 TYPE 2 DIABETES MELLITUS WITH DIABETIC NEUROPATHY, WITHOUT LONG-TERM CURRENT USE OF INSULIN (H): ICD-10-CM

## 2025-06-26 DIAGNOSIS — Z95.1 STATUS POST AORTO-CORONARY ARTERY BYPASS GRAFT: ICD-10-CM

## 2025-06-26 DIAGNOSIS — I50.22 CHRONIC SYSTOLIC HEART FAILURE (H): Primary | ICD-10-CM

## 2025-06-26 DIAGNOSIS — I10 HYPERTENSION, UNSPECIFIED TYPE: ICD-10-CM

## 2025-06-26 NOTE — NURSING NOTE
Labs: Patient was given results of the laboratory testing obtained today.  Patient demonstrated understanding of this information and agreed to call with further questions or concerns.     Med Reconcile: Reviewed and verified all current medications with the patient. The updated medication list was printed and given to the patient. No Med Changes     Return Appointment: Patient given instructions regarding scheduling next clinic visit. Patient demonstrated understanding of this information and agreed to call with further questions or concerns. CORE Follow up in 6 months     Patient stated she understood all health information given and agreed to call with further questions or concerns.

## 2025-06-26 NOTE — LETTER
6/26/2025      RE: Marianne Monroy  [address]  [address] [place] [id number]       Dear Colleague,    Thank you for the opportunity to participate in the care of your patient, Marianne Monroy, at the [facility]. Please see a copy of my visit note below.        HPI: Marianne is a 70 year old White female with a past medical history of T2DM, HTN, GERD, JUSTUS, MDD and recent episode of lightheadedness and dizziness on 4/30    Admission 5/18-6/4 - 5/24/2022.  Surgeon: Dr. Ulises Desai  1.  Coronary artery bypass grafting x 4 (left internal mammary artery to left anterior descending artery, saphenous vein graft to distal right coronary artery, saphenous vein graft to ramus intermedius artery, saphenous vein graft to obtuse marginal artery).  2.  Endoscopic vein harvest.    Pt was sent from cardiology clinic for hypotension and shortness of breath with concerns for STEMI, ACS, HF, PNA. BP 75/33 on arrival in the ED. Pt received 1 L NS with improvement of SBP to 90s-100s. BNP 500s, lactate 1.2, TSH WNL, trop peaked at 17. ECG reveals sinus bradycardia similar to her previous ECGs. CXR with clear lungs. Notably, patient shares that she has had decreased PO intake for the past week due to decreased taste, which is likely contributing to her hypotension. Will also evaluate for possible infection and worsening cardiac function/heart failure. Pt's HF was diagnosed in 05/2022. Patient's EF was 20-25% prior to bypass surgery, which improved to 35-40% after bypass and ICD placement.   - S/p 2 L NS in the ED  - TTE 12/10: EF improved to 45%, no new rWMA or valvular abnormalities  - Baseline weight ~120 lbs, admission weight 127.3 lbs (after 2 L NS)    Patient has no SOB at rest. She has some JEFFERSON with pushing heavy objects.  She has some JEFFERSON with stairs- can do 2 flights.  No swelling in the legs today. She has some lightheadedness with changes  and positioning.  Weight at home 139-140 lbs.     Denies SOB,PND, orthopnea, edema, chest pain, palpitations, lightheadedness, dizziness, near syncopal/syncopal episodes. Marianne has been following salt and fluid restrictions.      PAST MEDICAL HISTORY:  Past Medical History:   Diagnosis Date     Abnormal Pap smear of cervix ~    repeat pap was normal     Allergic rhinitis, cause unspecified      Anxiety state, unspecified     panic episodes     C. difficile colitis      CAD (coronary artery disease)      Congestive heart failure (H)      Depressive disorder      Diabetes (H)      HFrEF (heart failure with reduced ejection fraction) (H)      ICD (implantable cardioverter-defibrillator) in place      Osteopenia      Unspecified essential hypertension        FAMILY HISTORY:  Family History   Problem Relation Age of Onset     Diabetes Mother         hypertension, alive at 83     C.A.D. Mother      Coronary Artery Disease Mother      Cancer Father         lung cancer, smoker.   at age 53     Family History Negative Sister      Osteoporosis Sister      Anesthesia Reaction No family hx of      Deep Vein Thrombosis (DVT) No family hx of        SOCIAL HISTORY:  Social History     Tobacco Use     Smoking status: Never     Smokeless tobacco: Never   Vaping Use     Vaping status: Never Used   Substance Use Topics     Alcohol use: Yes     Comment: 1 drink a week     Drug use: No           CURRENT MEDICATIONS:  Current Outpatient Medications   Medication Sig Dispense Refill     ALPRAZolam (XANAX) 0.25 MG tablet Take 1 tablet (0.25 mg) by mouth 3 times daily as needed for anxiety. 10 tablet 0     aspirin 81 MG EC tablet Take 81 mg by mouth every evening       atorvastatin (LIPITOR) 80 MG tablet TAKE 1 TABLET BY MOUTH DAILY 90 tablet 3     Continuous Blood Gluc Sensor (FREESTYLE VANESSA 14 DAY SENSOR) MISC Change every 14 days. 2 each 0     Continuous Glucose Sensor (FREESTYLE VANESSA 3 PLUS SENSOR) MISC Change every 15 days.  6 each 11     empagliflozin (JARDIANCE) 25 MG TABS tablet Take 1 tablet (25 mg) by mouth daily. 90 tablet 1     FLUoxetine (PROZAC) 20 MG capsule Take 2 capsules (40 mg) by mouth daily. 180 capsule 1     glipiZIDE (GLUCOTROL XL) 2.5 MG 24 hr tablet 2 tablets daily with Food 180 tablet 1     metFORMIN (GLUCOPHAGE XR) 500 MG 24 hr tablet Take 3 tablets (1,500 mg) by mouth daily (with dinner). 270 tablet 1     metoprolol succinate ER (TOPROL XL) 50 MG 24 hr tablet Take 0.5 tablets (25 mg) by mouth daily.       MULTIPLE VITAMIN OR TABS Take by mouth every morning       sacubitril-valsartan (ENTRESTO) 49-51 MG per tablet Take 1 tablet by mouth 2 times daily. 180 tablet 3     spironolactone (ALDACTONE) 25 MG tablet Take 0.5 tablets (12.5 mg) by mouth daily. 45 tablet 3     VITAMIN D PO Take by mouth.             ROS:   Constitutional: No fever, chills, or sweats.  ENT: No visual disturbance, ear ache, epistaxis, sore throat.   Allergies/Immunologic: Negative  Respiratory: No cough, hemoptysis.   Cardiovascular: As per HPI.   GI: As per HPI.   : No urinary frequency, dysuria, or hematuria.   Integument: Negative.   Psychiatric: Negative.   Neuro: Negative.   Endocrinology: negative   Musculoskeletal: negative    EXAM:   GENERAL: Appears comfortable, in no acute distress.   HEENT: Eye symmetrical, no discharge or icterus bilaterally.   CV: RRR, +S1S2, no murmur, rub, or gallop. JVP < 6 .   RESPIRATORY: Respirations regular, even, and unlabored. Lungs CTA throughout.  GI: Soft and non distended  EXTREMITIES: No peripheral edema. Extremities warm and well perfused.   NEUROLOGIC: Alert and interacting appropriately. No focal deficits.   MUSCULOSKELETAL: No joint swelling or tenderness.   SKIN: No jaundice.    Labs:  CBC RESULTS:  Lab Results   Component Value Date    WBC 4.9 12/10/2024    WBC 7.1 09/09/2020    RBC 4.71 12/10/2024    RBC 4.57 09/09/2020    HGB 12.8 12/10/2024    HGB 13.3 09/09/2020    HCT 40.9 12/10/2024     "HCT 41.1 09/09/2020    MCV 87 12/10/2024    MCV 90 09/09/2020    MCH 27.2 12/10/2024    MCH 29.1 09/09/2020    MCHC 31.3 (L) 12/10/2024    MCHC 32.4 09/09/2020    RDW 13.5 12/10/2024    RDW 12.5 09/09/2020     12/10/2024     09/09/2020       CMP RESULTS:  Lab Results   Component Value Date     06/19/2025     01/07/2021    POTASSIUM 4.4 06/19/2025    POTASSIUM 5.0 02/07/2023    POTASSIUM 4.8 01/07/2021    CHLORIDE 105 06/19/2025    CHLORIDE 108 02/07/2023    CHLORIDE 106 01/07/2021    CO2 24 06/19/2025    CO2 29 02/07/2023    CO2 28 01/07/2021    ANIONGAP 11 06/19/2025    ANIONGAP 3 02/07/2023    ANIONGAP 7 01/07/2021     (H) 06/19/2025     (H) 02/16/2023     (H) 02/07/2023     (H) 01/07/2021    BUN 29.8 (H) 06/19/2025    BUN 25 02/07/2023    BUN 19 01/07/2021    CR 0.90 06/19/2025    CR 0.90 01/07/2021    GFRESTIMATED 68 06/19/2025    GFRESTIMATED 60 (L) 12/06/2024    GFRESTIMATED 66 01/07/2021    GFRESTBLACK 77 01/07/2021    POONAM 9.3 06/19/2025    POONAM 9.5 01/07/2021    BILITOTAL 0.5 12/10/2024    BILITOTAL 0.4 09/09/2020    ALBUMIN 4.2 12/10/2024    ALBUMIN 3.7 07/06/2022    ALBUMIN 3.7 09/09/2020    ALKPHOS 102 12/10/2024    ALKPHOS 90 09/09/2020    ALT 36 12/10/2024    ALT 33 09/09/2020    AST 18 12/10/2024    AST 14 09/09/2020        INR RESULTS:  Lab Results   Component Value Date    INR 1.03 12/09/2024       No components found for: \"CK\"  Lab Results   Component Value Date    MAG 2.0 12/10/2024     Lab Results   Component Value Date    NTBNP 1,034 (H) 12/04/2024     @BRIEFLABR (dig)@    Exercise Stress test: 12/9/24    Submaximal ECG stress test     75% maximal predicted HR achieved. The test was terminated due to general  fatigue.     Normal heart rate response to exercise. Normal blood pressure response to  exercise.     The patient did not report angina during exercise.     No ECG evidence of ischemia. Non specific ST-T changes at baseline " remained  unchanged with stress. Occasional premature ventricular complexes noted during  stress test.     Good exercise tolerance.    Echo 12/24  Interpretation Summary  Left ventricular function is decreased. The ejection fraction is 45% (mildly  reduced). The basal-mid inferior and inferoseptal segments are akinetic  Global right ventricular function is normal.  No significant valvular abnormalities abnormalities present.  Estimated mean right atrial pressure is normal.  No pericardial effusion is present.  _____________________________      Assessment and Plan:    In summary,Marianne  is a 70 year old here for a CORE follow up. She saw Dr. Steve in March, no medication changes made and follow up in one year.  Labs reviewed     1.  Chronic systolic heart failure secondary to ICM.  Stage C  NYHA Class III  ACEi/ARB: -Entresto 49/51 mg BID   BB: yes-  Metoprolol 25 mg   Aldosterone antagonist: 12.5 mg - unable to go higher  SCD prophylaxis: does not meet criteria for implant  Fluid status: euvolemic  Anticoagulation:   Antiplatelet:  ASA dose   Sleep apnea:  NSAID use:  Contraindicated.  Avoid use.  Remote Monitoring:none  SGLT 2 - Jardiance 25 mg      2.  Other comordbid conditions:      #T2DM - PCP to manage - glipizide , Jardiance, metformin    #HTN- Entresto and Metoprolol- Entresto 49/51 mg BID     #GERD- followed by PCP     3.  Follow-up  No med changes today   Follow up CORE 6 months        Belinda MOORE, CNP  [facility]     - I spent 35 minutes in the care of this patient today including: time spent face to face with the patient, coordinating care, reviewing testing and completing documentation. >50% was spent with the patient and coordinating care.             Please do not hesitate to contact me if you have any questions/concerns.     Sincerely,     OSCAR Smith CNP

## 2025-06-26 NOTE — PATIENT INSTRUCTIONS
Take your medicines every day, as directed    Changes made today:  No med changes      Monitor Your Weight and Symptoms    Contact us if you:    Gain 2 pounds in one day or 5 pounds in one week  Feel more short of breath  Notice more leg swelling  Feel lightheadeded   Change your lifestyle    Limit Salt or Sodium:  2000 mg  Limit Fluids:  2000 mL or approximately 64 ounces  Eat a Heart Healthy Diet  Low in saturated fats  Stay Active:  Aim to move at least 150 minutes every  week         To Contact us    During Business Hours:  [phone], option # 1      After hours, weekends or holidays:   [phone], Option #4  Ask to speak to the On-Call Cardiologist. Inform them you are a CORE/heart failure patient at the [facility].     Use [employer] allows you to communicate directly with your heart team through secure messaging.  [facility] can be accessed any time on your phone, computer, or tablet.  If you need assistance, we'd be happy to help!         Keep your Heart Appointments:               Follow up CORE 6 months     Please consider attending our virtual support group which is held monthly. Please reach out to Lloyd at [phone] for more information if you are interested in attending.

## 2025-06-26 NOTE — NURSING NOTE
"Chief Complaint   Patient presents with    CORE       Initial /63 (BP Location: Right arm, Patient Position: Chair, Cuff Size: Adult Regular)   Pulse 75   Wt 64.4 kg (142 lb)   SpO2 98%   BMI 25.16 kg/m   Estimated body mass index is 25.16 kg/m  as calculated from the following:    Height as of 6/3/25: 1.6 m (5' 2.99\").    Weight as of this encounter: 64.4 kg (142 lb)..  BP completed using cuff size: regular    Angeline Hinojosa, Visit Facilitator    "

## 2025-06-26 NOTE — PROGRESS NOTES
HPI: Marianne is a 70 year old White female with a past medical history of T2DM, HTN, GERD, JUSTUS, MDD and recent episode of lightheadedness and dizziness on 4/30    Admission 5/18-6/4 - 5/24/2022.  Surgeon: Dr. Ulises Desai  1.  Coronary artery bypass grafting x 4 (left internal mammary artery to left anterior descending artery, saphenous vein graft to distal right coronary artery, saphenous vein graft to ramus intermedius artery, saphenous vein graft to obtuse marginal artery).  2.  Endoscopic vein harvest.    Pt was sent from cardiology clinic for hypotension and shortness of breath with concerns for STEMI, ACS, HF, PNA. BP 75/33 on arrival in the ED. Pt received 1 L NS with improvement of SBP to 90s-100s. BNP 500s, lactate 1.2, TSH WNL, trop peaked at 17. ECG reveals sinus bradycardia similar to her previous ECGs. CXR with clear lungs. Notably, patient shares that she has had decreased PO intake for the past week due to decreased taste, which is likely contributing to her hypotension. Will also evaluate for possible infection and worsening cardiac function/heart failure. Pt's HF was diagnosed in 05/2022. Patient's EF was 20-25% prior to bypass surgery, which improved to 35-40% after bypass and ICD placement.   - S/p 2 L NS in the ED  - TTE 12/10: EF improved to 45%, no new rWMA or valvular abnormalities  - Baseline weight ~120 lbs, admission weight 127.3 lbs (after 2 L NS)    Patient has no SOB at rest. She has some JEFFERSON with pushing heavy objects.  She has some JEFFERSON with stairs- can do 2 flights.  No swelling in the legs today. She has some lightheadedness with changes and positioning.  Weight at home 139-140 lbs.     Denies SOB,PND, orthopnea, edema, chest pain, palpitations, lightheadedness, dizziness, near syncopal/syncopal episodes. Marianne has been following salt and fluid restrictions.      PAST MEDICAL HISTORY:  Past Medical History:   Diagnosis Date    Abnormal Pap smear of cervix ~2000    repeat pap was  normal    Allergic rhinitis, cause unspecified     Anxiety state, unspecified     panic episodes    C. difficile colitis     CAD (coronary artery disease)     Congestive heart failure (H)     Depressive disorder     Diabetes (H)     HFrEF (heart failure with reduced ejection fraction) (H)     ICD (implantable cardioverter-defibrillator) in place     Osteopenia     Unspecified essential hypertension        FAMILY HISTORY:  Family History   Problem Relation Age of Onset    Diabetes Mother         hypertension, alive at 83    C.A.D. Mother     Coronary Artery Disease Mother     Cancer Father         lung cancer, smoker.   at age 53    Family History Negative Sister     Osteoporosis Sister     Anesthesia Reaction No family hx of     Deep Vein Thrombosis (DVT) No family hx of        SOCIAL HISTORY:  Social History     Tobacco Use    Smoking status: Never    Smokeless tobacco: Never   Vaping Use    Vaping status: Never Used   Substance Use Topics    Alcohol use: Yes     Comment: 1 drink a week    Drug use: No           CURRENT MEDICATIONS:  Current Outpatient Medications   Medication Sig Dispense Refill    ALPRAZolam (XANAX) 0.25 MG tablet Take 1 tablet (0.25 mg) by mouth 3 times daily as needed for anxiety. 10 tablet 0    aspirin 81 MG EC tablet Take 81 mg by mouth every evening      atorvastatin (LIPITOR) 80 MG tablet TAKE 1 TABLET BY MOUTH DAILY 90 tablet 3    Continuous Blood Gluc Sensor (FREESTYLE VANESSA 14 DAY SENSOR) MISC Change every 14 days. 2 each 0    Continuous Glucose Sensor (FREESTYLE VANESSA 3 PLUS SENSOR) MISC Change every 15 days. 6 each 11    empagliflozin (JARDIANCE) 25 MG TABS tablet Take 1 tablet (25 mg) by mouth daily. 90 tablet 1    FLUoxetine (PROZAC) 20 MG capsule Take 2 capsules (40 mg) by mouth daily. 180 capsule 1    glipiZIDE (GLUCOTROL XL) 2.5 MG 24 hr tablet 2 tablets daily with Food 180 tablet 1    metFORMIN (GLUCOPHAGE XR) 500 MG 24 hr tablet Take 3 tablets (1,500 mg) by mouth daily (with  dinner). 270 tablet 1    metoprolol succinate ER (TOPROL XL) 50 MG 24 hr tablet Take 0.5 tablets (25 mg) by mouth daily.      MULTIPLE VITAMIN OR TABS Take by mouth every morning      sacubitril-valsartan (ENTRESTO) 49-51 MG per tablet Take 1 tablet by mouth 2 times daily. 180 tablet 3    spironolactone (ALDACTONE) 25 MG tablet Take 0.5 tablets (12.5 mg) by mouth daily. 45 tablet 3    VITAMIN D PO Take by mouth.             ROS:   Constitutional: No fever, chills, or sweats.  ENT: No visual disturbance, ear ache, epistaxis, sore throat.   Allergies/Immunologic: Negative  Respiratory: No cough, hemoptysis.   Cardiovascular: As per HPI.   GI: As per HPI.   : No urinary frequency, dysuria, or hematuria.   Integument: Negative.   Psychiatric: Negative.   Neuro: Negative.   Endocrinology: negative   Musculoskeletal: negative    EXAM:   GENERAL: Appears comfortable, in no acute distress.   HEENT: Eye symmetrical, no discharge or icterus bilaterally.   CV: RRR, +S1S2, no murmur, rub, or gallop. JVP < 6 .   RESPIRATORY: Respirations regular, even, and unlabored. Lungs CTA throughout.  GI: Soft and non distended  EXTREMITIES: No peripheral edema. Extremities warm and well perfused.   NEUROLOGIC: Alert and interacting appropriately. No focal deficits.   MUSCULOSKELETAL: No joint swelling or tenderness.   SKIN: No jaundice.    Labs:  CBC RESULTS:  Lab Results   Component Value Date    WBC 4.9 12/10/2024    WBC 7.1 09/09/2020    RBC 4.71 12/10/2024    RBC 4.57 09/09/2020    HGB 12.8 12/10/2024    HGB 13.3 09/09/2020    HCT 40.9 12/10/2024    HCT 41.1 09/09/2020    MCV 87 12/10/2024    MCV 90 09/09/2020    MCH 27.2 12/10/2024    MCH 29.1 09/09/2020    MCHC 31.3 (L) 12/10/2024    MCHC 32.4 09/09/2020    RDW 13.5 12/10/2024    RDW 12.5 09/09/2020     12/10/2024     09/09/2020       CMP RESULTS:  Lab Results   Component Value Date     06/19/2025     01/07/2021    POTASSIUM 4.4 06/19/2025    POTASSIUM 5.0  "02/07/2023    POTASSIUM 4.8 01/07/2021    CHLORIDE 105 06/19/2025    CHLORIDE 108 02/07/2023    CHLORIDE 106 01/07/2021    CO2 24 06/19/2025    CO2 29 02/07/2023    CO2 28 01/07/2021    ANIONGAP 11 06/19/2025    ANIONGAP 3 02/07/2023    ANIONGAP 7 01/07/2021     (H) 06/19/2025     (H) 02/16/2023     (H) 02/07/2023     (H) 01/07/2021    BUN 29.8 (H) 06/19/2025    BUN 25 02/07/2023    BUN 19 01/07/2021    CR 0.90 06/19/2025    CR 0.90 01/07/2021    GFRESTIMATED 68 06/19/2025    GFRESTIMATED 60 (L) 12/06/2024    GFRESTIMATED 66 01/07/2021    GFRESTBLACK 77 01/07/2021    POONAM 9.3 06/19/2025    POONAM 9.5 01/07/2021    BILITOTAL 0.5 12/10/2024    BILITOTAL 0.4 09/09/2020    ALBUMIN 4.2 12/10/2024    ALBUMIN 3.7 07/06/2022    ALBUMIN 3.7 09/09/2020    ALKPHOS 102 12/10/2024    ALKPHOS 90 09/09/2020    ALT 36 12/10/2024    ALT 33 09/09/2020    AST 18 12/10/2024    AST 14 09/09/2020        INR RESULTS:  Lab Results   Component Value Date    INR 1.03 12/09/2024       No components found for: \"CK\"  Lab Results   Component Value Date    MAG 2.0 12/10/2024     Lab Results   Component Value Date    NTBNP 1,994 (H) 12/04/2024     @BRIEFLABR (dig)@    Exercise Stress test: 12/9/24    Submaximal ECG stress test     75% maximal predicted HR achieved. The test was terminated due to general  fatigue.     Normal heart rate response to exercise. Normal blood pressure response to  exercise.     The patient did not report angina during exercise.     No ECG evidence of ischemia. Non specific ST-T changes at baseline remained  unchanged with stress. Occasional premature ventricular complexes noted during  stress test.     Good exercise tolerance.    Echo 12/24  Interpretation Summary  Left ventricular function is decreased. The ejection fraction is 45% (mildly  reduced). The basal-mid inferior and inferoseptal segments are akinetic  Global right ventricular function is normal.  No significant valvular abnormalities " abnormalities present.  Estimated mean right atrial pressure is normal.  No pericardial effusion is present.  _____________________________      Assessment and Plan:    In summary,Mraianne  is a 70 year old here for a CORE follow up. She saw Dr. Steve in March, no medication changes made and follow up in one year.  Labs reviewed     1.  Chronic systolic heart failure secondary to ICM.  Stage C  NYHA Class III  ACEi/ARB: -Entresto 49/51 mg BID   BB: yes-  Metoprolol 25 mg   Aldosterone antagonist: 12.5 mg - unable to go higher  SCD prophylaxis: does not meet criteria for implant  Fluid status: euvolemic  Anticoagulation:   Antiplatelet:  ASA dose   Sleep apnea:  NSAID use:  Contraindicated.  Avoid use.  Remote Monitoring:none  SGLT 2 - Jardiance 25 mg      2.  Other comordbid conditions:      #T2DM - PCP to manage - glipizide , Jardiance, metformin    #HTN- Entresto and Metoprolol- Entresto 49/51 mg BID     #GERD- followed by PCP     3.  Follow-up  No med changes today   Follow up CORE 6 months        Belinda MOORE, CNP  [facility]     - I spent 35 minutes in the care of this patient today including: time spent face to face with the patient, coordinating care, reviewing testing and completing documentation. >50% was spent with the patient and coordinating care.

## 2025-07-09 ENCOUNTER — TELEPHONE (OUTPATIENT)
Dept: PHARMACY | Facility: OTHER | Age: 70
End: 2025-07-09
Payer: COMMERCIAL

## 2025-07-09 NOTE — TELEPHONE ENCOUNTER
[facility] Recruitment: [facility] insurance     Referral outreach attempt #4 on July 9, 2025      Outcome: visit scheduled for 7/11 but patient canceled the appointment. I made one more attempt to reschedule and had to leave patient a message and also sent a Postcard on the [employer] message.       MT appointment was canceled, we made one more attempt to reschedule.     CX 7/11 - ! [facility] part d, [facility]    MyChart Message Sent             [facility]  -[facility]  [phone]

## 2025-08-13 ENCOUNTER — MYC MEDICAL ADVICE (OUTPATIENT)
Dept: CARDIOLOGY | Facility: CLINIC | Age: 70
End: 2025-08-13
Payer: COMMERCIAL

## 2025-08-13 DIAGNOSIS — Z95.1 S/P CABG (CORONARY ARTERY BYPASS GRAFT): ICD-10-CM

## 2025-08-14 RX ORDER — METOPROLOL SUCCINATE 25 MG/1
25 TABLET, EXTENDED RELEASE ORAL DAILY
Qty: 90 TABLET | Refills: 3 | Status: SHIPPED | OUTPATIENT
Start: 2025-08-14

## (undated) DEVICE — LINEN TOWEL PACK X30 5481

## (undated) DEVICE — SU PROLENE 6-0 C-1DA 4X24" M8726

## (undated) DEVICE — BLADE SAW STERNAL 20X30MM KM-32

## (undated) DEVICE — KIT ENDO VASOVIEW HEMOPRO 2 VH-4000

## (undated) DEVICE — SU PROLENE 7-0 BV-1DA 24" 8702H

## (undated) DEVICE — FASTENER CATH BALLOON CLAMPX2 STATLOCK 0684-00-493

## (undated) DEVICE — TUBING INSUFFLATION PNEUMOCLEAR 0620050100

## (undated) DEVICE — SU PROLENE 3-0 SHDA 36" 8522H

## (undated) DEVICE — DRAIN CHEST TUBE 32FR STR 8032

## (undated) DEVICE — SUCTION DRY CHEST DRAIN OASIS 3600-100

## (undated) DEVICE — SU ETHIBOND 0 CT-1 CR 8X18" CX21D

## (undated) DEVICE — DRAPE SLUSH/WARMER 66X44" ORS-320

## (undated) DEVICE — ELECTRODE DEFIB CADENCE 22550R

## (undated) DEVICE — CLIP HORIZON MED BLUE 002200

## (undated) DEVICE — SUCTION CATH AIRLIFE TRI-FLO W/CONTROL PORT 14FR  T60C

## (undated) DEVICE — SU PLEDGET SOFT TFE 3/8"X3/26"X1/16" PCP40

## (undated) DEVICE — SU ETHIBOND 3-0 BBDA 36" X588H

## (undated) DEVICE — PACK HEART LEFT CUSTOM

## (undated) DEVICE — TAPE MEDIPORE 4"X2YD 2864

## (undated) DEVICE — CLIP HORIZON SM RED WIDE SLOT 001201

## (undated) DEVICE — PREP CHLORAPREP 26ML TINTED HI-LITE ORANGE 930815

## (undated) DEVICE — GLOVE PROTEXIS POWDER FREE 7.0 ORTHOPEDIC 2D73ET70

## (undated) DEVICE — SPECIMEN CONTAINER 5OZ STERILE 2600SA

## (undated) DEVICE — DRSG ADAPTIC 3X16"  6114

## (undated) DEVICE — DEFIB PRO-PADZ LVP LQD GEL ADULT 8900-2105-01

## (undated) DEVICE — LEAD ELEC MYOCARDIO PACING TEMPORARY MEDTRONIC

## (undated) DEVICE — ANTIFOG SOLUTION W/FOAM PAD 31142527

## (undated) DEVICE — BNDG ELASTIC 4"X5YDS STERILE 6611-4S

## (undated) DEVICE — CABLE PACING ALLIGATOR CLIP 12FT 5833SL

## (undated) DEVICE — SPONGE LAP 12X12" X8425

## (undated) DEVICE — TIES BANDING T50R

## (undated) DEVICE — DRSG BIOPATCH GERMICIDAL SPLIT SPONGE 4MM MED 4150

## (undated) DEVICE — ESU PENCIL SMOKE EVAC W/ROCKER SWITCH 0703-047-000

## (undated) DEVICE — DRAPE IOBAN INCISE 23X17" 6650EZ

## (undated) DEVICE — BLOWER/MISTER CLEARVIEW 22150

## (undated) DEVICE — CLIP HORIZON LG ORANGE 004200

## (undated) DEVICE — SOL ADH LIQUID BENZOIN SWAB 0.6ML C1544

## (undated) DEVICE — SU PROLENE 5-0 RB-2DA 30" 8710H

## (undated) DEVICE — SYR 01ML 27GA 0.5" NDL TBC 309623

## (undated) DEVICE — TUBING SUCTION DRAINAGE PLEURAL DUAL 8884714200

## (undated) DEVICE — DRSG TEGADERM 4X4 3/4" 1626W

## (undated) DEVICE — DRSG ABDOMINAL 07 1/2X8" 7197D

## (undated) DEVICE — SU PROLENE 6-0 C-1DA 30" 8706H

## (undated) DEVICE — SU PROLENE 4-0 SHDA 36" 8521H

## (undated) DEVICE — INTRO GLIDESHEATH SLENDER 6FR 10X45CM 60-1060

## (undated) DEVICE — SOL NACL 0.9% IRRIG 1000ML BOTTLE 2F7124

## (undated) DEVICE — CANNULA VESSEL DLP  30001

## (undated) DEVICE — SPONGE RAY-TEC 4X8" 7318

## (undated) DEVICE — BLADE KNIFE BEAVER MINI STR BEAVER6900

## (undated) DEVICE — DRAIN CHEST TUBE 36FR STR 8036

## (undated) DEVICE — SU VICRYL 3-0 FS-1 27" J442H

## (undated) DEVICE — SU PROLENE 7-0 BV-1DA 4X24" M8702

## (undated) DEVICE — CLIP SPRING FOGARTY SOFTJAW CSOFT6

## (undated) DEVICE — BLADE KNIFE BEAVER MICROSHARP GREEN 377515

## (undated) DEVICE — PUNCH AORTIC 4.0MMX8" RCB40

## (undated) DEVICE — ESU HOLSTER PLASTIC DISP E2400

## (undated) DEVICE — SU VICRYL 2-0 CT-1 27" UND J259H

## (undated) DEVICE — CANISTER WOUND VAC W/GEL 1000ML M8275093/5

## (undated) DEVICE — SUCTION MANIFOLD NEPTUNE 2 SYS 4 PORT 0702-020-000

## (undated) DEVICE — SU VICRYL 0 CTX 36" J370H

## (undated) DEVICE — SU PROLENE 4-0 RB-1DA 36" 8557H

## (undated) DEVICE — DECANTER BAG 2002S

## (undated) DEVICE — Device

## (undated) DEVICE — SU ETHIBOND 2-0 SHDA 30" X563H

## (undated) DEVICE — PROTECTOR ARM ONE-STEP TRENDELENBURG 40418

## (undated) DEVICE — PREP SCRUB SOL EXIDINE 4% CHG 4OZ 29002-404

## (undated) DEVICE — LEAD PACER MYOCARDIAL BIPOLAR TEMPORARY 53CM 6495F

## (undated) DEVICE — SOL NACL 0.9% IRRIG 3000ML BAG 2B7477

## (undated) DEVICE — BLADE KNIFE SURG 15C 371716

## (undated) DEVICE — MANIFOLD KIT ANGIO AUTOMATED 014613

## (undated) DEVICE — CONNECTOR SIMS TUBING FOR CHEST TUBES 361

## (undated) DEVICE — KIT HAND CONTROL ACIST 014644 AR-P54

## (undated) DEVICE — DRAPE SLEEVE 599

## (undated) DEVICE — 9FR X 13CM SAFESHEATH II TEAR-AWAY VALVED SHEATH SYSTEM, WITH SIDE PORT, 0.038IN GUIDEWIRE, 19.5CM DILATOR

## (undated) DEVICE — ADH SKIN CLOSURE PREMIERPRO EXOFIN 1.0ML 3470

## (undated) DEVICE — SU RETRACT-O-TAPE 1041

## (undated) DEVICE — 30IN (76CM) EXCITE FLEXIBLE, CLEAR NYLON/POLYURETHANE CO-EXTRUDED CONTRAST INJECTION TUBING, FIXED MALE CONNECTOR

## (undated) DEVICE — DRAIN CHEST TUBE RIGHT ANGLED 28FR 8128

## (undated) DEVICE — WIPES FOLEY CARE SURESTEP PROVON DFC100

## (undated) DEVICE — SURGICEL HEMOSTAT 4X8" 1952

## (undated) DEVICE — DRSG WOUND VAC SPONGE MED BLACK M8275052/5

## (undated) DEVICE — BNDG ELASTIC 6"X5YDS STERILE 6611-6S

## (undated) DEVICE — SU SILK 0 TIE 6X30" A306H

## (undated) DEVICE — SLEEVE TR BAND RADIAL COMPRESSION DEVICE 24CM TRB24-REG

## (undated) DEVICE — DRSG DRAIN 4X4" 7086

## (undated) DEVICE — LINEN TOWEL PACK X6 WHITE 5487

## (undated) DEVICE — PACK ADULT HEART UMMC PV15CG92D

## (undated) RX ORDER — HEPARIN SODIUM 1000 [USP'U]/ML
INJECTION, SOLUTION INTRAVENOUS; SUBCUTANEOUS
Status: DISPENSED
Start: 2022-05-19

## (undated) RX ORDER — CEFAZOLIN SODIUM 1 G/3ML
INJECTION, POWDER, FOR SOLUTION INTRAMUSCULAR; INTRAVENOUS
Status: DISPENSED
Start: 2022-10-04

## (undated) RX ORDER — HEPARIN SODIUM 1000 [USP'U]/ML
INJECTION, SOLUTION INTRAVENOUS; SUBCUTANEOUS
Status: DISPENSED
Start: 2022-05-24

## (undated) RX ORDER — FENTANYL CITRATE 50 UG/ML
INJECTION, SOLUTION INTRAMUSCULAR; INTRAVENOUS
Status: DISPENSED
Start: 2022-10-04

## (undated) RX ORDER — METOPROLOL TARTRATE 25 MG/1
TABLET, FILM COATED ORAL
Status: DISPENSED
Start: 2023-02-28

## (undated) RX ORDER — NICARDIPINE HCL-0.9% SOD CHLOR 1 MG/10 ML
SYRINGE (ML) INTRAVENOUS
Status: DISPENSED
Start: 2022-05-19

## (undated) RX ORDER — FENTANYL CITRATE 50 UG/ML
INJECTION, SOLUTION INTRAMUSCULAR; INTRAVENOUS
Status: DISPENSED
Start: 2022-05-24

## (undated) RX ORDER — NITROGLYCERIN 5 MG/ML
VIAL (ML) INTRAVENOUS
Status: DISPENSED
Start: 2022-05-19

## (undated) RX ORDER — FENTANYL CITRATE 0.05 MG/ML
INJECTION, SOLUTION INTRAMUSCULAR; INTRAVENOUS
Status: DISPENSED
Start: 2022-05-24

## (undated) RX ORDER — GABAPENTIN 100 MG/1
CAPSULE ORAL
Status: DISPENSED
Start: 2022-05-24

## (undated) RX ORDER — CEFAZOLIN SODIUM 2 G/100ML
INJECTION, SOLUTION INTRAVENOUS
Status: DISPENSED
Start: 2022-10-04

## (undated) RX ORDER — FENTANYL CITRATE 50 UG/ML
INJECTION, SOLUTION INTRAMUSCULAR; INTRAVENOUS
Status: DISPENSED
Start: 2022-05-19

## (undated) RX ORDER — ASPIRIN 81 MG/1
TABLET, CHEWABLE ORAL
Status: DISPENSED
Start: 2022-05-24

## (undated) RX ORDER — CEFAZOLIN SODIUM 1 G/3ML
INJECTION, POWDER, FOR SOLUTION INTRAMUSCULAR; INTRAVENOUS
Status: DISPENSED
Start: 2022-05-24

## (undated) RX ORDER — CEFAZOLIN SODIUM/WATER 2 G/20 ML
SYRINGE (ML) INTRAVENOUS
Status: DISPENSED
Start: 2022-05-24

## (undated) RX ORDER — HYDROMORPHONE HYDROCHLORIDE 1 MG/ML
INJECTION, SOLUTION INTRAMUSCULAR; INTRAVENOUS; SUBCUTANEOUS
Status: DISPENSED
Start: 2022-05-24

## (undated) RX ORDER — NITROGLYCERIN 0.4 MG/1
TABLET SUBLINGUAL
Status: DISPENSED
Start: 2023-02-28

## (undated) RX ORDER — ACETAMINOPHEN 325 MG/1
TABLET ORAL
Status: DISPENSED
Start: 2022-05-24

## (undated) RX ORDER — SODIUM CHLORIDE 9 MG/ML
INJECTION, SOLUTION INTRAVENOUS
Status: DISPENSED
Start: 2022-10-04

## (undated) RX ORDER — FAMOTIDINE 20 MG/1
TABLET, FILM COATED ORAL
Status: DISPENSED
Start: 2022-05-24

## (undated) RX ORDER — PAPAVERINE HYDROCHLORIDE 30 MG/ML
INJECTION INTRAMUSCULAR; INTRAVENOUS
Status: DISPENSED
Start: 2022-05-24

## (undated) RX ORDER — ASPIRIN 325 MG
TABLET ORAL
Status: DISPENSED
Start: 2022-05-19

## (undated) RX ORDER — IVABRADINE 5 MG/1
TABLET, FILM COATED ORAL
Status: DISPENSED
Start: 2023-02-28